# Patient Record
Sex: FEMALE | Race: WHITE | NOT HISPANIC OR LATINO | Employment: OTHER | URBAN - METROPOLITAN AREA
[De-identification: names, ages, dates, MRNs, and addresses within clinical notes are randomized per-mention and may not be internally consistent; named-entity substitution may affect disease eponyms.]

---

## 2017-01-10 ENCOUNTER — PATIENT OUTREACH (OUTPATIENT)
Dept: OTHER | Facility: OTHER | Age: 72
End: 2017-01-10
Payer: MEDICARE

## 2017-01-10 NOTE — PROGRESS NOTES
Last 5 Patient Entered Readings                                                               Current 30 Day Average: 153/74     Recent Readings 1/10/2017 1/9/2017 1/9/2017 1/8/2017 1/8/2017    Systolic BP (mmHg) 150 158 142 158 169    Diastolic BP (mmHg) 79 78 80 77 86        Called Ms. Nguyen to introduce her into the Hypertension Digital Medicine Program.     She feels the [employer] cuff is giving falsely elevated BP readings compared to her [facility] HTN cuff. She confirms she is using it correctly. She said her arm was measured so she is confident it fits appropriately. She has an gynecology appointment on 1/26/17. She will bring her i[employer] cuff to this appointment and will have the check the readings and compare it to a manual reading. I will follow up with her after this visit. Asked that she continue taking readings often even though they may be elevated.     Reviewed patient's medications and verified allergies on file.     Reviewed questionnaire:  Depression: not indicated  Sleep apnea: not indicated    Explained that we expect her to obtain several blood pressures/week at random times of day. Also asked that the BP be taken at least 1 hour after taking BP medications.     Explained that our goal is to get her BP to consistently below 140/90mmHg.     Patient and I agreed that the patient will take her BP daily to every other day at varying times of the day.     I will plan to follow-up with the patient in 2 weeks.    Emailed patient link to [facility] webpage as well as my direct phone number in case he/ she has in questions.

## 2017-01-10 NOTE — LETTER
"   Veronique Bee, PharmD  [address] [place] [address]     Dear Clemencia Nguyen,    Welcome to the [facility]!         My name is Veronique Bee and I am your dedicated clinical pharmacist.  As an expert in medication management, I will help ensure that the medications you are taking continue to provide you with the intended benefits.      This is Miriam Jose and she will be your health  for the duration of the program.  Her job is to help you identify lifestyle changes to improve your blood pressure control.  You will talk about nutrition, exercise, and other ways that you may be able to adjust your current habits to better your health. Together, we will work to improve your overall health and encourage you to meet your goals for a healthier lifestyle.    What we expect from YOU:    You will need to take blood pressure readings multiple times a week and no less than one reading per week.   It is important that you take your measurements at different times during the day, when possible.     What you should expect from your Digital Medicine Care Team:   We will provide you with education about high blood pressure, including lifestyle changes that could help you to control your blood pressure.   We will review your weekly readings and provide you with monthly blood pressure progress reports after you have been in the program for more than 30 days.   We will send monthly progress reports on your blood pressure control to your physician so they can follow along with your progress as well.    You will be able to reach me by phone at   or through your [facility] account by clicking "Send a message to your doctor's office" on the home screen then selecting my name in the "To the office of:" field.     I look forward to working with you to achieve your blood pressure goals!    Sincerely,    Veronique Bee PharmD  Your personal Clinical Pharmacist    Please " visit [email or web address] to learn more about high blood pressure and what you can do lower your blood pressure.                                                                                         Clemencia Nguyen  [address]  [place] [address]

## 2017-01-17 ENCOUNTER — PATIENT OUTREACH (OUTPATIENT)
Dept: OTHER | Facility: OTHER | Age: 72
End: 2017-01-17
Payer: MEDICARE

## 2017-01-17 NOTE — PROGRESS NOTES
"Last 5 Patient Entered Readings                                                               Current 30 Day Average: 153/75     Recent Readings 1/17/2017 1/16/2017 1/16/2017 1/15/2017 1/15/2017    Systolic BP (mmHg) 146 162 157 151 157    Diastolic BP (mmHg) 79 80 75 71 79        Follow up with Ms. Clemencia Nguyen completed. Patient is doing well. She is still having different readings between her home cuff and iHealth cuff (sometimes 20-30 points difference). However, sometimes the readings are "close". She has an appointment on Thursday, 1/26, and will bring both cuffs to have them compared to a manual reading. Patient did not have any further questions or concerns. I will follow up in a few weeks to see how she is doing and progressing.        "

## 2017-01-19 DIAGNOSIS — Z12.31 VISIT FOR SCREENING MAMMOGRAM: Primary | ICD-10-CM

## 2017-01-26 ENCOUNTER — HOSPITAL ENCOUNTER (OUTPATIENT)
Dept: RADIOLOGY | Facility: HOSPITAL | Age: 72
Discharge: HOME | End: 2017-01-26
Attending: OBSTETRICS & GYNECOLOGY
Payer: MEDICARE

## 2017-01-26 DIAGNOSIS — Z12.31 VISIT FOR SCREENING MAMMOGRAM: ICD-10-CM

## 2017-01-26 PROCEDURE — 77067 SCR MAMMO BI INCL CAD: CPT | Mod: 26,,, | Performed by: RADIOLOGY

## 2017-01-26 PROCEDURE — 77063 BREAST TOMOSYNTHESIS BI: CPT | Mod: 26,,, | Performed by: RADIOLOGY

## 2017-01-26 PROCEDURE — 77067 SCR MAMMO BI INCL CAD: CPT | Mod: TC

## 2017-01-31 ENCOUNTER — PATIENT OUTREACH (OUTPATIENT)
Dept: OTHER | Facility: OTHER | Age: 72
End: 2017-01-31
Payer: MEDICARE

## 2017-01-31 ENCOUNTER — INITIAL CONSULT (OUTPATIENT)
Dept: DERMATOLOGY | Facility: CLINIC | Age: 72
End: 2017-01-31
Payer: MEDICARE

## 2017-01-31 VITALS — BODY MASS INDEX: 27.38 KG/M2 | HEIGHT: 61 IN | WEIGHT: 145 LBS

## 2017-01-31 DIAGNOSIS — L81.7 SCHAMBERG'S CAPILLARITIS: ICD-10-CM

## 2017-01-31 DIAGNOSIS — L82.1 SEBORRHEIC KERATOSES: Primary | ICD-10-CM

## 2017-01-31 DIAGNOSIS — I10 ESSENTIAL HYPERTENSION: Primary | ICD-10-CM

## 2017-01-31 DIAGNOSIS — L73.8 SEBACEOUS HYPERPLASIA: ICD-10-CM

## 2017-01-31 PROCEDURE — 99212 OFFICE O/P EST SF 10 MIN: CPT | Mod: PBBFAC,PO | Performed by: DERMATOLOGY

## 2017-01-31 PROCEDURE — 99999 PR PBB SHADOW E&M-EST. PATIENT-LVL II: CPT | Mod: PBBFAC,,, | Performed by: DERMATOLOGY

## 2017-01-31 PROCEDURE — 99203 OFFICE O/P NEW LOW 30 MIN: CPT | Mod: S$PBB,,, | Performed by: DERMATOLOGY

## 2017-01-31 RX ORDER — VALSARTAN 160 MG/1
160 TABLET ORAL DAILY
Qty: 90 TABLET | Refills: 3 | Status: SHIPPED | OUTPATIENT
Start: 2017-01-31 | End: 2017-11-27 | Stop reason: SDUPTHER

## 2017-01-31 NOTE — PROGRESS NOTES
Subjective:       Patient ID:  Clemencia Nguyen is a 71 y.o. female who presents for   Chief Complaint   Patient presents with    Skin Check    Rash    Lesion     HPI Comments: Pt here for IV skin check. She has never been seen by a dermatologist    Lesions on right upper arms for 6 months, asymptomatic, not treated    Rash on bilateral lower legs for 1 year, asymptomatic, not treated    No phx of skin cancer  No fhx of skin cancer    Takes fish oil daily, otherwise denies blood thinners    Review of Systems   Skin: Positive for rash. Negative for itching, daily sunscreen use and activity-related sunscreen use.        Objective:    Physical Exam   HENT:   Head:       Skin:                 Diagram Legend     Erythematous scaling macule/papule c/w actinic keratosis       Vascular papule c/w angioma      Pigmented verrucoid papule/plaque c/w seborrheic keratosis      Yellow umbilicated papule c/w sebaceous hyperplasia      Irregularly shaped tan macule c/w lentigo     1-2 mm smooth white papules consistent with Milia      Movable subcutaneous cyst with punctum c/w epidermal inclusion cyst      Subcutaneous movable cyst c/w pilar cyst      Firm pink to brown papule c/w dermatofibroma      Pedunculated fleshy papule(s) c/w skin tag(s)      Evenly pigmented macule c/w junctional nevus     Mildly variegated pigmented, slightly irregular-bordered macule c/w mildly atypical nevus      Flesh colored to evenly pigmented papule c/w intradermal nevus       Pink pearly papule/plaque c/w basal cell carcinoma      Erythematous hyperkeratotic cursted plaque c/w SCC      Surgical scar with no sign of skin cancer recurrence      Open and closed comedones      Inflammatory papules and pustules      Verrucoid papule consistent consistent with wart     Erythematous eczematous patches and plaques     Dystrophic onycholytic nail with subungual debris c/w onychomycosis     Umbilicated papule    Erythematous-base heme-crusted tan  verrucoid plaque consistent with inflamed seborrheic keratosis     Erythematous Silvery Scaling Plaque c/w Psoriasis     See annotation      Assessment / Plan:        Seborrheic keratoses, face/trunk/extremities  These are benign inherited growths without a malignant potential. Reassurance given to patient. No treatment is necessary.       Schamberg's capillaritis  Reassurance, discussed etiology red blood cell extravasation  Discussed association and exacerbation of condition with NSAIDs and ASA  Recommend OTC ascorbic acid 500mg bid and rutoside/rutin 50mg bid- pt declines, does not want to take any more meds      Sebaceous hyperplasia, face  Reassurance given to patient. No treatment is necessary.   Treatment of benign, asymptomatic lesions may be considered cosmetic.             Return if symptoms worsen or fail to improve.

## 2017-01-31 NOTE — LETTER
January 31, 2017      Marcie Mccloud MD  [address] [facility] [address] [place] [address]           [facility]  [address] [facility] [address] [place] [address]  Phone: [phone]  Fax: [phone]          Patient: Clemencia Nguyen   MR Number: [id number]   YOB: 1945   Date of Visit: 1/31/2017       Dear Dr. Marcie Mccloud:    Thank you for referring Clemencia Nguyen to me for evaluation. Attached you will find relevant portions of my assessment and plan of care.    If you have questions, please do not hesitate to call me. I look forward to following Clemencia Nguyen along with you.    Sincerely,    Angela Shahid MD    Enclosure  CC:  No Recipients    If you would like to receive this communication electronically, please contact [email or web address] or [phone] to request more information on [facility] access.    For providers and/or their staff who would like to refer a patient to [facility], please contact us through our one-stop-shop provider referral line, [facility] [place], at [phone].    If you feel you have received this communication in error or would no longer like to receive these types of communications, please e-mail [email or web address]

## 2017-01-31 NOTE — PROGRESS NOTES
Last 5 Patient Entered Readings                                                               Current 30 Day Average: 155/76     Recent Readings 1/31/2017 1/31/2017 1/30/2017 1/30/2017 1/29/2017    Systolic BP (mmHg) 149 167 142 156 160    Diastolic BP (mmHg) 69 79 67 85 76    Pulse 80 84 80 77 77        Ms. Nguyen BP is not at goal. She denies any symptoms. She did have her BP checked by multiple nurses on different occassions for appointments and the readings are very similar to her HDMP cuff. She understands that her BP is not at goal. Will start valsartan today.     Current HTN regimen:  Outpatient Hypertension Medications as of 1/31/2017             felodipine (PLENDIL) 5 MG 24 hr tablet TAKE 1 TABLET ONE TIME DAILY    valsartan (DIOVAN) 160 MG tablet Take 1 tablet (160 mg total) by mouth once daily.          Will continue to monitor regularly. Will follow up in 3-4 weeks, sooner if BP begins to trend upward or downward.    Patient has my contact information and knows to call with any concerns or clinical changes.

## 2017-02-08 DIAGNOSIS — I10 ESSENTIAL HYPERTENSION: ICD-10-CM

## 2017-02-08 RX ORDER — FELODIPINE 5 MG/1
TABLET, EXTENDED RELEASE ORAL
Qty: 90 TABLET | Refills: 1 | Status: SHIPPED | OUTPATIENT
Start: 2017-02-08 | End: 2017-05-19 | Stop reason: ALTCHOICE

## 2017-02-09 ENCOUNTER — PATIENT OUTREACH (OUTPATIENT)
Dept: OTHER | Facility: OTHER | Age: 72
End: 2017-02-09
Payer: MEDICARE

## 2017-02-09 NOTE — PROGRESS NOTES
Last 5 Patient Entered Readings                                                               Current 30 Day Average: 153/75     Recent Readings 2/9/2017 2/8/2017 2/8/2017 2/7/2017 2/7/2017    Systolic BP (mmHg) 140 149 156 143 157    Diastolic BP (mmHg) 70 69 71 71 73    Pulse 73 87 84 78 83        Follow up with Ms. Clemencia Nguyen completed. Ms. Nguyen is doing well. She started valsartin about a week ago and reports no issues so far. She is watching her sodium intake and trying to stay active. Patient did not have any further questions or concerns. I will follow up in a few weeks to see how she is doing and progressing.

## 2017-02-22 ENCOUNTER — PATIENT OUTREACH (OUTPATIENT)
Dept: OTHER | Facility: OTHER | Age: 72
End: 2017-02-22
Payer: MEDICARE

## 2017-02-22 NOTE — PROGRESS NOTES
Last 5 Patient Entered Readings                                                               Current 30 Day Average: 149/72     Recent Readings 2/21/2017 2/21/2017 2/20/2017 2/19/2017 2/19/2017    Systolic BP (mmHg) 144 156 133 149 132    Diastolic BP (mmHg) 68 76 61 68 70    Pulse 70 74 92 79 78        Ms. Nguyen's BP is not at goal, but is improving on valsartan 160 mg QD. Patient denies any symptoms or concerns.      Current HTN regimen:  Hypertension Medications             felodipine (PLENDIL) 5 MG 24 hr tablet TAKE 1 TABLET ONE TIME DAILY    valsartan (DIOVAN) 160 MG tablet Take 1 tablet (160 mg total) by mouth once daily.        Changes made today:  May increase valsartan to 320 mg QD    Will continue to monitor regularly. Will follow up in 2-3 weeks, sooner if BP begins to trend upward or downward.    Patient has my contact information and knows to call with any concerns or clinical changes.

## 2017-03-02 ENCOUNTER — PATIENT OUTREACH (OUTPATIENT)
Dept: OTHER | Facility: OTHER | Age: 72
End: 2017-03-02
Payer: MEDICARE

## 2017-03-02 NOTE — PROGRESS NOTES
Last 5 Patient Entered Readings                                                               Current 30 Day Average: 145/71     Recent Readings 3/2/2017 3/1/2017 2/28/2017 2/28/2017 2/27/2017    Systolic BP (mmHg) 148 149 132 135 141    Diastolic BP (mmHg) 74 74 69 71 69    Pulse 90 82 81 81 76        Follow up with Ms. Clemencia Nguyen completed. Ms. Nguyen is doing well. She is still attending [employer] three times a week and has cut most salt out of her diet. Patient did not have any further questions or concerns. I will follow up in a few weeks to see how she is doing and progressing.

## 2017-03-13 ENCOUNTER — LAB VISIT (OUTPATIENT)
Dept: LAB | Facility: HOSPITAL | Age: 72
End: 2017-03-13
Payer: MEDICARE

## 2017-03-13 DIAGNOSIS — N25.81 SECONDARY HYPERPARATHYROIDISM (OF RENAL ORIGIN): ICD-10-CM

## 2017-03-13 DIAGNOSIS — E55.9 UNSPECIFIED VITAMIN D DEFICIENCY: Primary | ICD-10-CM

## 2017-03-13 DIAGNOSIS — N18.30 CHRONIC KIDNEY DISEASE, STAGE III (MODERATE): ICD-10-CM

## 2017-03-13 DIAGNOSIS — I10 BENIGN ESSENTIAL HYPERTENSION: ICD-10-CM

## 2017-03-13 LAB
25(OH)D3+25(OH)D2 SERPL-MCNC: 33 NG/ML
ALBUMIN SERPL BCP-MCNC: 3.3 G/DL
ANION GAP SERPL CALC-SCNC: 6 MMOL/L
BASOPHILS # BLD AUTO: 0 K/UL
BASOPHILS NFR BLD: 0 %
BUN SERPL-MCNC: 19 MG/DL
CALCIUM SERPL-MCNC: 9.4 MG/DL
CHLORIDE SERPL-SCNC: 101 MMOL/L
CO2 SERPL-SCNC: 31 MMOL/L
CREAT SERPL-MCNC: 1.4 MG/DL
DIFFERENTIAL METHOD: ABNORMAL
EOSINOPHIL # BLD AUTO: 0 K/UL
EOSINOPHIL NFR BLD: 0 %
ERYTHROCYTE [DISTWIDTH] IN BLOOD BY AUTOMATED COUNT: 13.8 %
EST. GFR  (AFRICAN AMERICAN): 43 ML/MIN/1.73 M^2
EST. GFR  (NON AFRICAN AMERICAN): 37.3 ML/MIN/1.73 M^2
GLUCOSE SERPL-MCNC: 178 MG/DL
HCT VFR BLD AUTO: 43.4 %
HGB BLD-MCNC: 14.2 G/DL
LYMPHOCYTES # BLD AUTO: 1.1 K/UL
LYMPHOCYTES NFR BLD: 28.4 %
MCH RBC QN AUTO: 28 PG
MCHC RBC AUTO-ENTMCNC: 32.7 %
MCV RBC AUTO: 86 FL
MONOCYTES # BLD AUTO: 0.3 K/UL
MONOCYTES NFR BLD: 8.3 %
NEUTROPHILS # BLD AUTO: 2.5 K/UL
NEUTROPHILS NFR BLD: 63.3 %
PHOSPHATE SERPL-MCNC: 3 MG/DL
PLATELET # BLD AUTO: 127 K/UL
PMV BLD AUTO: 9.3 FL
POTASSIUM SERPL-SCNC: 3.8 MMOL/L
PTH-INTACT SERPL-MCNC: 88 PG/ML
RBC # BLD AUTO: 5.07 M/UL
SODIUM SERPL-SCNC: 138 MMOL/L
WBC # BLD AUTO: 3.98 K/UL

## 2017-03-13 PROCEDURE — 36415 COLL VENOUS BLD VENIPUNCTURE: CPT | Mod: PO

## 2017-03-13 PROCEDURE — 85025 COMPLETE CBC W/AUTO DIFF WBC: CPT

## 2017-03-13 PROCEDURE — 82306 VITAMIN D 25 HYDROXY: CPT

## 2017-03-13 PROCEDURE — 83970 ASSAY OF PARATHORMONE: CPT

## 2017-03-13 PROCEDURE — 80069 RENAL FUNCTION PANEL: CPT

## 2017-03-23 ENCOUNTER — PATIENT OUTREACH (OUTPATIENT)
Dept: OTHER | Facility: OTHER | Age: 72
End: 2017-03-23
Payer: MEDICARE

## 2017-03-23 NOTE — PROGRESS NOTES
Last 5 Patient Entered Readings                                                               Current 30 Day Average: 141/70     Recent Readings 3/27/2017 3/26/2017 3/25/2017 3/24/2017 3/23/2017    Systolic BP (mmHg) 147 124 137 137 139    Diastolic BP (mmHg) 88 64 70 76 77    Pulse 88 87 79 82 90      Last average 145/71    Follow up with Ms. Clemencia Nguyen completed. Ms. Nguyen is doing well. She is out of town this week in [place] for an [employer] trip. Patient reports that she had a bad cold for the past 8 days and was taking OTC drugs for relief. She feels much better now and will get back into her daily routine once she returns home from her trip. Patient did not have any further questions or concerns. I will follow up in a few weeks to see how she is doing and progressing.

## 2017-03-24 ENCOUNTER — PATIENT OUTREACH (OUTPATIENT)
Dept: OTHER | Facility: OTHER | Age: 72
End: 2017-03-24
Payer: MEDICARE

## 2017-03-24 NOTE — PROGRESS NOTES
Last 5 Patient Entered Readings                                                               Current 30 Day Average: 142/71     Recent Readings 3/24/2017 3/23/2017 3/22/2017 3/21/2017 3/20/2017    Systolic BP (mmHg) 137 139 150 129 145    Diastolic BP (mmHg) 76 77 81 71 74    Pulse 82 90 100 79 85        MsAnusha Alcocer BP is not at goal, but continues to improve on valsartan. She denies any symptoms. She had a bad cold last week and feels that may have been the reason for elevated readings.     Current HTN regimen:  Hypertension Medications             felodipine (PLENDIL) 5 MG 24 hr tablet TAKE 1 TABLET ONE TIME DAILY    valsartan (DIOVAN) 160 MG tablet Take 1 tablet (160 mg total) by mouth once daily.        Changes made today:  None if still not at goal at next follow up will increase valsartan to 320 QD    Will continue to monitor regularly. Will follow up in about 1 month, sooner if BP begins to trend upward or downward.    Patient has my contact information and knows to call with any concerns or clinical changes.

## 2017-04-01 DIAGNOSIS — K21.9 GASTROESOPHAGEAL REFLUX DISEASE WITHOUT ESOPHAGITIS: ICD-10-CM

## 2017-04-02 RX ORDER — PANTOPRAZOLE SODIUM 40 MG/1
TABLET, DELAYED RELEASE ORAL
Qty: 90 TABLET | Refills: 1 | Status: SHIPPED | OUTPATIENT
Start: 2017-04-02 | End: 2017-10-16 | Stop reason: SDUPTHER

## 2017-04-05 ENCOUNTER — OFFICE VISIT (OUTPATIENT)
Dept: INTERNAL MEDICINE | Facility: CLINIC | Age: 72
End: 2017-04-05
Payer: MEDICARE

## 2017-04-05 VITALS
WEIGHT: 143.06 LBS | HEART RATE: 85 BPM | RESPIRATION RATE: 16 BRPM | OXYGEN SATURATION: 98 % | HEIGHT: 61 IN | SYSTOLIC BLOOD PRESSURE: 110 MMHG | DIASTOLIC BLOOD PRESSURE: 58 MMHG | BODY MASS INDEX: 27.01 KG/M2

## 2017-04-05 DIAGNOSIS — J40 BRONCHITIS: Primary | ICD-10-CM

## 2017-04-05 PROCEDURE — 99213 OFFICE O/P EST LOW 20 MIN: CPT | Mod: S$PBB,,, | Performed by: INTERNAL MEDICINE

## 2017-04-05 PROCEDURE — 99999 PR PBB SHADOW E&M-EST. PATIENT-LVL III: CPT | Mod: PBBFAC,,, | Performed by: INTERNAL MEDICINE

## 2017-04-05 PROCEDURE — 99213 OFFICE O/P EST LOW 20 MIN: CPT | Mod: PBBFAC,PO | Performed by: INTERNAL MEDICINE

## 2017-04-05 RX ORDER — AZITHROMYCIN 500 MG/1
500 TABLET, FILM COATED ORAL DAILY
Qty: 3 TABLET | Refills: 0 | Status: SHIPPED | OUTPATIENT
Start: 2017-04-05 | End: 2017-05-19 | Stop reason: ALTCHOICE

## 2017-04-05 RX ORDER — AZELASTINE 1 MG/ML
1 SPRAY, METERED NASAL 2 TIMES DAILY
Qty: 30 ML | Refills: 1 | Status: SHIPPED | OUTPATIENT
Start: 2017-04-05 | End: 2017-11-27

## 2017-04-05 RX ORDER — BENZONATATE 200 MG/1
200 CAPSULE ORAL 2 TIMES DAILY PRN
Qty: 20 CAPSULE | Refills: 0 | Status: SHIPPED | OUTPATIENT
Start: 2017-04-05 | End: 2017-04-12

## 2017-04-05 NOTE — PROGRESS NOTES
HISTORY OF PRESENT ILLNESS:  Pt. is a 71 y.o. female presents after URI has failed to improve over the last week. She is coughing/dry cough. States a low grade fever one day. She states states a light headedness, had achiness. She states she has been taking coricidin hpb, Not helping, has been on robitussin. States no real shortness of breath. Her worst symptom is the cough, which has been fairly consistent throughout the night, difficult to sleep. Possible influenza, but we are out of the time frame for tamiflu. Her  is also ill.      ROS:  GENERAL: Recent fever, chills, fatigability; no weight loss.  SKIN: No rashes, itching or changes in color or texture of skin.  HEAD: No headaches or recent head trauma.  EARS: Denies ear pain, discharge or vertigo.  NOSE: No loss of smell, no epistaxis or postnasal drip.  MOUTH & THROAT: No hoarseness or change in voice. No excessive gum bleeding.  NODES: Denies swollen glands.  CHEST: Denies DUPONT, cyanosis, wheezing, positive cough and sputum production.  CARDIOVASCULAR: Denies chest pain, PND, orthopnea or reduced exercise tolerance.  ABDOMEN: Appetite fine. No weight loss. Denies constipation, diarrhea, abdominal pain, hematemesis or blood in stool.  URINARY: No flank pain, dysuria or hematuria.  PERIPHERAL VASCULAR: No claudication or cyanosis. No edema.  MUSCULOSKELETAL: No joint stiffness or swelling. Denies back pain.  NEUROLOGIC: Denies numbness    PE:   Vitals:   Vitals:    04/05/17 1048   BP: (!) 110/58   Pulse: 85   Resp: 16     GENERAL: no acute distress, A&Ox3, comfortable  Female with BMI of 27  HEENT: tympanic membranes clear, nasal mucosa pink, positive congestion; no pharyngeal erythema or exudate  NECK: supple, no cervical lymphadenopathy, no thyromegaly; no supraclavicular nodes;   CHEST:  clear to auscultation bilaterally, no crackles or wheeze; no increased work of breathing; positive wet cough;  CARDIOVASCULAR: regular rate and rhythm, no rubs,  murmurs or gallops.  ABDOMEN: normal bowel sounds, soft non-tender, non-distended; no palpable organomegaly;   EXT: no clubbing, cyanosis or edema.     ASSESSMENT/PLAN:    Bronchitis  -     azithromycin (ZITHROMAX) 500 MG tablet; Take 1 tablet (500 mg total) by mouth once daily.  Dispense: 3 tablet; Refill: 0  -     benzonatate (TESSALON) 200 MG capsule; Take 1 capsule (200 mg total) by mouth 2 (two) times daily as needed for Cough.  Dispense: 20 capsule; Refill: 0  -     azelastine (ASTELIN) 137 mcg (0.1 %) nasal spray; 1 spray (137 mcg total) by Nasal route 2 (two) times daily.  Dispense: 30 mL; Refill: 1      Call if condition changes or worsens.

## 2017-04-05 NOTE — MR AVS SNAPSHOT
[facility]  [address] [facility] [address]  [facility] [address]  Phone: [phone]  Fax: [phone]                  Clemencia Nguyen   2017 11:00 AM   Office Visit    Description:  Female : 1945   Provider:  Marcie Mccloud MD   Department:  [place] - Internal Medicine           Reason for Visit     Cough           Diagnoses this Visit        Comments    Bronchitis    -  Primary            To Do List           Future Appointments        Provider Department Dept Phone    2017 9:00 AM Marcie Mccloud MD [facility] [phone]      Goals (5 Years of Data)              Today    17    Blood Pressure <= 140/90   110/58  110/58  110/58    Notes - Note created  2016  3:40 PM by Miriam Jose    It is important to consistently maintain a controlled blood pressure.      Exercise at least 150 minutes per week.           Maintain a low sodium diet           Take at least one BP reading per week at various times of the day              These Medications        Disp Refills Start End    azithromycin (ZITHROMAX) 500 MG tablet 3 tablet 0 2017     Take 1 tablet (500 mg total) by mouth once daily. - Oral    Pharmacy: [facility] [place] [address] AT [facility] [place] [address] Ph #: [phone]       benzonatate (TESSALON) 200 MG capsule 20 capsule 0 2017    Take 1 capsule (200 mg total) by mouth 2 (two) times daily as needed for Cough. - Oral    Pharmacy: [facility] [place] [address] [place] [address] AT [facility] [address] Ph #: [phone]       azelastine (ASTELIN) 137 mcg (0.1 %) nasal spray 30 mL 1 2017     1 spray (137 mcg total) by Nasal route 2 (two) times daily. - Nasal    Pharmacy: [facility] [place] [address] [place] [address] AT [facility] [address] Ph #: [phone]         [facility] On Call     [facility] On [facility] Nurse Care Line -   Assistance  Unless otherwise directed by your provider, please contact [facility], our nurse care line that is available for  assistance.     Registered nurses in the [facility] provide: appointment scheduling, clinical advisement, health education, and other advisory services.  Call: [phone] (toll free)               Medications           Message regarding Medications     Verify the changes and/or additions to your medication regime listed below are the same as discussed with your clinician today.  If any of these changes or additions are incorrect, please notify your healthcare provider.        START taking these NEW medications        Refills    azithromycin (ZITHROMAX) 500 MG tablet 0    Sig: Take 1 tablet (500 mg total) by mouth once daily.    Class: Normal    Route: Oral    benzonatate (TESSALON) 200 MG capsule 0    Sig: Take 1 capsule (200 mg total) by mouth 2 (two) times daily as needed for Cough.    Class: Normal    Route: Oral    azelastine (ASTELIN) 137 mcg (0.1 %) nasal spray 1    Si spray (137 mcg total) by Nasal route 2 (two) times daily.    Class: Normal    Route: Nasal           Verify that the below list of medications is an accurate representation of the medications you are currently taking.  If none reported, the list may be blank. If incorrect, please contact your healthcare provider. Carry this list with you in case of emergency.           Current Medications     alendronate (FOSAMAX) 35 MG tablet Take 1 tablet by mouth once a week.    BIOTIN ORAL Take 1,000 mcg by mouth once daily.    calcitRIOL (ROCALTROL) 0.25 MCG Cap Take 1 capsule by mouth every other day.     felodipine (PLENDIL) 5 MG 24 hr tablet TAKE 1 TABLET ONE TIME DAILY    imipramine (TOFRANIL) 25 MG tablet 3 tablets Twice a day    levothyroxine (SYNTHROID) 75 MCG tablet Take 0.5 tablets (37.5 mcg total) by mouth before breakfast. Take 1/2 pill of the 75 mcg.    omega-3 acid ethyl esters (LOVAZA) 1  "gram capsule Take 4 capsules (4 g total) by mouth once daily.    pantoprazole (PROTONIX) 40 MG tablet TAKE 1 TABLET ONE TIME DAILY    simvastatin (ZOCOR) 10 MG tablet Take 1 tablet (10 mg total) by mouth every evening.    valsartan (DIOVAN) 160 MG tablet Take 1 tablet (160 mg total) by mouth once daily.    vitamin D 1000 units Tab Take 1,000 Units by mouth once daily.    azelastine (ASTELIN) 137 mcg (0.1 %) nasal spray 1 spray (137 mcg total) by Nasal route 2 (two) times daily.    azithromycin (ZITHROMAX) 500 MG tablet Take 1 tablet (500 mg total) by mouth once daily.    benzonatate (TESSALON) 200 MG capsule Take 1 capsule (200 mg total) by mouth 2 (two) times daily as needed for Cough.           Clinical Reference Information           Your Vitals Were     BP Pulse Resp Height Weight SpO2    110/58 85 16 5' 1" (1.549 m) 64.9 kg (143 lb 1.3 oz) 98%    BMI                27.03 kg/m2          Blood Pressure          Most Recent Value    BP  (!)  110/58      Allergies as of 4/5/2017     No Known Drug Allergies      Immunizations Administered on Date of Encounter - 4/5/2017     None      Language Assistance Services     ATTENTION: Language assistance services are available, free of charge. Please call [phone].      ATENCIÓN: Si shaquille noel, tiene a maldonado disposición servicios gratuitos de asistencia lingüística. Llame al [phone].     [facility] Ý: N?u b?n nói Ti?ng Vi?t, có các d?ch v? h? tr? ngôn ng? mi?n phí dành cho b?n. G?i s? [phone].         [facility] complies with applicable Federal civil rights laws and does not discriminate on the basis of race, color, national origin, age, disability, or sex.        "

## 2017-04-05 NOTE — PROGRESS NOTES
HISTORY OF PRESENT ILLNESS:  Pt. is a 71 y.o. female presents after URI has failed to improve over the last week.  She is coughing/dry cough.  States a low grade fever one day.  She states states a light headedness, had achiness.  She states she has been taking coricidin hpb,  Not helping, has been on robitussin.  States no real shortness of breath.  Her worst symptom is the cough, which has been fairly consistent throughout the night, difficult to sleep.  Possible influenza, but we are out of the time frame for tamiflu.  Her  is also ill.      ROS:  GENERAL: No fever, chills, fatigability or weight loss.  SKIN: No rashes, itching or changes in color or texture of skin.  HEAD: No headaches or recent head trauma.  EARS: Denies ear pain, discharge or vertigo.  NOSE: No loss of smell, no epistaxis or postnasal drip.  MOUTH & THROAT: No hoarseness or change in voice. No excessive gum bleeding.  NODES: Denies swollen glands.  CHEST: Denies DUPONT, cyanosis, wheezing, cough and sputum production.  CARDIOVASCULAR: Denies chest pain, PND, orthopnea or reduced exercise tolerance.  ABDOMEN: Appetite fine. No weight loss. Denies constipation, diarrhea, abdominal pain, hematemesis or blood in stool.  URINARY: No flank pain, dysuria or hematuria.  PERIPHERAL VASCULAR: No claudication or cyanosis. No edema.  MUSCULOSKELETAL: No joint stiffness or swelling. Denies back pain.  NEUROLOGIC: Denies numbness    PE:   Vitals:   Vitals:    04/05/17 1048   BP: (!) 110/58   Pulse: 85   Resp: 16     GENERAL: no acute distress, A&Ox3, comfortable.   HEENT: tympanic membranes clear, nasal mucosa pink, no pharyngeal erythema or exudate  NECK: supple, no cervical lymphadenopathy, no thyromegaly; no supraclavicular nodes;   CHEST:  clear to auscultation bilaterally, no crackles or wheeze; no increased work of breathing;  CARDIOVASCULAR: regular rate and rhythm, no rubs, murmurs or gallops.  ABDOMEN: normal bowel sounds, soft non-tender,  non-distended; no palpable organomegaly;   EXT: no clubbing, cyanosis or edema.     ASSESSMENT/PLAN:  Call if condition changes or worsens.  Answers for HPI/ROS submitted by the patient on 4/4/2017   Cough  Chronicity: recurrent  Onset: in the past 7 days  Progression since onset: waxing and waning  Frequency: constantly  Cough characteristics: non-productive  chest pain: No  chills: Yes  ear congestion: No  ear pain: No  fever: Yes  headaches: No  heartburn: No  hemoptysis: No  myalgias: Yes  nasal congestion: No  postnasal drip: No  rash: No  rhinorrhea: Yes  shortness of breath: No  sore throat: No  sweats: No  weight loss: No  wheezing: No  Aggravated by: lying down  asthma: No  bronchiectasis: No  bronchitis: No  COPD: No  emphysema: No  environmental allergies: No  pneumonia: No  Treatments tried: OTC cough suppressant  Improvement on treatment: mild

## 2017-04-24 ENCOUNTER — PATIENT OUTREACH (OUTPATIENT)
Dept: OTHER | Facility: OTHER | Age: 72
End: 2017-04-24
Payer: MEDICARE

## 2017-04-24 NOTE — PROGRESS NOTES
Last 5 Patient Entered Readings                                                               Current 30 Day Average: 141/67     Recent Readings 4/24/2017 4/23/2017 4/15/2017 4/14/2017 4/13/2017    Systolic BP (mmHg) 147 150 136 136 159    Diastolic BP (mmHg) 69 71 66 74 76    Pulse 82 79 83 95 97        Follow up with Ms. Clemencia Nguyen completed.  Mrs. Nguyen returned home from her cruise last night. She attributes elevated readings to dietary indiscretions. She is starting back to monitoring her sodium intake today, and will return to dancing at night. Ms. Nguyen has an appointment with Dr. Mccloud May 19. She will bring her BP cuff with her to assess it's accuracy. Patient did not have any further questions or concerns. I will follow up in a few weeks to see how she is doing and progressing.

## 2017-05-09 NOTE — PROGRESS NOTES
Last 5 Patient Entered Readings                                                               Current 30 Day Average: 139/67     Recent Readings 5/9/2017 5/8/2017 5/7/2017 5/6/2017 5/5/2017    Systolic BP (mmHg) 136 139 141 127 117    Diastolic BP (mmHg) 67 69 64 60 65    Pulse 74 80 87 88 85          Ms. Romero's BP is at goal. She denies any symptoms and has no questions/concerns.     Current HTN regimen:  Hypertension Medications             felodipine (PLENDIL) 5 MG 24 hr tablet TAKE 1 TABLET ONE TIME DAILY    valsartan (DIOVAN) 160 MG tablet Take 1 tablet (160 mg total) by mouth once daily.            Will continue to monitor regularly. Will follow up in 2-3 months, sooner if BP begins to trend upward or downward.    Patient has my contact information and knows to call with any concerns or clinical changes.

## 2017-05-19 ENCOUNTER — OFFICE VISIT (OUTPATIENT)
Dept: INTERNAL MEDICINE | Facility: CLINIC | Age: 72
End: 2017-05-19
Payer: MEDICARE

## 2017-05-19 VITALS
SYSTOLIC BLOOD PRESSURE: 138 MMHG | HEIGHT: 61 IN | BODY MASS INDEX: 27.43 KG/M2 | HEART RATE: 84 BPM | WEIGHT: 145.31 LBS | DIASTOLIC BLOOD PRESSURE: 68 MMHG

## 2017-05-19 DIAGNOSIS — N18.30 CHRONIC KIDNEY DISEASE, STAGE III (MODERATE): ICD-10-CM

## 2017-05-19 DIAGNOSIS — I10 ESSENTIAL HYPERTENSION: Primary | ICD-10-CM

## 2017-05-19 DIAGNOSIS — F39 MILD MOOD DISORDER: ICD-10-CM

## 2017-05-19 DIAGNOSIS — E03.9 HYPOTHYROIDISM, UNSPECIFIED TYPE: ICD-10-CM

## 2017-05-19 DIAGNOSIS — Z00.00 HEALTH CARE MAINTENANCE: ICD-10-CM

## 2017-05-19 DIAGNOSIS — E78.5 HYPERLIPIDEMIA, UNSPECIFIED HYPERLIPIDEMIA TYPE: ICD-10-CM

## 2017-05-19 PROCEDURE — 99499 UNLISTED E&M SERVICE: CPT | Mod: S$PBB,,, | Performed by: INTERNAL MEDICINE

## 2017-05-19 PROCEDURE — 99214 OFFICE O/P EST MOD 30 MIN: CPT | Mod: S$PBB,,, | Performed by: INTERNAL MEDICINE

## 2017-05-19 PROCEDURE — 99999 PR PBB SHADOW E&M-EST. PATIENT-LVL III: CPT | Mod: PBBFAC,,, | Performed by: INTERNAL MEDICINE

## 2017-05-19 PROCEDURE — 99213 OFFICE O/P EST LOW 20 MIN: CPT | Mod: PBBFAC,PO | Performed by: INTERNAL MEDICINE

## 2017-05-19 RX ORDER — AMLODIPINE BESYLATE 2.5 MG/1
5 TABLET ORAL DAILY
Qty: 180 TABLET | Refills: 0 | Status: SHIPPED | OUTPATIENT
Start: 2017-05-19 | End: 2017-07-24 | Stop reason: SDUPTHER

## 2017-05-19 RX ORDER — SIMVASTATIN 10 MG/1
10 TABLET, FILM COATED ORAL NIGHTLY
Qty: 90 TABLET | Refills: 0 | Status: SHIPPED | OUTPATIENT
Start: 2017-05-19 | End: 2017-09-28 | Stop reason: SDUPTHER

## 2017-05-19 NOTE — PROGRESS NOTES
HISTORY OF PRESENT ILLNESS:  Pt. is a 71 y.o. female presents for monitoing of her HTN, hyperlipidemia, hypothyroidism, GERD.  She is due for labs for her liid, TSH.  She had recent labs for Dr. Ny Davis for her CKD stage 3.  Her PTH had improved slightly from previous, renal function on last labs had decreased.  Her platelet count remains at low end of normal.  She is part of the digital HTN clinic, those B/Ps have been at goal.  She had had insurance change and would like alternatives on formulary.    Lab Results   Component Value Date    WBC 3.98 03/13/2017    HGB 14.2 03/13/2017    HCT 43.4 03/13/2017     (L) 03/13/2017    CHOL 152 08/29/2016    TRIG 204 (H) 08/29/2016    HDL 33 (L) 08/29/2016    ALT 21 08/29/2016    AST 18 08/29/2016     03/13/2017    K 3.8 03/13/2017     03/13/2017    CREATININE 1.4 03/13/2017    BUN 19 03/13/2017    CO2 31 (H) 03/13/2017    TSH 3.250 05/27/2016    HGBA1C 5.7 01/05/2015     Lab Results   Component Value Date    LDLCALC 78.2 08/29/2016             ROS:  GENERAL: No fever, chills, fatigability or weight loss.  SKIN: No rashes, itching or changes in color or texture of skin.  HEAD: No headaches or recent head trauma.  EARS: Denies ear pain, discharge or vertigo.  NOSE: No loss of smell, no epistaxis or postnasal drip.  MOUTH & THROAT: No hoarseness or change in voice. No excessive gum bleeding.  NODES: Denies swollen glands.  CHEST: Denies DUPONT, cyanosis, wheezing, cough and sputum production.  CARDIOVASCULAR: Denies chest pain, PND, orthopnea or reduced exercise tolerance.  ABDOMEN: Appetite fine. No weight loss. Denies constipation, diarrhea, abdominal pain, hematemesis or blood in stool.  URINARY: No flank pain, dysuria or hematuria.  PERIPHERAL VASCULAR: No claudication or cyanosis. No edema.  MUSCULOSKELETAL: No joint stiffness or swelling. Denies back pain.  NEUROLOGIC: Denies numbness    PE:   Vitals:   Vitals:    05/19/17 0850   BP: (!) 157/76    Pulse: 84     My repeat: 138/68;    GENERAL: no acute distress, A&Ox3, comfortable.  Female with BMI of 27   HEENT: tympanic membranes clear, nasal mucosa pink, no pharyngeal erythema or exudate  NECK: supple, no cervical lymphadenopathy, no thyromegaly; no supraclavicular nodes;   CHEST:  clear to auscultation bilaterally, no crackles or wheeze; no increased work of breathing;  CARDIOVASCULAR: regular rate and rhythm, no rubs, murmurs or gallops.  ABDOMEN: normal bowel sounds, soft non-tender, non-distended; no palpable organomegaly;   EXT: no clubbing, cyanosis or edema.     ASSESSMENT/PLAN:    Essential hypertension  -     amlodipine (NORVASC) 2.5 MG tablet; Take 2 tablets (5 mg total) by mouth once daily.  Dispense: 180 tablet; Refill: 0    Hyperlipidemia, unspecified hyperlipidemia type  -     Comprehensive metabolic panel; Future; Expected date: 5/19/17  -     Lipid panel; Future; Expected date: 5/19/17  -     simvastatin (ZOCOR) 10 MG tablet; Take 1 tablet (10 mg total) by mouth every evening.  Dispense: 90 tablet; Refill: 0    Hypothyroidism, unspecified type  -     TSH; Future; Expected date: 5/19/17      CKD Stage 3: seeing Dr. Ny Davis;    Mood disorder: on tofranil;    Health Maintenance: Immunizations UTD; mammogram UTD; will be due for Pap next year; declines shingles at this time;      Call if condition changes or worsens.    Answers for HPI/ROS submitted by the patient on 5/16/2017   Hypertension  Chronicity: chronic  Onset: more than 1 month ago  Progression since onset: gradually improving  Condition status: controlled  anxiety: No  blurred vision: No  chest pain: No  headaches: No  malaise/fatigue: No  neck pain: No  orthopnea: No  palpitations: No  peripheral edema: No  PND: No  shortness of breath: No  sweats: No  Agents associated with hypertension: no associated agents  CAD risks: family history  Compliance problems: no compliance problems  Improvement on treatment: moderate

## 2017-05-19 NOTE — MR AVS SNAPSHOT
[facility]  [address] [facility] [address]  [place] [address]  Phone: [phone]  Fax: [phone]                  Clemencia Nguyen   2017 9:00 AM   Office Visit    Description:  Female : 1945   Provider:  Marcie Mccloud MD   Department:  [facility]           Reason for Visit     Follow-up           Diagnoses this Visit        Comments    Essential hypertension    -  Primary     Hyperlipidemia, unspecified hyperlipidemia type         Hypothyroidism, unspecified type         Chronic kidney disease, stage III (moderate)         Mild mood disorder         Health care maintenance                To Do List           Future Appointments        Provider Department Dept Phone    2017 9:00 AM Marcie Mccloud MD [facility] [phone]      Goals (5 Years of Data)              Today    Today    Today    Blood Pressure <= 140/90   138/68  138/68  138/68    Notes - Note created  2016  3:40 PM by Miriam Jose    It is important to consistently maintain a controlled blood pressure.      Exercise at least 150 minutes per week.           Maintain a low sodium diet           Take at least one BP reading per week at various times of the day             Follow-Up and Disposition     Return in about 6 months (around 2017).       These Medications        Disp Refills Start End    amlodipine (NORVASC) 2.5 MG tablet 180 tablet 0 2017    Take 2 tablets (5 mg total) by mouth once daily. - Oral    Pharmacy: [facility] - [facility] [address] Ph #: [phone]       simvastatin (ZOCOR) 10 MG tablet 90 tablet 0 2017     Take 1 tablet (10 mg total) by mouth every evening. - Oral    Pharmacy: [facility] - [facility] [address] Ph #: [phone]         [facility]     [facility] Nurse Care Line -  Assistance  Unless otherwise directed by  your provider, please contact [facility], our nurse care line that is available for 24/7 assistance.     Registered nurses in the [facility] provide: appointment scheduling, clinical advisement, health education, and other advisory services.  Call: [phone] (toll free)               Medications           Message regarding Medications     Verify the changes and/or additions to your medication regime listed below are the same as discussed with your clinician today.  If any of these changes or additions are incorrect, please notify your healthcare provider.        START taking these NEW medications        Refills    amlodipine (NORVASC) 2.5 MG tablet 0    Sig: Take 2 tablets (5 mg total) by mouth once daily.    Class: Normal    Route: Oral      STOP taking these medications     azithromycin (ZITHROMAX) 500 MG tablet Take 1 tablet (500 mg total) by mouth once daily.    felodipine (PLENDIL) 5 MG 24 hr tablet TAKE 1 TABLET ONE TIME DAILY           Verify that the below list of medications is an accurate representation of the medications you are currently taking.  If none reported, the list may be blank. If incorrect, please contact your healthcare provider. Carry this list with you in case of emergency.           Current Medications     alendronate (FOSAMAX) 35 MG tablet Take 1 tablet by mouth once a week.    azelastine (ASTELIN) 137 mcg (0.1 %) nasal spray 1 spray (137 mcg total) by Nasal route 2 (two) times daily.    BIOTIN ORAL Take 1,000 mcg by mouth once daily.    calcitRIOL (ROCALTROL) 0.25 MCG Cap Take 1 capsule by mouth every other day.     imipramine (TOFRANIL) 25 MG tablet 3 tablets Twice a day    levothyroxine (SYNTHROID) 75 MCG tablet Take 0.5 tablets (37.5 mcg total) by mouth before breakfast. Take 1/2 pill of the 75 mcg.    omega-3 acid ethyl esters (LOVAZA) 1 gram capsule Take 4 capsules (4 g total) by mouth once daily.    pantoprazole (PROTONIX) 40 MG tablet TAKE 1 TABLET ONE TIME  "DAILY    simvastatin (ZOCOR) 10 MG tablet Take 1 tablet (10 mg total) by mouth every evening.    valsartan (DIOVAN) 160 MG tablet Take 1 tablet (160 mg total) by mouth once daily.    vitamin D 1000 units Tab Take 1,000 Units by mouth once daily.    amlodipine (NORVASC) 2.5 MG tablet Take 2 tablets (5 mg total) by mouth once daily.           Clinical Reference Information           Your Vitals Were     BP Pulse Height Weight BMI    138/68 84 5' 1" (1.549 m) 65.9 kg (145 lb 4.5 oz) 27.45 kg/m2      Blood Pressure          Most Recent Value    BP  138/68 [my repeat, ]      Allergies as of 5/19/2017     No Known Drug Allergies      Immunizations Administered on Date of Encounter - 5/19/2017     None      Orders Placed During Today's Visit     Future Labs/Procedures Expected by Expires    Comprehensive metabolic panel  5/19/2017 8/17/2017    Lipid panel  5/19/2017 5/20/2018    TSH  5/19/2017 5/20/2018      Language Assistance Services     ATTENTION: Language assistance services are available, free of charge. Please call [phone].      ATENCIÓN: Si habla español, tiene a maldonado disposición servicios gratuitos de asistencia lingüística. Llame al [phone].     SHARON Ý: N?u b?n nói Ti?ng Vi?t, có các d?ch v? h? tr? ngôn ng? mi?n phí dành cho b?n. G?i s? [phone].         [facility] complies with applicable Federal civil rights laws and does not discriminate on the basis of race, color, national origin, age, disability, or sex.        "

## 2017-05-20 ENCOUNTER — LAB VISIT (OUTPATIENT)
Dept: LAB | Facility: HOSPITAL | Age: 72
End: 2017-05-20
Attending: INTERNAL MEDICINE
Payer: MEDICARE

## 2017-05-20 DIAGNOSIS — E78.5 HYPERLIPIDEMIA, UNSPECIFIED HYPERLIPIDEMIA TYPE: ICD-10-CM

## 2017-05-20 DIAGNOSIS — E03.9 HYPOTHYROIDISM, UNSPECIFIED TYPE: ICD-10-CM

## 2017-05-20 LAB
ALBUMIN SERPL BCP-MCNC: 3.6 G/DL
ALP SERPL-CCNC: 57 U/L
ALT SERPL W/O P-5'-P-CCNC: 18 U/L
ANION GAP SERPL CALC-SCNC: 10 MMOL/L
AST SERPL-CCNC: 15 U/L
BILIRUB SERPL-MCNC: 0.6 MG/DL
BUN SERPL-MCNC: 29 MG/DL
CALCIUM SERPL-MCNC: 10.4 MG/DL
CHLORIDE SERPL-SCNC: 105 MMOL/L
CHOLEST/HDLC SERPL: 3.8 {RATIO}
CO2 SERPL-SCNC: 24 MMOL/L
CREAT SERPL-MCNC: 1.2 MG/DL
EST. GFR  (AFRICAN AMERICAN): 51.8 ML/MIN/1.73 M^2
EST. GFR  (NON AFRICAN AMERICAN): 44.9 ML/MIN/1.73 M^2
GLUCOSE SERPL-MCNC: 123 MG/DL
HDL/CHOLESTEROL RATIO: 26.4 %
HDLC SERPL-MCNC: 148 MG/DL
HDLC SERPL-MCNC: 39 MG/DL
LDLC SERPL CALC-MCNC: 89.6 MG/DL
NONHDLC SERPL-MCNC: 109 MG/DL
POTASSIUM SERPL-SCNC: 4.4 MMOL/L
PROT SERPL-MCNC: 7 G/DL
SODIUM SERPL-SCNC: 139 MMOL/L
T4 FREE SERPL-MCNC: 0.94 NG/DL
TRIGL SERPL-MCNC: 97 MG/DL
TSH SERPL DL<=0.005 MIU/L-ACNC: 5.55 UIU/ML

## 2017-05-20 PROCEDURE — 80061 LIPID PANEL: CPT

## 2017-05-20 PROCEDURE — 84443 ASSAY THYROID STIM HORMONE: CPT

## 2017-05-20 PROCEDURE — 36415 COLL VENOUS BLD VENIPUNCTURE: CPT | Mod: PO

## 2017-05-20 PROCEDURE — 84439 ASSAY OF FREE THYROXINE: CPT

## 2017-05-20 PROCEDURE — 80053 COMPREHEN METABOLIC PANEL: CPT

## 2017-05-22 ENCOUNTER — PATIENT OUTREACH (OUTPATIENT)
Dept: OTHER | Facility: OTHER | Age: 72
End: 2017-05-22
Payer: MEDICARE

## 2017-05-22 DIAGNOSIS — E03.9 HYPOTHYROIDISM, UNSPECIFIED TYPE: Primary | ICD-10-CM

## 2017-05-22 RX ORDER — LEVOTHYROXINE SODIUM 50 UG/1
50 TABLET ORAL
Qty: 30 TABLET | Refills: 1 | Status: SHIPPED | OUTPATIENT
Start: 2017-05-22 | End: 2017-06-30 | Stop reason: SDUPTHER

## 2017-05-22 NOTE — PROGRESS NOTES
Last 5 Patient Entered Readings                                                               Current 30 Day Average: 140/67     Recent Readings 5/21/2017 5/19/2017 5/18/2017 5/17/2017 5/15/2017    Systolic BP (mmHg) 135 157 147 152 132    Diastolic BP (mmHg) 68 76 71 72 62    Pulse 78 84 80 77 85          Follow up with Ms. Clemencia Nguyen completed. Ms. Nguyen is doing well. She brought her [employer] cuff to the clinic last week and had the doctor take a manual reading. Patient reports that the [employer] cuff is reading ~20 points higher than the manual cuff. Patient states that Dr. Mccloud told her that if the cuff could not be replaced that she should drop from the program. She will bring her cuff to the [facility] sometime this week. Patient did not have any further questions or concerns. I will follow up in a few weeks to see how she is doing and progressing.      Ms. Nguyen picked up a new [employer] cuff 5/24. She also reports that she is discontinuing felodipine and starting amlodipine due to cost.

## 2017-06-01 ENCOUNTER — PATIENT MESSAGE (OUTPATIENT)
Dept: INTERNAL MEDICINE | Facility: CLINIC | Age: 72
End: 2017-06-01

## 2017-06-03 ENCOUNTER — PATIENT MESSAGE (OUTPATIENT)
Dept: ADMINISTRATIVE | Facility: OTHER | Age: 72
End: 2017-06-03

## 2017-06-15 ENCOUNTER — PATIENT OUTREACH (OUTPATIENT)
Dept: OTHER | Facility: OTHER | Age: 72
End: 2017-06-15
Payer: MEDICARE

## 2017-06-15 NOTE — PROGRESS NOTES
Last 5 Patient Entered Readings                                                               Current 30 Day Average: 134/68     Recent Readings 6/15/2017 6/13/2017 6/12/2017 6/11/2017 6/9/2017    Systolic BP (mmHg) 133 115 119 124 131    Diastolic BP (mmHg) 58 60 68 58 60    Pulse 86 75 74 72 78        Follow up with Ms. Clemencia Nguyen completed. Ms. Nguyen is doing well. Patient has more confidence in new BP cuff. She still sees a fluctation of 5-9 points, but explained that this is not uncommon. Patient reports that she is maintaining a low sodium diet and continuing her exercise regimen. Patient did not have any further questions or concerns. I will follow up in a few weeks to see how she is doing and progressing.

## 2017-06-19 ENCOUNTER — TELEPHONE (OUTPATIENT)
Dept: FAMILY MEDICINE | Facility: CLINIC | Age: 72
End: 2017-06-19

## 2017-06-19 DIAGNOSIS — E03.9 HYPOTHYROIDISM, UNSPECIFIED TYPE: Primary | ICD-10-CM

## 2017-06-19 NOTE — TELEPHONE ENCOUNTER
----- Message from Uri Gonsales sent at 2017 12:17 PM CDT -----  Contact: same  Patient called in and stated she spoke to Dr. Mccloud about rescheduling her labs for 17 but the orders  on 17.      Patient call back number is [phone]

## 2017-06-29 ENCOUNTER — LAB VISIT (OUTPATIENT)
Dept: LAB | Facility: HOSPITAL | Age: 72
End: 2017-06-29
Attending: INTERNAL MEDICINE
Payer: MEDICARE

## 2017-06-29 DIAGNOSIS — E03.9 HYPOTHYROIDISM, UNSPECIFIED TYPE: ICD-10-CM

## 2017-06-29 LAB — TSH SERPL DL<=0.005 MIU/L-ACNC: 2.78 UIU/ML

## 2017-06-29 PROCEDURE — 84443 ASSAY THYROID STIM HORMONE: CPT

## 2017-06-29 PROCEDURE — 36415 COLL VENOUS BLD VENIPUNCTURE: CPT | Mod: PO

## 2017-06-30 ENCOUNTER — PATIENT MESSAGE (OUTPATIENT)
Dept: INTERNAL MEDICINE | Facility: CLINIC | Age: 72
End: 2017-06-30

## 2017-06-30 RX ORDER — LEVOTHYROXINE SODIUM 50 UG/1
50 TABLET ORAL
Qty: 90 TABLET | Refills: 3 | Status: SHIPPED | OUTPATIENT
Start: 2017-06-30 | End: 2018-05-28 | Stop reason: SDUPTHER

## 2017-07-07 ENCOUNTER — PATIENT OUTREACH (OUTPATIENT)
Dept: OTHER | Facility: OTHER | Age: 72
End: 2017-07-07

## 2017-07-07 NOTE — PROGRESS NOTES
Last 5 Patient Entered Redings Current 30 Day Average: 132/64     Recent Readings 7/7/2017 7/6/2017 7/5/2017 7/3/2017 7/1/2017    Systolic BP (mmHg) 118 141 120 128 129    Diastolic BP (mmHg) 59 64 60 54 65    Pulse 84 87 82 87 85        Follow up with Ms. Clemencia Nguyen completed. Ms. Nguyen is doing well. Patient reports that she is maintaining a low sodium diet and exercising at the [facility] three days a week. Patient did not have any further questions or concerns. I will follow up in a few weeks to see how she is doing and progressing.

## 2017-07-20 ENCOUNTER — PATIENT OUTREACH (OUTPATIENT)
Dept: OTHER | Facility: OTHER | Age: 72
End: 2017-07-20

## 2017-07-20 NOTE — PROGRESS NOTES
Last 5 Patient Entered Redings Current 30 Day Average: 136/64     Recent Readings 7/19/2017 7/17/2017 7/16/2017 7/14/2017 7/12/2017    Systolic BP (mmHg) 140 118 141 126 151    Diastolic BP (mmHg) 65 66 63 63 74    Pulse 88 77 84 84 82          Ms. Nguyen's BP is at goal. She denies any symptoms and has no questions/concerns. Dr. Mccloud changed felodipine to amlodipine due to insurance coverage. She has no side effects with the change.     Current HTN regimen:  Hypertension Medications             amlodipine (NORVASC) 2.5 MG tablet Take 2 tablets (5 mg total) by mouth once daily.    valsartan (DIOVAN) 160 MG tablet Take 1 tablet (160 mg total) by mouth once daily.          Will continue to monitor regularly. Will follow up in 3-4 months, sooner if BP begins to trend upward or downward.    Patient has my contact information and knows to call with any concerns or clinical changes.

## 2017-07-24 DIAGNOSIS — I10 ESSENTIAL HYPERTENSION: ICD-10-CM

## 2017-07-25 RX ORDER — AMLODIPINE BESYLATE 2.5 MG/1
5 TABLET ORAL DAILY
Qty: 180 TABLET | Refills: 2 | Status: SHIPPED | OUTPATIENT
Start: 2017-07-25 | End: 2018-02-14 | Stop reason: SDUPTHER

## 2017-08-07 ENCOUNTER — PATIENT OUTREACH (OUTPATIENT)
Dept: OTHER | Facility: OTHER | Age: 72
End: 2017-08-07

## 2017-08-07 NOTE — PROGRESS NOTES
"Last 5 Patient Entered Redings Current 30 Day Average: 134/66     Recent Readings 8/7/2017 8/6/2017 8/5/2017 8/4/2017 8/2/2017    Systolic BP (mmHg) 121 138 133 116 132    Diastolic BP (mmHg) 64 63 63 65 67    Pulse 92 85 80 90 82        Follow up with Ms. Clemencia Nguyen completed. Ms. Nguyen is doing "great". Patient reports that she is continuing to exercise at the [facility] three days a week, and is maintaining her diet. Ms. Nguyen Patient "appreciates all the help and information" for better BP control. She is pleased with her BP readings. Patient did not have any further questions or concerns. I will follow up in a few weeks to see how she is doing and progressing.        "

## 2017-09-05 ENCOUNTER — LAB VISIT (OUTPATIENT)
Dept: LAB | Facility: HOSPITAL | Age: 72
End: 2017-09-05
Attending: INTERNAL MEDICINE
Payer: MEDICARE

## 2017-09-05 ENCOUNTER — PATIENT OUTREACH (OUTPATIENT)
Dept: OTHER | Facility: OTHER | Age: 72
End: 2017-09-05

## 2017-09-05 DIAGNOSIS — M85.80 OTHER SPECIFIED DISORDERS OF BONE DENSITY AND STRUCTURE, UNSPECIFIED SITE: ICD-10-CM

## 2017-09-05 DIAGNOSIS — N18.30 CKD (CHRONIC KIDNEY DISEASE), STAGE III: ICD-10-CM

## 2017-09-05 DIAGNOSIS — E55.9 VITAMIN D DEFICIENCY: Primary | ICD-10-CM

## 2017-09-05 DIAGNOSIS — N25.81 SECONDARY HYPERPARATHYROIDISM OF RENAL ORIGIN: ICD-10-CM

## 2017-09-05 DIAGNOSIS — I10 ESSENTIAL HYPERTENSION: ICD-10-CM

## 2017-09-05 DIAGNOSIS — E88.09 OTHER DISORDERS OF PLASMA PROTEIN METABOLISM: ICD-10-CM

## 2017-09-05 LAB
25(OH)D3+25(OH)D2 SERPL-MCNC: 33 NG/ML
ALBUMIN SERPL BCP-MCNC: 3.6 G/DL
ANION GAP SERPL CALC-SCNC: 7 MMOL/L
BASOPHILS # BLD AUTO: 0 K/UL
BASOPHILS NFR BLD: 0 %
BUN SERPL-MCNC: 20 MG/DL
CALCIUM SERPL-MCNC: 9.9 MG/DL
CHLORIDE SERPL-SCNC: 103 MMOL/L
CO2 SERPL-SCNC: 29 MMOL/L
CREAT SERPL-MCNC: 1.4 MG/DL
DIFFERENTIAL METHOD: ABNORMAL
EOSINOPHIL # BLD AUTO: 0 K/UL
EOSINOPHIL NFR BLD: 0 %
ERYTHROCYTE [DISTWIDTH] IN BLOOD BY AUTOMATED COUNT: 13.3 %
EST. GFR  (AFRICAN AMERICAN): 42.7 ML/MIN/1.73 M^2
EST. GFR  (NON AFRICAN AMERICAN): 37 ML/MIN/1.73 M^2
GLUCOSE SERPL-MCNC: 120 MG/DL
HCT VFR BLD AUTO: 42.2 %
HGB BLD-MCNC: 13.9 G/DL
LYMPHOCYTES # BLD AUTO: 1.8 K/UL
LYMPHOCYTES NFR BLD: 43.7 %
MCH RBC QN AUTO: 28 PG
MCHC RBC AUTO-ENTMCNC: 32.9 G/DL
MCV RBC AUTO: 85 FL
MONOCYTES # BLD AUTO: 0.4 K/UL
MONOCYTES NFR BLD: 9.5 %
NEUTROPHILS # BLD AUTO: 1.9 K/UL
NEUTROPHILS NFR BLD: 46.8 %
PHOSPHATE SERPL-MCNC: 3.2 MG/DL
PLATELET # BLD AUTO: 158 K/UL
PMV BLD AUTO: 9 FL
POTASSIUM SERPL-SCNC: 4.5 MMOL/L
PTH-INTACT SERPL-MCNC: 63 PG/ML
RBC # BLD AUTO: 4.97 M/UL
SODIUM SERPL-SCNC: 139 MMOL/L
WBC # BLD AUTO: 4.12 K/UL

## 2017-09-05 PROCEDURE — 85025 COMPLETE CBC W/AUTO DIFF WBC: CPT

## 2017-09-05 PROCEDURE — 82306 VITAMIN D 25 HYDROXY: CPT

## 2017-09-05 PROCEDURE — 36415 COLL VENOUS BLD VENIPUNCTURE: CPT | Mod: PO

## 2017-09-05 PROCEDURE — 83970 ASSAY OF PARATHORMONE: CPT

## 2017-09-05 PROCEDURE — 80069 RENAL FUNCTION PANEL: CPT

## 2017-09-05 NOTE — PROGRESS NOTES
Last 5 Patient Entered Redings Current 30 Day Average: 131/65     Recent Readings 9/4/2017 9/2/2017 8/31/2017 8/30/2017 8/29/2017    Systolic BP (mmHg) 132 132 111 128 135    Diastolic BP (mmHg) 62 60 61 63 67    Pulse 86 83 87 92 92        Hypertension Digital Medicine Program (HDMP): Health  Follow Up    Lifestyle Modifications:    1. Low sodium diet: yes Patient reports that she is monitoring her sodium intake.     2.Physical activity: yes Ms. Nguyen is continuing to exercise at the [facility] three days each week.    3.Hypotension/Hypertension symptoms: no   Frequency/Alleviating factors/Precipitating factors, etc.     4. Patient has been compliant with the medicaiton regimen    Follow up with Ms. Clemencia Nguyen completed. No further questions or concerns. I will follow up in a few weeks to assess progress.

## 2017-09-28 DIAGNOSIS — E78.5 HYPERLIPIDEMIA, UNSPECIFIED HYPERLIPIDEMIA TYPE: ICD-10-CM

## 2017-10-02 RX ORDER — SIMVASTATIN 10 MG/1
10 TABLET, FILM COATED ORAL NIGHTLY
Qty: 90 TABLET | Refills: 0 | Status: SHIPPED | OUTPATIENT
Start: 2017-10-02 | End: 2017-10-09 | Stop reason: SDUPTHER

## 2017-10-04 ENCOUNTER — PATIENT OUTREACH (OUTPATIENT)
Dept: OTHER | Facility: OTHER | Age: 72
End: 2017-10-04

## 2017-10-04 NOTE — PROGRESS NOTES
Last 5 Patient Entered Redings Current 30 Day Average: 133/67     Recent Readings 9/30/2017 9/28/2017 9/21/2017 9/19/2017 9/16/2017    Systolic BP (mmHg) 138 130 133 118 127    Diastolic BP (mmHg) 72 62 63 65 67    Pulse 71 79 75 79 85        Hypertension Digital Medicine Program (HDMP): Health  Follow Up    Lifestyle Modifications:    1.Low sodium diet: yes Patient reports that she is maintaining her low sodium diet.     2.Physical activity: no Ms. Nguyen is continuing to exercise a few days each week.    3.Hypotension/Hypertension symptoms: no   Frequency/Alleviating factors/Precipitating factors, etc.     4.Patient has been compliant with the medication regimen.     Follow up with Ms. Clemencia Nguyen completed. Ms. Nguyen is doing well. No further questions or concerns. I will follow up in a few weeks to assess progress.

## 2017-10-09 DIAGNOSIS — E78.5 HYPERLIPIDEMIA, UNSPECIFIED HYPERLIPIDEMIA TYPE: ICD-10-CM

## 2017-10-10 RX ORDER — SIMVASTATIN 10 MG/1
10 TABLET, FILM COATED ORAL NIGHTLY
Qty: 90 TABLET | Refills: 1 | Status: SHIPPED | OUTPATIENT
Start: 2017-10-10 | End: 2018-02-14 | Stop reason: SDUPTHER

## 2017-10-16 DIAGNOSIS — K21.9 GASTROESOPHAGEAL REFLUX DISEASE WITHOUT ESOPHAGITIS: ICD-10-CM

## 2017-10-17 RX ORDER — PANTOPRAZOLE SODIUM 40 MG/1
TABLET, DELAYED RELEASE ORAL
Qty: 90 TABLET | Refills: 1 | Status: SHIPPED | OUTPATIENT
Start: 2017-10-17 | End: 2018-02-14 | Stop reason: SDUPTHER

## 2017-11-03 ENCOUNTER — PATIENT OUTREACH (OUTPATIENT)
Dept: OTHER | Facility: OTHER | Age: 72
End: 2017-11-03

## 2017-11-03 NOTE — PROGRESS NOTES
Last 5 Patient Entered Readings Current 30 Day Average: 141/70     Recent Readings 11/14/2017 11/13/2017 11/13/2017 11/11/2017 11/11/2017    Systolic BP (mmHg) 143 148 144 137 161    Diastolic BP (mmHg) 71 76 69 71 77    Pulse 91 97 83 75 81        Hypertension Digital Medicine Program (HDMP): Health  Follow Up    Lifestyle Modifications:    1.Low sodium diet: no Patient attributes elevated BP readings to dietary indiscretions while on vacation. She hopes to see better readings once her returns to her normal eating habits.    2.Physical activity: yes Patient reports that she has not been to the gym, but did do a lot of walking while on vacation. She is getting back this week.     3.Hypotension/Hypertension symptoms: no   Frequency/Alleviating factors/Precipitating factors, etc.     4.Patient has been compliant with the medication regimen.     Follow up with Ms. Clemencia Nguyen completed. Ms. Nguyen is doing well. She visited [place] and [place] while on vacation. No further questions or concerns. I will follow up in a few weeks to assess progress.

## 2017-11-22 ENCOUNTER — PATIENT OUTREACH (OUTPATIENT)
Dept: OTHER | Facility: OTHER | Age: 72
End: 2017-11-22

## 2017-11-22 NOTE — PROGRESS NOTES
Last 5 Patient Entered Readings Current 30 Day Average: 142/69     Recent Readings 11/20/2017 11/19/2017 11/17/2017 11/16/2017 11/15/2017    Systolic BP (mmHg) 150 128 156 124 127    Diastolic BP (mmHg) 64 61 69 57 66    Pulse 87 74 81 84 92        Patient's BP average is not controlled. Reviewed 2017 ACC/AHA HTN guidelines with patient and explained new BP goal is <130/80.     Ms. Nguyen's BP readings have been higher lately. She has been on 2 separate vacations recently and has not been following low salt diet. She is on her way to [place] for [employer], currently. She plans to get back to her usual low sodium diet when she returns. Will follow up in a few weeks to reassess BP. Explained we may have to increase valsartan and/or amlodipine to improve her BP.     Current HTN regimen:  Hypertension Medications             amlodipine (NORVASC) 2.5 MG tablet TAKE 2 TABLETS (5 MG TOTAL) BY MOUTH ONCE DAILY.    valsartan (DIOVAN) 160 MG tablet Take 1 tablet (160 mg total) by mouth once daily.          Will continue to monitor regularly. Will follow up in 2-3 weeks, sooner if BP begins to trend upward or downward.    Patient has my contact information and knows to call with any concerns or clinical changes.

## 2017-11-26 NOTE — PROGRESS NOTES
HISTORY OF PRESENT ILLNESS:  Pt. is a 72 y.o. female presents  for monitoing of her HTN, hyperlipidemia, hypothyroidism, GERD/states occ GERD, takes occ TUMs.  She is due for labs for her lipid, TSH.  She had recent labs for Dr. Ny Davis for her CKD stage 3.  Her PTH had improved slightly from previous, renal function on last labs had decreased from previous.  Her platelet count now normal.  She is part of the digital HTN clinic, those B/Ps have been at goal.  Today's B/P is at goal.  LDL at goal: 89.  TSH is at goal.  Will have her change to zantac.  Will be seeing Patrick Davis 3/17.    Health Maintenance Topics with due status: Not Due       Topic Last Completion Date    Colonoscopy 03/03/2014    DEXA SCAN 02/17/2016    TETANUS VACCINE 05/27/2016    Lipid Panel 05/20/2017     Health Maintenance Due   Topic Date Due    Mammogram  01/26/2018       Lab Results   Component Value Date    WBC 4.12 09/05/2017    HGB 13.9 09/05/2017    HCT 42.2 09/05/2017     09/05/2017    CHOL 148 05/20/2017    TRIG 97 05/20/2017    HDL 39 (L) 05/20/2017    LDLCALC 89.6 05/20/2017    ALT 18 05/20/2017    AST 15 05/20/2017     09/05/2017    K 4.5 09/05/2017     09/05/2017    CREATININE 1.4 09/05/2017    BUN 20 09/05/2017    CO2 29 09/05/2017    ALBUMIN 3.6 09/05/2017    TSH 2.778 06/29/2017    HGBA1C 5.7 01/05/2015       Past Medical History:   Diagnosis Date    Depression     has been on tofranil for years;    Disorder of kidney and ureter     Hyperlipemia     Hypertension     Hypothyroidism     Osteopenia     Thyroid disease     hypothyroid       Past Surgical History:   Procedure Laterality Date    cataract surgery      COLONOSCOPY  2011, again in 2014    repeat in 5    dexa   9/2011    -2.3 osteopenia    HYSTERECTOMY      OOPHORECTOMY      TONSILLECTOMY         Social History     Social History    Marital status:      Spouse name: N/A    Number of children: 3    Years of education: N/A      Social History Main Topics    Smoking status: Never Smoker    Smokeless tobacco: Never Used    Alcohol use No    Drug use: No    Sexual activity: Not Currently     Partners: Male     Birth control/ protection: Surgical     Other Topics Concern    None     Social History Narrative    None       ROS:  GENERAL: No fever, chills, fatigability or weight loss.  SKIN: No rashes, itching or changes in color or texture of skin.  HEAD: No headaches or recent head trauma.  EARS: Denies ear pain, discharge or vertigo.  NOSE: No loss of smell, no epistaxis or postnasal drip.  MOUTH & THROAT: No hoarseness or change in voice. No excessive gum bleeding.  NODES: Denies swollen glands.  CHEST: Denies DUPONT, cyanosis, wheezing, cough and sputum production.  CARDIOVASCULAR: Denies chest pain, PND, orthopnea or reduced exercise tolerance.  ABDOMEN: Appetite fine. No weight loss. Denies constipation, diarrhea, abdominal pain, hematemesis or blood in stool; occ GERD;  URINARY: No flank pain, dysuria or hematuria.  PERIPHERAL VASCULAR: No claudication or cyanosis. No edema.  MUSCULOSKELETAL: No joint stiffness or swelling. Denies back pain.  NEUROLOGIC: Denies numbness    PE:   Vitals:   Vitals:    11/27/17 0906   BP: 110/64   Pulse: 72   Resp: 16   Temp: 97.6 °F (36.4 °C)     GENERAL: no acute distress, A&Ox3, comfortable.  Female with BMI of 27   HEENT: tympanic membranes clear, nasal mucosa pink, no pharyngeal erythema or exudate  NECK: supple, no cervical lymphadenopathy, no thyromegaly; no supraclavicular nodes;   CHEST:  clear to auscultation bilaterally, no crackles or wheeze; no increased work of breathing;  CARDIOVASCULAR: regular rate and rhythm, no rubs, murmurs or gallops.  ABDOMEN: normal bowel sounds, soft non-tender, non-distended; no palpable organomegaly;   EXT: no clubbing, cyanosis or edema; positive onychomycosis;    ASSESSMENT/PLAN:    Hypothyroidism: in control on current levothyroxine  dose;    Hyperlipidemia: on simvastatin; LDL is at goal;    Essential hypertension  -     valsartan (DIOVAN) 160 MG tablet; Take 1 tablet (160 mg total) by mouth once daily.  Dispense: 90 tablet; Refill: 3    Onychomycosis  -     Ambulatory Referral to Podiatry    Health care maintenance  -     Ambulatory Referral to Obstetrics / Gynecology        Medication List with Changes/Refills   Current Medications    ALENDRONATE (FOSAMAX) 35 MG TABLET    Take 1 tablet by mouth once a week.    AMLODIPINE (NORVASC) 2.5 MG TABLET    TAKE 2 TABLETS (5 MG TOTAL) BY MOUTH ONCE DAILY.    BIOTIN ORAL    Take 1,000 mcg by mouth once daily.    CALCITRIOL (ROCALTROL) 0.25 MCG CAP    Take 1 capsule by mouth every other day.     IMIPRAMINE (TOFRANIL) 25 MG TABLET    3 tablets Twice a day    KRILL-OM-3-DHA-EPA-PHOSPHO-AST (MEGARED OMEGA-3 KRILL OIL) 1,000-230-60 MG CAP    Take 1 capsule by mouth once daily.    LEVOTHYROXINE (SYNTHROID) 50 MCG TABLET    Take 1 tablet (50 mcg total) by mouth before breakfast.    PANTOPRAZOLE (PROTONIX) 40 MG TABLET    TAKE 1 TABLET EVERY DAY    SIMVASTATIN (ZOCOR) 10 MG TABLET    TAKE 1 TABLET (10 MG TOTAL) BY MOUTH EVERY EVENING.    VITAMIN D 1000 UNITS TAB    Take 1,000 Units by mouth once daily.   Changed and/or Refilled Medications    Modified Medication Previous Medication    VALSARTAN (DIOVAN) 160 MG TABLET valsartan (DIOVAN) 160 MG tablet       Take 1 tablet (160 mg total) by mouth once daily.    Take 1 tablet (160 mg total) by mouth once daily.   Discontinued Medications    AZELASTINE (ASTELIN) 137 MCG (0.1 %) NASAL SPRAY    1 spray (137 mcg total) by Nasal route 2 (two) times daily.    OMEGA-3 ACID ETHYL ESTERS (LOVAZA) 1 GRAM CAPSULE    Take 4 capsules (4 g total) by mouth once daily.     Call if condition changes or worsens.  Answers for HPI/ROS submitted by the patient on 11/26/2017   Hypertension  Chronicity: recurrent  Onset: more than 1 month ago  Progression since onset: waxing and  waning  Condition status: controlled  anxiety: No  blurred vision: No  chest pain: No  headaches: No  malaise/fatigue: No  neck pain: No  orthopnea: No  palpitations: No  peripheral edema: No  PND: No  shortness of breath: No  sweats: No  Agents associated with hypertension: thyroid hormones  CAD risks: dyslipidemia, family history  Compliance problems: diet  Past treatments: lifestyle changes  Improvement on treatment: moderate

## 2017-11-27 ENCOUNTER — OFFICE VISIT (OUTPATIENT)
Dept: INTERNAL MEDICINE | Facility: CLINIC | Age: 72
End: 2017-11-27
Payer: MEDICARE

## 2017-11-27 VITALS
DIASTOLIC BLOOD PRESSURE: 64 MMHG | RESPIRATION RATE: 16 BRPM | BODY MASS INDEX: 27.39 KG/M2 | SYSTOLIC BLOOD PRESSURE: 110 MMHG | HEART RATE: 72 BPM | OXYGEN SATURATION: 99 % | TEMPERATURE: 98 F | WEIGHT: 145.06 LBS | HEIGHT: 61 IN

## 2017-11-27 DIAGNOSIS — E78.5 HYPERLIPIDEMIA, UNSPECIFIED HYPERLIPIDEMIA TYPE: ICD-10-CM

## 2017-11-27 DIAGNOSIS — E03.9 HYPOTHYROIDISM, UNSPECIFIED TYPE: ICD-10-CM

## 2017-11-27 DIAGNOSIS — I10 ESSENTIAL HYPERTENSION: ICD-10-CM

## 2017-11-27 DIAGNOSIS — Z00.00 HEALTH CARE MAINTENANCE: ICD-10-CM

## 2017-11-27 DIAGNOSIS — B35.1 ONYCHOMYCOSIS: ICD-10-CM

## 2017-11-27 PROCEDURE — 99214 OFFICE O/P EST MOD 30 MIN: CPT | Mod: S$PBB,,, | Performed by: INTERNAL MEDICINE

## 2017-11-27 PROCEDURE — 99999 PR PBB SHADOW E&M-EST. PATIENT-LVL IV: CPT | Mod: PBBFAC,,, | Performed by: INTERNAL MEDICINE

## 2017-11-27 PROCEDURE — 99214 OFFICE O/P EST MOD 30 MIN: CPT | Mod: PBBFAC,PO | Performed by: INTERNAL MEDICINE

## 2017-11-27 RX ORDER — KRILL/OM-3/DHA/EPA/PHOSPHO/AST 1000-230MG
1 CAPSULE ORAL DAILY
Status: ON HOLD | COMMUNITY
End: 2019-09-03 | Stop reason: HOSPADM

## 2017-11-27 RX ORDER — VALSARTAN 160 MG/1
160 TABLET ORAL DAILY
Qty: 90 TABLET | Refills: 3 | Status: SHIPPED | OUTPATIENT
Start: 2017-11-27 | End: 2018-04-09 | Stop reason: SDUPTHER

## 2017-11-28 ENCOUNTER — OFFICE VISIT (OUTPATIENT)
Dept: PODIATRY | Facility: CLINIC | Age: 72
End: 2017-11-28
Payer: MEDICARE

## 2017-11-28 VITALS — HEIGHT: 61 IN | BODY MASS INDEX: 27.38 KG/M2 | WEIGHT: 145 LBS

## 2017-11-28 DIAGNOSIS — B35.1 ONYCHOMYCOSIS DUE TO DERMATOPHYTE: Primary | ICD-10-CM

## 2017-11-28 PROCEDURE — 99999 PR PBB SHADOW E&M-EST. PATIENT-LVL III: CPT | Mod: PBBFAC,,, | Performed by: PODIATRIST

## 2017-11-28 PROCEDURE — 99213 OFFICE O/P EST LOW 20 MIN: CPT | Mod: PBBFAC,PO | Performed by: PODIATRIST

## 2017-11-28 PROCEDURE — 99203 OFFICE O/P NEW LOW 30 MIN: CPT | Mod: S$PBB,,, | Performed by: PODIATRIST

## 2017-11-28 RX ORDER — CICLOPIROX 80 MG/ML
SOLUTION TOPICAL NIGHTLY
Qty: 6.6 ML | Refills: 11 | Status: ON HOLD | OUTPATIENT
Start: 2017-11-28 | End: 2019-09-03 | Stop reason: HOSPADM

## 2017-11-28 NOTE — PROGRESS NOTES
Subjective:      Patient ID: Clemencia Nguyen is a 72 y.o. female.    Chief Complaint: Nail Problem    Virginia is a 72 y.o. female who presents to the clinic complaining of thick and discolored toenails on both feet   Gradual onset, worsening over past several weeks, aggravated by increased weight bearing, shoe gear, pressure.  No previous medical treatment.  OTC pain  not helping.  .Virginia is inquiring about treatment options.      Review of Systems   Constitution: Negative for chills, diaphoresis, fever, malaise/fatigue and night sweats.   Cardiovascular: Negative for claudication, cyanosis, leg swelling and syncope.   Skin: Positive for nail changes. Negative for color change, dry skin, rash, suspicious lesions and unusual hair distribution.   Musculoskeletal: Negative for falls, joint pain, joint swelling, muscle cramps, muscle weakness and stiffness.   Gastrointestinal: Negative for constipation, diarrhea, nausea and vomiting.   Neurological: Negative for brief paralysis, disturbances in coordination, focal weakness, numbness, paresthesias, sensory change and tremors.           Objective:      Physical Exam   Constitutional: She is oriented to person, place, and time. She appears well-developed and well-nourished. She is cooperative.   Oriented to time, place, and person.   Cardiovascular:   Pulses:       Dorsalis pedis pulses are 2+ on the right side, and 2+ on the left side.        Posterior tibial pulses are 1+ on the right side, and 1+ on the left side.   Capillary fill time 3-5 seconds.  All toes warm to touch.      Negative lower extremity edema bilateral.    Negative elevational pallor and dependent rubor bilateral.     Musculoskeletal:   Normal angle, base, station of gait. Decreased stride length, early heel off, moderately propulsive toe off bilateral.    All ten toes without clubbing, cyanosis, or signs of ischemia.      No pain to palpation bilateral lower extremities.      Range of motion,  stability, muscle strength, and muscle tone are age and health appropriate normal bilateral feet and legs.       Lymphadenopathy:   Negative lymphadenopathy bilateral popliteal fossa and tarsal tunnel.     Neurological: She is alert and oriented to person, place, and time. She has normal strength. She is not disoriented. She displays no atrophy and no tremor. No sensory deficit. She exhibits normal muscle tone.   Reflex Scores:       Patellar reflexes are 2+ on the right side and 2+ on the left side.       Achilles reflexes are 2+ on the right side and 2+ on the left side.  Negative tinel sign to percussion sural, superficial peroneal, deep peroneal, saphenous, and posterior tibial nerves right and left ankles and feet.       Skin: Skin is warm, dry and intact. No abrasion, no bruising, no burn, no ecchymosis, no laceration, no lesion, no petechiae and no rash noted. She is not diaphoretic. No cyanosis or erythema. No pallor. Nails show no clubbing.   Skin thin, atrophic, with decreased density and distribution of pedal hair bilateral, but without hyperpigmentation, jose alberto discoloration,  ulcers, masses, nodules or cords palpated bilateral feet and legs.      Toenails 1st, 2nd, 3rd, 4th, 5th  bilateral are hypertrophic thickened 2-3 mm, dystrophic, discolored tanish brown with tan, gray crumbly subungual debris.  Neatly trimmed and not tender to distal nail plate pressure, without periungual skin abnormality of each.               Assessment:       Encounter Diagnosis   Name Primary?    Onychomycosis due to dermatophyte Yes         Plan:       Virginia was seen today for nail problem.    Diagnoses and all orders for this visit:    Onychomycosis due to dermatophyte    Other orders  -     ciclopirox (PENLAC) 8 % Soln; Apply topically nightly.      I counseled the patient on her conditions, their implications and medical management.        - Shoe inspection. Patient instructed on proper foot hygeine. We discussed  wearing proper shoe gear, daily foot inspections, never walking without protective shoe gear, never putting sharp instruments to feet, routine podiatric nail visits every 2-3 months.      Discussed conservative treatment with shoes of adequate dimensions, material, and style to alleviate symptoms and delay or prevent surgical intervention.    Rx penlac.    Return if symptoms worsen or fail to improve.

## 2017-11-28 NOTE — LETTER
November 28, 2017      Marcie Mccloud MD  [address] [facility] [address] [place] [address]           [facility]  [address] [facility] [address] [place] [address]  Phone: [phone]          Patient: Clemencia Nguyen   MR Number: [id number]   YOB: 1945   Date of Visit: 11/28/2017       Dear Dr. Marcie Mccloud:    Thank you for referring Clemencia Nguyen to me for evaluation. Attached you will find relevant portions of my assessment and plan of care.    If you have questions, please do not hesitate to call me. I look forward to following Clemencia Nguyen along with you.    Sincerely,    Tj Jones, MARINA    Enclosure  CC:  No Recipients    If you would like to receive this communication electronically, please contact [email or web address] or [phone] to request more information on [employer] access.    For providers and/or their staff who would like to refer a patient to [facility], please contact us through our one-stop-shop provider referral line, [facility], at [phone].    If you feel you have received this communication in error or would no longer like to receive these types of communications, please e-mail [email or web address]

## 2017-12-12 ENCOUNTER — PATIENT OUTREACH (OUTPATIENT)
Dept: OTHER | Facility: OTHER | Age: 72
End: 2017-12-12

## 2017-12-12 NOTE — PROGRESS NOTES
"Last 5 Patient Entered Readings Current 30 Day Average: 137/65       Units 12/14/2017 12/14/2017 12/13/2017 12/10/2017 12/8/2017    Time -  3:47 PM  9:02 AM  5:31 PM 10:34 AM  7:34 PM    Systolic Blood Pressure - 144 140 147 125 153    Diastolic Blood Pressure - 66 65 70 68 69    Pulse bpm 86 95 86 79 80        Hypertension Digital Medicine Program (HDMP): Health  Follow Up    Lifestyle Modifications:    1.Low sodium diet: no Patient attributes elevated BP to dietary indiscretions. Ms. Nugyen reports that she has been doing a lot of shopping and running around, so she just "eats what's available". She also states that its harder to limit her salt intake during the holiday season. Encouragement provided to limit sodium intake.     2.Physical activity: yes Patient reports that she is continuing to exercise with Silver [employer]neakers three days each week. She has also been doing a lot of walking while [place] shopping.     3.Hypotension/Hypertension symptoms: no   Frequency/Alleviating factors/Precipitating factors, etc.     4.Patient has been compliant with the medication regimen.     Follow up with Ms. Clemencia Nguyen completed. Ms. Romero is doing well. She has been very busy preparing for [employer]. She was pleased with her BP in the clinic on 11/27/17 - 110/64 mmHg. No further questions or concerns. I will follow up in a few weeks to assess progress.       "

## 2017-12-18 DIAGNOSIS — F39 MOOD DISORDER: ICD-10-CM

## 2017-12-18 RX ORDER — IMIPRAMINE HYDROCHLORIDE 25 MG/1
TABLET, FILM COATED ORAL
Qty: 540 TABLET | Refills: 3 | Status: SHIPPED | OUTPATIENT
Start: 2017-12-18 | End: 2018-12-04 | Stop reason: SDUPTHER

## 2017-12-20 ENCOUNTER — PATIENT OUTREACH (OUTPATIENT)
Dept: OTHER | Facility: OTHER | Age: 72
End: 2017-12-20

## 2017-12-20 NOTE — PROGRESS NOTES
Last 5 Patient Entered Readings Current 30 Day Average: 139/66       Units 12/20/2017 12/19/2017 12/18/2017 12/17/2017 12/16/2017    Time - 10:24 AM 12:01 PM  4:24 PM  6:03 PM 10:52 AM    Systolic Blood Pressure - 146 137 143 139 154    Diastolic Blood Pressure - 66 65 73 60 70    Pulse bpm 90 86 87 99 92        Patient's BP average is above goal of <130/80.     Ms. Nguyen's BP is improving. She has returned to her usual routine of low salt diet. BP in Dr. Mccloud's office on 11/27 was 110/64. Will continue to monitor for now. If BP remains above goal, will increase valsartan to 320 mg QD.     Patient denies s/s of hypotension (lightheadedness, dizziness, nausea, fatigue) associated with low readings. Instructed patient to inform me if this occurs, patient confirms understanding.      Patient denies s/s of hypertension (SOB, CP, severe headaches, changes in vision) associated with high readings. Instructed patient to go to the ED if BP > 180/110 and accompanied by hypertensive s/s, patient confirms understanding.    Will continue to monitor regularly. Will follow up in 4-6 weeks, sooner if BP begins to trend upward or downward.    Patient has my contact information and knows to call with any concerns or clinical changes.     Current HTN regimen:  Hypertension Medications             amlodipine (NORVASC) 2.5 MG tablet TAKE 2 TABLETS (5 MG TOTAL) BY MOUTH ONCE DAILY.    valsartan (DIOVAN) 160 MG tablet Take 1 tablet (160 mg total) by mouth once daily.

## 2018-01-12 ENCOUNTER — PATIENT OUTREACH (OUTPATIENT)
Dept: OTHER | Facility: OTHER | Age: 73
End: 2018-01-12

## 2018-01-12 NOTE — PROGRESS NOTES
"Last 5 Patient Entered Readings                                      Current 30 Day Average: 140/68     Recent Readings 1/11/2018 1/9/2018 1/7/2018 1/6/2018 1/5/2018    SBP (mmHg) 143 144 144 140 130    DBP (mmHg) 68 62 67 71 74    Pulse 86 88 87 85 88        Hypertension Digital Medicine Program (HDMP): Health  Follow Up    Lifestyle Modifications:    1.Low sodium diet: yes Patient reports that she is "trying" to limit her salt intake. She continues to make small changes to her diet.    2.Physical activity: yes Ms. Nguyen has resumed her exercise routine. She went to the gym three times this week.    3.Hypotension/Hypertension symptoms: no   Frequency/Alleviating factors/Precipitating factors, etc.     4.Patient has been compliant with the medication regimen.     Follow up with Ms. Clemencia Nguyen completed. Ms. Nguyen is doing well. Patient states that she spoke with her PCP about the new guidelines for HTN (goal <130/80 mmHg). Ms. Nguyen states that she feels that she should be okay with keeping her BP consistently less that 140/90 "considering my age". Encouragement provided for patient to continue to limit salt intake and maintain physical activity. No further questions or concerns. I will follow up in a few weeks to assess progress.         "

## 2018-02-01 ENCOUNTER — HOSPITAL ENCOUNTER (OUTPATIENT)
Dept: RADIOLOGY | Facility: HOSPITAL | Age: 73
Discharge: HOME | End: 2018-02-01
Attending: OBSTETRICS & GYNECOLOGY
Payer: MEDICARE

## 2018-02-01 ENCOUNTER — INITIAL CONSULT (OUTPATIENT)
Dept: OBSTETRICS AND GYNECOLOGY | Facility: CLINIC | Age: 73
End: 2018-02-01
Payer: MEDICARE

## 2018-02-01 VITALS
SYSTOLIC BLOOD PRESSURE: 124 MMHG | WEIGHT: 145.5 LBS | HEIGHT: 61 IN | DIASTOLIC BLOOD PRESSURE: 64 MMHG | BODY MASS INDEX: 27.47 KG/M2

## 2018-02-01 VITALS — BODY MASS INDEX: 27.38 KG/M2 | WEIGHT: 145 LBS | HEIGHT: 61 IN

## 2018-02-01 DIAGNOSIS — Z12.31 VISIT FOR SCREENING MAMMOGRAM: ICD-10-CM

## 2018-02-01 DIAGNOSIS — Z01.419 ENCOUNTER FOR GYNECOLOGICAL EXAMINATION WITHOUT ABNORMAL FINDING: Primary | ICD-10-CM

## 2018-02-01 PROCEDURE — G0101 CA SCREEN;PELVIC/BREAST EXAM: HCPCS | Mod: PBBFAC,PN | Performed by: OBSTETRICS & GYNECOLOGY

## 2018-02-01 PROCEDURE — 77067 SCR MAMMO BI INCL CAD: CPT | Mod: TC,PN

## 2018-02-01 PROCEDURE — 99999 PR PBB SHADOW E&M-EST. PATIENT-LVL III: CPT | Mod: PBBFAC,,, | Performed by: OBSTETRICS & GYNECOLOGY

## 2018-02-01 PROCEDURE — 99213 OFFICE O/P EST LOW 20 MIN: CPT | Mod: PBBFAC,PN | Performed by: OBSTETRICS & GYNECOLOGY

## 2018-02-01 PROCEDURE — 77067 SCR MAMMO BI INCL CAD: CPT | Mod: 26,,, | Performed by: RADIOLOGY

## 2018-02-01 PROCEDURE — 77063 BREAST TOMOSYNTHESIS BI: CPT | Mod: 26,,, | Performed by: RADIOLOGY

## 2018-02-01 PROCEDURE — G0101 CA SCREEN;PELVIC/BREAST EXAM: HCPCS | Mod: S$PBB,,, | Performed by: OBSTETRICS & GYNECOLOGY

## 2018-02-01 NOTE — PROGRESS NOTES
Chief Complaint   Patient presents with    Well Woman       History of Present Illness: Clemencia Nguyen is a 72 y.o. female that presents today 2/1/2018 for well gyn visit. She reports feels pressure and feels like her bladder is falling out.     Past Medical History:   Diagnosis Date    Depression     has been on tofranil for years;    Disorder of kidney and ureter     Hyperlipemia     Hypertension     Hypothyroidism     Osteopenia     Thyroid disease     hypothyroid       Past Surgical History:   Procedure Laterality Date    cataract surgery      COLONOSCOPY  2011, again in 2014    repeat in 5    dexa   9/2011    -2.3 osteopenia    HYSTERECTOMY      OOPHORECTOMY      TONSILLECTOMY         Current Outpatient Prescriptions   Medication Sig Dispense Refill    alendronate (FOSAMAX) 35 MG tablet Take 1 tablet by mouth once a week.      amlodipine (NORVASC) 2.5 MG tablet TAKE 2 TABLETS (5 MG TOTAL) BY MOUTH ONCE DAILY. 180 tablet 2    BIOTIN ORAL Take 1,000 mcg by mouth once daily.      calcitRIOL (ROCALTROL) 0.25 MCG Cap Take 1 capsule by mouth every other day.       ciclopirox (PENLAC) 8 % Soln Apply topically nightly. 6.6 mL 11    imipramine (TOFRANIL) 25 MG tablet TAKE 3 TABLETS TWICE DAILY 540 tablet 3    krill-om-3-dha-epa-phospho-ast (MEGARED OMEGA-3 KRILL OIL) 1,000-230-60 mg Cap Take 1 capsule by mouth once daily.      levothyroxine (SYNTHROID) 50 MCG tablet Take 1 tablet (50 mcg total) by mouth before breakfast. 90 tablet 3    pantoprazole (PROTONIX) 40 MG tablet TAKE 1 TABLET EVERY DAY 90 tablet 1    simvastatin (ZOCOR) 10 MG tablet TAKE 1 TABLET (10 MG TOTAL) BY MOUTH EVERY EVENING. 90 tablet 1    valsartan (DIOVAN) 160 MG tablet Take 1 tablet (160 mg total) by mouth once daily. 90 tablet 3    vitamin D 1000 units Tab Take 1,000 Units by mouth once daily.       No current facility-administered medications for this visit.        Review of patient's allergies indicates:  "  Allergen Reactions    No known drug allergies        Family History   Problem Relation Age of Onset    Hypertension Mother     Heart disease Mother     Stroke Father     Breast cancer Neg Hx     Colon cancer Neg Hx     Ovarian cancer Neg Hx        Social History     Social History    Marital status:      Spouse name: N/A    Number of children: 3    Years of education: N/A     Social History Main Topics    Smoking status: Never Smoker    Smokeless tobacco: Never Used    Alcohol use No    Drug use: No    Sexual activity: Not Currently     Partners: Male     Birth control/ protection: Surgical     Other Topics Concern    None     Social History Narrative    None       OB History    Para Term  AB Living   3 3       3   SAB TAB Ectopic Multiple Live Births           3      # Outcome Date GA Lbr Remington/2nd Weight Sex Delivery Anes PTL Lv   3 Para 1971     Vag-Spont   RAJAN   2 Para 1967     Vag-Spont   RAJAN   1 Para 1964   3.629 kg (8 lb)  Vag-Spont   RAJAN          Review of Symptoms:  GENERAL: Denies weight gain or weight loss. Feeling well overall.   SKIN: Denies rash or lesions.   HEAD: Denies head injury or headache.   NODES: Denies enlarged lymph nodes.   CHEST: Denies chest pain or shortness of breath.   CARDIOVASCULAR: Denies palpitations or left sided chest pain.   ABDOMEN: No abdominal pain, constipation, diarrhea, nausea, vomiting or rectal bleeding.   URINARY: No frequency, dysuria, hematuria, or burning on urination.  HEMATOLOGIC: No easy bruisability or excessive bleeding.   MUSCULOSKELETAL: Denies joint pain or swelling.     /64   Ht 5' 1" (1.549 m)   Wt 66 kg (145 lb 8.1 oz)   Physical Exam:  APPEARANCE: Well nourished, well developed, in no acute distress.  SKIN: Normal skin turgor, no lesions.  NECK: Neck symmetric without masses   RESPIRATORY: Normal respiratory effort with no retractions or use of accessory muscles  CARDIOVASCULAR: Peripheral vascular system with " no swelling no varicosities and palpation of pulses normal  LYMPHATIC: No enlargements of the lymph nodes noted in the neck, axillae, or groin  ABDOMEN: Soft. No tenderness or masses. No hepatosplenomegaly. No hernias.  BREASTS: Symmetrical, no skin changes or visible lesions. No palpable masses, nipple discharge or adenopathy bilaterally.  PELVIC: Normal external female genitalia without lesions. Normal hair distribution. Adequate perineal body, normal urethral meatus. Urethra with no masses.  Bladder nontender. Vagina moist and well rugated without lesions or discharge. Adnexa without masses or tenderness. Urethra and bladder normal.   EXTREMITIES: No clubbing cyanosis or edema.    ASSESSMENT/PLAN:  Encounter for gynecological examination without abnormal finding    Visit for screening mammogram  -     Cancel: Mammo Digital Screening Bilat With CAD; Future; Expected date: 02/01/2018          Patient was counseled today on Pap guidelines, recommendation for pelvic exams, mammograms every other year after the age of 40 and annually after the age of 50, Colonoscopy after the age of 50, Dexa Bone Scan and calcium and vitamin D supplementation in menopause and to see her PCP for other health maintenance.   FOLLOW-UP:prn

## 2018-02-01 NOTE — LETTER
February 1, 2018      Marcie Mccloud MD  [address] [facility] [address]  [facility] [address]           [facility] [address]  [facility] [address]  Phone: [phone]  Fax: [phone]          Patient: Clemencia Nguyen   MR Number: [id number]   YOB: 1945   Date of Visit: 2/1/2018       Dear Dr. Marcie Mccloud:    Thank you for referring Clemencia Nguyen to me for evaluation. Attached you will find relevant portions of my assessment and plan of care.    If you have questions, please do not hesitate to call me. I look forward to following Clemencia Nguyen along with you.    Sincerely,    Ambar Lynn MD    Enclosure  CC:  No Recipients    If you would like to receive this communication electronically, please contact [email or web address] or [phone] to request more information on [employer] Link access.    For providers and/or their staff who would like to refer a patient to [facility], please contact us through our one-stop-shop provider referral line, [facility], at [phone].    If you feel you have received this communication in error or would no longer like to receive these types of communications, please e-mail [email or web address]

## 2018-02-14 DIAGNOSIS — K21.9 GASTROESOPHAGEAL REFLUX DISEASE WITHOUT ESOPHAGITIS: ICD-10-CM

## 2018-02-14 DIAGNOSIS — E78.5 HYPERLIPIDEMIA, UNSPECIFIED HYPERLIPIDEMIA TYPE: ICD-10-CM

## 2018-02-14 DIAGNOSIS — I10 ESSENTIAL HYPERTENSION: ICD-10-CM

## 2018-02-15 ENCOUNTER — PATIENT OUTREACH (OUTPATIENT)
Dept: OTHER | Facility: OTHER | Age: 73
End: 2018-02-15

## 2018-02-15 RX ORDER — PANTOPRAZOLE SODIUM 40 MG/1
TABLET, DELAYED RELEASE ORAL
Qty: 90 TABLET | Refills: 1 | Status: SHIPPED | OUTPATIENT
Start: 2018-02-15 | End: 2018-12-04 | Stop reason: SDUPTHER

## 2018-02-15 RX ORDER — AMLODIPINE BESYLATE 2.5 MG/1
TABLET ORAL
Qty: 180 TABLET | Refills: 2 | Status: SHIPPED | OUTPATIENT
Start: 2018-02-15 | End: 2018-04-09 | Stop reason: ALTCHOICE

## 2018-02-15 RX ORDER — SIMVASTATIN 10 MG/1
TABLET, FILM COATED ORAL
Qty: 90 TABLET | Refills: 0 | Status: SHIPPED | OUTPATIENT
Start: 2018-02-15 | End: 2018-05-15 | Stop reason: SDUPTHER

## 2018-02-15 NOTE — PROGRESS NOTES
Last 5 Patient Entered Readings                                      Current 30 Day Average: 137/68     Recent Readings 2/14/2018 2/10/2018 2/7/2018 2/5/2018 2/1/2018    SBP (mmHg) 130 151 126 126 126    DBP (mmHg) 59 72 66 67 70    Pulse 84 74 89 85 85        Hypertension Digital Medicine Program (HDMP): Health  Follow Up    Lifestyle Modifications:    1.Low sodium diet: yes Patient reports that she is continuing to limit her salt intake.     2.Physical activity: yes Ms. Nguyen is exercising with Silver [employer]neakers a few days each week.    3.Hypotension/Hypertension symptoms: no   Frequency/Alleviating factors/Precipitating factors, etc.     4.Patient has been compliant with the medication regimen.     Follow up with Ms. Clemencia NDIAYE Virgilmilan completed. Ms. Nguyen. She is not sure what caused her BP to spike on 2/10/18. No further questions or concerns. I will follow up in a few weeks to assess progress.

## 2018-02-19 ENCOUNTER — PATIENT OUTREACH (OUTPATIENT)
Dept: OTHER | Facility: OTHER | Age: 73
End: 2018-02-19

## 2018-02-19 NOTE — PROGRESS NOTES
Last 5 Patient Entered Readings                                      Current 30 Day Average: 135/67     Recent Readings 2/19/2018 2/17/2018 2/14/2018 2/10/2018 2/7/2018    SBP (mmHg) 124 130 130 151 126    DBP (mmHg) 57 66 59 72 66    Pulse 80 83 84 74 89        Ms. Alcocer BP is improving. She had a spike in BP 2 weeks ago, but all other readings are at goal. She is feeling well and has no questions or concerns at this time.     Will continue to monitor regularly. Will follow up in 4-6 weeks, sooner if BP begins to trend upward or downward.    Patient has my contact information and knows to call with any concerns or clinical changes.     Current HTN regimen:  Hypertension Medications             amLODIPine (NORVASC) 2.5 MG tablet TAKE 2 TABLETS ONE TIME DAILY    valsartan (DIOVAN) 160 MG tablet Take 1 tablet (160 mg total) by mouth once daily.

## 2018-03-05 ENCOUNTER — HOSPITAL ENCOUNTER (OUTPATIENT)
Dept: RADIOLOGY | Facility: HOSPITAL | Age: 73
Discharge: HOME | End: 2018-03-05
Attending: INTERNAL MEDICINE
Payer: MEDICARE

## 2018-03-05 DIAGNOSIS — M85.80 OTHER SPECIFIED DISORDERS OF BONE DENSITY AND STRUCTURE, UNSPECIFIED SITE: ICD-10-CM

## 2018-03-05 DIAGNOSIS — N18.30 CHRONIC KIDNEY DISEASE, STAGE III (MODERATE): ICD-10-CM

## 2018-03-05 DIAGNOSIS — E55.9 VITAMIN D DEFICIENCY: ICD-10-CM

## 2018-03-05 PROCEDURE — 77080 DXA BONE DENSITY AXIAL: CPT | Mod: 26,,, | Performed by: RADIOLOGY

## 2018-03-05 PROCEDURE — 77080 DXA BONE DENSITY AXIAL: CPT | Mod: TC,PO

## 2018-03-13 ENCOUNTER — PATIENT OUTREACH (OUTPATIENT)
Dept: OTHER | Facility: OTHER | Age: 73
End: 2018-03-13

## 2018-03-13 NOTE — PROGRESS NOTES
"Last 5 Patient Entered Readings                                      Current 30 Day Average: 128/64     Recent Readings 3/13/2018 3/11/2018 3/7/2018 3/2/2018 3/1/2018    SBP (mmHg) 126 124 132 135 120    DBP (mmHg) 64 64 65 74 66    Pulse 69 72 80 86 84        Digital Medicine: Health  Follow Up    Lifestyle Modifications:    1.Dietary Modifications (Sodium intake <2,000mg/day, food labels, dining out): Deferred    2.Physical Activity: Deferred    3.Medication Therapy: Patient has been compliant with the medication regimen.    4.Patient has the following medication side effects/concerns: NA  (Frequency/Alleviating factors/Precipitating factors, etc.)     Follow up with Ms. Clemencia Nguyen completed. Ms. Nguyen is doing well. Patient called to update medications after seeing Dr. Davis yesterday. Ms. Nguyen was instructed to stop amlodipine and double valsartan, totally to 320mg/day. She reports Dr. Davis "finds it better to be on a higher dose of one medication than a low dose of two medications". She will also start taking BP medication at night. Ms. Nguyen reports that Dr. Mccloud will be retiring. Will discuss enrollment status with patient at next encounter, as she must have an active [facility] PCP in order to participate. No further questions or concerns. I will follow up in a few weeks to assess progress.    "

## 2018-04-09 ENCOUNTER — PATIENT OUTREACH (OUTPATIENT)
Dept: OTHER | Facility: OTHER | Age: 73
End: 2018-04-09

## 2018-04-09 DIAGNOSIS — I10 ESSENTIAL HYPERTENSION: ICD-10-CM

## 2018-04-09 RX ORDER — VALSARTAN 320 MG/1
320 TABLET ORAL DAILY
Qty: 90 TABLET | Refills: 1 | Status: SHIPPED | OUTPATIENT
Start: 2018-04-09 | End: 2018-09-10 | Stop reason: CLARIF

## 2018-04-09 NOTE — PROGRESS NOTES
Last 5 Patient Entered Readings                                      Current 30 Day Average: 138/67     Recent Readings 4/9/2018 4/7/2018 4/6/2018 4/4/2018 4/2/2018    SBP (mmHg) 137 150 142 134 149    DBP (mmHg) 62 69 68 66 69    Pulse 79 83 77 83 81        Ms. Nguyen's BP readings have been higher over the past few weeks. Her nephrologist, Dr. Davis, stopped amlodipine and increased valsartan dose to 320 mg QD. She also told Ms. Nguyen to start taking valsartan in the evening. Ms. Nguyen's BP readings have been higher in the late afternoon/early evenings. Will have her return to taking valsartan to the morning. If BP remains elevated with the timing change, will add amlodipine back at 2.5 mg QD to start and increase if necessary.     Will continue to monitor regularly. Will follow up in 2-3 weeks, sooner if BP begins to trend upward or downward.    Patient has my contact information and knows to call with any concerns or clinical changes.     Current HTN regimen:  Hypertension Medications             valsartan (DIOVAN) 320 MG tablet Take 1 tablet (320 mg total) by mouth once daily.

## 2018-04-19 ENCOUNTER — PATIENT OUTREACH (OUTPATIENT)
Dept: OTHER | Facility: OTHER | Age: 73
End: 2018-04-19

## 2018-04-19 NOTE — PROGRESS NOTES
Last 5 Patient Entered Readings                                      Current 30 Day Average: 139/67     Recent Readings 4/24/2018 4/20/2018 4/19/2018 4/18/2018 4/17/2018    SBP (mmHg) 197 123 139 149 129    DBP (mmHg) 90 64 67 72 61    Pulse 73 89 89 81 81        Digital Medicine: Health  Follow Up    Lifestyle Modifications:    1.Dietary Modifications (Sodium intake <2,000mg/day, food labels, dining out): Deferred    2.Physical Activity: Deferred    3.Medication Therapy: Patient has been compliant with the medication regimen.    4.Patient has the following medication side effects/concerns: None  (Frequency/Alleviating factors/Precipitating factors, etc.)     Follow up with Mrs. Clemencia Nguyen completed. Mrs. Nguyen is feeling much better today after visiting the ED yesterday. She reports that [place] and reports that the last plate was very salty. She attributes elevated BP to this and sharp pains in her back. She presented to the ED and had her gallbladder removed. She will follow up with new PCP in a few weeks (Janice Castillo). Will inquire to see if she is able to continue participating in the HDMP under this physician. No further questions or concerns. I will follow up in a few weeks to assess progress.

## 2018-04-24 PROBLEM — K81.0 ACUTE CHOLECYSTITIS: Status: ACTIVE | Noted: 2018-04-24

## 2018-05-02 ENCOUNTER — PATIENT OUTREACH (OUTPATIENT)
Dept: OTHER | Facility: OTHER | Age: 73
End: 2018-05-02

## 2018-05-02 DIAGNOSIS — I10 ESSENTIAL HYPERTENSION: Primary | ICD-10-CM

## 2018-05-02 NOTE — PROGRESS NOTES
Last 5 Patient Entered Readings                                      Current 30 Day Average: 138/68     Recent Readings 5/10/2018 5/8/2018 5/5/2018 5/4/2018 5/2/2018    SBP (mmHg) 131 132 139 142 135    DBP (mmHg) 63 72 67 67 68    Pulse 81 84 81 79 93        Ms. Nguyen's BP average has increased since her nephrologist stopped amlodipine and increased valsartan. Will add amlodipine back to her regimen at half the previous dose. She confirms she still has 2.5 mg tablets at home, so she will start taking 1 tablet daily and will remain on valsartan 320 mg QD.    Patient's BP average is above goal of <130/80.     Will continue to monitor regularly. Will follow up in 2-3 weeks, sooner if BP begins to trend upward or downward.    Patient has my contact information and knows to call with any concerns or clinical changes.     Current HTN regimen:  Hypertension Medications             amLODIPine (NORVASC) 2.5 MG tablet Take 1 tablet (2.5 mg total) by mouth once daily.    valsartan (DIOVAN) 320 MG tablet Take 1 tablet (320 mg total) by mouth once daily.

## 2018-05-11 RX ORDER — AMLODIPINE BESYLATE 2.5 MG/1
2.5 TABLET ORAL DAILY
Qty: 90 TABLET | Refills: 1
Start: 2018-05-11 | End: 2018-09-10 | Stop reason: SDUPTHER

## 2018-05-15 ENCOUNTER — OFFICE VISIT (OUTPATIENT)
Dept: FAMILY MEDICINE | Facility: CLINIC | Age: 73
End: 2018-05-15
Payer: MEDICARE

## 2018-05-15 VITALS
OXYGEN SATURATION: 98 % | WEIGHT: 145.06 LBS | HEART RATE: 81 BPM | DIASTOLIC BLOOD PRESSURE: 70 MMHG | TEMPERATURE: 98 F | SYSTOLIC BLOOD PRESSURE: 130 MMHG | RESPIRATION RATE: 18 BRPM | BODY MASS INDEX: 27.39 KG/M2 | HEIGHT: 61 IN

## 2018-05-15 DIAGNOSIS — K21.9 GASTROESOPHAGEAL REFLUX DISEASE, ESOPHAGITIS PRESENCE NOT SPECIFIED: ICD-10-CM

## 2018-05-15 DIAGNOSIS — N18.30 CHRONIC KIDNEY DISEASE, STAGE III (MODERATE): ICD-10-CM

## 2018-05-15 DIAGNOSIS — E78.2 MIXED HYPERLIPIDEMIA: ICD-10-CM

## 2018-05-15 DIAGNOSIS — E03.9 HYPOTHYROIDISM, UNSPECIFIED TYPE: ICD-10-CM

## 2018-05-15 DIAGNOSIS — M85.80 OSTEOPENIA, UNSPECIFIED LOCATION: ICD-10-CM

## 2018-05-15 DIAGNOSIS — E78.5 HYPERLIPIDEMIA, UNSPECIFIED HYPERLIPIDEMIA TYPE: ICD-10-CM

## 2018-05-15 DIAGNOSIS — I10 ESSENTIAL HYPERTENSION: Primary | ICD-10-CM

## 2018-05-15 PROCEDURE — 99213 OFFICE O/P EST LOW 20 MIN: CPT | Mod: PBBFAC,PO | Performed by: FAMILY MEDICINE

## 2018-05-15 PROCEDURE — 99999 PR PBB SHADOW E&M-EST. PATIENT-LVL III: CPT | Mod: PBBFAC,,, | Performed by: FAMILY MEDICINE

## 2018-05-15 PROCEDURE — 99214 OFFICE O/P EST MOD 30 MIN: CPT | Mod: S$PBB,,, | Performed by: FAMILY MEDICINE

## 2018-05-15 RX ORDER — SIMVASTATIN 10 MG/1
10 TABLET, FILM COATED ORAL NIGHTLY
Qty: 90 TABLET | Refills: 3 | Status: SHIPPED | OUTPATIENT
Start: 2018-05-15 | End: 2019-07-08 | Stop reason: SDUPTHER

## 2018-05-15 NOTE — PROGRESS NOTES
Subjective:       Patient ID: Clemencia Nguyen is a 72 y.o. female.    Chief Complaint: Follow-up (hypertension)    This pt is new to me. Pt is here for followup of chronic medical issues.  She is treated for HTN, HLD, hypothyroidism and GERD.  She is 3 weeks s/p lap seferino.  [facility].  She is gettimg conflicting advice about her BP meds from the BP program and her nephrologist Dr. Davis.  Dr. Davis also changed her Fossamax to Miacalcin NS.      Hypertension   This is a chronic problem. The current episode started more than 1 year ago. The problem has been waxing and waning since onset. The problem is controlled. Pertinent negatives include no anxiety, blurred vision, chest pain, headaches, malaise/fatigue, neck pain, orthopnea, palpitations, peripheral edema, PND, shortness of breath or sweats. Agents associated with hypertension include thyroid hormones. Risk factors for coronary artery disease include dyslipidemia. The current treatment provides moderate improvement.     Review of Systems   Constitutional: Negative for activity change, appetite change, fatigue, malaise/fatigue and unexpected weight change.   Eyes: Negative for blurred vision and visual disturbance.   Respiratory: Negative for cough, chest tightness and shortness of breath.    Cardiovascular: Negative for chest pain, palpitations, orthopnea, leg swelling and PND.   Gastrointestinal: Negative for abdominal pain, constipation, diarrhea, nausea and vomiting.   Endocrine: Negative for cold intolerance, heat intolerance and polyuria.   Genitourinary: Negative for decreased urine volume and dysuria.   Musculoskeletal: Negative for arthralgias, back pain and neck pain.   Skin: Negative for rash.   Neurological: Negative for numbness and headaches.   Psychiatric/Behavioral: Negative for sleep disturbance.       Objective:      Physical Exam   Constitutional: She is oriented to person, place, and time. She  appears well-developed and well-nourished.   HENT:   Head: Normocephalic.   Eyes: Conjunctivae and EOM are normal. Pupils are equal, round, and reactive to light.   Neck: Normal range of motion. Neck supple. No thyromegaly present.   Cardiovascular: Normal rate, regular rhythm and normal heart sounds.    Pulmonary/Chest: Effort normal and breath sounds normal.   Abdominal: Soft. Bowel sounds are normal. There is no tenderness.   Musculoskeletal: Normal range of motion. She exhibits no tenderness or deformity.   Lymphadenopathy:     She has no cervical adenopathy.   Neurological: She is alert and oriented to person, place, and time. She displays normal reflexes. No cranial nerve deficit. She exhibits normal muscle tone. Coordination normal.   Skin: Skin is warm and dry.   Psychiatric: She has a normal mood and affect. Her behavior is normal.       Assessment:       1. Essential hypertension    2. Mixed hyperlipidemia    3. Chronic kidney disease, stage III (moderate)    4. Hypothyroidism, unspecified type    5. Gastroesophageal reflux disease, esophagitis presence not specified    6. Osteopenia, unspecified location    7. Hyperlipidemia, unspecified hyperlipidemia type        Plan:       [place] was seen today for follow-up.    Diagnoses and all orders for this visit:    Essential hypertension  -     Comprehensive metabolic panel; Future    Mixed hyperlipidemia  -     Lipid panel; Future    Chronic kidney disease, stage III (moderate)    Hypothyroidism, unspecified type  -     TSH; Future  -     T4, free; Future    Gastroesophageal reflux disease, esophagitis presence not specified    Osteopenia, unspecified location    Hyperlipidemia, unspecified hyperlipidemia type  -     simvastatin (ZOCOR) 10 MG tablet; Take 1 tablet (10 mg total) by mouth every evening.      During this visit, I reviewed the pt's history, medications, allergies, and problem list.

## 2018-05-16 ENCOUNTER — LAB VISIT (OUTPATIENT)
Dept: LAB | Facility: HOSPITAL | Age: 73
End: 2018-05-16
Attending: FAMILY MEDICINE
Payer: MEDICARE

## 2018-05-16 DIAGNOSIS — E03.9 HYPOTHYROIDISM, UNSPECIFIED TYPE: ICD-10-CM

## 2018-05-16 DIAGNOSIS — I10 ESSENTIAL HYPERTENSION: ICD-10-CM

## 2018-05-16 DIAGNOSIS — E78.2 MIXED HYPERLIPIDEMIA: ICD-10-CM

## 2018-05-16 LAB
ALBUMIN SERPL BCP-MCNC: 3.9 G/DL
ALP SERPL-CCNC: 71 U/L
ALT SERPL W/O P-5'-P-CCNC: 18 U/L
ANION GAP SERPL CALC-SCNC: 9 MMOL/L
AST SERPL-CCNC: 17 U/L
BILIRUB SERPL-MCNC: 0.9 MG/DL
BUN SERPL-MCNC: 17 MG/DL
CALCIUM SERPL-MCNC: 9.7 MG/DL
CHLORIDE SERPL-SCNC: 103 MMOL/L
CHOLEST SERPL-MCNC: 168 MG/DL
CHOLEST/HDLC SERPL: 4.7 {RATIO}
CO2 SERPL-SCNC: 27 MMOL/L
CREAT SERPL-MCNC: 1.4 MG/DL
EST. GFR  (AFRICAN AMERICAN): 42.7 ML/MIN/1.73 M^2
EST. GFR  (NON AFRICAN AMERICAN): 37 ML/MIN/1.73 M^2
GLUCOSE SERPL-MCNC: 120 MG/DL
HDLC SERPL-MCNC: 36 MG/DL
HDLC SERPL: 21.4 %
LDLC SERPL CALC-MCNC: 80.2 MG/DL
NONHDLC SERPL-MCNC: 132 MG/DL
POTASSIUM SERPL-SCNC: 4.2 MMOL/L
PROT SERPL-MCNC: 7 G/DL
SODIUM SERPL-SCNC: 139 MMOL/L
T4 FREE SERPL-MCNC: 0.98 NG/DL
TRIGL SERPL-MCNC: 259 MG/DL
TSH SERPL DL<=0.005 MIU/L-ACNC: 3.74 UIU/ML

## 2018-05-16 PROCEDURE — 80061 LIPID PANEL: CPT

## 2018-05-16 PROCEDURE — 84443 ASSAY THYROID STIM HORMONE: CPT

## 2018-05-16 PROCEDURE — 80053 COMPREHEN METABOLIC PANEL: CPT

## 2018-05-16 PROCEDURE — 84439 ASSAY OF FREE THYROXINE: CPT

## 2018-05-16 PROCEDURE — 36415 COLL VENOUS BLD VENIPUNCTURE: CPT | Mod: PO

## 2018-05-17 ENCOUNTER — LAB VISIT (OUTPATIENT)
Dept: LAB | Facility: HOSPITAL | Age: 73
End: 2018-05-17
Attending: FAMILY MEDICINE
Payer: MEDICARE

## 2018-05-17 ENCOUNTER — TELEPHONE (OUTPATIENT)
Dept: FAMILY MEDICINE | Facility: CLINIC | Age: 73
End: 2018-05-17

## 2018-05-17 DIAGNOSIS — R73.9 HYPERGLYCEMIA: Primary | ICD-10-CM

## 2018-05-17 DIAGNOSIS — R73.9 HYPERGLYCEMIA: ICD-10-CM

## 2018-05-17 LAB
ESTIMATED AVG GLUCOSE: 117 MG/DL
HBA1C MFR BLD HPLC: 5.7 %

## 2018-05-17 PROCEDURE — 36415 COLL VENOUS BLD VENIPUNCTURE: CPT | Mod: PO

## 2018-05-17 PROCEDURE — 83036 HEMOGLOBIN GLYCOSYLATED A1C: CPT

## 2018-05-17 NOTE — TELEPHONE ENCOUNTER
Spoke with pt and informed her to come in and have ha1c blood work she states she will come in this morning to have blood work done please place order in for ha1c

## 2018-05-28 ENCOUNTER — PATIENT MESSAGE (OUTPATIENT)
Dept: FAMILY MEDICINE | Facility: CLINIC | Age: 73
End: 2018-05-28

## 2018-05-28 RX ORDER — LEVOTHYROXINE SODIUM 50 UG/1
50 TABLET ORAL
Qty: 90 TABLET | Refills: 3 | Status: SHIPPED | OUTPATIENT
Start: 2018-05-28 | End: 2018-12-06 | Stop reason: SDUPTHER

## 2018-05-29 ENCOUNTER — PATIENT OUTREACH (OUTPATIENT)
Dept: OTHER | Facility: OTHER | Age: 73
End: 2018-05-29

## 2018-05-29 NOTE — PROGRESS NOTES
Last 5 Patient Entered Readings                                      Current 30 Day Average: 139/66     Recent Readings 5/28/2018 5/19/2018 5/18/2018 5/16/2018 5/15/2018    SBP (mmHg) 161 134 149 125 130    DBP (mmHg) 65 59 71 59 70    Pulse 86 93 87 81 82        Ms. Nguyen's BP readings were improving prior to her cruise. She returned back from the cruise yesterday. She feels BP was elevated last night from eating the food on the cruise ship for a week. She confirms she is taking amlodipine 2.5 mg QD with no issues. Will not increase dose as of now. If BP remains above goal, will increase amlodipine to 5 mg QD.    Patient's BP average is above goal of <130/80.     Will continue to monitor regularly. Will follow up in 2-3 weeks, sooner if BP begins to trend upward or downward.    Patient has my contact information and knows to call with any concerns or clinical changes.     Current HTN regimen:  Hypertension Medications             amLODIPine (NORVASC) 2.5 MG tablet Take 1 tablet (2.5 mg total) by mouth once daily.    valsartan (DIOVAN) 320 MG tablet Take 1 tablet (320 mg total) by mouth once daily.

## 2018-06-12 ENCOUNTER — PATIENT OUTREACH (OUTPATIENT)
Dept: OTHER | Facility: OTHER | Age: 73
End: 2018-06-12

## 2018-06-12 NOTE — PROGRESS NOTES
Last 5 Patient Entered Readings                                      Current 30 Day Average: 132/62     Recent Readings 6/11/2018 6/9/2018 6/8/2018 6/7/2018 6/6/2018    SBP (mmHg) 123 112 130 119 130    DBP (mmHg) 55 53 61 57 61    Pulse 79 89 90 79 88        Ms. Nguyen's BP is trending down. Will continue current regimen. If BP average remains above goal, or begins to trend up, will increase amlodipine to 5 mg QD.     I will continue to monitor regularly and will follow up in 1-2 months, sooner if BP begins to trend upward or downward.    Patient has my contact information and knows to call with any concerns or clinical changes.     Current HTN regimen:  Hypertension Medications             amLODIPine (NORVASC) 2.5 MG tablet Take 1 tablet (2.5 mg total) by mouth once daily.    valsartan (DIOVAN) 320 MG tablet Take 1 tablet (320 mg total) by mouth once daily.

## 2018-06-26 ENCOUNTER — PATIENT OUTREACH (OUTPATIENT)
Dept: OTHER | Facility: OTHER | Age: 73
End: 2018-06-26

## 2018-06-26 NOTE — PROGRESS NOTES
Last 5 Patient Entered Readings                                      Current 30 Day Average: 130/61     Recent Readings 6/25/2018 6/22/2018 6/18/2018 6/15/2018 6/14/2018    SBP (mmHg) 117 114 130 142 139    DBP (mmHg) 58 61 58 63 66    Pulse 79 79 87 96 87        Digital Medicine: Health  Follow Up    Lifestyle Modifications:    1.Dietary Modifications (Sodium intake <2,000mg/day, food labels, dining out): Patient reports that she has been limiting her salt intake. Encouragement provided.    2.Physical Activity: Mrs. Nguyen is exercising with Silver Sneakers three days each week.    3.Medication Therapy: Patient has been compliant with the medication regimen.    4.Patient has the following medication side effects/concerns: None  (Frequency/Alleviating factors/Precipitating factors, etc.)     Follow up with Mrs. Clemencia Nguyen completed. Mrs. Nguyen is doing well. She is pleased with recent BP readings. No further questions or concerns. Will continue follow up to achieve health goals.

## 2018-07-17 ENCOUNTER — PATIENT OUTREACH (OUTPATIENT)
Dept: OTHER | Facility: OTHER | Age: 73
End: 2018-07-17

## 2018-07-17 NOTE — PROGRESS NOTES
Last 5 Patient Entered Readings                                      Current 30 Day Average: 124/61     Recent Readings 7/13/2018 7/10/2018 7/9/2018 7/7/2018 7/6/2018    SBP (mmHg) 126 125 123 120 121    DBP (mmHg) 71 62 62 63 59    Pulse 90 77 75 86 83        Patient called in regards to valsartan recall. She has started to see a new PCP that is not enrolled in the [facility] as of yet, so I am unable to change her medication without an enrolled cosigner. Advised Ms. Nguyen call her nephrologist, Dr. Davis, since she started the valsartan to request irebesartan in place of it. She will also call Dr. Castillo's (new PCP) office to see if they can send a new prescription. She will keep me informed on what happens.    Patient's BP average is controlled based on 2017 ACC/AHA HTN guidelines of goal BP <130/80.      Patient's health , Miriam Jose, will be following up every 3-4 weeks. I will continue to monitor regularly and will follow up in 3-4 months, sooner if BP begins to trend upward or downward.    Patient has my contact information and knows to call with any concerns or clinical changes.     Current HTN regimen:  Hypertension Medications             amLODIPine (NORVASC) 2.5 MG tablet Take 1 tablet (2.5 mg total) by mouth once daily.    valsartan (DIOVAN) 320 MG tablet Take 1 tablet (320 mg total) by mouth once daily.

## 2018-08-07 ENCOUNTER — PATIENT OUTREACH (OUTPATIENT)
Dept: OTHER | Facility: OTHER | Age: 73
End: 2018-08-07

## 2018-08-07 NOTE — PROGRESS NOTES
Last 5 Patient Entered Readings                                      Current 30 Day Average: 133/64     Recent Readings 8/6/2018 8/4/2018 8/3/2018 8/1/2018 7/31/2018    SBP (mmHg) 136 136 119 142 150    DBP (mmHg) 63 64 62 72 68    Pulse 94 70 91 82 74        Digital Medicine: Health  Follow Up    Lifestyle Modifications:    1.Dietary Modifications (Sodium intake <2,000mg/day, food labels, dining out): Deferred    2.Physical Activity: Deferred    3.Medication Therapy: Patient has been compliant with the medication regimen. Mrs. Nguyen was able to get a new prescription from [facility]. [facility] is [facility].     4.Patient has the following medication side effects/concerns: None  (Frequency/Alleviating factors/Precipitating factors, etc.)     Follow up with Mrs. Clemencia Nguyen completed. Mrs. Nguyen has been very busy. Her  spent the last 10 days. He was in prior for a knee replacement, but returned a day or so later after having shortness of breath. He is home now and doing well. She thanks me for calling. No further questions or concerns. Will continue follow up to achieve health goals.

## 2018-09-04 ENCOUNTER — LAB VISIT (OUTPATIENT)
Dept: LAB | Facility: HOSPITAL | Age: 73
End: 2018-09-04
Attending: INTERNAL MEDICINE
Payer: MEDICARE

## 2018-09-04 DIAGNOSIS — M85.80 SAPHO SYNDROME: ICD-10-CM

## 2018-09-04 DIAGNOSIS — L40.3 SAPHO SYNDROME: ICD-10-CM

## 2018-09-04 DIAGNOSIS — N18.30 CHRONIC KIDNEY DISEASE, STAGE III (MODERATE): ICD-10-CM

## 2018-09-04 DIAGNOSIS — E55.9 AVITAMINOSIS D: Primary | ICD-10-CM

## 2018-09-04 DIAGNOSIS — M65.9 SAPHO SYNDROME: ICD-10-CM

## 2018-09-04 DIAGNOSIS — M86.9 SAPHO SYNDROME: ICD-10-CM

## 2018-09-04 DIAGNOSIS — N25.81 SECONDARY HYPERPARATHYROIDISM OF RENAL ORIGIN: ICD-10-CM

## 2018-09-04 DIAGNOSIS — L70.9 SAPHO SYNDROME: ICD-10-CM

## 2018-09-04 DIAGNOSIS — I10 ESSENTIAL HYPERTENSION, MALIGNANT: ICD-10-CM

## 2018-09-04 LAB
25(OH)D3+25(OH)D2 SERPL-MCNC: 35 NG/ML
ALBUMIN SERPL BCP-MCNC: 3.6 G/DL
ANION GAP SERPL CALC-SCNC: 8 MMOL/L
BASOPHILS # BLD AUTO: 0 K/UL
BASOPHILS NFR BLD: 0 %
BUN SERPL-MCNC: 18 MG/DL
CALCIUM SERPL-MCNC: 9.6 MG/DL
CHLORIDE SERPL-SCNC: 102 MMOL/L
CO2 SERPL-SCNC: 27 MMOL/L
CREAT SERPL-MCNC: 1.5 MG/DL
DIFFERENTIAL METHOD: ABNORMAL
EOSINOPHIL # BLD AUTO: 0 K/UL
EOSINOPHIL NFR BLD: 0 %
ERYTHROCYTE [DISTWIDTH] IN BLOOD BY AUTOMATED COUNT: 13.2 %
EST. GFR  (AFRICAN AMERICAN): 39 ML/MIN/1.73 M^2
EST. GFR  (NON AFRICAN AMERICAN): 33.8 ML/MIN/1.73 M^2
GLUCOSE SERPL-MCNC: 147 MG/DL
HCT VFR BLD AUTO: 41.9 %
HGB BLD-MCNC: 13.7 G/DL
IMM GRANULOCYTES # BLD AUTO: 0.01 K/UL
IMM GRANULOCYTES NFR BLD AUTO: 0.3 %
LYMPHOCYTES # BLD AUTO: 1.5 K/UL
LYMPHOCYTES NFR BLD: 40.5 %
MCH RBC QN AUTO: 28.6 PG
MCHC RBC AUTO-ENTMCNC: 32.7 G/DL
MCV RBC AUTO: 88 FL
MONOCYTES # BLD AUTO: 0.2 K/UL
MONOCYTES NFR BLD: 6.3 %
NEUTROPHILS # BLD AUTO: 2 K/UL
NEUTROPHILS NFR BLD: 52.9 %
NRBC BLD-RTO: 0 /100 WBC
PHOSPHATE SERPL-MCNC: 2.9 MG/DL
PLATELET # BLD AUTO: 138 K/UL
PMV BLD AUTO: 9.4 FL
POTASSIUM SERPL-SCNC: 4.3 MMOL/L
PTH-INTACT SERPL-MCNC: 65 PG/ML
RBC # BLD AUTO: 4.79 M/UL
SODIUM SERPL-SCNC: 137 MMOL/L
WBC # BLD AUTO: 3.78 K/UL

## 2018-09-04 PROCEDURE — 83970 ASSAY OF PARATHORMONE: CPT

## 2018-09-04 PROCEDURE — 85025 COMPLETE CBC W/AUTO DIFF WBC: CPT

## 2018-09-04 PROCEDURE — 82306 VITAMIN D 25 HYDROXY: CPT

## 2018-09-04 PROCEDURE — 36415 COLL VENOUS BLD VENIPUNCTURE: CPT | Mod: PO

## 2018-09-04 PROCEDURE — 80069 RENAL FUNCTION PANEL: CPT

## 2018-09-11 ENCOUNTER — PATIENT MESSAGE (OUTPATIENT)
Dept: ADMINISTRATIVE | Facility: OTHER | Age: 73
End: 2018-09-11

## 2018-09-11 ENCOUNTER — PATIENT OUTREACH (OUTPATIENT)
Dept: OTHER | Facility: OTHER | Age: 73
End: 2018-09-11

## 2018-10-16 ENCOUNTER — PATIENT OUTREACH (OUTPATIENT)
Dept: OTHER | Facility: OTHER | Age: 73
End: 2018-10-16

## 2018-10-16 NOTE — PROGRESS NOTES
Last 5 Patient Entered Readings                                      Current 30 Day Average: 128/62     Recent Readings 10/15/2018 10/12/2018 10/10/2018 10/7/2018 10/5/2018    SBP (mmHg) 133 133 121 131 111    DBP (mmHg) 69 65 60 64 54    Pulse 81 91 88 80 81        Digital Medicine: Health  Follow Up    Lifestyle Modifications:    1.Dietary Modifications (Sodium intake <2,000mg/day, food labels, dining out): Patient is limiting her salt intake.     2.Physical Activity: Patient continues to exercise with Silver [employer]neakers.    3.Medication Therapy: Patient has been compliant with the medication regimen.    4.Patient has the following medication side effects/concerns: None  (Frequency/Alleviating factors/Precipitating factors, etc.)     Follow up with Mrs. Clemencia Nguyen completed. Mrs. Nguyen is doing well. She has been trying to check her BP more regularly since her  is doing better. Her 30 day BP average 128/62 mmHg is at goal, <130/80 mmHg. She has no questions for improving dietary habits or physical activity currently. Will continue to follow up to achieve health goals.

## 2018-11-20 ENCOUNTER — PATIENT OUTREACH (OUTPATIENT)
Dept: OTHER | Facility: OTHER | Age: 73
End: 2018-11-20

## 2018-11-20 NOTE — PROGRESS NOTES
Last 5 Patient Entered Readings                                      Current 30 Day Average: 127/63     Recent Readings 11/20/2018 11/19/2018 11/17/2018 11/12/2018 11/7/2018    SBP (mmHg) 143 129 139 128 130    DBP (mmHg) 79 61 66 65 60    Pulse 83 80 83 84 83        Digital Medicine: Health  Follow Up    Lifestyle Modifications:    1.Dietary Modifications (Sodium intake <2,000mg/day, food labels, dining out): Patient denies changes to her diet. Encouraged sodium restriction.     2.Physical Activity: Mrs. Nguyen continues to exercise with Silver Sneakers during the week.     3.Medication Therapy: Patient has been compliant with the medication regimen.    4.Patient has the following medication side effects/concerns: None  (Frequency/Alleviating factors/Precipitating factors, etc.)     Follow up with Mrs. Clemencia Nguyen completed. Mrs. Nguyen is doing okay. Patient called after receiving a text asking her to check her BP. Instructed patient to open MyChart, and readings came through immediately. She is not sure caused BP to spike today. Otherwise, she is pleased with her BP. No further questions or concerns. Will continue to follow up to achieve health goals.

## 2018-12-04 ENCOUNTER — OFFICE VISIT (OUTPATIENT)
Dept: FAMILY MEDICINE | Facility: CLINIC | Age: 73
End: 2018-12-04
Payer: MEDICARE

## 2018-12-04 VITALS
SYSTOLIC BLOOD PRESSURE: 122 MMHG | HEIGHT: 61 IN | OXYGEN SATURATION: 99 % | DIASTOLIC BLOOD PRESSURE: 76 MMHG | HEART RATE: 72 BPM | TEMPERATURE: 98 F | WEIGHT: 140.19 LBS | BODY MASS INDEX: 26.47 KG/M2

## 2018-12-04 DIAGNOSIS — F39 MOOD DISORDER: ICD-10-CM

## 2018-12-04 DIAGNOSIS — Z12.39 BREAST CANCER SCREENING: ICD-10-CM

## 2018-12-04 DIAGNOSIS — I10 ESSENTIAL HYPERTENSION: Primary | ICD-10-CM

## 2018-12-04 DIAGNOSIS — E78.2 MIXED HYPERLIPIDEMIA: ICD-10-CM

## 2018-12-04 DIAGNOSIS — K21.9 GASTROESOPHAGEAL REFLUX DISEASE WITHOUT ESOPHAGITIS: ICD-10-CM

## 2018-12-04 DIAGNOSIS — E03.9 ACQUIRED HYPOTHYROIDISM: ICD-10-CM

## 2018-12-04 PROCEDURE — 99999 PR PBB SHADOW E&M-EST. PATIENT-LVL III: CPT | Mod: PBBFAC,,, | Performed by: FAMILY MEDICINE

## 2018-12-04 PROCEDURE — 99214 OFFICE O/P EST MOD 30 MIN: CPT | Mod: S$PBB,,, | Performed by: FAMILY MEDICINE

## 2018-12-04 PROCEDURE — 99213 OFFICE O/P EST LOW 20 MIN: CPT | Mod: PBBFAC,PO | Performed by: FAMILY MEDICINE

## 2018-12-04 RX ORDER — PANTOPRAZOLE SODIUM 40 MG/1
40 TABLET, DELAYED RELEASE ORAL DAILY
Qty: 90 TABLET | Refills: 3 | Status: SHIPPED | OUTPATIENT
Start: 2018-12-04 | End: 2019-06-14 | Stop reason: SDUPTHER

## 2018-12-04 RX ORDER — AMLODIPINE BESYLATE 2.5 MG/1
2.5 TABLET ORAL NIGHTLY
Qty: 90 TABLET | Refills: 3
Start: 2018-12-04 | End: 2018-12-11 | Stop reason: SDUPTHER

## 2018-12-04 RX ORDER — IMIPRAMINE HYDROCHLORIDE 25 MG/1
TABLET, FILM COATED ORAL
Qty: 540 TABLET | Refills: 3 | Status: ON HOLD | OUTPATIENT
Start: 2018-12-04 | End: 2019-09-03 | Stop reason: HOSPADM

## 2018-12-04 NOTE — PROGRESS NOTES
"Subjective:       Patient ID: Clemencia Nguyen is a 73 y.o. female.    Chief Complaint: Follow-up and Medication Refill    Pt is known to me.  Pt is here for followup of chronic medical issues.  The pt is feeling good and she has no acute complaints today.  [facility] [employer] [employer] [place].      Hypertension   The current episode started more than 1 year ago. The problem has been waxing and waning since onset. The problem is controlled. Pertinent negatives include no anxiety, blurred vision, chest pain, headaches, malaise/fatigue, neck pain, orthopnea, palpitations, peripheral edema, PND, shortness of breath or sweats. Agents associated with hypertension include thyroid hormones. Risk factors for coronary artery disease include dyslipidemia and family history. The current treatment provides moderate improvement.     Review of Systems   Constitutional: Negative for malaise/fatigue and unexpected weight change.   Eyes: Negative for blurred vision.   Respiratory: Negative for chest tightness and shortness of breath.    Cardiovascular: Negative for chest pain, palpitations, orthopnea, leg swelling and PND.   Endocrine: Negative for polyuria.   Musculoskeletal: Negative for neck pain.   Neurological: Negative for dizziness and headaches.   Psychiatric/Behavioral: Negative for dysphoric mood and sleep disturbance. The patient is not nervous/anxious.        Objective:       Vitals:    12/04/18 1116   BP: 122/76   Pulse: 72   Temp: 98.2 °F (36.8 °C)   SpO2: 99%   Weight: 63.6 kg (140 lb 3.4 oz)   Height: 5' 1" (1.549 m)     Physical Exam   Constitutional: She is oriented to person, place, and time. She appears well-developed and well-nourished.   HENT:   Head: Normocephalic.   Eyes: Conjunctivae and EOM are normal. Pupils are equal, round, and reactive to light.   Neck: Normal range of motion. Neck supple. No thyromegaly present.   Cardiovascular: Normal rate, regular rhythm and normal " heart sounds.   Pulmonary/Chest: Effort normal and breath sounds normal.   Abdominal: Soft. Bowel sounds are normal. There is no tenderness.   Musculoskeletal: Normal range of motion. She exhibits no tenderness or deformity.   Lymphadenopathy:     She has no cervical adenopathy.   Neurological: She is alert and oriented to person, place, and time. She displays normal reflexes. No cranial nerve deficit. She exhibits normal muscle tone. Coordination normal.   Skin: Skin is warm and dry.   Psychiatric: She has a normal mood and affect. Her behavior is normal.       Assessment:       1. Breast cancer screening    2. Essential hypertension    3. Mood disorder    4. Gastroesophageal reflux disease without esophagitis    5. Mixed hyperlipidemia    6. Acquired hypothyroidism        Plan:       [place] was seen today for follow-up and medication refill.    Diagnoses and all orders for this visit:    Breast cancer screening  -     Mammo Digital Screening Bilateral With CAD; Future    Essential hypertension  -     amLODIPine (NORVASC) 2.5 MG tablet; Take 1 tablet (2.5 mg total) by mouth every evening.    Mood disorder  -     imipramine (TOFRANIL) 25 MG tablet; TAKE 3 TABLETS TWICE DAILY    Gastroesophageal reflux disease without esophagitis  -     pantoprazole (PROTONIX) 40 MG tablet; Take 1 tablet (40 mg total) by mouth once daily.    Mixed hyperlipidemia  -     Lipid panel; Future    Acquired hypothyroidism  -     TSH; Future      During this visit, I reviewed the pt's history, medications, allergies, and problem list.

## 2018-12-05 ENCOUNTER — LAB VISIT (OUTPATIENT)
Dept: LAB | Facility: HOSPITAL | Age: 73
End: 2018-12-05
Attending: FAMILY MEDICINE
Payer: MEDICARE

## 2018-12-05 DIAGNOSIS — E03.9 ACQUIRED HYPOTHYROIDISM: ICD-10-CM

## 2018-12-05 DIAGNOSIS — N18.30 CKD (CHRONIC KIDNEY DISEASE) STAGE 3, GFR 30-59 ML/MIN: ICD-10-CM

## 2018-12-05 DIAGNOSIS — E78.2 MIXED HYPERLIPIDEMIA: ICD-10-CM

## 2018-12-05 LAB
ALBUMIN SERPL BCP-MCNC: 3.7 G/DL
ANION GAP SERPL CALC-SCNC: 7 MMOL/L
BUN SERPL-MCNC: 18 MG/DL
CALCIUM SERPL-MCNC: 10 MG/DL
CHLORIDE SERPL-SCNC: 106 MMOL/L
CHOLEST SERPL-MCNC: 162 MG/DL
CHOLEST/HDLC SERPL: 4.8 {RATIO}
CO2 SERPL-SCNC: 27 MMOL/L
CREAT SERPL-MCNC: 1.3 MG/DL
EST. GFR  (AFRICAN AMERICAN): 46.4 ML/MIN/1.73 M^2
EST. GFR  (NON AFRICAN AMERICAN): 40.2 ML/MIN/1.73 M^2
GLUCOSE SERPL-MCNC: 132 MG/DL
HDLC SERPL-MCNC: 34 MG/DL
HDLC SERPL: 21 %
LDLC SERPL CALC-MCNC: 80.8 MG/DL
NONHDLC SERPL-MCNC: 128 MG/DL
PHOSPHATE SERPL-MCNC: 2.9 MG/DL
POTASSIUM SERPL-SCNC: 4.5 MMOL/L
SODIUM SERPL-SCNC: 140 MMOL/L
T4 FREE SERPL-MCNC: 0.99 NG/DL
TRIGL SERPL-MCNC: 236 MG/DL
TSH SERPL DL<=0.005 MIU/L-ACNC: 4.69 UIU/ML

## 2018-12-05 PROCEDURE — 80061 LIPID PANEL: CPT

## 2018-12-05 PROCEDURE — 84443 ASSAY THYROID STIM HORMONE: CPT

## 2018-12-05 PROCEDURE — 84439 ASSAY OF FREE THYROXINE: CPT

## 2018-12-05 PROCEDURE — 80069 RENAL FUNCTION PANEL: CPT

## 2018-12-05 PROCEDURE — 36415 COLL VENOUS BLD VENIPUNCTURE: CPT | Mod: PO

## 2018-12-06 ENCOUNTER — PATIENT MESSAGE (OUTPATIENT)
Dept: FAMILY MEDICINE | Facility: CLINIC | Age: 73
End: 2018-12-06

## 2018-12-06 DIAGNOSIS — E03.9 ACQUIRED HYPOTHYROIDISM: Primary | ICD-10-CM

## 2018-12-06 RX ORDER — LEVOTHYROXINE SODIUM 75 UG/1
75 TABLET ORAL
Qty: 90 TABLET | Refills: 3 | Status: ON HOLD | OUTPATIENT
Start: 2018-12-06 | End: 2019-09-03 | Stop reason: HOSPADM

## 2018-12-17 ENCOUNTER — PATIENT MESSAGE (OUTPATIENT)
Dept: FAMILY MEDICINE | Facility: CLINIC | Age: 73
End: 2018-12-17

## 2018-12-18 ENCOUNTER — PATIENT MESSAGE (OUTPATIENT)
Dept: ADMINISTRATIVE | Facility: OTHER | Age: 73
End: 2018-12-18

## 2019-01-08 ENCOUNTER — PATIENT OUTREACH (OUTPATIENT)
Dept: OTHER | Facility: OTHER | Age: 74
End: 2019-01-08

## 2019-01-08 NOTE — PROGRESS NOTES
Last 5 Patient Entered Readings                                      Current 30 Day Average: 135/64     Recent Readings 1/7/2019 1/3/2019 1/2/2019 12/31/2018 12/29/2018    SBP (mmHg) 125 116 118 128 134    DBP (mmHg) 64 59 56 57 66    Pulse 80 77 75 84 79        Digital Medicine: Health  Follow Up    Lifestyle Modifications:    1.Dietary Modifications (Sodium intake <2,000mg/day, food labels, dining out): Patient attributes spikes in BP to dietary indiscretions over the holidays. She reports that she is getting back on track. Encouraged sodium restriction.     2.Physical Activity: Mrs. Nguyen continues to exercise with Silver Sneakers during the week.    3.Medication Therapy: Patient has been compliant with the medication regimen.    4.Patient has the following medication side effects/concerns: None  (Frequency/Alleviating factors/Precipitating factors, etc.)     Follow up with Mrs. Clemencia Nguyen completed. Mrs. Nguyen is doing okay. She is pleased with her most recent BP readings. No further questions or concerns. Will continue to follow up to achieve health goals.

## 2019-01-17 ENCOUNTER — PATIENT OUTREACH (OUTPATIENT)
Dept: OTHER | Facility: OTHER | Age: 74
End: 2019-01-17

## 2019-01-17 NOTE — PROGRESS NOTES
Last 5 Patient Entered Readings                                      Current 30 Day Average: 127/63     Recent Readings 1/9/2019 1/7/2019 1/3/2019 1/2/2019 12/31/2018    SBP (mmHg) 126 125 116 118 128    DBP (mmHg) 60 64 59 56 57    Pulse 87 80 77 75 84        Patient's BP average is controlled based on 2017 ACC/AHA HTN guidelines of goal BP <130/80.      Ms. Cordero is doing well. She is pleased with BP readings and has no questions or concerns at this time.    Patient's health , Miriam Jose, will be following up every 3-4 weeks. I will continue to monitor regularly and will follow up in 4-6 months, sooner if BP begins to trend upward or downward.    Patient has my contact information and knows to call with any concerns or clinical changes.     Current HTN regimen:  Hypertension Medications             amLODIPine (NORVASC) 2.5 MG tablet Take 1 tablet (2.5 mg total) by mouth every evening.    irbesartan (AVAPRO) 300 MG tablet Take 1 tablet (300 mg total) by mouth every evening.

## 2019-01-24 ENCOUNTER — OFFICE VISIT (OUTPATIENT)
Dept: FAMILY MEDICINE | Facility: CLINIC | Age: 74
End: 2019-01-24
Payer: MEDICARE

## 2019-01-24 VITALS
HEIGHT: 61 IN | SYSTOLIC BLOOD PRESSURE: 104 MMHG | HEART RATE: 87 BPM | OXYGEN SATURATION: 98 % | TEMPERATURE: 98 F | DIASTOLIC BLOOD PRESSURE: 70 MMHG | BODY MASS INDEX: 26.39 KG/M2 | WEIGHT: 139.75 LBS

## 2019-01-24 DIAGNOSIS — N18.30 CHRONIC KIDNEY DISEASE, STAGE III (MODERATE): ICD-10-CM

## 2019-01-24 DIAGNOSIS — J34.89 NASAL CONGESTION WITH RHINORRHEA: ICD-10-CM

## 2019-01-24 DIAGNOSIS — J06.9 URI WITH COUGH AND CONGESTION: Primary | ICD-10-CM

## 2019-01-24 DIAGNOSIS — R09.81 NASAL CONGESTION WITH RHINORRHEA: ICD-10-CM

## 2019-01-24 DIAGNOSIS — I10 ESSENTIAL HYPERTENSION: ICD-10-CM

## 2019-01-24 PROCEDURE — 99215 OFFICE O/P EST HI 40 MIN: CPT | Mod: PBBFAC,PO | Performed by: NURSE PRACTITIONER

## 2019-01-24 PROCEDURE — 99214 PR OFFICE/OUTPT VISIT, EST, LEVL IV, 30-39 MIN: ICD-10-PCS | Mod: S$PBB,,, | Performed by: NURSE PRACTITIONER

## 2019-01-24 PROCEDURE — 99999 PR PBB SHADOW E&M-EST. PATIENT-LVL V: ICD-10-PCS | Mod: PBBFAC,,, | Performed by: NURSE PRACTITIONER

## 2019-01-24 PROCEDURE — 99999 PR PBB SHADOW E&M-EST. PATIENT-LVL V: CPT | Mod: PBBFAC,,, | Performed by: NURSE PRACTITIONER

## 2019-01-24 PROCEDURE — 99214 OFFICE O/P EST MOD 30 MIN: CPT | Mod: S$PBB,,, | Performed by: NURSE PRACTITIONER

## 2019-01-24 RX ORDER — FLUTICASONE PROPIONATE 50 MCG
1 SPRAY, SUSPENSION (ML) NASAL DAILY
Qty: 1 BOTTLE | Refills: 0 | Status: SHIPPED | OUTPATIENT
Start: 2019-01-24 | End: 2019-02-20 | Stop reason: SDUPTHER

## 2019-01-24 RX ORDER — GUAIFENESIN 600 MG/1
1200 TABLET, EXTENDED RELEASE ORAL 2 TIMES DAILY
Qty: 30 TABLET | Refills: 0 | Status: SHIPPED | OUTPATIENT
Start: 2019-01-24 | End: 2019-02-23

## 2019-01-24 RX ORDER — BENZONATATE 100 MG/1
100 CAPSULE ORAL 3 TIMES DAILY PRN
Qty: 20 CAPSULE | Refills: 0 | Status: SHIPPED | OUTPATIENT
Start: 2019-01-24 | End: 2019-02-03

## 2019-01-24 NOTE — PATIENT INSTRUCTIONS
Viral Upper Respiratory Illness (Adult)  You have a viral upper respiratory illness (URI), which is another term for the common cold. This illness is contagious during the first few days. It is spread through the air by coughing and sneezing. It may also be spread by direct contact (touching the sick person and then touching your own eyes, nose, or mouth). Frequent handwashing will decrease risk of spread. Most viral illnesses go away within 7 to 10 days with rest and simple home remedies. Sometimes the illness may last for several weeks. Antibiotics will not kill a virus, and they are generally not prescribed for this condition.    Home care  If symptoms are severe, rest at home for the first 2 to 3 days. When you resume activity, don't let yourself get too tired.  Avoid being exposed to cigarette smoke (yours or others).  You may use acetaminophen or ibuprofen to control pain and fever, unless another medicine was prescribed. (Note: If you have chronic liver or kidney disease, have ever had a stomach ulcer or gastrointestinal bleeding, or are taking blood-thinning medicines, talk with your healthcare provider before using these medicines.) Aspirin should never be given to anyone under 18 years of age who is ill with a viral infection or fever. It may cause severe liver or brain damage.  Your appetite may be poor, so a light diet is fine. Avoid dehydration by drinking 6 to 8 glasses of fluids per day (water, soft drinks, juices, tea, or soup). Extra fluids will help loosen secretions in the nose and lungs.  Over-the-counter cold medicines will not shorten the length of time youre sick, but they may be helpful for the following symptoms: cough, sore throat, and nasal and sinus congestion. (Note: Do not use decongestants if you have high blood pressure.)  Follow-up care  Follow up with your healthcare provider, or as advised.  When to seek medical advice  Call your healthcare provider right away if any of these  occur:  Cough with lots of colored sputum (mucus)  Severe headache; face, neck, or ear pain  Difficulty swallowing due to throat pain  Fever of 100.4°F (38°C)  Call 911, or get immediate medical care  Call emergency services right away if any of these occur:  Chest pain, shortness of breath, wheezing, or difficulty breathing  Coughing up blood  Inability to swallow due to throat pain  Date Last Reviewed: 9/13/2015  © 0852-9610 [employer]. [address] [place] [address], [place] [address]. All rights reserved. This information is not intended as a substitute for professional medical care. Always follow your healthcare professional's instructions.        Adult Self-Care for Colds  Colds are caused by viruses. They can't be cured with antibiotics. However, you can ease symptoms and support your body's efforts to heal itself.  No matter which symptoms you have, be sure to:  · Drink plenty of fluids (water or clear soup)  · Stop smoking and drinking alcohol  · Get plenty of rest    Understand a fever  · Take your temperature several times a day. If your fever is 100.4°F (38.0°C) for more than a day, call your healthcare provider.  · Relax, lie down. Go to bed if you want. Just get off your feet and rest. Also, drink plenty of fluids to avoid dehydration.  · Take acetaminophen or a nonsteroidal anti-inflammatory agent (NSAID), such as ibuprofen.  Treat a troubled nose kindly  · Breathe steam or heated humidified air to open blocked nasal passages.  a hot shower or use a vaporizer. Be careful not to get burned by the steam.  · Saline nasal sprays and decongestant tablets help open a stuffy nose. Antihistamines can also help, but they can cause side effects such as drowsiness and drying of the eyes, nose, and mouth.  Soothe a sore throat and cough  · Gargle every 2 hours with 1/4 teaspoon of salt dissolved in 1/2 cup of warm water. Suck on throat lozenges and cough drops to moisten your throat.  · Cough  medicines are available but it is unclear how well they actually work.  · Take acetaminophen or an NSAID, such as ibuprofen, to ease throat pain  Ease digestive problems  · Put fluids back into your body. Take frequent sips of clear liquids such as water or broth. Avoid drinks that have a lot of sugar in them, such as juices and sodas. These can make diarrhea worse. Older children and adults can drink sports drinks.  · As your appetite returns, you can resume your normal diet. Ask your healthcare provider if there are any foods you should avoid.  When to seek medical care  When you first notice symptoms, ask your healthcare provider if antiviral medicines are appropriate. Antibiotics should not be taken for colds or flu. Also, call your healthcare provider if you have any of the following symptoms or if you aren't feeling better after 7 days:  · Shortness of breath  · Pain or pressure in the chest or belly (abdomen)  · Worsening symptoms, especially after a period of improvement  · Fever of 100.4°F  (38.0°C) or higher, or fever that doesn't go down with medicine  · Sudden dizziness or confusion  · Severe or continued vomiting  · Signs of dehydration, including extreme thirst, dark urine, infrequent urination, dry mouth  · Spotted, red, or very sore throat   Date Last Reviewed: 12/1/2016  © 6123-9854 [employer]. [address] [place] [address], [place] [address]. All rights reserved. This information is not intended as a substitute for professional medical care. Always follow your healthcare professional's instructions.

## 2019-01-24 NOTE — PROGRESS NOTES
Subjective:       Patient ID: Celmencia Nguyen is a 73 y.o. female.    Chief Complaint: Nasal Congestion (runny nose; cough; nasal/chest congestion for 1 week )    Patient who is new to me presents with cough.       Cough   This is a new problem. The current episode started in the past 7 days. The problem has been waxing and waning. The problem occurs constantly. The cough is productive of sputum. Associated symptoms include nasal congestion, postnasal drip and rhinorrhea. Pertinent negatives include no chest pain, chills, ear congestion, ear pain, fever, headaches, heartburn, hemoptysis, myalgias, rash, sore throat, shortness of breath, sweats, weight loss or wheezing. The symptoms are aggravated by lying down. She has tried OTC cough suppressant and body position changes (robitussin cough and chest congestion, NSAIDs) for the symptoms. The treatment provided no relief. There is no history of asthma, bronchiectasis, bronchitis, COPD, emphysema, environmental allergies or pneumonia.     Review of Systems   Constitutional: Negative for chills, fatigue, fever and weight loss.   HENT: Positive for congestion, postnasal drip and rhinorrhea. Negative for ear pain, sinus pressure, sinus pain, sneezing and sore throat.    Respiratory: Positive for cough. Negative for hemoptysis, chest tightness, shortness of breath and wheezing.    Cardiovascular: Negative for chest pain, palpitations and leg swelling.   Gastrointestinal: Negative for abdominal distention, abdominal pain, constipation, diarrhea, heartburn, nausea and vomiting.   Genitourinary: Negative for decreased urine volume, difficulty urinating, dysuria, frequency and urgency.   Musculoskeletal: Negative for arthralgias, gait problem, joint swelling and myalgias.   Skin: Negative for rash and wound.   Allergic/Immunologic: Negative for environmental allergies.   Neurological: Negative for dizziness, light-headedness, numbness and headaches.       Objective:       Physical Exam   Constitutional: She is oriented to person, place, and time. She appears well-developed and well-nourished.   HENT:   Head: Normocephalic and atraumatic.   Right Ear: Hearing, tympanic membrane, external ear and ear canal normal.   Left Ear: Hearing, tympanic membrane, external ear and ear canal normal.   Nose: Mucosal edema and rhinorrhea present. Right sinus exhibits no maxillary sinus tenderness and no frontal sinus tenderness. Left sinus exhibits no maxillary sinus tenderness and no frontal sinus tenderness.   Mouth/Throat: Oropharynx is clear and moist.   Eyes: Pupils are equal, round, and reactive to light.   Neck: Normal range of motion.   Cardiovascular: Normal rate, regular rhythm, normal heart sounds and intact distal pulses.   Pulmonary/Chest: Effort normal and breath sounds normal. She has no decreased breath sounds. She has no wheezes. She has no rhonchi. She has no rales. She exhibits no tenderness.   Abdominal: Soft. Bowel sounds are normal.   Musculoskeletal: Normal range of motion.   Neurological: She is alert and oriented to person, place, and time.   Skin: Skin is warm and dry.   Nursing note and vitals reviewed.      Assessment:       1. URI with cough and congestion    2. Nasal congestion with rhinorrhea    3. Chronic kidney disease, stage III (moderate)    4. Essential hypertension        Plan:       URI with cough and congestion  -     benzonatate (TESSALON) 100 MG capsule; Take 1 capsule (100 mg total) by mouth 3 (three) times daily as needed.  Dispense: 20 capsule; Refill: 0  -     fluticasone (FLONASE) 50 mcg/actuation nasal spray; 1 spray (50 mcg total) by Each Nare route once daily.  Dispense: 1 Bottle; Refill: 0  -     guaiFENesin (MUCINEX) 600 mg 12 hr tablet; Take 2 tablets (1,200 mg total) by mouth 2 (two) times daily.  Dispense: 30 tablet; Refill: 0    Nasal congestion with rhinorrhea  -     benzonatate (TESSALON) 100 MG capsule; Take 1 capsule (100 mg total) by  mouth 3 (three) times daily as needed.  Dispense: 20 capsule; Refill: 0  -     fluticasone (FLONASE) 50 mcg/actuation nasal spray; 1 spray (50 mcg total) by Each Nare route once daily.  Dispense: 1 Bottle; Refill: 0  -     guaiFENesin (MUCINEX) 600 mg 12 hr tablet; Take 2 tablets (1,200 mg total) by mouth 2 (two) times daily.  Dispense: 30 tablet; Refill: 0    Chronic kidney disease, stage III (moderate)  BP stable followed by PCP.     Essential hypertension  Stable on current medications, followed by PCP.     Advised OTCs, hydration, rest, and symptoms that warrant follow up. Breath sounds clear to auscultation, symptoms less than 7 days, and vital signs WNL, discussed viral etiology. Patient to follow up with any new or concerning symptoms.

## 2019-02-06 ENCOUNTER — HOSPITAL ENCOUNTER (OUTPATIENT)
Dept: RADIOLOGY | Facility: HOSPITAL | Age: 74
Discharge: HOME | End: 2019-02-06
Attending: FAMILY MEDICINE
Payer: MEDICARE

## 2019-02-06 VITALS — HEIGHT: 61 IN | WEIGHT: 139 LBS | BODY MASS INDEX: 26.24 KG/M2

## 2019-02-06 DIAGNOSIS — Z12.39 BREAST CANCER SCREENING: ICD-10-CM

## 2019-02-06 PROCEDURE — 77063 BREAST TOMOSYNTHESIS BI: CPT | Mod: 26,,, | Performed by: RADIOLOGY

## 2019-02-06 PROCEDURE — 77067 MAMMO DIGITAL SCREENING BILAT WITH TOMOSYNTHESIS_CAD: ICD-10-PCS | Mod: 26,,, | Performed by: RADIOLOGY

## 2019-02-06 PROCEDURE — 77067 SCR MAMMO BI INCL CAD: CPT | Mod: 26,,, | Performed by: RADIOLOGY

## 2019-02-06 PROCEDURE — 77067 SCR MAMMO BI INCL CAD: CPT | Mod: TC,PN

## 2019-02-06 PROCEDURE — 77063 MAMMO DIGITAL SCREENING BILAT WITH TOMOSYNTHESIS_CAD: ICD-10-PCS | Mod: 26,,, | Performed by: RADIOLOGY

## 2019-02-20 ENCOUNTER — PATIENT OUTREACH (OUTPATIENT)
Dept: OTHER | Facility: OTHER | Age: 74
End: 2019-02-20

## 2019-02-20 DIAGNOSIS — J06.9 URI WITH COUGH AND CONGESTION: ICD-10-CM

## 2019-02-20 DIAGNOSIS — R09.81 NASAL CONGESTION WITH RHINORRHEA: ICD-10-CM

## 2019-02-20 DIAGNOSIS — J34.89 NASAL CONGESTION WITH RHINORRHEA: ICD-10-CM

## 2019-02-20 RX ORDER — FLUTICASONE PROPIONATE 50 MCG
1 SPRAY, SUSPENSION (ML) NASAL DAILY
Qty: 16 ML | Refills: 5 | Status: ON HOLD | OUTPATIENT
Start: 2019-02-20 | End: 2019-09-03 | Stop reason: HOSPADM

## 2019-02-20 NOTE — PROGRESS NOTES
Last 5 Patient Entered Readings                                      Current 30 Day Average: 134/67     Recent Readings 2/14/2019 2/12/2019 2/11/2019 2/9/2019 2/6/2019    SBP (mmHg) 142 146 136 133 123    DBP (mmHg) 73 70 62 68 64    Pulse 77 79 87 82 81        Digital Medicine: Health  Follow Up    Lifestyle Modifications:    1.Dietary Modifications (Sodium intake <2,000mg/day, food labels, dining out): Patient denies changes to her diet.     2.Physical Activity: Patient was not exercising as often when she had a cold. She is feeling better now.     3.Medication Therapy: Patient has been compliant with the medication regimen. Patient reports taking Coricidin HBP for about 2 weeks. She attributes this to spikes in BP, even though she knows the OTC medication is not supposed to effect BP.     4.Patient has the following medication side effects/concerns: None  (Frequency/Alleviating factors/Precipitating factors, etc.)     Follow up with Mrs. Clemencia Nguyen completed. Mrs. Nguyen is doing okay. She will be going out of town in March for a cruise. Will place patient on hiatus during that time. Encouraged patient to submit a new reading today, since she is no longer taking any cold medication. She thanks me for calling, and has no questions or concerns currently. Will continue to follow up to achieve health goals.

## 2019-03-11 ENCOUNTER — PATIENT MESSAGE (OUTPATIENT)
Dept: ADMINISTRATIVE | Facility: OTHER | Age: 74
End: 2019-03-11

## 2019-04-01 ENCOUNTER — PATIENT OUTREACH (OUTPATIENT)
Dept: OTHER | Facility: OTHER | Age: 74
End: 2019-04-01

## 2019-04-01 NOTE — PROGRESS NOTES
Last 5 Patient Entered Readings                                      Current 30 Day Average: 139/66     Recent Readings 4/1/2019 3/28/2019 3/27/2019 3/26/2019 3/23/2019    SBP (mmHg) 122 148 136 133 134    DBP (mmHg) 62 70 67 64 62    Pulse 78 85 89 78 77        Digital Medicine: Health  Follow Up    Lifestyle Modifications:    1.Dietary Modifications (Sodium intake <2,000mg/day, food labels, dining out): Patient attributes elevated BP to dietary indiscretions while on a cruise. She reports that she is getting back on track with her diet. Encouragement provided.     2.Physical Activity: Mrs. Nguyen is exercising with [employer] 3 days a week.     3.Medication Therapy: Patient has been compliant with the medication regimen.    4.Patient has the following medication side effects/concerns: None  (Frequency/Alleviating factors/Precipitating factors, etc.)     Follow up with Mrs. Clemencia Nguyen completed. Mrs. Nguyen is doing well, and is pleased with her BP today. She thanks me for calling, and will continue to monitor. No further questions or concerns. Will continue to follow up to achieve health goals.

## 2019-05-22 ENCOUNTER — PATIENT OUTREACH (OUTPATIENT)
Dept: ADMINISTRATIVE | Facility: HOSPITAL | Age: 74
End: 2019-05-22

## 2019-05-22 NOTE — PROGRESS NOTES
There are no preventive care reminders to display for this patient.    Chart reviewed/scrubbed on 05/22/2019

## 2019-06-07 ENCOUNTER — PATIENT OUTREACH (OUTPATIENT)
Dept: OTHER | Facility: OTHER | Age: 74
End: 2019-06-07

## 2019-06-07 NOTE — PROGRESS NOTES
Last 5 Patient Entered Readings                                      Current 30 Day Average: 121/65     Recent Readings 6/7/2019 6/7/2019 6/6/2019 5/29/2019 5/28/2019    SBP (mmHg) 110 143 141 114 119    DBP (mmHg) 66 64 76 57 72    Pulse 83 92 86 98 89        Digital Medicine: Health  Follow Up    Lifestyle Modifications:    1.Dietary Modifications (Sodium intake <2,000mg/day, food labels, dining out): Patient is keeping an eye on her sodium intake.     2.Physical Activity: Mrs. Nguyen is exercising with [employer] three days a week.     3.Medication Therapy: Patient has been compliant with the medication regimen.    4.Patient has the following medication side effects/concerns: None   (Frequency/Alleviating factors/Precipitating factors, etc.)     Follow up with Mrs. Clemencia Nguyen completed. Mrs. Nguyen is doing well. She noticed a spike in her BP yesterday, but cannot attribute any dietary changes to it. She rechecked her BP after exercise today, and was pleased. She thanks me for calling, and has no further questions or concerns. Will continue to follow up to achieve health goals.

## 2019-06-14 ENCOUNTER — LAB VISIT (OUTPATIENT)
Dept: LAB | Facility: HOSPITAL | Age: 74
End: 2019-06-14
Attending: INTERNAL MEDICINE
Payer: MEDICARE

## 2019-06-14 ENCOUNTER — OFFICE VISIT (OUTPATIENT)
Dept: FAMILY MEDICINE | Facility: CLINIC | Age: 74
End: 2019-06-14
Payer: MEDICARE

## 2019-06-14 VITALS
WEIGHT: 141.31 LBS | DIASTOLIC BLOOD PRESSURE: 60 MMHG | HEIGHT: 61 IN | BODY MASS INDEX: 26.68 KG/M2 | OXYGEN SATURATION: 98 % | HEART RATE: 89 BPM | SYSTOLIC BLOOD PRESSURE: 122 MMHG

## 2019-06-14 DIAGNOSIS — K21.9 GASTROESOPHAGEAL REFLUX DISEASE WITHOUT ESOPHAGITIS: ICD-10-CM

## 2019-06-14 DIAGNOSIS — N25.81 SECONDARY HYPERPARATHYROIDISM OF RENAL ORIGIN: ICD-10-CM

## 2019-06-14 DIAGNOSIS — I10 ESSENTIAL HYPERTENSION: Primary | ICD-10-CM

## 2019-06-14 DIAGNOSIS — E55.9 VITAMIN D DEFICIENCY: ICD-10-CM

## 2019-06-14 DIAGNOSIS — N18.30 CHRONIC KIDNEY DISEASE, STAGE III (MODERATE): ICD-10-CM

## 2019-06-14 DIAGNOSIS — N18.30 CKD (CHRONIC KIDNEY DISEASE) STAGE 3, GFR 30-59 ML/MIN: ICD-10-CM

## 2019-06-14 DIAGNOSIS — K21.9 GASTROESOPHAGEAL REFLUX DISEASE, ESOPHAGITIS PRESENCE NOT SPECIFIED: ICD-10-CM

## 2019-06-14 DIAGNOSIS — E03.9 ACQUIRED HYPOTHYROIDISM: ICD-10-CM

## 2019-06-14 LAB
25(OH)D3+25(OH)D2 SERPL-MCNC: 36 NG/ML (ref 30–96)
ALBUMIN SERPL BCP-MCNC: 3.8 G/DL (ref 3.5–5.2)
ANION GAP SERPL CALC-SCNC: 11 MMOL/L (ref 8–16)
BASOPHILS # BLD AUTO: 0 K/UL (ref 0–0.2)
BASOPHILS NFR BLD: 0 % (ref 0–1.9)
BUN SERPL-MCNC: 14 MG/DL (ref 8–23)
CALCIUM SERPL-MCNC: 9.7 MG/DL (ref 8.7–10.5)
CHLORIDE SERPL-SCNC: 105 MMOL/L (ref 95–110)
CO2 SERPL-SCNC: 23 MMOL/L (ref 23–29)
CREAT SERPL-MCNC: 1.2 MG/DL (ref 0.5–1.4)
DIFFERENTIAL METHOD: NORMAL
EOSINOPHIL # BLD AUTO: 0 K/UL (ref 0–0.5)
EOSINOPHIL NFR BLD: 0 % (ref 0–8)
ERYTHROCYTE [DISTWIDTH] IN BLOOD BY AUTOMATED COUNT: 13.5 % (ref 11.5–14.5)
EST. GFR  (AFRICAN AMERICAN): 51.1 ML/MIN/1.73 M^2
EST. GFR  (NON AFRICAN AMERICAN): 44.3 ML/MIN/1.73 M^2
GLUCOSE SERPL-MCNC: 147 MG/DL (ref 70–110)
HCT VFR BLD AUTO: 42.8 % (ref 37–48.5)
HGB BLD-MCNC: 14.2 G/DL (ref 12–16)
IMM GRANULOCYTES # BLD AUTO: 0 K/UL (ref 0–0.04)
IMM GRANULOCYTES NFR BLD AUTO: 0 % (ref 0–0.5)
LYMPHOCYTES # BLD AUTO: 1.7 K/UL (ref 1–4.8)
LYMPHOCYTES NFR BLD: 30.1 % (ref 18–48)
MCH RBC QN AUTO: 28.6 PG (ref 27–31)
MCHC RBC AUTO-ENTMCNC: 33.2 G/DL (ref 32–36)
MCV RBC AUTO: 86 FL (ref 82–98)
MONOCYTES # BLD AUTO: 0.5 K/UL (ref 0.3–1)
MONOCYTES NFR BLD: 7.8 % (ref 4–15)
NEUTROPHILS # BLD AUTO: 3.6 K/UL (ref 1.8–7.7)
NEUTROPHILS NFR BLD: 62.1 % (ref 38–73)
NRBC BLD-RTO: 0 /100 WBC
PHOSPHATE SERPL-MCNC: 2.2 MG/DL (ref 2.7–4.5)
PLATELET # BLD AUTO: 199 K/UL (ref 150–350)
PMV BLD AUTO: 9.4 FL (ref 9.2–12.9)
POTASSIUM SERPL-SCNC: 3.7 MMOL/L (ref 3.5–5.1)
PTH-INTACT SERPL-MCNC: 112 PG/ML (ref 9–77)
RBC # BLD AUTO: 4.97 M/UL (ref 4–5.4)
SODIUM SERPL-SCNC: 139 MMOL/L (ref 136–145)
T4 FREE SERPL-MCNC: 1.38 NG/DL (ref 0.71–1.51)
TSH SERPL DL<=0.005 MIU/L-ACNC: 0.28 UIU/ML (ref 0.4–4)
WBC # BLD AUTO: 5.79 K/UL (ref 3.9–12.7)

## 2019-06-14 PROCEDURE — 82306 VITAMIN D 25 HYDROXY: CPT

## 2019-06-14 PROCEDURE — 99999 PR PBB SHADOW E&M-EST. PATIENT-LVL III: CPT | Mod: PBBFAC,,, | Performed by: FAMILY MEDICINE

## 2019-06-14 PROCEDURE — 83970 ASSAY OF PARATHORMONE: CPT

## 2019-06-14 PROCEDURE — 99999 PR PBB SHADOW E&M-EST. PATIENT-LVL III: ICD-10-PCS | Mod: PBBFAC,,, | Performed by: FAMILY MEDICINE

## 2019-06-14 PROCEDURE — 36415 COLL VENOUS BLD VENIPUNCTURE: CPT | Mod: PO

## 2019-06-14 PROCEDURE — 85025 COMPLETE CBC W/AUTO DIFF WBC: CPT

## 2019-06-14 PROCEDURE — 84439 ASSAY OF FREE THYROXINE: CPT

## 2019-06-14 PROCEDURE — 99213 OFFICE O/P EST LOW 20 MIN: CPT | Mod: PBBFAC,PO | Performed by: FAMILY MEDICINE

## 2019-06-14 PROCEDURE — 99499 UNLISTED E&M SERVICE: CPT | Mod: S$PBB,,, | Performed by: FAMILY MEDICINE

## 2019-06-14 PROCEDURE — 99499 RISK ADDL DX/OHS AUDIT: ICD-10-PCS | Mod: S$PBB,,, | Performed by: FAMILY MEDICINE

## 2019-06-14 PROCEDURE — 99214 PR OFFICE/OUTPT VISIT, EST, LEVL IV, 30-39 MIN: ICD-10-PCS | Mod: S$PBB,,, | Performed by: FAMILY MEDICINE

## 2019-06-14 PROCEDURE — 80069 RENAL FUNCTION PANEL: CPT

## 2019-06-14 PROCEDURE — 84443 ASSAY THYROID STIM HORMONE: CPT

## 2019-06-14 PROCEDURE — 99214 OFFICE O/P EST MOD 30 MIN: CPT | Mod: S$PBB,,, | Performed by: FAMILY MEDICINE

## 2019-06-14 RX ORDER — PANTOPRAZOLE SODIUM 40 MG/1
40 TABLET, DELAYED RELEASE ORAL 2 TIMES DAILY
Qty: 180 TABLET | Refills: 1 | Status: ON HOLD | OUTPATIENT
Start: 2019-06-14 | End: 2019-09-03 | Stop reason: HOSPADM

## 2019-06-14 NOTE — PROGRESS NOTES
"Subjective:       Patient ID: Clemencia Nguyen is a 73 y.o. female.    Chief Complaint: Follow-up (6 month)    Pt is known to me.  Pt is here for followup of chronic medical issues.  The pt had colonoscopy and EGD 2 days ago--she had polyps and needed esophageal dilation.  She is doing well--she still square dances [facility]    Hypertension   This is a recurrent problem. The current episode started more than 1 year ago. The problem has been waxing and waning since onset. The problem is controlled. Pertinent negatives include no anxiety, blurred vision, chest pain, headaches, malaise/fatigue, neck pain, orthopnea, palpitations, peripheral edema, PND, shortness of breath or sweats. Agents associated with hypertension include thyroid hormones. Risk factors for coronary artery disease include dyslipidemia and family history. The current treatment provides significant improvement. There are no compliance problems.      Review of Systems   Constitutional: Negative for malaise/fatigue.   Eyes: Negative for blurred vision.   Respiratory: Negative for shortness of breath.    Cardiovascular: Negative for chest pain, palpitations, orthopnea and PND.   Musculoskeletal: Negative for neck pain.   Neurological: Negative for headaches.       Objective:       Vitals:    06/14/19 1442   BP: 122/60   BP Location: Right arm   Patient Position: Sitting   BP Method: Medium (Manual)   Pulse: 89   SpO2: 98%   Weight: 64.1 kg (141 lb 5 oz)   Height: 5' 1" (1.549 m)     Physical Exam   Constitutional: She is oriented to person, place, and time. She appears well-developed and well-nourished.   HENT:   Head: Normocephalic.   Eyes: Pupils are equal, round, and reactive to light. Conjunctivae and EOM are normal.   Neck: Normal range of motion. Neck supple. No thyromegaly present.   Cardiovascular: Normal rate, regular rhythm and normal heart sounds.   Pulmonary/Chest: Effort normal and breath sounds normal. "   Abdominal: Soft. Bowel sounds are normal. There is no tenderness.   Musculoskeletal: Normal range of motion. She exhibits no tenderness or deformity.   Lymphadenopathy:     She has no cervical adenopathy.   Neurological: She is alert and oriented to person, place, and time. She displays normal reflexes. No cranial nerve deficit. She exhibits normal muscle tone. Coordination normal.   Skin: Skin is warm and dry.   Psychiatric: She has a normal mood and affect. Her behavior is normal.       Assessment:       1. Essential hypertension    2. Chronic kidney disease, stage III (moderate)    3. Acquired hypothyroidism    4. Gastroesophageal reflux disease, esophagitis presence not specified    5. Gastroesophageal reflux disease without esophagitis        Plan:       [place] was seen today for follow-up.    Diagnoses and all orders for this visit:    Essential hypertension    Chronic kidney disease, stage III (moderate)    Acquired hypothyroidism  -     TSH; Future  -     T4, free; Future    Gastroesophageal reflux disease, esophagitis presence not specified  -     pantoprazole (PROTONIX) 40 MG tablet; Take 1 tablet (40 mg total) by mouth 2 (two) times daily.    Gastroesophageal reflux disease without esophagitis      During this visit, I reviewed the pt's history, medications, allergies, and problem list.    Script given for Eddie

## 2019-07-08 ENCOUNTER — PATIENT MESSAGE (OUTPATIENT)
Dept: FAMILY MEDICINE | Facility: CLINIC | Age: 74
End: 2019-07-08

## 2019-07-08 DIAGNOSIS — E78.5 HYPERLIPIDEMIA, UNSPECIFIED HYPERLIPIDEMIA TYPE: ICD-10-CM

## 2019-07-08 DIAGNOSIS — I10 ESSENTIAL HYPERTENSION: ICD-10-CM

## 2019-07-08 RX ORDER — SIMVASTATIN 10 MG/1
10 TABLET, FILM COATED ORAL NIGHTLY
Qty: 90 TABLET | Refills: 3 | Status: ON HOLD | OUTPATIENT
Start: 2019-07-08 | End: 2019-09-03 | Stop reason: HOSPADM

## 2019-07-08 RX ORDER — AMLODIPINE BESYLATE 2.5 MG/1
2.5 TABLET ORAL NIGHTLY
Qty: 90 TABLET | Refills: 3 | Status: ON HOLD | OUTPATIENT
Start: 2019-07-08 | End: 2019-09-03 | Stop reason: HOSPADM

## 2019-07-19 ENCOUNTER — PATIENT OUTREACH (OUTPATIENT)
Dept: OTHER | Facility: OTHER | Age: 74
End: 2019-07-19

## 2019-07-19 NOTE — PROGRESS NOTES
Last 5 Patient Entered Readings                                      Current 30 Day Average: 120/65     Recent Readings 7/15/2019 7/9/2019 7/3/2019 6/26/2019 6/24/2019    SBP (mmHg) 121 123 115 110 125    DBP (mmHg) 61 63 65 70 63    Pulse 90 84 91 89 72        Digital Medicine: Health  Follow Up    Lifestyle Modifications:    1.Dietary Modifications (Sodium intake <2,000mg/day, food labels, dining out): No changes to diet.    2.Physical Activity: Patient continues to exercise with [employer] 2-3 days a week.     3.Medication Therapy: Patient has been compliant with the medication regimen.    4.Patient has the following medication side effects/concerns: None  (Frequency/Alleviating factors/Precipitating factors, etc.)     Follow up with Mrs. Clemencia Nguyen completed. Mrs. Nguyen is feeling well and is pleased with her BP overall. Her 30 day BP average 120/65 mmHg is at goal, <130/80 mmHg. She has no new health goals to establish currently, and no questions for improving dietary habits or physical activity. Will continue to follow up to achieve health goals.

## 2019-07-31 ENCOUNTER — PATIENT OUTREACH (OUTPATIENT)
Dept: OTHER | Facility: OTHER | Age: 74
End: 2019-07-31

## 2019-07-31 NOTE — PROGRESS NOTES
Last 5 Patient Entered Readings                                      Current 30 Day Average: 122/64     Recent Readings 7/29/2019 7/23/2019 7/15/2019 7/9/2019 7/3/2019    SBP (mmHg) 123 130 121 123 115    DBP (mmHg) 62 70 61 63 65    Pulse 82 78 90 84 91        Per 30 day average, 122/64 mmHg, patient's BP is at goal.     Ms. Nguyen's BP average is stable. She denies any s/s associated with lower BP readings. She has no concerns at this time.     Patient's health , Miriam Jose, will be following up. I will continue to monitor regularly and will follow up in 4-6 months, sooner if BP begins to trend upward or downward.    Asked patient to call or message with questions or concerns.     Current HTN regimen:  Hypertension Medications             amLODIPine (NORVASC) 2.5 MG tablet Take 1 tablet (2.5 mg total) by mouth every evening.    irbesartan (AVAPRO) 300 MG tablet Take 1 tablet (300 mg total) by mouth every evening.

## 2019-08-05 ENCOUNTER — PATIENT MESSAGE (OUTPATIENT)
Dept: FAMILY MEDICINE | Facility: CLINIC | Age: 74
End: 2019-08-05

## 2019-08-05 RX ORDER — IRBESARTAN 300 MG/1
300 TABLET ORAL NIGHTLY
Qty: 90 TABLET | Refills: 3 | Status: ON HOLD | OUTPATIENT
Start: 2019-08-05 | End: 2019-09-03 | Stop reason: HOSPADM

## 2019-08-09 ENCOUNTER — OFFICE VISIT (OUTPATIENT)
Dept: DERMATOLOGY | Facility: CLINIC | Age: 74
End: 2019-08-09
Payer: MEDICARE

## 2019-08-09 VITALS — BODY MASS INDEX: 27 KG/M2 | WEIGHT: 143 LBS | HEIGHT: 61 IN

## 2019-08-09 DIAGNOSIS — L57.0 AK (ACTINIC KERATOSIS): Primary | ICD-10-CM

## 2019-08-09 DIAGNOSIS — D22.39 FIBROUS PAPULE OF NOSE: ICD-10-CM

## 2019-08-09 DIAGNOSIS — L81.4 LENTIGINES: ICD-10-CM

## 2019-08-09 DIAGNOSIS — D22.9 MULTIPLE BENIGN NEVI: ICD-10-CM

## 2019-08-09 DIAGNOSIS — D18.00 ANGIOMA: ICD-10-CM

## 2019-08-09 DIAGNOSIS — L82.1 SEBORRHEIC KERATOSES: ICD-10-CM

## 2019-08-09 DIAGNOSIS — L82.1 VERRUCOUS KERATOSIS: ICD-10-CM

## 2019-08-09 DIAGNOSIS — Z12.83 SCREENING EXAM FOR SKIN CANCER: ICD-10-CM

## 2019-08-09 PROCEDURE — 17110 DESTRUCTION B9 LES UP TO 14: CPT | Mod: S$PBB,,, | Performed by: DERMATOLOGY

## 2019-08-09 PROCEDURE — 17000 DESTRUCT PREMALG LESION: CPT | Mod: 59,S$PBB,, | Performed by: DERMATOLOGY

## 2019-08-09 PROCEDURE — 17000 DESTRUCT PREMALG LESION: CPT | Mod: 59,PBBFAC,PO | Performed by: DERMATOLOGY

## 2019-08-09 PROCEDURE — 99213 PR OFFICE/OUTPT VISIT, EST, LEVL III, 20-29 MIN: ICD-10-PCS | Mod: 25,S$PBB,, | Performed by: DERMATOLOGY

## 2019-08-09 PROCEDURE — 17110 PR DESTRUCTION BENIGN LESIONS UP TO 14: ICD-10-PCS | Mod: S$PBB,,, | Performed by: DERMATOLOGY

## 2019-08-09 PROCEDURE — 17110 DESTRUCTION B9 LES UP TO 14: CPT | Mod: PBBFAC,PO | Performed by: DERMATOLOGY

## 2019-08-09 PROCEDURE — 99999 PR PBB SHADOW E&M-EST. PATIENT-LVL III: CPT | Mod: PBBFAC,,, | Performed by: DERMATOLOGY

## 2019-08-09 PROCEDURE — 99213 OFFICE O/P EST LOW 20 MIN: CPT | Mod: 25,S$PBB,, | Performed by: DERMATOLOGY

## 2019-08-09 PROCEDURE — 17000 PR DESTRUCTION(LASER SURGERY,CRYOSURGERY,CHEMOSURGERY),PREMALIGNANT LESIONS,FIRST LESION: ICD-10-PCS | Mod: 59,S$PBB,, | Performed by: DERMATOLOGY

## 2019-08-09 PROCEDURE — 99999 PR PBB SHADOW E&M-EST. PATIENT-LVL III: ICD-10-PCS | Mod: PBBFAC,,, | Performed by: DERMATOLOGY

## 2019-08-09 PROCEDURE — 99213 OFFICE O/P EST LOW 20 MIN: CPT | Mod: PBBFAC,PO,25 | Performed by: DERMATOLOGY

## 2019-08-09 NOTE — PROGRESS NOTES
Subjective:       Patient ID:  Clemencia Nguyen is a 74 y.o. female who presents for   Chief Complaint   Patient presents with    Spot     L cheek, R arm    Lesion     nose     HPI    New patient   Last o/v 1/31/2017 with Dr Shahid    Patient presents today for a spot on the L cheek, x 3 weeks, raised and scaly, The patient denies any change, including change in color, increase in size, or spontaneous bleeding, associated with this lesion.  no tx  Spot on the R arm, x years, raised, has shrunk, no tx  Lesion on the nose, x 4 months, thinks its growing, The patient denies any change, including change in color, or spontaneous bleeding, associated with this lesion.    no tx    No phx of skin cancer  No fhx of skin cancer        Past Medical History:   Diagnosis Date    Depression     has been on tofranil for years;    Disorder of kidney and ureter     Esophageal stricture 06/12/2019    per pt per Dr. Garcia    Hyperlipemia     Hypertension     Hypothyroidism     Osteopenia     Thyroid disease     hypothyroid       Review of Systems   Constitutional: Negative for fever, chills and fatigue.   HENT: Negative for congestion and sore throat.    Respiratory: Negative for cough.    Musculoskeletal: Negative for joint swelling and arthralgias.   Skin: Negative for activity-related sunscreen use and wears hat.   Hematologic/Lymphatic: Does not bruise/bleed easily.        Objective:    Physical Exam   Constitutional: She appears well-developed and well-nourished. No distress.   Neurological: She is alert and oriented to person, place, and time. She is not disoriented.   Skin:   Areas Examined (abnormalities noted in diagram):   Head / Face Inspection Performed  Neck Inspection Performed  Chest / Axilla Inspection Performed  RUE Inspected  LUE Inspection Performed  RLE Inspected  LLE Inspection Performed                       Diagram Legend     Erythematous scaling macule/papule c/w actinic keratosis        Vascular papule c/w angioma      Pigmented verrucoid papule/plaque c/w seborrheic keratosis      Yellow umbilicated papule c/w sebaceous hyperplasia      Irregularly shaped tan macule c/w lentigo     1-2 mm smooth white papules consistent with Milia      Movable subcutaneous cyst with punctum c/w epidermal inclusion cyst      Subcutaneous movable cyst c/w pilar cyst      Firm pink to brown papule c/w dermatofibroma      Pedunculated fleshy papule(s) c/w skin tag(s)      Evenly pigmented macule c/w junctional nevus     Mildly variegated pigmented, slightly irregular-bordered macule c/w mildly atypical nevus      Flesh colored to evenly pigmented papule c/w intradermal nevus       Pink pearly papule/plaque c/w basal cell carcinoma      Erythematous hyperkeratotic cursted plaque c/w SCC      Surgical scar with no sign of skin cancer recurrence      Open and closed comedones      Inflammatory papules and pustules      Verrucoid papule consistent consistent with wart     Erythematous eczematous patches and plaques     Dystrophic onycholytic nail with subungual debris c/w onychomycosis     Umbilicated papule    Erythematous-base heme-crusted tan verrucoid plaque consistent with inflamed seborrheic keratosis     Erythematous Silvery Scaling Plaque c/w Psoriasis     See annotation      Assessment / Plan:        AK (actinic keratosis)  Premalignant nature discussed     Cryosurgery Procedure Note    Verbal consent from the patient is obtained including, but not limited to, risk of hypopigmentation/hyperpigmentation, scar, recurrence of lesion. The patient is aware of the precancerous quality and need for treatment of these lesions. Liquid nitrogen cryosurgery is applied to the 1 actinic keratoses, as detailed in the physical exam, to produce a freeze injury. The patient is aware that blisters may form and is instructed on wound care with gentle cleansing and use of vaseline ointment to keep moist until healed. The patient is  supplied a handout on cryosurgery and is instructed to call if lesions do not completely resolve.      Verrucous keratosis  Cryosurgery Procedure Note    Verbal consent from the patient is obtained including, but not limited to, risk of hypopigmentation/hyperpigmentation, scar, recurrence of lesion. The patient is aware of the precancerous quality and need for treatment of these lesions. Liquid nitrogen cryosurgery is applied to the 1 actinic keratoses, as detailed in the physical exam, to produce a freeze injury. The patient is aware that blisters may form and is instructed on wound care with gentle cleansing and use of vaseline ointment to keep moist until healed. The patient is supplied a handout on cryosurgery and is instructed to call if lesions do not completely resolve.      Seborrheic keratoses  These are benign inherited growths without a malignant potential. Reassurance given to patient. No treatment is necessary.       Multiple benign nevi  Reassurance provided.  Instructed patient to observe lesion(s) for changes and follow up in clinic if changes are noted. Discussed ABCDE's of moles and brochure provided.    Angioma  This is a benign vascular lesion. Reassurance given. No treatment required.     Lentigines  This is a benign hyperpigmented sun induced lesion. Daily sun protection will reduce the number of new lesions. Treatment of these benign lesions are considered cosmetic.      Screening exam for skin cancer   Skin examination performed today including at least 6 points as noted in physical examination. No lesions suspicious for malignancy noted.      Fibrous papule of nose  Benign. No further treatment is needed.                Follow up in about 1 year (around 8/9/2020).

## 2019-08-31 ENCOUNTER — EXTERNAL CHRONIC CARE MANAGEMENT (OUTPATIENT)
Dept: PRIMARY CARE CLINIC | Facility: CLINIC | Age: 74
DRG: 004 | End: 2019-08-31
Payer: MEDICARE

## 2019-08-31 PROCEDURE — 99490 PR CHRONIC CARE MGMT, 1ST 20 MIN: ICD-10-PCS | Mod: S$PBB,,, | Performed by: FAMILY MEDICINE

## 2019-08-31 PROCEDURE — 99490 CHRNC CARE MGMT STAFF 1ST 20: CPT | Mod: S$PBB,,, | Performed by: FAMILY MEDICINE

## 2019-08-31 PROCEDURE — 99490 CHRNC CARE MGMT STAFF 1ST 20: CPT | Mod: PBBFAC,PO | Performed by: FAMILY MEDICINE

## 2019-09-02 PROCEDURE — 99232 SBSQ HOSP IP/OBS MODERATE 35: CPT | Mod: ,,, | Performed by: NEUROLOGICAL SURGERY

## 2019-09-02 PROCEDURE — 99232 PR SUBSEQUENT HOSPITAL CARE,LEVL II: ICD-10-PCS | Mod: ,,, | Performed by: NEUROLOGICAL SURGERY

## 2019-09-03 ENCOUNTER — HOSPITAL ENCOUNTER (INPATIENT)
Facility: HOSPITAL | Age: 74
LOS: 31 days | Discharge: NURSING, REHAB OR LONG-TERM CARE | DRG: 004 | End: 2019-10-04
Attending: ANESTHESIOLOGY | Admitting: PSYCHIATRY & NEUROLOGY
Payer: MEDICARE

## 2019-09-03 DIAGNOSIS — G40.901 STATUS EPILEPTICUS: ICD-10-CM

## 2019-09-03 DIAGNOSIS — G93.40 ENCEPHALOPATHY: ICD-10-CM

## 2019-09-03 DIAGNOSIS — Z99.11 ON MECHANICALLY ASSISTED VENTILATION: ICD-10-CM

## 2019-09-03 DIAGNOSIS — I21.9 MYOCARDIAL INFARCTION: ICD-10-CM

## 2019-09-03 DIAGNOSIS — J96.01 ACUTE RESPIRATORY FAILURE WITH HYPOXIA AND HYPERCAPNIA: ICD-10-CM

## 2019-09-03 DIAGNOSIS — G93.9 BRAIN LESION: ICD-10-CM

## 2019-09-03 DIAGNOSIS — N18.30 CHRONIC KIDNEY DISEASE, STAGE III (MODERATE): ICD-10-CM

## 2019-09-03 DIAGNOSIS — D72.829 LEUKOCYTOSIS, UNSPECIFIED TYPE: ICD-10-CM

## 2019-09-03 DIAGNOSIS — J96.02 ACUTE RESPIRATORY FAILURE WITH HYPOXIA AND HYPERCAPNIA: ICD-10-CM

## 2019-09-03 DIAGNOSIS — R56.9 NEW ONSET SEIZURE: ICD-10-CM

## 2019-09-03 DIAGNOSIS — I95.9 HYPOTENSION: ICD-10-CM

## 2019-09-03 DIAGNOSIS — Z00.00 EVALUATION BY MEDICAL SERVICE REQUIRED: ICD-10-CM

## 2019-09-03 DIAGNOSIS — K22.2 ESOPHAGEAL STRICTURE: Primary | ICD-10-CM

## 2019-09-03 DIAGNOSIS — I10 ESSENTIAL HYPERTENSION: ICD-10-CM

## 2019-09-03 DIAGNOSIS — R78.81 BACTEREMIA: ICD-10-CM

## 2019-09-03 DIAGNOSIS — I33.0 VALVULAR VEGETATION: ICD-10-CM

## 2019-09-03 DIAGNOSIS — G93.6 CEREBRAL EDEMA: ICD-10-CM

## 2019-09-03 DIAGNOSIS — G93.89 BRAIN MASS: ICD-10-CM

## 2019-09-03 PROBLEM — G83.84 TODD'S PARALYSIS (POSTEPILEPTIC): Status: ACTIVE | Noted: 2019-09-03

## 2019-09-03 LAB
ALBUMIN SERPL BCP-MCNC: 4.1 G/DL (ref 3.5–5.2)
ALLENS TEST: ABNORMAL
ALP SERPL-CCNC: 87 U/L (ref 55–135)
ALT SERPL W/O P-5'-P-CCNC: 20 U/L (ref 10–44)
AMPHET+METHAMPHET UR QL: NEGATIVE
ANION GAP SERPL CALC-SCNC: 10 MMOL/L (ref 8–16)
AST SERPL-CCNC: 17 U/L (ref 10–40)
BACTERIA #/AREA URNS AUTO: ABNORMAL /HPF
BARBITURATES UR QL SCN>200 NG/ML: NEGATIVE
BASOPHILS # BLD AUTO: 0.01 K/UL (ref 0–0.2)
BASOPHILS NFR BLD: 0.1 % (ref 0–1.9)
BENZODIAZ UR QL SCN>200 NG/ML: NORMAL
BILIRUB SERPL-MCNC: 0.6 MG/DL (ref 0.1–1)
BILIRUB UR QL STRIP: NEGATIVE
BUN SERPL-MCNC: 21 MG/DL (ref 8–23)
BZE UR QL SCN: NEGATIVE
CALCIUM SERPL-MCNC: 9.4 MG/DL (ref 8.7–10.5)
CANNABINOIDS UR QL SCN: NEGATIVE
CHLORIDE SERPL-SCNC: 103 MMOL/L (ref 95–110)
CLARITY CSF: CLEAR
CLARITY UR REFRACT.AUTO: ABNORMAL
CO2 SERPL-SCNC: 24 MMOL/L (ref 23–29)
COLOR CSF: ABNORMAL
COLOR UR AUTO: ABNORMAL
CREAT SERPL-MCNC: 1.3 MG/DL (ref 0.5–1.4)
CREAT UR-MCNC: 44 MG/DL (ref 15–325)
DELSYS: ABNORMAL
DIFFERENTIAL METHOD: ABNORMAL
EOSINOPHIL # BLD AUTO: 0 K/UL (ref 0–0.5)
EOSINOPHIL NFR BLD: 0 % (ref 0–8)
ERYTHROCYTE [DISTWIDTH] IN BLOOD BY AUTOMATED COUNT: 12.9 % (ref 11.5–14.5)
ERYTHROCYTE [SEDIMENTATION RATE] IN BLOOD BY WESTERGREN METHOD: 16 MM/H
EST. GFR  (AFRICAN AMERICAN): 46 ML/MIN/1.73 M^2
EST. GFR  (NON AFRICAN AMERICAN): 39.9 ML/MIN/1.73 M^2
FIO2: 60
GLUCOSE CSF-MCNC: 107 MG/DL (ref 40–70)
GLUCOSE SERPL-MCNC: 180 MG/DL (ref 70–110)
GLUCOSE UR QL STRIP: ABNORMAL
GRAM STN SPEC: NORMAL
GRAM STN SPEC: NORMAL
HCO3 UR-SCNC: 24 MMOL/L (ref 24–28)
HCT VFR BLD AUTO: 41.8 % (ref 37–48.5)
HGB BLD-MCNC: 13.5 G/DL (ref 12–16)
HGB UR QL STRIP: ABNORMAL
HYALINE CASTS UR QL AUTO: 0 /LPF
IMM GRANULOCYTES # BLD AUTO: 0.03 K/UL (ref 0–0.04)
IMM GRANULOCYTES NFR BLD AUTO: 0.4 % (ref 0–0.5)
KETONES UR QL STRIP: NEGATIVE
LEUKOCYTE ESTERASE UR QL STRIP: ABNORMAL
LYMPHOCYTES # BLD AUTO: 0.8 K/UL (ref 1–4.8)
LYMPHOCYTES NFR BLD: 9.7 % (ref 18–48)
LYMPHOCYTES NFR CSF MANUAL: 72 % (ref 40–80)
MAGNESIUM SERPL-MCNC: 1.7 MG/DL (ref 1.6–2.6)
MCH RBC QN AUTO: 28.5 PG (ref 27–31)
MCHC RBC AUTO-ENTMCNC: 32.3 G/DL (ref 32–36)
MCV RBC AUTO: 88 FL (ref 82–98)
METHADONE UR QL SCN>300 NG/ML: NEGATIVE
MICROSCOPIC COMMENT: ABNORMAL
MIN VOL: 6.6
MODE: ABNORMAL
MONOCYTES # BLD AUTO: 0.3 K/UL (ref 0.3–1)
MONOCYTES NFR BLD: 4 % (ref 4–15)
MONOS+MACROS NFR CSF MANUAL: 15 % (ref 15–45)
NEUTROPHILS # BLD AUTO: 7.1 K/UL (ref 1.8–7.7)
NEUTROPHILS NFR BLD: 85.8 % (ref 38–73)
NEUTROPHILS NFR CSF MANUAL: 13 % (ref 0–6)
NITRITE UR QL STRIP: NEGATIVE
NRBC BLD-RTO: 0 /100 WBC
OPIATES UR QL SCN: NEGATIVE
PCO2 BLDA: 39.3 MMHG (ref 35–45)
PCP UR QL SCN>25 NG/ML: NEGATIVE
PEEP: 5
PH SMN: 7.39 [PH] (ref 7.35–7.45)
PH UR STRIP: 5 [PH] (ref 5–8)
PHOSPHATE SERPL-MCNC: 1.9 MG/DL (ref 2.7–4.5)
PIP: 21
PLATELET # BLD AUTO: 141 K/UL (ref 150–350)
PMV BLD AUTO: 9 FL (ref 9.2–12.9)
PO2 BLDA: 284 MMHG (ref 80–100)
POC BE: -1 MMOL/L
POC SATURATED O2: 100 % (ref 95–100)
POC TCO2: 25 MMOL/L (ref 23–27)
POTASSIUM SERPL-SCNC: 4.4 MMOL/L (ref 3.5–5.1)
PROT CSF-MCNC: 45 MG/DL (ref 15–40)
PROT SERPL-MCNC: 7.3 G/DL (ref 6–8.4)
PROT UR QL STRIP: ABNORMAL
RBC # BLD AUTO: 4.74 M/UL (ref 4–5.4)
RBC # CSF: 1090 /CU MM
RBC #/AREA URNS AUTO: >100 /HPF (ref 0–4)
SAMPLE: ABNORMAL
SITE: ABNORMAL
SODIUM SERPL-SCNC: 137 MMOL/L (ref 136–145)
SP GR UR STRIP: 1.01 (ref 1–1.03)
SP02: 100
SPECIMEN VOL CSF: 1 ML
SQUAMOUS #/AREA URNS AUTO: 0 /HPF
TOXICOLOGY INFORMATION: NORMAL
URATE CRY UR QL COMP ASSIST: ABNORMAL
URN SPEC COLLECT METH UR: ABNORMAL
VT: 400
WBC # BLD AUTO: 8.31 K/UL (ref 3.9–12.7)
WBC # CSF: 8 /CU MM (ref 0–5)
WBC #/AREA URNS AUTO: 4 /HPF (ref 0–5)

## 2019-09-03 PROCEDURE — 82945 GLUCOSE OTHER FLUID: CPT

## 2019-09-03 PROCEDURE — 37799 UNLISTED PX VASCULAR SURGERY: CPT

## 2019-09-03 PROCEDURE — A9585 GADOBUTROL INJECTION: HCPCS | Performed by: PSYCHIATRY & NEUROLOGY

## 2019-09-03 PROCEDURE — 95951 PR EEG MONITORING/VIDEORECORD: ICD-10-PCS | Mod: 26,,, | Performed by: PSYCHIATRY & NEUROLOGY

## 2019-09-03 PROCEDURE — 84100 ASSAY OF PHOSPHORUS: CPT

## 2019-09-03 PROCEDURE — 80307 DRUG TEST PRSMV CHEM ANLYZR: CPT

## 2019-09-03 PROCEDURE — 88185 FLOWCYTOMETRY/TC ADD-ON: CPT | Mod: 59 | Performed by: PATHOLOGY

## 2019-09-03 PROCEDURE — 62270 DX LMBR SPI PNXR: CPT | Mod: GC,,, | Performed by: PSYCHIATRY & NEUROLOGY

## 2019-09-03 PROCEDURE — 89051 BODY FLUID CELL COUNT: CPT

## 2019-09-03 PROCEDURE — 94003 VENT MGMT INPAT SUBQ DAY: CPT

## 2019-09-03 PROCEDURE — 99900035 HC TECH TIME PER 15 MIN (STAT)

## 2019-09-03 PROCEDURE — 99223 PR INITIAL HOSPITAL CARE,LEVL III: ICD-10-PCS | Mod: ,,, | Performed by: PHYSICIAN ASSISTANT

## 2019-09-03 PROCEDURE — 94002 VENT MGMT INPAT INIT DAY: CPT

## 2019-09-03 PROCEDURE — 20000000 HC ICU ROOM

## 2019-09-03 PROCEDURE — 62270 PR SPINAL PUNCTURE,LUMBAR,DIAGNOSTIC: ICD-10-PCS | Mod: GC,,, | Performed by: PSYCHIATRY & NEUROLOGY

## 2019-09-03 PROCEDURE — 99223 1ST HOSP IP/OBS HIGH 75: CPT | Mod: ,,, | Performed by: PHYSICIAN ASSISTANT

## 2019-09-03 PROCEDURE — 88108 CYTOPATH CONCENTRATE TECH: CPT | Performed by: PATHOLOGY

## 2019-09-03 PROCEDURE — 88189 PR  FLOWCYTOMETRY/READ, 16 & > MARKERS: ICD-10-PCS | Mod: ,,, | Performed by: PATHOLOGY

## 2019-09-03 PROCEDURE — 99900026 HC AIRWAY MAINTENANCE (STAT)

## 2019-09-03 PROCEDURE — 63600175 PHARM REV CODE 636 W HCPCS: Performed by: PSYCHIATRY & NEUROLOGY

## 2019-09-03 PROCEDURE — 25000003 PHARM REV CODE 250: Performed by: PSYCHIATRY & NEUROLOGY

## 2019-09-03 PROCEDURE — 88184 FLOWCYTOMETRY/ TC 1 MARKER: CPT | Performed by: PATHOLOGY

## 2019-09-03 PROCEDURE — 25500020 PHARM REV CODE 255: Performed by: PSYCHIATRY & NEUROLOGY

## 2019-09-03 PROCEDURE — 94761 N-INVAS EAR/PLS OXIMETRY MLT: CPT

## 2019-09-03 PROCEDURE — 84157 ASSAY OF PROTEIN OTHER: CPT

## 2019-09-03 PROCEDURE — 63600367 HC NITRIC OXIDE PER HOUR

## 2019-09-03 PROCEDURE — 85025 COMPLETE CBC W/AUTO DIFF WBC: CPT

## 2019-09-03 PROCEDURE — 99291 PR CRITICAL CARE, E/M 30-74 MINUTES: ICD-10-PCS | Mod: 25,GC,, | Performed by: PSYCHIATRY & NEUROLOGY

## 2019-09-03 PROCEDURE — 87798 DETECT AGENT NOS DNA AMP: CPT

## 2019-09-03 PROCEDURE — 62270 DX LMBR SPI PNXR: CPT

## 2019-09-03 PROCEDURE — 88189 FLOWCYTOMETRY/READ 16 & >: CPT | Mod: ,,, | Performed by: PATHOLOGY

## 2019-09-03 PROCEDURE — 27000221 HC OXYGEN, UP TO 24 HOURS

## 2019-09-03 PROCEDURE — 81001 URINALYSIS AUTO W/SCOPE: CPT

## 2019-09-03 PROCEDURE — 87205 SMEAR GRAM STAIN: CPT

## 2019-09-03 PROCEDURE — 83735 ASSAY OF MAGNESIUM: CPT

## 2019-09-03 PROCEDURE — 82803 BLOOD GASES ANY COMBINATION: CPT

## 2019-09-03 PROCEDURE — 95951 HC EEG MONITORING/VIDEO RECORD: CPT

## 2019-09-03 PROCEDURE — 80053 COMPREHEN METABOLIC PANEL: CPT

## 2019-09-03 PROCEDURE — 95951 PR EEG MONITORING/VIDEORECORD: CPT | Mod: 26,,, | Performed by: PSYCHIATRY & NEUROLOGY

## 2019-09-03 PROCEDURE — 88108 CYTOLOGY SPECIMEN- MEDICAL CYTOLOGY (FLUID/WASH/BRUSH): ICD-10-PCS | Mod: 26,,, | Performed by: PATHOLOGY

## 2019-09-03 PROCEDURE — 99291 CRITICAL CARE FIRST HOUR: CPT | Mod: 25,GC,, | Performed by: PSYCHIATRY & NEUROLOGY

## 2019-09-03 PROCEDURE — 99000 SPECIMEN HANDLING OFFICE-LAB: CPT

## 2019-09-03 PROCEDURE — 88108 CYTOPATH CONCENTRATE TECH: CPT | Mod: 26,,, | Performed by: PATHOLOGY

## 2019-09-03 RX ORDER — SODIUM CHLORIDE 9 MG/ML
INJECTION, SOLUTION INTRAVENOUS CONTINUOUS
Status: DISCONTINUED | OUTPATIENT
Start: 2019-09-03 | End: 2019-09-04

## 2019-09-03 RX ORDER — LEVETIRACETAM 10 MG/ML
1000 INJECTION INTRAVASCULAR EVERY 12 HOURS
Status: DISCONTINUED | OUTPATIENT
Start: 2019-09-03 | End: 2019-09-06

## 2019-09-03 RX ORDER — PROPOFOL 10 MG/ML
INJECTION, EMULSION INTRAVENOUS
Status: DISPENSED
Start: 2019-09-03 | End: 2019-09-03

## 2019-09-03 RX ORDER — GADOBUTROL 604.72 MG/ML
8 INJECTION INTRAVENOUS
Status: COMPLETED | OUTPATIENT
Start: 2019-09-03 | End: 2019-09-03

## 2019-09-03 RX ORDER — PROPOFOL 10 MG/ML
45 INJECTION, EMULSION INTRAVENOUS CONTINUOUS
Status: DISCONTINUED | OUTPATIENT
Start: 2019-09-03 | End: 2019-09-04

## 2019-09-03 RX ORDER — SODIUM CHLORIDE 0.9 % (FLUSH) 0.9 %
10 SYRINGE (ML) INJECTION
Status: DISCONTINUED | OUTPATIENT
Start: 2019-09-03 | End: 2019-10-02

## 2019-09-03 RX ORDER — DEXAMETHASONE SODIUM PHOSPHATE 4 MG/ML
4 INJECTION, SOLUTION INTRA-ARTICULAR; INTRALESIONAL; INTRAMUSCULAR; INTRAVENOUS; SOFT TISSUE EVERY 6 HOURS
Status: DISCONTINUED | OUTPATIENT
Start: 2019-09-03 | End: 2019-09-23

## 2019-09-03 RX ORDER — POLYETHYLENE GLYCOL 3350 17 G/17G
17 POWDER, FOR SOLUTION ORAL DAILY
Status: DISCONTINUED | OUTPATIENT
Start: 2019-09-03 | End: 2019-09-04

## 2019-09-03 RX ORDER — AMOXICILLIN 250 MG
1 CAPSULE ORAL 2 TIMES DAILY
Status: DISCONTINUED | OUTPATIENT
Start: 2019-09-03 | End: 2019-09-04

## 2019-09-03 RX ORDER — FAMOTIDINE 20 MG/1
20 TABLET, FILM COATED ORAL DAILY
Status: DISCONTINUED | OUTPATIENT
Start: 2019-09-03 | End: 2019-09-06

## 2019-09-03 RX ADMIN — DEXAMETHASONE SODIUM PHOSPHATE 4 MG: 4 INJECTION, SOLUTION INTRAMUSCULAR; INTRAVENOUS at 11:09

## 2019-09-03 RX ADMIN — SENNOSIDES,DOCUSATE SODIUM 1 TABLET: 8.6; 5 TABLET, FILM COATED ORAL at 09:09

## 2019-09-03 RX ADMIN — LEVETIRACETAM 1000 MG: 10 INJECTION INTRAVENOUS at 01:09

## 2019-09-03 RX ADMIN — LEVETIRACETAM 1000 MG: 10 INJECTION INTRAVENOUS at 09:09

## 2019-09-03 RX ADMIN — GADOBUTROL 8 ML: 604.72 INJECTION INTRAVENOUS at 12:09

## 2019-09-03 RX ADMIN — SENNOSIDES,DOCUSATE SODIUM 1 TABLET: 8.6; 5 TABLET, FILM COATED ORAL at 04:09

## 2019-09-03 RX ADMIN — SODIUM CHLORIDE: 0.9 INJECTION, SOLUTION INTRAVENOUS at 04:09

## 2019-09-03 RX ADMIN — PROPOFOL 45 MCG/KG/MIN: 10 INJECTION, EMULSION INTRAVENOUS at 02:09

## 2019-09-03 RX ADMIN — POLYETHYLENE GLYCOL 3350 17 G: 17 POWDER, FOR SOLUTION ORAL at 04:09

## 2019-09-03 RX ADMIN — FAMOTIDINE 20 MG: 20 TABLET, FILM COATED ORAL at 04:09

## 2019-09-03 RX ADMIN — PROPOFOL 45 MCG/KG/MIN: 10 INJECTION, EMULSION INTRAVENOUS at 09:09

## 2019-09-03 NOTE — SUBJECTIVE & OBJECTIVE
Medications Prior to Admission   Medication Sig Dispense Refill Last Dose    0.9 % sodium chloride (SODIUM CHLORIDE 0.9%) solution Inject 75 mL/hr into the vein continuous.       dexamethasone sodium phosp/PF (DEXAMETHASONE SODIUM PHOS, PF,) 10 mg/mL Soln Inject 0.6 mLs (6 mg total) into the vein every 8 (eight) hours.       dextrose 5 % SolP 100 mL with levETIRAcetam 500 mg/5 mL Soln 750 mg Inject 750 mg into the vein every 12 (twelve) hours.       enoxaparin (LOVENOX) 30 mg/0.3 mL Syrg Inject 0.3 mLs (30 mg total) into the skin once daily.       labetalol 20 mg/4 mL (5 mg/mL) Syrg Inject 2 mLs (10 mg total) into the vein every 4 (four) hours as needed (170).       LORazepam (ATIVAN) 2 mg/mL injection Inject 1 mL (2 mg total) into the vein every hour as needed (seizure).       ondansetron 4 mg/2 mL Soln Inject 4 mg into the vein every 8 (eight) hours as needed.       propofol (DIPRIVAN) 10 mg/mL infusion Inject 1,354 mcg/min into the vein continuous prn (sedation).       sodium chloride 0.9% SolP 100 mL with lacosamide 200 mg/20 mL Soln 200 mg Inject 200 mg into the vein every 12 (twelve) hours.          Review of patient's allergies indicates:  No Known Allergies    Past Medical History:   Diagnosis Date    Depression     has been on tofranil for years;    Disorder of kidney and ureter     Esophageal stricture 06/12/2019    per pt per Dr. Garcia    Hyperlipemia     Hypertension     Hypothyroidism     Osteopenia     Thyroid disease     hypothyroid     Past Surgical History:   Procedure Laterality Date    cataract surgery      CHOLECYSTECTOMY  04/24/2018    CHOLECYSTECTOMY-LAPAROSCOPIC N/A 4/24/2018    [facility]formed [facility]    COLONOSCOPY  2011, again in 2014    repeat in 5    COLONOSCOPY W/ BIOPSIES  06/12/2019    polypectomies per Dr. Garcia    ESOPHAGOGASTRODUODENOSCOPY  06/12/2019    Dr. Garcia    HYSTERECTOMY      OOPHORECTOMY      TONSILLECTOMY       Family  "History     Problem Relation (Age of Onset)    Heart disease Mother    Hypertension Mother    Stroke Father        Tobacco Use    Smoking status: Never Smoker    Smokeless tobacco: Never Used   Substance and Sexual Activity    Alcohol use: No     Frequency: Never     Binge frequency: Never    Drug use: No    Sexual activity: Not Currently     Partners: Male     Birth control/protection: Surgical     Review of Systems   Unable to obtain 2/2 intubation  Objective:     Weight: 70.3 kg (154 lb 15.7 oz)  Body mass index is 28.35 kg/m².  Vital Signs (Most Recent):  Temp: 98.4 °F (36.9 °C) (09/03/19 1100)  Pulse: 82 (09/03/19 1352)  Resp: 16 (09/03/19 1352)  BP: (!) 125/56 (09/03/19 1200)  SpO2: 100 % (09/03/19 1352) Vital Signs (24h Range):  Temp:  [98 °F (36.7 °C)-98.4 °F (36.9 °C)] 98.4 °F (36.9 °C)  Pulse:  [] 82  Resp:  [16-29] 16  SpO2:  [97 %-100 %] 100 %  BP: (111-177)/(56-81) 125/56     Date 09/03/19 0700 - 09/04/19 0659   Shift 2674-0680 2132-0197 3716-0010 24 Hour Total   INTAKE   Shift Total(mL/kg)       OUTPUT   Urine(mL/kg/hr) 485   485   Shift Total(mL/kg) 485(6.9)   485(6.9)   Weight (kg) 70.3 70.3 70.3 70.3              Vent Mode: A/C  Oxygen Concentration (%):  [60] 60  Resp Rate Total:  [16 br/min] 16 br/min  Vt Set:  [400 mL-500 mL] 400 mL  PEEP/CPAP:  [5 cmH20] 5 cmH20  Pressure Support:  [0 cmH20] 0 cmH20  Mean Airway Pressure:  [8.4 cmH20] 8.4 cmH20         Urethral Catheter 09/03/19 1000 Latex 16 Fr. (Active)   Site Assessment Clean;Intact 9/3/2019 11:00 AM   Collection Container Urimeter 9/3/2019 11:00 AM   Securement Method secured to top of thigh w/ adhesive device 9/3/2019 11:00 AM   Catheter Care Performed yes 9/3/2019 11:00 AM   Reason for Continuing Urinary Catheterization Critically ill in ICU requiring intensive monitoring 9/3/2019 11:00 AM   CAUTI Prevention Bundle StatLock in place w 1" slack;Intact seal between catheter & drainage tubing;Drainage bag off the floor;Green " sheeting clip in use;No dependent loops or kinks;Drainage bag not overfilled (<2/3 full);Drainage bag below bladder 9/3/2019 10:00 AM   Output (mL) 195 mL 9/3/2019 12:00 PM       Neurosurgery Physical Exam   General: well developed, well nourished, mild distress secondary to ET tube   Head: normocephalic  GCS: Motor: 5/Verbal: T/Eyes: 1 GCS Total: 7T  Cranial nerves: positive cough, gag, and corneal reflex  Eyes: pupils equal, round, reactive to light with accomodation, EOMI.   Motor Strength: Moves BLE spontaneously. Responds to light noxious stimuli in all 4 extremities. Localizes to pain in all 4 extremities. No abnormal movements seen.   Clonus: absent  Babinski: present on the left  Skin: Skin is warm, dry and intact.      Significant Labs:  Recent Labs   Lab 09/02/19 2252 09/03/19  1007   * 180*    137   K 3.7 4.4    103   CO2 17* 24   BUN 26* 21   CREATININE 1.45* 1.3   CALCIUM 9.9 9.4   MG  --  1.7     Recent Labs   Lab 09/02/19 2252 09/03/19  1007   WBC 5.62 8.31   HGB 13.3 13.5   HCT 41.0 41.8   * 141*     Recent Labs   Lab 09/03/19  0228   LABPT 12.6   INR 1.0   APTT 25.2     Microbiology Results (last 7 days)     ** No results found for the last 168 hours. **            Significant Diagnostics:  EXAMINATION:  MRI BRAIN W WO CONTRAST    CLINICAL HISTORY:  brain mass;    TECHNIQUE:  Sagittal and axial T1, axial T2, axial FLAIR, axial gradient, axial diffusion imaging of the whole brain pre-contrast with postcontrast axial T1 and axial spoiled gradient imaging reformatted in the coronal and sagittal planes.. 8 ml of Gadavist injected intravenously.    COMPARISON:  Outside institution MRI brain earlier today 09/03/2019    FINDINGS:  There is a large region of T2 FLAIR signal hyperintensity within the right frontal lobe extending to the parietal lobe corresponding to signal abnormality seen on outside MRI earlier today.  There is subtle ill-defined enhancement within the underlying  frontal subcortical white matter without diffusion restriction.  Please note there is slight hyperintense FLAIR signal in the overlying cortex although this is most prominent within the right superior frontal gyrus with partial sparing of the right middle frontal and inferior frontal cortex.  There is a subtle component of ill-defined susceptibility within the inferior frontal gyrus within the area of signal abnormality concerning for small component of hemorrhage.  Overall configuration for the process remains nonspecific with sequela of inflammatory/infectious process including encephalitis/cerebritis to be considered however primary CNS neoplasm remains high in the differential.  Sequela of ischemia/infarction felt to be much less likely.  There is superimposed patchy and confluent T2 FLAIR signal abnormality elsewhere supratentorial white matter and alonzo concerning for possible chronic ischemic change.  Ventricles are normal without hydrocephalus.    There is a small focus of susceptibility within the left alonzo concerning for remote microhemorrhage.  Differential including small cavernoma cannot be excluded.    This report was flagged in Epic as abnormal.   Impression:       Continued moderate to large region of edema signal within the right frontal lobe most pronounced in the right parasagittal frontal lobe corresponding to abnormality seen on prior study earlier today.  There is subtle ill-defined enhancement in the frontal subcortical white matter within this region without diffusion restriction.  In addition there is a subtle focus of susceptibility within the inferior frontal gyrus.  Overall remains nonspecific however primary CNS neoplasm to be considered high in the differential inflammatory/infectious process including encephalitis/cerebritis cannot be excluded and overall atypical for ischemia/infarction.    Clinical correlation advised.    Superimposed T2 FLAIR signal abnormality elsewhere  supratentorial white matter and alonzo concerning for superimposed chronic ischemic change.    Small focus of susceptibility in the left alonzo suggestive for remote microhemorrhage.

## 2019-09-03 NOTE — PROGRESS NOTES
[facility]  Neurocritical Care  Progress Note    Admit Date: 9/3/2019  Service Date: 09/03/2019  Length of Stay: 0    Subjective:     Chief Complaint: Status epilepticus    History of Present Illness: Pt is  75 yo female with a PMHx of CKD 3, HTN, was transferred from OSH to [facility] for new onset seizures and concern for right front lobe mass. The pt was found in her bedroom by her spouse at approximately 9:45 pm sitting her head against the bed, unresponsive, and having seizure like activity on the left-side. EMT was called and the pt was given Versed twice while en route to the hospital. Pt had a second witnessed seizures, left facial droop, left-sided weakness, and tongue ecchymosis in the ED. Neurology was consulted and The pt was given IV Keppra and Vimpat. MRI brain without contrast was acquired. The image showed right frontal lobe vasogenic edema with mass effect concerning for underlying mass. EEG was acquired at outside hospital concerning showing an abnormal result. The pt was given decadron IV and neurosurgical consult recommended. Pt transferred to [facility] and admitted to the [facility] for higher level of care and further evaluation.     Pt history acquired per chart review and from spouse present at the bedside. The pt's spouse denies prior history of seizures CVA, cancer history and up-to-date screenings    Hospital Course: 09/03/2019 admitted to hospital      Interval History:  Stat MRI with contrast to further workout brain mass, EEG monitoring, Steroids, Keppra 1g BID       Review of Systems   Unable to perform ROS: Intubated       Objective:     Vitals:  Temp: 98.4 °F (36.9 °C)  Pulse: 85  Rhythm: sinus tachycardia  BP: (!) 125/56  MAP (mmHg): 81  Resp: 17  SpO2: 100 %  Oxygen Concentration (%): 60  O2 Device (Oxygen Therapy): ventilator  Vent Mode: A/C  Set Rate: 16 bmp  Vt Set: 400 mL  PEEP/CPAP: 5 cmH20  Peak Airway Pressure: 22 cmH2O  Mean Airway Pressure: 8.4 cmH20    Temp  Min: 98 °F  (36.7 °C)  Max: 98.4 °F (36.9 °C)  Pulse  Min: 79  Max: 110  BP  Min: 111/78  Max: 177/81  MAP (mmHg)  Min: 79  Max: 116  Resp  Min: 16  Max: 29  SpO2  Min: 97 %  Max: 100 %  Oxygen Concentration (%)  Min: 60  Max: 60    No intake/output data recorded.           Physical Exam   Constitutional: She appears well-developed. She is intubated.   HENT:   Head: Normocephalic.   Cardiovascular: Normal rate and regular rhythm.   Pulmonary/Chest: She is intubated.   Abdominal: Soft. Normal appearance and bowel sounds are normal.   Neurological:   Pt lethargic, intubated, does not follow commands   E2V(T1)M4 GCS (6)   Pupils 2 mm pinpoint and reactive  Downward gaze bilaterally of eyes   Cough, gag, and corneals intact   Left-sided weakness   Seizures   BUE - withdraws and moves extremities spontaneously, RUE > LUE   BLE - withdraws and moves extremities to noxious stimuli        Medications:  Continuous Scheduled  dexamethasone 4 mg Q6H   famotidine 20 mg Daily   levetiracetam IVPB 1,000 mg Q12H   polyethylene glycol 17 g Daily   propofol     propofol     senna-docusate 8.6-50 mg 1 tablet BID   PRN  sodium chloride 0.9% 10 mL PRN     Today I personally reviewed pertinent medications, lines/drains/airways, imaging, cardiology results, laboratory results, microbiology results, notably:    Diet  No diet orders on file  No diet orders on file        Assessment/Plan:     Neuro  * Status epilepticus  Pt is 75 yo female found in bedroom by  last night and two witnessed seizures with concern for new onset brain mass on MRI was admitted to [facility] for further mgmt and workup.     -admit to [facility]  -neurosurgery consulted  -Epilepsy consulted  -neuro checks q1hr   -vital checks q1hr   -SBP <180, MAP >65   -AEDS, on Keppra 1 g BID, wean Propofol   -steroids  -SubQ heparin started  -EEG monitoring   -EKG, ECHO pending   -Labs pending CBC, CMP, TSH, HgA1c pending   -MRI -no acute diffusion or ischemia, there was some underlying mass  effect or infiltrative process.   -repeat imaging MRI with contrast - small focus in the left alonzo suggestive for remote microhemorrhage, large region of edema signal within th right frontal lobe with pronounced right parasagittal frontal lobe.   -Bowel regimen   -on Famotidine  -PT/OT when appropriate      Cerebral edema  2/2 to brain mass   Steroids   NSGY following   Repeat imaging     New onset seizure  2/2 to brain mass   Steroids   See VENTURA   EEG monitoring   Epilepsy following     Cardiac/Vascular  Essential hypertension  SBP <180, MAP >65       Renal/  Chronic kidney disease, stage III (moderate)  -PMHx of CKD 3   -monitor renal function, I/Os   -UA pending   -monitor electrolytes           The patient is being Prophylaxed for:  Venous Thromboembolism with: Mechanical or Chemical  Stress Ulcer with: H2B  Ventilator Pneumonia with: chlorhexidine oral care    Activity Orders          None        Full Code   I have spent 35 min with this patient, with over 50% of this time spent coordinating care and speaking with the family    Critical secondary to Patient has a condition that poses threat to life and bodily function:      Critical care was time spent personally by me on the following activities: development of treatment plan with patient or surrogate and bedside caregivers, discussions with consultants, evaluation of patient's response to treatment, examination of patient, ordering and performing treatments and interventions, ordering and review of laboratory studies, ordering and review of radiographic studies, pulse oximetry, re-evaluation of patient's condition. This critical care time did not overlap with that of any other provider or involve time for any procedures.      Armand Rm PA-C  Neurocritical Care  [facility]

## 2019-09-03 NOTE — PLAN OF CARE
Problem: Adult Inpatient Plan of Care  Goal: Plan of Care Review  Outcome: Ongoing (interventions implemented as appropriate)  POC reviewed with pt and family at 1400. Questions and concerns addressed. No acute events today. Pt progressing toward goals. Will continue to monitor. See flowsheets for full assessment and VS info.     Pt intubated and sedated with Propofol. MRI with contrast performed. OG tube placed. Xray verification for OG tube and ETT. Lumbar puncture performed.

## 2019-09-03 NOTE — HPI
Ms. Nguyen is a 73 yo female who presented as transfer to [facility] [place]SH for evaluation of new onset seizure. Per  at bedside, patient was in her usual state of health on day of OSH presentation. He reports when he got out of hte shower, he found patient unresponsive on the floor with head wedged between bedside table and bed. EMS was activated, and patient given Versed twice for suspected seizure activity. While at OSH, patient reported to have additional witnessed seizures, left facial droop, and left sided weakness. Patient started on Keppra. MRI brain completed which showed large area of edema in right frontal lobe, ill defined enhancement right frontal lobe concerning for possible cerebritis vs neoplasm. Transfer initiated to [facility] for higher level of care. Patient intubated, started on Propofol prior to transfer.

## 2019-09-03 NOTE — HPI
73 y/o female with pmhx CKD and HTN  presented to outside hospital for seizure- like activity this AM. Per  pt was found unconscious this AM, with seizure like activity occurring on the left side. Pt was given versed x 2 via EMS. CTH performed at outside hospital was negative for bleed. Pt had additional seizure in outside hospital ED and was given IV Keppra. EEG ordered and MRI brain w/out contrast concerning for possible infiltratrive process vs post ischemic sequela. Pt transferred [place] [facility] and [facility] to [facility]. MRI brain w/ and w/out obtained that showed large area of edema in right frontal lobe and ill defined enhancement in the frontal lobe concerning for possible cerebritis vs. Neoplasm. NSGY was consulted to evaluate. Pt not on anti-coagulation.

## 2019-09-03 NOTE — ASSESSMENT & PLAN NOTE
- Presented to OSH initially with new onset seizure, MRI brain concerning for brain mass. EEG 8/3 showing lateralized rhythmic delta activity localizing to right hemisphere with some spread of rhythmic activity to left posterior and midline head regions, at times appearing more generalized. Transfer initiated to [facility] for higher level of care, continuous EEG monitoring    Recommendations:  - Continuous EEG  - MRI brain W/Wo completed this afternoon  - Continue Propofol 45 mKm for now  - Agree with LP for further evaluation of etiology  - Continue Keppra 1000 mg BID    Plan of care discussed with NCC team, family at bedside. Will continue to follow.

## 2019-09-03 NOTE — PROGRESS NOTES
Patient arrived to [facility] from [facility]    Type of stroke/diagnosis:  Status    TPA start and end timeN/A    Thrombectomy start and end time N/A    Current symptoms: intubated, sedated, downward gaze, withdraw in all $ extremities, Pupils 2 Brisk not following commands,     Skin assessment done: Y  Wounds noted:Y  non blanchable redness to left flank, and tongue lesions    NCC notified: name of person notified Miriam LOPEZ notified

## 2019-09-03 NOTE — SUBJECTIVE & OBJECTIVE
Interval History:  Stat MRI with contrast to further workout brain mass, EEG monitoring, Steroids, Keppra 1g BID       Review of Systems   Unable to perform ROS: Intubated       Objective:     Vitals:  Temp: 98.4 °F (36.9 °C)  Pulse: 85  Rhythm: sinus tachycardia  BP: (!) 125/56  MAP (mmHg): 81  Resp: 17  SpO2: 100 %  Oxygen Concentration (%): 60  O2 Device (Oxygen Therapy): ventilator  Vent Mode: A/C  Set Rate: 16 bmp  Vt Set: 400 mL  PEEP/CPAP: 5 cmH20  Peak Airway Pressure: 22 cmH2O  Mean Airway Pressure: 8.4 cmH20    Temp  Min: 98 °F (36.7 °C)  Max: 98.4 °F (36.9 °C)  Pulse  Min: 79  Max: 110  BP  Min: 111/78  Max: 177/81  MAP (mmHg)  Min: 79  Max: 116  Resp  Min: 16  Max: 29  SpO2  Min: 97 %  Max: 100 %  Oxygen Concentration (%)  Min: 60  Max: 60    No intake/output data recorded.           Physical Exam   Constitutional: She appears well-developed. She is intubated.   HENT:   Head: Normocephalic.   Cardiovascular: Normal rate and regular rhythm.   Pulmonary/Chest: She is intubated.   Abdominal: Soft. Normal appearance and bowel sounds are normal.   Neurological:   Pt lethargic, intubated, does not follow commands   E2V(T1)M4 GCS (6)   Pupils 2 mm pinpoint and reactive  Downward gaze bilaterally of eyes   Cough, gag, and corneals intact   Left-sided weakness   Seizures   BUE - withdraws and moves extremities spontaneously, RUE > LUE   BLE - withdraws and moves extremities to noxious stimuli        Medications:  Continuous Scheduled  dexamethasone 4 mg Q6H   famotidine 20 mg Daily   levetiracetam IVPB 1,000 mg Q12H   polyethylene glycol 17 g Daily   propofol     propofol     senna-docusate 8.6-50 mg 1 tablet BID   PRN  sodium chloride 0.9% 10 mL PRN     Today I personally reviewed pertinent medications, lines/drains/airways, imaging, cardiology results, laboratory results, microbiology results, notably:    Diet  No diet orders on file  No diet orders on file

## 2019-09-03 NOTE — PLAN OF CARE
09/03/19 1758   Discharge Assessment   Assessment Type Discharge Planning Assessment   Confirmed/corrected address and phone number on facesheet? Yes   Assessment information obtained from? Caregiver  (spouse)   Expected Length of Stay (days) 7   Communicated expected length of stay with patient/caregiver yes   Prior to hospitilization cognitive status: Alert/Oriented   Prior to hospitalization functional status: Independent   Current cognitive status: Coma/Sedated/Intubated   Current Functional Status: Completely Dependent   [facility] spouse   Able to Return to Prior Arrangements yes   Is patient able to care for self after discharge? Unable to determine at this time (comments)   Who are your caregiver(s) and their phone number(s)? Enrike Nguyen () [phone]   Patient's perception of discharge disposition other (comments)  (porsha)   Readmission Within the Last 30 Days no previous admission in last 30 days   Patient currently being followed by outpatient case management? No   Patient currently receives any other outside agency services? No   Equipment Currently Used at Home none   Do you have any problems affording any of your prescribed medications? TBD   Is the patient taking medications as prescribed? yes   Does the patient have transportation home? Yes   Transportation Anticipated family or friend will provide   Does the patient receive services at the [facility]? No   Discharge Plan A Long-term acute care facility (LTAC)   Discharge Plan B Skilled Nursing Facility   DME Needed Upon Discharge  other (see comments)  (tbd)   Patient/Family in Agreement with Plan yes         Discharge/ My Health Packet Folder Given to patient/family:      Yes        PCP:  SHELBY Castillo MD        Pharmacy:      [facility] - [place], [place] - [address]  [address] [facility] [address]  Phone: [phone] Fax: [phone]    [facility]/pharmacy #[id number]  - [place], [place] - [address] [address]  [address]  [facility] [address]  Phone: [phone] Fax: [phone]      Emergency Contacts:    Extended Emergency Contact Information  Primary Emergency Contact: Enrike Romero  Address: [address] [place] [address] [place]  Mobile Phone: [phone]  Relation: Spouse    Insurance:    Payor: MEDICARE / Plan: MEDICARE PART A & B / Product Type: [email or web address] /     Keely Malone RN, CCRN-K, [facility]  Neuro-Critical Care   X [phone]

## 2019-09-03 NOTE — CARE UPDATE
Pt arrived from [facility] without incident and placed on vent in A/C mode.  ABG / CXR pending.  ( Date entered manually due to her chart not being released at this time).

## 2019-09-03 NOTE — ASSESSMENT & PLAN NOTE
73 y/o female with pmhx CKD and HTN presented to outside hospital for seizure- like activity occurring on the left side. Found to have area of ill-defined enhancement on MRI brain w/ and w/out.     -q 1 hr neuro checks  -Fu cEEG, continue AEDs per epilepsy team  -Fu LP results  -Fu infectious rodriguez  -Continue steroids for now  -Further care per NCC, will follow.

## 2019-09-03 NOTE — PROCEDURES
"Clemencia Nguyen is a 74 y.o. female patient.    Temp: 98.4 °F (36.9 °C) (09/03/19 1315)  Pulse: 87 (09/03/19 1800)  Resp: (!) 27 (09/03/19 1800)  BP: (!) 149/83 (09/03/19 1800)  SpO2: 96 % (09/03/19 1800)  Weight: 70.3 kg (154 lb 15.7 oz) (09/03/19 1040)  Height: 5' 2" (157.5 cm) (09/03/19 1040)       Lumbar Puncture  Date/Time: 9/3/2019 6:41 PM  Location procedure was performed: [facility] NEURO CRITICAL CARE  Performed by: Miriam Orlando MD  Authorized by: Miriam Orlando MD   Assisting provider: Vaibhav Gentile MD  Pre-operative diagnosis:  Seizure  Post-operative diagnosis: Unchanged  Consent Done: Yes  Indications: evaluation for infection  Anesthesia: local infiltration    Anesthesia:  Local Anesthetic: lidocaine 1% without epinephrine  Anesthetic total: 3 mL  Patient sedated: yes  Sedatives: propofol  Description of findings: Blood-tinged then clearing fluid   Preparation: Patient was prepped and draped in the usual sterile fashion.  Lumbar space: L4-L5 interspace  Patient's position: left lateral decubitus  Needle gauge: 18  Needle type: spinal needle - Quincke tip  Needle length: 3.5 in  Number of attempts: 3  Opening pressure: 29 cm H2O  Fluid appearance: blood-tinged then clearing  Tubes of fluid: 4  Total volume: 12 ml  Post-procedure: site cleaned and adhesive bandage applied  Complications: No  Estimated blood loss (mL): 5  Specimens: Yes  Implants: No  Patient tolerance: Patient tolerated the procedure well with no immediate complications          Miriam Orlando  9/3/2019  "

## 2019-09-03 NOTE — ASSESSMENT & PLAN NOTE
Pt is 73 yo female found in bedroom by  last night and two witnessed seizures with concern for new onset brain mass on MRI was admitted to [facility] for further mgmt and workup.     -admit to [facility]  -neurosurgery consulted  -Epilepsy consulted  -neuro checks q1hr   -vital checks q1hr   -SBP <180, MAP >65   -AEDS, on Keppra 1 g BID, wean Propofol   -steroids  -SubQ heparin started  -EEG monitoring   -EKG, ECHO pending   -Labs pending CBC, CMP, TSH, HgA1c pending   -MRI -no acute diffusion or ischemia, there was some underlying mass effect or infiltrative process.   -repeat imaging MRI with contrast - small focus in the left alonzo suggestive for remote microhemorrhage, large region of edema signal within th right frontal lobe with pronounced right parasagittal frontal lobe.   -Bowel regimen   -on Famotidine  -PT/OT when appropriate

## 2019-09-03 NOTE — SUBJECTIVE & OBJECTIVE
Past Medical History:   Diagnosis Date    Depression     has been on tofranil for years;    Disorder of kidney and ureter     Esophageal stricture 06/12/2019    per pt per Dr. Garcia    Hyperlipemia     Hypertension     Hypothyroidism     Osteopenia     Thyroid disease     hypothyroid       Past Surgical History:   Procedure Laterality Date    cataract surgery      CHOLECYSTECTOMY  04/24/2018    CHOLECYSTECTOMY-LAPAROSCOPIC N/A 4/24/2018    Performed by Marina Killian MD at [facility]    COLONOSCOPY  2011, again in 2014    repeat in 5    COLONOSCOPY W/ BIOPSIES  06/12/2019    polypectomies per Dr. Garcia    ESOPHAGOGASTRODUODENOSCOPY  06/12/2019    Dr. Garcia    HYSTERECTOMY      OOPHORECTOMY      TONSILLECTOMY         Review of patient's allergies indicates:  No Known Allergies    Current Facility-Administered Medications on File Prior to Encounter   Medication    [COMPLETED] levETIRAcetam in NaCl (iso-os) IVPB 1,000 mg    [COMPLETED] lorazepam injection 1 mg    [DISCONTINUED] 0.9%  NaCl infusion    [DISCONTINUED] chlorhexidine 0.12 % solution 10 mL    [DISCONTINUED] dexAMETHasone sodium phos (PF) injection 6 mg    [DISCONTINUED] dextrose 50% injection 12.5 g    [DISCONTINUED] enoxaparin injection 30 mg    [DISCONTINUED] famotidine (PF) injection 20 mg    [DISCONTINUED] famotidine IVPB 20 mg    [DISCONTINUED] glucagon (human recombinant) injection 1 mg    [DISCONTINUED] insulin aspart U-100 pen 0-5 Units    [DISCONTINUED] labetalol 20 mg/4 mL (5 mg/mL) IV syring    [DISCONTINUED] lacosamide (VIMPAT) 200 mg in sodium chloride 0.9% 100 mL IVPB    [DISCONTINUED] levETIRAcetam (KEPPRA) 750 mg in dextrose 5 % 100 mL IVPB    [DISCONTINUED] levETIRAcetam in NaCl (iso-os) IVPB 1,000 mg    [DISCONTINUED] mupirocin 2 % ointment    [DISCONTINUED] ondansetron injection 4 mg    [DISCONTINUED] propofol (DIPRIVAN) 10 mg/mL infusion    [DISCONTINUED] propofol (DIPRIVAN) 10 mg/mL infusion     [DISCONTINUED] sodium chloride 0.9% flush 10 mL     Current Outpatient Medications on File Prior to Encounter   Medication Sig    0.9 % sodium chloride (SODIUM CHLORIDE 0.9%) solution Inject 75 mL/hr into the vein continuous.    dexamethasone sodium phosp/PF (DEXAMETHASONE SODIUM PHOS, PF,) 10 mg/mL Soln Inject 0.6 mLs (6 mg total) into the vein every 8 (eight) hours.    dextrose 5 % SolP 100 mL with levETIRAcetam 500 mg/5 mL Soln 750 mg Inject 750 mg into the vein every 12 (twelve) hours.    enoxaparin (LOVENOX) 30 mg/0.3 mL Syrg Inject 0.3 mLs (30 mg total) into the skin once daily.    labetalol 20 mg/4 mL (5 mg/mL) Syrg Inject 2 mLs (10 mg total) into the vein every 4 (four) hours as needed (170).    LORazepam (ATIVAN) 2 mg/mL injection Inject 1 mL (2 mg total) into the vein every hour as needed (seizure).    ondansetron 4 mg/2 mL Soln Inject 4 mg into the vein every 8 (eight) hours as needed.    propofol (DIPRIVAN) 10 mg/mL infusion Inject 1,354 mcg/min into the vein continuous prn (sedation).    sodium chloride 0.9% SolP 100 mL with lacosamide 200 mg/20 mL Soln 200 mg Inject 200 mg into the vein every 12 (twelve) hours.    [DISCONTINUED] amLODIPine (NORVASC) 2.5 MG tablet Take 1 tablet (2.5 mg total) by mouth every evening.    [DISCONTINUED] BIOTIN ORAL Take 1,000 mcg by mouth once daily.    [DISCONTINUED] calcitonin, salmon, (FORTICAL) 200 unit/actuation nasal spray 1 spray by Nasal route once daily.    [DISCONTINUED] calcitRIOL (ROCALTROL) 0.5 MCG Cap Take 1 capsule (0.5 mcg total) by mouth once daily. With food    [DISCONTINUED] ciclopirox (PENLAC) 8 % Soln Apply topically nightly.    [DISCONTINUED] fluticasone (FLONASE) 50 mcg/actuation nasal spray 1 SPRAY (50 MCG TOTAL) BY EACH NARE ROUTE ONCE DAILY.    [DISCONTINUED] imipramine (TOFRANIL) 25 MG tablet TAKE 3 TABLETS TWICE DAILY    [DISCONTINUED] irbesartan (AVAPRO) 300 MG tablet Take 1 tablet (300 mg total) by mouth every evening.     [DISCONTINUED] krill-om-3-dha-epa-phospho-ast (MEGARED OMEGA-3 KRILL OIL) 1,000-230-60 mg Cap Take 1 capsule by mouth once daily.    [DISCONTINUED] levothyroxine (SYNTHROID) 75 MCG tablet Take 1 tablet (75 mcg total) by mouth before breakfast.    [DISCONTINUED] pantoprazole (PROTONIX) 40 MG tablet Take 1 tablet (40 mg total) by mouth 2 (two) times daily.    [DISCONTINUED] psyllium (METAMUCIL) packet Take 1 packet by mouth once daily.    [DISCONTINUED] simvastatin (ZOCOR) 10 MG tablet Take 1 tablet (10 mg total) by mouth every evening.    [DISCONTINUED] vitamin D 1000 units Tab Take 1,000 Units by mouth once daily.     Continuous Infusions:   propofol 45 mcg/kg/min (09/03/19 1400)       Family History     Problem Relation (Age of Onset)    Heart disease Mother    Hypertension Mother    Stroke Father        Tobacco Use    Smoking status: Never Smoker    Smokeless tobacco: Never Used   Substance and Sexual Activity    Alcohol use: No     Frequency: Never     Binge frequency: Never    Drug use: No    Sexual activity: Not Currently     Partners: Male     Birth control/protection: Surgical     Review of Systems   Unable to perform ROS: Intubated     Objective:     Vital Signs (Most Recent):  Temp: 98.4 °F (36.9 °C) (09/03/19 1100)  Pulse: 82 (09/03/19 1546)  Resp: 16 (09/03/19 1546)  BP: (!) 142/65 (09/03/19 1400)  SpO2: 100 % (09/03/19 1546) Vital Signs (24h Range):  Temp:  [98 °F (36.7 °C)-98.4 °F (36.9 °C)] 98.4 °F (36.9 °C)  Pulse:  [] 82  Resp:  [16-29] 16  SpO2:  [97 %-100 %] 100 %  BP: (111-177)/(56-81) 142/65     Weight: 70.3 kg (154 lb 15.7 oz)  Body mass index is 28.35 kg/m².    Physical Exam   Constitutional: She appears well-developed and well-nourished.   HENT:   Head: Normocephalic and atraumatic.   Eyes: Pupils are equal, round, and reactive to light. Conjunctivae are normal.   Pulmonary/Chest:   intubated   Skin: Skin is warm and dry. She is not diaphoretic.   Psychiatric:   Unable to  assess       NEUROLOGICAL EXAMINATION:     CRANIAL NERVES     CN III, IV, VI   Pupils are equal, round, and reactive to light.       CN:   ELA OU   Corneal intact OU   Face symmetric   Withdraw to noxious stimuli in extremities     Exam findings to suggest seizures:  Myoclonus - no   eye twitching - no   Nystagmus - no   gaze deviation - no   waxy rigidity - no        Significant Labs: All pertinent lab results from the past 24 hours have been reviewed.    Significant Studies: I have reviewed all pertinent imaging results/findings within the past 24 hours.

## 2019-09-03 NOTE — CONSULTS
[facility]  Neurology-Epilepsy  Consult Note    Patient Name: Clemencia Nguyen  MRN: [id number]  Admission Date: 9/3/2019  Hospital Length of Stay: 0 days  Code Status: Full Code   Attending Provider: Johanna Gamboa MD   Consulting Provider: Angela Stringer PA-C  Primary Care Physician: SHELBY Castillo MD  Principal Problem:Status epilepticus    Inpatient consult for Status Epilepticus Management  Consult performed by: Angela Stringer PA-C  Consult ordered by: Armand Rm PA-C         Subjective:     HPI:   Ms. Nguyen is a 75 yo female who presented as transfer to [facility] from OSH for evaluation of new onset seizure. Per  at bedside, patient was in her usual state of health on day of OSH presentation. He reports when he got out of hte shower, he found patient unresponsive on the floor with head wedged between bedside table and bed. EMS was activated, and patient given Versed twice for suspected seizure activity. While at OSH, patient reported to have additional witnessed seizures, left facial droop, and left sided weakness. Patient started on Keppra. MRI brain completed which showed large area of edema in right frontal lobe, ill defined enhancement right frontal lobe concerning for possible cerebritis vs neoplasm. Transfer initiated to [facility] for higher level of care. Patient intubated, started on Propofol prior to transfer.     Hospital Course:   No notes on file     Past Medical History:   Diagnosis Date    Depression     has been on tofranil for years;    Disorder of kidney and ureter     Esophageal stricture 06/12/2019    per pt per Dr. Garcia    Hyperlipemia     Hypertension     Hypothyroidism     Osteopenia     Thyroid disease     hypothyroid       Past Surgical History:   Procedure Laterality Date    cataract surgery      CHOLECYSTECTOMY  04/24/2018    CHOLECYSTECTOMY-LAPAROSCOPIC N/A 4/24/2018    Performed by Marina Killian MD at [facility] OR    COLONOSCOPY  2011,  again in 2014    repeat in 5    COLONOSCOPY W/ BIOPSIES  06/12/2019    polypectomies per Dr. Garcia    ESOPHAGOGASTRODUODENOSCOPY  06/12/2019    Dr. Garcia    HYSTERECTOMY      OOPHORECTOMY      TONSILLECTOMY         Review of patient's allergies indicates:  No Known Allergies    Current Facility-Administered Medications on File Prior to Encounter   Medication    [COMPLETED] levETIRAcetam in NaCl (iso-os) IVPB 1,000 mg    [COMPLETED] lorazepam injection 1 mg    [DISCONTINUED] 0.9%  NaCl infusion    [DISCONTINUED] chlorhexidine 0.12 % solution 10 mL    [DISCONTINUED] dexAMETHasone sodium phos (PF) injection 6 mg    [DISCONTINUED] dextrose 50% injection 12.5 g    [DISCONTINUED] enoxaparin injection 30 mg    [DISCONTINUED] famotidine (PF) injection 20 mg    [DISCONTINUED] famotidine IVPB 20 mg    [DISCONTINUED] glucagon (human recombinant) injection 1 mg    [DISCONTINUED] insulin aspart U-100 pen 0-5 Units    [DISCONTINUED] labetalol 20 mg/4 mL (5 mg/mL) IV syring    [DISCONTINUED] lacosamide (VIMPAT) 200 mg in sodium chloride 0.9% 100 mL IVPB    [DISCONTINUED] levETIRAcetam (KEPPRA) 750 mg in dextrose 5 % 100 mL IVPB    [DISCONTINUED] levETIRAcetam in NaCl (iso-os) IVPB 1,000 mg    [DISCONTINUED] mupirocin 2 % ointment    [DISCONTINUED] ondansetron injection 4 mg    [DISCONTINUED] propofol (DIPRIVAN) 10 mg/mL infusion    [DISCONTINUED] propofol (DIPRIVAN) 10 mg/mL infusion    [DISCONTINUED] sodium chloride 0.9% flush 10 mL     Current Outpatient Medications on File Prior to Encounter   Medication Sig    0.9 % sodium chloride (SODIUM CHLORIDE 0.9%) solution Inject 75 mL/hr into the vein continuous.    dexamethasone sodium phosp/PF (DEXAMETHASONE SODIUM PHOS, PF,) 10 mg/mL Soln Inject 0.6 mLs (6 mg total) into the vein every 8 (eight) hours.    dextrose 5 % SolP 100 mL with levETIRAcetam 500 mg/5 mL Soln 750 mg Inject 750 mg into the vein every 12 (twelve) hours.    enoxaparin (LOVENOX) 30  mg/0.3 mL Syrg Inject 0.3 mLs (30 mg total) into the skin once daily.    labetalol 20 mg/4 mL (5 mg/mL) Syrg Inject 2 mLs (10 mg total) into the vein every 4 (four) hours as needed (170).    LORazepam (ATIVAN) 2 mg/mL injection Inject 1 mL (2 mg total) into the vein every hour as needed (seizure).    ondansetron 4 mg/2 mL Soln Inject 4 mg into the vein every 8 (eight) hours as needed.    propofol (DIPRIVAN) 10 mg/mL infusion Inject 1,354 mcg/min into the vein continuous prn (sedation).    sodium chloride 0.9% SolP 100 mL with lacosamide 200 mg/20 mL Soln 200 mg Inject 200 mg into the vein every 12 (twelve) hours.    [DISCONTINUED] amLODIPine (NORVASC) 2.5 MG tablet Take 1 tablet (2.5 mg total) by mouth every evening.    [DISCONTINUED] BIOTIN ORAL Take 1,000 mcg by mouth once daily.    [DISCONTINUED] calcitonin, salmon, (FORTICAL) 200 unit/actuation nasal spray 1 spray by Nasal route once daily.    [DISCONTINUED] calcitRIOL (ROCALTROL) 0.5 MCG Cap Take 1 capsule (0.5 mcg total) by mouth once daily. With food    [DISCONTINUED] ciclopirox (PENLAC) 8 % Soln Apply topically nightly.    [DISCONTINUED] fluticasone (FLONASE) 50 mcg/actuation nasal spray 1 SPRAY (50 MCG TOTAL) BY EACH NARE ROUTE ONCE DAILY.    [DISCONTINUED] imipramine (TOFRANIL) 25 MG tablet TAKE 3 TABLETS TWICE DAILY    [DISCONTINUED] irbesartan (AVAPRO) 300 MG tablet Take 1 tablet (300 mg total) by mouth every evening.    [DISCONTINUED] krill-om-3-dha-epa-phospho-ast (MEGARED OMEGA-3 KRILL OIL) 1,000-230-60 mg Cap Take 1 capsule by mouth once daily.    [DISCONTINUED] levothyroxine (SYNTHROID) 75 MCG tablet Take 1 tablet (75 mcg total) by mouth before breakfast.    [DISCONTINUED] pantoprazole (PROTONIX) 40 MG tablet Take 1 tablet (40 mg total) by mouth 2 (two) times daily.    [DISCONTINUED] psyllium (METAMUCIL) packet Take 1 packet by mouth once daily.    [DISCONTINUED] simvastatin (ZOCOR) 10 MG tablet Take 1 tablet (10 mg total) by mouth  every evening.    [DISCONTINUED] vitamin D 1000 units Tab Take 1,000 Units by mouth once daily.     Continuous Infusions:   propofol 45 mcg/kg/min (09/03/19 1400)       Family History     Problem Relation (Age of Onset)    Heart disease Mother    Hypertension Mother    Stroke Father        Tobacco Use    Smoking status: Never Smoker    Smokeless tobacco: Never Used   Substance and Sexual Activity    Alcohol use: No     Frequency: Never     Binge frequency: Never    Drug use: No    Sexual activity: Not Currently     Partners: Male     Birth control/protection: Surgical     Review of Systems   Unable to perform ROS: Intubated     Objective:     Vital Signs (Most Recent):  Temp: 98.4 °F (36.9 °C) (09/03/19 1100)  Pulse: 82 (09/03/19 1546)  Resp: 16 (09/03/19 1546)  BP: (!) 142/65 (09/03/19 1400)  SpO2: 100 % (09/03/19 1546) Vital Signs (24h Range):  Temp:  [98 °F (36.7 °C)-98.4 °F (36.9 °C)] 98.4 °F (36.9 °C)  Pulse:  [] 82  Resp:  [16-29] 16  SpO2:  [97 %-100 %] 100 %  BP: (111-177)/(56-81) 142/65     Weight: 70.3 kg (154 lb 15.7 oz)  Body mass index is 28.35 kg/m².    Physical Exam   Constitutional: She appears well-developed and well-nourished.   HENT:   Head: Normocephalic and atraumatic.   Eyes: Pupils are equal, round, and reactive to light. Conjunctivae are normal.   Pulmonary/Chest:   intubated   Skin: Skin is warm and dry. She is not diaphoretic.   Psychiatric:   Unable to assess       NEUROLOGICAL EXAMINATION:     CRANIAL NERVES     CN III, IV, VI   Pupils are equal, round, and reactive to light.       CN:   ELA OU   Corneal intact OU   Face symmetric   Withdraw to noxious stimuli in extremities     Exam findings to suggest seizures:  Myoclonus - no   eye twitching - no   Nystagmus - no   gaze deviation - no   waxy rigidity - no        Significant Labs: All pertinent lab results from the past 24 hours have been reviewed.    Significant Studies: I have reviewed all pertinent imaging  results/findings within the past 24 hours.    Assessment and Plan:     * Status epilepticus  - Presented to OSH initially with new onset seizure, MRI brain concerning for brain mass. EEG 8/3 showing lateralized rhythmic delta activity localizing to right hemisphere with some spread of rhythmic activity to left posterior and midline head regions, at times appearing more generalized. Transfer initiated to [facility] for higher level of care, continuous EEG monitoring    Recommendations:  - Continuous EEG  - MRI brain W/Wo completed this afternoon  - Continue Propofol 45 mKm for now  - Agree with LP for further evaluation of etiology  - Continue Keppra 1000 mg BID    Plan of care discussed with NCC team, family at bedside. Will continue to follow.      Cerebral edema  - Dex 4 mg q6 hours  - NSGY following    Chronic kidney disease, stage III (moderate)  - SCr 1.3, GFT 39.9  - monitor daily    Essential hypertension  - Vitals reviewed, management per NCC        VTE Risk Mitigation (From admission, onward)    None          Thank you for your consult. I will follow-up with patient. Please contact us if you have any additional questions.    Angela Stringer PA-C  Neurology-Epilepsy  [facility]  Staff: Dr. Lockwood

## 2019-09-03 NOTE — HPI
Pt is  75 yo female with a PMHx of CKD 3, HTN, was [facility] for new onset seizures and concern for right front lobe mass. The pt was found in her bedroom by her spouse at approximately 9:45 pm sitting her head against the bed, unresponsive, and having seizure like activity on the left-side. EMT was called and the pt was given Versed twice while en route to the hospital. Pt had a second witnessed seizures, left facial droop, left-sided weakness, and tongue ecchymosis in the ED. Neurology was consulted and The pt was given IV Keppra and Vimpat. MRI brain without contrast was acquired. The image showed right frontal lobe vasogenic edema with mass effect concerning for underlying mass. EEG was acquired at outside hospital concerning showing an abnormal result. The pt was given decadron IV and neurosurgical consult recommended. [facility] and admitted to the [facility] for higher level of care and further evaluation.     Pt history acquired per chart review and from spouse present at the bedside. The pt's spouse denies prior history of seizures CVA, cancer history and up-to-date screenings

## 2019-09-03 NOTE — ASSESSMENT & PLAN NOTE
75 y/o female with pmhx CKD and HTN presented to outside hospital for seizure- like activity occurring on the left side. Found to have area of ill-defined enhancement on MRI brain w/ and w/out.     - No emergent neurosurgical intervention required  - MRI brain w/ and w/out shows large area of edema in right frontal lobe and ill defined enhancement in the right frontal lobe concerning for possible cerebritis vs. Neoplasm.  - Cerebral edema: Recommend continuing high dose steroids and PPI  - Seizure: continue work up and AEDs per primary team  - Will continue to monitor pt closely.

## 2019-09-03 NOTE — CONSULTS
[facility]  Neurosurgery  Consult Note    Subjective:     History of Present Illness: 73 y/o female with pmhx CKD and HTN  presented to outside hospital for seizure- like activity this AM. Per HPI from [facility], pt was found unconscious this AM, with seizure like activity occurring on the left side. Pt was given versed x 2 via EMS. CTH performed at outside hospital was negative for bleed. Pt had additional seizure in outside hospital ED and was given IV Keppra. EEG ordered and MRI brain w/out contrast concerning for possible infiltratrive process vs post ischemic sequela. Pt transferred to [facility] and admitted to [facility]. MRI brain w/ and w/out obtained that showed large area of edema in right frontal lobe and ill defined enhancement in the frontal lobe concerning for possible cerebritis vs. Neoplasm. NSGY was consulted to evaluate. Pt not on anti-coagulation.       Post-Op Info:  * No surgery found *         Medications Prior to Admission   Medication Sig Dispense Refill Last Dose    0.9 % sodium chloride (SODIUM CHLORIDE 0.9%) solution Inject 75 mL/hr into the vein continuous.       dexamethasone sodium phosp/PF (DEXAMETHASONE SODIUM PHOS, PF,) 10 mg/mL Soln Inject 0.6 mLs (6 mg total) into the vein every 8 (eight) hours.       dextrose 5 % SolP 100 mL with levETIRAcetam 500 mg/5 mL Soln 750 mg Inject 750 mg into the vein every 12 (twelve) hours.       enoxaparin (LOVENOX) 30 mg/0.3 mL Syrg Inject 0.3 mLs (30 mg total) into the skin once daily.       labetalol 20 mg/4 mL (5 mg/mL) Syrg Inject 2 mLs (10 mg total) into the vein every 4 (four) hours as needed (170).       LORazepam (ATIVAN) 2 mg/mL injection Inject 1 mL (2 mg total) into the vein every hour as needed (seizure).       ondansetron 4 mg/2 mL Soln Inject 4 mg into the vein every 8 (eight) hours as needed.       propofol (DIPRIVAN) 10 mg/mL infusion Inject 1,354 mcg/min into the vein continuous prn (sedation).       sodium  chloride 0.9% SolP 100 mL with lacosamide 200 mg/20 mL Soln 200 mg Inject 200 mg into the vein every 12 (twelve) hours.          Review of patient's allergies indicates:  No Known Allergies    Past Medical History:   Diagnosis Date    Depression     has been on tofranil for years;    Disorder of kidney and ureter     Esophageal stricture 06/12/2019    per pt per Dr. Garcia    Hyperlipemia     Hypertension     Hypothyroidism     Osteopenia     Thyroid disease     hypothyroid     Past Surgical History:   Procedure Laterality Date    cataract surgery      CHOLECYSTECTOMY  04/24/2018    CHOLECYSTECTOMY-LAPAROSCOPIC N/A 4/24/2018    [facility]    COLONOSCOPY  2011, again in 2014    repeat in 5    COLONOSCOPY W/ BIOPSIES  06/12/2019    polypectomies per Dr. Garcia    ESOPHAGOGASTRODUODENOSCOPY  06/12/2019    Dr. Garcia    HYSTERECTOMY      OOPHORECTOMY      TONSILLECTOMY       Family History     Problem Relation (Age of Onset)    Heart disease Mother    Hypertension Mother    Stroke Father        Tobacco Use    Smoking status: Never Smoker    Smokeless tobacco: Never Used   Substance and Sexual Activity    Alcohol use: No     Frequency: Never     Binge frequency: Never    Drug use: No    Sexual activity: Not Currently     Partners: Male     Birth control/protection: Surgical     Review of Systems   Unable to obtain 2/2 intubation  Objective:     Weight: 70.3 kg (154 lb 15.7 oz)  Body mass index is 28.35 kg/m².  Vital Signs (Most Recent):  Temp: 98.4 °F (36.9 °C) (09/03/19 1100)  Pulse: 82 (09/03/19 1352)  Resp: 16 (09/03/19 1352)  BP: (!) 125/56 (09/03/19 1200)  SpO2: 100 % (09/03/19 1352) Vital Signs (24h Range):  Temp:  [98 °F (36.7 °C)-98.4 °F (36.9 °C)] 98.4 °F (36.9 °C)  Pulse:  [] 82  Resp:  [16-29] 16  SpO2:  [97 %-100 %] 100 %  BP: (111-177)/(56-81) 125/56     Date 09/03/19 0700 - 09/04/19 0659   Shift 5722-8485 3703-7045 0081-2100 24 Hour Total  "  INTAKE   Shift Total(mL/kg)       OUTPUT   Urine(mL/kg/hr) 485   485   Shift Total(mL/kg) 485(6.9)   485(6.9)   Weight (kg) 70.3 70.3 70.3 70.3              Vent Mode: A/C  Oxygen Concentration (%):  [60] 60  Resp Rate Total:  [16 br/min] 16 br/min  Vt Set:  [400 mL-500 mL] 400 mL  PEEP/CPAP:  [5 cmH20] 5 cmH20  Pressure Support:  [0 cmH20] 0 cmH20  Mean Airway Pressure:  [8.4 cmH20] 8.4 cmH20         Urethral Catheter 09/03/19 1000 Latex 16 Fr. (Active)   Site Assessment Clean;Intact 9/3/2019 11:00 AM   Collection Container Urimeter 9/3/2019 11:00 AM   Securement Method secured to top of thigh w/ adhesive device 9/3/2019 11:00 AM   Catheter Care Performed yes 9/3/2019 11:00 AM   Reason for Continuing Urinary Catheterization Critically ill in ICU requiring intensive monitoring 9/3/2019 11:00 AM   CAUTI Prevention Bundle StatLock in place w 1" slack;Intact seal between catheter & drainage tubing;Drainage bag off the floor;Green sheeting clip in use;No dependent loops or kinks;Drainage bag not overfilled (<2/3 full);Drainage bag below bladder 9/3/2019 10:00 AM   Output (mL) 195 mL 9/3/2019 12:00 PM       Neurosurgery Physical Exam   General: well developed, well nourished, mild distress secondary to ET tube   Head: normocephalic  GCS: Motor: 5/Verbal: T/Eyes: 1 GCS Total: 7T  Cranial nerves: positive cough, gag, and corneal reflex  Eyes: pupils equal, round, reactive to light with accomodation, EOMI.   Motor Strength: Moves BLE spontaneously. Responds to light noxious stimuli in all 4 extremities. Localizes to pain in upper extremities. No abnormal movements seen.   Clonus: absent  Babinski: present on the left  Skin: Skin is warm, dry and intact.      Significant Labs:  Recent Labs   Lab 09/02/19  2252 09/03/19  1007   * 180*    137   K 3.7 4.4    103   CO2 17* 24   BUN 26* 21   CREATININE 1.45* 1.3   CALCIUM 9.9 9.4   MG  --  1.7     Recent Labs   Lab 09/02/19  2252 09/03/19  1007   WBC 5.62 " 8.31   HGB 13.3 13.5   HCT 41.0 41.8   * 141*     Recent Labs   Lab 09/03/19  0228   LABPT 12.6   INR 1.0   APTT 25.2     Microbiology Results (last 7 days)     ** No results found for the last 168 hours. **            Significant Diagnostics:  EXAMINATION:  MRI BRAIN W WO CONTRAST    CLINICAL HISTORY:  brain mass;    TECHNIQUE:  Sagittal and axial T1, axial T2, axial FLAIR, axial gradient, axial diffusion imaging of the whole brain pre-contrast with postcontrast axial T1 and axial spoiled gradient imaging reformatted in the coronal and sagittal planes.. 8 ml of Gadavist injected intravenously.    COMPARISON:  Outside institution MRI brain earlier today 09/03/2019    FINDINGS:  There is a large region of T2 FLAIR signal hyperintensity within the right frontal lobe extending to the parietal lobe corresponding to signal abnormality seen on outside MRI earlier today.  There is subtle ill-defined enhancement within the underlying frontal subcortical white matter without diffusion restriction.  Please note there is slight hyperintense FLAIR signal in the overlying cortex although this is most prominent within the right superior frontal gyrus with partial sparing of the right middle frontal and inferior frontal cortex.  There is a subtle component of ill-defined susceptibility within the inferior frontal gyrus within the area of signal abnormality concerning for small component of hemorrhage.  Overall configuration for the process remains nonspecific with sequela of inflammatory/infectious process including encephalitis/cerebritis to be considered however primary CNS neoplasm remains high in the differential.  Sequela of ischemia/infarction felt to be much less likely.  There is superimposed patchy and confluent T2 FLAIR signal abnormality elsewhere supratentorial white matter and alonzo concerning for possible chronic ischemic change.  Ventricles are normal without hydrocephalus.    There is a small focus of  susceptibility within the left alonzo concerning for remote microhemorrhage.  Differential including small cavernoma cannot be excluded.    This report was flagged in Epic as abnormal.   Impression:       Continued moderate to large region of edema signal within the right frontal lobe most pronounced in the right parasagittal frontal lobe corresponding to abnormality seen on prior study earlier today.  There is subtle ill-defined enhancement in the frontal subcortical white matter within this region without diffusion restriction.  In addition there is a subtle focus of susceptibility within the inferior frontal gyrus.  Overall remains nonspecific however primary CNS neoplasm to be considered high in the differential inflammatory/infectious process including encephalitis/cerebritis cannot be excluded and overall atypical for ischemia/infarction.    Clinical correlation advised.    Superimposed T2 FLAIR signal abnormality elsewhere supratentorial white matter and alonzo concerning for superimposed chronic ischemic change.    Small focus of susceptibility in the left alonzo suggestive for remote microhemorrhage.         Assessment/Plan:     New onset seizure  73 y/o female with pmhx CKD and HTN presented to outside hospital for seizure- like activity occurring on the left side. Found to have area of ill-defined enhancement on MRI brain w/ and w/out.     - Pt currently intubated and sedated  - MRI brain w/ and w/out shows large area of edema in right frontal lobe and ill defined enhancement in the right frontal lobe concerning for possible cerebritis vs. Neoplasm.       - No emergent neurosurgical intervention required. Continue work up per primary team  - Cerebral edema: Recommend continuing high dose steroids and PPI  - Seizure: Continue work up and AEDs per primary team  - Will continue to monitor pt closely. Please notify NSGY for any changes in neuro exam     Discussed with Dr. Jonathan Christianson,  JEFF  Neurosurgery  [facility]

## 2019-09-03 NOTE — HOSPITAL COURSE
--admitted to [facility] on 9/3 following new onset seizure, arrived intubated  --MRI with large area of R cortical edema with flair and ill defined enhancement on contrast study, concern for glioma vs infectious/inflammatory process  --LP appears non infectious, cytology pending  9/5- no acute events, awaiting LP finalization from pathology report., weaning vent.   09/06/2019: Very agitated overnight, requiring increased sedation. Patient extubated this morning on rounds, and reintubated shortly after. Unable to maintain airway and secretions. CSF gram stain negative.  09/07/2019: NAEON. EKG obtained, seroquel started. Valproic acid started.  09/08/2019: NAEON. Improved agitation with seroquel.  09/09/2019: MRI Brain w/wo ordered for today. BLE US ordered. Will need new LP later on in the week.   09/10/2019 LP for infx workup, CD4 lab, EEG monitoring per Epilepsy   09/11/2019: To IR for LP. Spoke to NSGY about possible bx of lesion. Discontinue cEEG.   9/13/2019 Issues with low HR and BP overnight and this morning, Given Shoaib bump and atropine. 24 hours of IVF and bolus. WBC decreasing and afebrile. Repeat blood cultures negative. Plan for EGD and FEDERICO today.   9/14/2019 NAEO, EGD and FEDERICO moved until Monday. CSF still pending. Increasing WBC but afebrile. Start tube feeding   9/15/2019 NAEO, all CSF studies negative so far. CT negative. Begin insulin gtt. EGD and FEDERICO tomorrow    9/16 No significant events over night. EGD done this afternoon for esophageal stricture. Esophagus stretched to 15. FEDERICO ordered for tomorrow.   9/17 No significant vents over night NPO for FEDERICO today scheduled for 3pm. Off levophed.  9/18 FEDERICO negative. Tube feeds restarted. NSGY following and considering brain biopsy. Propofol stopped. Precedex started.  9/19: NSGY recommending MRI brain W/WO with stealth for possible brain biopsy as other work up has been negative. Patient continues with hypotensive episodes, on Fentanyl drip and recommend to space  out medications.  9/20 no significant events over night. Hypotensive episodes after vimpat dose and seroquel dose requiring a kayla bump, IVF bolus then  Levophed gtt started to maintain map>65. Spontaneous breathing trial this afternoon  9/21 no significant events over night . Discussions with family daily about trach and peg. Trial of spon breathing off all sedation, pt. Agitated, restless not following commands. Family decided to have trach and peg done . gen surg. Consulted for trach and peg next week. Restarted fentanyl gtt for sedation. DCd EEG, not having seizures  9/22 Hypotensive episode last night required 500cc bolus and neobump x3. Levophed gtt started. Fentanyl gtt off till SBP back up. This am remains restless on fentanyl gtt. Will try versed IVP at lower dose.  Currently on levophed. Trach and peg scheduled for Tuesday. Paraneoplastic panel sent. Febrile over night blood and sputum cultures sent. Will readdress biopsy with NSGY next week.  9/23: likely intravascularly dry, fluid bolus again administered and placed on continuous rate, use prn versed 1mg  q2 hrs prn and resume fentanyl gtt when awakens, could not tolerate 2 mg dose  9/30: wean fentanyl, increase gabapentin, KUB, family updated  10/1: advance tube feeds, LTAC ready  10/2: transfer to LTAC  10/3: did not transfer over night due to fentanyl gtt, questionable seizure over night, stop levetiracetam, stop fentanyl gtt, start fent patch, connect real EEG, wean propofol as tolerated  10/4: off propofol, tolerating fent patch,  updated at bedside, no seizure on eeg, remove EEG

## 2019-09-03 NOTE — PLAN OF CARE
09/03/19 1800   Post-Acute Status   Post-Acute Authorization Placement   Post-Acute Placement Status Awaiting Internal Medical Clearance   Discharge Delays None known at this time       Keely Malone RN, CCRN-K, [facility]  Neuro-Critical Care   X [phone]

## 2019-09-04 PROBLEM — R23.9 ALTERATION IN SKIN INTEGRITY: Status: ACTIVE | Noted: 2019-09-04

## 2019-09-04 LAB
ALBUMIN SERPL BCP-MCNC: 3.7 G/DL (ref 3.5–5.2)
ALLENS TEST: ABNORMAL
ALP SERPL-CCNC: 80 U/L (ref 55–135)
ALT SERPL W/O P-5'-P-CCNC: 20 U/L (ref 10–44)
ANION GAP SERPL CALC-SCNC: 12 MMOL/L (ref 8–16)
ASCENDING AORTA: 2.9 CM
AST SERPL-CCNC: 22 U/L (ref 10–40)
AV INDEX (PROSTH): 0.8
AV MEAN GRADIENT: 4 MMHG
AV PEAK GRADIENT: 7 MMHG
AV VALVE AREA: 2.12 CM2
AV VELOCITY RATIO: 0.78
BASOPHILS # BLD AUTO: 0.01 K/UL (ref 0–0.2)
BASOPHILS NFR BLD: 0.1 % (ref 0–1.9)
BILIRUB SERPL-MCNC: 0.6 MG/DL (ref 0.1–1)
BSA FOR ECHO PROCEDURE: 1.76 M2
BUN SERPL-MCNC: 14 MG/DL (ref 8–23)
CALCIUM SERPL-MCNC: 8.9 MG/DL (ref 8.7–10.5)
CHLORIDE SERPL-SCNC: 110 MMOL/L (ref 95–110)
CO2 SERPL-SCNC: 19 MMOL/L (ref 23–29)
CREAT SERPL-MCNC: 1.2 MG/DL (ref 0.5–1.4)
CV ECHO LV RWT: 0.51 CM
DELSYS: ABNORMAL
DIFFERENTIAL METHOD: ABNORMAL
DOP CALC AO PEAK VEL: 1.33 M/S
DOP CALC AO VTI: 25.52 CM
DOP CALC LVOT AREA: 2.7 CM2
DOP CALC LVOT DIAMETER: 1.84 CM
DOP CALC LVOT PEAK VEL: 1.04 M/S
DOP CALC LVOT STROKE VOLUME: 54.19 CM3
DOP CALCLVOT PEAK VEL VTI: 20.39 CM
E WAVE DECELERATION TIME: 276.66 MSEC
E/A RATIO: 0.82
E/E' RATIO: 11.87 M/S
ECHO LV POSTERIOR WALL: 0.71 CM (ref 0.6–1.1)
EOSINOPHIL # BLD AUTO: 0 K/UL (ref 0–0.5)
EOSINOPHIL NFR BLD: 0 % (ref 0–8)
ERYTHROCYTE [DISTWIDTH] IN BLOOD BY AUTOMATED COUNT: 13.2 % (ref 11.5–14.5)
ERYTHROCYTE [SEDIMENTATION RATE] IN BLOOD BY WESTERGREN METHOD: 16 MM/H
EST. GFR  (AFRICAN AMERICAN): 50.7 ML/MIN/1.73 M^2
EST. GFR  (NON AFRICAN AMERICAN): 44 ML/MIN/1.73 M^2
FIO2: 40
FLOW CYTOMETRY ANTIBODIES ANALYZED - CSF: NORMAL
FLOW CYTOMETRY COMMENT - CSF: NORMAL
FLOW CYTOMETRY INTERPRETATION - CSF: NORMAL
FRACTIONAL SHORTENING: 31 % (ref 28–44)
GLUCOSE SERPL-MCNC: 180 MG/DL (ref 70–110)
HCO3 UR-SCNC: 22 MMOL/L (ref 24–28)
HCT VFR BLD AUTO: 38.3 % (ref 37–48.5)
HGB BLD-MCNC: 12.6 G/DL (ref 12–16)
IMM GRANULOCYTES # BLD AUTO: 0.05 K/UL (ref 0–0.04)
IMM GRANULOCYTES NFR BLD AUTO: 0.5 % (ref 0–0.5)
INTERVENTRICULAR SEPTUM: 0.79 CM (ref 0.6–1.1)
LA MAJOR: 4.3 CM
LA MINOR: 4.12 CM
LA WIDTH: 2.9 CM
LEFT ATRIUM SIZE: 3.08 CM
LEFT ATRIUM VOLUME INDEX: 18.5 ML/M2
LEFT ATRIUM VOLUME: 31.95 CM3
LEFT INTERNAL DIMENSION IN SYSTOLE: 1.93 CM (ref 2.1–4)
LEFT VENTRICLE DIASTOLIC VOLUME INDEX: 16.9 ML/M2
LEFT VENTRICLE DIASTOLIC VOLUME: 29.24 ML
LEFT VENTRICLE MASS INDEX: 28 G/M2
LEFT VENTRICLE SYSTOLIC VOLUME INDEX: 6.7 ML/M2
LEFT VENTRICLE SYSTOLIC VOLUME: 11.65 ML
LEFT VENTRICULAR INTERNAL DIMENSION IN DIASTOLE: 2.79 CM (ref 3.5–6)
LEFT VENTRICULAR MASS: 48.22 G
LV LATERAL E/E' RATIO: 11.13 M/S
LV SEPTAL E/E' RATIO: 12.71 M/S
LYMPHOCYTES # BLD AUTO: 0.6 K/UL (ref 1–4.8)
LYMPHOCYTES NFR BLD: 5.5 % (ref 18–48)
MAGNESIUM SERPL-MCNC: 1.9 MG/DL (ref 1.6–2.6)
MCH RBC QN AUTO: 29.1 PG (ref 27–31)
MCHC RBC AUTO-ENTMCNC: 32.9 G/DL (ref 32–36)
MCV RBC AUTO: 89 FL (ref 82–98)
MIN VOL: 9.57
MODE: ABNORMAL
MONOCYTES # BLD AUTO: 0.6 K/UL (ref 0.3–1)
MONOCYTES NFR BLD: 5.9 % (ref 4–15)
MV PEAK A VEL: 1.08 M/S
MV PEAK E VEL: 0.89 M/S
NEUTROPHILS # BLD AUTO: 9.1 K/UL (ref 1.8–7.7)
NEUTROPHILS NFR BLD: 88 % (ref 38–73)
NRBC BLD-RTO: 0 /100 WBC
PCO2 BLDA: 35.6 MMHG (ref 35–45)
PEEP: 5
PH SMN: 7.4 [PH] (ref 7.35–7.45)
PHOSPHATE SERPL-MCNC: 2.7 MG/DL (ref 2.7–4.5)
PIP: 20
PLATELET # BLD AUTO: 153 K/UL (ref 150–350)
PMV BLD AUTO: 8.9 FL (ref 9.2–12.9)
PO2 BLDA: 110 MMHG (ref 80–100)
POC BE: -3 MMOL/L
POC SATURATED O2: 98 % (ref 95–100)
POC TCO2: 23 MMOL/L (ref 23–27)
POCT GLUCOSE: 164 MG/DL (ref 70–110)
POCT GLUCOSE: 171 MG/DL (ref 70–110)
POTASSIUM SERPL-SCNC: 4.8 MMOL/L (ref 3.5–5.1)
PROT SERPL-MCNC: 6.9 G/DL (ref 6–8.4)
RA MAJOR: 3.27 CM
RA WIDTH: 2.16 CM
RBC # BLD AUTO: 4.33 M/UL (ref 4–5.4)
RIGHT VENTRICULAR END-DIASTOLIC DIMENSION: 2.58 CM
SAMPLE: ABNORMAL
SINUS: 2.22 CM
SITE: ABNORMAL
SODIUM SERPL-SCNC: 141 MMOL/L (ref 136–145)
SP02: 100
STJ: 2.5 CM
TDI LATERAL: 0.08 M/S
TDI SEPTAL: 0.07 M/S
TDI: 0.08 M/S
TRICUSPID ANNULAR PLANE SYSTOLIC EXCURSION: 1.81 CM
VT: 400
WBC # BLD AUTO: 10.32 K/UL (ref 3.9–12.7)

## 2019-09-04 PROCEDURE — 99900035 HC TECH TIME PER 15 MIN (STAT)

## 2019-09-04 PROCEDURE — 63600175 PHARM REV CODE 636 W HCPCS: Performed by: STUDENT IN AN ORGANIZED HEALTH CARE EDUCATION/TRAINING PROGRAM

## 2019-09-04 PROCEDURE — 84100 ASSAY OF PHOSPHORUS: CPT

## 2019-09-04 PROCEDURE — 63600175 PHARM REV CODE 636 W HCPCS: Performed by: PSYCHIATRY & NEUROLOGY

## 2019-09-04 PROCEDURE — 82803 BLOOD GASES ANY COMBINATION: CPT

## 2019-09-04 PROCEDURE — 94761 N-INVAS EAR/PLS OXIMETRY MLT: CPT

## 2019-09-04 PROCEDURE — 27200966 HC CLOSED SUCTION SYSTEM

## 2019-09-04 PROCEDURE — 95951 HC EEG MONITORING/VIDEO RECORD: CPT

## 2019-09-04 PROCEDURE — 20000000 HC ICU ROOM

## 2019-09-04 PROCEDURE — 99233 PR SUBSEQUENT HOSPITAL CARE,LEVL III: ICD-10-PCS | Mod: ,,, | Performed by: PSYCHIATRY & NEUROLOGY

## 2019-09-04 PROCEDURE — 94003 VENT MGMT INPAT SUBQ DAY: CPT

## 2019-09-04 PROCEDURE — 25000003 PHARM REV CODE 250: Performed by: PSYCHIATRY & NEUROLOGY

## 2019-09-04 PROCEDURE — 36600 WITHDRAWAL OF ARTERIAL BLOOD: CPT

## 2019-09-04 PROCEDURE — 27000221 HC OXYGEN, UP TO 24 HOURS

## 2019-09-04 PROCEDURE — 85025 COMPLETE CBC W/AUTO DIFF WBC: CPT

## 2019-09-04 PROCEDURE — 80053 COMPREHEN METABOLIC PANEL: CPT

## 2019-09-04 PROCEDURE — 83735 ASSAY OF MAGNESIUM: CPT

## 2019-09-04 PROCEDURE — 99900026 HC AIRWAY MAINTENANCE (STAT)

## 2019-09-04 PROCEDURE — 95951 PR EEG MONITORING/VIDEORECORD: ICD-10-PCS | Mod: 26,,, | Performed by: PSYCHIATRY & NEUROLOGY

## 2019-09-04 PROCEDURE — 25000003 PHARM REV CODE 250: Performed by: STUDENT IN AN ORGANIZED HEALTH CARE EDUCATION/TRAINING PROGRAM

## 2019-09-04 PROCEDURE — 99233 SBSQ HOSP IP/OBS HIGH 50: CPT | Mod: ,,, | Performed by: PSYCHIATRY & NEUROLOGY

## 2019-09-04 PROCEDURE — 36415 COLL VENOUS BLD VENIPUNCTURE: CPT

## 2019-09-04 PROCEDURE — 95951 PR EEG MONITORING/VIDEORECORD: CPT | Mod: 26,,, | Performed by: PSYCHIATRY & NEUROLOGY

## 2019-09-04 RX ORDER — AMOXICILLIN 250 MG
1 CAPSULE ORAL 2 TIMES DAILY
Status: DISCONTINUED | OUTPATIENT
Start: 2019-09-04 | End: 2019-09-20

## 2019-09-04 RX ORDER — PROPOFOL 10 MG/ML
30 INJECTION, EMULSION INTRAVENOUS CONTINUOUS
Status: DISCONTINUED | OUTPATIENT
Start: 2019-09-04 | End: 2019-09-04

## 2019-09-04 RX ORDER — LEVOTHYROXINE SODIUM 75 UG/1
75 TABLET ORAL
Status: DISCONTINUED | OUTPATIENT
Start: 2019-09-05 | End: 2019-10-04 | Stop reason: HOSPADM

## 2019-09-04 RX ORDER — SODIUM,POTASSIUM PHOSPHATES 280-250MG
2 POWDER IN PACKET (EA) ORAL
Status: DISCONTINUED | OUTPATIENT
Start: 2019-09-04 | End: 2019-10-04 | Stop reason: HOSPADM

## 2019-09-04 RX ORDER — GLUCAGON 1 MG
1 KIT INJECTION
Status: DISCONTINUED | OUTPATIENT
Start: 2019-09-04 | End: 2019-09-16

## 2019-09-04 RX ORDER — POTASSIUM CHLORIDE 20 MEQ/15ML
40 SOLUTION ORAL
Status: DISCONTINUED | OUTPATIENT
Start: 2019-09-04 | End: 2019-10-04 | Stop reason: HOSPADM

## 2019-09-04 RX ORDER — POLYETHYLENE GLYCOL 3350 17 G/17G
17 POWDER, FOR SOLUTION ORAL DAILY
Status: DISCONTINUED | OUTPATIENT
Start: 2019-09-05 | End: 2019-09-08

## 2019-09-04 RX ORDER — HEPARIN SODIUM 5000 [USP'U]/ML
5000 INJECTION, SOLUTION INTRAVENOUS; SUBCUTANEOUS EVERY 8 HOURS
Status: DISCONTINUED | OUTPATIENT
Start: 2019-09-04 | End: 2019-09-10

## 2019-09-04 RX ORDER — POTASSIUM CHLORIDE 20 MEQ/15ML
60 SOLUTION ORAL ONCE
Status: DISCONTINUED | OUTPATIENT
Start: 2019-09-04 | End: 2019-09-04

## 2019-09-04 RX ORDER — INSULIN ASPART 100 [IU]/ML
1-10 INJECTION, SOLUTION INTRAVENOUS; SUBCUTANEOUS EVERY 6 HOURS PRN
Status: DISCONTINUED | OUTPATIENT
Start: 2019-09-04 | End: 2019-09-15

## 2019-09-04 RX ORDER — LANOLIN ALCOHOL/MO/W.PET/CERES
800 CREAM (GRAM) TOPICAL
Status: DISCONTINUED | OUTPATIENT
Start: 2019-09-04 | End: 2019-10-04 | Stop reason: HOSPADM

## 2019-09-04 RX ORDER — FENTANYL CITRATE 50 UG/ML
25 INJECTION, SOLUTION INTRAMUSCULAR; INTRAVENOUS ONCE
Status: COMPLETED | OUTPATIENT
Start: 2019-09-04 | End: 2019-09-04

## 2019-09-04 RX ORDER — FENTANYL CITRATE 50 UG/ML
50 INJECTION, SOLUTION INTRAMUSCULAR; INTRAVENOUS
Status: DISCONTINUED | OUTPATIENT
Start: 2019-09-04 | End: 2019-09-05

## 2019-09-04 RX ORDER — CHLORHEXIDINE GLUCONATE ORAL RINSE 1.2 MG/ML
15 SOLUTION DENTAL 2 TIMES DAILY
Status: DISCONTINUED | OUTPATIENT
Start: 2019-09-04 | End: 2019-09-06

## 2019-09-04 RX ORDER — FENTANYL CITRATE 50 UG/ML
25 INJECTION, SOLUTION INTRAMUSCULAR; INTRAVENOUS
Status: DISCONTINUED | OUTPATIENT
Start: 2019-09-04 | End: 2019-09-04

## 2019-09-04 RX ADMIN — DEXAMETHASONE SODIUM PHOSPHATE 4 MG: 4 INJECTION, SOLUTION INTRAMUSCULAR; INTRAVENOUS at 11:09

## 2019-09-04 RX ADMIN — FENTANYL CITRATE 25 MCG: 50 INJECTION INTRAMUSCULAR; INTRAVENOUS at 04:09

## 2019-09-04 RX ADMIN — PROPOFOL 30 MCG/KG/MIN: 10 INJECTION, EMULSION INTRAVENOUS at 04:09

## 2019-09-04 RX ADMIN — POTASSIUM & SODIUM PHOSPHATES POWDER PACK 280-160-250 MG 2 PACKET: 280-160-250 PACK at 04:09

## 2019-09-04 RX ADMIN — FENTANYL CITRATE 25 MCG: 50 INJECTION INTRAMUSCULAR; INTRAVENOUS at 02:09

## 2019-09-04 RX ADMIN — PROPOFOL 30 MCG/KG/MIN: 10 INJECTION, EMULSION INTRAVENOUS at 11:09

## 2019-09-04 RX ADMIN — POTASSIUM & SODIUM PHOSPHATES POWDER PACK 280-160-250 MG 2 PACKET: 280-160-250 PACK at 11:09

## 2019-09-04 RX ADMIN — PROPOFOL 45 MCG/KG/MIN: 10 INJECTION, EMULSION INTRAVENOUS at 06:09

## 2019-09-04 RX ADMIN — FAMOTIDINE 20 MG: 20 TABLET, FILM COATED ORAL at 08:09

## 2019-09-04 RX ADMIN — CHLORHEXIDINE GLUCONATE 0.12% ORAL RINSE 15 ML: 1.2 LIQUID ORAL at 11:09

## 2019-09-04 RX ADMIN — DEXAMETHASONE SODIUM PHOSPHATE 4 MG: 4 INJECTION, SOLUTION INTRAMUSCULAR; INTRAVENOUS at 05:09

## 2019-09-04 RX ADMIN — LEVETIRACETAM 1000 MG: 10 INJECTION INTRAVENOUS at 08:09

## 2019-09-04 RX ADMIN — FENTANYL CITRATE 25 MCG: 50 INJECTION INTRAMUSCULAR; INTRAVENOUS at 05:09

## 2019-09-04 RX ADMIN — HEPARIN SODIUM 5000 UNITS: 5000 INJECTION INTRAVENOUS; SUBCUTANEOUS at 09:09

## 2019-09-04 RX ADMIN — HEPARIN SODIUM 5000 UNITS: 5000 INJECTION INTRAVENOUS; SUBCUTANEOUS at 02:09

## 2019-09-04 RX ADMIN — SENNOSIDES,DOCUSATE SODIUM 1 TABLET: 8.6; 5 TABLET, FILM COATED ORAL at 08:09

## 2019-09-04 RX ADMIN — INSULIN ASPART 2 UNITS: 100 INJECTION, SOLUTION INTRAVENOUS; SUBCUTANEOUS at 11:09

## 2019-09-04 RX ADMIN — FENTANYL CITRATE 50 MCG: 50 INJECTION INTRAMUSCULAR; INTRAVENOUS at 10:09

## 2019-09-04 RX ADMIN — CHLORHEXIDINE GLUCONATE 0.12% ORAL RINSE 15 ML: 1.2 LIQUID ORAL at 08:09

## 2019-09-04 RX ADMIN — SENNOSIDES AND DOCUSATE SODIUM 1 TABLET: 8.6; 5 TABLET ORAL at 09:09

## 2019-09-04 RX ADMIN — POLYETHYLENE GLYCOL 3350 17 G: 17 POWDER, FOR SOLUTION ORAL at 08:09

## 2019-09-04 RX ADMIN — DEXAMETHASONE SODIUM PHOSPHATE 4 MG: 4 INJECTION, SOLUTION INTRAMUSCULAR; INTRAVENOUS at 06:09

## 2019-09-04 RX ADMIN — FENTANYL CITRATE 50 MCG: 50 INJECTION INTRAMUSCULAR; INTRAVENOUS at 08:09

## 2019-09-04 RX ADMIN — INSULIN ASPART 2 UNITS: 100 INJECTION, SOLUTION INTRAVENOUS; SUBCUTANEOUS at 05:09

## 2019-09-04 NOTE — H&P
[facility]  Neurocritical Care  Progress Note    Admit Date: 9/3/2019  Service Date: 09/03/2019  Length of Stay: 0    Subjective:     Chief Complaint: Status epilepticus    History of Present Illness: Pt is  73 yo female with a PMHx of CKD 3, HTN, was transferred from OSH to [facility] for new onset seizures and concern for right front lobe mass. The pt was found in her bedroom by her spouse at approximately 9:45 pm sitting her head against the bed, unresponsive, and having seizure like activity on the left-side. EMT was called and the pt was given Versed twice while en route to the hospital. Pt had a second witnessed seizures, left facial droop, left-sided weakness, and tongue ecchymosis in the ED. Neurology was consulted and The pt was given IV Keppra and Vimpat. MRI brain without contrast was acquired. The image showed right frontal lobe vasogenic edema with mass effect concerning for underlying mass. EEG was acquired at outside hospital concerning showing an abnormal result. The pt was given decadron IV and neurosurgical consult recommended. Pt transferred to [facility] and admitted to the [facility] for higher level of care and further evaluation.     Pt history acquired per chart review and from spouse present at the bedside. The pt's spouse denies prior history of seizures CVA, cancer history and up-to-date screenings    Hospital Course: 09/03/2019 admitted to hospital      Interval History:  Admitted to [facility], Stat MRI with contrast to further workout brain mass, EEG monitoring, Steroids, Keppra 1g BID     Review of Systems   Unable to perform ROS: Intubated     Objective:     Vitals:  Temp: 98.3 °F (36.8 °C)  Pulse: 92  Rhythm: normal sinus rhythm  BP: (!) 150/70  MAP (mmHg): 110  Resp: 18  SpO2: 97 %  Oxygen Concentration (%): 40  O2 Device (Oxygen Therapy): ventilator  Vent Mode: A/C  Set Rate: 16 bmp  Vt Set: 400 mL  Pressure Support: 0 cmH20  PEEP/CPAP: 5 cmH20  Peak Airway Pressure: 29 cmH2O  Mean Airway  Pressure: 9.6 cmH20  Plateau Pressure: 0 cmH20    Temp  Min: 98 °F (36.7 °C)  Max: 98.4 °F (36.9 °C)  Pulse  Min: 77  Max: 110  BP  Min: 111/78  Max: 177/81  MAP (mmHg)  Min: 79  Max: 116  Resp  Min: 16  Max: 29  SpO2  Min: 96 %  Max: 100 %  Oxygen Concentration (%)  Min: 40  Max: 60    No intake/output data recorded.           Physical Exam     Constitutional: She appears well-developed. She is intubated.   HENT:   Head: Normocephalic.   Cardiovascular: Normal rate and regular rhythm.   Pulmonary/Chest: She is intubated.   Abdominal: Soft. Normal appearance and bowel sounds are normal.   Neurological:   Pt lethargic, intubated, does not follow commands   E2V(T1)M4 GCS (6)   Pupils 2 mm pinpoint and reactive  Downward gaze bilaterally of eyes   Cough, gag, and corneals intact   Left-sided weakness   Seizures   BUE - withdraws and moves extremities spontaneously, RUE > LUE   BLE - withdraws and moves extremities to noxious stimuli     Medications:  Continuous  sodium chloride 0.9% Last Rate: 75 mL/hr at 09/03/19 2009   propofol Last Rate: 45 mcg/kg/min (09/03/19 2009)   Scheduled  dexamethasone 4 mg Q6H   famotidine 20 mg Daily   levetiracetam IVPB 1,000 mg Q12H   polyethylene glycol 17 g Daily   propofol     propofol     senna-docusate 8.6-50 mg 1 tablet BID   PRN  sodium chloride 0.9% 10 mL PRN     Today I personally reviewed pertinent medications, lines/drains/airways, imaging, cardiology results, laboratory results, microbiology results, notably:    Diet  No diet orders on file  No diet orders on file        Assessment/Plan:     Neuro  * Status epilepticus  Pt is 75 yo female found in bedroom by  last night and two witnessed seizures with concern for new onset brain mass on MRI was admitted to [facility] for further mgmt and workup.     -admit to [facility]  -neurosurgery consulted  -Epilepsy consulted  -neuro checks q1hr   -vital checks q1hr   -SBP <180, MAP >65   -AEDS, on Keppra 1 g BID, wean Propofol    -steroids  -SubQ heparin started  -EEG monitoring   -EKG, ECHO pending   -Labs pending CBC, CMP, TSH, HgA1c pending   -MRI -no acute diffusion or ischemia, there was some underlying mass effect or infiltrative process.   -repeat imaging MRI with contrast - small focus in the left alonzo suggestive for remote microhemorrhage, large region of edema signal within th right frontal lobe with pronounced right parasagittal frontal lobe.   -Bowel regimen   -on Famotidine  -PT/OT when appropriate      Cerebral edema  2/2 to brain mass   Steroids   NSGY following   Repeat imaging     New onset seizure  2/2 to brain mass   Steroids   See VENTURA   EEG monitoring   Epilepsy following     Cardiac/Vascular  Essential hypertension  SBP <180, MAP >65       Renal/  Chronic kidney disease, stage III (moderate)  -PMHx of CKD 3   -monitor renal function, I/Os   -UA pending   -monitor electrolytes           The patient is being Prophylaxed for:  Venous Thromboembolism with: Mechanical  Stress Ulcer with: H2B  Ventilator Pneumonia with: chlorhexidine oral care    Activity Orders          None        Full Code  I have spent 35 min with this patient, with over 50% of this time spent coordinating care and speaking with the family    Critical secondary to Patient has a condition that poses threat to life and bodily function:      Critical care was time spent personally by me on the following activities: development of treatment plan with patient or surrogate and bedside caregivers, discussions with consultants, evaluation of patient's response to treatment, examination of patient, ordering and performing treatments and interventions, ordering and review of laboratory studies, ordering and review of radiographic studies, pulse oximetry, re-evaluation of patient's condition. This critical care time did not overlap with that of any other provider or involve time for any procedures.        Armand Rm PA-C  Neurocritical Care  [facility]

## 2019-09-04 NOTE — SUBJECTIVE & OBJECTIVE
Interval History: on cEEG this am.    Medications:  Continuous Infusions:   propofol 30 mcg/kg/min (09/04/19 1124)     Scheduled Meds:   chlorhexidine  15 mL Mouth/Throat BID    dexamethasone  4 mg Intravenous Q6H    famotidine  20 mg Per OG tube Daily    heparin (porcine)  5,000 Units Subcutaneous Q8H    levetiracetam IVPB  1,000 mg Intravenous Q12H    [START ON 9/5/2019] levothyroxine  75 mcg Per OG tube Before breakfast    [START ON 9/5/2019] polyethylene glycol  17 g Per NG tube Daily    potassium chloride 10%  60 mEq Per NG tube Once    senna-docusate 8.6-50 mg  1 tablet Per OG tube BID     PRN Meds:Dextrose 10% Bolus, glucagon (human recombinant), insulin aspart U-100, magnesium oxide, magnesium oxide, potassium chloride 10%, potassium chloride 10%, potassium, sodium phosphates, potassium, sodium phosphates, potassium, sodium phosphates, sodium chloride 0.9%     Review of Systems  Objective:     Weight: 69.9 kg (154 lb)  Body mass index is 27.28 kg/m².  Vital Signs (Most Recent):  Temp: 97.8 °F (36.6 °C) (09/04/19 1105)  Pulse: 86 (09/04/19 1136)  Resp: 16 (09/04/19 1136)  BP: (!) 143/66 (09/04/19 1136)  SpO2: 100 % (09/04/19 1136) Vital Signs (24h Range):  Temp:  [97.8 °F (36.6 °C)-98.8 °F (37.1 °C)] 97.8 °F (36.6 °C)  Pulse:  [] 86  Resp:  [16-31] 16  SpO2:  [96 %-100 %] 100 %  BP: (103-173)/(52-83) 143/66     Date 09/04/19 0700 - 09/05/19 0659   Shift 8135-9456 5640-0928 4214-5146 24 Hour Total   INTAKE   I.V.(mL/kg) 659.9(9.4)   659.9(9.4)   NG/   240   IV Piggyback 100   100   Shift Total(mL/kg) 999.9(14.3)   999.9(14.3)   OUTPUT   Urine(mL/kg/hr) 385   385   Shift Total(mL/kg) 385(5.5)   385(5.5)   Weight (kg) 69.9 69.9 69.9 69.9              Vent Mode: A/C  Oxygen Concentration (%):  [40-60] 40  Resp Rate Total:  [16 br/min-24 br/min] 16 br/min  Vt Set:  [400 mL] 400 mL  PEEP/CPAP:  [5 cmH20] 5 cmH20  Pressure Support:  [0 cmH20] 0 cmH20  Mean Airway Pressure:  [8.3 cmH20-10 cmH20]  "8.8 cmH20         NG/OG Tube 09/03/19 1100 orogastric Center mouth (Active)   Placement Check placement verified by aspirate characteristics;placement verified by distal tube length measurement 9/4/2019  7:05 AM   Tolerance no signs/symptoms of discomfort 9/4/2019  7:05 AM   Securement secured to commercial device 9/4/2019  7:05 AM   Clamp Status/Tolerance clamped 9/4/2019  7:05 AM   Suction Setting/Drainage Method suction at the bedside 9/4/2019  3:05 AM   Insertion Site Appearance no redness, warmth, tenderness, skin breakdown, drainage 9/4/2019  7:05 AM   Flush/Irrigation flushed w/;water;no resistance met 9/4/2019  8:31 AM   Water Bolus (mL) 240 mL 9/4/2019  8:31 AM            Urethral Catheter 09/03/19 1000 Latex 16 Fr. (Active)   Site Assessment Clean;Intact 9/4/2019  7:05 AM   Collection Container Urimeter 9/4/2019  7:05 AM   Securement Method secured to top of thigh w/ adhesive device 9/4/2019  7:05 AM   Catheter Care Performed yes 9/4/2019  7:05 AM   Reason for Continuing Urinary Catheterization Critically ill in ICU requiring intensive monitoring 9/4/2019  7:05 AM   CAUTI Prevention Bundle StatLock in place w 1" slack;Intact seal between catheter & drainage tubing;Drainage bag off the floor;Green sheeting clip in use 9/4/2019  7:05 AM   Output (mL) 100 mL 9/4/2019 11:05 AM       Neurosurgery Physical Exam   E1VTM5, grimacing to pain.  PERRL  Localizing on right, WD on left    Significant Labs:  Recent Labs   Lab 09/02/19 2252 09/03/19 1007 09/04/19  0631   * 180* 180*    137 141   K 3.7 4.4 4.8    103 110   CO2 17* 24 19*   BUN 26* 21 14   CREATININE 1.45* 1.3 1.2   CALCIUM 9.9 9.4 8.9   MG  --  1.7 1.9     Recent Labs   Lab 09/02/19 2252 09/03/19  1007 09/04/19  0813   WBC 5.62 8.31 10.32   HGB 13.3 13.5 12.6   HCT 41.0 41.8 38.3   * 141* 153     Recent Labs   Lab 09/03/19  0228   LABPT 12.6   INR 1.0   APTT 25.2     Microbiology Results (last 7 days)     Procedure Component " Value Units Date/Time    Gram stain [[id number]] Collected:  09/03/19 1843    Order Status:  Completed Specimen:  CSF (Spinal Fluid) from CSF Tap, Tube 2 Updated:  09/03/19 2153     Gram Stain Result No WBC's      No organisms seen            Significant Diagnostics:

## 2019-09-04 NOTE — SUBJECTIVE & OBJECTIVE
Interval History:    F/u LP, Wean propofol  to 30   F/u ECHO read   d/c IVF   SSI started  / glucose checks q6hr   Nutrition consult   TF started  d/c cunningham   SUbqH started     Review of Systems   Unable to perform ROS: Acuity of condition     Objective:     Vitals:  Temp: 97.8 °F (36.6 °C)  Pulse: 100  Rhythm: normal sinus rhythm  BP: (!) 158/74  MAP (mmHg): 107  Resp: (!) 29  SpO2: 100 %  Oxygen Concentration (%): 40  O2 Device (Oxygen Therapy): ventilator  Vent Mode: A/C  Set Rate: 16 bmp  Vt Set: 400 mL  Pressure Support: 0 cmH20  PEEP/CPAP: 5 cmH20  Peak Airway Pressure: 24 cmH2O  Mean Airway Pressure: 8.8 cmH20  Plateau Pressure: 0 cmH20    Temp  Min: 97.8 °F (36.6 °C)  Max: 98.8 °F (37.1 °C)  Pulse  Min: 72  Max: 109  BP  Min: 103/52  Max: 173/75  MAP (mmHg)  Min: 74  Max: 110  Resp  Min: 16  Max: 31  SpO2  Min: 96 %  Max: 100 %  Oxygen Concentration (%)  Min: 40  Max: 60    09/03 0701 - 09/04 0700  In: 1294.1 [I.V.:1094.1]  Out: 1955 [Urine:1957]   Unmeasured Output  Stool Occurrence: 0       Physical Exam   Constitutional: She appears well-developed and well-nourished. She is intubated.   HENT:   Head: Normocephalic.   Cardiovascular: Normal rate and regular rhythm.   Pulmonary/Chest: She is intubated.   Psychiatric:   Unable to assess    Abdominal: Soft. Normal appearance and bowel sounds are normal.   Neurological:   Pt lethargic, intubated, does not follow commands   E2V(T1)M4 GCS (6)   Pupils 2 mm pinpoint and reactive  Downward gaze bilaterally of eyes   Cough, gag, and corneals intact   Left-sided weakness   Seizures   BUE - withdraws and moves extremities spontaneously, RUE > LUE   BLE - withdraws and moves extremities to noxious stimuli     Medications:  Continuous  propofol Last Rate: 30 mcg/kg/min (09/04/19 1305)   Scheduled  chlorhexidine 15 mL BID   dexamethasone 4 mg Q6H   famotidine 20 mg Daily   heparin (porcine) 5,000 Units Q8H   levetiracetam IVPB 1,000 mg Q12H   [START ON 9/5/2019]  levothyroxine 75 mcg Before breakfast   [START ON 9/5/2019] polyethylene glycol 17 g Daily   potassium chloride 10% 60 mEq Once   senna-docusate 8.6-50 mg 1 tablet BID   PRN  Dextrose 10% Bolus 12.5 g PRN   glucagon (human recombinant) 1 mg PRN   insulin aspart U-100 1-10 Units Q6H PRN   magnesium oxide 800 mg PRN   magnesium oxide 800 mg PRN   potassium chloride 10% 40 mEq PRN   potassium chloride 10% 40 mEq PRN   potassium, sodium phosphates 2 packet PRN   potassium, sodium phosphates 2 packet PRN   potassium, sodium phosphates 2 packet PRN   sodium chloride 0.9% 10 mL PRN     Today I personally reviewed pertinent medications, lines/drains/airways, imaging, cardiology results, laboratory results, microbiology results, notably:    Diet  No diet orders on file  No diet orders on file

## 2019-09-04 NOTE — CARE UPDATE
Withdrew ETT tube 1cm as ordered by MD.  Tube placed at 20cm @ teeth.  Pt tolerated well.  Will continue to monitor.

## 2019-09-04 NOTE — SUBJECTIVE & OBJECTIVE
Interval History:  Admitted to [facility], Stat MRI with contrast to further workout brain mass, EEG monitoring, Steroids, Keppra 1g BID     Review of Systems   Unable to perform ROS: Intubated     Objective:     Vitals:  Temp: 98.3 °F (36.8 °C)  Pulse: 92  Rhythm: normal sinus rhythm  BP: (!) 150/70  MAP (mmHg): 110  Resp: 18  SpO2: 97 %  Oxygen Concentration (%): 40  O2 Device (Oxygen Therapy): ventilator  Vent Mode: A/C  Set Rate: 16 bmp  Vt Set: 400 mL  Pressure Support: 0 cmH20  PEEP/CPAP: 5 cmH20  Peak Airway Pressure: 29 cmH2O  Mean Airway Pressure: 9.6 cmH20  Plateau Pressure: 0 cmH20    Temp  Min: 98 °F (36.7 °C)  Max: 98.4 °F (36.9 °C)  Pulse  Min: 77  Max: 110  BP  Min: 111/78  Max: 177/81  MAP (mmHg)  Min: 79  Max: 116  Resp  Min: 16  Max: 29  SpO2  Min: 96 %  Max: 100 %  Oxygen Concentration (%)  Min: 40  Max: 60    No intake/output data recorded.           Physical Exam     Constitutional: She appears well-developed. She is intubated.   HENT:   Head: Normocephalic.   Cardiovascular: Normal rate and regular rhythm.   Pulmonary/Chest: She is intubated.   Abdominal: Soft. Normal appearance and bowel sounds are normal.   Neurological:   Pt lethargic, intubated, does not follow commands   E2V(T1)M4 GCS (6)   Pupils 2 mm pinpoint and reactive  Downward gaze bilaterally of eyes   Cough, gag, and corneals intact   Left-sided weakness   Seizures   BUE - withdraws and moves extremities spontaneously, RUE > LUE   BLE - withdraws and moves extremities to noxious stimuli     Medications:  Continuous  sodium chloride 0.9% Last Rate: 75 mL/hr at 09/03/19 2009   propofol Last Rate: 45 mcg/kg/min (09/03/19 2009)   Scheduled  dexamethasone 4 mg Q6H   famotidine 20 mg Daily   levetiracetam IVPB 1,000 mg Q12H   polyethylene glycol 17 g Daily   propofol     propofol     senna-docusate 8.6-50 mg 1 tablet BID   PRN  sodium chloride 0.9% 10 mL PRN     Today I personally reviewed pertinent medications, lines/drains/airways, imaging,  cardiology results, laboratory results, microbiology results, notably:    Diet  No diet orders on file  No diet orders on file

## 2019-09-04 NOTE — ASSESSMENT & PLAN NOTE
-PMHx of CKD 3   -monitor renal function, I/Os   -GFR 44, Creatinine 1.2   -UA pending   -monitor electrolytes

## 2019-09-04 NOTE — CARE UPDATE
Withdrew ETT tube 4cm to 21cm @ teeth as ordered by MD.  BS are equal bilaterally.  Pt tolerated procedure well.  Chest x-ray has been ordered to confirm placement.

## 2019-09-04 NOTE — PLAN OF CARE
Problem: Adult Inpatient Plan of Care  Goal: Plan of Care Review  Outcome: Ongoing (interventions implemented as appropriate)  POC reviewed with pt and family at 1400. Pt. Intubated and sedated and is unable to verbalize understanding. Questions and concerns addressed with family. Propofol drip decreased and PRN Fentanyl given per orders OGT feedings started and patient tolerating well. Bazzi catheter DC'd and patient voided x 1. No acute events today. Pt progressing toward goals. Will continue to monitor. See flowsheets for full assessment and VS info.

## 2019-09-04 NOTE — NURSING
ETT pulled back by respiratory 1 cm per BRIANNA Dodson.  Current Tube placement at 20 at lips.  Pt tolerated well. Will continue to monitor.

## 2019-09-04 NOTE — NURSING
Notified BRIANNA Dodson of radiology results of cxr pt ETT in Right main bronchus.  Orders to call respiratory and pull tube out 4 cm and get repeat cxr per BRIANNA Dodson.        Current tube placement after withdrawing tube is 21 at lips.  Pt tolerated well. Will continue to monitor.

## 2019-09-04 NOTE — ASSESSMENT & PLAN NOTE
Pt is 75 yo female found in bedroom by  last night and two witnessed seizures with concern for new onset brain mass on MRI was admitted to [facility] for further mgmt and workup.     -admit to [facility]  -neurosurgery following  -Epilepsy following   -neuro checks q1hr   -vital checks q1hr   -SBP <180, MAP >65   -AEDS, on Keppra 1 g BID  -- wean Propofol @ 30    -on steroids q6hrs   -on SubQ heparin started  -EEG monitoring   -ECHO - 65-70%, no wall motion abnormalities, nml LV diastolic function  -MRI -no acute diffusion or ischemia, there was some underlying mass effect or infiltrative process.   -repeat imaging MRI with contrast - small focus in the left alonzo suggestive for remote microhemorrhage, large region of edema signal within th right frontal lobe with pronounced right parasagittal frontal lobe.   -Bowel regimen   -on Famotidine  -f/u LP results pending   -PT/OT when appropriate

## 2019-09-04 NOTE — PROGRESS NOTES
Consulted for tongue lesions on admit  75 yo female with a PMHx of CKD 3, HTN, was transferred from OSH to [facility] for new onset seizures and concern for right front lobe mass. The pt was found in her bedroom by her spouse at approximately 9:45 pm sitting her head against the bed, unresponsive, and having seizure like activity on the left-side. EMT was called and the pt was given Versed twice while en route to the hospital. Pt had a second witnessed seizures, left facial droop, left-sided weakness, and tongue ecchymosis in the ED.   Noted consult for open lesions to tongue and right lower lip. History suggests this trauma occurred during her seizure activity.  Suggest applying Sween moisturizer to lips. Unable to offer anything to the tongue . Only able to see a small portion of the tip of her left tongue as her mouth is clenched shut.   Right lower lip 0.4x0.8x0.1cm    tongue    Discussed recommendations and inability to fully assess the tongue with Armand Rm PA-C     09/04/19 1600        Wound 09/04/19 1612 Other (comment) lower Lip   Date First Assessed/Time First Assessed: 09/04/19 1612   Pre-existing: Yes  Primary Wound Type: (c) Other (comment)  Side: Right  Orientation: lower  Location: (c) Lip   Wound Image     Wound WDL ex   Dressing Appearance Open to air;No dressing   Drainage Amount None   Drainage Characteristics/Odor No odor   Appearance Red;Moist   Periwound Area Intact   Wound Edges Open   Wound Length (cm) 0.4 cm   Wound Width (cm) 0.8 cm   Wound Depth (cm) 0.1 cm   Wound Volume (cm^3) 0.03 cm^3   Wound Surface Area (cm^2) 0.32 cm^2   Care Cleansed with:;Sterile normal saline;Applied:;Moisturizing agent  (Sween (pink top))     Abelino Tejada RN CWON  [id number]

## 2019-09-04 NOTE — ASSESSMENT & PLAN NOTE
2/2 to brain mass   Steroids   NSGY following   MRI w/wo Contrast ill-defined enhancement in the frontal subcortical white matter within this region without diffusion restriction,  R frontol lobe extending to the parietal lobe. There is a slight hyperintense FLAIR signal in the overlying cortex within the right superior frontal gyrus with partial sparing of the R middle frontal and inferior frontal cortex.There is a small focus of susceptibility within the left alonzo concerning for remote microhemorrhage.

## 2019-09-04 NOTE — PROGRESS NOTES
[facility]  Neurocritical Care  Progress Note    Admit Date: 9/3/2019  Service Date: 09/04/2019  Length of Stay: 1    Subjective:     Chief Complaint: Status epilepticus    History of Present Illness: Pt is  75 yo female with a PMHx of CKD 3, HTN, was transferred from OSH to [facility] for new onset seizures and concern for right front lobe mass. The pt was found in her bedroom by her spouse at approximately 9:45 pm sitting her head against the bed, unresponsive, and having seizure like activity on the left-side. EMT was called and the pt was given Versed twice while en route to the hospital. Pt had a second witnessed seizures, left facial droop, left-sided weakness, and tongue ecchymosis in the ED. Neurology was consulted and The pt was given IV Keppra and Vimpat. MRI brain without contrast was acquired. The image showed right frontal lobe vasogenic edema with mass effect concerning for underlying mass. EEG was acquired at outside hospital concerning showing an abnormal result. The pt was given decadron IV and neurosurgical consult recommended. Pt transferred to [facility] and admitted to the [facility] for higher level of care and further evaluation.     Pt history acquired per chart review and from spouse present at the bedside. The pt's spouse denies prior history of seizures CVA, cancer history and up-to-date screenings    Hospital Course: 09/03/2019 admitted to hospital  09/04/2019 F/u LP cytology and Infx results, f/u Epilepsy read from EEG, subQH, trickle feeds           Interval History:    F/u LP, Wean propofol  to 30   F/u ECHO read   d/c IVF   SSI started  / glucose checks q6hr   Nutrition consult   TF started  d/c cunningham   SUbqH started     Review of Systems   Unable to perform ROS: Acuity of condition     Objective:     Vitals:  Temp: 97.8 °F (36.6 °C)  Pulse: 100  Rhythm: normal sinus rhythm  BP: (!) 158/74  MAP (mmHg): 107  Resp: (!) 29  SpO2: 100 %  Oxygen Concentration (%): 40  O2 Device (Oxygen  Therapy): ventilator  Vent Mode: A/C  Set Rate: 16 bmp  Vt Set: 400 mL  Pressure Support: 0 cmH20  PEEP/CPAP: 5 cmH20  Peak Airway Pressure: 24 cmH2O  Mean Airway Pressure: 8.8 cmH20  Plateau Pressure: 0 cmH20    Temp  Min: 97.8 °F (36.6 °C)  Max: 98.8 °F (37.1 °C)  Pulse  Min: 72  Max: 109  BP  Min: 103/52  Max: 173/75  MAP (mmHg)  Min: 74  Max: 110  Resp  Min: 16  Max: 31  SpO2  Min: 96 %  Max: 100 %  Oxygen Concentration (%)  Min: 40  Max: 60    09/03 0701 - 09/04 0700  In: 1294.1 [I.V.:1094.1]  Out: 1955 [Urine:1955]   Unmeasured Output  Stool Occurrence: 0       Physical Exam   Constitutional: She appears well-developed and well-nourished. She is intubated.   HENT:   Head: Normocephalic.   Cardiovascular: Normal rate and regular rhythm.   Pulmonary/Chest: She is intubated.   Psychiatric:   Unable to assess    Abdominal: Soft. Normal appearance and bowel sounds are normal.   Neurological:   Pt lethargic, intubated, does not follow commands   E2V(T1)M4 GCS (6)   Pupils 2 mm pinpoint and reactive  Downward gaze bilaterally of eyes   Cough, gag, and corneals intact   Left-sided weakness   Seizures   BUE - withdraws and moves extremities spontaneously, RUE > LUE   BLE - withdraws and moves extremities to noxious stimuli     Medications:  Continuous  propofol Last Rate: 30 mcg/kg/min (09/04/19 1305)   Scheduled  chlorhexidine 15 mL BID   dexamethasone 4 mg Q6H   famotidine 20 mg Daily   heparin (porcine) 5,000 Units Q8H   levetiracetam IVPB 1,000 mg Q12H   [START ON 9/5/2019] levothyroxine 75 mcg Before breakfast   [START ON 9/5/2019] polyethylene glycol 17 g Daily   potassium chloride 10% 60 mEq Once   senna-docusate 8.6-50 mg 1 tablet BID   PRN  Dextrose 10% Bolus 12.5 g PRN   glucagon (human recombinant) 1 mg PRN   insulin aspart U-100 1-10 Units Q6H PRN   magnesium oxide 800 mg PRN   magnesium oxide 800 mg PRN   potassium chloride 10% 40 mEq PRN   potassium chloride 10% 40 mEq PRN   potassium, sodium phosphates 2  packet PRN   potassium, sodium phosphates 2 packet PRN   potassium, sodium phosphates 2 packet PRN   sodium chloride 0.9% 10 mL PRN     Today I personally reviewed pertinent medications, lines/drains/airways, imaging, cardiology results, laboratory results, microbiology results, notably:    Diet  No diet orders on file  No diet orders on file        Assessment/Plan:     Neuro  * Status epilepticus  Pt is 73 yo female found in bedroom by  last night and two witnessed seizures with concern for new onset brain mass on MRI was admitted to [facility] for further mgmt and workup.     -admit to [facility]  -neurosurgery following  -Epilepsy following   -neuro checks q1hr   -vital checks q1hr   -SBP <180, MAP >65   -AEDS, on Keppra 1 g BID  -- wean Propofol @ 30    -on steroids q6hrs   -on SubQ heparin started  -EEG monitoring   -ECHO - 65-70%, no wall motion abnormalities, nml LV diastolic function  -MRI -no acute diffusion or ischemia, there was some underlying mass effect or infiltrative process.   -repeat imaging MRI with contrast - small focus in the left alonzo suggestive for remote microhemorrhage, large region of edema signal within th right frontal lobe with pronounced right parasagittal frontal lobe.   -Bowel regimen   -on Famotidine  -f/u LP results pending   -PT/OT when appropriate      Cerebral edema  2/2 to brain mass   Steroids   NSGY following   MRI w/wo Contrast ill-defined enhancement in the frontal subcortical white matter within this region without diffusion restriction,  R frontol lobe extending to the parietal lobe. There is a slight hyperintense FLAIR signal in the overlying cortex within the right superior frontal gyrus with partial sparing of the R middle frontal and inferior frontal cortex.There is a small focus of susceptibility within the left alonzo concerning for remote microhemorrhage.    New onset seizure  2/2 to brain mass   Steroids - Dex 4 mg q6hrs   See VENTURA   On Keppra 1000 BID   Wean propofol at 30    EEG monitoring   Epilepsy following   NSGY following     Cardiac/Vascular  Essential hypertension  SBP <180, MAP >65       Renal/  Chronic kidney disease, stage III (moderate)  -PMHx of CKD 3   -monitor renal function, I/Os   -GFR 44, Creatinine 1.2   -UA pending   -monitor electrolytes           The patient is being Prophylaxed for:  Venous Thromboembolism with: Mechanical or Chemical  Stress Ulcer with: H2B  Ventilator Pneumonia with: chlorhexidine oral care    Activity Orders          None        Full Code   I have spent 35 min with this patient, with over 50% of this time spent coordinating care and speaking with the family    Critical secondary to Patient has a condition that poses threat to life and bodily function:      Critical care was time spent personally by me on the following activities: development of treatment plan with patient or surrogate and bedside caregivers, discussions with consultants, evaluation of patient's response to treatment, examination of patient, ordering and performing treatments and interventions, ordering and review of laboratory studies, ordering and review of radiographic studies, pulse oximetry, re-evaluation of patient's condition. This critical care time did not overlap with that of any other provider or involve time for any procedures.      Armand Rm PA-C  Neurocritical Care  [facility]

## 2019-09-04 NOTE — PROGRESS NOTES
[facility]  Neurosurgery  Progress Note    Subjective:     History of Present Illness: 73 y/o female with pmhx CKD and HTN  presented to outside hospital for seizure- like activity this AM. Per  pt was found unconscious this AM, with seizure like activity occurring on the left side. Pt was given versed x 2 via EMS. CTH performed at outside hospital was negative for bleed. Pt had additional seizure in outside hospital ED and was given IV Keppra. EEG ordered and MRI brain w/out contrast concerning for possible infiltratrive process vs post ischemic sequela. [place] [facility] [place] [facility]. MRI brain w/ and w/out obtained that showed large area of edema in right frontal lobe and ill defined enhancement in the frontal lobe concerning for possible cerebritis vs. Neoplasm. NSGY was consulted to evaluate. Pt not on anti-coagulation.       Post-Op Info:  * No surgery found *         Interval History: on cEEG this am.    Medications:  Continuous Infusions:   propofol 30 mcg/kg/min (09/04/19 1124)     Scheduled Meds:   chlorhexidine  15 mL Mouth/Throat BID    dexamethasone  4 mg Intravenous Q6H    famotidine  20 mg Per OG tube Daily    heparin (porcine)  5,000 Units Subcutaneous Q8H    levetiracetam IVPB  1,000 mg Intravenous Q12H    [START ON 9/5/2019] levothyroxine  75 mcg Per OG tube Before breakfast    [START ON 9/5/2019] polyethylene glycol  17 g Per NG tube Daily    potassium chloride 10%  60 mEq Per NG tube Once    senna-docusate 8.6-50 mg  1 tablet Per OG tube BID     PRN Meds:Dextrose 10% Bolus, glucagon (human recombinant), insulin aspart U-100, magnesium oxide, magnesium oxide, potassium chloride 10%, potassium chloride 10%, potassium, sodium phosphates, potassium, sodium phosphates, potassium, sodium phosphates, sodium chloride 0.9%     Review of Systems  Objective:     Weight: 69.9 kg (154 lb)  Body mass index is 27.28 kg/m².  Vital Signs (Most Recent):  Temp: 97.8  °F (36.6 °C) (09/04/19 1105)  Pulse: 86 (09/04/19 1136)  Resp: 16 (09/04/19 1136)  BP: (!) 143/66 (09/04/19 1136)  SpO2: 100 % (09/04/19 1136) Vital Signs (24h Range):  Temp:  [97.8 °F (36.6 °C)-98.8 °F (37.1 °C)] 97.8 °F (36.6 °C)  Pulse:  [] 86  Resp:  [16-31] 16  SpO2:  [96 %-100 %] 100 %  BP: (103-173)/(52-83) 143/66     Date 09/04/19 0700 - 09/05/19 0659   Shift 0407-0493 0813-6340 7347-4842 24 Hour Total   INTAKE   I.V.(mL/kg) 659.9(9.4)   659.9(9.4)   NG/   240   IV Piggyback 100   100   Shift Total(mL/kg) 999.9(14.3)   999.9(14.3)   OUTPUT   Urine(mL/kg/hr) 385   385   Shift Total(mL/kg) 385(5.5)   385(5.5)   Weight (kg) 69.9 69.9 69.9 69.9              Vent Mode: A/C  Oxygen Concentration (%):  [40-60] 40  Resp Rate Total:  [16 br/min-24 br/min] 16 br/min  Vt Set:  [400 mL] 400 mL  PEEP/CPAP:  [5 cmH20] 5 cmH20  Pressure Support:  [0 cmH20] 0 cmH20  Mean Airway Pressure:  [8.3 cmH20-10 cmH20] 8.8 cmH20         NG/OG Tube 09/03/19 1100 orogastric Center mouth (Active)   Placement Check placement verified by aspirate characteristics;placement verified by distal tube length measurement 9/4/2019  7:05 AM   Tolerance no signs/symptoms of discomfort 9/4/2019  7:05 AM   Securement secured to commercial device 9/4/2019  7:05 AM   Clamp Status/Tolerance clamped 9/4/2019  7:05 AM   Suction Setting/Drainage Method suction at the bedside 9/4/2019  3:05 AM   Insertion Site Appearance no redness, warmth, tenderness, skin breakdown, drainage 9/4/2019  7:05 AM   Flush/Irrigation flushed w/;water;no resistance met 9/4/2019  8:31 AM   Water Bolus (mL) 240 mL 9/4/2019  8:31 AM            Urethral Catheter 09/03/19 1000 Latex 16 Fr. (Active)   Site Assessment Clean;Intact 9/4/2019  7:05 AM   Collection Container Urimeter 9/4/2019  7:05 AM   Securement Method secured to top of thigh w/ adhesive device 9/4/2019  7:05 AM   Catheter Care Performed yes 9/4/2019  7:05 AM   Reason for Continuing Urinary Catheterization  "Critically ill in ICU requiring intensive monitoring 9/4/2019  7:05 AM   CAUTI Prevention Bundle StatLock in place w 1" slack;Intact seal between catheter & drainage tubing;Drainage bag off the floor;Green sheeting clip in use 9/4/2019  7:05 AM   Output (mL) 100 mL 9/4/2019 11:05 AM       Neurosurgery Physical Exam   E1VTM5, grimacing to pain.  PERRL  Localizing on right, WD on left    Significant Labs:  Recent Labs   Lab 09/02/19 2252 09/03/19  1007 09/04/19  0631   * 180* 180*    137 141   K 3.7 4.4 4.8    103 110   CO2 17* 24 19*   BUN 26* 21 14   CREATININE 1.45* 1.3 1.2   CALCIUM 9.9 9.4 8.9   MG  --  1.7 1.9     Recent Labs   Lab 09/02/19 2252 09/03/19  1007 09/04/19  0813   WBC 5.62 8.31 10.32   HGB 13.3 13.5 12.6   HCT 41.0 41.8 38.3   * 141* 153     Recent Labs   Lab 09/03/19  0228   LABPT 12.6   INR 1.0   APTT 25.2     Microbiology Results (last 7 days)     Procedure Component Value Units Date/Time    Gram stain [[id number]] Collected:  09/03/19 1843    Order Status:  Completed Specimen:  CSF (Spinal Fluid) from CSF Tap, Tube 2 Updated:  09/03/19 2153     Gram Stain Result No WBC's      No organisms seen            Significant Diagnostics:      Assessment/Plan:     New onset seizure  75 y/o female with pmhx CKD and HTN presented to outside hospital for seizure- like activity occurring on the left side. Found to have area of ill-defined enhancement on MRI brain w/ and w/out.     -q 1 hr neuro checks  -Fu cEEG, continue AEDs per epilepsy team  -Fu LP results  -Fu infectious rodriguez  -Continue steroids for now  -Further care per NCC, will follow.          Kingsley Lisa, DO  Neurosurgery  [facility]  "

## 2019-09-04 NOTE — PLAN OF CARE
Problem: Adult Inpatient Plan of Care  Goal: Plan of Care Review  ETT tube pulled back 5cm   POC reviewed with pt at 0500. Pt  verbalized understanding. Questions and concerns addressed with . No acute events overnight. Pt progressing toward goals. Will continue to monitor. See flowsheets and notes for full assessment and VS info

## 2019-09-04 NOTE — ASSESSMENT & PLAN NOTE
2/2 to brain mass   Steroids - Dex 4 mg q6hrs   See VENTURA   On Keppra 1000 BID   Wean propofol at 30   EEG monitoring   Epilepsy following   NSGY following

## 2019-09-04 NOTE — ASSESSMENT & PLAN NOTE
Pt is 73 yo female found in bedroom by  last night and two witnessed seizures with concern for new onset brain mass on MRI was admitted to [facility] for further mgmt and workup.     -admit to [facility]  -neurosurgery consulted  -Epilepsy consulted  -neuro checks q1hr   -vital checks q1hr   -SBP <180, MAP >65   -AEDS, on Keppra 1 g BID, wean Propofol   -steroids  -SubQ heparin started  -EEG monitoring   -EKG, ECHO pending   -Labs pending CBC, CMP, TSH, HgA1c pending   -MRI -no acute diffusion or ischemia, there was some underlying mass effect or infiltrative process.   -repeat imaging MRI with contrast - small focus in the left alonzo suggestive for remote microhemorrhage, large region of edema signal within th right frontal lobe with pronounced right parasagittal frontal lobe.   -Bowel regimen   -on Famotidine  -PT/OT when appropriate

## 2019-09-05 LAB
ALBUMIN SERPL BCP-MCNC: 3 G/DL (ref 3.5–5.2)
ALLENS TEST: ABNORMAL
ALLENS TEST: NORMAL
ALP SERPL-CCNC: 66 U/L (ref 55–135)
ALT SERPL W/O P-5'-P-CCNC: 34 U/L (ref 10–44)
ANION GAP SERPL CALC-SCNC: 9 MMOL/L (ref 8–16)
AST SERPL-CCNC: 25 U/L (ref 10–40)
BASOPHILS # BLD AUTO: 0 K/UL (ref 0–0.2)
BASOPHILS NFR BLD: 0 % (ref 0–1.9)
BILIRUB SERPL-MCNC: 0.3 MG/DL (ref 0.1–1)
BUN SERPL-MCNC: 18 MG/DL (ref 8–23)
CALCIUM SERPL-MCNC: 8.4 MG/DL (ref 8.7–10.5)
CHLORIDE SERPL-SCNC: 111 MMOL/L (ref 95–110)
CO2 SERPL-SCNC: 21 MMOL/L (ref 23–29)
CREAT SERPL-MCNC: 1 MG/DL (ref 0.5–1.4)
DELSYS: ABNORMAL
DELSYS: NORMAL
DIFFERENTIAL METHOD: ABNORMAL
EOSINOPHIL # BLD AUTO: 0 K/UL (ref 0–0.5)
EOSINOPHIL NFR BLD: 0 % (ref 0–8)
ERYTHROCYTE [DISTWIDTH] IN BLOOD BY AUTOMATED COUNT: 13.6 % (ref 11.5–14.5)
ERYTHROCYTE [SEDIMENTATION RATE] IN BLOOD BY WESTERGREN METHOD: 16 MM/H
EST. GFR  (AFRICAN AMERICAN): >60 ML/MIN/1.73 M^2
EST. GFR  (NON AFRICAN AMERICAN): 54.9 ML/MIN/1.73 M^2
FIO2: 40
GLUCOSE SERPL-MCNC: 206 MG/DL (ref 70–110)
HCO3 UR-SCNC: 25.6 MMOL/L (ref 24–28)
HCO3 UR-SCNC: 25.7 MMOL/L (ref 24–28)
HCT VFR BLD AUTO: 35 % (ref 37–48.5)
HGB BLD-MCNC: 11.4 G/DL (ref 12–16)
IMM GRANULOCYTES # BLD AUTO: 0.04 K/UL (ref 0–0.04)
IMM GRANULOCYTES NFR BLD AUTO: 0.5 % (ref 0–0.5)
LYMPHOCYTES # BLD AUTO: 0.7 K/UL (ref 1–4.8)
LYMPHOCYTES NFR BLD: 8.3 % (ref 18–48)
MAGNESIUM SERPL-MCNC: 1.9 MG/DL (ref 1.6–2.6)
MCH RBC QN AUTO: 29.2 PG (ref 27–31)
MCHC RBC AUTO-ENTMCNC: 32.6 G/DL (ref 32–36)
MCV RBC AUTO: 90 FL (ref 82–98)
MODE: ABNORMAL
MODE: NORMAL
MONOCYTES # BLD AUTO: 0.7 K/UL (ref 0.3–1)
MONOCYTES NFR BLD: 8.3 % (ref 4–15)
NEUTROPHILS # BLD AUTO: 6.9 K/UL (ref 1.8–7.7)
NEUTROPHILS NFR BLD: 82.9 % (ref 38–73)
NRBC BLD-RTO: 0 /100 WBC
PCO2 BLDA: 35.4 MMHG (ref 35–45)
PCO2 BLDA: 44.4 MMHG (ref 35–45)
PEEP: 5
PEEP: 5
PH SMN: 7.37 [PH] (ref 7.35–7.45)
PH SMN: 7.47 [PH] (ref 7.35–7.45)
PHOSPHATE SERPL-MCNC: 2.5 MG/DL (ref 2.7–4.5)
PLATELET # BLD AUTO: 145 K/UL (ref 150–350)
PMV BLD AUTO: 9.2 FL (ref 9.2–12.9)
PO2 BLDA: 108 MMHG (ref 80–100)
PO2 BLDA: 92 MMHG (ref 80–100)
POC BE: 0 MMOL/L
POC BE: 2 MMOL/L
POC SATURATED O2: 97 % (ref 95–100)
POC SATURATED O2: 99 % (ref 95–100)
POC TCO2: 27 MMOL/L (ref 23–27)
POC TCO2: 27 MMOL/L (ref 23–27)
POCT GLUCOSE: 198 MG/DL (ref 70–110)
POCT GLUCOSE: 264 MG/DL (ref 70–110)
POTASSIUM SERPL-SCNC: 4.1 MMOL/L (ref 3.5–5.1)
PROT SERPL-MCNC: 5.8 G/DL (ref 6–8.4)
PS: 10
RBC # BLD AUTO: 3.9 M/UL (ref 4–5.4)
SAMPLE: ABNORMAL
SAMPLE: NORMAL
SITE: ABNORMAL
SITE: NORMAL
SODIUM SERPL-SCNC: 141 MMOL/L (ref 136–145)
SP02: 40
VT: 400
WBC # BLD AUTO: 8.35 K/UL (ref 3.9–12.7)

## 2019-09-05 PROCEDURE — 63600175 PHARM REV CODE 636 W HCPCS: Performed by: PHYSICIAN ASSISTANT

## 2019-09-05 PROCEDURE — 85025 COMPLETE CBC W/AUTO DIFF WBC: CPT

## 2019-09-05 PROCEDURE — 95951 HC EEG MONITORING/VIDEO RECORD: CPT

## 2019-09-05 PROCEDURE — 94003 VENT MGMT INPAT SUBQ DAY: CPT

## 2019-09-05 PROCEDURE — 95951 PR EEG MONITORING/VIDEORECORD: CPT | Mod: 26,,, | Performed by: PSYCHIATRY & NEUROLOGY

## 2019-09-05 PROCEDURE — 95951 PR EEG MONITORING/VIDEORECORD: ICD-10-PCS | Mod: 26,,, | Performed by: PSYCHIATRY & NEUROLOGY

## 2019-09-05 PROCEDURE — 94761 N-INVAS EAR/PLS OXIMETRY MLT: CPT

## 2019-09-05 PROCEDURE — 99291 CRITICAL CARE FIRST HOUR: CPT | Mod: GC,,, | Performed by: PSYCHIATRY & NEUROLOGY

## 2019-09-05 PROCEDURE — 36600 WITHDRAWAL OF ARTERIAL BLOOD: CPT

## 2019-09-05 PROCEDURE — 99232 SBSQ HOSP IP/OBS MODERATE 35: CPT | Mod: ,,, | Performed by: NEUROLOGICAL SURGERY

## 2019-09-05 PROCEDURE — 25000003 PHARM REV CODE 250: Performed by: PSYCHIATRY & NEUROLOGY

## 2019-09-05 PROCEDURE — 27200966 HC CLOSED SUCTION SYSTEM

## 2019-09-05 PROCEDURE — 99232 PR SUBSEQUENT HOSPITAL CARE,LEVL II: ICD-10-PCS | Mod: ,,, | Performed by: NEUROLOGICAL SURGERY

## 2019-09-05 PROCEDURE — 80053 COMPREHEN METABOLIC PANEL: CPT

## 2019-09-05 PROCEDURE — 99900026 HC AIRWAY MAINTENANCE (STAT)

## 2019-09-05 PROCEDURE — 63600175 PHARM REV CODE 636 W HCPCS: Performed by: PSYCHIATRY & NEUROLOGY

## 2019-09-05 PROCEDURE — 25000003 PHARM REV CODE 250: Performed by: STUDENT IN AN ORGANIZED HEALTH CARE EDUCATION/TRAINING PROGRAM

## 2019-09-05 PROCEDURE — 99233 PR SUBSEQUENT HOSPITAL CARE,LEVL III: ICD-10-PCS | Mod: ,,, | Performed by: PHYSICIAN ASSISTANT

## 2019-09-05 PROCEDURE — 82803 BLOOD GASES ANY COMBINATION: CPT

## 2019-09-05 PROCEDURE — 84100 ASSAY OF PHOSPHORUS: CPT

## 2019-09-05 PROCEDURE — 63600175 PHARM REV CODE 636 W HCPCS: Performed by: STUDENT IN AN ORGANIZED HEALTH CARE EDUCATION/TRAINING PROGRAM

## 2019-09-05 PROCEDURE — 37799 UNLISTED PX VASCULAR SURGERY: CPT

## 2019-09-05 PROCEDURE — 63600175 PHARM REV CODE 636 W HCPCS

## 2019-09-05 PROCEDURE — 27000221 HC OXYGEN, UP TO 24 HOURS

## 2019-09-05 PROCEDURE — 82800 BLOOD PH: CPT

## 2019-09-05 PROCEDURE — 20000000 HC ICU ROOM

## 2019-09-05 PROCEDURE — 99291 PR CRITICAL CARE, E/M 30-74 MINUTES: ICD-10-PCS | Mod: GC,,, | Performed by: PSYCHIATRY & NEUROLOGY

## 2019-09-05 PROCEDURE — 99900035 HC TECH TIME PER 15 MIN (STAT)

## 2019-09-05 PROCEDURE — 99233 SBSQ HOSP IP/OBS HIGH 50: CPT | Mod: ,,, | Performed by: PHYSICIAN ASSISTANT

## 2019-09-05 PROCEDURE — 83735 ASSAY OF MAGNESIUM: CPT

## 2019-09-05 PROCEDURE — 63600175 PHARM REV CODE 636 W HCPCS: Performed by: NURSE PRACTITIONER

## 2019-09-05 RX ORDER — PROPOFOL 10 MG/ML
INJECTION, EMULSION INTRAVENOUS
Status: DISPENSED
Start: 2019-09-05 | End: 2019-09-06

## 2019-09-05 RX ORDER — GLUCAGON 1 MG
1 KIT INJECTION
Status: CANCELLED | OUTPATIENT
Start: 2019-09-05

## 2019-09-05 RX ORDER — SODIUM CHLORIDE 9 MG/ML
INJECTION, SOLUTION INTRAVENOUS CONTINUOUS
Status: DISCONTINUED | OUTPATIENT
Start: 2019-09-05 | End: 2019-09-06

## 2019-09-05 RX ORDER — FENTANYL CITRATE 50 UG/ML
50 INJECTION, SOLUTION INTRAMUSCULAR; INTRAVENOUS ONCE
Status: COMPLETED | OUTPATIENT
Start: 2019-09-05 | End: 2019-09-05

## 2019-09-05 RX ORDER — MIDAZOLAM HYDROCHLORIDE 1 MG/ML
INJECTION INTRAMUSCULAR; INTRAVENOUS
Status: COMPLETED
Start: 2019-09-05 | End: 2019-09-05

## 2019-09-05 RX ORDER — DEXMEDETOMIDINE HYDROCHLORIDE 4 UG/ML
0.2 INJECTION, SOLUTION INTRAVENOUS CONTINUOUS
Status: DISCONTINUED | OUTPATIENT
Start: 2019-09-05 | End: 2019-09-05

## 2019-09-05 RX ORDER — INSULIN ASPART 100 [IU]/ML
1-10 INJECTION, SOLUTION INTRAVENOUS; SUBCUTANEOUS EVERY 4 HOURS PRN
Status: CANCELLED | OUTPATIENT
Start: 2019-09-05

## 2019-09-05 RX ORDER — FENTANYL CITRATE 50 UG/ML
50 INJECTION, SOLUTION INTRAMUSCULAR; INTRAVENOUS
Status: DISCONTINUED | OUTPATIENT
Start: 2019-09-05 | End: 2019-09-05

## 2019-09-05 RX ORDER — FENTANYL CITRATE 50 UG/ML
100 INJECTION, SOLUTION INTRAMUSCULAR; INTRAVENOUS ONCE
Status: COMPLETED | OUTPATIENT
Start: 2019-09-05 | End: 2019-09-05

## 2019-09-05 RX ORDER — PROPOFOL 10 MG/ML
5 INJECTION, EMULSION INTRAVENOUS CONTINUOUS
Status: DISCONTINUED | OUTPATIENT
Start: 2019-09-05 | End: 2019-09-06

## 2019-09-05 RX ORDER — FENTANYL CITRATE 50 UG/ML
100 INJECTION, SOLUTION INTRAMUSCULAR; INTRAVENOUS
Status: DISCONTINUED | OUTPATIENT
Start: 2019-09-05 | End: 2019-09-05

## 2019-09-05 RX ORDER — MIDAZOLAM HYDROCHLORIDE 1 MG/ML
2 INJECTION INTRAMUSCULAR; INTRAVENOUS ONCE
Status: COMPLETED | OUTPATIENT
Start: 2019-09-05 | End: 2019-09-05

## 2019-09-05 RX ORDER — PROPOFOL 10 MG/ML
5 INJECTION, EMULSION INTRAVENOUS CONTINUOUS
Status: DISCONTINUED | OUTPATIENT
Start: 2019-09-05 | End: 2019-09-05

## 2019-09-05 RX ORDER — FENTANYL CITRATE 50 UG/ML
INJECTION, SOLUTION INTRAMUSCULAR; INTRAVENOUS
Status: COMPLETED
Start: 2019-09-05 | End: 2019-09-05

## 2019-09-05 RX ORDER — FENTANYL CITRATE-0.9 % NACL/PF 10 MCG/ML
12.5 PLASTIC BAG, INJECTION (ML) INTRAVENOUS CONTINUOUS
Status: DISCONTINUED | OUTPATIENT
Start: 2019-09-05 | End: 2019-09-05

## 2019-09-05 RX ADMIN — LEVOTHYROXINE SODIUM 75 MCG: 75 TABLET ORAL at 05:09

## 2019-09-05 RX ADMIN — LEVETIRACETAM 1000 MG: 10 INJECTION INTRAVENOUS at 08:09

## 2019-09-05 RX ADMIN — FENTANYL CITRATE 50 MCG: 50 INJECTION INTRAMUSCULAR; INTRAVENOUS at 08:09

## 2019-09-05 RX ADMIN — FENTANYL CITRATE 100 MCG: 50 INJECTION, SOLUTION INTRAMUSCULAR; INTRAVENOUS at 06:09

## 2019-09-05 RX ADMIN — DEXAMETHASONE SODIUM PHOSPHATE 4 MG: 4 INJECTION, SOLUTION INTRAMUSCULAR; INTRAVENOUS at 11:09

## 2019-09-05 RX ADMIN — PROPOFOL 36 MCG/KG/MIN: 10 INJECTION, EMULSION INTRAVENOUS at 11:09

## 2019-09-05 RX ADMIN — SENNOSIDES AND DOCUSATE SODIUM 1 TABLET: 8.6; 5 TABLET ORAL at 08:09

## 2019-09-05 RX ADMIN — FENTANYL CITRATE 50 MCG: 50 INJECTION INTRAMUSCULAR; INTRAVENOUS at 01:09

## 2019-09-05 RX ADMIN — FENTANYL CITRATE 100 MCG: 50 INJECTION INTRAMUSCULAR; INTRAVENOUS at 05:09

## 2019-09-05 RX ADMIN — LEVETIRACETAM 1000 MG: 10 INJECTION INTRAVENOUS at 10:09

## 2019-09-05 RX ADMIN — PROPOFOL 30 MCG/KG/MIN: 10 INJECTION, EMULSION INTRAVENOUS at 07:09

## 2019-09-05 RX ADMIN — FENTANYL CITRATE 50 MCG: 50 INJECTION INTRAMUSCULAR; INTRAVENOUS at 02:09

## 2019-09-05 RX ADMIN — FAMOTIDINE 20 MG: 20 TABLET, FILM COATED ORAL at 08:09

## 2019-09-05 RX ADMIN — FENTANYL CITRATE 50 MCG: 50 INJECTION INTRAMUSCULAR; INTRAVENOUS at 05:09

## 2019-09-05 RX ADMIN — FENTANYL CITRATE 100 MCG: 50 INJECTION INTRAMUSCULAR; INTRAVENOUS at 06:09

## 2019-09-05 RX ADMIN — HEPARIN SODIUM 5000 UNITS: 5000 INJECTION INTRAVENOUS; SUBCUTANEOUS at 10:09

## 2019-09-05 RX ADMIN — CHLORHEXIDINE GLUCONATE 0.12% ORAL RINSE 15 ML: 1.2 LIQUID ORAL at 08:09

## 2019-09-05 RX ADMIN — FENTANYL CITRATE 100 MCG: 50 INJECTION INTRAMUSCULAR; INTRAVENOUS at 07:09

## 2019-09-05 RX ADMIN — HEPARIN SODIUM 5000 UNITS: 5000 INJECTION INTRAVENOUS; SUBCUTANEOUS at 02:09

## 2019-09-05 RX ADMIN — FENTANYL CITRATE 50 MCG: 50 INJECTION INTRAMUSCULAR; INTRAVENOUS at 11:09

## 2019-09-05 RX ADMIN — FENTANYL CITRATE 50 MCG: 50 INJECTION INTRAMUSCULAR; INTRAVENOUS at 12:09

## 2019-09-05 RX ADMIN — FENTANYL CITRATE 50 MCG: 50 INJECTION INTRAMUSCULAR; INTRAVENOUS at 03:09

## 2019-09-05 RX ADMIN — PROPOFOL 30 MCG/KG/MIN: 10 INJECTION, EMULSION INTRAVENOUS at 08:09

## 2019-09-05 RX ADMIN — DEXAMETHASONE SODIUM PHOSPHATE 4 MG: 4 INJECTION, SOLUTION INTRAMUSCULAR; INTRAVENOUS at 05:09

## 2019-09-05 RX ADMIN — POLYETHYLENE GLYCOL 3350 17 G: 17 POWDER, FOR SOLUTION ORAL at 08:09

## 2019-09-05 RX ADMIN — PROPOFOL 30 MCG/KG/MIN: 10 INJECTION, EMULSION INTRAVENOUS at 01:09

## 2019-09-05 RX ADMIN — MIDAZOLAM HYDROCHLORIDE 2 MG: 1 INJECTION, SOLUTION INTRAMUSCULAR; INTRAVENOUS at 08:09

## 2019-09-05 RX ADMIN — FENTANYL CITRATE 50 MCG: 50 INJECTION INTRAMUSCULAR; INTRAVENOUS at 07:09

## 2019-09-05 RX ADMIN — MIDAZOLAM HYDROCHLORIDE 2 MG: 1 INJECTION INTRAMUSCULAR; INTRAVENOUS at 08:09

## 2019-09-05 RX ADMIN — INSULIN ASPART 6 UNITS: 100 INJECTION, SOLUTION INTRAVENOUS; SUBCUTANEOUS at 05:09

## 2019-09-05 RX ADMIN — FENTANYL CITRATE 50 MCG: 50 INJECTION INTRAMUSCULAR; INTRAVENOUS at 09:09

## 2019-09-05 RX ADMIN — DEXMEDETOMIDINE HYDROCHLORIDE 1.4 MCG/KG/HR: 4 INJECTION, SOLUTION INTRAVENOUS at 03:09

## 2019-09-05 RX ADMIN — DEXMEDETOMIDINE HYDROCHLORIDE 0.2 MCG/KG/HR: 4 INJECTION, SOLUTION INTRAVENOUS at 11:09

## 2019-09-05 RX ADMIN — CHLORHEXIDINE GLUCONATE 0.12% ORAL RINSE 15 ML: 1.2 LIQUID ORAL at 10:09

## 2019-09-05 RX ADMIN — INSULIN ASPART 1 UNITS: 100 INJECTION, SOLUTION INTRAVENOUS; SUBCUTANEOUS at 12:09

## 2019-09-05 RX ADMIN — SENNOSIDES AND DOCUSATE SODIUM 1 TABLET: 8.6; 5 TABLET ORAL at 10:09

## 2019-09-05 RX ADMIN — HEPARIN SODIUM 5000 UNITS: 5000 INJECTION INTRAVENOUS; SUBCUTANEOUS at 05:09

## 2019-09-05 RX ADMIN — SODIUM CHLORIDE: 0.9 INJECTION, SOLUTION INTRAVENOUS at 10:09

## 2019-09-05 NOTE — PROGRESS NOTES
[facility]  Neurosurgery  Progress Note    Subjective:     History of Present Illness: 73 y/o female with pmhx CKD and HTN  presented to outside hospital for seizure- like activity this AM. Per  pt was found unconscious this AM, with seizure like activity occurring on the left side. Pt was given versed x 2 via EMS. CTH performed at outside hospital was negative for bleed. Pt had additional seizure in outside hospital ED and was given IV Keppra. EEG ordered and MRI brain w/out contrast concerning for possible infiltratrive process vs post ischemic sequela. Pt transferred to [facility] and admitted to [facility]. MRI brain w/ and w/out obtained that showed large area of edema in right frontal lobe and ill defined enhancement in the frontal lobe concerning for possible cerebritis vs. Neoplasm. NSGY was consulted to evaluate. Pt not on anti-coagulation.       Post-Op Info:  * No surgery found *         Interval History: naeon, remains on cEEG.    Medications:  Continuous Infusions:   propofol 30 mcg/kg/min (09/05/19 0600)     Scheduled Meds:   chlorhexidine  15 mL Mouth/Throat BID    dexamethasone  4 mg Intravenous Q6H    famotidine  20 mg Per OG tube Daily    heparin (porcine)  5,000 Units Subcutaneous Q8H    levetiracetam IVPB  1,000 mg Intravenous Q12H    levothyroxine  75 mcg Per OG tube Before breakfast    polyethylene glycol  17 g Per NG tube Daily    senna-docusate 8.6-50 mg  1 tablet Per OG tube BID     PRN Meds:Dextrose 10% Bolus, fentaNYL, glucagon (human recombinant), insulin aspart U-100, magnesium oxide, magnesium oxide, potassium chloride 10%, potassium chloride 10%, potassium, sodium phosphates, potassium, sodium phosphates, potassium, sodium phosphates, sodium chloride 0.9%     Review of Systems  Objective:     Weight: 70.5 kg (155 lb 6.8 oz)  Body mass index is 27.53 kg/m².  Vital Signs (Most Recent):  Temp: 97.7 °F (36.5 °C) (09/05/19 0301)  Pulse: 63 (09/05/19 0630)  Resp: 16  (09/05/19 0630)  BP: (!) 109/52 (09/05/19 0630)  SpO2: 100 % (09/05/19 0630) Vital Signs (24h Range):  Temp:  [97.7 °F (36.5 °C)-98.7 °F (37.1 °C)] 97.7 °F (36.5 °C)  Pulse:  [] 63  Resp:  [16-32] 16  SpO2:  [99 %-100 %] 100 %  BP: (103-161)/(51-74) 109/52                Vent Mode: A/C  Oxygen Concentration (%):  [40] 40  Resp Rate Total:  [16 br/min-29 br/min] 16 br/min  Vt Set:  [400 mL] 400 mL  PEEP/CPAP:  [5 cmH20] 5 cmH20  Pressure Support:  [0 cmH20] 0 cmH20  Mean Airway Pressure:  [8.8 cmH20-10 cmH20] 9.4 cmH20         NG/OG Tube 09/03/19 1100 orogastric Center mouth (Active)   Placement Check placement verified by x-ray 9/5/2019  3:03 AM   Tolerance no signs/symptoms of discomfort 9/5/2019  3:03 AM   Securement secured to commercial device 9/5/2019  3:03 AM   Clamp Status/Tolerance unclamped 9/5/2019  3:03 AM   Suction Setting/Drainage Method suction at the bedside 9/4/2019  3:05 AM   Insertion Site Appearance no redness, warmth, tenderness, skin breakdown, drainage 9/5/2019  3:03 AM   Flush/Irrigation flushed w/;water;no resistance met 9/4/2019  8:31 AM   Feeding Method continuous 9/5/2019  3:03 AM   Feeding Action feeding continued 9/5/2019  3:03 AM   Current Rate (mL/hr) 30 mL/hr 9/4/2019  7:10 PM   Goal Rate (mL/hr) 45 mL/hr 9/4/2019  7:10 PM   Water Bolus (mL) 60 mL 9/5/2019  6:00 AM   Formula Name Isosource 1.5 9/4/2019 12:05 PM   Intake (mL) - Formula Tube Feeding 45 9/5/2019  5:00 AM   Residual Amount (ml) 0 ml 9/5/2019  3:03 AM       Female External Urinary Catheter 09/04/19 2301 (Active)   Skin perineum cleansed w/ soap and water 9/5/2019  3:03 AM   Tolerance no signs/symptoms of discomfort 9/5/2019  3:03 AM   Suction Continuous suction at 70 mmHg 9/4/2019 11:01 PM   Date of last wick change 09/04/19 9/4/2019 11:01 PM   Time of last wick change 2301 9/4/2019 11:01 PM       Neurosurgery Physical Exam   E2VTM5, grimacing to pain  PERRL, +corneals, +cough/gag  Localizing x4    Significant  Labs:  Recent Labs   Lab 09/03/19  1007 09/04/19  0631 09/05/19  0254   * 180* 206*    141 141   K 4.4 4.8 4.1    110 111*   CO2 24 19* 21*   BUN 21 14 18   CREATININE 1.3 1.2 1.0   CALCIUM 9.4 8.9 8.4*   MG 1.7 1.9 1.9     Recent Labs   Lab 09/03/19  1007 09/04/19  0813 09/05/19  0254   WBC 8.31 10.32 8.35   HGB 13.5 12.6 11.4*   HCT 41.8 38.3 35.0*   * 153 145*     No results for input(s): LABPT, INR, APTT in the last 48 hours.  Microbiology Results (last 7 days)     Procedure Component Value Units Date/Time    Gram stain [[id number]] Collected:  09/03/19 1843    Order Status:  Completed Specimen:  CSF (Spinal Fluid) from CSF Tap, Tube 2 Updated:  09/03/19 2153     Gram Stain Result No WBC's      No organisms seen            Significant Diagnostics:      Assessment/Plan:     New onset seizure  73 y/o female with pmhx CKD and HTN presented to outside hospital for seizure- like activity occurring on the left side. Found to have area of ill-defined enhancement on MRI brain w/ and w/out.     -q 1 hr neuro checks  -Fu cEEG, continue AEDs per epilepsy team  -Fu LP results  -Fu infectious rodriguez  -Continue steroids for now  -Further care per NCC, will follow.          Kingsley Lisa, DO  Neurosurgery  [facility]

## 2019-09-05 NOTE — SUBJECTIVE & OBJECTIVE
Interval History: naeon, remains on cEEG.    Medications:  Continuous Infusions:   propofol 30 mcg/kg/min (09/05/19 0600)     Scheduled Meds:   chlorhexidine  15 mL Mouth/Throat BID    dexamethasone  4 mg Intravenous Q6H    famotidine  20 mg Per OG tube Daily    heparin (porcine)  5,000 Units Subcutaneous Q8H    levetiracetam IVPB  1,000 mg Intravenous Q12H    levothyroxine  75 mcg Per OG tube Before breakfast    polyethylene glycol  17 g Per NG tube Daily    senna-docusate 8.6-50 mg  1 tablet Per OG tube BID     PRN Meds:Dextrose 10% Bolus, fentaNYL, glucagon (human recombinant), insulin aspart U-100, magnesium oxide, magnesium oxide, potassium chloride 10%, potassium chloride 10%, potassium, sodium phosphates, potassium, sodium phosphates, potassium, sodium phosphates, sodium chloride 0.9%     Review of Systems  Objective:     Weight: 70.5 kg (155 lb 6.8 oz)  Body mass index is 27.53 kg/m².  Vital Signs (Most Recent):  Temp: 97.7 °F (36.5 °C) (09/05/19 0301)  Pulse: 63 (09/05/19 0630)  Resp: 16 (09/05/19 0630)  BP: (!) 109/52 (09/05/19 0630)  SpO2: 100 % (09/05/19 0630) Vital Signs (24h Range):  Temp:  [97.7 °F (36.5 °C)-98.7 °F (37.1 °C)] 97.7 °F (36.5 °C)  Pulse:  [] 63  Resp:  [16-32] 16  SpO2:  [99 %-100 %] 100 %  BP: (103-161)/(51-74) 109/52                Vent Mode: A/C  Oxygen Concentration (%):  [40] 40  Resp Rate Total:  [16 br/min-29 br/min] 16 br/min  Vt Set:  [400 mL] 400 mL  PEEP/CPAP:  [5 cmH20] 5 cmH20  Pressure Support:  [0 cmH20] 0 cmH20  Mean Airway Pressure:  [8.8 cmH20-10 cmH20] 9.4 cmH20         NG/OG Tube 09/03/19 1100 orogastric Center mouth (Active)   Placement Check placement verified by x-ray 9/5/2019  3:03 AM   Tolerance no signs/symptoms of discomfort 9/5/2019  3:03 AM   Securement secured to commercial device 9/5/2019  3:03 AM   Clamp Status/Tolerance unclamped 9/5/2019  3:03 AM   Suction Setting/Drainage Method suction at the bedside 9/4/2019  3:05 AM   Insertion Site  Appearance no redness, warmth, tenderness, skin breakdown, drainage 9/5/2019  3:03 AM   Flush/Irrigation flushed w/;water;no resistance met 9/4/2019  8:31 AM   Feeding Method continuous 9/5/2019  3:03 AM   Feeding Action feeding continued 9/5/2019  3:03 AM   Current Rate (mL/hr) 30 mL/hr 9/4/2019  7:10 PM   Goal Rate (mL/hr) 45 mL/hr 9/4/2019  7:10 PM   Water Bolus (mL) 60 mL 9/5/2019  6:00 AM   Formula Name Isosource 1.5 9/4/2019 12:05 PM   Intake (mL) - Formula Tube Feeding 45 9/5/2019  5:00 AM   Residual Amount (ml) 0 ml 9/5/2019  3:03 AM       Female External Urinary Catheter 09/04/19 2301 (Active)   Skin perineum cleansed w/ soap and water 9/5/2019  3:03 AM   Tolerance no signs/symptoms of discomfort 9/5/2019  3:03 AM   Suction Continuous suction at 70 mmHg 9/4/2019 11:01 PM   Date of last wick change 09/04/19 9/4/2019 11:01 PM   Time of last wick change 2301 9/4/2019 11:01 PM       Neurosurgery Physical Exam   E2VTM5, grimacing to pain  PERRL, +corneals, +cough/gag  Localizing x4    Significant Labs:  Recent Labs   Lab 09/03/19 1007 09/04/19  0631 09/05/19  0254   * 180* 206*    141 141   K 4.4 4.8 4.1    110 111*   CO2 24 19* 21*   BUN 21 14 18   CREATININE 1.3 1.2 1.0   CALCIUM 9.4 8.9 8.4*   MG 1.7 1.9 1.9     Recent Labs   Lab 09/03/19 1007 09/04/19  0813 09/05/19  0254   WBC 8.31 10.32 8.35   HGB 13.5 12.6 11.4*   HCT 41.8 38.3 35.0*   * 153 145*     No results for input(s): LABPT, INR, APTT in the last 48 hours.  Microbiology Results (last 7 days)     Procedure Component Value Units Date/Time    Gram stain [[id number]] Collected:  09/03/19 1843    Order Status:  Completed Specimen:  CSF (Spinal Fluid) from CSF Tap, Tube 2 Updated:  09/03/19 2153     Gram Stain Result No WBC's      No organisms seen            Significant Diagnostics:

## 2019-09-05 NOTE — PLAN OF CARE
Problem: Adult Inpatient Plan of Care  Goal: Plan of Care Review  Outcome: Ongoing (interventions implemented as appropriate)  Nutrition assessment completed. Please see RD note for details.    Recommendation/Intervention:   1. Recommend changing TF to better meet pt's protein needs and manage BG levels.   Impact Peptide @ goal rate 40mL/hr.   - Provides 1440kcals, 90g protein and 739mL free water.     2. If able to extubate and advance diet, recommend Regular with texture per SLP recommendations.     RD to monitor.    Goals: Pt to receive >85% EEN and EPN by RD follow up  Nutrition Goal Status: new  Communication of RD Recs: reviewed with RN

## 2019-09-05 NOTE — PROGRESS NOTES
SBT initiated 1203. Sedation vacation completed.     Pt placed on spontaneous 10/+5/40%    Cough reflex present.    Pt failed SBT. RR >=35 for 5 mins.     Pt placed back on sedation per MD.    Pt placed on below settings:    VC/SIMV = RR 14/ / PEEP 5/ FIO2 40%

## 2019-09-05 NOTE — PLAN OF CARE
Problem: Adult Inpatient Plan of Care  Goal: Plan of Care Review  Outcome: Ongoing (interventions implemented as appropriate)  Patient progressing towards discharge.  Patient had no acute events noted throughout the night at this time.  See Doc Flowsheets for documented results.       Infusions: propofol     Neurological:  Responds to voice.  bilateral 2 mm PERRL, hippus. BENITEZ, but does not follow commands. Restless and agitated frequently, fentanyl IVP given during those episodes of agitation. cEEG monitoring ongoing.     Cardiac:  Normal Sinus Rhythm.  Pulses weak, but palpable in all extremities.  Capillary refill < 3 seconds in all extremities.    Respiratory:  intubated     Gastrointestinal: OGT with TF, tolerating well, currently at goal, max residual 10 cc. No BM during shift.      Genitourinary: bladder scan performed, straight cath performed, 700 cc output.     Endocrine:  Blood glucose monitoring, ISS administered.     Musculoskeletal: BENITEZ spontaneously when restless, otherwise, withdraws to pain.     Integumentary/Other: Patient repositioned every 2 hours throughout the night.      Discussed plan of care with patient and family with verbalization of understanding.  Will continue to monitor.

## 2019-09-05 NOTE — PROGRESS NOTES
Cuff leak test performed.     Cuff pressure 0. Pt maintaining volumes. Absence of air leak.     Inflated cuff pressure to 30.

## 2019-09-05 NOTE — PLAN OF CARE
Problem: Adult Inpatient Plan of Care  Goal: Plan of Care Review  Outcome: Ongoing (interventions implemented as appropriate)  POC reviewed with pt and family at 1400. Pt verbalized understanding. Questions and concerns addressed. Propofol gtt switched to precedex gtt. cEEg continued; no seizure activity noted at this time. Pt tolerating spontaneous mode well since 1400.  No acute events today. Pt progressing toward goals. Will continue to monitor. See flowsheets for full assessment and VS info.

## 2019-09-05 NOTE — PROGRESS NOTES
[facility]  Neurology-Epilepsy  Progress Note    Patient Name: Clemencia Nguyen  MRN: [id number]  Admission Date: 9/3/2019  Hospital Length of Stay: 2 days  Code Status: Full Code   Attending Provider: Johanan Gamboa MD  Primary Care Physician: SHELBY Castillo MD   Principal Problem:<principal problem not specified>    Subjective:     Hospital Course:   EE/3>: generalized slowing, no epileptiform activity  >: Propofol tapered off, EEG so far stable    Interval History: Propofol tapered off today, patient agitated, now on precedex with intermittent fentanyl pushes. Will continue EEG monitoring.    Current Facility-Administered Medications   Medication Dose Route Frequency Provider Last Rate Last Dose    chlorhexidine 0.12 % solution 15 mL  15 mL Mouth/Throat BID Armand Rm PA-C   15 mL at 19 0826    dexamethasone injection 4 mg  4 mg Intravenous Q6H Armand Rm PA-C   4 mg at 19 1134    dexmedetomidine (PRECEDEX) 400mcg/100mL 0.9% NaCL infusion  0.2 mcg/kg/hr Intravenous Continuous Miriam Orlando MD 24.7 mL/hr at 19 1526 1.4 mcg/kg/hr at 19 1526    dextrose 10% (D10W) Bolus  12.5 g Intravenous PRN Miriam Orlando MD        famotidine tablet 20 mg  20 mg Per OG tube Daily Armand Rm PA-C   20 mg at 19 0828    fentaNYL injection 50 mcg  50 mcg Intravenous Q1H PRN Desiree Jerry PA-C   50 mcg at 19 1534    glucagon (human recombinant) injection 1 mg  1 mg Intramuscular PRN Miriam Orlando MD        heparin (porcine) injection 5,000 Units  5,000 Units Subcutaneous Q8H Miriam Orlando MD   5,000 Units at 19 1400    insulin aspart U-100 pen 1-10 Units  1-10 Units Subcutaneous Q6H PRN Miriam Orlando MD   6 Units at 19 0534    levETIRAcetam in NaCl (iso-os) IVPB 1,000 mg  1,000 mg Intravenous Q12H Armand Rm PA-C 200 mL/hr at  09/05/19 0826 1,000 mg at 09/05/19 0826    levothyroxine tablet 75 mcg  75 mcg Per OG tube Before breakfast Johanna Gamboa MD   75 mcg at 09/05/19 0534    magnesium oxide tablet 800 mg  800 mg Per OG tube PRN Miriam Orlando MD        magnesium oxide tablet 800 mg  800 mg Per OG tube PRN Miriam Orlando MD        polyethylene glycol packet 17 g  17 g Per NG tube Daily Johanna Gamboa MD   17 g at 09/05/19 0827    potassium chloride 10% oral solution 40 mEq  40 mEq Per NG tube PRN Miriam Orlando MD        potassium chloride 10% oral solution 40 mEq  40 mEq Per NG tube PRN Miriam Orlando MD        potassium, sodium phosphates 280-160-250 mg packet 2 packet  2 packet Per OG tube PRN Miriam Orlando MD   2 packet at 09/04/19 1642    potassium, sodium phosphates 280-160-250 mg packet 2 packet  2 packet Per OG tube PRN Miriam Orlando MD        potassium, sodium phosphates 280-160-250 mg packet 2 packet  2 packet Per OG tube PRN Miriam Orlando MD        senna-docusate 8.6-50 mg per tablet 1 tablet  1 tablet Per OG tube BID Johanna Gamboa MD   1 tablet at 09/05/19 0827    sodium chloride 0.9% flush 10 mL  10 mL Intravenous PRN Armand Rm PA-C         Continuous Infusions:   dexmedetomidine (PRECEDEX) infusion 1.4 mcg/kg/hr (09/05/19 1526)       Review of Systems   Unable to perform ROS: Intubated     Objective:     Vital Signs (Most Recent):  Temp: 97.3 °F (36.3 °C) (09/05/19 1548)  Pulse: 92 (09/05/19 1548)  Resp: 13 (09/05/19 1548)  BP: (!) 161/78 (09/05/19 1548)  SpO2: 97 % (09/05/19 1548) Vital Signs (24h Range):  Temp:  [96.1 °F (35.6 °C)-98.5 °F (36.9 °C)] 97.3 °F (36.3 °C)  Pulse:  [] 92  Resp:  [13-32] 13  SpO2:  [94 %-100 %] 97 %  BP: ()/(51-78) 161/78     Weight: 70.5 kg (155 lb 6.8 oz)  Body mass index is 27.53 kg/m².    Physical Exam   Constitutional: She appears well-developed and  well-nourished.   HENT:   Head: Normocephalic and atraumatic.   Eyes: Pupils are equal, round, and reactive to light. Conjunctivae are normal.   Pulmonary/Chest:   intubated   Skin: Skin is warm and dry. She is not diaphoretic.   Psychiatric:   Unable to assess       NEUROLOGICAL EXAMINATION:     CRANIAL NERVES     CN III, IV, VI   Pupils are equal, round, and reactive to light.       CN:   ELA OU   Corneal intact OU   Face symmetric   Agitated, moving all extremities spontaneously, not following commands    Exam findings to suggest seizures:  Myoclonus - no   eye twitching - no   Nystagmus - no   gaze deviation - no   waxy rigidity - no        Significant Labs: All pertinent lab results from the past 24 hours have been reviewed.    Significant Studies: I have reviewed all pertinent imaging results/findings within the past 24 hours.    Assessment and Plan:     New onset seizure  Presented to OSH initially with new onset seizure, MRI brain concerning for brain mass. EEG 9/3 showed lateralized rhythmic delta activity localizing to right hemisphere with some spread of rhythmic activity to left posterior and midline head regions, at times appearing more generalized. Patient transferred to [facility] for higher level of care, continuous EEG monitoring.    REcommendations:  - Continue vEEG  - COntinue Keppra 1000 mg BID  - Propofol tapered off today - will continue close EEG monitoring for further recommendations    Cerebral edema  - Dex 4 mg q6 hours  - NSGY following    Chronic kidney disease, stage III (moderate)  - SCr 1.0, GFR 54.9 today - improving over past several days  - monitor daily    Essential hypertension  - Vitals reviewed, management per NCC      VTE Risk Mitigation (From admission, onward)        Ordered     heparin (porcine) injection 5,000 Units  Every 8 hours      09/04/19 1035          Angela Stringer PA-C  Neurology-Epilepsy  [facility]  Staff: Dr. Lockwood

## 2019-09-05 NOTE — NURSING
Pt's core temperature as read on esophageal probe = 95.9F   Relayed to NCC team. Per ALEJANDRO Ramírez, implement bear hugger. Will continue to monitor.

## 2019-09-05 NOTE — ASSESSMENT & PLAN NOTE
Presented to OSH initially with new onset seizure, MRI brain concerning for brain mass. EEG 9/3 showed lateralized rhythmic delta activity localizing to right hemisphere with some spread of rhythmic activity to left posterior and midline head regions, at times appearing more generalized. Patient transferred to [facility] for higher level of care, continuous EEG monitoring.    REcommendations:  - Continue vEEG  - COntinue Keppra 1000 mg BID  - Propofol tapered off today - will continue close EEG monitoring for further recommendations

## 2019-09-05 NOTE — SUBJECTIVE & OBJECTIVE
Interval History: Propofol tapered off today, patient agitated, now on precedex with intermittent fentanyl pushes. Will continue EEG monitoring.    Current Facility-Administered Medications   Medication Dose Route Frequency Provider Last Rate Last Dose    chlorhexidine 0.12 % solution 15 mL  15 mL Mouth/Throat BID Armand Rm PA-C   15 mL at 09/05/19 0826    dexamethasone injection 4 mg  4 mg Intravenous Q6H Armand Rm PA-C   4 mg at 09/05/19 1134    dexmedetomidine (PRECEDEX) 400mcg/100mL 0.9% NaCL infusion  0.2 mcg/kg/hr Intravenous Continuous Miriam Orlando MD 24.7 mL/hr at 09/05/19 1526 1.4 mcg/kg/hr at 09/05/19 1526    dextrose 10% (D10W) Bolus  12.5 g Intravenous PRN Miriam Orlando MD        famotidine tablet 20 mg  20 mg Per OG tube Daily Armand Rm PA-C   20 mg at 09/05/19 0828    fentaNYL injection 50 mcg  50 mcg Intravenous Q1H PRN Desiree Jerry PA-C   50 mcg at 09/05/19 1534    glucagon (human recombinant) injection 1 mg  1 mg Intramuscular PRN Miriam Orlando MD        heparin (porcine) injection 5,000 Units  5,000 Units Subcutaneous Q8H Miriam Orlando MD   5,000 Units at 09/05/19 1400    insulin aspart U-100 pen 1-10 Units  1-10 Units Subcutaneous Q6H PRN Miriam Orlando MD   6 Units at 09/05/19 0534    levETIRAcetam in NaCl (iso-os) IVPB 1,000 mg  1,000 mg Intravenous Q12H Armand Rm PA-C 200 mL/hr at 09/05/19 0826 1,000 mg at 09/05/19 0826    levothyroxine tablet 75 mcg  75 mcg Per OG tube Before breakfast Johanna Gamboa MD   75 mcg at 09/05/19 0534    magnesium oxide tablet 800 mg  800 mg Per OG tube PRN Miriam Orlando MD        magnesium oxide tablet 800 mg  800 mg Per OG tube PRN Miriam Orlando MD        polyethylene glycol packet 17 g  17 g Per NG tube Daily Johanna Gamboa MD   17 g at 09/05/19 0827    potassium chloride 10% oral solution 40 mEq  40  mEq Per NG tube PRN Miriam Orlando MD        potassium chloride 10% oral solution 40 mEq  40 mEq Per NG tube PRN Miriam Orlando MD        potassium, sodium phosphates 280-160-250 mg packet 2 packet  2 packet Per OG tube PRN Miriam Orlando MD   2 packet at 09/04/19 1642    potassium, sodium phosphates 280-160-250 mg packet 2 packet  2 packet Per OG tube PRN Miriam Orlando MD        potassium, sodium phosphates 280-160-250 mg packet 2 packet  2 packet Per OG tube PRN Miriam Orlando MD        senna-docusate 8.6-50 mg per tablet 1 tablet  1 tablet Per OG tube BID Johanna Gamboa MD   1 tablet at 09/05/19 0827    sodium chloride 0.9% flush 10 mL  10 mL Intravenous PRN Armand Rm PA-C         Continuous Infusions:   dexmedetomidine (PRECEDEX) infusion 1.4 mcg/kg/hr (09/05/19 1526)       Review of Systems   Unable to perform ROS: Intubated     Objective:     Vital Signs (Most Recent):  Temp: 97.3 °F (36.3 °C) (09/05/19 1548)  Pulse: 92 (09/05/19 1548)  Resp: 13 (09/05/19 1548)  BP: (!) 161/78 (09/05/19 1548)  SpO2: 97 % (09/05/19 1548) Vital Signs (24h Range):  Temp:  [96.1 °F (35.6 °C)-98.5 °F (36.9 °C)] 97.3 °F (36.3 °C)  Pulse:  [] 92  Resp:  [13-32] 13  SpO2:  [94 %-100 %] 97 %  BP: ()/(51-78) 161/78     Weight: 70.5 kg (155 lb 6.8 oz)  Body mass index is 27.53 kg/m².    Physical Exam   Constitutional: She appears well-developed and well-nourished.   HENT:   Head: Normocephalic and atraumatic.   Eyes: Pupils are equal, round, and reactive to light. Conjunctivae are normal.   Pulmonary/Chest:   intubated   Skin: Skin is warm and dry. She is not diaphoretic.   Psychiatric:   Unable to assess       NEUROLOGICAL EXAMINATION:     CRANIAL NERVES     CN III, IV, VI   Pupils are equal, round, and reactive to light.       CN:   ELA OU   Corneal intact OU   Face symmetric   Agitated, moving all extremities spontaneously, not following  commands    Exam findings to suggest seizures:  Myoclonus - no   eye twitching - no   Nystagmus - no   gaze deviation - no   waxy rigidity - no        Significant Labs: All pertinent lab results from the past 24 hours have been reviewed.    Significant Studies: I have reviewed all pertinent imaging results/findings within the past 24 hours.

## 2019-09-05 NOTE — PROGRESS NOTES
[facility]  Neurocritical Care  Progress Note    Admit Date: 9/3/2019  Service Date: 09/05/2019  Length of Stay: 2    Subjective:     Chief Complaint: <principal problem not specified>    History of Present Illness: Pt is  75 yo female with a PMHx of CKD 3, HTN, was transferred from OSH to [facility] for new onset seizures and concern for right front lobe mass. The pt was found in her bedroom by her spouse at approximately 9:45 pm sitting her head against the bed, unresponsive, and having seizure like activity on the left-side. EMT was called and the pt was given Versed twice while en route to the hospital. Pt had a second witnessed seizures, left facial droop, left-sided weakness, and tongue ecchymosis in the ED. Neurology was consulted and The pt was given IV Keppra and Vimpat. MRI brain without contrast was acquired. The image showed right frontal lobe vasogenic edema with mass effect concerning for underlying mass. EEG was acquired at outside hospital concerning showing an abnormal result. The pt was given decadron IV and neurosurgical consult recommended. Pt transferred to [facility] and admitted to the [facility] for higher level of care and further evaluation.     Pt history acquired per chart review and from spouse present at the bedside. The pt's spouse denies prior history of seizures CVA, cancer history and up-to-date screenings    Hospital Course: --admitted to [facility] on 9/3 following new onset seizure, arrived intubated  --MRI with large area of R cortical edema with flair and ill defined enhancement on contrast study, concern for glioma vs infectious/inflammatory process  --LP appears non infectious, cytology pending    9/5- no acute events, awaiting LP finalization from pathology report., weaning vent.           Interval History: noeon    Review of Systems:Unable to obtain a complete ROS due to level of consciousness.     Vitals:   Temp: 97.5 °F (36.4 °C)  Pulse: 64  Rhythm: normal sinus rhythm  BP: (!)  88/48  MAP (mmHg): 65  Resp: 14  SpO2: (!) 94 %  Oxygen Concentration (%): 40  O2 Device (Oxygen Therapy): ventilator  Vent Mode: SIMV  Set Rate: 14 bmp  Vt Set: 410 mL  Pressure Support: 10 cmH20  PEEP/CPAP: 5 cmH20  Peak Airway Pressure: 23 cmH2O  Mean Airway Pressure: 8.7 cmH20  Plateau Pressure: 20 cmH20    Temp  Min: 96.1 °F (35.6 °C)  Max: 98.7 °F (37.1 °C)  Pulse  Min: 63  Max: 127  BP  Min: 88/48  Max: 162/74  MAP (mmHg)  Min: 65  Max: 106  Resp  Min: 14  Max: 32  SpO2  Min: 94 %  Max: 100 %  Oxygen Concentration (%)  Min: 40  Max: 40    09/04 0701 - 09/05 0700  In: 2133.2 [I.V.:988.2]  Out: 1085 [Urine:1085]   Unmeasured Output  Urine Occurrence: 1  Stool Occurrence: 0     Examination:   Constitutional: Well-nourished and -developed. No apparent distress.   Eyes: Conjunctiva clear, anicteric. Lids no lesions.  Head/Ears/Nose/Mouth/Throat/Neck: Moist mucous membranes. External ears, nose atraumatic.   Cardiovascular: Regular rhythm. No murmurs. No leg edema.  Respiratory: Comfortable respirations. Clear to auscultation. Intubated   Gastrointestinal: No hernia. Soft, nondistended, nontender. + bowel sounds.    Neurologic:  --on propofol, exam limited   --pupils equal and reactive, face symmetric   -- opens eyes spontaneously, pulling at lines, does not consistently following commands   --moves all extremities spontaneously, localizes to pain, tracks examiner minimally     Medications:   Continuous  dexmedetomidine (PRECEDEX) infusion Last Rate: 1.4 mcg/kg/hr (09/05/19 1401)   Scheduled  chlorhexidine 15 mL BID   dexamethasone 4 mg Q6H   famotidine 20 mg Daily   heparin (porcine) 5,000 Units Q8H   levetiracetam IVPB 1,000 mg Q12H   levothyroxine 75 mcg Before breakfast   polyethylene glycol 17 g Daily   senna-docusate 8.6-50 mg 1 tablet BID   PRN  Dextrose 10% Bolus 12.5 g PRN   fentaNYL 50 mcg Q2H PRN   glucagon (human recombinant) 1 mg PRN   insulin aspart U-100 1-10 Units Q6H PRN   magnesium oxide 800 mg  PRN   magnesium oxide 800 mg PRN   potassium chloride 10% 40 mEq PRN   potassium chloride 10% 40 mEq PRN   potassium, sodium phosphates 2 packet PRN   potassium, sodium phosphates 2 packet PRN   potassium, sodium phosphates 2 packet PRN   sodium chloride 0.9% 10 mL PRN      Today I independently reviewed pertinent medications, lines/drains/airways, imaging, cardiology results,     ISTAT: Recent Labs   Lab 09/05/19  0331   PH 7.467*   PCO2 35.4   PO2 108*   POCSATURATED 99   HCO3 25.6   BE 2   POCTCO2 27   SAMPLE ARTERIAL      Chem: Recent Labs   Lab 09/05/19  0254      K 4.1   *   CO2 21*   *   BUN 18   CREATININE 1.0   ESTGFRAFRICA >60.0   EGFRNONAA 54.9*   CALCIUM 8.4*   MG 1.9   PHOS 2.5*   ANIONGAP 9   PROT 5.8*   ALBUMIN 3.0*   BILITOT 0.3   ALKPHOS 66   AST 25   ALT 34     Heme: Recent Labs   Lab 09/05/19 0254   WBC 8.35   HGB 11.4*   HCT 35.0*   *     Endo:   Recent Labs   Lab 09/04/19  1742 09/05/19  0024 09/05/19  0533   POCTGLUCOSE 164* 198* 264*      Assessment/Plan:     Neuro  Cerebral edema  --continue decadron, see brain lesion    New onset seizure  2/2 to brain mass   On Keppra 1000 BID   EEG monitoring   Epilepsy following       Cardiac/Vascular  Essential hypertension  SBP <180, MAP >65             The patient is being Prophylaxed for:  Venous Thromboembolism with: Mechanical or Chemical  Stress Ulcer with: H2B  Ventilator Pneumonia with: chlorhexidine oral care    Activity Orders          Straight Cath starting at 09/05 0034        Full Code    Desiree Jerry PA-C  Neurocritical Care  [facility]

## 2019-09-05 NOTE — CONSULTS
"  [facility]  Adult Nutrition  Consult Note    SUMMARY     Recommendations    Recommendation/Intervention:   1. Recommend changing TF to better meet pt's protein needs and manage BG levels.   Impact Peptide @ goal rate 40mL/hr.   - Provides 1440kcals, 90g protein and 739mL free water.     2. If able to extubate and advance diet, recommend Regular with texture per SLP recommendations.     RD to monitor.    Goals: Pt to receive >85% EEN and EPN by RD follow up  Nutrition Goal Status: new  Communication of RD Recs: reviewed with RN    Reason for Assessment    Reason For Assessment: consult  Diagnosis: seizures  Relevant Medical History: CKD St 3, HTN  Interdisciplinary Rounds: attended  General Information Comments: Pt intubated, sedated. TF started, tolerating at goal rate. Family at bedside confirming nutrition hx. Pt's UBW approx 145-150lb > 8 years per family, confirmed by chart review. Pt with excellent appetite and intake PTA. No indications of malnutrition at this time.  Nutrition Discharge Planning: unable to determine at this time    Nutrition Risk Screen    Nutrition Risk Screen: other (see comments)(NPO- intubated/ sedated)    Nutrition/Diet History    Spiritual, Cultural Beliefs, Judaism Practices, Values that Affect Care: no  Factors Affecting Nutritional Intake: NPO, on mechanical ventilation    Anthropometrics    Temp: 96.4 °F (35.8 °C)  Height Method: Stated  Height: 5' 3" (160 cm)  Height (inches): 63 in  Weight Method: Bed Scale  Weight: 70.5 kg (155 lb 6.8 oz)  Weight (lb): 155.43 lb  Ideal Body Weight (IBW), Female: 115 lb  % Ideal Body Weight, Female (lb): 133.91 lb  BMI (Calculated): 27.3  BMI Grade: 25 - 29.9 - overweight       Lab/Procedures/Meds    Pertinent Labs Reviewed: reviewed  Pertinent Labs Comments: POCT Glu 164-264  Pertinent Medications Reviewed: reviewed  Pertinent Medications Comments: heparin, propofol    Estimated/Assessed Needs    Weight Used For Calorie " Calculations: 70.5 kg (155 lb 6.8 oz)  Energy Calorie Requirements (kcal): 1348  Energy Need Method: [facility]  Protein Requirements: 85-106g(1.2-1.5g/kg)  Weight Used For Protein Calculations: 70.5 kg (155 lb 6.8 oz)  Fluid Requirements (mL): 1mL/kcal or per MD     RDA Method (mL): 1348         Nutrition Prescription Ordered    Current Diet Order: NPO  Nutrition Order Comments: TF at goal  Current Nutrition Support Formula Ordered: Isosource 1.5  Current Nutrition Support Rate Ordered: 45 (ml)  Current Nutrition Support Frequency Ordered: mL/hr    Evaluation of Received Nutrient/Fluid Intake    Enteral Calories (kcal): 1620  Enteral Protein (gm): 73  Enteral (Free Water) Fluid (mL): 825  Lipid Calories (kcals): 330 kcals(propofol @ 12.5mL/hr)  Total Calories (kcal): 1950    % Kcal Needs: 144  % Protein Needs: 86    I/O: +I/O, good UOP, LBM 9/1    Energy Calories Required: exceeds needs  Protein Required: not meeting needs  Fluid Required: other (see comments)(per MD)    Comments: 0mL residuals 9/5  Tolerance: tolerating    % Intake of Estimated Energy Needs: Other: >100%  % Meal Intake: NPO    Nutrition Risk    Level of Risk/Frequency of Follow-up: high(f/u 2x/week)     Assessment and Plan    Nutrition Problem  Inadequate oral intake    Related to (etiology):   NPO    Signs and Symptoms (as evidenced by):   Pt requiring alternative means of nutrition to meet 85% EEN and EPN.     Intervention:  Collaboration of nutrition care with providers    Nutrition Diagnosis Status:   New      Monitor and Evaluation    Food and Nutrient Intake: energy intake, food and beverage intake, enteral nutrition intake  Food and Nutrient Adminstration: diet order, enteral and parenteral nutrition administration  Anthropometric Measurements: weight, weight change, body mass index  Biochemical Data, Medical Tests and Procedures: electrolyte and renal panel, gastrointestinal profile, glucose/endocrine profile, inflammatory profile, lipid  profile  Nutrition-Focused Physical Findings: overall appearance     Nutrition Follow-Up    RD Follow-up?: Yes

## 2019-09-06 PROBLEM — Z99.11 ON MECHANICALLY ASSISTED VENTILATION: Status: ACTIVE | Noted: 2019-09-06

## 2019-09-06 LAB
ALBUMIN SERPL BCP-MCNC: 2.8 G/DL (ref 3.5–5.2)
ALLENS TEST: ABNORMAL
ALP SERPL-CCNC: 69 U/L (ref 55–135)
ALT SERPL W/O P-5'-P-CCNC: 118 U/L (ref 10–44)
ANION GAP SERPL CALC-SCNC: 9 MMOL/L (ref 8–16)
AST SERPL-CCNC: 48 U/L (ref 10–40)
BASOPHILS # BLD AUTO: 0.01 K/UL (ref 0–0.2)
BASOPHILS NFR BLD: 0.1 % (ref 0–1.9)
BILIRUB SERPL-MCNC: 0.4 MG/DL (ref 0.1–1)
BUN SERPL-MCNC: 26 MG/DL (ref 8–23)
CALCIUM SERPL-MCNC: 8.4 MG/DL (ref 8.7–10.5)
CHLORIDE SERPL-SCNC: 112 MMOL/L (ref 95–110)
CO2 SERPL-SCNC: 21 MMOL/L (ref 23–29)
CREAT SERPL-MCNC: 1 MG/DL (ref 0.5–1.4)
DELSYS: ABNORMAL
DIFFERENTIAL METHOD: ABNORMAL
EOSINOPHIL # BLD AUTO: 0 K/UL (ref 0–0.5)
EOSINOPHIL NFR BLD: 0 % (ref 0–8)
ERYTHROCYTE [DISTWIDTH] IN BLOOD BY AUTOMATED COUNT: 13.6 % (ref 11.5–14.5)
ERYTHROCYTE [SEDIMENTATION RATE] IN BLOOD BY WESTERGREN METHOD: 20 MM/H
EST. GFR  (AFRICAN AMERICAN): >60 ML/MIN/1.73 M^2
EST. GFR  (NON AFRICAN AMERICAN): 54.9 ML/MIN/1.73 M^2
FIO2: 100
GLUCOSE SERPL-MCNC: 180 MG/DL (ref 70–110)
HCO3 UR-SCNC: 23.9 MMOL/L (ref 24–28)
HCT VFR BLD AUTO: 34.5 % (ref 37–48.5)
HGB BLD-MCNC: 10.7 G/DL (ref 12–16)
IMM GRANULOCYTES # BLD AUTO: 0.08 K/UL (ref 0–0.04)
IMM GRANULOCYTES NFR BLD AUTO: 0.9 % (ref 0–0.5)
LYMPHOCYTES # BLD AUTO: 0.8 K/UL (ref 1–4.8)
LYMPHOCYTES NFR BLD: 9.1 % (ref 18–48)
MAGNESIUM SERPL-MCNC: 1.9 MG/DL (ref 1.6–2.6)
MCH RBC QN AUTO: 28.4 PG (ref 27–31)
MCHC RBC AUTO-ENTMCNC: 31 G/DL (ref 32–36)
MCV RBC AUTO: 92 FL (ref 82–98)
MODE: ABNORMAL
MONOCYTES # BLD AUTO: 1 K/UL (ref 0.3–1)
MONOCYTES NFR BLD: 11 % (ref 4–15)
NEUTROPHILS # BLD AUTO: 7.2 K/UL (ref 1.8–7.7)
NEUTROPHILS NFR BLD: 78.9 % (ref 38–73)
NRBC BLD-RTO: 0 /100 WBC
PCO2 BLDA: 36.9 MMHG (ref 35–45)
PEEP: 5
PH SMN: 7.42 [PH] (ref 7.35–7.45)
PHOSPHATE SERPL-MCNC: 2.5 MG/DL (ref 2.7–4.5)
PLATELET # BLD AUTO: 151 K/UL (ref 150–350)
PMV BLD AUTO: 9.4 FL (ref 9.2–12.9)
PO2 BLDA: 181 MMHG (ref 80–100)
POC BE: -1 MMOL/L
POC SATURATED O2: 100 % (ref 95–100)
POC TCO2: 25 MMOL/L (ref 23–27)
POCT GLUCOSE: 157 MG/DL (ref 70–110)
POCT GLUCOSE: 165 MG/DL (ref 70–110)
POCT GLUCOSE: 170 MG/DL (ref 70–110)
POCT GLUCOSE: 191 MG/DL (ref 70–110)
POTASSIUM SERPL-SCNC: 4.8 MMOL/L (ref 3.5–5.1)
PROT SERPL-MCNC: 5.6 G/DL (ref 6–8.4)
RBC # BLD AUTO: 3.77 M/UL (ref 4–5.4)
SAMPLE: ABNORMAL
SITE: ABNORMAL
SODIUM SERPL-SCNC: 142 MMOL/L (ref 136–145)
VT: 410
WBC # BLD AUTO: 9.12 K/UL (ref 3.9–12.7)

## 2019-09-06 PROCEDURE — 63600175 PHARM REV CODE 636 W HCPCS: Performed by: NURSE PRACTITIONER

## 2019-09-06 PROCEDURE — 82803 BLOOD GASES ANY COMBINATION: CPT

## 2019-09-06 PROCEDURE — 94003 VENT MGMT INPAT SUBQ DAY: CPT

## 2019-09-06 PROCEDURE — 63600175 PHARM REV CODE 636 W HCPCS

## 2019-09-06 PROCEDURE — 95813 PR EEG, EXTENDED, 61-119 MINS: ICD-10-PCS | Mod: 26,,, | Performed by: PSYCHIATRY & NEUROLOGY

## 2019-09-06 PROCEDURE — 25000242 PHARM REV CODE 250 ALT 637 W/ HCPCS: Performed by: PSYCHIATRY & NEUROLOGY

## 2019-09-06 PROCEDURE — 94002 VENT MGMT INPAT INIT DAY: CPT

## 2019-09-06 PROCEDURE — 20000000 HC ICU ROOM

## 2019-09-06 PROCEDURE — 80053 COMPREHEN METABOLIC PANEL: CPT

## 2019-09-06 PROCEDURE — 99900026 HC AIRWAY MAINTENANCE (STAT)

## 2019-09-06 PROCEDURE — 94761 N-INVAS EAR/PLS OXIMETRY MLT: CPT

## 2019-09-06 PROCEDURE — 94640 AIRWAY INHALATION TREATMENT: CPT

## 2019-09-06 PROCEDURE — 36430 TRANSFUSION BLD/BLD COMPNT: CPT

## 2019-09-06 PROCEDURE — 25000003 PHARM REV CODE 250: Performed by: PSYCHIATRY & NEUROLOGY

## 2019-09-06 PROCEDURE — 25000242 PHARM REV CODE 250 ALT 637 W/ HCPCS: Performed by: STUDENT IN AN ORGANIZED HEALTH CARE EDUCATION/TRAINING PROGRAM

## 2019-09-06 PROCEDURE — 95951 PR EEG MONITORING/VIDEORECORD: ICD-10-PCS | Mod: 26,,, | Performed by: PSYCHIATRY & NEUROLOGY

## 2019-09-06 PROCEDURE — 25000003 PHARM REV CODE 250: Performed by: NURSE PRACTITIONER

## 2019-09-06 PROCEDURE — 85025 COMPLETE CBC W/AUTO DIFF WBC: CPT

## 2019-09-06 PROCEDURE — 99291 PR CRITICAL CARE, E/M 30-74 MINUTES: ICD-10-PCS | Mod: 25,,, | Performed by: NURSE PRACTITIONER

## 2019-09-06 PROCEDURE — 36600 WITHDRAWAL OF ARTERIAL BLOOD: CPT

## 2019-09-06 PROCEDURE — 63600175 PHARM REV CODE 636 W HCPCS: Performed by: STUDENT IN AN ORGANIZED HEALTH CARE EDUCATION/TRAINING PROGRAM

## 2019-09-06 PROCEDURE — 95813 EEG EXTND MNTR 61-119 MIN: CPT | Mod: 26,,, | Performed by: PSYCHIATRY & NEUROLOGY

## 2019-09-06 PROCEDURE — 84100 ASSAY OF PHOSPHORUS: CPT

## 2019-09-06 PROCEDURE — 31500 INSERT EMERGENCY AIRWAY: CPT | Mod: GC,,, | Performed by: PSYCHIATRY & NEUROLOGY

## 2019-09-06 PROCEDURE — 95951 HC EEG MONITORING/VIDEO RECORD: CPT

## 2019-09-06 PROCEDURE — 25000003 PHARM REV CODE 250: Performed by: STUDENT IN AN ORGANIZED HEALTH CARE EDUCATION/TRAINING PROGRAM

## 2019-09-06 PROCEDURE — 99232 SBSQ HOSP IP/OBS MODERATE 35: CPT | Mod: ,,, | Performed by: NEUROLOGICAL SURGERY

## 2019-09-06 PROCEDURE — 31500 PR INSERT, EMERGENCY ENDOTRACH AIRWAY: ICD-10-PCS | Mod: GC,,, | Performed by: PSYCHIATRY & NEUROLOGY

## 2019-09-06 PROCEDURE — 99291 CRITICAL CARE FIRST HOUR: CPT | Mod: 25,,, | Performed by: NURSE PRACTITIONER

## 2019-09-06 PROCEDURE — 99900035 HC TECH TIME PER 15 MIN (STAT)

## 2019-09-06 PROCEDURE — 63600175 PHARM REV CODE 636 W HCPCS: Performed by: PSYCHIATRY & NEUROLOGY

## 2019-09-06 PROCEDURE — 83735 ASSAY OF MAGNESIUM: CPT

## 2019-09-06 PROCEDURE — 27000221 HC OXYGEN, UP TO 24 HOURS

## 2019-09-06 PROCEDURE — 95951 PR EEG MONITORING/VIDEORECORD: CPT | Mod: 26,,, | Performed by: PSYCHIATRY & NEUROLOGY

## 2019-09-06 PROCEDURE — 99232 PR SUBSEQUENT HOSPITAL CARE,LEVL II: ICD-10-PCS | Mod: ,,, | Performed by: NEUROLOGICAL SURGERY

## 2019-09-06 RX ORDER — FUROSEMIDE 10 MG/ML
INJECTION INTRAMUSCULAR; INTRAVENOUS
Status: COMPLETED
Start: 2019-09-06 | End: 2019-09-06

## 2019-09-06 RX ORDER — DEXMEDETOMIDINE HYDROCHLORIDE 4 UG/ML
INJECTION, SOLUTION INTRAVENOUS
Status: DISPENSED
Start: 2019-09-06 | End: 2019-09-06

## 2019-09-06 RX ORDER — PROPOFOL 10 MG/ML
5 INJECTION, EMULSION INTRAVENOUS CONTINUOUS
Status: DISCONTINUED | OUTPATIENT
Start: 2019-09-06 | End: 2019-09-06

## 2019-09-06 RX ORDER — FUROSEMIDE 10 MG/ML
40 INJECTION INTRAMUSCULAR; INTRAVENOUS ONCE
Status: COMPLETED | OUTPATIENT
Start: 2019-09-06 | End: 2019-09-06

## 2019-09-06 RX ORDER — ACETAMINOPHEN 325 MG/1
650 TABLET ORAL EVERY 6 HOURS PRN
Status: DISCONTINUED | OUTPATIENT
Start: 2019-09-06 | End: 2019-10-04 | Stop reason: HOSPADM

## 2019-09-06 RX ORDER — MIDAZOLAM HYDROCHLORIDE 1 MG/ML
1 INJECTION INTRAMUSCULAR; INTRAVENOUS ONCE
Status: COMPLETED | OUTPATIENT
Start: 2019-09-06 | End: 2019-09-06

## 2019-09-06 RX ORDER — PROPOFOL 10 MG/ML
5 INJECTION, EMULSION INTRAVENOUS CONTINUOUS
Status: DISCONTINUED | OUTPATIENT
Start: 2019-09-06 | End: 2019-09-18

## 2019-09-06 RX ORDER — LACOSAMIDE 50 MG/1
100 TABLET ORAL EVERY 12 HOURS
Status: DISCONTINUED | OUTPATIENT
Start: 2019-09-06 | End: 2019-09-20

## 2019-09-06 RX ORDER — DEXMEDETOMIDINE HYDROCHLORIDE 4 UG/ML
0.2 INJECTION, SOLUTION INTRAVENOUS CONTINUOUS
Status: DISCONTINUED | OUTPATIENT
Start: 2019-09-06 | End: 2019-09-06

## 2019-09-06 RX ORDER — CHLORHEXIDINE GLUCONATE ORAL RINSE 1.2 MG/ML
15 SOLUTION DENTAL 2 TIMES DAILY
Status: DISCONTINUED | OUTPATIENT
Start: 2019-09-06 | End: 2019-09-23

## 2019-09-06 RX ORDER — PHENYLEPHRINE HCL IN 0.9% NACL 1 MG/10 ML
SYRINGE (ML) INTRAVENOUS
Status: DISPENSED
Start: 2019-09-06 | End: 2019-09-06

## 2019-09-06 RX ORDER — MIDAZOLAM HYDROCHLORIDE 1 MG/ML
INJECTION INTRAMUSCULAR; INTRAVENOUS
Status: COMPLETED
Start: 2019-09-06 | End: 2019-09-06

## 2019-09-06 RX ORDER — ROCURONIUM BROMIDE 10 MG/ML
70 INJECTION, SOLUTION INTRAVENOUS ONCE
Status: COMPLETED | OUTPATIENT
Start: 2019-09-06 | End: 2019-09-06

## 2019-09-06 RX ORDER — ETOMIDATE 2 MG/ML
14 INJECTION INTRAVENOUS ONCE
Status: COMPLETED | OUTPATIENT
Start: 2019-09-06 | End: 2019-09-06

## 2019-09-06 RX ORDER — FAMOTIDINE 20 MG/1
20 TABLET, FILM COATED ORAL NIGHTLY
Status: DISCONTINUED | OUTPATIENT
Start: 2019-09-06 | End: 2019-10-04 | Stop reason: HOSPADM

## 2019-09-06 RX ADMIN — FAMOTIDINE 20 MG: 20 TABLET ORAL at 09:09

## 2019-09-06 RX ADMIN — LEVETIRACETAM 1000 MG: 10 INJECTION INTRAVENOUS at 10:09

## 2019-09-06 RX ADMIN — PROPOFOL 50 MCG/KG/MIN: 10 INJECTION, EMULSION INTRAVENOUS at 10:09

## 2019-09-06 RX ADMIN — SODIUM CHLORIDE 500 ML: 0.9 INJECTION, SOLUTION INTRAVENOUS at 04:09

## 2019-09-06 RX ADMIN — RACEPINEPHRINE HYDROCHLORIDE 0.5 ML: 11.25 SOLUTION RESPIRATORY (INHALATION) at 08:09

## 2019-09-06 RX ADMIN — INSULIN ASPART 1 UNITS: 100 INJECTION, SOLUTION INTRAVENOUS; SUBCUTANEOUS at 11:09

## 2019-09-06 RX ADMIN — FUROSEMIDE 40 MG: 10 INJECTION, SOLUTION INTRAMUSCULAR; INTRAVENOUS at 10:09

## 2019-09-06 RX ADMIN — DEXAMETHASONE SODIUM PHOSPHATE 4 MG: 4 INJECTION, SOLUTION INTRAMUSCULAR; INTRAVENOUS at 11:09

## 2019-09-06 RX ADMIN — ROCURONIUM BROMIDE 70 MG: 10 INJECTION, SOLUTION INTRAVENOUS at 11:09

## 2019-09-06 RX ADMIN — CHLORHEXIDINE GLUCONATE 0.12% ORAL RINSE 15 ML: 1.2 LIQUID ORAL at 09:09

## 2019-09-06 RX ADMIN — RACEPINEPHRINE HYDROCHLORIDE 0.5 ML: 11.25 SOLUTION RESPIRATORY (INHALATION) at 09:09

## 2019-09-06 RX ADMIN — INSULIN ASPART 2 UNITS: 100 INJECTION, SOLUTION INTRAVENOUS; SUBCUTANEOUS at 06:09

## 2019-09-06 RX ADMIN — MIDAZOLAM HYDROCHLORIDE 1 MG: 1 INJECTION INTRAMUSCULAR; INTRAVENOUS at 07:09

## 2019-09-06 RX ADMIN — ETOMIDATE 14 MG: 2 INJECTION, SOLUTION INTRAVENOUS at 11:09

## 2019-09-06 RX ADMIN — MIDAZOLAM HYDROCHLORIDE 1 MG: 1 INJECTION, SOLUTION INTRAMUSCULAR; INTRAVENOUS at 07:09

## 2019-09-06 RX ADMIN — SODIUM CHLORIDE: 0.9 INJECTION, SOLUTION INTRAVENOUS at 05:09

## 2019-09-06 RX ADMIN — HEPARIN SODIUM 5000 UNITS: 5000 INJECTION INTRAVENOUS; SUBCUTANEOUS at 09:09

## 2019-09-06 RX ADMIN — ACETAMINOPHEN 650 MG: 325 TABLET ORAL at 11:09

## 2019-09-06 RX ADMIN — HEPARIN SODIUM 5000 UNITS: 5000 INJECTION INTRAVENOUS; SUBCUTANEOUS at 05:09

## 2019-09-06 RX ADMIN — DEXAMETHASONE SODIUM PHOSPHATE 4 MG: 4 INJECTION, SOLUTION INTRAMUSCULAR; INTRAVENOUS at 05:09

## 2019-09-06 RX ADMIN — HEPARIN SODIUM 5000 UNITS: 5000 INJECTION INTRAVENOUS; SUBCUTANEOUS at 02:09

## 2019-09-06 RX ADMIN — LACOSAMIDE 100 MG: 50 TABLET, FILM COATED ORAL at 09:09

## 2019-09-06 RX ADMIN — PROPOFOL 70 MCG/KG/MIN: 10 INJECTION, EMULSION INTRAVENOUS at 08:09

## 2019-09-06 RX ADMIN — FUROSEMIDE 40 MG: 10 INJECTION INTRAMUSCULAR; INTRAVENOUS at 10:09

## 2019-09-06 RX ADMIN — PROPOFOL 50 MCG/KG/MIN: 10 INJECTION, EMULSION INTRAVENOUS at 05:09

## 2019-09-06 RX ADMIN — INSULIN ASPART 2 UNITS: 100 INJECTION, SOLUTION INTRAVENOUS; SUBCUTANEOUS at 12:09

## 2019-09-06 RX ADMIN — LEVOTHYROXINE SODIUM 75 MCG: 75 TABLET ORAL at 05:09

## 2019-09-06 RX ADMIN — PROPOFOL 65 MCG/KG/MIN: 10 INJECTION, EMULSION INTRAVENOUS at 02:09

## 2019-09-06 RX ADMIN — INSULIN ASPART 1 UNITS: 100 INJECTION, SOLUTION INTRAVENOUS; SUBCUTANEOUS at 02:09

## 2019-09-06 RX ADMIN — INSULIN ASPART 2 UNITS: 100 INJECTION, SOLUTION INTRAVENOUS; SUBCUTANEOUS at 05:09

## 2019-09-06 NOTE — PROGRESS NOTES
[facility]  Neurosurgery  Progress Note    Subjective:     History of Present Illness: 73 y/o female with pmhx CKD and HTN  presented to outside hospital for seizure- like activity this AM. Per  pt was found unconscious this AM, with seizure like activity occurring on the left side. Pt was given versed x 2 via EMS. CTH performed at outside hospital was negative for bleed. Pt had additional seizure in outside hospital ED and was given IV Keppra. EEG ordered and MRI brain w/out contrast concerning for possible infiltratrive process vs post ischemic sequela. Pt transferred to [facility] and admitted to [facility]. MRI brain w/ and w/out obtained that showed large area of edema in right frontal lobe and ill defined enhancement in the frontal lobe concerning for possible cerebritis vs. Neoplasm. NSGY was consulted to evaluate. Pt not on anti-coagulation.       Post-Op Info:  * No surgery found *         Interval History: propofol weaned in attempt to extubate yesterday but placed back on overnight d/t agitation.    Medications:  Continuous Infusions:   sodium chloride 0.9% 75 mL/hr at 09/06/19 0558    propofol 52 mcg/kg/min (09/06/19 0605)     Scheduled Meds:   chlorhexidine  15 mL Mouth/Throat BID    dexamethasone  4 mg Intravenous Q6H    famotidine  20 mg Per OG tube Daily    heparin (porcine)  5,000 Units Subcutaneous Q8H    levetiracetam IVPB  1,000 mg Intravenous Q12H    levothyroxine  75 mcg Per OG tube Before breakfast    polyethylene glycol  17 g Per NG tube Daily    senna-docusate 8.6-50 mg  1 tablet Per OG tube BID     PRN Meds:Dextrose 10% Bolus, glucagon (human recombinant), insulin aspart U-100, magnesium oxide, magnesium oxide, potassium chloride 10%, potassium chloride 10%, potassium, sodium phosphates, potassium, sodium phosphates, potassium, sodium phosphates, sodium chloride 0.9%     Review of Systems  Objective:     Weight: 70.5 kg (155 lb 6.8 oz)  Body mass index is 27.53  kg/m².  Vital Signs (Most Recent):  Temp: 98.4 °F (36.9 °C) (09/06/19 0305)  Pulse: 69 (09/06/19 0605)  Resp: 14 (09/06/19 0605)  BP: (!) 133/58 (09/06/19 0605)  SpO2: 100 % (09/06/19 0605) Vital Signs (24h Range):  Temp:  [94.3 °F (34.6 °C)-98.4 °F (36.9 °C)] 98.4 °F (36.9 °C)  Pulse:  [] 69  Resp:  [13-40] 14  SpO2:  [91 %-100 %] 100 %  BP: ()/() 133/58                Vent Mode: A/C  Oxygen Concentration (%):  [40] 40  Resp Rate Total:  [11 br/min-38 br/min] 14 br/min  Vt Set:  [400 mL-410 mL] 410 mL  PEEP/CPAP:  [5 cmH20] 5 cmH20  Pressure Support:  [0 cmH20-10 cmH20] 0 cmH20  Mean Airway Pressure:  [4.9 euL25-92 cmH20] 11 cmH20         NG/OG Tube 09/03/19 1100 orogastric Center mouth (Active)   Placement Check placement verified by aspirate characteristics;placement verified by distal tube length measurement;placement verified by x-ray 9/5/2019  3:01 PM   Tolerance no signs/symptoms of discomfort 9/5/2019  3:01 PM   Securement secured to commercial device 9/5/2019  3:01 PM   Clamp Status/Tolerance unclamped 9/5/2019  3:01 PM   Suction Setting/Drainage Method suction at the bedside 9/4/2019  3:05 AM   Insertion Site Appearance no redness, warmth, tenderness, skin breakdown, drainage 9/5/2019  3:01 PM   Flush/Irrigation flushed w/;water 9/5/2019  3:01 PM   Feeding Method continuous 9/5/2019  3:01 PM   Feeding Action feeding continued 9/5/2019  3:01 PM   Current Rate (mL/hr) 45 mL/hr 9/5/2019  7:01 AM   Goal Rate (mL/hr) 45 mL/hr 9/5/2019  7:01 AM   Intake (mL) 60 mL 9/5/2019  9:01 AM   Water Bolus (mL) 60 mL 9/5/2019  6:00 AM   Formula Name isosource 9/5/2019  3:01 PM   Intake (mL) - Formula Tube Feeding 45 9/6/2019  6:05 AM   Residual Amount (ml) 0 ml 9/5/2019  3:03 AM       Female External Urinary Catheter 09/04/19 5252 (Active)   Skin no redness;no breakdown 9/6/2019  3:05 AM   Tolerance no signs/symptoms of discomfort 9/6/2019  3:05 AM   Suction Continuous suction at 40 mmHg 9/6/2019  3:05 AM    Date of last wick change 09/06/19 9/6/2019  3:05 AM   Time of last wick change 0025 9/6/2019  3:05 AM       Neurosurgery Physical Exam   [id number]  PERRL, +corneals, +cough/gag  Localizing in BLE, minimal movement in BUE    Significant Labs:  Recent Labs   Lab 09/05/19  0254 09/06/19  0210   * 180*    142   K 4.1 4.8   * 112*   CO2 21* 21*   BUN 18 26*   CREATININE 1.0 1.0   CALCIUM 8.4* 8.4*   MG 1.9 1.9     Recent Labs   Lab 09/04/19  0813 09/05/19  0254 09/06/19  0210   WBC 10.32 8.35 9.12   HGB 12.6 11.4* 10.7*   HCT 38.3 35.0* 34.5*    145* 151     No results for input(s): LABPT, INR, APTT in the last 48 hours.  Microbiology Results (last 7 days)     Procedure Component Value Units Date/Time    Gram stain [[id number]] Collected:  09/03/19 1843    Order Status:  Completed Specimen:  CSF (Spinal Fluid) from CSF Tap, Tube 2 Updated:  09/03/19 2153     Gram Stain Result No WBC's      No organisms seen            Significant Diagnostics:      Assessment/Plan:     New onset seizure  75 y/o female with pmhx CKD and HTN presented to outside hospital for seizure- like activity occurring on the left side. Found to have area of ill-defined enhancement on MRI brain w/ and w/out.     -q 1 hr neuro checks  -Fu cEEG, wean propofol, continue keppra.  -Fu infectious rodriguez  -Continue steroids for now  -WTE when medically stable.  -Further care per NCC, will follow.          Kingsley Lisa, DO  Neurosurgery  [facility]

## 2019-09-06 NOTE — ASSESSMENT & PLAN NOTE
2/2 to brain mass   On Keppra 1000 BID- may need changed to Vimpat if continued agitation  Continue EEG monitoring   Epilepsy following

## 2019-09-06 NOTE — PROGRESS NOTES
[facility]  Neurocritical Care  Progress Note    Admit Date: 9/3/2019  Service Date: 09/06/2019  Length of Stay: 3    Subjective:     Chief Complaint: Brain lesion    History of Present Illness: Pt is  73 yo female with a PMHx of CKD 3, HTN, was transferred from [facility] to [facility] [place] new onset seizures and concern for right front lobe mass. The pt was found in her bedroom by her spouse at approximately 9:45 pm sitting her head against the bed, unresponsive, and having seizure like activity on the left-side. EMT was called and the pt was given Versed twice while en route to the hospital. Pt had a second witnessed seizures, left facial droop, left-sided weakness, and tongue ecchymosis in the ED. Neurology was consulted and The pt was given IV Keppra and Vimpat. MRI brain without contrast was acquired. The image showed right frontal lobe vasogenic edema with mass effect concerning for underlying mass. EEG was acquired at outside hospital concerning showing an abnormal result. The pt was given decadron IV and neurosurgical consult recommended. Pt transferred to [facility] and admitted to the [facility] for higher level of care and further evaluation.     Pt history acquired per chart review and from spouse present at the bedside. The pt's spouse denies prior history of seizures CVA, cancer history and up-to-date screenings    Hospital Course: --admitted to [facility] on 9/3 following new onset seizure, arrived intubated  --MRI with large area of R cortical edema with flair and ill defined enhancement on contrast study, concern for glioma vs infectious/inflammatory process  --LP appears non infectious, cytology pending  9/5- no acute events, awaiting LP finalization from pathology report., weaning vent.   09/06/2019: Very agitated overnight, requiring increased sedation. Patient extubated this morning on rounds, and reintubated shortly after. Unable to maintain airway and secretions. CSF gram stain negative, cytology negative  for malignant cells. Changed Keppra to Vimpat.    Review of Systems: Unable to obtain a complete ROS due to level of consciousness.     Vitals:   Temp: 99.1 °F (37.3 °C)  Pulse: 96  Rhythm: normal sinus rhythm  BP: (!) 147/61  MAP (mmHg): 95  Resp: 20  SpO2: 96 %  Oxygen Concentration (%): 40  O2 Device (Oxygen Therapy): ambu bag  Vent Mode: A/C  Set Rate: 20 bmp  Vt Set: 410 mL  Pressure Support: 0 cmH20  PEEP/CPAP: 5 cmH20  Peak Airway Pressure: 25 cmH2O  Mean Airway Pressure: 11 cmH20  Plateau Pressure: 19 cmH20    Temp  Min: 94.3 °F (34.6 °C)  Max: 99.1 °F (37.3 °C)  Pulse  Min: 64  Max: 152  BP  Min: 98/50  Max: 194/84  MAP (mmHg)  Min: 71  Max: 124  Resp  Min: 13  Max: 48  SpO2  Min: 84 %  Max: 100 %  Oxygen Concentration (%)  Min: 36  Max: 100    09/05 0701 - 09/06 0700  In: 2393.8 [I.V.:733.8]  Out: -    Unmeasured Output  Urine Occurrence: 1  Stool Occurrence: 0  Emesis Occurrence: 0  Pad Count: 2     Examination:   Constitutional: Well-nourished and -developed. No apparent distress.   Eyes: Conjunctiva clear, anicteric. Lids no lesions.  Head/Ears/Nose/Mouth/Throat/Neck: Moist mucous membranes. External ears, nose atraumatic.   Cardiovascular: Regular rhythm. No murmurs. No leg edema.  Respiratory: Comfortable respirations. Clear to auscultation.  Gastrointestinal: No hernia. Soft, nondistended, nontender. + bowel sounds.    Neurologic:  -GCS E 4 V 1t M 5  -Restless. Intubated. Sedated. Unable to follow commands.  -Cranial nerves: EOM intact, PERRL,  -Motor: Moves all extremities spontaneously and antigravity  -Sensation: Intact to noxious stimuli  Unable to test orientation, language, memory, judgment, insight, fund of knowledge, hearing, shoulder shrug, tongue protrusion, coordination, gait due to level of consciousness.    Medications:   Continuous  propofol Last Rate: 65 mcg/kg/min (09/06/19 1455)   Scheduled  chlorhexidine 15 mL BID   dexamethasone 4 mg Q6H   dexMEDEtomidine in 0.9 % NaCl      famotidine 20 mg QHS   heparin (porcine) 5,000 Units Q8H   levetiracetam IVPB 1,000 mg Q12H   levothyroxine 75 mcg Before breakfast   phenylephrine HCl in 0.9% NaCl     polyethylene glycol 17 g Daily   senna-docusate 8.6-50 mg 1 tablet BID   PRN  acetaminophen 650 mg Q6H PRN   Dextrose 10% Bolus 12.5 g PRN   glucagon (human recombinant) 1 mg PRN   insulin aspart U-100 1-10 Units Q6H PRN   magnesium oxide 800 mg PRN   magnesium oxide 800 mg PRN   potassium chloride 10% 40 mEq PRN   potassium chloride 10% 40 mEq PRN   potassium, sodium phosphates 2 packet PRN   potassium, sodium phosphates 2 packet PRN   potassium, sodium phosphates 2 packet PRN   racepinephrine 0.5 mL Q4H PRN   sodium chloride 0.9% 10 mL PRN      Today I independently reviewed pertinent medications, lines/drains/airways, imaging, cardiology results, laboratory results, microbiology results, notably:     ISTAT: Recent Labs   Lab 09/06/19  1048   PH 7.419   PCO2 36.9   PO2 181*   POCSATURATED 100   HCO3 23.9*   BE -1   POCTCO2 25   SAMPLE ARTERIAL      Chem: Recent Labs   Lab 09/06/19  0210      K 4.8   *   CO2 21*   *   BUN 26*   CREATININE 1.0   ESTGFRAFRICA >60.0   EGFRNONAA 54.9*   CALCIUM 8.4*   MG 1.9   PHOS 2.5*   ANIONGAP 9   PROT 5.6*   ALBUMIN 2.8*   BILITOT 0.4   ALKPHOS 69   AST 48*   *     Heme: Recent Labs   Lab 09/06/19  0210   WBC 9.12   HGB 10.7*   HCT 34.5*        Endo:   Recent Labs   Lab 09/06/19  0227 09/06/19  0650 09/06/19  1202   POCTGLUCOSE 191* 170* 157*          Assessment/Plan:     Neuro  * Brain lesion  74 year old admitted to unit on 9/3 following new onset seizure, with MRI showing large area of R cortical edema with flair and ill defined enhancement on contrast study, concern for glioma vs infectious/inflammatory process  -- LP appears non infectious, Gram stain negative, cytology negative for malignant cells, will need repeat MRI once extubated  -- continue decadron  -- EEG monitoring,  no further seizures  -- Propofol for sedation- requiring high doses  -- Change keppra 1000 bid to Vimpat 100 BID  -- NSGY, Epilepsy following   -- Hourly neuro checks  -- Hourly vital signs    Cerebral edema  --continue decadron, see brain lesion    New onset seizure  2/2 to brain mass   Changed to Vimpat 100 mg BID 9/6  Continue EEG monitoring   Epilepsy following       Pulmonary  On mechanically assisted ventilation  Daily CXR, ABG  Continue Peridex, Famotidine, Heparin subq  Trial of extubation 9/6/2019- re-intubated shortly after extubation for airway protection    Vent Mode: A/C  Oxygen Concentration (%):  [] 40  Resp Rate Total:  [11 br/min-36 br/min] 20 br/min  Vt Set:  [410 mL] 410 mL  PEEP/CPAP:  [5 cmH20] 5 cmH20  Pressure Support:  [0 cmH20-10 cmH20] 0 cmH20  Mean Airway Pressure:  [6.7 yaR27-27 cmH20] 11 cmH20      Cardiac/Vascular  Essential hypertension  SBP <180, MAP >65     The patient is being Prophylaxed for:  Venous Thromboembolism with: Mechanical or Chemical  Stress Ulcer with: H2B  Ventilator Pneumonia with: chlorhexidine oral care    Activity Orders          Straight Cath starting at 09/05 0034        Full Code    CC time spent: 45 minutes    Lolita Salinas NP  Neurocritical Care  [facility]

## 2019-09-06 NOTE — ASSESSMENT & PLAN NOTE
Daily CXR, ABG  Continue Peridex, Famotidine, Heparin subq  Trial of extubation 9/6/2019- re-intubated shortly after extubation for airway protection    Vent Mode: A/C  Oxygen Concentration (%):  [] 40  Resp Rate Total:  [11 br/min-36 br/min] 20 br/min  Vt Set:  [410 mL] 410 mL  PEEP/CPAP:  [5 cmH20] 5 cmH20  Pressure Support:  [0 cmH20-10 cmH20] 0 cmH20  Mean Airway Pressure:  [6.7 duJ10-54 cmH20] 11 cmH20

## 2019-09-06 NOTE — PROGRESS NOTES
0853: Extubated with neuro critical care team and RN at bedside.    Breath sounds: stridorous  Resp. Pattern: labored, retractions    Administered: Racepinephrine nebulization    No change in respiratory status after tx.    0915: Intubated pt with a 7.5 ETT secured at 23cm lips.    Intubated by MILLA Gamboa MD    Placement verified by end tidal co2, equal breath sounds, x-ray.    RN, RT at bedside.    Pt placed on below settings:    VC/AC: RR 20/  / PEEP 5 / FIO2 100%    1048: ABG done post intubation    PH 7.41 / CO2 36.9 / O2 181 / HCO3 23.9

## 2019-09-06 NOTE — PLAN OF CARE
Problem: Adult Inpatient Plan of Care  Goal: Plan of Care Review  Outcome: Ongoing (interventions implemented as appropriate)  POC reviewed with pt and family at 1400.  verbalized understanding. Questions and concerns addressed. Pt extubated and emergently re-intubated this morning. Neuro exam remains the same. Propofol gtt continued. Pt progressing toward goals. Will continue to monitor. See flowsheets for full assessment and VS info.

## 2019-09-06 NOTE — PLAN OF CARE
Problem: Adult Inpatient Plan of Care  Goal: Plan of Care Review  Outcome: Ongoing (interventions implemented as appropriate)  POC reviewed with pt and spouse at 0500. Pt intubated and sedated. Questions and concerns addressed. 1000mL NS bolus x1 and 500mL NS bolus x1 administered throughout the night due to  hypotension. Propofol at 52mcg/kg/min No acute events overnight. Pt progressing toward goals. Will continue to monitor. See flowsheets for full assessment and VS info

## 2019-09-06 NOTE — SUBJECTIVE & OBJECTIVE
Interval History: propofol weaned in attempt to extubate yesterday but placed back on overnight d/t agitation.    Medications:  Continuous Infusions:   sodium chloride 0.9% 75 mL/hr at 09/06/19 0558    propofol 52 mcg/kg/min (09/06/19 0605)     Scheduled Meds:   chlorhexidine  15 mL Mouth/Throat BID    dexamethasone  4 mg Intravenous Q6H    famotidine  20 mg Per OG tube Daily    heparin (porcine)  5,000 Units Subcutaneous Q8H    levetiracetam IVPB  1,000 mg Intravenous Q12H    levothyroxine  75 mcg Per OG tube Before breakfast    polyethylene glycol  17 g Per NG tube Daily    senna-docusate 8.6-50 mg  1 tablet Per OG tube BID     PRN Meds:Dextrose 10% Bolus, glucagon (human recombinant), insulin aspart U-100, magnesium oxide, magnesium oxide, potassium chloride 10%, potassium chloride 10%, potassium, sodium phosphates, potassium, sodium phosphates, potassium, sodium phosphates, sodium chloride 0.9%     Review of Systems  Objective:     Weight: 70.5 kg (155 lb 6.8 oz)  Body mass index is 27.53 kg/m².  Vital Signs (Most Recent):  Temp: 98.4 °F (36.9 °C) (09/06/19 0305)  Pulse: 69 (09/06/19 0605)  Resp: 14 (09/06/19 0605)  BP: (!) 133/58 (09/06/19 0605)  SpO2: 100 % (09/06/19 0605) Vital Signs (24h Range):  Temp:  [94.3 °F (34.6 °C)-98.4 °F (36.9 °C)] 98.4 °F (36.9 °C)  Pulse:  [] 69  Resp:  [13-40] 14  SpO2:  [91 %-100 %] 100 %  BP: ()/() 133/58                Vent Mode: A/C  Oxygen Concentration (%):  [40] 40  Resp Rate Total:  [11 br/min-38 br/min] 14 br/min  Vt Set:  [400 mL-410 mL] 410 mL  PEEP/CPAP:  [5 cmH20] 5 cmH20  Pressure Support:  [0 cmH20-10 cmH20] 0 cmH20  Mean Airway Pressure:  [4.9 utG16-86 cmH20] 11 cmH20         NG/OG Tube 09/03/19 1100 orogastric Center mouth (Active)   Placement Check placement verified by aspirate characteristics;placement verified by distal tube length measurement;placement verified by x-ray 9/5/2019  3:01 PM   Tolerance no signs/symptoms of discomfort  9/5/2019  3:01 PM   Securement secured to commercial device 9/5/2019  3:01 PM   Clamp Status/Tolerance unclamped 9/5/2019  3:01 PM   Suction Setting/Drainage Method suction at the bedside 9/4/2019  3:05 AM   Insertion Site Appearance no redness, warmth, tenderness, skin breakdown, drainage 9/5/2019  3:01 PM   Flush/Irrigation flushed w/;water 9/5/2019  3:01 PM   Feeding Method continuous 9/5/2019  3:01 PM   Feeding Action feeding continued 9/5/2019  3:01 PM   Current Rate (mL/hr) 45 mL/hr 9/5/2019  7:01 AM   Goal Rate (mL/hr) 45 mL/hr 9/5/2019  7:01 AM   Intake (mL) 60 mL 9/5/2019  9:01 AM   Water Bolus (mL) 60 mL 9/5/2019  6:00 AM   Formula Name isosource 9/5/2019  3:01 PM   Intake (mL) - Formula Tube Feeding 45 9/6/2019  6:05 AM   Residual Amount (ml) 0 ml 9/5/2019  3:03 AM       Female External Urinary Catheter 09/04/19 2301 (Active)   Skin no redness;no breakdown 9/6/2019  3:05 AM   Tolerance no signs/symptoms of discomfort 9/6/2019  3:05 AM   Suction Continuous suction at 40 mmHg 9/6/2019  3:05 AM   Date of last wick change 09/06/19 9/6/2019  3:05 AM   Time of last wick change 0025 9/6/2019  3:05 AM       Neurosurgery Physical Exam   E1VTM5  PERRL, +corneals, +cough/gag  Localizing in BLE, minimal movement in BUE    Significant Labs:  Recent Labs   Lab 09/05/19  0254 09/06/19  0210   * 180*    142   K 4.1 4.8   * 112*   CO2 21* 21*   BUN 18 26*   CREATININE 1.0 1.0   CALCIUM 8.4* 8.4*   MG 1.9 1.9     Recent Labs   Lab 09/04/19  0813 09/05/19  0254 09/06/19  0210   WBC 10.32 8.35 9.12   HGB 12.6 11.4* 10.7*   HCT 38.3 35.0* 34.5*    145* 151     No results for input(s): LABPT, INR, APTT in the last 48 hours.  Microbiology Results (last 7 days)     Procedure Component Value Units Date/Time    Gram stain [[id number]] Collected:  09/03/19 1843    Order Status:  Completed Specimen:  CSF (Spinal Fluid) from CSF Tap, Tube 2 Updated:  09/03/19 2153     Gram Stain Result No WBC's      No  organisms seen            Significant Diagnostics:

## 2019-09-06 NOTE — PROGRESS NOTES
Cuff leak test performed.      Cuff pressure deflated to 0. Pt maintained volumes.     Absence of air leak.      Inflated cuff pressure to 20.

## 2019-09-06 NOTE — PLAN OF CARE
09/06/19 1527   Discharge Reassessment   Assessment Type Discharge Planning Reassessment   Provided patient/caregiver education on the expected discharge date and the discharge plan No   Do you have any problems affording any of your prescribed medications? TBD   Discharge Plan A Long-term acute care facility (LTAC)   Discharge Plan B Skilled Nursing Facility   DME Needed Upon Discharge  other (see comments)  (tbd)   Patient choice form signed by patient/caregiver N/A   Anticipated Discharge Disposition LTAC   Can the patient answer the patient profile reliably? No, cognitively impaired   How does the patient rate their overall health at the present time?   (porsha)   Describe the patient's ability to walk at the present time. Does not walk or unable to take any steps at all   How often would a person be available to care for the patient? Whenever needed   Number of comorbid conditions (as recorded on the chart) Five or more   During the past month, has the patient often been bothered by feeling down, depressed or hopeless?   (porsha)   During the past month, has the patient often been bothered by little interest or pleasure in doing things?   (porsha)   Post-Acute Status   Post-Acute Authorization Placement   Post-Acute Placement Status Awaiting Internal Medical Clearance  (patient reintubated today)   Discharge Delays None known at this time       Keely Malone RN, CCRN-K, [facility]  Neuro-Critical Care   [phone]

## 2019-09-06 NOTE — SUBJECTIVE & OBJECTIVE
Review of Systems: Unable to obtain a complete ROS due to level of consciousness.     Vitals:   Temp: 99.1 °F (37.3 °C)  Pulse: 96  Rhythm: normal sinus rhythm  BP: (!) 147/61  MAP (mmHg): 95  Resp: 20  SpO2: 96 %  Oxygen Concentration (%): 40  O2 Device (Oxygen Therapy): ambu bag  Vent Mode: A/C  Set Rate: 20 bmp  Vt Set: 410 mL  Pressure Support: 0 cmH20  PEEP/CPAP: 5 cmH20  Peak Airway Pressure: 25 cmH2O  Mean Airway Pressure: 11 cmH20  Plateau Pressure: 19 cmH20    Temp  Min: 94.3 °F (34.6 °C)  Max: 99.1 °F (37.3 °C)  Pulse  Min: 64  Max: 152  BP  Min: 98/50  Max: 194/84  MAP (mmHg)  Min: 71  Max: 124  Resp  Min: 13  Max: 48  SpO2  Min: 84 %  Max: 100 %  Oxygen Concentration (%)  Min: 36  Max: 100    09/05 0701 - 09/06 0700  In: 2393.8 [I.V.:733.8]  Out: -    Unmeasured Output  Urine Occurrence: 1  Stool Occurrence: 0  Emesis Occurrence: 0  Pad Count: 2     Examination:   Constitutional: Well-nourished and -developed. No apparent distress.   Eyes: Conjunctiva clear, anicteric. Lids no lesions.  Head/Ears/Nose/Mouth/Throat/Neck: Moist mucous membranes. External ears, nose atraumatic.   Cardiovascular: Regular rhythm. No murmurs. No leg edema.  Respiratory: Comfortable respirations. Clear to auscultation.  Gastrointestinal: No hernia. Soft, nondistended, nontender. + bowel sounds.    Neurologic:  -GCS E 4 V 1t M 5  -Restless. Intubated. Sedated. Unable to follow commands.  -Cranial nerves: EOM intact, PERRL,  -Motor: Moves all extremities spontaneously and antigravity  -Sensation: Intact to noxious stimuli  Unable to test orientation, language, memory, judgment, insight, fund of knowledge, hearing, shoulder shrug, tongue protrusion, coordination, gait due to level of consciousness.    Medications:   Continuous  propofol Last Rate: 65 mcg/kg/min (09/06/19 1455)   Scheduled  chlorhexidine 15 mL BID   dexamethasone 4 mg Q6H   dexMEDEtomidine in 0.9 % NaCl     famotidine 20 mg QHS   heparin (porcine) 5,000 Units Q8H    levetiracetam IVPB 1,000 mg Q12H   levothyroxine 75 mcg Before breakfast   phenylephrine HCl in 0.9% NaCl     polyethylene glycol 17 g Daily   senna-docusate 8.6-50 mg 1 tablet BID   PRN  acetaminophen 650 mg Q6H PRN   Dextrose 10% Bolus 12.5 g PRN   glucagon (human recombinant) 1 mg PRN   insulin aspart U-100 1-10 Units Q6H PRN   magnesium oxide 800 mg PRN   magnesium oxide 800 mg PRN   potassium chloride 10% 40 mEq PRN   potassium chloride 10% 40 mEq PRN   potassium, sodium phosphates 2 packet PRN   potassium, sodium phosphates 2 packet PRN   potassium, sodium phosphates 2 packet PRN   racepinephrine 0.5 mL Q4H PRN   sodium chloride 0.9% 10 mL PRN      Today I independently reviewed pertinent medications, lines/drains/airways, imaging, cardiology results, laboratory results, microbiology results, notably:     ISTAT: Recent Labs   Lab 09/06/19  1048   PH 7.419   PCO2 36.9   PO2 181*   POCSATURATED 100   HCO3 23.9*   BE -1   POCTCO2 25   SAMPLE ARTERIAL      Chem: Recent Labs   Lab 09/06/19  0210      K 4.8   *   CO2 21*   *   BUN 26*   CREATININE 1.0   ESTGFRAFRICA >60.0   EGFRNONAA 54.9*   CALCIUM 8.4*   MG 1.9   PHOS 2.5*   ANIONGAP 9   PROT 5.6*   ALBUMIN 2.8*   BILITOT 0.4   ALKPHOS 69   AST 48*   *     Heme: Recent Labs   Lab 09/06/19  0210   WBC 9.12   HGB 10.7*   HCT 34.5*        Endo:   Recent Labs   Lab 09/06/19  0227 09/06/19  0650 09/06/19  1202   POCTGLUCOSE 191* 170* 157*

## 2019-09-06 NOTE — PROCEDURES
"Clemencia Nguyen is a 74 y.o. female patient.    Temp: 99.1 °F (37.3 °C) (09/06/19 1101)  Pulse: 96 (09/06/19 1401)  Resp: 20 (09/06/19 1401)  BP: (!) 147/61 (09/06/19 1401)  SpO2: 96 % (09/06/19 1401)  Weight: 70.5 kg (155 lb 6.8 oz) (09/05/19 0309)  Height: 5' 3" (160 cm) (09/04/19 0947)       Intubation  Date/Time: 9/6/2019 2:43 PM  Location procedure was performed: [facility] NEURO CRITICAL CARE  Performed by: Johanna Gamboa MD  Authorized by: Johanna Gamboa MD   Consent Done: Yes  Consent: Verbal consent obtained.  Consent given by: spouse  Patient identity confirmed: MRN, name and provided demographic data  Indications: respiratory distress and  airway protection  Intubation method: direct  Preoxygenation: mask  Sedatives: etomidate  Paralytic: rocuronium  Laryngoscope size: Mac 3  Tube size: 7.5 mm  Tube type: cuffed  Number of attempts: 1  Cricoid pressure: no  Cords visualized: yes  Post-procedure assessment: chest rise and CO2 detector  Breath sounds: rales/crackles  Cuff inflated: yes  ETT to lip: 23 cm  Tube secured with: adhesive tape, bite block and ETT russell  Chest x-ray interpreted by me.  Chest x-ray findings: endotracheal tube in appropriate position  Patient tolerance: Patient tolerated the procedure well with no immediate complications  Complications: No  Specimens: No  Implants: No          Johanna Gamboa  9/6/2019  "

## 2019-09-06 NOTE — NURSING
Pt extubated at bedside by Dr. Gamboa and [facility] team members. Shortly after extubation,  pt was emergently re-intubated at bedside by Dr. Gamboa.  Re-intubation without complication. Propofol gtt restarted per order. VSS. See flowsheet for further information. Will continue to monitor.

## 2019-09-06 NOTE — ASSESSMENT & PLAN NOTE
73 y/o female with pmhx CKD and HTN presented to outside hospital for seizure- like activity occurring on the left side. Found to have area of ill-defined enhancement on MRI brain w/ and w/out.     -q 1 hr neuro checks  -Fu cEEG, wean propofol, continue keppra.  -Fu infectious rodriguez  -Continue steroids for now  -WTE when medically stable.  -Further care per NCC, will follow.

## 2019-09-07 LAB
ALBUMIN SERPL BCP-MCNC: 3.1 G/DL (ref 3.5–5.2)
ALLENS TEST: ABNORMAL
ALP SERPL-CCNC: 74 U/L (ref 55–135)
ALT SERPL W/O P-5'-P-CCNC: 94 U/L (ref 10–44)
ANION GAP SERPL CALC-SCNC: 9 MMOL/L (ref 8–16)
AST SERPL-CCNC: 30 U/L (ref 10–40)
BASOPHILS # BLD AUTO: 0.01 K/UL (ref 0–0.2)
BASOPHILS NFR BLD: 0.1 % (ref 0–1.9)
BILIRUB SERPL-MCNC: 0.6 MG/DL (ref 0.1–1)
BUN SERPL-MCNC: 27 MG/DL (ref 8–23)
CALCIUM SERPL-MCNC: 8.5 MG/DL (ref 8.7–10.5)
CHLORIDE SERPL-SCNC: 104 MMOL/L (ref 95–110)
CO2 SERPL-SCNC: 24 MMOL/L (ref 23–29)
CREAT SERPL-MCNC: 1.2 MG/DL (ref 0.5–1.4)
DELSYS: ABNORMAL
DIFFERENTIAL METHOD: ABNORMAL
EOSINOPHIL # BLD AUTO: 0 K/UL (ref 0–0.5)
EOSINOPHIL NFR BLD: 0 % (ref 0–8)
ERYTHROCYTE [DISTWIDTH] IN BLOOD BY AUTOMATED COUNT: 13.2 % (ref 11.5–14.5)
ERYTHROCYTE [SEDIMENTATION RATE] IN BLOOD BY WESTERGREN METHOD: 20 MM/H
EST. GFR  (AFRICAN AMERICAN): 50.7 ML/MIN/1.73 M^2
EST. GFR  (NON AFRICAN AMERICAN): 44 ML/MIN/1.73 M^2
FIO2: 40
GLUCOSE SERPL-MCNC: 189 MG/DL (ref 70–110)
HCO3 UR-SCNC: 25.5 MMOL/L (ref 24–28)
HCT VFR BLD AUTO: 36.3 % (ref 37–48.5)
HGB BLD-MCNC: 11.5 G/DL (ref 12–16)
IMM GRANULOCYTES # BLD AUTO: 0.07 K/UL (ref 0–0.04)
IMM GRANULOCYTES NFR BLD AUTO: 0.8 % (ref 0–0.5)
LYMPHOCYTES # BLD AUTO: 1.1 K/UL (ref 1–4.8)
LYMPHOCYTES NFR BLD: 12.5 % (ref 18–48)
MAGNESIUM SERPL-MCNC: 1.9 MG/DL (ref 1.6–2.6)
MCH RBC QN AUTO: 28.1 PG (ref 27–31)
MCHC RBC AUTO-ENTMCNC: 31.7 G/DL (ref 32–36)
MCV RBC AUTO: 89 FL (ref 82–98)
MIN VOL: 9
MODE: ABNORMAL
MONOCYTES # BLD AUTO: 0.9 K/UL (ref 0.3–1)
MONOCYTES NFR BLD: 10.6 % (ref 4–15)
NEUTROPHILS # BLD AUTO: 6.4 K/UL (ref 1.8–7.7)
NEUTROPHILS NFR BLD: 76 % (ref 38–73)
NRBC BLD-RTO: 0 /100 WBC
PCO2 BLDA: 30.6 MMHG (ref 35–45)
PEEP: 5
PH SMN: 7.53 [PH] (ref 7.35–7.45)
PHOSPHATE SERPL-MCNC: 2.1 MG/DL (ref 2.7–4.5)
PIP: 26
PLATELET # BLD AUTO: 180 K/UL (ref 150–350)
PMV BLD AUTO: 8.9 FL (ref 9.2–12.9)
PO2 BLDA: 100 MMHG (ref 80–100)
POC BE: 3 MMOL/L
POC SATURATED O2: 99 % (ref 95–100)
POC TCO2: 26 MMOL/L (ref 23–27)
POCT GLUCOSE: 154 MG/DL (ref 70–110)
POCT GLUCOSE: 174 MG/DL (ref 70–110)
POCT GLUCOSE: 176 MG/DL (ref 70–110)
POCT GLUCOSE: 199 MG/DL (ref 70–110)
POTASSIUM SERPL-SCNC: 4.3 MMOL/L (ref 3.5–5.1)
PROT SERPL-MCNC: 6.3 G/DL (ref 6–8.4)
RBC # BLD AUTO: 4.09 M/UL (ref 4–5.4)
SAMPLE: ABNORMAL
SITE: ABNORMAL
SODIUM SERPL-SCNC: 137 MMOL/L (ref 136–145)
SP02: 100
VT: 420
WBC # BLD AUTO: 8.41 K/UL (ref 3.9–12.7)

## 2019-09-07 PROCEDURE — 95951 PR EEG MONITORING/VIDEORECORD: CPT | Mod: 26,,, | Performed by: PSYCHIATRY & NEUROLOGY

## 2019-09-07 PROCEDURE — 20000000 HC ICU ROOM

## 2019-09-07 PROCEDURE — 95951 PR EEG MONITORING/VIDEORECORD: ICD-10-PCS | Mod: 26,,, | Performed by: PSYCHIATRY & NEUROLOGY

## 2019-09-07 PROCEDURE — 93010 ELECTROCARDIOGRAM REPORT: CPT | Mod: ,,, | Performed by: INTERNAL MEDICINE

## 2019-09-07 PROCEDURE — 63600175 PHARM REV CODE 636 W HCPCS: Performed by: STUDENT IN AN ORGANIZED HEALTH CARE EDUCATION/TRAINING PROGRAM

## 2019-09-07 PROCEDURE — 25000003 PHARM REV CODE 250: Performed by: NURSE PRACTITIONER

## 2019-09-07 PROCEDURE — 99900035 HC TECH TIME PER 15 MIN (STAT)

## 2019-09-07 PROCEDURE — 80053 COMPREHEN METABOLIC PANEL: CPT

## 2019-09-07 PROCEDURE — 25000003 PHARM REV CODE 250: Performed by: PSYCHIATRY & NEUROLOGY

## 2019-09-07 PROCEDURE — 25000003 PHARM REV CODE 250: Performed by: STUDENT IN AN ORGANIZED HEALTH CARE EDUCATION/TRAINING PROGRAM

## 2019-09-07 PROCEDURE — 99291 PR CRITICAL CARE, E/M 30-74 MINUTES: ICD-10-PCS | Mod: ,,, | Performed by: NURSE PRACTITIONER

## 2019-09-07 PROCEDURE — 94003 VENT MGMT INPAT SUBQ DAY: CPT

## 2019-09-07 PROCEDURE — 99900026 HC AIRWAY MAINTENANCE (STAT)

## 2019-09-07 PROCEDURE — 93010 EKG 12-LEAD: ICD-10-PCS | Mod: ,,, | Performed by: INTERNAL MEDICINE

## 2019-09-07 PROCEDURE — 83735 ASSAY OF MAGNESIUM: CPT

## 2019-09-07 PROCEDURE — 85025 COMPLETE CBC W/AUTO DIFF WBC: CPT

## 2019-09-07 PROCEDURE — 85730 THROMBOPLASTIN TIME PARTIAL: CPT

## 2019-09-07 PROCEDURE — 36600 WITHDRAWAL OF ARTERIAL BLOOD: CPT

## 2019-09-07 PROCEDURE — 94761 N-INVAS EAR/PLS OXIMETRY MLT: CPT

## 2019-09-07 PROCEDURE — 84100 ASSAY OF PHOSPHORUS: CPT

## 2019-09-07 PROCEDURE — 82803 BLOOD GASES ANY COMBINATION: CPT

## 2019-09-07 PROCEDURE — 63600175 PHARM REV CODE 636 W HCPCS: Performed by: PSYCHIATRY & NEUROLOGY

## 2019-09-07 PROCEDURE — 95951 HC EEG MONITORING/VIDEO RECORD: CPT

## 2019-09-07 PROCEDURE — 99291 CRITICAL CARE FIRST HOUR: CPT | Mod: ,,, | Performed by: NURSE PRACTITIONER

## 2019-09-07 PROCEDURE — 85610 PROTHROMBIN TIME: CPT

## 2019-09-07 PROCEDURE — 27000221 HC OXYGEN, UP TO 24 HOURS

## 2019-09-07 PROCEDURE — 93005 ELECTROCARDIOGRAM TRACING: CPT

## 2019-09-07 PROCEDURE — 63600175 PHARM REV CODE 636 W HCPCS: Performed by: NURSE PRACTITIONER

## 2019-09-07 RX ORDER — AMLODIPINE BESYLATE 5 MG/1
5 TABLET ORAL DAILY
Status: DISCONTINUED | OUTPATIENT
Start: 2019-09-07 | End: 2019-09-13

## 2019-09-07 RX ORDER — LABETALOL HCL 20 MG/4 ML
10 SYRINGE (ML) INTRAVENOUS EVERY 6 HOURS PRN
Status: DISCONTINUED | OUTPATIENT
Start: 2019-09-07 | End: 2019-09-13

## 2019-09-07 RX ORDER — QUETIAPINE FUMARATE 25 MG/1
25 TABLET, FILM COATED ORAL 2 TIMES DAILY
Status: DISCONTINUED | OUTPATIENT
Start: 2019-09-07 | End: 2019-09-13

## 2019-09-07 RX ORDER — HYDRALAZINE HYDROCHLORIDE 20 MG/ML
10 INJECTION INTRAMUSCULAR; INTRAVENOUS EVERY 6 HOURS PRN
Status: DISCONTINUED | OUTPATIENT
Start: 2019-09-07 | End: 2019-10-04 | Stop reason: HOSPADM

## 2019-09-07 RX ADMIN — PROPOFOL 70 MCG/KG/MIN: 10 INJECTION, EMULSION INTRAVENOUS at 06:09

## 2019-09-07 RX ADMIN — INSULIN ASPART 2 UNITS: 100 INJECTION, SOLUTION INTRAVENOUS; SUBCUTANEOUS at 05:09

## 2019-09-07 RX ADMIN — LACOSAMIDE 100 MG: 50 TABLET, FILM COATED ORAL at 09:09

## 2019-09-07 RX ADMIN — CHLORHEXIDINE GLUCONATE 0.12% ORAL RINSE 15 ML: 1.2 LIQUID ORAL at 08:09

## 2019-09-07 RX ADMIN — PROPOFOL 70 MCG/KG/MIN: 10 INJECTION, EMULSION INTRAVENOUS at 03:09

## 2019-09-07 RX ADMIN — LEVOTHYROXINE SODIUM 75 MCG: 75 TABLET ORAL at 05:09

## 2019-09-07 RX ADMIN — PROPOFOL 60 MCG/KG/MIN: 10 INJECTION, EMULSION INTRAVENOUS at 12:09

## 2019-09-07 RX ADMIN — QUETIAPINE FUMARATE 25 MG: 25 TABLET ORAL at 06:09

## 2019-09-07 RX ADMIN — HEPARIN SODIUM 5000 UNITS: 5000 INJECTION INTRAVENOUS; SUBCUTANEOUS at 09:09

## 2019-09-07 RX ADMIN — PROPOFOL 60 MCG/KG/MIN: 10 INJECTION, EMULSION INTRAVENOUS at 02:09

## 2019-09-07 RX ADMIN — LACOSAMIDE 100 MG: 50 TABLET, FILM COATED ORAL at 08:09

## 2019-09-07 RX ADMIN — HEPARIN SODIUM 5000 UNITS: 5000 INJECTION INTRAVENOUS; SUBCUTANEOUS at 05:09

## 2019-09-07 RX ADMIN — CHLORHEXIDINE GLUCONATE 0.12% ORAL RINSE 15 ML: 1.2 LIQUID ORAL at 09:09

## 2019-09-07 RX ADMIN — POTASSIUM & SODIUM PHOSPHATES POWDER PACK 280-160-250 MG 2 PACKET: 280-160-250 PACK at 01:09

## 2019-09-07 RX ADMIN — AMLODIPINE BESYLATE 5 MG: 5 TABLET ORAL at 11:09

## 2019-09-07 RX ADMIN — SENNOSIDES AND DOCUSATE SODIUM 1 TABLET: 8.6; 5 TABLET ORAL at 08:09

## 2019-09-07 RX ADMIN — DEXTROSE 750 MG: 50 INJECTION, SOLUTION INTRAVENOUS at 04:09

## 2019-09-07 RX ADMIN — SENNOSIDES AND DOCUSATE SODIUM 1 TABLET: 8.6; 5 TABLET ORAL at 09:09

## 2019-09-07 RX ADMIN — FAMOTIDINE 20 MG: 20 TABLET ORAL at 09:09

## 2019-09-07 RX ADMIN — DEXAMETHASONE SODIUM PHOSPHATE 4 MG: 4 INJECTION, SOLUTION INTRAMUSCULAR; INTRAVENOUS at 05:09

## 2019-09-07 RX ADMIN — PROPOFOL 60 MCG/KG/MIN: 10 INJECTION, EMULSION INTRAVENOUS at 10:09

## 2019-09-07 RX ADMIN — HEPARIN SODIUM 5000 UNITS: 5000 INJECTION INTRAVENOUS; SUBCUTANEOUS at 01:09

## 2019-09-07 RX ADMIN — DEXTROSE 1000 MG: 50 INJECTION, SOLUTION INTRAVENOUS at 11:09

## 2019-09-07 RX ADMIN — POTASSIUM & SODIUM PHOSPHATES POWDER PACK 280-160-250 MG 2 PACKET: 280-160-250 PACK at 08:09

## 2019-09-07 RX ADMIN — INSULIN ASPART 2 UNITS: 100 INJECTION, SOLUTION INTRAVENOUS; SUBCUTANEOUS at 11:09

## 2019-09-07 RX ADMIN — POTASSIUM & SODIUM PHOSPHATES POWDER PACK 280-160-250 MG 2 PACKET: 280-160-250 PACK at 05:09

## 2019-09-07 RX ADMIN — DEXAMETHASONE SODIUM PHOSPHATE 4 MG: 4 INJECTION, SOLUTION INTRAMUSCULAR; INTRAVENOUS at 11:09

## 2019-09-07 NOTE — ASSESSMENT & PLAN NOTE
2/2 to brain mass   Keppra changed to Vimpat 9/6  Valproic acid started 9/7  Continue EEG monitoring   Epilepsy following

## 2019-09-07 NOTE — ASSESSMENT & PLAN NOTE
74 year old admitted to unit on 9/3 following new onset seizure, with MRI showing large area of R cortical edema with flair and ill defined enhancement on contrast study, concern for glioma vs infectious/inflammatory process  -- LP appears non infectious, cytology negative- will need repeat MRI once extubated  -- continue decadron  -- EEG monitoring  -- Continue propofol, seroquel started 9/7 for agitation  -- NSGY, epilepsy following   -- Hourly neuro checks  -- Hourly vital signs

## 2019-09-07 NOTE — SUBJECTIVE & OBJECTIVE
Interval History: propofol weaned in attempt to extubate yesterday but placed back on overnight d/t agitation.    Medications:  Continuous Infusions:   propofol 60 mcg/kg/min (09/07/19 0726)     Scheduled Meds:   chlorhexidine  15 mL Mouth/Throat BID    dexamethasone  4 mg Intravenous Q6H    famotidine  20 mg Per OG tube QHS    heparin (porcine)  5,000 Units Subcutaneous Q8H    lacosamide  100 mg Per OG tube Q12H    levothyroxine  75 mcg Per OG tube Before breakfast    polyethylene glycol  17 g Per NG tube Daily    senna-docusate 8.6-50 mg  1 tablet Per OG tube BID     PRN Meds:acetaminophen, Dextrose 10% Bolus, glucagon (human recombinant), insulin aspart U-100, magnesium oxide, magnesium oxide, potassium chloride 10%, potassium chloride 10%, potassium, sodium phosphates, potassium, sodium phosphates, potassium, sodium phosphates, racepinephrine, sodium chloride 0.9%     Review of Systems    Objective:     Weight: 70.5 kg (155 lb 6.8 oz)  Body mass index is 27.53 kg/m².  Vital Signs (Most Recent):  Temp: 99.5 °F (37.5 °C) (09/07/19 0705)  Pulse: 92 (09/07/19 0705)  Resp: 20 (09/07/19 0705)  BP: (!) 163/74 (09/07/19 0705)  SpO2: 100 % (09/07/19 0705) Vital Signs (24h Range):  Temp:  [99.1 °F (37.3 °C)-100.6 °F (38.1 °C)] 99.5 °F (37.5 °C)  Pulse:  [] 92  Resp:  [16-48] 20  SpO2:  [84 %-100 %] 100 %  BP: (112-194)/(56-90) 163/74     Date 09/07/19 0700 - 09/08/19 0659   Shift 1011-2430 1336-7226 4204-1299 24 Hour Total   INTAKE   I.V.(mL/kg) 34.6(0.5)   34.6(0.5)   NG/GT 45   45   Shift Total(mL/kg) 79.6(1.1)   79.6(1.1)   OUTPUT   Urine(mL/kg/hr) 250   250   Shift Total(mL/kg) 250(3.5)   250(3.5)   Weight (kg) 70.5 70.5 70.5 70.5              Vent Mode: A/C  Oxygen Concentration (%):  [] 40  Resp Rate Total:  [15 br/min-22 br/min] 20 br/min  Vt Set:  [410 mL] 410 mL  PEEP/CPAP:  [5 cmH20] 5 cmH20  Pressure Support:  [0 cmH20-5 cmH20] 0 cmH20  Mean Airway Pressure:  [7.8 ryB76-17 cmH20] 12  cmH20         NG/OG Tube 09/03/19 1100 orogastric Center mouth (Active)   Placement Check placement verified by aspirate characteristics;placement verified by distal tube length measurement;placement verified by x-ray 9/5/2019  3:01 PM   Tolerance no signs/symptoms of discomfort 9/5/2019  3:01 PM   Securement secured to commercial device 9/5/2019  3:01 PM   Clamp Status/Tolerance unclamped 9/5/2019  3:01 PM   Suction Setting/Drainage Method suction at the bedside 9/4/2019  3:05 AM   Insertion Site Appearance no redness, warmth, tenderness, skin breakdown, drainage 9/5/2019  3:01 PM   Flush/Irrigation flushed w/;water 9/5/2019  3:01 PM   Feeding Method continuous 9/5/2019  3:01 PM   Feeding Action feeding continued 9/5/2019  3:01 PM   Current Rate (mL/hr) 45 mL/hr 9/5/2019  7:01 AM   Goal Rate (mL/hr) 45 mL/hr 9/5/2019  7:01 AM   Intake (mL) 60 mL 9/5/2019  9:01 AM   Water Bolus (mL) 60 mL 9/5/2019  6:00 AM   Formula Name isosource 9/5/2019  3:01 PM   Intake (mL) - Formula Tube Feeding 45 9/6/2019  6:05 AM   Residual Amount (ml) 0 ml 9/5/2019  3:03 AM       Female External Urinary Catheter 09/04/19 2301 (Active)   Skin no redness;no breakdown 9/6/2019  3:05 AM   Tolerance no signs/symptoms of discomfort 9/6/2019  3:05 AM   Suction Continuous suction at 40 mmHg 9/6/2019  3:05 AM   Date of last wick change 09/06/19 9/6/2019  3:05 AM   Time of last wick change 0025 9/6/2019  3:05 AM       Neurosurgery Physical Exam     [id number]  PERRL, +corneals, +cough/gag  Localizing in BLE, minimal movement in BUE  Moves legs spontaneously  On cEEG    Significant Labs:  Recent Labs   Lab 09/06/19  0210 09/07/19  0212   * 189*    137   K 4.8 4.3   * 104   CO2 21* 24   BUN 26* 27*   CREATININE 1.0 1.2   CALCIUM 8.4* 8.5*   MG 1.9 1.9     Recent Labs   Lab 09/06/19  0210 09/07/19  0212   WBC 9.12 8.41   HGB 10.7* 11.5*   HCT 34.5* 36.3*    180     No results for input(s): LABPT, INR, APTT in the last 48  hours.  Microbiology Results (last 7 days)     Procedure Component Value Units Date/Time    Gram stain [[id number]] Collected:  09/03/19 1843    Order Status:  Completed Specimen:  CSF (Spinal Fluid) from CSF Tap, Tube 2 Updated:  09/03/19 2153     Gram Stain Result No WBC's      No organisms seen            Significant Diagnostics:

## 2019-09-07 NOTE — ASSESSMENT & PLAN NOTE
Daily CXR, ABG  Continue Peridex, Famotidine, Heparin subq  Trial of extubation 9/6/2019- re-intubated shortly after extubation for airway protection  Continue to wean vent settings as tolerated    Vent Mode: A/C  Oxygen Concentration (%):  [40] 40  Resp Rate Total:  [20 br/min-23 br/min] 20 br/min  Vt Set:  [410 mL] 410 mL  PEEP/CPAP:  [5 cmH20] 5 cmH20  Pressure Support:  [0 cmH20] 0 cmH20  Mean Airway Pressure:  [9.7 ifS71-02 cmH20] 11 cmH20

## 2019-09-07 NOTE — PROGRESS NOTES
[facility]  Neurosurgery  Progress Note    Subjective:     History of Present Illness: 73 y/o female with pmhx CKD and HTN  presented to outside hospital for seizure- like activity this AM. Per  pt was found unconscious this AM, with seizure like activity occurring on the left side. Pt was given versed x 2 via EMS. CTH performed at outside hospital was negative for bleed. Pt had additional seizure in outside hospital ED and was given IV Keppra. EEG ordered and MRI brain w/out contrast concerning for possible infiltratrive process vs post ischemic sequela. Pt transferred to [facility] and admitted to [facility]. MRI brain w/ and w/out obtained that showed large area of edema in right frontal lobe and ill defined enhancement in the frontal lobe concerning for possible cerebritis vs. Neoplasm. NSGY was consulted to evaluate. Pt not on anti-coagulation.       Post-Op Info:  * No surgery found *         Interval History: propofol weaned in attempt to extubate yesterday but placed back on overnight d/t agitation.    Medications:  Continuous Infusions:   propofol 60 mcg/kg/min (09/07/19 0726)     Scheduled Meds:   chlorhexidine  15 mL Mouth/Throat BID    dexamethasone  4 mg Intravenous Q6H    famotidine  20 mg Per OG tube QHS    heparin (porcine)  5,000 Units Subcutaneous Q8H    lacosamide  100 mg Per OG tube Q12H    levothyroxine  75 mcg Per OG tube Before breakfast    polyethylene glycol  17 g Per NG tube Daily    senna-docusate 8.6-50 mg  1 tablet Per OG tube BID     PRN Meds:acetaminophen, Dextrose 10% Bolus, glucagon (human recombinant), insulin aspart U-100, magnesium oxide, magnesium oxide, potassium chloride 10%, potassium chloride 10%, potassium, sodium phosphates, potassium, sodium phosphates, potassium, sodium phosphates, racepinephrine, sodium chloride 0.9%     Review of Systems    Objective:     Weight: 70.5 kg (155 lb 6.8 oz)  Body mass index is 27.53 kg/m².  Vital Signs (Most  Recent):  Temp: 99.5 °F (37.5 °C) (09/07/19 0705)  Pulse: 92 (09/07/19 0705)  Resp: 20 (09/07/19 0705)  BP: (!) 163/74 (09/07/19 0705)  SpO2: 100 % (09/07/19 0705) Vital Signs (24h Range):  Temp:  [99.1 °F (37.3 °C)-100.6 °F (38.1 °C)] 99.5 °F (37.5 °C)  Pulse:  [] 92  Resp:  [16-48] 20  SpO2:  [84 %-100 %] 100 %  BP: (112-194)/(56-90) 163/74     Date 09/07/19 0700 - 09/08/19 0659   Shift 1857-5953 8643-0179 6155-3601 24 Hour Total   INTAKE   I.V.(mL/kg) 34.6(0.5)   34.6(0.5)   NG/GT 45   45   Shift Total(mL/kg) 79.6(1.1)   79.6(1.1)   OUTPUT   Urine(mL/kg/hr) 250   250   Shift Total(mL/kg) 250(3.5)   250(3.5)   Weight (kg) 70.5 70.5 70.5 70.5              Vent Mode: A/C  Oxygen Concentration (%):  [] 40  Resp Rate Total:  [15 br/min-22 br/min] 20 br/min  Vt Set:  [410 mL] 410 mL  PEEP/CPAP:  [5 cmH20] 5 cmH20  Pressure Support:  [0 cmH20-5 cmH20] 0 cmH20  Mean Airway Pressure:  [7.8 qwY27-91 cmH20] 12 cmH20         NG/OG Tube 09/03/19 1100 orogastric Center mouth (Active)   Placement Check placement verified by aspirate characteristics;placement verified by distal tube length measurement;placement verified by x-ray 9/5/2019  3:01 PM   Tolerance no signs/symptoms of discomfort 9/5/2019  3:01 PM   Securement secured to commercial device 9/5/2019  3:01 PM   Clamp Status/Tolerance unclamped 9/5/2019  3:01 PM   Suction Setting/Drainage Method suction at the bedside 9/4/2019  3:05 AM   Insertion Site Appearance no redness, warmth, tenderness, skin breakdown, drainage 9/5/2019  3:01 PM   Flush/Irrigation flushed w/;water 9/5/2019  3:01 PM   Feeding Method continuous 9/5/2019  3:01 PM   Feeding Action feeding continued 9/5/2019  3:01 PM   Current Rate (mL/hr) 45 mL/hr 9/5/2019  7:01 AM   Goal Rate (mL/hr) 45 mL/hr 9/5/2019  7:01 AM   Intake (mL) 60 mL 9/5/2019  9:01 AM   Water Bolus (mL) 60 mL 9/5/2019  6:00 AM   Formula Name isosource 9/5/2019  3:01 PM   Intake (mL) - Formula Tube Feeding 45 9/6/2019  6:05 AM    Residual Amount (ml) 0 ml 9/5/2019  3:03 AM       Female External Urinary Catheter 09/04/19 2301 (Active)   Skin no redness;no breakdown 9/6/2019  3:05 AM   Tolerance no signs/symptoms of discomfort 9/6/2019  3:05 AM   Suction Continuous suction at 40 mmHg 9/6/2019  3:05 AM   Date of last wick change 09/06/19 9/6/2019  3:05 AM   Time of last wick change 0025 9/6/2019  3:05 AM       Neurosurgery Physical Exam     E1VTM5  PERRL, +corneals, +cough/gag  Localizing in BLE, minimal movement in BUE  Moves legs spontaneously  On cEEG    Significant Labs:  Recent Labs   Lab 09/06/19 0210 09/07/19 0212   * 189*    137   K 4.8 4.3   * 104   CO2 21* 24   BUN 26* 27*   CREATININE 1.0 1.2   CALCIUM 8.4* 8.5*   MG 1.9 1.9     Recent Labs   Lab 09/06/19 0210 09/07/19 0212   WBC 9.12 8.41   HGB 10.7* 11.5*   HCT 34.5* 36.3*    180     No results for input(s): LABPT, INR, APTT in the last 48 hours.  Microbiology Results (last 7 days)     Procedure Component Value Units Date/Time    Gram stain [[id number]] Collected:  09/03/19 1843    Order Status:  Completed Specimen:  CSF (Spinal Fluid) from CSF Tap, Tube 2 Updated:  09/03/19 2153     Gram Stain Result No WBC's      No organisms seen            Significant Diagnostics:      Assessment/Plan:     New onset seizure  75 y/o female with pmhx CKD and HTN presented to outside hospital for seizure- like activity occurring on the left side. Found to have area of ill-defined enhancement on MRI brain w/ and w/out.     -q 1 hr neuro checks  -Fu cEEG, wean propofol, continue keppra.  -Fu infectious rodriguez  -Continue steroids for now  -WTE when medically stable.  -Further care per NCC, will follow.          Darin Mckeon MD  Neurosurgery  [facility]

## 2019-09-07 NOTE — SUBJECTIVE & OBJECTIVE
Review of Systems: Unable to obtain a complete ROS due to level of consciousness.     Vitals:   Temp: 98.6 °F (37 °C)  Pulse: 94  Rhythm: normal sinus rhythm  BP: (!) 179/80  MAP (mmHg): 115  Resp: 20  SpO2: 99 %  Oxygen Concentration (%): 40  O2 Device (Oxygen Therapy): ventilator  Vent Mode: A/C  Set Rate: 20 bmp  Vt Set: 410 mL  Pressure Support: 0 cmH20  PEEP/CPAP: 5 cmH20  Peak Airway Pressure: 23 cmH2O  Mean Airway Pressure: 11 cmH20  Plateau Pressure: 19 cmH20    Temp  Min: 98.6 °F (37 °C)  Max: 100.6 °F (38.1 °C)  Pulse  Min: 81  Max: 103  BP  Min: 137/64  Max: 186/78  MAP (mmHg)  Min: 92  Max: 123  Resp  Min: 18  Max: 28  SpO2  Min: 98 %  Max: 100 %  Oxygen Concentration (%)  Min: 40  Max: 40    09/06 0701 - 09/07 0700  In: 1553.8 [I.V.:978.8]  Out: 2500 [Urine:2500]   Unmeasured Output  Urine Occurrence: 1  Stool Occurrence: 1  Emesis Occurrence: 0  Pad Count: 1     Examination:   Constitutional: Well-nourished and -developed. No apparent distress.   Eyes: Conjunctiva clear, anicteric. Lids no lesions.  Head/Ears/Nose/Mouth/Throat/Neck: Moist mucous membranes. External ears, nose atraumatic.   Cardiovascular: Regular rhythm. No murmurs. No leg edema.  Respiratory: Comfortable respirations. Clear to auscultation.  Gastrointestinal: No hernia. Soft, nondistended, nontender. + bowel sounds.    Neurologic:  -GCS E 4 V 1t M 5  -Restless. Intubated. Sedated. Unable to follow commands.  -Cranial nerves: EOM intact, PERRL,  -Motor: Moves all extremities spontaneously and antigravity  -Sensation: Intact to noxious stimuli  Unable to test orientation, language, memory, judgment, insight, fund of knowledge, hearing, shoulder shrug, tongue protrusion, coordination, gait due to level of consciousness.    Medications:   Continuous  propofol Last Rate: 50 mcg/kg/min (09/07/19 1705)   Scheduled  amLODIPine 5 mg Daily   chlorhexidine 15 mL BID   dexamethasone 4 mg Q6H   famotidine 20 mg QHS   heparin (porcine) 5,000 Units  Q8H   lacosamide 100 mg Q12H   levothyroxine 75 mcg Before breakfast   polyethylene glycol 17 g Daily   QUEtiapine 25 mg BID   senna-docusate 8.6-50 mg 1 tablet BID   valproate sodium (DEPACON) IVPB 750 mg Q8H   PRN  acetaminophen 650 mg Q6H PRN   Dextrose 10% Bolus 12.5 g PRN   glucagon (human recombinant) 1 mg PRN   hydrALAZINE 10 mg Q6H PRN   insulin aspart U-100 1-10 Units Q6H PRN   labetalol 10 mg Q6H PRN   magnesium oxide 800 mg PRN   magnesium oxide 800 mg PRN   potassium chloride 10% 40 mEq PRN   potassium chloride 10% 40 mEq PRN   potassium, sodium phosphates 2 packet PRN   potassium, sodium phosphates 2 packet PRN   potassium, sodium phosphates 2 packet PRN   racepinephrine 0.5 mL Q4H PRN   sodium chloride 0.9% 10 mL PRN      Today I independently reviewed pertinent medications, lines/drains/airways, imaging, cardiology results, laboratory results, microbiology results, notably:     ISTAT: Recent Labs   Lab 09/07/19  0341   PH 7.529*   PCO2 30.6*   PO2 100   POCSATURATED 99   HCO3 25.5   BE 3   POCTCO2 26   SAMPLE ARTERIAL      Chem: Recent Labs   Lab 09/07/19 0212      K 4.3      CO2 24   *   BUN 27*   CREATININE 1.2   ESTGFRAFRICA 50.7*   EGFRNONAA 44.0*   CALCIUM 8.5*   MG 1.9   PHOS 2.1*   ANIONGAP 9   PROT 6.3   ALBUMIN 3.1*   BILITOT 0.6   ALKPHOS 74   AST 30   ALT 94*     Heme: Recent Labs   Lab 09/07/19  0212   WBC 8.41   HGB 11.5*   HCT 36.3*        Endo:   Recent Labs   Lab 09/07/19  0503 09/07/19  1123 09/07/19  1714   POCTGLUCOSE 174* 176* 199*

## 2019-09-07 NOTE — PLAN OF CARE
Problem: Adult Inpatient Plan of Care  Goal: Plan of Care Review  Outcome: Ongoing (interventions implemented as appropriate)  POC reviewed with pt and  at 0500. Pt  verbalized understanding. Pt unable to verbalize understanding due to cognitive state. Questions and concerns addressed. No acute events overnight. Replacing Phos. Propofol gtt continued per order. Pt restless throughout shift. TF at goal. 1 BM. Will continue to monitor. See flowsheets for full assessment and VS info

## 2019-09-07 NOTE — PLAN OF CARE
Problem: Adult Inpatient Plan of Care  Goal: Patient-Specific Goal (Individualization)  Outcome: Ongoing (interventions implemented as appropriate)  POC reviewed with pt and family at 1400. Pt unable to verbalize understanding due to mechanical ventilation. Questions and concerns addressed with family at bedside. No acute events today. Titrating propofol for RASS goal. EKG obtained, WNL. Pt progressing toward goals. Will continue to monitor. See flowsheets for full assessment and VS info.

## 2019-09-07 NOTE — PROCEDURES
ICU EEG/VIDEO MONITORING REPORT    Clemencia Nguyen  [id number]  1945    DATE OF SERVICE: 9/6-7/19    DATE OF ADMISSION: 9/3/2019 11:00 AM    ADMITTING PROVIDER: Phillip Bardales MD    METHODOLOGY   Electroencephalographic (EEG) recording is with electrodes placed according to the International 10-20 placement system.  Thirty two (32) channels of digital signal are simultaneously recorded from the scalp and may include EKG, EMG, and/or eye monitors.   Recording band pass was 0.1 to 512 hz.  Digital video recording of the patient is simultaneously recorded with the EEG.  The nursing staff report clinical symptoms and may press an event button when the patient has symptoms of clinical interest to the treating physicians.  EEG and video recording is stored and archived in digital format.  The entire recording is visually reviewed and the times identified by computer analysis as being spikes or seizures are reviewed again.  Activation procedures which include photic stimulation, hyperventilation and instructing patients to perform simple task are done in selected patients.   Compresses spectral analysis (CSA) is also performed on the activity recorded from each individual channel.  This is displayed as a power display of frequencies from 0 to 30 Hz over time.   The CSA analysis is done and displayed continuously.  This is reviewed for asymmetries in power between homologous areas of the scalp and for presence of changes in power which canbe seen when seizures occur.  Sections of suspected abnormalities on the CSA is then compared with the original EEG recording.     Sp[employer]ee software was also utilized in the review of this study.  This software suite analyzes the EEG recording in multiple domains.  Coherence and rhythmicity is computed to identify EEG sections which may contain organized seizures.  Each channel undergoes analysis to detect presence of spike and sharp waves which have special and morphological  characteristic of epileptic activity.  The routine EEG recording is converted from spacial into frequency domain.  This is then displayed comparing homologous areas to identify areas of significant asymmetry.  Algorithm to identify non-cortically generated artifact is used to separate eye movement, EMG and other artifact from the EEG.      Recording Times  Start on 9/6/19, 11:17  Stop on 9/7/19, 07:00    A total of 19 hours 43 minutes of EEG was recorded.    EEG FINDINGS  At onset with Propofol 50mKm,  Burst suppression pattern is seen with bursts of sharply contoured delta mixed with sharp waves         On Propofol 65mKm:  Bilateral independent epileptiform (sharp waves) periodic discharges (left > right) is noted  No associated clinical correlate seen.        With Propofol increased to 70mKm              Generalized delta (1-2 Hz) slowing with bilateral (left > right) epileptiform (sharp waves) periodic discharges persist until end of the study - however, the morphology of the periodic discharges become more broad and lower amplitude with increased dose of Propofol.        Sleep:   Not seen.    Activation procedures:   Not done    EKG:   - unable to assess    IMPRESSION:   This is an abnormal C-EEG due to:  1) bilateral left > right epileptiform periodic discharges seen as described, suggestive of underlying epileptiform potential   2) severe generalized slowing seen, suggestive of severe diffuse or multifocal cerebral dysfunction.    CLINICAL CORRELATION IS RECOMMENDED.    Fawn Alfred MD, MALINA(), ELIZABETH MOMIN.  Neurology-Epilepsy.  [facility].

## 2019-09-07 NOTE — PROGRESS NOTES
[facility]  Neurocritical Care  Progress Note    Admit Date: 9/3/2019  Service Date: 09/07/2019  Length of Stay: 4    Subjective:     Chief Complaint: Brain lesion    History of Present Illness: Pt is  75 yo female with a PMHx of CKD 3, HTN, was transferred from [facility] to [facility] for new onset seizures and concern for right front lobe mass. The pt was found in her bedroom by her spouse at approximately 9:45 pm sitting her head against the bed, unresponsive, and having seizure like activity on the left-side. EMT was called and the pt was given Versed twice while en route to the hospital. Pt had a second witnessed seizures, left facial droop, left-sided weakness, and tongue ecchymosis in the ED. Neurology was consulted and The pt was given IV Keppra and Vimpat. MRI brain without contrast was acquired. The image showed right frontal lobe vasogenic edema with mass effect concerning for underlying mass. EEG was acquired at outside hospital concerning showing an abnormal result. The pt was given decadron IV and neurosurgical consult recommended. Pt transferred to [facility] and admitted to the [facility] for higher level of care and further evaluation.     Pt history acquired per chart review and from spouse present at the bedside. The pt's spouse denies prior history of seizures CVA, cancer history and up-to-date screenings    Hospital Course: --admitted to [facility] on 9/3 following new onset seizure, arrived intubated  --MRI with large area of R cortical edema with flair and ill defined enhancement on contrast study, concern for glioma vs infectious/inflammatory process  --LP appears non infectious, cytology pending  9/5- no acute events, awaiting LP finalization from pathology report., weaning vent.   09/06/2019: Very agitated overnight, requiring increased sedation. Patient extubated this morning on rounds, and reintubated shortly after. Unable to maintain airway and secretions. CSF gram stain negative.  09/07/2019: KATT.  EKG obtained, seroquel started. Valproic acid started.    Review of Systems: Unable to obtain a complete ROS due to level of consciousness.     Vitals:   Temp: 98.6 °F (37 °C)  Pulse: 94  Rhythm: normal sinus rhythm  BP: (!) 179/80  MAP (mmHg): 115  Resp: 20  SpO2: 99 %  Oxygen Concentration (%): 40  O2 Device (Oxygen Therapy): ventilator  Vent Mode: A/C  Set Rate: 20 bmp  Vt Set: 410 mL  Pressure Support: 0 cmH20  PEEP/CPAP: 5 cmH20  Peak Airway Pressure: 23 cmH2O  Mean Airway Pressure: 11 cmH20  Plateau Pressure: 19 cmH20    Temp  Min: 98.6 °F (37 °C)  Max: 100.6 °F (38.1 °C)  Pulse  Min: 81  Max: 103  BP  Min: 137/64  Max: 186/78  MAP (mmHg)  Min: 92  Max: 123  Resp  Min: 18  Max: 28  SpO2  Min: 98 %  Max: 100 %  Oxygen Concentration (%)  Min: 40  Max: 40    09/06 0701 - 09/07 0700  In: 1553.8 [I.V.:978.8]  Out: 2500 [Urine:2500]   Unmeasured Output  Urine Occurrence: 1  Stool Occurrence: 1  Emesis Occurrence: 0  Pad Count: 1     Examination:   Constitutional: Well-nourished and -developed. No apparent distress.   Eyes: Conjunctiva clear, anicteric. Lids no lesions.  Head/Ears/Nose/Mouth/Throat/Neck: Moist mucous membranes. External ears, nose atraumatic.   Cardiovascular: Regular rhythm. No murmurs. No leg edema.  Respiratory: Comfortable respirations. Clear to auscultation.  Gastrointestinal: No hernia. Soft, nondistended, nontender. + bowel sounds.    Neurologic:  -GCS E 4 V 1t M 5  -Restless. Intubated. Sedated. Unable to follow commands.  -Cranial nerves: EOM intact, PERRL,  -Motor: Moves all extremities spontaneously and antigravity  -Sensation: Intact to noxious stimuli  Unable to test orientation, language, memory, judgment, insight, fund of knowledge, hearing, shoulder shrug, tongue protrusion, coordination, gait due to level of consciousness.    Medications:   Continuous  propofol Last Rate: 50 mcg/kg/min (09/07/19 1705)   Scheduled  amLODIPine 5 mg Daily   chlorhexidine 15 mL BID   dexamethasone 4 mg  Q6H   famotidine 20 mg QHS   heparin (porcine) 5,000 Units Q8H   lacosamide 100 mg Q12H   levothyroxine 75 mcg Before breakfast   polyethylene glycol 17 g Daily   QUEtiapine 25 mg BID   senna-docusate 8.6-50 mg 1 tablet BID   valproate sodium (DEPACON) IVPB 750 mg Q8H   PRN  acetaminophen 650 mg Q6H PRN   Dextrose 10% Bolus 12.5 g PRN   glucagon (human recombinant) 1 mg PRN   hydrALAZINE 10 mg Q6H PRN   insulin aspart U-100 1-10 Units Q6H PRN   labetalol 10 mg Q6H PRN   magnesium oxide 800 mg PRN   magnesium oxide 800 mg PRN   potassium chloride 10% 40 mEq PRN   potassium chloride 10% 40 mEq PRN   potassium, sodium phosphates 2 packet PRN   potassium, sodium phosphates 2 packet PRN   potassium, sodium phosphates 2 packet PRN   racepinephrine 0.5 mL Q4H PRN   sodium chloride 0.9% 10 mL PRN      Today I independently reviewed pertinent medications, lines/drains/airways, imaging, cardiology results, laboratory results, microbiology results, notably:     ISTAT: Recent Labs   Lab 09/07/19  0341   PH 7.529*   PCO2 30.6*   PO2 100   POCSATURATED 99   HCO3 25.5   BE 3   POCTCO2 26   SAMPLE ARTERIAL      Chem: Recent Labs   Lab 09/07/19  0212      K 4.3      CO2 24   *   BUN 27*   CREATININE 1.2   ESTGFRAFRICA 50.7*   EGFRNONAA 44.0*   CALCIUM 8.5*   MG 1.9   PHOS 2.1*   ANIONGAP 9   PROT 6.3   ALBUMIN 3.1*   BILITOT 0.6   ALKPHOS 74   AST 30   ALT 94*     Heme: Recent Labs   Lab 09/07/19  0212   WBC 8.41   HGB 11.5*   HCT 36.3*        Endo:   Recent Labs   Lab 09/07/19  0503 09/07/19  1123 09/07/19  1714   POCTGLUCOSE 174* 176* 199*          Assessment/Plan:     Neuro  * Brain lesion  74 year old admitted to unit on 9/3 following new onset seizure, with MRI showing large area of R cortical edema with flair and ill defined enhancement on contrast study, concern for glioma vs infectious/inflammatory process  -- LP appears non infectious, cytology negative- will need repeat MRI once extubated  --  continue decadron  -- EEG monitoring  -- Continue propofol, seroquel started 9/7 for agitation  -- NSGY, epilepsy following   -- Hourly neuro checks  -- Hourly vital signs    Cerebral edema  --continue decadron, see brain lesion    New onset seizure  2/2 to brain mass   Keppra changed to Vimpat 9/6  Valproic acid started 9/7  Continue EEG monitoring   Epilepsy following      Pulmonary  On mechanically assisted ventilation  Daily CXR, ABG  Continue Peridex, Famotidine, Heparin subq  Trial of extubation 9/6/2019- re-intubated shortly after extubation for airway protection  Continue to wean vent settings as tolerated    Vent Mode: A/C  Oxygen Concentration (%):  [40] 40  Resp Rate Total:  [20 br/min-23 br/min] 20 br/min  Vt Set:  [410 mL] 410 mL  PEEP/CPAP:  [5 cmH20] 5 cmH20  Pressure Support:  [0 cmH20] 0 cmH20  Mean Airway Pressure:  [9.7 baV09-41 cmH20] 11 cmH20      Cardiac/Vascular  Essential hypertension  SBP <180, MAP >65   -Amlodipine started 9/7          The patient is being Prophylaxed for:  Venous Thromboembolism with: Mechanical or Chemical  Stress Ulcer with: H2B  Ventilator Pneumonia with: chlorhexidine oral care    Activity Orders          Straight Cath starting at 09/05 0034        Full Code     CC time spent: 35 minutes    Lolita Salinas NP  Neurocritical Care  [facility]

## 2019-09-08 PROBLEM — D72.829 LEUKOCYTOSIS: Status: ACTIVE | Noted: 2019-09-08

## 2019-09-08 LAB
ALBUMIN SERPL BCP-MCNC: 3 G/DL (ref 3.5–5.2)
ALLENS TEST: ABNORMAL
ALP SERPL-CCNC: 83 U/L (ref 55–135)
ALT SERPL W/O P-5'-P-CCNC: 89 U/L (ref 10–44)
ANION GAP SERPL CALC-SCNC: 15 MMOL/L (ref 8–16)
APTT BLDCRRT: 25.2 SEC (ref 21–32)
AST SERPL-CCNC: 22 U/L (ref 10–40)
BACTERIA #/AREA URNS AUTO: ABNORMAL /HPF
BASOPHILS # BLD AUTO: 0.06 K/UL (ref 0–0.2)
BASOPHILS NFR BLD: 0.4 % (ref 0–1.9)
BILIRUB SERPL-MCNC: 0.5 MG/DL (ref 0.1–1)
BILIRUB UR QL STRIP: NEGATIVE
BUN SERPL-MCNC: 25 MG/DL (ref 8–23)
CALCIUM SERPL-MCNC: 9.2 MG/DL (ref 8.7–10.5)
CHLORIDE SERPL-SCNC: 104 MMOL/L (ref 95–110)
CLARITY UR REFRACT.AUTO: ABNORMAL
CO2 SERPL-SCNC: 21 MMOL/L (ref 23–29)
COLOR UR AUTO: ABNORMAL
CREAT SERPL-MCNC: 1.1 MG/DL (ref 0.5–1.4)
DELSYS: ABNORMAL
DIFFERENTIAL METHOD: ABNORMAL
EOSINOPHIL # BLD AUTO: 0 K/UL (ref 0–0.5)
EOSINOPHIL NFR BLD: 0 % (ref 0–8)
ERYTHROCYTE [DISTWIDTH] IN BLOOD BY AUTOMATED COUNT: 13.2 % (ref 11.5–14.5)
ERYTHROCYTE [SEDIMENTATION RATE] IN BLOOD BY WESTERGREN METHOD: 20 MM/H
EST. GFR  (AFRICAN AMERICAN): 56.4 ML/MIN/1.73 M^2
EST. GFR  (NON AFRICAN AMERICAN): 48.9 ML/MIN/1.73 M^2
FIO2: 40
GLUCOSE SERPL-MCNC: 209 MG/DL (ref 70–110)
GLUCOSE UR QL STRIP: ABNORMAL
HCO3 UR-SCNC: 24.3 MMOL/L (ref 24–28)
HCT VFR BLD AUTO: 44.6 % (ref 37–48.5)
HGB BLD-MCNC: 14.3 G/DL (ref 12–16)
HGB UR QL STRIP: ABNORMAL
HYALINE CASTS UR QL AUTO: 0 /LPF
IMM GRANULOCYTES # BLD AUTO: 0.42 K/UL (ref 0–0.04)
IMM GRANULOCYTES NFR BLD AUTO: 3.1 % (ref 0–0.5)
INR PPP: 0.9 (ref 0.8–1.2)
KETONES UR QL STRIP: ABNORMAL
LEUKOCYTE ESTERASE UR QL STRIP: ABNORMAL
LYMPHOCYTES # BLD AUTO: 1.4 K/UL (ref 1–4.8)
LYMPHOCYTES NFR BLD: 10.1 % (ref 18–48)
MAGNESIUM SERPL-MCNC: 1.9 MG/DL (ref 1.6–2.6)
MCH RBC QN AUTO: 28.3 PG (ref 27–31)
MCHC RBC AUTO-ENTMCNC: 32.1 G/DL (ref 32–36)
MCV RBC AUTO: 88 FL (ref 82–98)
MICROSCOPIC COMMENT: ABNORMAL
MODE: ABNORMAL
MONOCYTES # BLD AUTO: 1.8 K/UL (ref 0.3–1)
MONOCYTES NFR BLD: 13.3 % (ref 4–15)
NEUTROPHILS # BLD AUTO: 9.9 K/UL (ref 1.8–7.7)
NEUTROPHILS NFR BLD: 73.1 % (ref 38–73)
NITRITE UR QL STRIP: NEGATIVE
NRBC BLD-RTO: 0 /100 WBC
PCO2 BLDA: 31.3 MMHG (ref 35–45)
PEEP: 5
PH SMN: 7.5 [PH] (ref 7.35–7.45)
PH UR STRIP: 6 [PH] (ref 5–8)
PHOSPHATE SERPL-MCNC: 2.6 MG/DL (ref 2.7–4.5)
PLATELET # BLD AUTO: 271 K/UL (ref 150–350)
PMV BLD AUTO: 8.8 FL (ref 9.2–12.9)
PO2 BLDA: 97 MMHG (ref 80–100)
POC BE: 1 MMOL/L
POC SATURATED O2: 98 % (ref 95–100)
POC TCO2: 25 MMOL/L (ref 23–27)
POCT GLUCOSE: 164 MG/DL (ref 70–110)
POCT GLUCOSE: 164 MG/DL (ref 70–110)
POCT GLUCOSE: 195 MG/DL (ref 70–110)
POCT GLUCOSE: 206 MG/DL (ref 70–110)
POTASSIUM SERPL-SCNC: 4.3 MMOL/L (ref 3.5–5.1)
PROT SERPL-MCNC: 6.8 G/DL (ref 6–8.4)
PROT UR QL STRIP: ABNORMAL
PROTHROMBIN TIME: 10 SEC (ref 9–12.5)
RBC # BLD AUTO: 5.05 M/UL (ref 4–5.4)
RBC #/AREA URNS AUTO: 10 /HPF (ref 0–4)
SAMPLE: ABNORMAL
SITE: ABNORMAL
SODIUM SERPL-SCNC: 140 MMOL/L (ref 136–145)
SP GR UR STRIP: 1.02 (ref 1–1.03)
URN SPEC COLLECT METH UR: ABNORMAL
VT: 410
WBC # BLD AUTO: 13.53 K/UL (ref 3.9–12.7)
WBC #/AREA URNS AUTO: 13 /HPF (ref 0–5)

## 2019-09-08 PROCEDURE — 80053 COMPREHEN METABOLIC PANEL: CPT

## 2019-09-08 PROCEDURE — 87070 CULTURE OTHR SPECIMN AEROBIC: CPT

## 2019-09-08 PROCEDURE — 99900035 HC TECH TIME PER 15 MIN (STAT)

## 2019-09-08 PROCEDURE — 84100 ASSAY OF PHOSPHORUS: CPT

## 2019-09-08 PROCEDURE — 27200966 HC CLOSED SUCTION SYSTEM

## 2019-09-08 PROCEDURE — 95951 PR EEG MONITORING/VIDEORECORD: CPT | Mod: 26,,, | Performed by: PSYCHIATRY & NEUROLOGY

## 2019-09-08 PROCEDURE — 99291 PR CRITICAL CARE, E/M 30-74 MINUTES: ICD-10-PCS | Mod: ,,, | Performed by: NURSE PRACTITIONER

## 2019-09-08 PROCEDURE — 99291 CRITICAL CARE FIRST HOUR: CPT | Mod: ,,, | Performed by: NURSE PRACTITIONER

## 2019-09-08 PROCEDURE — 20000000 HC ICU ROOM

## 2019-09-08 PROCEDURE — 85025 COMPLETE CBC W/AUTO DIFF WBC: CPT

## 2019-09-08 PROCEDURE — 25000003 PHARM REV CODE 250: Performed by: PSYCHIATRY & NEUROLOGY

## 2019-09-08 PROCEDURE — 81001 URINALYSIS AUTO W/SCOPE: CPT

## 2019-09-08 PROCEDURE — 25000003 PHARM REV CODE 250: Performed by: NURSE PRACTITIONER

## 2019-09-08 PROCEDURE — 36600 WITHDRAWAL OF ARTERIAL BLOOD: CPT

## 2019-09-08 PROCEDURE — 63600175 PHARM REV CODE 636 W HCPCS: Performed by: PSYCHIATRY & NEUROLOGY

## 2019-09-08 PROCEDURE — 87205 SMEAR GRAM STAIN: CPT

## 2019-09-08 PROCEDURE — 63600175 PHARM REV CODE 636 W HCPCS: Performed by: STUDENT IN AN ORGANIZED HEALTH CARE EDUCATION/TRAINING PROGRAM

## 2019-09-08 PROCEDURE — 25000003 PHARM REV CODE 250: Performed by: STUDENT IN AN ORGANIZED HEALTH CARE EDUCATION/TRAINING PROGRAM

## 2019-09-08 PROCEDURE — 82800 BLOOD PH: CPT

## 2019-09-08 PROCEDURE — 94761 N-INVAS EAR/PLS OXIMETRY MLT: CPT

## 2019-09-08 PROCEDURE — 82803 BLOOD GASES ANY COMBINATION: CPT

## 2019-09-08 PROCEDURE — 87040 BLOOD CULTURE FOR BACTERIA: CPT

## 2019-09-08 PROCEDURE — 87077 CULTURE AEROBIC IDENTIFY: CPT

## 2019-09-08 PROCEDURE — 99900026 HC AIRWAY MAINTENANCE (STAT)

## 2019-09-08 PROCEDURE — 27000221 HC OXYGEN, UP TO 24 HOURS

## 2019-09-08 PROCEDURE — 87186 SC STD MICRODIL/AGAR DIL: CPT

## 2019-09-08 PROCEDURE — 63600175 PHARM REV CODE 636 W HCPCS: Performed by: NURSE PRACTITIONER

## 2019-09-08 PROCEDURE — 95951 PR EEG MONITORING/VIDEORECORD: ICD-10-PCS | Mod: 26,,, | Performed by: PSYCHIATRY & NEUROLOGY

## 2019-09-08 PROCEDURE — 95951 HC EEG MONITORING/VIDEO RECORD: CPT

## 2019-09-08 PROCEDURE — 94003 VENT MGMT INPAT SUBQ DAY: CPT

## 2019-09-08 PROCEDURE — 83735 ASSAY OF MAGNESIUM: CPT

## 2019-09-08 RX ORDER — VANCOMYCIN 2 GRAM/500 ML IN 0.9 % SODIUM CHLORIDE INTRAVENOUS
25 ONCE
Status: COMPLETED | OUTPATIENT
Start: 2019-09-08 | End: 2019-09-08

## 2019-09-08 RX ORDER — POLYETHYLENE GLYCOL 3350 17 G/17G
17 POWDER, FOR SOLUTION ORAL DAILY PRN
Status: DISCONTINUED | OUTPATIENT
Start: 2019-09-08 | End: 2019-09-11

## 2019-09-08 RX ADMIN — DEXAMETHASONE SODIUM PHOSPHATE 4 MG: 4 INJECTION, SOLUTION INTRAMUSCULAR; INTRAVENOUS at 06:09

## 2019-09-08 RX ADMIN — DEXTROSE 750 MG: 50 INJECTION, SOLUTION INTRAVENOUS at 12:09

## 2019-09-08 RX ADMIN — LACOSAMIDE 100 MG: 50 TABLET, FILM COATED ORAL at 08:09

## 2019-09-08 RX ADMIN — DEXTROSE 750 MG: 50 INJECTION, SOLUTION INTRAVENOUS at 03:09

## 2019-09-08 RX ADMIN — HEPARIN SODIUM 5000 UNITS: 5000 INJECTION INTRAVENOUS; SUBCUTANEOUS at 02:09

## 2019-09-08 RX ADMIN — CHLORHEXIDINE GLUCONATE 0.12% ORAL RINSE 15 ML: 1.2 LIQUID ORAL at 09:09

## 2019-09-08 RX ADMIN — AMLODIPINE BESYLATE 5 MG: 5 TABLET ORAL at 08:09

## 2019-09-08 RX ADMIN — DEXTROSE 750 MG: 50 INJECTION, SOLUTION INTRAVENOUS at 08:09

## 2019-09-08 RX ADMIN — DEXAMETHASONE SODIUM PHOSPHATE 4 MG: 4 INJECTION, SOLUTION INTRAMUSCULAR; INTRAVENOUS at 11:09

## 2019-09-08 RX ADMIN — PROPOFOL 45 MCG/KG/MIN: 10 INJECTION, EMULSION INTRAVENOUS at 09:09

## 2019-09-08 RX ADMIN — LABETALOL HCL IV SOLN PREFILLED SYRINGE 20 MG/4ML (5 MG/ML) 10 MG: 20/4 SOLUTION PREFILLED SYRINGE at 12:09

## 2019-09-08 RX ADMIN — VANCOMYCIN HYDROCHLORIDE 2000 MG: 100 INJECTION, POWDER, LYOPHILIZED, FOR SOLUTION INTRAVENOUS at 03:09

## 2019-09-08 RX ADMIN — FAMOTIDINE 20 MG: 20 TABLET ORAL at 09:09

## 2019-09-08 RX ADMIN — SENNOSIDES AND DOCUSATE SODIUM 1 TABLET: 8.6; 5 TABLET ORAL at 08:09

## 2019-09-08 RX ADMIN — DEXAMETHASONE SODIUM PHOSPHATE 4 MG: 4 INJECTION, SOLUTION INTRAMUSCULAR; INTRAVENOUS at 12:09

## 2019-09-08 RX ADMIN — POTASSIUM & SODIUM PHOSPHATES POWDER PACK 280-160-250 MG 2 PACKET: 280-160-250 PACK at 06:09

## 2019-09-08 RX ADMIN — POTASSIUM & SODIUM PHOSPHATES POWDER PACK 280-160-250 MG 2 PACKET: 280-160-250 PACK at 09:09

## 2019-09-08 RX ADMIN — DEXAMETHASONE SODIUM PHOSPHATE 4 MG: 4 INJECTION, SOLUTION INTRAMUSCULAR; INTRAVENOUS at 05:09

## 2019-09-08 RX ADMIN — HYDRALAZINE HYDROCHLORIDE 10 MG: 20 INJECTION INTRAMUSCULAR; INTRAVENOUS at 12:09

## 2019-09-08 RX ADMIN — INSULIN ASPART 2 UNITS: 100 INJECTION, SOLUTION INTRAVENOUS; SUBCUTANEOUS at 01:09

## 2019-09-08 RX ADMIN — LEVOTHYROXINE SODIUM 75 MCG: 75 TABLET ORAL at 06:09

## 2019-09-08 RX ADMIN — QUETIAPINE FUMARATE 25 MG: 25 TABLET ORAL at 09:09

## 2019-09-08 RX ADMIN — HEPARIN SODIUM 5000 UNITS: 5000 INJECTION INTRAVENOUS; SUBCUTANEOUS at 09:09

## 2019-09-08 RX ADMIN — INSULIN ASPART 2 UNITS: 100 INJECTION, SOLUTION INTRAVENOUS; SUBCUTANEOUS at 05:09

## 2019-09-08 RX ADMIN — INSULIN ASPART 2 UNITS: 100 INJECTION, SOLUTION INTRAVENOUS; SUBCUTANEOUS at 06:09

## 2019-09-08 RX ADMIN — HEPARIN SODIUM 5000 UNITS: 5000 INJECTION INTRAVENOUS; SUBCUTANEOUS at 06:09

## 2019-09-08 RX ADMIN — LACOSAMIDE 100 MG: 50 TABLET, FILM COATED ORAL at 09:09

## 2019-09-08 RX ADMIN — PIPERACILLIN AND TAZOBACTAM 4.5 G: 4; .5 INJECTION, POWDER, LYOPHILIZED, FOR SOLUTION INTRAVENOUS; PARENTERAL at 11:09

## 2019-09-08 RX ADMIN — CHLORHEXIDINE GLUCONATE 0.12% ORAL RINSE 15 ML: 1.2 LIQUID ORAL at 08:09

## 2019-09-08 RX ADMIN — PIPERACILLIN AND TAZOBACTAM 4.5 G: 4; .5 INJECTION, POWDER, LYOPHILIZED, FOR SOLUTION INTRAVENOUS; PARENTERAL at 04:09

## 2019-09-08 RX ADMIN — QUETIAPINE FUMARATE 25 MG: 25 TABLET ORAL at 08:09

## 2019-09-08 RX ADMIN — PROPOFOL 25 MCG/KG/MIN: 10 INJECTION, EMULSION INTRAVENOUS at 02:09

## 2019-09-08 RX ADMIN — DEXTROSE 750 MG: 50 INJECTION, SOLUTION INTRAVENOUS at 11:09

## 2019-09-08 RX ADMIN — INSULIN ASPART 2 UNITS: 100 INJECTION, SOLUTION INTRAVENOUS; SUBCUTANEOUS at 12:09

## 2019-09-08 RX ADMIN — SENNOSIDES AND DOCUSATE SODIUM 1 TABLET: 8.6; 5 TABLET ORAL at 09:09

## 2019-09-08 NOTE — SUBJECTIVE & OBJECTIVE
Review of Systems: Unable to obtain a complete ROS due to level of consciousness.     Vitals:   Temp: 99.1 °F (37.3 °C)  Pulse: 94  Rhythm: sinus tachycardia  BP: (!) 165/78  MAP (mmHg): 123  Resp: (!) 29  SpO2: 100 %  Oxygen Concentration (%): 30  O2 Device (Oxygen Therapy): ventilator  Vent Mode: A/C  Set Rate: 20 bmp  Vt Set: 410 mL  Pressure Support: 0 cmH20  PEEP/CPAP: 5 cmH20  Peak Airway Pressure: 21 cmH2O  Mean Airway Pressure: 11 cmH20  Plateau Pressure: 19 cmH20    Temp  Min: 98.6 °F (37 °C)  Max: 100.1 °F (37.8 °C)  Pulse  Min: 82  Max: 117  BP  Min: 128/60  Max: 210/86  MAP (mmHg)  Min: 86  Max: 134  Resp  Min: 20  Max: 35  SpO2  Min: 99 %  Max: 100 %  Oxygen Concentration (%)  Min: 30  Max: 40    09/07 0701 - 09/08 0700  In: 2058.7 [I.V.:618.7]  Out: 3000 [Urine:3000]   Unmeasured Output  Urine Occurrence: 1  Stool Occurrence: 1  Emesis Occurrence: 0  Pad Count: 2     Examination:   Constitutional: Well-nourished and -developed. No apparent distress.   Eyes: Conjunctiva clear, anicteric. Lids no lesions.  Head/Ears/Nose/Mouth/Throat/Neck: Moist mucous membranes. External ears, nose atraumatic.   Cardiovascular: Regular rhythm. No murmurs. No leg edema.  Respiratory: Comfortable respirations. Clear to auscultation.  Gastrointestinal: No hernia. Soft, nondistended, nontender. + bowel sounds.    Neurologic:  -GCS E 4 V 1t M 5  -Intubated. Sedated. Restless. Does not follow commands.  -Cranial nerves: EOM intact, PERRL, no facial droop, cough/gag intact  -Motor: Moves all extremities spontaneously and antigravity  -Sensation: Intact to noxious stimuli  Unable to test orientation, language, memory, judgment, insight, fund of knowledge, hearing, shoulder shrug, tongue protrusion, coordination, gait due to level of consciousness.    Medications:   Continuous  propofol Last Rate: 20 mcg/kg/min (09/08/19 1205)   Scheduled  amLODIPine 5 mg Daily   chlorhexidine 15 mL BID   dexamethasone 4 mg Q6H   famotidine 20  mg QHS   heparin (porcine) 5,000 Units Q8H   lacosamide 100 mg Q12H   levothyroxine 75 mcg Before breakfast   QUEtiapine 25 mg BID   senna-docusate 8.6-50 mg 1 tablet BID   valproate sodium (DEPACON) IVPB 750 mg Q8H   PRN  acetaminophen 650 mg Q6H PRN   Dextrose 10% Bolus 12.5 g PRN   glucagon (human recombinant) 1 mg PRN   hydrALAZINE 10 mg Q6H PRN   insulin aspart U-100 1-10 Units Q6H PRN   labetalol 10 mg Q6H PRN   magnesium oxide 800 mg PRN   magnesium oxide 800 mg PRN   polyethylene glycol 17 g Daily PRN   potassium chloride 10% 40 mEq PRN   potassium chloride 10% 40 mEq PRN   potassium, sodium phosphates 2 packet PRN   potassium, sodium phosphates 2 packet PRN   potassium, sodium phosphates 2 packet PRN   racepinephrine 0.5 mL Q4H PRN   sodium chloride 0.9% 10 mL PRN      Today I independently reviewed pertinent medications, lines/drains/airways, imaging, cardiology results, laboratory results, microbiology results, notably:     ISTAT: Recent Labs   Lab 09/08/19  0337   PH 7.497*   PCO2 31.3*   PO2 97   POCSATURATED 98   HCO3 24.3   BE 1   POCTCO2 25   SAMPLE ARTERIAL      Chem: Recent Labs   Lab 09/08/19  0240      K 4.3      CO2 21*   *   BUN 25*   CREATININE 1.1   ESTGFRAFRICA 56.4*   EGFRNONAA 48.9*   CALCIUM 9.2   MG 1.9   PHOS 2.6*   ANIONGAP 15   PROT 6.8   ALBUMIN 3.0*   BILITOT 0.5   ALKPHOS 83   AST 22   ALT 89*     Heme: Recent Labs   Lab 09/08/19  0240   WBC 13.53*   HGB 14.3   HCT 44.6        Endo:   Recent Labs   Lab 09/08/19  0100 09/08/19  0614 09/08/19  1214   POCTGLUCOSE 206* 164* 164*

## 2019-09-08 NOTE — PROGRESS NOTES
[facility]  Neurocritical Care  Progress Note    Admit Date: 9/3/2019  Service Date: 09/08/2019  Length of Stay: 5    Subjective:     Chief Complaint: Brain lesion    History of Present Illness: Pt is  73 yo female with a PMHx of CKD 3, HTN, was transferred from [facility] to [facility] for new onset seizures and concern for right front lobe mass. The pt was found in her bedroom by her spouse at approximately 9:45 pm sitting her head against the bed, unresponsive, and having seizure like activity on the left-side. EMT was called and the pt was given Versed twice while en route to the hospital. Pt had a second witnessed seizures, left facial droop, left-sided weakness, and tongue ecchymosis in the ED. Neurology was consulted and The pt was given IV Keppra and Vimpat. MRI brain without contrast was acquired. The image showed right frontal lobe vasogenic edema with mass effect concerning for underlying mass. EEG was acquired at outside hospital concerning showing an abnormal result. The pt was given decadron IV and neurosurgical consult recommended. Pt transferred to [facility] and admitted to the [facility] for higher level of care and further evaluation.     Pt history acquired per chart review and from spouse present at the bedside. The pt's spouse denies prior history of seizures CVA, cancer history and up-to-date screenings    Hospital Course: --admitted to [facility] on 9/3 following new onset seizure, arrived intubated  --MRI with large area of R cortical edema with flair and ill defined enhancement on contrast study, concern for glioma vs infectious/inflammatory process  --LP appears non infectious, cytology pending  9/5- no acute events, awaiting LP finalization from pathology report., weaning vent.   09/06/2019: Very agitated overnight, requiring increased sedation. Patient extubated this morning on rounds, and reintubated shortly after. Unable to maintain airway and secretions. CSF gram stain negative.  09/07/2019: KATT.  EKG obtained, seroquel started. Valproic acid started.  09/08/2019: JOVITAON. Improved agitation with seroquel. Febrile, leukocytosis, panculture, start broad spectrum antibiotics.      Review of Systems: Unable to obtain a complete ROS due to level of consciousness.     Vitals:   Temp: 99.1 °F (37.3 °C)  Pulse: 94  Rhythm: sinus tachycardia  BP: (!) 165/78  MAP (mmHg): 123  Resp: (!) 29  SpO2: 100 %  Oxygen Concentration (%): 30  O2 Device (Oxygen Therapy): ventilator  Vent Mode: A/C  Set Rate: 20 bmp  Vt Set: 410 mL  Pressure Support: 0 cmH20  PEEP/CPAP: 5 cmH20  Peak Airway Pressure: 21 cmH2O  Mean Airway Pressure: 11 cmH20  Plateau Pressure: 19 cmH20    Temp  Min: 98.6 °F (37 °C)  Max: 100.1 °F (37.8 °C)  Pulse  Min: 82  Max: 117  BP  Min: 128/60  Max: 210/86  MAP (mmHg)  Min: 86  Max: 134  Resp  Min: 20  Max: 35  SpO2  Min: 99 %  Max: 100 %  Oxygen Concentration (%)  Min: 30  Max: 40    09/07 0701 - 09/08 0700  In: 2058.7 [I.V.:618.7]  Out: 3000 [Urine:3000]   Unmeasured Output  Urine Occurrence: 1  Stool Occurrence: 1  Emesis Occurrence: 0  Pad Count: 2     Examination:   Constitutional: Well-nourished and -developed. No apparent distress.   Eyes: Conjunctiva clear, anicteric. Lids no lesions.  Head/Ears/Nose/Mouth/Throat/Neck: Moist mucous membranes. External ears, nose atraumatic.   Cardiovascular: Regular rhythm. No murmurs. No leg edema.  Respiratory: Comfortable respirations. Clear to auscultation.  Gastrointestinal: No hernia. Soft, nondistended, nontender. + bowel sounds.    Neurologic:  -GCS E 4 V 1t M 5  -Intubated. Sedated. Restless. Does not follow commands.  -Cranial nerves: EOM intact, PERRL, no facial droop, cough/gag intact  -Motor: Moves all extremities spontaneously and antigravity  -Sensation: Intact to noxious stimuli  Unable to test orientation, language, memory, judgment, insight, fund of knowledge, hearing, shoulder shrug, tongue protrusion, coordination, gait due to level of  consciousness.    Medications:   Continuous  propofol Last Rate: 20 mcg/kg/min (09/08/19 1205)   Scheduled  amLODIPine 5 mg Daily   chlorhexidine 15 mL BID   dexamethasone 4 mg Q6H   famotidine 20 mg QHS   heparin (porcine) 5,000 Units Q8H   lacosamide 100 mg Q12H   levothyroxine 75 mcg Before breakfast   QUEtiapine 25 mg BID   senna-docusate 8.6-50 mg 1 tablet BID   valproate sodium (DEPACON) IVPB 750 mg Q8H   PRN  acetaminophen 650 mg Q6H PRN   Dextrose 10% Bolus 12.5 g PRN   glucagon (human recombinant) 1 mg PRN   hydrALAZINE 10 mg Q6H PRN   insulin aspart U-100 1-10 Units Q6H PRN   labetalol 10 mg Q6H PRN   magnesium oxide 800 mg PRN   magnesium oxide 800 mg PRN   polyethylene glycol 17 g Daily PRN   potassium chloride 10% 40 mEq PRN   potassium chloride 10% 40 mEq PRN   potassium, sodium phosphates 2 packet PRN   potassium, sodium phosphates 2 packet PRN   potassium, sodium phosphates 2 packet PRN   racepinephrine 0.5 mL Q4H PRN   sodium chloride 0.9% 10 mL PRN      Today I independently reviewed pertinent medications, lines/drains/airways, imaging, cardiology results, laboratory results, microbiology results, notably:     ISTAT: Recent Labs   Lab 09/08/19  0337   PH 7.497*   PCO2 31.3*   PO2 97   POCSATURATED 98   HCO3 24.3   BE 1   POCTCO2 25   SAMPLE ARTERIAL      Chem: Recent Labs   Lab 09/08/19  0240      K 4.3      CO2 21*   *   BUN 25*   CREATININE 1.1   ESTGFRAFRICA 56.4*   EGFRNONAA 48.9*   CALCIUM 9.2   MG 1.9   PHOS 2.6*   ANIONGAP 15   PROT 6.8   ALBUMIN 3.0*   BILITOT 0.5   ALKPHOS 83   AST 22   ALT 89*     Heme: Recent Labs   Lab 09/08/19  0240   WBC 13.53*   HGB 14.3   HCT 44.6        Endo:   Recent Labs   Lab 09/08/19  0100 09/08/19  0614 09/08/19  1214   POCTGLUCOSE 206* 164* 164*          Assessment/Plan:     Neuro  * Brain lesion  74 year old admitted to unit on 9/3 following new onset seizure, with MRI showing large area of R cortical edema with flair and ill  defined enhancement on contrast study, concern for glioma vs infectious/inflammatory process  -- LP appears non infectious, cytology negative  -- continue decadron  -- EEG monitoring  -- Wean Propofol as tolerated, minimal sedation requirements since seroquel started  -- Will need repeat MRI imaging, Monday or Tuesday to reevaluate lesion  -- NSGY, epilepsy following   -- Hourly neuro checks  -- Hourly vital signs    Cerebral edema  --continue decadron, see brain lesion    New onset seizure  2/2 to brain mass   Keppra changed to Vimpat 9/6  Valproic acid started 9/7  Continue EEG monitoring   Epilepsy following      Pulmonary  On mechanically assisted ventilation  Daily CXR, ABG  Continue Peridex, Famotidine, Heparin subq  Trial of extubation 9/6/2019- re-intubated shortly after extubation for airway protection  Continue to wean vent settings as tolerated    Vent Mode: A/C  Oxygen Concentration (%):  [30-40] 30  Resp Rate Total:  [20 br/min-31 br/min] 26 br/min  Vt Set:  [410 mL] 410 mL  PEEP/CPAP:  [5 cmH20] 5 cmH20  Pressure Support:  [0 cmH20] 0 cmH20  Mean Airway Pressure:  [9.9 xiM70-89 cmH20] 11 cmH20      Cardiac/Vascular  Essential hypertension  SBP <180, MAP >65   -Amlodipine started 9/7      Oncology  Leukocytosis  Febrile, leukocytosis present  -Panculture  -Start Broad Spec antibiotics        The patient is being Prophylaxed for:  Venous Thromboembolism with: Mechanical or Chemical  Stress Ulcer with: H2B  Ventilator Pneumonia with: chlorhexidine oral care    Activity Orders          Straight Cath starting at 09/05 0034        Full Code    CC time spent: 35 minutes    Lolita Salinas NP  Neurocritical Care  [facility]

## 2019-09-08 NOTE — ASSESSMENT & PLAN NOTE
Daily CXR, ABG  Continue Peridex, Famotidine, Heparin subq  Trial of extubation 9/6/2019- re-intubated shortly after extubation for airway protection  Continue to wean vent settings as tolerated    Vent Mode: A/C  Oxygen Concentration (%):  [30-40] 30  Resp Rate Total:  [20 br/min-31 br/min] 26 br/min  Vt Set:  [410 mL] 410 mL  PEEP/CPAP:  [5 cmH20] 5 cmH20  Pressure Support:  [0 cmH20] 0 cmH20  Mean Airway Pressure:  [9.9 ahH45-78 cmH20] 11 cmH20

## 2019-09-08 NOTE — PLAN OF CARE
Problem: Adult Inpatient Plan of Care  Goal: Plan of Care Review  Outcome: Ongoing (interventions implemented as appropriate)  POC reviewed with pt and family at 0500. Pt unable to verbalize understanding. Questions and concerns addressed with family. Hydralazine 10 mg given 1x overnight. TMAX 100.1 F. 1 BM overnight. No acute events overnight. Will continue to monitor. See flowsheets for full assessment and VS info

## 2019-09-08 NOTE — PROGRESS NOTES
Pharmacokinetic Initial Assessment: IV Vancomycin    Assessment/Plan:  · Initiate intravenous vancomycin with loading dose of 2,000 mg once followed by a maintenance dose of vancomycin 1,250 mg IV every 24 hours  · Desired empiric serum trough concentration is 10 to 20 mcg/mL  · Draw vancomycin trough level 30 min prior to third dose on 9/10 at approximately 1515.     Pharmacy will continue to follow and monitor vancomycin. Please call with questions.    Thank you for the consult,   Talia Bailon, PharmD, BCCCP  Neurocritical Care Clinical Pharmacist  Spectralink: [id number]  _____________________________________________________________________________________________________________________     Patient brief summary:  Clemencia Nguyen is a 74 y.o. female initiated on antimicrobial therapy with IV Vancomycin for treatment of suspected bacteremia    Drug Allergies:   Review of patient's allergies indicates:  No Known Allergies    Actual Body Weight:   78.6 kg    Renal Function:   Estimated Creatinine Clearance: 43.2 mL/min (based on SCr of 1.1 mg/dL).,     Dialysis Method (if applicable):  N/A    CBC (last 72 hours):  Recent Labs   Lab Result Units 09/06/19 0210 09/07/19 0212 09/08/19  0240   WBC K/uL 9.12 8.41 13.53*   Hemoglobin g/dL 10.7* 11.5* 14.3   Hematocrit % 34.5* 36.3* 44.6   Platelets K/uL 151 180 271   Gran% % 78.9* 76.0* 73.1*   Lymph% % 9.1* 12.5* 10.1*   Mono% % 11.0 10.6 13.3   Eosinophil% % 0.0 0.0 0.0   Basophil% % 0.1 0.1 0.4   Differential Method  Automated Automated Automated       Metabolic Panel (last 72 hours):  Recent Labs   Lab Result Units 09/06/19 0210 09/07/19 0212 09/08/19  0240   Sodium mmol/L 142 137 140   Potassium mmol/L 4.8 4.3 4.3   Chloride mmol/L 112* 104 104   CO2 mmol/L 21* 24 21*   Glucose mg/dL 180* 189* 209*   BUN, Bld mg/dL 26* 27* 25*   Creatinine mg/dL 1.0 1.2 1.1   Albumin g/dL 2.8* 3.1* 3.0*   Total Bilirubin mg/dL 0.4 0.6 0.5   Alkaline Phosphatase U/L 69 74 83    AST U/L 48* 30 22   ALT U/L 118* 94* 89*   Magnesium mg/dL 1.9 1.9 1.9   Phosphorus mg/dL 2.5* 2.1* 2.6*       Drug levels (last 3 results):  No results for input(s): VANCOMYCINRA, VANCOMYCINPE, VANCOMYCINTR in the last 72 hours.    Microbiologic Results:  Microbiology Results (last 7 days)     Procedure Component Value Units Date/Time    Culture, Respiratory with Gram Stain [[id number]] Collected:  09/08/19 1158    Order Status:  Sent Specimen:  Respiratory from Tracheal Aspirate Updated:  09/08/19 1335    Blood culture [[id number]] Collected:  09/08/19 1215    Order Status:  Sent Specimen:  Blood from Peripheral, Forearm, Left Updated:  09/08/19 1234    Blood culture [[id number]] Collected:  09/08/19 1210    Order Status:  Sent Specimen:  Blood from Peripheral, Ankle, Left Updated:  09/08/19 1233    Gram stain [[id number]] Collected:  09/03/19 1843    Order Status:  Completed Specimen:  CSF (Spinal Fluid) from CSF Tap, Tube 2 Updated:  09/03/19 2153     Gram Stain Result No WBC's      No organisms seen

## 2019-09-08 NOTE — ASSESSMENT & PLAN NOTE
Daily CXR, ABG  Continue Peridex, Famotidine, Heparin subq  Trial of extubation 9/6/2019- re-intubated shortly after extubation for airway protection  Continue to wean vent settings as tolerated    Vent Mode: A/C  Oxygen Concentration (%):  [30-40] 30  Resp Rate Total:  [20 br/min-31 br/min] 28 br/min  Vt Set:  [410 mL] 410 mL  PEEP/CPAP:  [5 cmH20] 5 cmH20  Pressure Support:  [0 cmH20] 0 cmH20  Mean Airway Pressure:  [9.9 fjX23-61 cmH20] 11 cmH20

## 2019-09-08 NOTE — PROCEDURES
ICU EEG/VIDEO MONITORING REPORT    Clemencia Nguyen  [id number]  1945    DATE OF SERVICE: 9/7-8/19    DATE OF ADMISSION: 9/3/2019 11:00 AM    ADMITTING PROVIDER: Phillip Bardales MD    METHODOLOGY   Electroencephalographic (EEG) recording is with electrodes placed according to the International 10-20 placement system.  Thirty two (32) channels of digital signal are simultaneously recorded from the scalp and may include EKG, EMG, and/or eye monitors.   Recording band pass was 0.1 to 512 hz.  Digital video recording of the patient is simultaneously recorded with the EEG.  The nursing staff report clinical symptoms and may press an event button when the patient has symptoms of clinical interest to the treating physicians.  EEG and video recording is stored and archived in digital format.  The entire recording is visually reviewed and the times identified by computer analysis as being spikes or seizures are reviewed again.  Activation procedures which include photic stimulation, hyperventilation and instructing patients to perform simple task are done in selected patients.   Compresses spectral analysis (CSA) is also performed on the activity recorded from each individual channel.  This is displayed as a power display of frequencies from 0 to 30 Hz over time.   The CSA analysis is done and displayed continuously.  This is reviewed for asymmetries in power between homologous areas of the scalp and for presence of changes in power which canbe seen when seizures occur.  Sections of suspected abnormalities on the CSA is then compared with the original EEG recording.     [employer] software was also utilized in the review of this study.  This software suite analyzes the EEG recording in multiple domains.  Coherence and rhythmicity is computed to identify EEG sections which may contain organized seizures.  Each channel undergoes analysis to detect presence of spike and sharp waves which have special and morphological  characteristic of epileptic activity.  The routine EEG recording is converted from spacial into frequency domain.  This is then displayed comparing homologous areas to identify areas of significant asymmetry.  Algorithm to identify non-cortically generated artifact is used to separate eye movement, EMG and other artifact from the EEG.      Recording Times  Start on 9/7/19, 07:00  Stop on 9/8/19, 07:00    A total of 24 hours of EEG was recorded.    EEG FINDINGS  Bilateral independent epileptiform (broad sharp waves) periodic discharges (left > right) is noted throughout the study.  No associated clinical correlate seen.    At onset, with Propofol @ 60mKm        The bilateral periodic discharges persist until end of the study  Towards the end of the study with Propofol @ 25mKm:        Sleep:   Not seen.    Activation procedures:   Not done    EKG:   - unable to assess    IMPRESSION:   This is an abnormal C-EEG due to:  1) bilateral left > right epileptiform periodic discharges seen as described, suggestive of underlying epileptiform potential   2) severe generalized slowing seen, suggestive of severe diffuse or multifocal cerebral dysfunction.    No significant change from prior day  CLINICAL CORRELATION IS RECOMMENDED.    Fawn Alfred MD, MALINA(), FACNS, FAKENISHA.  Neurology-Epilepsy.  [facility].

## 2019-09-08 NOTE — SUBJECTIVE & OBJECTIVE
Interval History: no AE. AFVSS. No seizures reported overnight.     Medications:  Continuous Infusions:   propofol 25 mcg/kg/min (09/08/19 0705)     Scheduled Meds:   amLODIPine  5 mg Per OG tube Daily    chlorhexidine  15 mL Mouth/Throat BID    dexamethasone  4 mg Intravenous Q6H    famotidine  20 mg Per OG tube QHS    heparin (porcine)  5,000 Units Subcutaneous Q8H    lacosamide  100 mg Per OG tube Q12H    levothyroxine  75 mcg Per OG tube Before breakfast    polyethylene glycol  17 g Per NG tube Daily    QUEtiapine  25 mg Per OG tube BID    senna-docusate 8.6-50 mg  1 tablet Per OG tube BID    valproate sodium (DEPACON) IVPB  750 mg Intravenous Q8H     PRN Meds:acetaminophen, Dextrose 10% Bolus, glucagon (human recombinant), hydrALAZINE, insulin aspart U-100, labetalol, magnesium oxide, magnesium oxide, potassium chloride 10%, potassium chloride 10%, potassium, sodium phosphates, potassium, sodium phosphates, potassium, sodium phosphates, racepinephrine, sodium chloride 0.9%     Review of Systems    Objective:     Weight: 78.6 kg (173 lb 4.5 oz)  Body mass index is 30.7 kg/m².  Vital Signs (Most Recent):  Temp: 100.1 °F (37.8 °C) (09/08/19 0705)  Pulse: 102 (09/08/19 0705)  Resp: (!) 26 (09/08/19 0705)  BP: (!) 152/72 (09/08/19 0705)  SpO2: 100 % (09/08/19 0705) Vital Signs (24h Range):  Temp:  [98.6 °F (37 °C)-100.1 °F (37.8 °C)] 100.1 °F (37.8 °C)  Pulse:  [] 102  Resp:  [18-27] 26  SpO2:  [99 %-100 %] 100 %  BP: (145-193)/(64-93) 152/72     Date 09/08/19 0700 - 09/09/19 0659   Shift 7874-4050 0370-5224 3608-7286 24 Hour Total   INTAKE   I.V.(mL/kg) 11.2(0.1)   11.2(0.1)   NG/GT 45   45   Shift Total(mL/kg) 56.2(0.7)   56.2(0.7)   OUTPUT   Urine(mL/kg/hr) 100   100   Shift Total(mL/kg) 100(1.3)   100(1.3)   Weight (kg) 78.6 78.6 78.6 78.6              Vent Mode: A/C  Oxygen Concentration (%):  [40] 40  Resp Rate Total:  [20 br/min-27 br/min] 20 br/min  Vt Set:  [410 mL] 410 mL  PEEP/CPAP:  [5  cmH20] 5 cmH20  Pressure Support:  [0 cmH20] 0 cmH20  Mean Airway Pressure:  [9.7 yxB33-04 cmH20] 10 cmH20         NG/OG Tube 09/03/19 1100 orogastric Center mouth (Active)   Placement Check placement verified by aspirate characteristics;placement verified by distal tube length measurement;placement verified by x-ray 9/5/2019  3:01 PM   Tolerance no signs/symptoms of discomfort 9/5/2019  3:01 PM   Securement secured to commercial device 9/5/2019  3:01 PM   Clamp Status/Tolerance unclamped 9/5/2019  3:01 PM   Suction Setting/Drainage Method suction at the bedside 9/4/2019  3:05 AM   Insertion Site Appearance no redness, warmth, tenderness, skin breakdown, drainage 9/5/2019  3:01 PM   Flush/Irrigation flushed w/;water 9/5/2019  3:01 PM   Feeding Method continuous 9/5/2019  3:01 PM   Feeding Action feeding continued 9/5/2019  3:01 PM   Current Rate (mL/hr) 45 mL/hr 9/5/2019  7:01 AM   Goal Rate (mL/hr) 45 mL/hr 9/5/2019  7:01 AM   Intake (mL) 60 mL 9/5/2019  9:01 AM   Water Bolus (mL) 60 mL 9/5/2019  6:00 AM   Formula Name isosource 9/5/2019  3:01 PM   Intake (mL) - Formula Tube Feeding 45 9/6/2019  6:05 AM   Residual Amount (ml) 0 ml 9/5/2019  3:03 AM       Female External Urinary Catheter 09/04/19 2301 (Active)   Skin no redness;no breakdown 9/6/2019  3:05 AM   Tolerance no signs/symptoms of discomfort 9/6/2019  3:05 AM   Suction Continuous suction at 40 mmHg 9/6/2019  3:05 AM   Date of last wick change 09/06/19 9/6/2019  3:05 AM   Time of last wick change 0025 9/6/2019  3:05 AM       Physical Exam:  Nursing note and vitals reviewed.    Constitutional: She appears well-developed.     Abdominal: Soft.      E1VTM5  PERRL, +corneals, +cough/gag  Localizing in BLE, minimal movement in BUE  Moves legs spontaneously  On cEEG    Significant Labs:  Recent Labs   Lab 09/07/19  0212 09/08/19  0240   * 209*    140   K 4.3 4.3    104   CO2 24 21*   BUN 27* 25*   CREATININE 1.2 1.1   CALCIUM 8.5* 9.2   MG 1.9  1.9     Recent Labs   Lab 09/07/19  0212 09/08/19  0240   WBC 8.41 13.53*   HGB 11.5* 14.3   HCT 36.3* 44.6    271     Recent Labs   Lab 09/07/19  0000   INR 0.9   APTT 25.2     Microbiology Results (last 7 days)     Procedure Component Value Units Date/Time    Gram stain [[id number]] Collected:  09/03/19 1843    Order Status:  Completed Specimen:  CSF (Spinal Fluid) from CSF Tap, Tube 2 Updated:  09/03/19 2153     Gram Stain Result No WBC's      No organisms seen            Significant Diagnostics:

## 2019-09-08 NOTE — ASSESSMENT & PLAN NOTE
74 year old admitted to unit on 9/3 following new onset seizure, with MRI showing large area of R cortical edema with flair and ill defined enhancement on contrast study, concern for glioma vs infectious/inflammatory process  -- LP appears non infectious, cytology negative  -- continue decadron  -- EEG monitoring  -- NSGY, epilepsy following   -- Hourly neuro checks  -- Hourly vital signs  --Repeat MRI Brain w/wo today  --Increased Seroquel overnight. Wean Propofol, Fentanyl IVP as needed.  --Will need repeat LP later on in the week.

## 2019-09-08 NOTE — PROGRESS NOTES
[facility]  Neurosurgery  Progress Note    Subjective:     History of Present Illness: 73 y/o female with pmhx CKD and HTN  presented to outside hospital for seizure- like activity this AM. Per  pt was found unconscious this AM, with seizure like activity occurring on the left side. Pt was given versed x 2 via EMS. CTH performed at outside hospital was negative for bleed. Pt had additional seizure in outside hospital ED and was given IV Keppra. EEG ordered and MRI brain w/out contrast concerning for possible infiltratrive process vs post ischemic sequela. Pt transferred to [facility] and admitted to [facility]. MRI brain w/ and w/out obtained that showed large area of edema in right frontal lobe and ill defined enhancement in the frontal lobe concerning for possible cerebritis vs. Neoplasm. NSGY was consulted to evaluate. Pt not on anti-coagulation.       Post-Op Info:  * No surgery found *         Interval History: no AE. AFVSS. No seizures reported overnight.     Medications:  Continuous Infusions:   propofol 25 mcg/kg/min (09/08/19 0705)     Scheduled Meds:   amLODIPine  5 mg Per OG tube Daily    chlorhexidine  15 mL Mouth/Throat BID    dexamethasone  4 mg Intravenous Q6H    famotidine  20 mg Per OG tube QHS    heparin (porcine)  5,000 Units Subcutaneous Q8H    lacosamide  100 mg Per OG tube Q12H    levothyroxine  75 mcg Per OG tube Before breakfast    polyethylene glycol  17 g Per NG tube Daily    QUEtiapine  25 mg Per OG tube BID    senna-docusate 8.6-50 mg  1 tablet Per OG tube BID    valproate sodium (DEPACON) IVPB  750 mg Intravenous Q8H     PRN Meds:acetaminophen, Dextrose 10% Bolus, glucagon (human recombinant), hydrALAZINE, insulin aspart U-100, labetalol, magnesium oxide, magnesium oxide, potassium chloride 10%, potassium chloride 10%, potassium, sodium phosphates, potassium, sodium phosphates, potassium, sodium phosphates, racepinephrine, sodium chloride 0.9%     Review of  Systems    Objective:     Weight: 78.6 kg (173 lb 4.5 oz)  Body mass index is 30.7 kg/m².  Vital Signs (Most Recent):  Temp: 100.1 °F (37.8 °C) (09/08/19 0705)  Pulse: 102 (09/08/19 0705)  Resp: (!) 26 (09/08/19 0705)  BP: (!) 152/72 (09/08/19 0705)  SpO2: 100 % (09/08/19 0705) Vital Signs (24h Range):  Temp:  [98.6 °F (37 °C)-100.1 °F (37.8 °C)] 100.1 °F (37.8 °C)  Pulse:  [] 102  Resp:  [18-27] 26  SpO2:  [99 %-100 %] 100 %  BP: (145-193)/(64-93) 152/72     Date 09/08/19 0700 - 09/09/19 0659   Shift 2934-0469 6094-3100 3970-1090 24 Hour Total   INTAKE   I.V.(mL/kg) 11.2(0.1)   11.2(0.1)   NG/GT 45   45   Shift Total(mL/kg) 56.2(0.7)   56.2(0.7)   OUTPUT   Urine(mL/kg/hr) 100   100   Shift Total(mL/kg) 100(1.3)   100(1.3)   Weight (kg) 78.6 78.6 78.6 78.6              Vent Mode: A/C  Oxygen Concentration (%):  [40] 40  Resp Rate Total:  [20 br/min-27 br/min] 20 br/min  Vt Set:  [410 mL] 410 mL  PEEP/CPAP:  [5 cmH20] 5 cmH20  Pressure Support:  [0 cmH20] 0 cmH20  Mean Airway Pressure:  [9.7 ncD86-08 cmH20] 10 cmH20         NG/OG Tube 09/03/19 1100 orogastric Center mouth (Active)   Placement Check placement verified by aspirate characteristics;placement verified by distal tube length measurement;placement verified by x-ray 9/5/2019  3:01 PM   Tolerance no signs/symptoms of discomfort 9/5/2019  3:01 PM   Securement secured to commercial device 9/5/2019  3:01 PM   Clamp Status/Tolerance unclamped 9/5/2019  3:01 PM   Suction Setting/Drainage Method suction at the bedside 9/4/2019  3:05 AM   Insertion Site Appearance no redness, warmth, tenderness, skin breakdown, drainage 9/5/2019  3:01 PM   Flush/Irrigation flushed w/;water 9/5/2019  3:01 PM   Feeding Method continuous 9/5/2019  3:01 PM   Feeding Action feeding continued 9/5/2019  3:01 PM   Current Rate (mL/hr) 45 mL/hr 9/5/2019  7:01 AM   Goal Rate (mL/hr) 45 mL/hr 9/5/2019  7:01 AM   Intake (mL) 60 mL 9/5/2019  9:01 AM   Water Bolus (mL) 60 mL 9/5/2019  6:00  AM   Formula Name isosource 9/5/2019  3:01 PM   Intake (mL) - Formula Tube Feeding 45 9/6/2019  6:05 AM   Residual Amount (ml) 0 ml 9/5/2019  3:03 AM       Female External Urinary Catheter 09/04/19 2301 (Active)   Skin no redness;no breakdown 9/6/2019  3:05 AM   Tolerance no signs/symptoms of discomfort 9/6/2019  3:05 AM   Suction Continuous suction at 40 mmHg 9/6/2019  3:05 AM   Date of last wick change 09/06/19 9/6/2019  3:05 AM   Time of last wick change 0025 9/6/2019  3:05 AM       Physical Exam:  Nursing note and vitals reviewed.    Constitutional: She appears well-developed.     Abdominal: Soft.      E1VTM5  PERRL, +corneals, +cough/gag  Localizing in BLE, minimal movement in BUE  Moves legs spontaneously  On cEEG    Significant Labs:  Recent Labs   Lab 09/07/19  0212 09/08/19  0240   * 209*    140   K 4.3 4.3    104   CO2 24 21*   BUN 27* 25*   CREATININE 1.2 1.1   CALCIUM 8.5* 9.2   MG 1.9 1.9     Recent Labs   Lab 09/07/19  0212 09/08/19  0240   WBC 8.41 13.53*   HGB 11.5* 14.3   HCT 36.3* 44.6    271     Recent Labs   Lab 09/07/19  0000   INR 0.9   APTT 25.2     Microbiology Results (last 7 days)     Procedure Component Value Units Date/Time    Gram stain [[id number]] Collected:  09/03/19 1843    Order Status:  Completed Specimen:  CSF (Spinal Fluid) from CSF Tap, Tube 2 Updated:  09/03/19 2153     Gram Stain Result No WBC's      No organisms seen            Significant Diagnostics:      Assessment/Plan:     New onset seizure  73 y/o female with pmhx CKD and HTN presented to outside hospital for seizure- like activity occurring on the left side. Found to have area of ill-defined enhancement on MRI brain w/ and w/out.     -q 1 hr neuro checks  -Fu cEEG, wean propofol, continue keppra.  -Fu infectious rodriguez  -Continue steroids for now  -WTE when medically stable.  -Further care per N[facility], will follow.          Darin Mckeon MD  Neurosurgery  [facility]

## 2019-09-09 PROBLEM — J96.02 ACUTE RESPIRATORY FAILURE WITH HYPOXIA AND HYPERCAPNIA: Status: ACTIVE | Noted: 2019-09-09

## 2019-09-09 PROBLEM — R50.9 FEVER: Status: ACTIVE | Noted: 2019-09-09

## 2019-09-09 PROBLEM — J96.01 ACUTE RESPIRATORY FAILURE WITH HYPOXIA AND HYPERCAPNIA: Status: ACTIVE | Noted: 2019-09-09

## 2019-09-09 LAB
ALBUMIN SERPL BCP-MCNC: 2.4 G/DL (ref 3.5–5.2)
ALLENS TEST: ABNORMAL
ALP SERPL-CCNC: 54 U/L (ref 55–135)
ALT SERPL W/O P-5'-P-CCNC: 42 U/L (ref 10–44)
ANION GAP SERPL CALC-SCNC: 17 MMOL/L (ref 8–16)
AST SERPL-CCNC: 12 U/L (ref 10–40)
BASOPHILS # BLD AUTO: 0.05 K/UL (ref 0–0.2)
BASOPHILS NFR BLD: 0.5 % (ref 0–1.9)
BILIRUB SERPL-MCNC: 0.4 MG/DL (ref 0.1–1)
BUN SERPL-MCNC: 34 MG/DL (ref 8–23)
CALCIUM SERPL-MCNC: 8.2 MG/DL (ref 8.7–10.5)
CHLORIDE SERPL-SCNC: 107 MMOL/L (ref 95–110)
CO2 SERPL-SCNC: 17 MMOL/L (ref 23–29)
CREAT SERPL-MCNC: 1.2 MG/DL (ref 0.5–1.4)
DELSYS: ABNORMAL
DIFFERENTIAL METHOD: ABNORMAL
EOSINOPHIL # BLD AUTO: 0 K/UL (ref 0–0.5)
EOSINOPHIL NFR BLD: 0 % (ref 0–8)
ERYTHROCYTE [DISTWIDTH] IN BLOOD BY AUTOMATED COUNT: 13.5 % (ref 11.5–14.5)
ERYTHROCYTE [SEDIMENTATION RATE] IN BLOOD BY WESTERGREN METHOD: 20 MM/H
EST. GFR  (AFRICAN AMERICAN): 50.7 ML/MIN/1.73 M^2
EST. GFR  (NON AFRICAN AMERICAN): 44 ML/MIN/1.73 M^2
FIO2: 30
GLUCOSE SERPL-MCNC: 238 MG/DL (ref 70–110)
HCO3 UR-SCNC: 23 MMOL/L (ref 24–28)
HCT VFR BLD AUTO: 39.7 % (ref 37–48.5)
HGB BLD-MCNC: 12.9 G/DL (ref 12–16)
IMM GRANULOCYTES # BLD AUTO: 0.45 K/UL (ref 0–0.04)
IMM GRANULOCYTES NFR BLD AUTO: 4.1 % (ref 0–0.5)
LYMPHOCYTES # BLD AUTO: 1.5 K/UL (ref 1–4.8)
LYMPHOCYTES NFR BLD: 13.4 % (ref 18–48)
MAGNESIUM SERPL-MCNC: 1.9 MG/DL (ref 1.6–2.6)
MCH RBC QN AUTO: 28.7 PG (ref 27–31)
MCHC RBC AUTO-ENTMCNC: 32.5 G/DL (ref 32–36)
MCV RBC AUTO: 88 FL (ref 82–98)
MODE: ABNORMAL
MONOCYTES # BLD AUTO: 1.4 K/UL (ref 0.3–1)
MONOCYTES NFR BLD: 13.1 % (ref 4–15)
NEUTROPHILS # BLD AUTO: 7.5 K/UL (ref 1.8–7.7)
NEUTROPHILS NFR BLD: 68.9 % (ref 38–73)
NRBC BLD-RTO: 0 /100 WBC
PCO2 BLDA: 29.2 MMHG (ref 35–45)
PEEP: 5
PH SMN: 7.5 [PH] (ref 7.35–7.45)
PHOSPHATE SERPL-MCNC: 3 MG/DL (ref 2.7–4.5)
PLATELET # BLD AUTO: 229 K/UL (ref 150–350)
PMV BLD AUTO: 9.2 FL (ref 9.2–12.9)
PO2 BLDA: 86 MMHG (ref 80–100)
POC BE: 0 MMOL/L
POC SATURATED O2: 98 % (ref 95–100)
POC TCO2: 24 MMOL/L (ref 23–27)
POCT GLUCOSE: 150 MG/DL (ref 70–110)
POCT GLUCOSE: 175 MG/DL (ref 70–110)
POCT GLUCOSE: 194 MG/DL (ref 70–110)
POCT GLUCOSE: 206 MG/DL (ref 70–110)
POCT GLUCOSE: 216 MG/DL (ref 70–110)
POTASSIUM SERPL-SCNC: 4.7 MMOL/L (ref 3.5–5.1)
PROT SERPL-MCNC: 5.3 G/DL (ref 6–8.4)
RBC # BLD AUTO: 4.5 M/UL (ref 4–5.4)
SAMPLE: ABNORMAL
SITE: ABNORMAL
SODIUM SERPL-SCNC: 141 MMOL/L (ref 136–145)
VANCOMYCIN SERPL-MCNC: 10.5 UG/ML
VT: 410
WBC # BLD AUTO: 10.87 K/UL (ref 3.9–12.7)

## 2019-09-09 PROCEDURE — 84100 ASSAY OF PHOSPHORUS: CPT

## 2019-09-09 PROCEDURE — 85025 COMPLETE CBC W/AUTO DIFF WBC: CPT

## 2019-09-09 PROCEDURE — 20000000 HC ICU ROOM

## 2019-09-09 PROCEDURE — 95951 HC EEG MONITORING/VIDEO RECORD: CPT

## 2019-09-09 PROCEDURE — A9585 GADOBUTROL INJECTION: HCPCS | Performed by: PSYCHIATRY & NEUROLOGY

## 2019-09-09 PROCEDURE — 94003 VENT MGMT INPAT SUBQ DAY: CPT

## 2019-09-09 PROCEDURE — 99900026 HC AIRWAY MAINTENANCE (STAT)

## 2019-09-09 PROCEDURE — 99900035 HC TECH TIME PER 15 MIN (STAT)

## 2019-09-09 PROCEDURE — 99291 PR CRITICAL CARE, E/M 30-74 MINUTES: ICD-10-PCS | Mod: GC,,, | Performed by: PSYCHIATRY & NEUROLOGY

## 2019-09-09 PROCEDURE — 63600175 PHARM REV CODE 636 W HCPCS: Performed by: PSYCHIATRY & NEUROLOGY

## 2019-09-09 PROCEDURE — 63600175 PHARM REV CODE 636 W HCPCS: Performed by: NURSE PRACTITIONER

## 2019-09-09 PROCEDURE — 82803 BLOOD GASES ANY COMBINATION: CPT

## 2019-09-09 PROCEDURE — 99232 PR SUBSEQUENT HOSPITAL CARE,LEVL II: ICD-10-PCS | Mod: ,,, | Performed by: NEUROLOGICAL SURGERY

## 2019-09-09 PROCEDURE — 95951 PR EEG MONITORING/VIDEORECORD: ICD-10-PCS | Mod: 26,52,, | Performed by: PSYCHIATRY & NEUROLOGY

## 2019-09-09 PROCEDURE — 25000003 PHARM REV CODE 250: Performed by: STUDENT IN AN ORGANIZED HEALTH CARE EDUCATION/TRAINING PROGRAM

## 2019-09-09 PROCEDURE — 80053 COMPREHEN METABOLIC PANEL: CPT

## 2019-09-09 PROCEDURE — 25000003 PHARM REV CODE 250: Performed by: NURSE PRACTITIONER

## 2019-09-09 PROCEDURE — 83735 ASSAY OF MAGNESIUM: CPT

## 2019-09-09 PROCEDURE — 63600175 PHARM REV CODE 636 W HCPCS: Performed by: STUDENT IN AN ORGANIZED HEALTH CARE EDUCATION/TRAINING PROGRAM

## 2019-09-09 PROCEDURE — 25000003 PHARM REV CODE 250: Performed by: PSYCHIATRY & NEUROLOGY

## 2019-09-09 PROCEDURE — 36600 WITHDRAWAL OF ARTERIAL BLOOD: CPT

## 2019-09-09 PROCEDURE — 82800 BLOOD PH: CPT

## 2019-09-09 PROCEDURE — 99291 CRITICAL CARE FIRST HOUR: CPT | Mod: GC,,, | Performed by: PSYCHIATRY & NEUROLOGY

## 2019-09-09 PROCEDURE — 99232 SBSQ HOSP IP/OBS MODERATE 35: CPT | Mod: ,,, | Performed by: NEUROLOGICAL SURGERY

## 2019-09-09 PROCEDURE — 27000221 HC OXYGEN, UP TO 24 HOURS

## 2019-09-09 PROCEDURE — 94761 N-INVAS EAR/PLS OXIMETRY MLT: CPT

## 2019-09-09 PROCEDURE — 95951 PR EEG MONITORING/VIDEORECORD: CPT | Mod: 26,52,, | Performed by: PSYCHIATRY & NEUROLOGY

## 2019-09-09 PROCEDURE — 80202 ASSAY OF VANCOMYCIN: CPT

## 2019-09-09 PROCEDURE — 25500020 PHARM REV CODE 255: Performed by: PSYCHIATRY & NEUROLOGY

## 2019-09-09 RX ORDER — FENTANYL CITRATE 50 UG/ML
50 INJECTION, SOLUTION INTRAMUSCULAR; INTRAVENOUS
Status: DISCONTINUED | OUTPATIENT
Start: 2019-09-09 | End: 2019-09-11

## 2019-09-09 RX ORDER — SODIUM CHLORIDE 9 MG/ML
INJECTION, SOLUTION INTRAVENOUS CONTINUOUS
Status: ACTIVE | OUTPATIENT
Start: 2019-09-09 | End: 2019-09-10

## 2019-09-09 RX ORDER — GADOBUTROL 604.72 MG/ML
8 INJECTION INTRAVENOUS
Status: COMPLETED | OUTPATIENT
Start: 2019-09-09 | End: 2019-09-09

## 2019-09-09 RX ORDER — VANCOMYCIN HCL IN 5 % DEXTROSE 1G/250ML
1000 PLASTIC BAG, INJECTION (ML) INTRAVENOUS
Status: DISCONTINUED | OUTPATIENT
Start: 2019-09-10 | End: 2019-09-09

## 2019-09-09 RX ORDER — VANCOMYCIN HCL IN 5 % DEXTROSE 1G/250ML
1000 PLASTIC BAG, INJECTION (ML) INTRAVENOUS
Status: DISCONTINUED | OUTPATIENT
Start: 2019-09-09 | End: 2019-09-11

## 2019-09-09 RX ORDER — PHENYLEPHRINE HCL IN 0.9% NACL 1 MG/10 ML
SYRINGE (ML) INTRAVENOUS
Status: DISPENSED
Start: 2019-09-09 | End: 2019-09-10

## 2019-09-09 RX ADMIN — GADOBUTROL 8 ML: 604.72 INJECTION INTRAVENOUS at 05:09

## 2019-09-09 RX ADMIN — DEXTROSE 750 MG: 50 INJECTION, SOLUTION INTRAVENOUS at 08:09

## 2019-09-09 RX ADMIN — DEXAMETHASONE SODIUM PHOSPHATE 4 MG: 4 INJECTION, SOLUTION INTRAMUSCULAR; INTRAVENOUS at 05:09

## 2019-09-09 RX ADMIN — LACOSAMIDE 100 MG: 50 TABLET, FILM COATED ORAL at 08:09

## 2019-09-09 RX ADMIN — FENTANYL CITRATE 50 MCG: 50 INJECTION INTRAMUSCULAR; INTRAVENOUS at 11:09

## 2019-09-09 RX ADMIN — QUETIAPINE FUMARATE 25 MG: 25 TABLET ORAL at 08:09

## 2019-09-09 RX ADMIN — PIPERACILLIN AND TAZOBACTAM 4.5 G: 4; .5 INJECTION, POWDER, LYOPHILIZED, FOR SOLUTION INTRAVENOUS; PARENTERAL at 06:09

## 2019-09-09 RX ADMIN — FENTANYL CITRATE 50 MCG: 50 INJECTION INTRAMUSCULAR; INTRAVENOUS at 02:09

## 2019-09-09 RX ADMIN — DEXTROSE 750 MG: 50 INJECTION, SOLUTION INTRAVENOUS at 11:09

## 2019-09-09 RX ADMIN — INSULIN ASPART 1 UNITS: 100 INJECTION, SOLUTION INTRAVENOUS; SUBCUTANEOUS at 12:09

## 2019-09-09 RX ADMIN — INSULIN ASPART 4 UNITS: 100 INJECTION, SOLUTION INTRAVENOUS; SUBCUTANEOUS at 05:09

## 2019-09-09 RX ADMIN — HEPARIN SODIUM 5000 UNITS: 5000 INJECTION INTRAVENOUS; SUBCUTANEOUS at 09:09

## 2019-09-09 RX ADMIN — DEXTROSE 750 MG: 50 INJECTION, SOLUTION INTRAVENOUS at 05:09

## 2019-09-09 RX ADMIN — PIPERACILLIN AND TAZOBACTAM 4.5 G: 4; .5 INJECTION, POWDER, LYOPHILIZED, FOR SOLUTION INTRAVENOUS; PARENTERAL at 05:09

## 2019-09-09 RX ADMIN — SODIUM CHLORIDE: 0.9 INJECTION, SOLUTION INTRAVENOUS at 07:09

## 2019-09-09 RX ADMIN — INSULIN ASPART 2 UNITS: 100 INJECTION, SOLUTION INTRAVENOUS; SUBCUTANEOUS at 11:09

## 2019-09-09 RX ADMIN — HEPARIN SODIUM 5000 UNITS: 5000 INJECTION INTRAVENOUS; SUBCUTANEOUS at 03:09

## 2019-09-09 RX ADMIN — LACOSAMIDE 100 MG: 50 TABLET, FILM COATED ORAL at 09:09

## 2019-09-09 RX ADMIN — SODIUM CHLORIDE: 0.9 INJECTION, SOLUTION INTRAVENOUS at 09:09

## 2019-09-09 RX ADMIN — HEPARIN SODIUM 5000 UNITS: 5000 INJECTION INTRAVENOUS; SUBCUTANEOUS at 05:09

## 2019-09-09 RX ADMIN — LEVOTHYROXINE SODIUM 75 MCG: 75 TABLET ORAL at 05:09

## 2019-09-09 RX ADMIN — FAMOTIDINE 20 MG: 20 TABLET ORAL at 09:09

## 2019-09-09 RX ADMIN — DEXAMETHASONE SODIUM PHOSPHATE 4 MG: 4 INJECTION, SOLUTION INTRAMUSCULAR; INTRAVENOUS at 11:09

## 2019-09-09 RX ADMIN — AMLODIPINE BESYLATE 5 MG: 5 TABLET ORAL at 08:09

## 2019-09-09 RX ADMIN — CHLORHEXIDINE GLUCONATE 0.12% ORAL RINSE 15 ML: 1.2 LIQUID ORAL at 09:09

## 2019-09-09 RX ADMIN — CHLORHEXIDINE GLUCONATE 0.12% ORAL RINSE 15 ML: 1.2 LIQUID ORAL at 08:09

## 2019-09-09 RX ADMIN — QUETIAPINE FUMARATE 25 MG: 25 TABLET ORAL at 09:09

## 2019-09-09 RX ADMIN — VANCOMYCIN HYDROCHLORIDE 1000 MG: 1 INJECTION, POWDER, LYOPHILIZED, FOR SOLUTION INTRAVENOUS at 07:09

## 2019-09-09 NOTE — SUBJECTIVE & OBJECTIVE
Review of Systems: Unable to obtain a complete ROS due to level of consciousness.     Vitals:   Temp: 97.8 °F (36.6 °C)  Pulse: 67  Rhythm: normal sinus rhythm  BP: 138/63  MAP (mmHg): 90  Resp: 20  SpO2: 100 %  Oxygen Concentration (%): 30  O2 Device (Oxygen Therapy): ventilator  Vent Mode: A/C  Set Rate: 20 bmp  Vt Set: 410 mL  Pressure Support: 0 cmH20  PEEP/CPAP: 5 cmH20  Peak Airway Pressure: 27 cmH2O  Mean Airway Pressure: 11 cmH20  Plateau Pressure: 19 cmH20    Temp  Min: 97.2 °F (36.2 °C)  Max: 99.7 °F (37.6 °C)  Pulse  Min: 64  Max: 101  BP  Min: 106/51  Max: 178/73  MAP (mmHg)  Min: 74  Max: 113  Resp  Min: 7  Max: 38  SpO2  Min: 100 %  Max: 100 %  Oxygen Concentration (%)  Min: 30  Max: 30    09/08 0701 - 09/09 0700  In: 2989.9 [I.V.:529.9]  Out: 220 [Urine:220]   Unmeasured Output  Urine Occurrence: 1  Stool Occurrence: 1  Emesis Occurrence: 0  Pad Count: 2     Examination:   Constitutional: Well-nourished and -developed. No apparent distress.   Eyes: Conjunctiva clear, anicteric. Lids no lesions.  Head/Ears/Nose/Mouth/Throat/Neck: Moist mucous membranes. External ears, nose atraumatic.   Cardiovascular: Regular rhythm. No murmurs. No leg edema.  Respiratory: Comfortable respirations. Clear to auscultation.  Gastrointestinal: No hernia. Soft, nondistended, nontender. + bowel sounds.    Neurologic:  -GCS E 4 V 1t M 5  -Intubated. Sedated. Restless. Does not follow commands.  -Cranial nerves: EOM intact, PERRL, no facial droop, cough/gag intact  -Motor: Moves all extremities spontaneously and antigravity  -Sensation: Intact to noxious stimuli  Unable to test orientation, language, memory, judgment, insight, fund of knowledge, hearing, shoulder shrug, tongue protrusion, coordination, gait due to level of consciousness.    Medications:   Continuous  sodium chloride 0.9% Last Rate: 100 mL/hr at 09/09/19 1300   propofol Last Rate: 15 mcg/kg/min (09/09/19 1339)   Scheduled  amLODIPine 5 mg Daily   chlorhexidine  15 mL BID   dexamethasone 4 mg Q6H   famotidine 20 mg QHS   heparin (porcine) 5,000 Units Q8H   lacosamide 100 mg Q12H   levothyroxine 75 mcg Before breakfast   piperacillin-tazobactam (ZOSYN) IVPB 4.5 g Q8H   QUEtiapine 25 mg BID   senna-docusate 8.6-50 mg 1 tablet BID   valproate sodium (DEPACON) IVPB 750 mg Q8H   vancomycin (VANCOCIN) IVPB 15 mg/kg Q24H   PRN  acetaminophen 650 mg Q6H PRN   Dextrose 10% Bolus 12.5 g PRN   fentaNYL 50 mcg Q2H PRN   glucagon (human recombinant) 1 mg PRN   hydrALAZINE 10 mg Q6H PRN   insulin aspart U-100 1-10 Units Q6H PRN   labetalol 10 mg Q6H PRN   magnesium oxide 800 mg PRN   magnesium oxide 800 mg PRN   polyethylene glycol 17 g Daily PRN   potassium chloride 10% 40 mEq PRN   potassium chloride 10% 40 mEq PRN   potassium, sodium phosphates 2 packet PRN   potassium, sodium phosphates 2 packet PRN   potassium, sodium phosphates 2 packet PRN   racepinephrine 0.5 mL Q4H PRN   sodium chloride 0.9% 10 mL PRN      Today I independently reviewed pertinent medications, lines/drains/airways, imaging, cardiology results, laboratory results, microbiology results, notably:     ISTAT: Recent Labs   Lab 09/09/19 0414   PH 7.504*   PCO2 29.2*   PO2 86   POCSATURATED 98   HCO3 23.0*   BE 0   POCTCO2 24   SAMPLE ARTERIAL      Chem: Recent Labs   Lab 09/09/19 0412      K 4.7      CO2 17*   *   BUN 34*   CREATININE 1.2   ESTGFRAFRICA 50.7*   EGFRNONAA 44.0*   CALCIUM 8.2*   MG 1.9   PHOS 3.0   ANIONGAP 17*   PROT 5.3*   ALBUMIN 2.4*   BILITOT 0.4   ALKPHOS 54*   AST 12   ALT 42     Heme: Recent Labs   Lab 09/09/19 0412   WBC 10.87   HGB 12.9   HCT 39.7        Endo:   Recent Labs   Lab 09/09/19  0000 09/09/19  0536 09/09/19  1137   POCTGLUCOSE 175* 216* 194*

## 2019-09-09 NOTE — SUBJECTIVE & OBJECTIVE
Interval History: naeon, remains on propofol.    Medications:  Continuous Infusions:   propofol 40 mcg/kg/min (09/09/19 0600)     Scheduled Meds:   amLODIPine  5 mg Per OG tube Daily    chlorhexidine  15 mL Mouth/Throat BID    dexamethasone  4 mg Intravenous Q6H    famotidine  20 mg Per OG tube QHS    heparin (porcine)  5,000 Units Subcutaneous Q8H    lacosamide  100 mg Per OG tube Q12H    levothyroxine  75 mcg Per OG tube Before breakfast    piperacillin-tazobactam (ZOSYN) IVPB  4.5 g Intravenous Q8H    QUEtiapine  25 mg Per OG tube BID    senna-docusate 8.6-50 mg  1 tablet Per OG tube BID    valproate sodium (DEPACON) IVPB  750 mg Intravenous Q8H    vancomycin (VANCOCIN) IVPB  15 mg/kg Intravenous Q24H     PRN Meds:acetaminophen, Dextrose 10% Bolus, glucagon (human recombinant), hydrALAZINE, insulin aspart U-100, labetalol, magnesium oxide, magnesium oxide, polyethylene glycol, potassium chloride 10%, potassium chloride 10%, potassium, sodium phosphates, potassium, sodium phosphates, potassium, sodium phosphates, racepinephrine, sodium chloride 0.9%     Review of Systems  Objective:     Weight: 78.6 kg (173 lb 4.5 oz)  Body mass index is 30.7 kg/m².  Vital Signs (Most Recent):  Temp: 98.9 °F (37.2 °C) (09/09/19 0300)  Pulse: 73 (09/09/19 0600)  Resp: 20 (09/09/19 0600)  BP: (!) 151/65 (09/09/19 0600)  SpO2: 100 % (09/09/19 0600) Vital Signs (24h Range):  Temp:  [98.6 °F (37 °C)-100.1 °F (37.8 °C)] 98.9 °F (37.2 °C)  Pulse:  [] 73  Resp:  [7-38] 20  SpO2:  [100 %] 100 %  BP: (106-210)/(51-86) 151/65                Vent Mode: A/C  Oxygen Concentration (%):  [30-40] 30  Resp Rate Total:  [20 br/min-31 br/min] 20 br/min  Vt Set:  [410 mL] 410 mL  PEEP/CPAP:  [5 cmH20] 5 cmH20  Pressure Support:  [0 cmH20] 0 cmH20  Mean Airway Pressure:  [10 myO62-72 cmH20] 11 cmH20         NG/OG Tube 09/03/19 1100 orogastric Center mouth (Active)   Placement Check placement verified by x-ray;placement verified by  distal tube length measurement;placement verified by aspirate characteristics 9/9/2019  3:00 AM   Tolerance no signs/symptoms of discomfort 9/9/2019  3:00 AM   Securement secured to commercial device 9/9/2019  3:00 AM   Clamp Status/Tolerance unclamped;no abdominal discomfort;no emesis;no nausea;no residual;no restlessness 9/9/2019  3:00 AM   Suction Setting/Drainage Method suction at the bedside 9/8/2019  3:05 AM   Insertion Site Appearance no redness, warmth, tenderness, skin breakdown, drainage 9/9/2019  3:00 AM   Flush/Irrigation flushed w/;water;no resistance met 9/9/2019  3:00 AM   Feeding Method continuous 9/9/2019  3:00 AM   Feeding Action feeding continued 9/9/2019  3:00 AM   Current Rate (mL/hr) 45 mL/hr 9/9/2019  3:00 AM   Goal Rate (mL/hr) 45 mL/hr 9/9/2019  3:00 AM   Intake (mL) 90 mL 9/9/2019  6:00 AM   Water Bolus (mL) 60 mL 9/5/2019  6:00 AM   Rate Formula Tube Feeding (mL/hr) 45 mL/hr 9/8/2019  3:05 AM   Formula Name Isosource 9/8/2019  3:05 AM   Intake (mL) - Formula Tube Feeding 45 9/9/2019  6:00 AM   Residual Amount (ml) 0 ml 9/7/2019 11:05 PM       Female External Urinary Catheter 09/04/19 2301 (Active)   Skin no redness;no breakdown 9/9/2019  3:00 AM   Tolerance no signs/symptoms of discomfort 9/9/2019  3:00 AM   Suction Continuous suction at 70 mmHg 9/9/2019  3:00 AM   Date of last wick change 09/08/19 9/9/2019  3:00 AM   Time of last wick change 2300 9/9/2019  3:00 AM   Output (mL) 70 mL 9/8/2019  4:31 PM       Neurosurgery Physical Exam   E1VTM5  PERRL, +corneals, coughg/gag  Grimacing to pain  Localizing in all except WD in LUE    Significant Labs:  Recent Labs   Lab 09/08/19  0240   *      K 4.3      CO2 21*   BUN 25*   CREATININE 1.1   CALCIUM 9.2   MG 1.9     Recent Labs   Lab 09/08/19  0240 09/09/19  0412   WBC 13.53* 10.87   HGB 14.3 12.9   HCT 44.6 39.7    229     No results for input(s): LABPT, INR, APTT in the last 48 hours.  Microbiology Results (last 7  days)     Procedure Component Value Units Date/Time    Blood culture [[id number]] Collected:  09/08/19 1210    Order Status:  Completed Specimen:  Blood from Peripheral, Ankle, Left Updated:  09/08/19 2115     Blood Culture, Routine No Growth to date    Blood culture [[id number]] Collected:  09/08/19 1215    Order Status:  Completed Specimen:  Blood from Peripheral, Forearm, Left Updated:  09/08/19 2115     Blood Culture, Routine No Growth to date    Culture, Respiratory with Gram Stain [[id number]] Collected:  09/08/19 1158    Order Status:  Completed Specimen:  Respiratory from Tracheal Aspirate Updated:  09/08/19 1537     Gram Stain (Respiratory) Few WBC's     Gram Stain (Respiratory) Moderate Gram negative rods     Gram Stain (Respiratory) Rare Gram positive cocci    Gram stain [[id number]] Collected:  09/03/19 1843    Order Status:  Completed Specimen:  CSF (Spinal Fluid) from CSF Tap, Tube 2 Updated:  09/03/19 2153     Gram Stain Result No WBC's      No organisms seen            Significant Diagnostics:

## 2019-09-09 NOTE — ASSESSMENT & PLAN NOTE
2/2 to brain mass   Keppra changed to Vimpat 9/6  Valproic acid started 9/7  EEG monitoring  Epilepsy following

## 2019-09-09 NOTE — ASSESSMENT & PLAN NOTE
74 year old admitted to unit on 9/3 following new onset seizure, with MRI showing large area of R cortical edema with flair and ill defined enhancement on contrast study, concern for glioma vs infectious/inflammatory process  - LP appears non infectious, cytology negative  - continue decadron  - EEG monitoring  - NSGY, epilepsy following   - neuro checks q1hr   - vital signs q1hr   -on SubQ heparin   -Repeat MRI Brain stable  -Increased Seroquel overnight  - Wean Propofol,   -Fentanyl IVP gtt  -repeat LP today

## 2019-09-09 NOTE — PROCEDURES
[facility] EEG/VIDEO MONITORING REPORT  Clemencia Hermaneix  [id number]  1945    DATE OF SERVICE:  09/08/2019  DATE OF ADMISSION: 9/3/2019 11:00 AM    ADMITTING/REQUESTING PROVIDER: Phillip Bardales MD    REASON FOR CONSULT:  74-year-old woman admitted after episodes concerning for seizure found to have significant white matter changes consistent with vasogenic edema of unclear etiology most prominent in the right anterior quadrant.  Evaluate for evidence of epileptiform activity    METHODOLOGY   Electroencephalographic (EEG) recording is with electrodes placed according to the International 10-20 placement system.  Thirty two (32) channels of digital signal (sampling rate of 512/sec) including T1 and T2 was simultaneously recorded from the scalp and may include  EKG, EMG, and/or eye monitors.  Recording band pass was 0.1 to 512 hz.  Digital video recording of the patient is simultaneously recorded with the EEG.  The patient is instructed report clinical symptoms which may occur during the recording session.  EEG and video recording is stored and archived in digital format.  Activation procedures which include photic stimulation, hyperventilation and instructing patients to perform simple task are done in selected patients.   The EEG is displayed on a monitor screen and can be reviewed using different montages.  Computer assisted analysis is employed to detect spike and electrographic seizure activity.   The entire record is submitted for computer analysis.  The entire recording is visually reviewed and the times identified by computer analysis as being spikes or seizures are reviewed again.  Compresses spectral analysis (CSA) is also performed on the activity recorded from each individual channel.  This is displayed as a power display of frequencies from 0 to 30 Hz over time.   The CSA is reviewed looking for asymmetries in power between homologous areas of the scalp and then compared with the original EEG recording.      [employer] software is also utilized in the review of this study.  This software suite analyzes the EEG recording in multiple domains.  Coherence and rhythmicity is computed to identify EEG sections which may contain organized seizures.  Each channel undergoes analysis to detect presence of spike and sharp waves which have special and morphological characteristic of epileptic activity.  The routine EEG recording is converted from spacial into frequency domain.  This is then displayed comparing homologous areas to identify areas of significant asymmetry.  Algorithm to identify non-cortically generated artifact is used to separate eye movement, EMG and other artifact from the EEG.      RECORDING TIMES  Start on 09/08/2019 at 07:00 a.m.  Stop on 09/09/2019 at 07:00 a.m.  A total of 23 hr and 40 min of EEG recording is obtained.    EEG FINDINGS  Background activity:   The background is generally slow with moderate voltage, disorganized, mixed frequency theta/delta activity with occasional generalized suppressions.  At times, the background appears more burst suppressed with prolonged generalized suppressions and bursts of irregular theta activity. During these times, there is occasional sharp activity over the temporal lobes L>>R.   At other times, there are abundant broad-based triphasic appearing waveforms.  There are also periods with more continuous, disorganized, polymorphic theta activity.  There are overriding faster activities.     There are no pushbutton activations.    Sleep:  There is no normal sleep architecture.    Activation procedures:   Hyperventilation is not performed  Photic stimulation is not performed    Cardiac Monitor:   Heart rate looks general regular on a single lead EKG.    Impression:   This is an abnormal continuous EEG monitoring study because of occasional periods with broad-based waveforms which may indicate a potential for cortical hyperexcitability.  During burst suppression, there is  occasional sharp activity over the temporal lobes, L>>R of unclear significance.  Otherwise, the background cycles through various levels of generalized suppression consistent with global cortical dysfunction and a moderate-severe to severe encephalopathy. This finding is nonspecific with regards to etiology but can be seen in the setting of toxic/metabolic derangements, infection, and as a medication effect.  Overriding faster activities are often seen as a medication effect.  There are no pushbutton activations and no electrographic seizures.    Angelique Salinas MD PhD  Neurology-Epilepsy  [facility].  [facility]

## 2019-09-09 NOTE — PROGRESS NOTES
[facility]  Neurosurgery  Progress Note    Subjective:     History of Present Illness: 73 y/o female with pmhx CKD and HTN  presented to outside hospital for seizure- like activity this AM. Per  pt was found unconscious this AM, with seizure like activity occurring on the left side. Pt was given versed x 2 via EMS. CTH performed at outside hospital was negative for bleed. Pt had additional seizure in outside hospital ED and was given IV Keppra. EEG ordered and MRI brain w/out contrast concerning for possible infiltratrive process vs post ischemic sequela. Pt transferred to [facility] and admitted to [facility]. MRI brain w/ and w/out obtained that showed large area of edema in right frontal lobe and ill defined enhancement in the frontal lobe concerning for possible cerebritis vs. Neoplasm. NSGY was consulted to evaluate. Pt not on anti-coagulation.       Post-Op Info:  * No surgery found *         Interval History: naeon, remains on propofol.    Medications:  Continuous Infusions:   propofol 40 mcg/kg/min (09/09/19 0600)     Scheduled Meds:   amLODIPine  5 mg Per OG tube Daily    chlorhexidine  15 mL Mouth/Throat BID    dexamethasone  4 mg Intravenous Q6H    famotidine  20 mg Per OG tube QHS    heparin (porcine)  5,000 Units Subcutaneous Q8H    lacosamide  100 mg Per OG tube Q12H    levothyroxine  75 mcg Per OG tube Before breakfast    piperacillin-tazobactam (ZOSYN) IVPB  4.5 g Intravenous Q8H    QUEtiapine  25 mg Per OG tube BID    senna-docusate 8.6-50 mg  1 tablet Per OG tube BID    valproate sodium (DEPACON) IVPB  750 mg Intravenous Q8H    vancomycin (VANCOCIN) IVPB  15 mg/kg Intravenous Q24H     PRN Meds:acetaminophen, Dextrose 10% Bolus, glucagon (human recombinant), hydrALAZINE, insulin aspart U-100, labetalol, magnesium oxide, magnesium oxide, polyethylene glycol, potassium chloride 10%, potassium chloride 10%, potassium, sodium phosphates, potassium, sodium phosphates, potassium,  sodium phosphates, racepinephrine, sodium chloride 0.9%     Review of Systems  Objective:     Weight: 78.6 kg (173 lb 4.5 oz)  Body mass index is 30.7 kg/m².  Vital Signs (Most Recent):  Temp: 98.9 °F (37.2 °C) (09/09/19 0300)  Pulse: 73 (09/09/19 0600)  Resp: 20 (09/09/19 0600)  BP: (!) 151/65 (09/09/19 0600)  SpO2: 100 % (09/09/19 0600) Vital Signs (24h Range):  Temp:  [98.6 °F (37 °C)-100.1 °F (37.8 °C)] 98.9 °F (37.2 °C)  Pulse:  [] 73  Resp:  [7-38] 20  SpO2:  [100 %] 100 %  BP: (106-210)/(51-86) 151/65                Vent Mode: A/C  Oxygen Concentration (%):  [30-40] 30  Resp Rate Total:  [20 br/min-31 br/min] 20 br/min  Vt Set:  [410 mL] 410 mL  PEEP/CPAP:  [5 cmH20] 5 cmH20  Pressure Support:  [0 cmH20] 0 cmH20  Mean Airway Pressure:  [10 hjV74-98 cmH20] 11 cmH20         NG/OG Tube 09/03/19 1100 orogastric Center mouth (Active)   Placement Check placement verified by x-ray;placement verified by distal tube length measurement;placement verified by aspirate characteristics 9/9/2019  3:00 AM   Tolerance no signs/symptoms of discomfort 9/9/2019  3:00 AM   Securement secured to commercial device 9/9/2019  3:00 AM   Clamp Status/Tolerance unclamped;no abdominal discomfort;no emesis;no nausea;no residual;no restlessness 9/9/2019  3:00 AM   Suction Setting/Drainage Method suction at the bedside 9/8/2019  3:05 AM   Insertion Site Appearance no redness, warmth, tenderness, skin breakdown, drainage 9/9/2019  3:00 AM   Flush/Irrigation flushed w/;water;no resistance met 9/9/2019  3:00 AM   Feeding Method continuous 9/9/2019  3:00 AM   Feeding Action feeding continued 9/9/2019  3:00 AM   Current Rate (mL/hr) 45 mL/hr 9/9/2019  3:00 AM   Goal Rate (mL/hr) 45 mL/hr 9/9/2019  3:00 AM   Intake (mL) 90 mL 9/9/2019  6:00 AM   Water Bolus (mL) 60 mL 9/5/2019  6:00 AM   Rate Formula Tube Feeding (mL/hr) 45 mL/hr 9/8/2019  3:05 AM   Formula Name Isosource 9/8/2019  3:05 AM   Intake (mL) - Formula Tube Feeding 45 9/9/2019   6:00 AM   Residual Amount (ml) 0 ml 9/7/2019 11:05 PM       Female External Urinary Catheter 09/04/19 2301 (Active)   Skin no redness;no breakdown 9/9/2019  3:00 AM   Tolerance no signs/symptoms of discomfort 9/9/2019  3:00 AM   Suction Continuous suction at 70 mmHg 9/9/2019  3:00 AM   Date of last wick change 09/08/19 9/9/2019  3:00 AM   Time of last wick change 2300 9/9/2019  3:00 AM   Output (mL) 70 mL 9/8/2019  4:31 PM       Neurosurgery Physical Exam   E1VTM5  PERRL, +corneals, coughg/gag  Grimacing to pain  Localizing in all except WD in LUE    Significant Labs:  Recent Labs   Lab 09/08/19  0240   *      K 4.3      CO2 21*   BUN 25*   CREATININE 1.1   CALCIUM 9.2   MG 1.9     Recent Labs   Lab 09/08/19  0240 09/09/19  0412   WBC 13.53* 10.87   HGB 14.3 12.9   HCT 44.6 39.7    229     No results for input(s): LABPT, INR, APTT in the last 48 hours.  Microbiology Results (last 7 days)     Procedure Component Value Units Date/Time    Blood culture [[id number]] Collected:  09/08/19 1210    Order Status:  Completed Specimen:  Blood from Peripheral, Ankle, Left Updated:  09/08/19 2115     Blood Culture, Routine No Growth to date    Blood culture [[id number]] Collected:  09/08/19 1215    Order Status:  Completed Specimen:  Blood from Peripheral, Forearm, Left Updated:  09/08/19 2115     Blood Culture, Routine No Growth to date    Culture, Respiratory with Gram Stain [[id number]] Collected:  09/08/19 1158    Order Status:  Completed Specimen:  Respiratory from Tracheal Aspirate Updated:  09/08/19 1537     Gram Stain (Respiratory) Few WBC's     Gram Stain (Respiratory) Moderate Gram negative rods     Gram Stain (Respiratory) Rare Gram positive cocci    Gram stain [[id number]] Collected:  09/03/19 1843    Order Status:  Completed Specimen:  CSF (Spinal Fluid) from CSF Tap, Tube 2 Updated:  09/03/19 2153     Gram Stain Result No WBC's      No organisms seen            Significant  Diagnostics:      Assessment/Plan:     New onset seizure  73 y/o female with pmhx CKD and HTN presented to outside hospital for seizure- like activity occurring on the left side. Found to have area of ill-defined enhancement on MRI brain w/ and w/out.  Most recent EEG showing bilateral PLEDs left greater than right.    -q 1 hr neuro checks  -Fu cEEG, wean propofol, continue AEDs per epilepsy team.  -Fu infectious rodriguez  -Continue steroids for now  -WTE when medically stable.  -Further care per NCC, will follow.          Kingsley Lisa, DO  Neurosurgery  [facility]

## 2019-09-09 NOTE — PLAN OF CARE
Problem: Adult Inpatient Plan of Care  Goal: Plan of Care Review  Outcome: Ongoing (interventions implemented as appropriate)  No acute events occurred throughout the shift. See flowsheets for assessments and VS. Plan of care reviewed with Clemencia Nguyen and Clemencia Nguyen's family. All questions answered in turn. Pt progressing towards goals as noted. Will continue to monitor.

## 2019-09-09 NOTE — PROGRESS NOTES
"[facility]  Adult Nutrition  Progress Note    SUMMARY       Recommendations    Recommendation/Intervention:   1. Recommend changing TF to better meet pt's protein needs and manage BG levels.   Impact Peptide @ goal rate 40mL/hr.   - Provides 1440kcals, 90g protein and 739mL free water.     2. If able to extubate and advance diet, recommend Regular with texture per SLP recommendations.     RD to monitor.    Goals: Pt to receive >85% EEN and EPN by RD follow up  Nutrition Goal Status: goal not met  Communication of RD Recs: reviewed with RN    Reason for Assessment    Reason For Assessment: RD follow-up  Diagnosis: seizures  Relevant Medical History: CKD St 3, HTN  Interdisciplinary Rounds: attended  General Information Comments: Pt remains intubated and sedated. TF at goal, tolerating appropriately. Family at bedside confirming nutrition hx. Pt's UBW approx 145-150lb > 8 years per family, confirmed by chart review. Pt with excellent appetite and intake PTA. No indications of malnutrition at this time.  Nutrition Discharge Planning: unable to determine at this time    Nutrition Risk Screen    Nutrition Risk Screen: tube feeding or parenteral nutrition    Nutrition/Diet History    Spiritual, Cultural Beliefs, Temple Practices, Values that Affect Care: no  Factors Affecting Nutritional Intake: NPO, on mechanical ventilation    Anthropometrics    Temp: 97.2 °F (36.2 °C)  Height Method: Stated  Height: 5' 3" (160 cm)  Height (inches): 63 in  Weight Method: Bed Scale  Weight: 78.6 kg (173 lb 4.5 oz)  Weight (lb): 173.28 lb  Ideal Body Weight (IBW), Female: 115 lb  % Ideal Body Weight, Female (lb): 133.91 lb  BMI (Calculated): 27.3  BMI Grade: 25 - 29.9 - overweight       Lab/Procedures/Meds    Pertinent Labs Reviewed: reviewed  Pertinent Labs Comments: POCT Glu 175-216  Pertinent Medications Reviewed: reviewed  Pertinent Medications Comments: heparin, propofol      Estimated/Assessed Needs    Weight " Used For Calorie Calculations: 70.5 kg (155 lb 6.8 oz)  Energy Calorie Requirements (kcal): 1457  Energy Need Method: [facility]  Protein Requirements: 85-106g(1.2-1.5g/kg)  Weight Used For Protein Calculations: 70.5 kg (155 lb 6.8 oz)  Fluid Requirements (mL): 1mL/kcal or per MD     RDA Method (mL): 1457         Nutrition Prescription Ordered    Current Diet Order: NPO  Nutrition Order Comments: TF at goal  Current Nutrition Support Formula Ordered: Isosource 1.5  Current Nutrition Support Rate Ordered: 45 (ml)  Current Nutrition Support Frequency Ordered: mL/hr    Evaluation of Received Nutrient/Fluid Intake    Enteral Calories (kcal): 1620  Enteral Protein (gm): 73  Enteral (Free Water) Fluid (mL): 825  Lipid Calories (kcals): 224 kcals(propofol @ 8.5mL/hr)  Total Calories (kcal): 1844    % Kcal Needs: 127  % Protein Needs: 86    I/O: +3.6L since admit, +UOP, LBM 9/8    Energy Calories Required: exceeds needs  Protein Required: not meeting needs  Fluid Required: other (see comments)(per MD)    Comments: 0mL residuals 9/7  Tolerance: tolerating    % Intake of Estimated Energy Needs: 75 - 100 %  % Meal Intake: NPO    Nutrition Risk    Level of Risk/Frequency of Follow-up: low(f/u 1x/week)     Assessment and Plan    Nutrition Problem  Inadequate oral intake     Related to (etiology):   NPO     Signs and Symptoms (as evidenced by):   Pt requiring alternative means of nutrition to meet 85% EEN and EPN.      Intervention:  Collaboration of nutrition care with providers     Nutrition Diagnosis Status:   Continues    Monitor and Evaluation    Food and Nutrient Intake: energy intake, food and beverage intake, enteral nutrition intake  Food and Nutrient Adminstration: diet order, enteral and parenteral nutrition administration  Anthropometric Measurements: weight, weight change, body mass index  Biochemical Data, Medical Tests and Procedures: electrolyte and renal panel, gastrointestinal profile, glucose/endocrine profile,  inflammatory profile, lipid profile  Nutrition-Focused Physical Findings: overall appearance          Nutrition Follow-Up    RD Follow-up?: Yes

## 2019-09-09 NOTE — PROGRESS NOTES
[facility]  Neurocritical Care  Progress Note    Admit Date: 9/3/2019  Service Date: 09/09/2019  Length of Stay: 6    Subjective:     Chief Complaint: Brain lesion    History of Present Illness: Pt is  73 yo female with a PMHx of CKD 3, HTN, was transferred from [facility] to [facility] for new onset seizures and concern for right front lobe mass. The pt was found in her bedroom by her spouse at approximately 9:45 pm sitting her head against the bed, unresponsive, and having seizure like activity on the left-side. EMT was called and the pt was given Versed twice while en route to the hospital. Pt had a second witnessed seizures, left facial droop, left-sided weakness, and tongue ecchymosis in the ED. Neurology was consulted and The pt was given IV Keppra and Vimpat. MRI brain without contrast was acquired. The image showed right frontal lobe vasogenic edema with mass effect concerning for underlying mass. EEG was acquired at outside hospital concerning showing an abnormal result. The pt was given decadron IV and neurosurgical consult recommended. Pt transferred to [facility] and admitted to the [facility] for higher level of care and further evaluation.     Pt history acquired per chart review and from spouse present at the bedside. The pt's spouse denies prior history of seizures CVA, cancer history and up-to-date screenings    Hospital Course: --admitted to [facility] on 9/3 following new onset seizure, arrived intubated  --MRI with large area of R cortical edema with flair and ill defined enhancement on contrast study, concern for glioma vs infectious/inflammatory process  --LP appears non infectious, cytology pending  9/5- no acute events, awaiting LP finalization from pathology report., weaning vent.   09/06/2019: Very agitated overnight, requiring increased sedation. Patient extubated this morning on rounds, and reintubated shortly after. Unable to maintain airway and secretions. CSF gram stain negative.  09/07/2019: KATT.  EKG obtained, seroquel started. Valproic acid started.  09/08/2019: NAEON. Improved agitation with seroquel.  09/09/2019: MRI Brain w/wo ordered for today. BLE US ordered. Will need new LP later on in the week.       Review of Systems: Unable to obtain a complete ROS due to level of consciousness.     Vitals:   Temp: 97.8 °F (36.6 °C)  Pulse: 67  Rhythm: normal sinus rhythm  BP: 138/63  MAP (mmHg): 90  Resp: 20  SpO2: 100 %  Oxygen Concentration (%): 30  O2 Device (Oxygen Therapy): ventilator  Vent Mode: A/C  Set Rate: 20 bmp  Vt Set: 410 mL  Pressure Support: 0 cmH20  PEEP/CPAP: 5 cmH20  Peak Airway Pressure: 27 cmH2O  Mean Airway Pressure: 11 cmH20  Plateau Pressure: 19 cmH20    Temp  Min: 97.2 °F (36.2 °C)  Max: 99.7 °F (37.6 °C)  Pulse  Min: 64  Max: 101  BP  Min: 106/51  Max: 178/73  MAP (mmHg)  Min: 74  Max: 113  Resp  Min: 7  Max: 38  SpO2  Min: 100 %  Max: 100 %  Oxygen Concentration (%)  Min: 30  Max: 30    09/08 0701 - 09/09 0700  In: 2989.9 [I.V.:529.9]  Out: 220 [Urine:220]   Unmeasured Output  Urine Occurrence: 1  Stool Occurrence: 1  Emesis Occurrence: 0  Pad Count: 2     Examination:   Constitutional: Well-nourished and -developed. No apparent distress.   Eyes: Conjunctiva clear, anicteric. Lids no lesions.  Head/Ears/Nose/Mouth/Throat/Neck: Moist mucous membranes. External ears, nose atraumatic.   Cardiovascular: Regular rhythm. No murmurs. No leg edema.  Respiratory: Comfortable respirations. Clear to auscultation.  Gastrointestinal: No hernia. Soft, nondistended, nontender. + bowel sounds.    Neurologic:  -GCS E 4 V 1t M 5  -Intubated. Sedated. Restless. Does not follow commands.  -Cranial nerves: EOM intact, PERRL, no facial droop, cough/gag intact  -Motor: Moves all extremities spontaneously and antigravity  -Sensation: Intact to noxious stimuli  Unable to test orientation, language, memory, judgment, insight, fund of knowledge, hearing, shoulder shrug, tongue protrusion, coordination, gait due  to level of consciousness.    Medications:   Continuous  sodium chloride 0.9% Last Rate: 100 mL/hr at 09/09/19 1300   propofol Last Rate: 15 mcg/kg/min (09/09/19 1339)   Scheduled  amLODIPine 5 mg Daily   chlorhexidine 15 mL BID   dexamethasone 4 mg Q6H   famotidine 20 mg QHS   heparin (porcine) 5,000 Units Q8H   lacosamide 100 mg Q12H   levothyroxine 75 mcg Before breakfast   piperacillin-tazobactam (ZOSYN) IVPB 4.5 g Q8H   QUEtiapine 25 mg BID   senna-docusate 8.6-50 mg 1 tablet BID   valproate sodium (DEPACON) IVPB 750 mg Q8H   vancomycin (VANCOCIN) IVPB 15 mg/kg Q24H   PRN  acetaminophen 650 mg Q6H PRN   Dextrose 10% Bolus 12.5 g PRN   fentaNYL 50 mcg Q2H PRN   glucagon (human recombinant) 1 mg PRN   hydrALAZINE 10 mg Q6H PRN   insulin aspart U-100 1-10 Units Q6H PRN   labetalol 10 mg Q6H PRN   magnesium oxide 800 mg PRN   magnesium oxide 800 mg PRN   polyethylene glycol 17 g Daily PRN   potassium chloride 10% 40 mEq PRN   potassium chloride 10% 40 mEq PRN   potassium, sodium phosphates 2 packet PRN   potassium, sodium phosphates 2 packet PRN   potassium, sodium phosphates 2 packet PRN   racepinephrine 0.5 mL Q4H PRN   sodium chloride 0.9% 10 mL PRN      Today I independently reviewed pertinent medications, lines/drains/airways, imaging, cardiology results, laboratory results, microbiology results, notably:     ISTAT: Recent Labs   Lab 09/09/19 0414   PH 7.504*   PCO2 29.2*   PO2 86   POCSATURATED 98   HCO3 23.0*   BE 0   POCTCO2 24   SAMPLE ARTERIAL      Chem: Recent Labs   Lab 09/09/19 0412      K 4.7      CO2 17*   *   BUN 34*   CREATININE 1.2   ESTGFRAFRICA 50.7*   EGFRNONAA 44.0*   CALCIUM 8.2*   MG 1.9   PHOS 3.0   ANIONGAP 17*   PROT 5.3*   ALBUMIN 2.4*   BILITOT 0.4   ALKPHOS 54*   AST 12   ALT 42     Heme: Recent Labs   Lab 09/09/19 0412   WBC 10.87   HGB 12.9   HCT 39.7        Endo:   Recent Labs   Lab 09/09/19  0000 09/09/19  0536 09/09/19  1137   POCTGLUCOSE 175* 216*  194*          Assessment/Plan:     Neuro  * Brain lesion  74 year old admitted to unit on 9/3 following new onset seizure, with MRI showing large area of R cortical edema with flair and ill defined enhancement on contrast study, concern for glioma vs infectious/inflammatory process  -- LP appears non infectious, cytology negative  -- continue decadron  -- EEG monitoring  -- NSGY, epilepsy following   -- Hourly neuro checks  -- Hourly vital signs  --Repeat MRI Brain w/wo today to reevaluate lesion  --Increased Seroquel overnight. Wean Propofol, Fentanyl IVP as needed.  --Will need repeat LP later on in the week.    Cerebral edema  --continue decadron, see brain lesion    New onset seizure  2/2 to brain mass   Keppra changed to Vimpat 9/6  Valproic acid started 9/7  Continue EEG monitoring   Epilepsy following      Pulmonary  On mechanically assisted ventilation  Daily CXR, ABG  Continue Peridex, Famotidine, Heparin subq  Trial of extubation 9/6/2019- re-intubated shortly after extubation for airway protection  Continue to wean vent settings as tolerated    Vent Mode: A/C  Oxygen Concentration (%):  [30] 30  Resp Rate Total:  [20 br/min-29 br/min] 20 br/min  Vt Set:  [410 mL] 410 mL  PEEP/CPAP:  [5 cmH20] 5 cmH20  Pressure Support:  [0 cmH20] 0 cmH20  Mean Airway Pressure:  [10 vmX14-99 cmH20] 11 cmH20      Cardiac/Vascular  Essential hypertension  SBP <180, MAP >65   -Amlodipine started 9/7      Renal/  Chronic kidney disease, stage III (moderate)  -PMHx of CKD 3   -monitor renal function, I/Os   -IVF pre and post MRI contrast dye    Oncology  Leukocytosis  Febrile, leukocytosis present  -Panculture  -Start Broad Spec antibiotics    Other  Fever  -US BLE ordered      The patient is being Prophylaxed for:  Venous Thromboembolism with: Mechanical or Chemical  Stress Ulcer with: H2B  Ventilator Pneumonia with: chlorhexidine oral care    Activity Orders          Straight Cath starting at 09/05 0034        Full Code      CC time spent: 35 minutes    Lolita Salinas NP  Neurocritical Care  [facility]

## 2019-09-09 NOTE — PLAN OF CARE
Problem: Adult Inpatient Plan of Care  Goal: Patient-Specific Goal (Individualization)  Outcome: Ongoing (interventions implemented as appropriate)  POC reviewed with pt and pt's , Enrike, at 1400. Pt's  verbalized understanding. Questions and concerns addressed. No acute events today. cEEG throughout most of the shift, removed for MRI and to be replaced tomorrow morning per MRI tech.  Straight cath x 1.  Propofol gtt and Fentanyl IVP prn agitation per Dr. Ramos.  Pt progressing toward goals. Will continue to monitor. See flowsheets for full assessment and VS info.

## 2019-09-09 NOTE — ASSESSMENT & PLAN NOTE
Daily CXR, ABG  Continue Peridex, Famotidine, Heparin subq  Trial of extubation 9/6/2019- re-intubated shortly after extubation for airway protection  Continue to wean vent settings as tolerated    Vent Mode: A/C  Oxygen Concentration (%):  [30] 30  Resp Rate Total:  [20 br/min-26 br/min] 20 br/min  Vt Set:  [410 mL] 410 mL  PEEP/CPAP:  [5 cmH20] 5 cmH20  Pressure Support:  [0 cmH20] 0 cmH20  Mean Airway Pressure:  [10 mcQ07-55 cmH20] 11 cmH20

## 2019-09-10 LAB
ALBUMIN SERPL BCP-MCNC: 2.4 G/DL (ref 3.5–5.2)
ALP SERPL-CCNC: 47 U/L (ref 55–135)
ALT SERPL W/O P-5'-P-CCNC: 38 U/L (ref 10–44)
ANION GAP SERPL CALC-SCNC: 13 MMOL/L (ref 8–16)
ANISOCYTOSIS BLD QL SMEAR: SLIGHT
AST SERPL-CCNC: 27 U/L (ref 10–40)
BASOPHILS # BLD AUTO: ABNORMAL K/UL (ref 0–0.2)
BASOPHILS NFR BLD: 0 % (ref 0–1.9)
BILIRUB SERPL-MCNC: 0.3 MG/DL (ref 0.1–1)
BUN SERPL-MCNC: 32 MG/DL (ref 8–23)
CALCIUM SERPL-MCNC: 8 MG/DL (ref 8.7–10.5)
CHLORIDE SERPL-SCNC: 109 MMOL/L (ref 95–110)
CO2 SERPL-SCNC: 17 MMOL/L (ref 23–29)
CREAT SERPL-MCNC: 1 MG/DL (ref 0.5–1.4)
DIFFERENTIAL METHOD: ABNORMAL
EOSINOPHIL # BLD AUTO: ABNORMAL K/UL (ref 0–0.5)
EOSINOPHIL NFR BLD: 0 % (ref 0–8)
ERYTHROCYTE [DISTWIDTH] IN BLOOD BY AUTOMATED COUNT: 13.7 % (ref 11.5–14.5)
EST. GFR  (AFRICAN AMERICAN): >60 ML/MIN/1.73 M^2
EST. GFR  (NON AFRICAN AMERICAN): 54.9 ML/MIN/1.73 M^2
GLUCOSE SERPL-MCNC: 214 MG/DL (ref 70–110)
HCT VFR BLD AUTO: 45.3 % (ref 37–48.5)
HGB BLD-MCNC: 14.1 G/DL (ref 12–16)
IMM GRANULOCYTES # BLD AUTO: ABNORMAL K/UL (ref 0–0.04)
IMM GRANULOCYTES NFR BLD AUTO: ABNORMAL % (ref 0–0.5)
LYMPHOCYTES # BLD AUTO: ABNORMAL K/UL (ref 1–4.8)
LYMPHOCYTES NFR BLD: 10 % (ref 18–48)
MAGNESIUM SERPL-MCNC: 2 MG/DL (ref 1.6–2.6)
MCH RBC QN AUTO: 29 PG (ref 27–31)
MCHC RBC AUTO-ENTMCNC: 31.1 G/DL (ref 32–36)
MCV RBC AUTO: 93 FL (ref 82–98)
MONOCYTES # BLD AUTO: ABNORMAL K/UL (ref 0.3–1)
MONOCYTES NFR BLD: 19 % (ref 4–15)
MYELOCYTES NFR BLD MANUAL: 3 %
NEUTROPHILS NFR BLD: 68 % (ref 38–73)
NRBC BLD-RTO: 0 /100 WBC
OVALOCYTES BLD QL SMEAR: ABNORMAL
PHOSPHATE SERPL-MCNC: 2.9 MG/DL (ref 2.7–4.5)
PLATELET # BLD AUTO: 216 K/UL (ref 150–350)
PLATELET BLD QL SMEAR: ABNORMAL
PMV BLD AUTO: 9 FL (ref 9.2–12.9)
POCT GLUCOSE: 184 MG/DL (ref 70–110)
POCT GLUCOSE: 186 MG/DL (ref 70–110)
POCT GLUCOSE: 187 MG/DL (ref 70–110)
POIKILOCYTOSIS BLD QL SMEAR: SLIGHT
POTASSIUM SERPL-SCNC: 5.1 MMOL/L (ref 3.5–5.1)
PROT SERPL-MCNC: 5.3 G/DL (ref 6–8.4)
RBC # BLD AUTO: 4.87 M/UL (ref 4–5.4)
SODIUM SERPL-SCNC: 139 MMOL/L (ref 136–145)
WBC # BLD AUTO: 12.57 K/UL (ref 3.9–12.7)

## 2019-09-10 PROCEDURE — 99291 PR CRITICAL CARE, E/M 30-74 MINUTES: ICD-10-PCS | Mod: 25,GC,, | Performed by: PSYCHIATRY & NEUROLOGY

## 2019-09-10 PROCEDURE — 25000003 PHARM REV CODE 250: Performed by: NURSE PRACTITIONER

## 2019-09-10 PROCEDURE — 99232 SBSQ HOSP IP/OBS MODERATE 35: CPT | Mod: ,,, | Performed by: NEUROLOGICAL SURGERY

## 2019-09-10 PROCEDURE — 99900026 HC AIRWAY MAINTENANCE (STAT)

## 2019-09-10 PROCEDURE — 27000221 HC OXYGEN, UP TO 24 HOURS

## 2019-09-10 PROCEDURE — 94761 N-INVAS EAR/PLS OXIMETRY MLT: CPT

## 2019-09-10 PROCEDURE — 20000000 HC ICU ROOM

## 2019-09-10 PROCEDURE — 95951 PR EEG MONITORING/VIDEORECORD: CPT | Mod: 26,,, | Performed by: PSYCHIATRY & NEUROLOGY

## 2019-09-10 PROCEDURE — 99900035 HC TECH TIME PER 15 MIN (STAT)

## 2019-09-10 PROCEDURE — 85027 COMPLETE CBC AUTOMATED: CPT

## 2019-09-10 PROCEDURE — 86361 T CELL ABSOLUTE COUNT: CPT

## 2019-09-10 PROCEDURE — 62270 DX LMBR SPI PNXR: CPT | Mod: 52,,, | Performed by: PSYCHIATRY & NEUROLOGY

## 2019-09-10 PROCEDURE — 95951 HC EEG MONITORING/VIDEO RECORD: CPT

## 2019-09-10 PROCEDURE — 63600175 PHARM REV CODE 636 W HCPCS: Performed by: STUDENT IN AN ORGANIZED HEALTH CARE EDUCATION/TRAINING PROGRAM

## 2019-09-10 PROCEDURE — 25000003 PHARM REV CODE 250: Performed by: PSYCHIATRY & NEUROLOGY

## 2019-09-10 PROCEDURE — 25000003 PHARM REV CODE 250: Performed by: STUDENT IN AN ORGANIZED HEALTH CARE EDUCATION/TRAINING PROGRAM

## 2019-09-10 PROCEDURE — 99232 PR SUBSEQUENT HOSPITAL CARE,LEVL II: ICD-10-PCS | Mod: ,,, | Performed by: NEUROLOGICAL SURGERY

## 2019-09-10 PROCEDURE — 62270 PR SPINAL PUNCTURE,LUMBAR,DIAGNOSTIC: ICD-10-PCS | Mod: 52,,, | Performed by: PSYCHIATRY & NEUROLOGY

## 2019-09-10 PROCEDURE — 63600175 PHARM REV CODE 636 W HCPCS: Performed by: PSYCHIATRY & NEUROLOGY

## 2019-09-10 PROCEDURE — 80053 COMPREHEN METABOLIC PANEL: CPT

## 2019-09-10 PROCEDURE — 85007 BL SMEAR W/DIFF WBC COUNT: CPT

## 2019-09-10 PROCEDURE — 99291 CRITICAL CARE FIRST HOUR: CPT | Mod: 25,GC,, | Performed by: PSYCHIATRY & NEUROLOGY

## 2019-09-10 PROCEDURE — 94003 VENT MGMT INPAT SUBQ DAY: CPT

## 2019-09-10 PROCEDURE — 63600175 PHARM REV CODE 636 W HCPCS: Performed by: NURSE PRACTITIONER

## 2019-09-10 PROCEDURE — 83735 ASSAY OF MAGNESIUM: CPT

## 2019-09-10 PROCEDURE — 84100 ASSAY OF PHOSPHORUS: CPT

## 2019-09-10 PROCEDURE — 95951 PR EEG MONITORING/VIDEORECORD: ICD-10-PCS | Mod: 26,,, | Performed by: PSYCHIATRY & NEUROLOGY

## 2019-09-10 RX ORDER — LIDOCAINE HYDROCHLORIDE 10 MG/ML
1 INJECTION, SOLUTION EPIDURAL; INFILTRATION; INTRACAUDAL; PERINEURAL
Status: DISCONTINUED | OUTPATIENT
Start: 2019-09-10 | End: 2019-09-11

## 2019-09-10 RX ORDER — HEPARIN SODIUM 5000 [USP'U]/ML
5000 INJECTION, SOLUTION INTRAVENOUS; SUBCUTANEOUS EVERY 8 HOURS
Status: DISCONTINUED | OUTPATIENT
Start: 2019-09-10 | End: 2019-09-25

## 2019-09-10 RX ORDER — FENTANYL CITRATE-0.9 % NACL/PF 10 MCG/ML
25 PLASTIC BAG, INJECTION (ML) INTRAVENOUS CONTINUOUS
Status: DISCONTINUED | OUTPATIENT
Start: 2019-09-10 | End: 2019-09-11

## 2019-09-10 RX ADMIN — PIPERACILLIN AND TAZOBACTAM 4.5 G: 4; .5 INJECTION, POWDER, LYOPHILIZED, FOR SOLUTION INTRAVENOUS; PARENTERAL at 06:09

## 2019-09-10 RX ADMIN — INSULIN ASPART 2 UNITS: 100 INJECTION, SOLUTION INTRAVENOUS; SUBCUTANEOUS at 06:09

## 2019-09-10 RX ADMIN — CHLORHEXIDINE GLUCONATE 0.12% ORAL RINSE 15 ML: 1.2 LIQUID ORAL at 09:09

## 2019-09-10 RX ADMIN — PROPOFOL 40 MCG/KG/MIN: 10 INJECTION, EMULSION INTRAVENOUS at 04:09

## 2019-09-10 RX ADMIN — PROPOFOL 40 MCG/KG/MIN: 10 INJECTION, EMULSION INTRAVENOUS at 12:09

## 2019-09-10 RX ADMIN — QUETIAPINE FUMARATE 25 MG: 25 TABLET ORAL at 08:09

## 2019-09-10 RX ADMIN — DEXTROSE 750 MG: 50 INJECTION, SOLUTION INTRAVENOUS at 11:09

## 2019-09-10 RX ADMIN — PROPOFOL 40 MCG/KG/MIN: 10 INJECTION, EMULSION INTRAVENOUS at 05:09

## 2019-09-10 RX ADMIN — Medication 200 MCG/HR: at 11:09

## 2019-09-10 RX ADMIN — PIPERACILLIN AND TAZOBACTAM 4.5 G: 4; .5 INJECTION, POWDER, LYOPHILIZED, FOR SOLUTION INTRAVENOUS; PARENTERAL at 10:09

## 2019-09-10 RX ADMIN — Medication 25 MCG/HR: at 10:09

## 2019-09-10 RX ADMIN — FENTANYL CITRATE 50 MCG: 50 INJECTION INTRAMUSCULAR; INTRAVENOUS at 09:09

## 2019-09-10 RX ADMIN — DEXAMETHASONE SODIUM PHOSPHATE 4 MG: 4 INJECTION, SOLUTION INTRAMUSCULAR; INTRAVENOUS at 05:09

## 2019-09-10 RX ADMIN — DEXTROSE 750 MG: 50 INJECTION, SOLUTION INTRAVENOUS at 08:09

## 2019-09-10 RX ADMIN — SENNOSIDES AND DOCUSATE SODIUM 1 TABLET: 8.6; 5 TABLET ORAL at 09:09

## 2019-09-10 RX ADMIN — DEXTROSE 750 MG: 50 INJECTION, SOLUTION INTRAVENOUS at 03:09

## 2019-09-10 RX ADMIN — LEVOTHYROXINE SODIUM 75 MCG: 75 TABLET ORAL at 06:09

## 2019-09-10 RX ADMIN — DEXAMETHASONE SODIUM PHOSPHATE 4 MG: 4 INJECTION, SOLUTION INTRAMUSCULAR; INTRAVENOUS at 12:09

## 2019-09-10 RX ADMIN — INSULIN ASPART 2 UNITS: 100 INJECTION, SOLUTION INTRAVENOUS; SUBCUTANEOUS at 12:09

## 2019-09-10 RX ADMIN — QUETIAPINE FUMARATE 25 MG: 25 TABLET ORAL at 09:09

## 2019-09-10 RX ADMIN — LACOSAMIDE 100 MG: 50 TABLET, FILM COATED ORAL at 09:09

## 2019-09-10 RX ADMIN — DEXAMETHASONE SODIUM PHOSPHATE 4 MG: 4 INJECTION, SOLUTION INTRAMUSCULAR; INTRAVENOUS at 06:09

## 2019-09-10 RX ADMIN — LACOSAMIDE 100 MG: 50 TABLET, FILM COATED ORAL at 08:09

## 2019-09-10 RX ADMIN — VANCOMYCIN HYDROCHLORIDE 1000 MG: 1 INJECTION, POWDER, LYOPHILIZED, FOR SOLUTION INTRAVENOUS at 05:09

## 2019-09-10 RX ADMIN — AMLODIPINE BESYLATE 5 MG: 5 TABLET ORAL at 08:09

## 2019-09-10 RX ADMIN — HEPARIN SODIUM 5000 UNITS: 5000 INJECTION INTRAVENOUS; SUBCUTANEOUS at 06:09

## 2019-09-10 RX ADMIN — FAMOTIDINE 20 MG: 20 TABLET ORAL at 09:09

## 2019-09-10 RX ADMIN — DEXAMETHASONE SODIUM PHOSPHATE 4 MG: 4 INJECTION, SOLUTION INTRAMUSCULAR; INTRAVENOUS at 11:09

## 2019-09-10 RX ADMIN — PIPERACILLIN AND TAZOBACTAM 4.5 G: 4; .5 INJECTION, POWDER, LYOPHILIZED, FOR SOLUTION INTRAVENOUS; PARENTERAL at 01:09

## 2019-09-10 RX ADMIN — HEPARIN SODIUM 5000 UNITS: 5000 INJECTION, SOLUTION INTRAVENOUS; SUBCUTANEOUS at 09:09

## 2019-09-10 RX ADMIN — CHLORHEXIDINE GLUCONATE 0.12% ORAL RINSE 15 ML: 1.2 LIQUID ORAL at 08:09

## 2019-09-10 NOTE — PROGRESS NOTES
[facility]  Neurosurgery  Progress Note    Subjective:     History of Present Illness: 73 y/o female with pmhx CKD and HTN  presented to outside hospital for seizure- like activity this AM. Per  pt was found unconscious this AM, with seizure like activity occurring on the left side. Pt was given versed x 2 via EMS. CTH performed at outside hospital was negative for bleed. Pt had additional seizure in outside hospital ED and was given IV Keppra. EEG ordered and MRI brain w/out contrast concerning for possible infiltratrive process vs post ischemic sequela. Pt transferred to [facility] and admitted to [facility]. MRI brain w/ and w/out obtained that showed large area of edema in right frontal lobe and ill defined enhancement in the frontal lobe concerning for possible cerebritis vs. Neoplasm. NSGY was consulted to evaluate. Pt not on anti-coagulation.       Post-Op Info:  * No surgery found *         Interval History: NAEON. MRI overnight. Continues on Prop gtt, no sz on EEG.    Medications:  Continuous Infusions:   fentanyl      propofol 20 mcg/kg/min (09/10/19 0900)     Scheduled Meds:   amLODIPine  5 mg Per OG tube Daily    chlorhexidine  15 mL Mouth/Throat BID    dexamethasone  4 mg Intravenous Q6H    famotidine  20 mg Per OG tube QHS    heparin (porcine)  5,000 Units Subcutaneous Q8H    lacosamide  100 mg Per OG tube Q12H    levothyroxine  75 mcg Per OG tube Before breakfast    piperacillin-tazobactam (ZOSYN) IVPB  4.5 g Intravenous Q8H    QUEtiapine  25 mg Per OG tube BID    senna-docusate 8.6-50 mg  1 tablet Per OG tube BID    valproate sodium (DEPACON) IVPB  750 mg Intravenous Q8H    vancomycin (VANCOCIN) IVPB  1,000 mg Intravenous Q24H     PRN Meds:acetaminophen, Dextrose 10% Bolus, fentaNYL, glucagon (human recombinant), hydrALAZINE, insulin aspart U-100, labetalol, magnesium oxide, magnesium oxide, polyethylene glycol, potassium chloride 10%, potassium chloride 10%, potassium,  sodium phosphates, potassium, sodium phosphates, potassium, sodium phosphates, racepinephrine, sodium chloride 0.9%     Review of Systems  Objective:     Weight: 78.6 kg (173 lb 4.5 oz)  Body mass index is 30.7 kg/m².  Vital Signs (Most Recent):  Temp: 98.5 °F (36.9 °C) (09/10/19 0705)  Pulse: 88 (09/10/19 0915)  Resp: (!) 24 (09/10/19 0915)  BP: (!) 150/68 (09/10/19 0900)  SpO2: 100 % (09/10/19 0915) Vital Signs (24h Range):  Temp:  [97.7 °F (36.5 °C)-99.1 °F (37.3 °C)] 98.5 °F (36.9 °C)  Pulse:  [] 88  Resp:  [20-31] 24  SpO2:  [98 %-100 %] 100 %  BP: ()/(51-76) 150/68     Date 09/10/19 0700 - 09/11/19 0659   Shift 8449-9750 4050-3023 0662-6198 24 Hour Total   INTAKE   I.V.(mL/kg) 467.2(5.9)   467.2(5.9)   NG/   195   IV Piggyback 100   100   Shift Total(mL/kg) 762.2(9.7)   762.2(9.7)   OUTPUT   Urine(mL/kg/hr) 350   350   Shift Total(mL/kg) 350(4.5)   350(4.5)   Weight (kg) 78.6 78.6 78.6 78.6              Vent Mode: A/C  Oxygen Concentration (%):  [30] 30  Resp Rate Total:  [20 br/min-26 br/min] 25 br/min  Vt Set:  [410 mL] 410 mL  PEEP/CPAP:  [5 cmH20] 5 cmH20  Pressure Support:  [0 cmH20] 0 cmH20  Mean Airway Pressure:  [10 qaW02-77 cmH20] 11 cmH20         NG/OG Tube 09/03/19 1100 orogastric Center mouth (Active)   Placement Check placement verified by x-ray 9/10/2019  3:05 AM   Tolerance no signs/symptoms of discomfort 9/10/2019  3:05 AM   Securement secured to commercial device 9/10/2019  3:05 AM   Clamp Status/Tolerance unclamped 9/10/2019  3:05 AM   Suction Setting/Drainage Method suction at the bedside 9/8/2019  3:05 AM   Insertion Site Appearance no redness, warmth, tenderness, skin breakdown, drainage 9/10/2019  3:05 AM   Flush/Irrigation flushed w/;water;no resistance met 9/9/2019  3:00 PM   Feeding Method continuous 9/9/2019  3:00 PM   Feeding Action feeding continued 9/9/2019  3:00 PM   Current Rate (mL/hr) 45 mL/hr 9/9/2019  7:05 PM   Goal Rate (mL/hr) 45 mL/hr 9/9/2019  7:05 PM    Intake (mL) 60 mL 9/10/2019  9:00 AM   Water Bolus (mL) 60 mL 9/5/2019  6:00 AM   Rate Formula Tube Feeding (mL/hr) 45 mL/hr 9/9/2019  7:05 PM   Formula Name isosource 9/10/2019  3:05 AM   Intake (mL) - Formula Tube Feeding 45 9/10/2019  9:00 AM   Residual Amount (ml) 5 ml 9/9/2019  7:05 PM       Female External Urinary Catheter 09/04/19 2301 (Active)   Skin no redness;no breakdown 9/10/2019  3:05 AM   Tolerance no signs/symptoms of discomfort 9/10/2019  3:05 AM   Suction Continuous suction at 50 mmHg 9/9/2019  7:05 PM   Date of last wick change 09/09/19 9/9/2019  7:05 PM   Time of last wick change 2300 9/9/2019  7:05 AM   Output (mL) 250 mL 9/10/2019  9:00 AM       Neurosurgery Physical Exam   E1VTM5  PERRL, +corneals, coughg/gag  Grimacing to pain  Localizing in all except WD in LUE    Significant Labs:  Recent Labs   Lab 09/09/19  0412 09/10/19  0145   * 214*    139   K 4.7 5.1    109   CO2 17* 17*   BUN 34* 32*   CREATININE 1.2 1.0   CALCIUM 8.2* 8.0*   MG 1.9 2.0     Recent Labs   Lab 09/09/19  0412 09/10/19  0343   WBC 10.87 12.57   HGB 12.9 14.1   HCT 39.7 45.3    216     No results for input(s): LABPT, INR, APTT in the last 48 hours.  Microbiology Results (last 7 days)     Procedure Component Value Units Date/Time    Blood culture [[id number]] Collected:  09/08/19 1215    Order Status:  Completed Specimen:  Blood from Peripheral, Forearm, Left Updated:  09/10/19 0911     Blood Culture, Routine Gram stain brian bottle: Gram positive cocci       Results called to and read back by:Milagro Valencia RN  09/09/2019  13:48    Culture, Respiratory with Gram Stain [[id number]]  (Abnormal) Collected:  09/08/19 1158    Order Status:  Completed Specimen:  Respiratory from Tracheal Aspirate Updated:  09/10/19 2679     Respiratory Culture No S aureus or Pseudomonas isolated.      ESCHERICHIA COLI  Few  Susceptibility pending  Normal respiratory connie also present       Gram Stain (Respiratory) Few WBC's      Gram Stain (Respiratory) Moderate Gram negative rods     Gram Stain (Respiratory) Rare Gram positive cocci    Blood culture [[id number]] Collected:  09/08/19 1210    Order Status:  Completed Specimen:  Blood from Peripheral, Ankle, Left Updated:  09/09/19 1412     Blood Culture, Routine No Growth to date      No Growth to date    Gram stain [[id number]] Collected:  09/03/19 1843    Order Status:  Completed Specimen:  CSF (Spinal Fluid) from CSF Tap, Tube 2 Updated:  09/03/19 2153     Gram Stain Result No WBC's      No organisms seen            Significant Diagnostics:  X-ray Chest 1 View    Result Date: 9/10/2019  Suspected mild left central and upper infiltrate without dense consolidation, continued appearance suspected representing mild pleural effusion on the left. Electronically signed by: Heber Brunson Date:    09/10/2019 Time:    05:55    Us Lower Extremity Veins Bilateral    Result Date: 9/9/2019  No evidence of deep venous thrombosis in either lower extremity. Electronically signed by resident: Iva Calderón Date:    09/09/2019 Time:    15:38 Electronically signed by: Murphy Chairez MD Date:    09/09/2019 Time:    15:45      Assessment/Plan:     New onset seizure  73 y/o female with pmhx CKD and HTN presented to outside hospital for seizure- like activity occurring on the left side. Found to have area of ill-defined enhancement on MRI brain w/ and w/out.  Most recent EEG showing bilateral PLEDs left greater than right.    -q 1 hr neuro checks  -Fu cEEG, wean propofol, continue AEDs per epilepsy team.  -Fu infectious rodriguez  -Continue steroids for now  -WTE when medically stable.  -MRI brain reviewed  -Further care per [facility], will follow.          Chica Starks MD  Neurosurgery  [facility]

## 2019-09-10 NOTE — PROCEDURES
[facility] EEG/VIDEO MONITORING REPORT  Clemencia Hermaneix  [id number]  1945    DATE OF SERVICE:  09/09/2018  DATE OF ADMISSION: 9/3/2019 11:00 AM    ADMITTING/REQUESTING PROVIDER: Phillip Bardales MD    REASON FOR CONSULT:  74-year-old woman admitted after episodes concerning for seizure found to have significant white matter changes consistent with vasogenic edema of unclear etiology most prominent in the right anterior quadrant.  Evaluate for evidence of epileptiform activity    METHODOLOGY   Electroencephalographic (EEG) recording is with electrodes placed according to the International 10-20 placement system.  Thirty two (32) channels of digital signal (sampling rate of 512/sec) including T1 and T2 was simultaneously recorded from the scalp and may include  EKG, EMG, and/or eye monitors.  Recording band pass was 0.1 to 512 hz.  Digital video recording of the patient is simultaneously recorded with the EEG.  The patient is instructed report clinical symptoms which may occur during the recording session.  EEG and video recording is stored and archived in digital format.  Activation procedures which include photic stimulation, hyperventilation and instructing patients to perform simple task are done in selected patients.   The EEG is displayed on a monitor screen and can be reviewed using different montages.  Computer assisted analysis is employed to detect spike and electrographic seizure activity.   The entire record is submitted for computer analysis.  The entire recording is visually reviewed and the times identified by computer analysis as being spikes or seizures are reviewed again.  Compresses spectral analysis (CSA) is also performed on the activity recorded from each individual channel.  This is displayed as a power display of frequencies from 0 to 30 Hz over time.   The CSA is reviewed looking for asymmetries in power between homologous areas of the scalp and then compared with the original EEG recording.      [employer] software is also utilized in the review of this study.  This software suite analyzes the EEG recording in multiple domains.  Coherence and rhythmicity is computed to identify EEG sections which may contain organized seizures.  Each channel undergoes analysis to detect presence of spike and sharp waves which have special and morphological characteristic of epileptic activity.  The routine EEG recording is converted from spacial into frequency domain.  This is then displayed comparing homologous areas to identify areas of significant asymmetry.  Algorithm to identify non-cortically generated artifact is used to separate eye movement, EMG and other artifact from the EEG.      RECORDING TIMES  Start on 09/09/2019 at 07:00 a.m.  Stop on 09/10/2019 at 16:22 p.m.  A total of 9 hr and 15 min of EEG recording is obtained.    EEG FINDINGS  Background activity:   The background is generally slow with moderate voltage, disorganized, mixed frequency, polymorphic, theta/delta activity with occasional generalized suppressions lasting 1-3 seconds. There are occasional periods with low-moderate voltage, broad based, generalized waveforms. At other times, the background appears more suppressed with longer generalized suppressions. There are overriding faster activities.     There are no pushbutton activations.    Sleep:  There is no normal sleep architecture.    Activation procedures:   Hyperventilation is not performed  Photic stimulation is not performed    Cardiac Monitor:   Heart rate looks general regular on a single lead EKG.    Impression:   This is an abnormal continuous EEG monitoring study because of occasional periods with broad-based waveforms which may indicate a potential for cortical hyperexcitability.  Otherwise, the background cycles through various levels of generalized suppression consistent with global cortical dysfunction and a moderate-severe to severe encephalopathy. This finding is nonspecific with  regards to etiology but can be seen in the setting of toxic/metabolic derangements, infection, and as a medication effect.  Overriding faster activities are often seen as a medication effect.  There are no pushbutton activations and no electrographic seizures.    Angelique Salinas MD PhD  Neurology-Epilepsy  [facility].  [facility]

## 2019-09-10 NOTE — SUBJECTIVE & OBJECTIVE
Interval History: NAEON. MRI overnight. Continues on Prop gtt, no sz on EEG.    Medications:  Continuous Infusions:   fentanyl      propofol 20 mcg/kg/min (09/10/19 0900)     Scheduled Meds:   amLODIPine  5 mg Per OG tube Daily    chlorhexidine  15 mL Mouth/Throat BID    dexamethasone  4 mg Intravenous Q6H    famotidine  20 mg Per OG tube QHS    heparin (porcine)  5,000 Units Subcutaneous Q8H    lacosamide  100 mg Per OG tube Q12H    levothyroxine  75 mcg Per OG tube Before breakfast    piperacillin-tazobactam (ZOSYN) IVPB  4.5 g Intravenous Q8H    QUEtiapine  25 mg Per OG tube BID    senna-docusate 8.6-50 mg  1 tablet Per OG tube BID    valproate sodium (DEPACON) IVPB  750 mg Intravenous Q8H    vancomycin (VANCOCIN) IVPB  1,000 mg Intravenous Q24H     PRN Meds:acetaminophen, Dextrose 10% Bolus, fentaNYL, glucagon (human recombinant), hydrALAZINE, insulin aspart U-100, labetalol, magnesium oxide, magnesium oxide, polyethylene glycol, potassium chloride 10%, potassium chloride 10%, potassium, sodium phosphates, potassium, sodium phosphates, potassium, sodium phosphates, racepinephrine, sodium chloride 0.9%     Review of Systems  Objective:     Weight: 78.6 kg (173 lb 4.5 oz)  Body mass index is 30.7 kg/m².  Vital Signs (Most Recent):  Temp: 98.5 °F (36.9 °C) (09/10/19 0705)  Pulse: 88 (09/10/19 0915)  Resp: (!) 24 (09/10/19 0915)  BP: (!) 150/68 (09/10/19 0900)  SpO2: 100 % (09/10/19 0915) Vital Signs (24h Range):  Temp:  [97.7 °F (36.5 °C)-99.1 °F (37.3 °C)] 98.5 °F (36.9 °C)  Pulse:  [] 88  Resp:  [20-31] 24  SpO2:  [98 %-100 %] 100 %  BP: ()/(51-76) 150/68     Date 09/10/19 0700 - 09/11/19 0659   Shift 5080-6222 6927-7954 3599-5984 24 Hour Total   INTAKE   I.V.(mL/kg) 467.2(5.9)   467.2(5.9)   NG/   195   IV Piggyback 100   100   Shift Total(mL/kg) 762.2(9.7)   762.2(9.7)   OUTPUT   Urine(mL/kg/hr) 350   350   Shift Total(mL/kg) 350(4.5)   350(4.5)   Weight (kg) 78.6 78.6 78.6 78.6               Vent Mode: A/C  Oxygen Concentration (%):  [30] 30  Resp Rate Total:  [20 br/min-26 br/min] 25 br/min  Vt Set:  [410 mL] 410 mL  PEEP/CPAP:  [5 cmH20] 5 cmH20  Pressure Support:  [0 cmH20] 0 cmH20  Mean Airway Pressure:  [10 ybR06-36 cmH20] 11 cmH20         NG/OG Tube 09/03/19 1100 orogastric Center mouth (Active)   Placement Check placement verified by x-ray 9/10/2019  3:05 AM   Tolerance no signs/symptoms of discomfort 9/10/2019  3:05 AM   Securement secured to commercial device 9/10/2019  3:05 AM   Clamp Status/Tolerance unclamped 9/10/2019  3:05 AM   Suction Setting/Drainage Method suction at the bedside 9/8/2019  3:05 AM   Insertion Site Appearance no redness, warmth, tenderness, skin breakdown, drainage 9/10/2019  3:05 AM   Flush/Irrigation flushed w/;water;no resistance met 9/9/2019  3:00 PM   Feeding Method continuous 9/9/2019  3:00 PM   Feeding Action feeding continued 9/9/2019  3:00 PM   Current Rate (mL/hr) 45 mL/hr 9/9/2019  7:05 PM   Goal Rate (mL/hr) 45 mL/hr 9/9/2019  7:05 PM   Intake (mL) 60 mL 9/10/2019  9:00 AM   Water Bolus (mL) 60 mL 9/5/2019  6:00 AM   Rate Formula Tube Feeding (mL/hr) 45 mL/hr 9/9/2019  7:05 PM   Formula Name isosource 9/10/2019  3:05 AM   Intake (mL) - Formula Tube Feeding 45 9/10/2019  9:00 AM   Residual Amount (ml) 5 ml 9/9/2019  7:05 PM       Female External Urinary Catheter 09/04/19 2301 (Active)   Skin no redness;no breakdown 9/10/2019  3:05 AM   Tolerance no signs/symptoms of discomfort 9/10/2019  3:05 AM   Suction Continuous suction at 50 mmHg 9/9/2019  7:05 PM   Date of last wick change 09/09/19 9/9/2019  7:05 PM   Time of last wick change 2300 9/9/2019  7:05 AM   Output (mL) 250 mL 9/10/2019  9:00 AM       Neurosurgery Physical Exam   E1VTM5  PERRL, +corneals, coughg/gag  Grimacing to pain  Localizing in all except WD in LUE    Significant Labs:  Recent Labs   Lab 09/09/19  0412 09/10/19  0145   * 214*    139   K 4.7 5.1    109   CO2  17* 17*   BUN 34* 32*   CREATININE 1.2 1.0   CALCIUM 8.2* 8.0*   MG 1.9 2.0     Recent Labs   Lab 09/09/19  0412 09/10/19  0343   WBC 10.87 12.57   HGB 12.9 14.1   HCT 39.7 45.3    216     No results for input(s): LABPT, INR, APTT in the last 48 hours.  Microbiology Results (last 7 days)     Procedure Component Value Units Date/Time    Blood culture [[id number]] Collected:  09/08/19 1215    Order Status:  Completed Specimen:  Blood from Peripheral, Forearm, Left Updated:  09/10/19 0911     Blood Culture, Routine Gram stain brian bottle: Gram positive cocci       Results called to and read back by:Milagro Valencia RN  09/09/2019  13:48    Culture, Respiratory with Gram Stain [[id number]]  (Abnormal) Collected:  09/08/19 1158    Order Status:  Completed Specimen:  Respiratory from Tracheal Aspirate Updated:  09/10/19 0854     Respiratory Culture No S aureus or Pseudomonas isolated.      ESCHERICHIA COLI  Few  Susceptibility pending  Normal respiratory connie also present       Gram Stain (Respiratory) Few WBC's     Gram Stain (Respiratory) Moderate Gram negative rods     Gram Stain (Respiratory) Rare Gram positive cocci    Blood culture [[id number]] Collected:  09/08/19 1210    Order Status:  Completed Specimen:  Blood from Peripheral, Ankle, Left Updated:  09/09/19 1412     Blood Culture, Routine No Growth to date      No Growth to date    Gram stain [[id number]] Collected:  09/03/19 1843    Order Status:  Completed Specimen:  CSF (Spinal Fluid) from CSF Tap, Tube 2 Updated:  09/03/19 2153     Gram Stain Result No WBC's      No organisms seen            Significant Diagnostics:  X-ray Chest 1 View    Result Date: 9/10/2019  Suspected mild left central and upper infiltrate without dense consolidation, continued appearance suspected representing mild pleural effusion on the left. Electronically signed by: Heber Brunson Date:    09/10/2019 Time:    05:55    Us Lower Extremity Veins Bilateral    Result Date: 9/9/2019  No  evidence of deep venous thrombosis in either lower extremity. Electronically signed by resident: Iva Calderón Date:    09/09/2019 Time:    15:38 Electronically signed by: Murphy Chairez MD Date:    09/09/2019 Time:    15:45

## 2019-09-10 NOTE — PHYSICIAN QUERY
PT Name: Clemencia Nguyen  MR #: [id number]     CDS: Clementine Akhtar RN, CCDS       Contact information: [email or web address]  This form is a permanent document in the medical record.     Query Date: September 10, 2019    Physician Query - Neurological Condition Clarification    By submitting this query, we are merely seeking further clarification of documentation to reflect the severity of illness of your patient. Please utilize your independent clinical judgment when addressing the question(s) below.    The Medical record reflects the following:     Indicators   Supporting Clinical Findings Location in Medical Record   X    X AMS, Confusion,  LOC, etc.  Remains agitated with acute encephalopathy.    Acute encephalopathy/encephalitis unchanged. Repeat mri without significant changes (mostly white matter involvement, some cortical edema). Lp attempted today, unsuccessful, will consult ir. Cont abx, awaiting c/s. Cont steroids. Minimize sedation, will switch to fentanyl. Nsgy on board, possible biopsy.    9/9-[facility] PN (Rachel)    9/10-[facility] PN Attestation (Rachel)   X Acute / Chronic Illness 75 yo female with new onset seizure and bilateral (extensive R frontal, some L cingulate) brain lesions of unclear etiology. On mri appears to be mostly white matter involvement. Lp fairly bland. Initial cytology negative. Ddx remains broad (infection, autoimmune, neoplasm).  Cerebral Edema.  Acute hypoxic respiratory failure. Failed extubation over weekend. Maintain ett today.   Fever and leukocytosis.    9/9-[facility] PN (Rachel)   X Radiology Findings --MRI with large area of R cortical edema with flair and ill defined enhancement on contrast study, concern for glioma vs infectious/inflammatory process.   9/9-[facility] PN (otilio)    Electrolyte Imbalance     X Medication Cont steroids (diagnostic yield if lymphoma is now low), aeds, and abx. On seroquel.   Cont abx for ecoli in sputum.   9/9-[facility] PN (Rachel)   X Treatment        "  Repeat mri today. Ivf in setting of ckd. Will likely need repeat lp.       X      X Other Improved agitation with seroquel.      This is an abnormal continuous EEG monitoring study because of occasional periods with broad-based waveforms which may indicate a potential for cortical hyperexcitability.  During burst suppression, there is occasional sharp activity over the temporal lobes, L>>R of unclear significance.  Otherwise, the background cycles through various levels of generalized suppression consistent with global cortical dysfunction and a moderate-severe to severe encephalopathy. This finding is nonspecific with regards to etiology but can be seen in the setting of toxic/metabolic derangements, infection, and as a medication effect.  Overriding faster activities are often seen as a medication effect.   9/9-[facility] PN (Rachel)    9/9-EEG Report     Encephalopathy- is a general term for any diffuse disease of the brain that alters brain function or structure. Treatment of the cognitive dysfunction varies but is ultimately dependent on the treatment of the underlying condition.    Major Symptoms of Encephalopathy - Decreased level of consciousness, fluctuating alertness/concentration, confusion, agitation, lethargy, somnolence, drowsiness, obtundation, stupor, or coma.         References: National Institutes of Healths (NIH) National [place] of Neurological Disorders and Strokes;  HCPro 2016; Advisory Board     Clinical Guidelines:   These guidelines will set system standards to assist providers in managing, documentation, and coding of encephalopathy. The intent of this document is to serve as a system guideline, not replace the providers clinical judgment:    Provider, please specify the diagnosis or diagnoses associated with above clinical findings.    Please provide a more specific "Acute Encephalopathy" diagnosis by clarifying Type/Etiology.     [   ] Metabolic Encephalopathy - Due to electrolye " imbalance, metabolic derangements, or infections processes, includes Septic Encephalopathy   [   ] Other Encephalopathy - Includes uremic encephalopathy   [  x ] Unspecified Encephalopathy : related to brain lesion     [   ] Other Neurological Condition- Includes Post-ictal altered mental status. (please specify condition): ____________________________   [   ] Clinically Undetermined     Please document in your progress notes daily for the duration of treatment until resolved, and include in your discharge summary.

## 2019-09-10 NOTE — PROGRESS NOTES
[facility]  Neurocritical Care  Progress Note    Admit Date: 9/3/2019  Service Date: 09/10/2019  Length of Stay: 7    Subjective:     Chief Complaint: Brain lesion    History of Present Illness: Pt is  73 yo female with a PMHx of CKD 3, HTN, was transferred from [facility] to [facility] for new onset seizures and concern for right front lobe mass. The pt was found in her bedroom by her spouse at approximately 9:45 pm sitting her head against the bed, unresponsive, and having seizure like activity on the left-side. EMT was called and the pt was given Versed twice while en route to the hospital. Pt had a second witnessed seizures, left facial droop, left-sided weakness, and tongue ecchymosis in the ED. Neurology was consulted and The pt was given IV Keppra and Vimpat. MRI brain without contrast was acquired. The image showed right frontal lobe vasogenic edema with mass effect concerning for underlying mass. EEG was acquired at outside hospital concerning showing an abnormal result. The pt was given decadron IV and neurosurgical consult recommended. Pt transferred to [facility] and admitted to the [facility] for higher level of care and further evaluation.     Pt history acquired per chart review and from spouse present at the bedside. The pt's spouse denies prior history of seizures CVA, cancer history and up-to-date screenings    Hospital Course: --admitted to [facility] on 9/3 following new onset seizure, arrived intubated  --MRI with large area of R cortical edema with flair and ill defined enhancement on contrast study, concern for glioma vs infectious/inflammatory process  --LP appears non infectious, cytology pending  9/5- no acute events, awaiting LP finalization from pathology report., weaning vent.   09/06/2019: Very agitated overnight, requiring increased sedation. Patient extubated this morning on rounds, and reintubated shortly after. Unable to maintain airway and secretions. CSF gram stain negative.  09/07/2019: KATT.  EKG obtained, seroquel started. Valproic acid started.  09/08/2019: NAEON. Improved agitation with seroquel.  09/09/2019: MRI Brain w/wo ordered for today. BLE US ordered. Will need new LP later on in the week.   09/10/2019 LP for infx workup, CD4 lab, EEG monitoring per Epilepsy         Interval History:    D/cIVFs  LP for cytology 10 cc for cytology   Concern for RACHEL virus   Cd4 lab - for immune suppression in setting of viral etiology   On Fentanyl gt      Review of Systems   Unable to perform ROS: Intubated   and acuity of condition, encephalopathic     Objective:     Vitals:  Temp: 97.2 °F (36.2 °C)  Pulse: 72  Rhythm: normal sinus rhythm  BP: (!) 142/65  MAP (mmHg): 93  Resp: 20  SpO2: 100 %  Oxygen Concentration (%): 30  O2 Device (Oxygen Therapy): ventilator  Vent Mode: A/C  Set Rate: 20 bmp  Vt Set: 410 mL  Pressure Support: 0 cmH20  PEEP/CPAP: 5 cmH20  Peak Airway Pressure: 26 cmH2O  Mean Airway Pressure: 11 cmH20  Plateau Pressure: 0 cmH20    Temp  Min: 97.2 °F (36.2 °C)  Max: 99.1 °F (37.3 °C)  Pulse  Min: 62  Max: 96  BP  Min: 97/55  Max: 164/71  MAP (mmHg)  Min: 73  Max: 107  Resp  Min: 20  Max: 31  SpO2  Min: 98 %  Max: 100 %  Oxygen Concentration (%)  Min: 30  Max: 30    09/09 0701 - 09/10 0700  In: 4480.3 [I.V.:2545.3]  Out: 830 [Urine:830]   Unmeasured Output  Urine Occurrence: 1  Stool Occurrence: 1  Emesis Occurrence: 0  Pad Count: 1       Physical Exam   Constitutional: She appears well-developed and well-nourished.   She is intubated.   HENT:   Head: Normocephalic.   Cardiovascular: Normal rate and regular rhythm.   Pulmonary/Chest: She is intubated.   Abdominal: Soft. Normal appearance and bowel sounds are normal.   Neurological:   Pt lethargic, intubated, does not follow commands   E2V(T1)M4 GCS (6)   Pupils 2 mm pinpoint and reactive  Downward gaze bilaterally of eyes   Cough, gag, and corneals intact   Left-sided weakness   Seizures   BUE - withdraws and moves extremities spontaneously, RUE  > LUE   BLE - withdraws and moves extremities to noxious stimuli       Medications:  Continuous  fentanyl Last Rate: 150 mcg/hr (09/10/19 1229)   propofol Last Rate: Stopped (09/10/19 1230)   Scheduled  amLODIPine 5 mg Daily   chlorhexidine 15 mL BID   dexamethasone 4 mg Q6H   famotidine 20 mg QHS   heparin (porcine) 5,000 Units Q8H   lacosamide 100 mg Q12H   levothyroxine 75 mcg Before breakfast   piperacillin-tazobactam (ZOSYN) IVPB 4.5 g Q8H   QUEtiapine 25 mg BID   senna-docusate 8.6-50 mg 1 tablet BID   valproate sodium (DEPACON) IVPB 750 mg Q8H   vancomycin (VANCOCIN) IVPB 1,000 mg Q24H   PRN  acetaminophen 650 mg Q6H PRN   Dextrose 10% Bolus 12.5 g PRN   fentaNYL 50 mcg Q2H PRN   glucagon (human recombinant) 1 mg PRN   hydrALAZINE 10 mg Q6H PRN   insulin aspart U-100 1-10 Units Q6H PRN   labetalol 10 mg Q6H PRN   lidocaine (PF) 10 mg/ml (1%) 1 mL On Call Procedure   magnesium oxide 800 mg PRN   magnesium oxide 800 mg PRN   polyethylene glycol 17 g Daily PRN   potassium chloride 10% 40 mEq PRN   potassium chloride 10% 40 mEq PRN   potassium, sodium phosphates 2 packet PRN   potassium, sodium phosphates 2 packet PRN   potassium, sodium phosphates 2 packet PRN   racepinephrine 0.5 mL Q4H PRN   sodium chloride 0.9% 10 mL PRN     Today I personally reviewed pertinent medications, lines/drains/airways, imaging, cardiology results, laboratory results, microbiology results, notably:    Diet  No diet orders on file  No diet orders on file        Assessment/Plan:     Neuro  * Brain lesion  74 year old admitted to unit on 9/3 following new onset seizure, with MRI showing large area of R cortical edema with flair and ill defined enhancement on contrast study, concern for glioma vs infectious/inflammatory process  - LP appears non infectious, cytology negative  - continue decadron  - EEG monitoring  - NSGY, epilepsy following   - neuro checks q1hr   - vital signs q1hr   -on SubQ heparin   -Repeat MRI Brain  stable  -Increased Seroquel overnight  - Wean Propofol,   -Fentanyl IVP gtt  -repeat LP today     Cerebral edema  --continue decadron, see brain lesion    New onset seizure  2/2 to brain mass   Keppra changed to Vimpat 9/6  Valproic acid started 9/7  EEG monitoring  Epilepsy following      Pulmonary  On mechanically assisted ventilation  Daily CXR, ABG  Continue Peridex, Famotidine, Heparin subq  Trial of extubation 9/6/2019- re-intubated shortly after extubation for airway protection  Continue to wean vent settings as tolerated    Vent Mode: A/C  Oxygen Concentration (%):  [30] 30  Resp Rate Total:  [20 br/min-26 br/min] 20 br/min  Vt Set:  [410 mL] 410 mL  PEEP/CPAP:  [5 cmH20] 5 cmH20  Pressure Support:  [0 cmH20] 0 cmH20  Mean Airway Pressure:  [10 xiY06-28 cmH20] 11 cmH20      Cardiac/Vascular  Essential hypertension  SBP <180, MAP >65   -on Amlodipine 5 mg     Renal/  Chronic kidney disease, stage III (moderate)  -PMHx of CKD 3   -monitor renal function, I/Os   -d/C IVFs       Oncology  Leukocytosis  Febrile, leukocytosis present  -Panculture  -on Broad Spec ABX, vanc + zosyn       Other  Fever  -US BLE ordered          The patient is being Prophylaxed for:  Venous Thromboembolism with: Mechanical or Chemical  Stress Ulcer with: H2B  Ventilator Pneumonia with: chlorhexidine oral care    Activity Orders          Straight Cath starting at 09/05 0034        Full Code   I have spent 35 min with this patient, with over 50% of this time spent coordinating care and speaking with the family    Critical secondary to Patient has a condition that poses threat to life and bodily function:      Critical care was time spent personally by me on the following activities: development of treatment plan with patient or surrogate and bedside caregivers, discussions with consultants, evaluation of patient's response to treatment, examination of patient, ordering and performing treatments and interventions, ordering and review of  laboratory studies, ordering and review of radiographic studies, pulse oximetry, re-evaluation of patient's condition. This critical care time did not overlap with that of any other provider or involve time for any procedures.      Armand Rm PA-C  Neurocritical Care  [facility]

## 2019-09-10 NOTE — PROCEDURES
"Clemencia Nguyen is a 74 y.o. female patient.    Temp: 97.2 °F (36.2 °C) (09/10/19 1100)  Pulse: 72 (09/10/19 1312)  Resp: 20 (09/10/19 1312)  BP: (!) 142/65 (09/10/19 1200)  SpO2: 100 % (09/10/19 1312)  Weight: 78.6 kg (173 lb 4.5 oz) (09/07/19 2305)  Height: 5' 3" (160 cm) (09/04/19 0947)       Lumbar Puncture  Date/Time: 9/10/2019 2:52 PM  Location procedure was performed: [facility] NEURO CRITICAL CARE  Performed by: Antelmo Ramos MD  Authorized by: Miriam Orlando MD   Assisting provider: Miriam Orlando MD  Pre-operative diagnosis:  Seizure  Post-operative diagnosis: unchanged  Consent Done: Yes  Indications: evaluation for infection and evaluation for altered mental status  Anesthesia: local infiltration    Anesthesia:  Local Anesthetic: lidocaine 1% without epinephrine  Anesthetic total: 5 mL  Patient sedated: yes  Sedatives: fentanyl and propofol  Lumbar space: L3-L4 interspace  Patient's position: sitting  Needle gauge: 18  Needle type: spinal needle - Quincke tip  Needle length: 3.5 in  Number of attempts: 5 or more  Fluid appearance: blood-tinged then clearing  Tubes of fluid: 2  Total volume: 12 ml  Post-procedure: site cleaned and adhesive bandage applied  Complications: No  Estimated blood loss (mL): 5  Specimens: Yes (Clotted specimen, unusable)  Implants: No  Patient tolerance: Patient tolerated the procedure well with no immediate complications  Comments: Bloody tap obtained but likely that needle moved during tap and significant blood in collection tubes leading to two clotted samples.        Miriam Orlando  9/10/2019  "

## 2019-09-10 NOTE — PLAN OF CARE
Problem: Adult Inpatient Plan of Care  Goal: Patient-Specific Goal (Individualization)  Admit Date:  9/3/19    Admit Dx:    Past Medical History:  No date: Depression      Comment:  has been on tofranil for years;  No date: Disorder of kidney and ureter  06/12/2019: Esophageal stricture      Comment:  per pt per Dr. Garcia  No date: Hyperlipemia  No date: Hypertension  No date: Hypothyroidism  No date: Osteopenia  No date: Thyroid disease      Comment:  hypothyroid    Past Surgical History:  No date: cataract surgery  04/24/2018: CHOLECYSTECTOMY  4/24/2018: CHOLECYSTECTOMY-LAPAROSCOPIC; N/A      Comment:  Performed by Marina Killian [facility] at [facility]  2011, again in 2014: COLONOSCOPY      Comment:  repeat in 5  06/12/2019: COLONOSCOPY W/ BIOPSIES      Comment:  polypectomies per Dr. Garcia  06/12/2019: ESOPHAGOGASTRODUODENOSCOPY      Comment:  Dr. Garcia  No date: HYSTERECTOMY  No date: OOPHORECTOMY  No date: TONSILLECTOMY    Individualization:   1.     Restraints: 9/3/19 pulling lines       Outcome: Ongoing (interventions implemented as appropriate)  POC reviewed with pt and pt's , Enrike, at 1400. Pt's  verbalized understanding. Questions and concerns addressed. Plan for LP in IR this week.  Transitioned from propofol gtt to fentanyl gtt for agitation.  cEEG reconnected.  Tolerating SIMV.  Pt progressing toward goals. Will continue to monitor. See flowsheets for full assessment and VS info.

## 2019-09-10 NOTE — ASSESSMENT & PLAN NOTE
75 y/o female with pmhx CKD and HTN presented to outside hospital for seizure- like activity occurring on the left side. Found to have area of ill-defined enhancement on MRI brain w/ and w/out.  Most recent EEG showing bilateral PLEDs left greater than right.    -q 1 hr neuro checks  -Fu cEEG, wean propofol, continue AEDs per epilepsy team.  -Fu infectious rodriguez  -Continue steroids for now  -WTE when medically stable.  -MRI brain reviewed  -Further care per NCC, will follow.

## 2019-09-10 NOTE — PLAN OF CARE
09/09/19 2142   Discharge Reassessment   Assessment Type Discharge Planning Reassessment   Provided patient/caregiver education on the expected discharge date and the discharge plan No   Do you have any problems affording any of your prescribed medications? TBD   Discharge Plan A Long-term acute care facility (LTAC)   Discharge Plan B Skilled Nursing Facility   DME Needed Upon Discharge  other (see comments)  (tbd)   Patient choice form signed by patient/caregiver N/A   Anticipated Discharge Disposition LTAC   Can the patient answer the patient profile reliably? No, cognitively impaired   How does the patient rate their overall health at the present time?   (porsha)   How often would a person be available to care for the patient? Whenever needed   Number of comorbid conditions (as recorded on the chart) Five or more   During the past month, has the patient often been bothered by feeling down, depressed or hopeless?   (porsha)   During the past month, has the patient often been bothered by little interest or pleasure in doing things?   (porsha)   Post-Acute Status   Post-Acute Authorization Placement   Post-Acute Placement Status Awaiting Internal Medical Clearance   Discharge Delays None known at this time       Keely Malone RN, CCRN-K, [facility]  Neuro-Critical Care   [phone]

## 2019-09-10 NOTE — PLAN OF CARE
Problem: Adult Inpatient Plan of Care  Goal: Plan of Care Review  Outcome: Ongoing (interventions implemented as appropriate)  POC reviewed with pt and spouse at 0500. Pt unable to verbalize understanding. Questions and concerns addressed with spouse. No acute events overnight. Propofol gtt titrated. AM labs collected. No replacements admin. R and L wrist restraints in place. cEEG placement 9/10. Pt progressing toward goals. Will continue to monitor. See flowsheets for full assessment and VS info

## 2019-09-10 NOTE — SUBJECTIVE & OBJECTIVE
Interval History:    D/cIVFs  LP for cytology 10 cc for cytology   Concern for RACHEL virus   Cd4 lab - for immune suppression in setting of viral etiology   On Fentanyl gt      Review of Systems   Unable to perform ROS: Intubated   and acuity of condition, encephalopathic     Objective:     Vitals:  Temp: 97.2 °F (36.2 °C)  Pulse: 72  Rhythm: normal sinus rhythm  BP: (!) 142/65  MAP (mmHg): 93  Resp: 20  SpO2: 100 %  Oxygen Concentration (%): 30  O2 Device (Oxygen Therapy): ventilator  Vent Mode: A/C  Set Rate: 20 bmp  Vt Set: 410 mL  Pressure Support: 0 cmH20  PEEP/CPAP: 5 cmH20  Peak Airway Pressure: 26 cmH2O  Mean Airway Pressure: 11 cmH20  Plateau Pressure: 0 cmH20    Temp  Min: 97.2 °F (36.2 °C)  Max: 99.1 °F (37.3 °C)  Pulse  Min: 62  Max: 96  BP  Min: 97/55  Max: 164/71  MAP (mmHg)  Min: 73  Max: 107  Resp  Min: 20  Max: 31  SpO2  Min: 98 %  Max: 100 %  Oxygen Concentration (%)  Min: 30  Max: 30    09/09 0701 - 09/10 0700  In: 4480.3 [I.V.:2545.3]  Out: 830 [Urine:830]   Unmeasured Output  Urine Occurrence: 1  Stool Occurrence: 1  Emesis Occurrence: 0  Pad Count: 1       Physical Exam   Constitutional: She appears well-developed and well-nourished.   She is intubated.   HENT:   Head: Normocephalic.   Cardiovascular: Normal rate and regular rhythm.   Pulmonary/Chest: She is intubated.   Abdominal: Soft. Normal appearance and bowel sounds are normal.   Neurological:   Pt lethargic, intubated, does not follow commands   E2V(T1)M4 GCS (6)   Pupils 2 mm pinpoint and reactive  Downward gaze bilaterally of eyes   Cough, gag, and corneals intact   Left-sided weakness   Seizures   BUE - withdraws and moves extremities spontaneously, RUE > LUE   BLE - withdraws and moves extremities to noxious stimuli       Medications:  Continuous  fentanyl Last Rate: 150 mcg/hr (09/10/19 1229)   propofol Last Rate: Stopped (09/10/19 1230)   Scheduled  amLODIPine 5 mg Daily   chlorhexidine 15 mL BID   dexamethasone 4 mg Q6H   famotidine  20 mg QHS   heparin (porcine) 5,000 Units Q8H   lacosamide 100 mg Q12H   levothyroxine 75 mcg Before breakfast   piperacillin-tazobactam (ZOSYN) IVPB 4.5 g Q8H   QUEtiapine 25 mg BID   senna-docusate 8.6-50 mg 1 tablet BID   valproate sodium (DEPACON) IVPB 750 mg Q8H   vancomycin (VANCOCIN) IVPB 1,000 mg Q24H   PRN  acetaminophen 650 mg Q6H PRN   Dextrose 10% Bolus 12.5 g PRN   fentaNYL 50 mcg Q2H PRN   glucagon (human recombinant) 1 mg PRN   hydrALAZINE 10 mg Q6H PRN   insulin aspart U-100 1-10 Units Q6H PRN   labetalol 10 mg Q6H PRN   lidocaine (PF) 10 mg/ml (1%) 1 mL On Call Procedure   magnesium oxide 800 mg PRN   magnesium oxide 800 mg PRN   polyethylene glycol 17 g Daily PRN   potassium chloride 10% 40 mEq PRN   potassium chloride 10% 40 mEq PRN   potassium, sodium phosphates 2 packet PRN   potassium, sodium phosphates 2 packet PRN   potassium, sodium phosphates 2 packet PRN   racepinephrine 0.5 mL Q4H PRN   sodium chloride 0.9% 10 mL PRN     Today I personally reviewed pertinent medications, lines/drains/airways, imaging, cardiology results, laboratory results, microbiology results, notably:    Diet  No diet orders on file  No diet orders on file

## 2019-09-11 LAB
ALBUMIN SERPL BCP-MCNC: 2.5 G/DL (ref 3.5–5.2)
ALLENS TEST: ABNORMAL
ALP SERPL-CCNC: 48 U/L (ref 55–135)
ALT SERPL W/O P-5'-P-CCNC: 37 U/L (ref 10–44)
ANION GAP SERPL CALC-SCNC: 9 MMOL/L (ref 8–16)
ANISOCYTOSIS BLD QL SMEAR: SLIGHT
AST SERPL-CCNC: 29 U/L (ref 10–40)
BACTERIA SPEC AEROBE CULT: ABNORMAL
BACTERIA SPEC AEROBE CULT: ABNORMAL
BASOPHILS NFR BLD: 0 % (ref 0–1.9)
BILIRUB SERPL-MCNC: 0.4 MG/DL (ref 0.1–1)
BLOOD COLLECTION FOR MS PROFILE: NORMAL
BUN SERPL-MCNC: 34 MG/DL (ref 8–23)
CALCIUM SERPL-MCNC: 8.2 MG/DL (ref 8.7–10.5)
CD3+CD4+ CELLS # BLD: 623 CELLS/UL (ref 300–1400)
CD3+CD4+ CELLS NFR BLD: 49 % (ref 28–57)
CHLORIDE SERPL-SCNC: 107 MMOL/L (ref 95–110)
CLARITY CSF: ABNORMAL
CLARITY CSF: ABNORMAL
CO2 SERPL-SCNC: 21 MMOL/L (ref 23–29)
COLOR CSF: ABNORMAL
COLOR CSF: ABNORMAL
CREAT SERPL-MCNC: 1.1 MG/DL (ref 0.5–1.4)
DELSYS: ABNORMAL
DIFFERENTIAL METHOD: ABNORMAL
EOSINOPHIL NFR BLD: 0 % (ref 0–8)
ERYTHROCYTE [DISTWIDTH] IN BLOOD BY AUTOMATED COUNT: 13.5 % (ref 11.5–14.5)
ERYTHROCYTE [SEDIMENTATION RATE] IN BLOOD BY WESTERGREN METHOD: 20 MM/H
EST. GFR  (AFRICAN AMERICAN): 56.4 ML/MIN/1.73 M^2
EST. GFR  (NON AFRICAN AMERICAN): 48.9 ML/MIN/1.73 M^2
FIO2: 30
GLUCOSE CSF-MCNC: 108 MG/DL (ref 40–70)
GLUCOSE SERPL-MCNC: 230 MG/DL (ref 70–110)
GRAM STN SPEC: ABNORMAL
HCO3 UR-SCNC: 22.4 MMOL/L (ref 24–28)
HCT VFR BLD AUTO: 40 % (ref 37–48.5)
HGB BLD-MCNC: 12.7 G/DL (ref 12–16)
IMM GRANULOCYTES # BLD AUTO: ABNORMAL K/UL (ref 0–0.04)
IMM GRANULOCYTES NFR BLD AUTO: ABNORMAL % (ref 0–0.5)
JCPYV DNA CSF QL NAA+PROBE: NEGATIVE
LYMPHOCYTES NFR BLD: 15 % (ref 18–48)
LYMPHOCYTES NFR CSF MANUAL: 20 % (ref 40–80)
LYMPHOCYTES NFR CSF MANUAL: 21 % (ref 40–80)
MAGNESIUM SERPL-MCNC: 2.2 MG/DL (ref 1.6–2.6)
MCH RBC QN AUTO: 28.7 PG (ref 27–31)
MCHC RBC AUTO-ENTMCNC: 31.8 G/DL (ref 32–36)
MCV RBC AUTO: 91 FL (ref 82–98)
METAMYELOCYTES NFR BLD MANUAL: 1 %
MODE: ABNORMAL
MONOCYTES NFR BLD: 12 % (ref 4–15)
MONOS+MACROS NFR CSF MANUAL: 3 % (ref 15–45)
MONOS+MACROS NFR CSF MANUAL: 7 % (ref 15–45)
MYELOCYTES NFR BLD MANUAL: 1 %
NEUTROPHILS NFR BLD: 70 % (ref 38–73)
NEUTROPHILS NFR CSF MANUAL: 72 % (ref 0–6)
NEUTROPHILS NFR CSF MANUAL: 77 % (ref 0–6)
NEUTS BAND NFR BLD MANUAL: 1 %
NRBC BLD-RTO: 0 /100 WBC
PCO2 BLDA: 27.8 MMHG (ref 35–45)
PEEP: 5
PH SMN: 7.51 [PH] (ref 7.35–7.45)
PHOSPHATE SERPL-MCNC: 3.2 MG/DL (ref 2.7–4.5)
PLATELET # BLD AUTO: 223 K/UL (ref 150–350)
PLATELET BLD QL SMEAR: ABNORMAL
PMV BLD AUTO: 9.7 FL (ref 9.2–12.9)
PO2 BLDA: 80 MMHG (ref 80–100)
POC BE: -1 MMOL/L
POC SATURATED O2: 97 % (ref 95–100)
POC TCO2: 23 MMOL/L (ref 23–27)
POCT GLUCOSE: 108 MG/DL (ref 70–110)
POCT GLUCOSE: 144 MG/DL (ref 70–110)
POCT GLUCOSE: 187 MG/DL (ref 70–110)
POCT GLUCOSE: 235 MG/DL (ref 70–110)
POTASSIUM SERPL-SCNC: 5.1 MMOL/L (ref 3.5–5.1)
PROT CSF-MCNC: 118 MG/DL (ref 15–40)
PROT SERPL-MCNC: 5.7 G/DL (ref 6–8.4)
PS: 8
RBC # BLD AUTO: 4.42 M/UL (ref 4–5.4)
RBC # CSF: 4080 /CU MM
RBC # CSF: 5025 /CU MM
SAMPLE: ABNORMAL
SITE: ABNORMAL
SODIUM SERPL-SCNC: 137 MMOL/L (ref 136–145)
SP02: 100
SPECIMEN VOL CSF: 0.5 ML
SPECIMEN VOL CSF: 0.5 ML
VANCOMYCIN TROUGH SERPL-MCNC: 9.6 UG/ML (ref 10–22)
VT: 410
WBC # BLD AUTO: 10.72 K/UL (ref 3.9–12.7)
WBC # CSF: 10 /CU MM (ref 0–5)
WBC # CSF: 6 /CU MM (ref 0–5)

## 2019-09-11 PROCEDURE — 88108 CYTOPATH CONCENTRATE TECH: CPT | Mod: 26,,, | Performed by: PATHOLOGY

## 2019-09-11 PROCEDURE — 63600175 PHARM REV CODE 636 W HCPCS: Performed by: NURSE PRACTITIONER

## 2019-09-11 PROCEDURE — 80053 COMPREHEN METABOLIC PANEL: CPT

## 2019-09-11 PROCEDURE — 25000003 PHARM REV CODE 250: Performed by: PSYCHIATRY & NEUROLOGY

## 2019-09-11 PROCEDURE — 20000000 HC ICU ROOM

## 2019-09-11 PROCEDURE — 63600175 PHARM REV CODE 636 W HCPCS

## 2019-09-11 PROCEDURE — 27000221 HC OXYGEN, UP TO 24 HOURS

## 2019-09-11 PROCEDURE — 63600175 PHARM REV CODE 636 W HCPCS: Performed by: PSYCHIATRY & NEUROLOGY

## 2019-09-11 PROCEDURE — 82945 GLUCOSE OTHER FLUID: CPT

## 2019-09-11 PROCEDURE — 99900026 HC AIRWAY MAINTENANCE (STAT)

## 2019-09-11 PROCEDURE — 82803 BLOOD GASES ANY COMBINATION: CPT

## 2019-09-11 PROCEDURE — 99233 SBSQ HOSP IP/OBS HIGH 50: CPT | Mod: GC,,, | Performed by: PSYCHIATRY & NEUROLOGY

## 2019-09-11 PROCEDURE — 36600 WITHDRAWAL OF ARTERIAL BLOOD: CPT

## 2019-09-11 PROCEDURE — 87529 HSV DNA AMP PROBE: CPT

## 2019-09-11 PROCEDURE — 80202 ASSAY OF VANCOMYCIN: CPT

## 2019-09-11 PROCEDURE — 87498 ENTEROVIRUS PROBE&REVRS TRNS: CPT

## 2019-09-11 PROCEDURE — 89051 BODY FLUID CELL COUNT: CPT

## 2019-09-11 PROCEDURE — 87798 DETECT AGENT NOS DNA AMP: CPT | Mod: 91

## 2019-09-11 PROCEDURE — 87040 BLOOD CULTURE FOR BACTERIA: CPT

## 2019-09-11 PROCEDURE — 99233 PR SUBSEQUENT HOSPITAL CARE,LEVL III: ICD-10-PCS | Mod: GC,,, | Performed by: PSYCHIATRY & NEUROLOGY

## 2019-09-11 PROCEDURE — 87205 SMEAR GRAM STAIN: CPT

## 2019-09-11 PROCEDURE — 99900035 HC TECH TIME PER 15 MIN (STAT)

## 2019-09-11 PROCEDURE — 99000 SPECIMEN HANDLING OFFICE-LAB: CPT

## 2019-09-11 PROCEDURE — 88108 CYTOPATH CONCENTRATE TECH: CPT | Performed by: PATHOLOGY

## 2019-09-11 PROCEDURE — 88108 CYTOLOGY SPECIMEN- MEDICAL CYTOLOGY (FLUID/WASH/BRUSH): ICD-10-PCS | Mod: 26,,, | Performed by: PATHOLOGY

## 2019-09-11 PROCEDURE — 84157 ASSAY OF PROTEIN OTHER: CPT

## 2019-09-11 PROCEDURE — 83735 ASSAY OF MAGNESIUM: CPT

## 2019-09-11 PROCEDURE — 82040 ASSAY OF SERUM ALBUMIN: CPT

## 2019-09-11 PROCEDURE — 82800 BLOOD PH: CPT

## 2019-09-11 PROCEDURE — 94761 N-INVAS EAR/PLS OXIMETRY MLT: CPT

## 2019-09-11 PROCEDURE — 85027 COMPLETE CBC AUTOMATED: CPT

## 2019-09-11 PROCEDURE — 25000003 PHARM REV CODE 250: Performed by: STUDENT IN AN ORGANIZED HEALTH CARE EDUCATION/TRAINING PROGRAM

## 2019-09-11 PROCEDURE — 94003 VENT MGMT INPAT SUBQ DAY: CPT

## 2019-09-11 PROCEDURE — 25000003 PHARM REV CODE 250: Performed by: NURSE PRACTITIONER

## 2019-09-11 PROCEDURE — 87070 CULTURE OTHR SPECIMN AEROBIC: CPT

## 2019-09-11 PROCEDURE — 95951 HC EEG MONITORING/VIDEO RECORD: CPT

## 2019-09-11 PROCEDURE — 87798 DETECT AGENT NOS DNA AMP: CPT

## 2019-09-11 PROCEDURE — 85007 BL SMEAR W/DIFF WBC COUNT: CPT

## 2019-09-11 PROCEDURE — 84100 ASSAY OF PHOSPHORUS: CPT

## 2019-09-11 RX ORDER — MIDAZOLAM HYDROCHLORIDE 5 MG/ML
2 INJECTION INTRAMUSCULAR; INTRAVENOUS EVERY 5 MIN PRN
Status: DISCONTINUED | OUTPATIENT
Start: 2019-09-11 | End: 2019-09-16

## 2019-09-11 RX ORDER — MIDAZOLAM HYDROCHLORIDE 1 MG/ML
INJECTION INTRAMUSCULAR; INTRAVENOUS
Status: COMPLETED
Start: 2019-09-11 | End: 2019-09-11

## 2019-09-11 RX ORDER — FENTANYL CITRATE 50 UG/ML
100 INJECTION, SOLUTION INTRAMUSCULAR; INTRAVENOUS
Status: DISCONTINUED | OUTPATIENT
Start: 2019-09-11 | End: 2019-09-21

## 2019-09-11 RX ORDER — MIDAZOLAM HYDROCHLORIDE 1 MG/ML
1 INJECTION INTRAMUSCULAR; INTRAVENOUS ONCE
Status: COMPLETED | OUTPATIENT
Start: 2019-09-11 | End: 2019-09-11

## 2019-09-11 RX ORDER — FENTANYL CITRATE 50 UG/ML
50 INJECTION, SOLUTION INTRAMUSCULAR; INTRAVENOUS
Status: DISCONTINUED | OUTPATIENT
Start: 2019-09-11 | End: 2019-09-21

## 2019-09-11 RX ORDER — VALPROIC ACID 250 MG/5ML
750 SOLUTION ORAL
Status: DISCONTINUED | OUTPATIENT
Start: 2019-09-11 | End: 2019-09-26

## 2019-09-11 RX ORDER — LIDOCAINE HYDROCHLORIDE 10 MG/ML
3 INJECTION INFILTRATION; PERINEURAL ONCE
Status: COMPLETED | OUTPATIENT
Start: 2019-09-11 | End: 2019-09-11

## 2019-09-11 RX ADMIN — PROPOFOL 50 MCG/KG/MIN: 10 INJECTION, EMULSION INTRAVENOUS at 08:09

## 2019-09-11 RX ADMIN — SENNOSIDES AND DOCUSATE SODIUM 1 TABLET: 8.6; 5 TABLET ORAL at 09:09

## 2019-09-11 RX ADMIN — HEPARIN SODIUM 5000 UNITS: 5000 INJECTION, SOLUTION INTRAVENOUS; SUBCUTANEOUS at 03:09

## 2019-09-11 RX ADMIN — INSULIN ASPART 1 UNITS: 100 INJECTION, SOLUTION INTRAVENOUS; SUBCUTANEOUS at 11:09

## 2019-09-11 RX ADMIN — VANCOMYCIN HYDROCHLORIDE 1500 MG: 1.5 INJECTION, POWDER, LYOPHILIZED, FOR SOLUTION INTRAVENOUS at 09:09

## 2019-09-11 RX ADMIN — INSULIN ASPART 2 UNITS: 100 INJECTION, SOLUTION INTRAVENOUS; SUBCUTANEOUS at 12:09

## 2019-09-11 RX ADMIN — FAMOTIDINE 20 MG: 20 TABLET ORAL at 09:09

## 2019-09-11 RX ADMIN — DEXAMETHASONE SODIUM PHOSPHATE 4 MG: 4 INJECTION, SOLUTION INTRAMUSCULAR; INTRAVENOUS at 11:09

## 2019-09-11 RX ADMIN — MIDAZOLAM HYDROCHLORIDE 2 MG: 5 INJECTION, SOLUTION INTRAMUSCULAR; INTRAVENOUS at 02:09

## 2019-09-11 RX ADMIN — HEPARIN SODIUM 5000 UNITS: 5000 INJECTION, SOLUTION INTRAVENOUS; SUBCUTANEOUS at 09:09

## 2019-09-11 RX ADMIN — DEXTROSE 750 MG: 50 INJECTION, SOLUTION INTRAVENOUS at 09:09

## 2019-09-11 RX ADMIN — DEXAMETHASONE SODIUM PHOSPHATE 4 MG: 4 INJECTION, SOLUTION INTRAMUSCULAR; INTRAVENOUS at 03:09

## 2019-09-11 RX ADMIN — QUETIAPINE FUMARATE 25 MG: 25 TABLET ORAL at 09:09

## 2019-09-11 RX ADMIN — Medication 50 MCG/HR: at 11:09

## 2019-09-11 RX ADMIN — CHLORHEXIDINE GLUCONATE 0.12% ORAL RINSE 15 ML: 1.2 LIQUID ORAL at 09:09

## 2019-09-11 RX ADMIN — DEXAMETHASONE SODIUM PHOSPHATE 4 MG: 4 INJECTION, SOLUTION INTRAMUSCULAR; INTRAVENOUS at 05:09

## 2019-09-11 RX ADMIN — FENTANYL CITRATE 100 MCG: 50 INJECTION INTRAMUSCULAR; INTRAVENOUS at 07:09

## 2019-09-11 RX ADMIN — HEPARIN SODIUM 5000 UNITS: 5000 INJECTION, SOLUTION INTRAVENOUS; SUBCUTANEOUS at 05:09

## 2019-09-11 RX ADMIN — MIDAZOLAM HYDROCHLORIDE 1 MG: 1 INJECTION, SOLUTION INTRAMUSCULAR; INTRAVENOUS at 06:09

## 2019-09-11 RX ADMIN — LACOSAMIDE 100 MG: 50 TABLET, FILM COATED ORAL at 09:09

## 2019-09-11 RX ADMIN — LIDOCAINE HYDROCHLORIDE 3 ML: 10 INJECTION, SOLUTION INFILTRATION; PERINEURAL at 01:09

## 2019-09-11 RX ADMIN — PIPERACILLIN AND TAZOBACTAM 4.5 G: 4; .5 INJECTION, POWDER, LYOPHILIZED, FOR SOLUTION INTRAVENOUS; PARENTERAL at 09:09

## 2019-09-11 RX ADMIN — CEFTRIAXONE 2 G: 2 INJECTION, SOLUTION INTRAVENOUS at 05:09

## 2019-09-11 RX ADMIN — FENTANYL CITRATE 50 MCG: 50 INJECTION INTRAMUSCULAR; INTRAVENOUS at 10:09

## 2019-09-11 RX ADMIN — MIDAZOLAM HYDROCHLORIDE 2 MG: 1 INJECTION, SOLUTION INTRAMUSCULAR; INTRAVENOUS at 01:09

## 2019-09-11 RX ADMIN — VALPROIC ACID 750 MG: 250 SOLUTION ORAL at 11:09

## 2019-09-11 RX ADMIN — SODIUM CHLORIDE 500 ML: 0.9 INJECTION, SOLUTION INTRAVENOUS at 12:09

## 2019-09-11 RX ADMIN — VALPROIC ACID 750 MG: 250 SOLUTION ORAL at 05:09

## 2019-09-11 RX ADMIN — INSULIN ASPART 2 UNITS: 100 INJECTION, SOLUTION INTRAVENOUS; SUBCUTANEOUS at 05:09

## 2019-09-11 RX ADMIN — LEVOTHYROXINE SODIUM 75 MCG: 75 TABLET ORAL at 05:09

## 2019-09-11 RX ADMIN — PIPERACILLIN AND TAZOBACTAM 4.5 G: 4; .5 INJECTION, POWDER, LYOPHILIZED, FOR SOLUTION INTRAVENOUS; PARENTERAL at 02:09

## 2019-09-11 RX ADMIN — AMLODIPINE BESYLATE 5 MG: 5 TABLET ORAL at 09:09

## 2019-09-11 NOTE — PROCEDURES
"Clemencia Nguyen is a 74 y.o. female patient.    Temp: 97.6 °F (36.4 °C) (09/11/19 0305)  Pulse: 88 (09/11/19 0605)  Resp: 18 (09/11/19 0605)  BP: (!) 152/68 (09/11/19 0605)  SpO2: 100 % (09/11/19 0605)  Weight: 78.6 kg (173 lb 4.5 oz) (09/07/19 2305)  Height: 5' 3" (160 cm) (09/04/19 0947)       Lumbar Puncture  Date/Time: 9/10/2019 4:00 PM  Location procedure was performed: [facility] NEURO CRITICAL CARE  Performed by: Antelmo Ramos MD  Authorized by: Antelmo Ramos MD   Consent Done: Yes  Indications: evaluation for infection and evaluation for altered mental status  Anesthesia: local infiltration    Anesthesia:  Local Anesthetic: lidocaine 1% without epinephrine  Patient sedated: yes  Sedatives: propofol (pt intubated, using sedation that was already in place)  Analgesia: fentanyl  Vitals: Vital signs were monitored during sedation.  Preparation: Patient was prepped and draped in the usual sterile fashion.  Lumbar space: L4-L5 interspace  Patient's position: sitting  Needle gauge: 22  Needle type: spinal needle - Quincke tip  Number of attempts: 5 or more  Fluid appearance: bloody  Tubes of fluid: 2  Total volume: 16 ml  Post-procedure: site cleaned  Patient tolerance: Patient tolerated the procedure well with no immediate complications  Comments: Difficult access  Csf space entered with bloody csf, however did not clear after 16 ccs were collected  Tubes with blood clotted and were not usable  Procedure aborted           Antelmo Ramos  9/11/2019  "

## 2019-09-11 NOTE — ASSESSMENT & PLAN NOTE
74 year old admitted to unit on 9/3 following new onset seizure, with MRI showing large area of R cortical edema with flair and ill defined enhancement on contrast study, concern for glioma vs infectious/inflammatory process  - LP appears non infectious, cytology negative  - continue decadron  - EEG monitoring  - NSGY, epilepsy following   - neuro checks q1hr   - vital signs q1hr   -on SubQ heparin   -Repeat MRI Brain stable  -LP attempt 9/10 unsuccessful, to IR today for LP  -Spoke to NSGY about possibly bx of lesion

## 2019-09-11 NOTE — SUBJECTIVE & OBJECTIVE
Interval History: off propofol overnight.    Medications:  Continuous Infusions:   fentanyl 150 mcg/hr (09/11/19 0810)    propofol 10 mcg/kg/min (09/11/19 0800)     Scheduled Meds:   amLODIPine  5 mg Per OG tube Daily    chlorhexidine  15 mL Mouth/Throat BID    dexamethasone  4 mg Intravenous Q6H    famotidine  20 mg Per OG tube QHS    heparin (porcine)  5,000 Units Subcutaneous Q8H    lacosamide  100 mg Per OG tube Q12H    levothyroxine  75 mcg Per OG tube Before breakfast    piperacillin-tazobactam (ZOSYN) IVPB  4.5 g Intravenous Q8H    QUEtiapine  25 mg Per OG tube BID    senna-docusate 8.6-50 mg  1 tablet Per OG tube BID    valproate sodium (DEPACON) IVPB  750 mg Intravenous Q8H    vancomycin (VANCOCIN) IVPB  1,000 mg Intravenous Q24H     PRN Meds:acetaminophen, Dextrose 10% Bolus, fentaNYL, glucagon (human recombinant), hydrALAZINE, insulin aspart U-100, labetalol, lidocaine (PF) 10 mg/ml (1%), magnesium oxide, magnesium oxide, polyethylene glycol, potassium chloride 10%, potassium chloride 10%, potassium, sodium phosphates, potassium, sodium phosphates, potassium, sodium phosphates, racepinephrine, sodium chloride 0.9%     Review of Systems  Objective:     Weight: 78.6 kg (173 lb 4.5 oz)  Body mass index is 30.7 kg/m².  Vital Signs (Most Recent):  Temp: 97.6 °F (36.4 °C) (09/11/19 0305)  Pulse: 97 (09/11/19 0801)  Resp: (!) 29 (09/11/19 0801)  BP: (!) 152/68 (09/11/19 0605)  SpO2: 100 % (09/11/19 0801) Vital Signs (24h Range):  Temp:  [97.2 °F (36.2 °C)-98 °F (36.7 °C)] 97.6 °F (36.4 °C)  Pulse:  [58-97] 97  Resp:  [18-31] 29  SpO2:  [93 %-100 %] 100 %  BP: ()/(48-75) 152/68                Vent Mode: SIMV  Oxygen Concentration (%):  [30] 30  Resp Rate Total:  [18 br/min-26 br/min] 18 br/min  Vt Set:  [400 mL-410 mL] 400 mL  PEEP/CPAP:  [5 cmH20] 5 cmH20  Pressure Support:  [0 cmH20-8 cmH20] 8 cmH20  Mean Airway Pressure:  [11 shR20-19 cmH20] 12 cmH20         NG/OG Tube 09/03/19 1100  orogastric Center mouth (Active)   Placement Check placement verified by aspirate characteristics;placement verified by distal tube length measurement 9/11/2019  3:05 AM   Tolerance no signs/symptoms of discomfort 9/11/2019  3:05 AM   Securement secured to commercial device 9/11/2019  3:05 AM   Clamp Status/Tolerance unclamped 9/11/2019  3:05 AM   Suction Setting/Drainage Method suction at the bedside 9/8/2019  3:05 AM   Insertion Site Appearance no redness, warmth, tenderness, skin breakdown, drainage 9/11/2019  3:05 AM   Flush/Irrigation flushed w/;water;no resistance met 9/11/2019  3:05 AM   Feeding Method continuous 9/11/2019  3:05 AM   Feeding Action feeding continued 9/11/2019  3:05 AM   Current Rate (mL/hr) 45 mL/hr 9/10/2019  7:05 PM   Goal Rate (mL/hr) 45 mL/hr 9/10/2019  7:05 PM   Intake (mL) 60 mL 9/10/2019  9:00 AM   Water Bolus (mL) 60 mL 9/5/2019  6:00 AM   Rate Formula Tube Feeding (mL/hr) 45 mL/hr 9/9/2019  7:05 PM   Formula Name isosource 9/10/2019  3:00 PM   Intake (mL) - Formula Tube Feeding 45 9/11/2019  6:05 AM   Residual Amount (ml) 5 ml 9/10/2019  7:05 AM       Female External Urinary Catheter 09/04/19 2301 (Active)   Skin no redness;no breakdown 9/11/2019  3:05 AM   Tolerance no signs/symptoms of discomfort 9/11/2019  3:05 AM   Suction Continuous suction at 50 mmHg 9/10/2019  7:05 PM   Date of last wick change 09/10/19 9/10/2019  7:05 PM   Time of last wick change 1905 9/10/2019  7:05 PM   Output (mL) 100 mL 9/10/2019  4:00 PM       Neurosurgery Physical Exam   [id number]  PERRL  Right and LLE spontaneously antigravity.  WD in LUE  SILT    Significant Labs:  Recent Labs   Lab 09/10/19  0145 09/11/19  0115   * 230*    137   K 5.1 5.1    107   CO2 17* 21*   BUN 32* 34*   CREATININE 1.0 1.1   CALCIUM 8.0* 8.2*   MG 2.0 2.2     Recent Labs   Lab 09/10/19  0343 09/11/19  0115   WBC 12.57 10.72   HGB 14.1 12.7   HCT 45.3 40.0    223     No results for input(s): LABPT, INR,  APTT in the last 48 hours.  Microbiology Results (last 7 days)     Procedure Component Value Units Date/Time    Blood culture [[id number]] Collected:  09/08/19 1215    Order Status:  Completed Specimen:  Blood from Peripheral, Forearm, Left Updated:  09/10/19 1547     Blood Culture, Routine Gram stain brian bottle: Gram positive cocci       Results called to and read back by:Milagro Valencia RN  09/09/2019  13:48      Gram stain aer bottle: Gram positive cocci 09/10/2019  15:46    Blood culture [[id number]] Collected:  09/08/19 1210    Order Status:  Completed Specimen:  Blood from Peripheral, Ankle, Left Updated:  09/10/19 1412     Blood Culture, Routine No Growth to date      No Growth to date      No Growth to date    Culture, Respiratory with Gram Stain [[id number]]  (Abnormal) Collected:  09/08/19 1158    Order Status:  Completed Specimen:  Respiratory from Tracheal Aspirate Updated:  09/10/19 0854     Respiratory Culture No S aureus or Pseudomonas isolated.      ESCHERICHIA COLI  Few  Susceptibility pending  Normal respiratory connie also present       Gram Stain (Respiratory) Few WBC's     Gram Stain (Respiratory) Moderate Gram negative rods     Gram Stain (Respiratory) Rare Gram positive cocci            Significant Diagnostics:

## 2019-09-11 NOTE — ASSESSMENT & PLAN NOTE
Daily CXR, ABG  Continue Peridex, Famotidine, Heparin subq  Trial of extubation 9/6/2019- re-intubated shortly after extubation for airway protection  Continue to wean vent settings as tolerated  Still intubated 2/2 inability to protect airway and for LP procedure    Vent Mode: SIMV  Oxygen Concentration (%):  [30] 30  Resp Rate Total:  [15 br/min-21 br/min] 15 br/min  Vt Set:  [380 mL-410 mL] 380 mL  PEEP/CPAP:  [5 cmH20] 5 cmH20  Pressure Support:  [0 cmH20-8 cmH20] 8 cmH20  Mean Airway Pressure:  [7.3 riO90-17 cmH20] 12 cmH20

## 2019-09-11 NOTE — SUBJECTIVE & OBJECTIVE
Review of Systems: Unable to obtain a complete ROS due to level of consciousness.     Vitals:   Temp: 97.6 °F (36.4 °C)  Pulse: 93  Rhythm: normal sinus rhythm  BP: (!) 160/73  MAP (mmHg): 98  Resp: (!) 26  SpO2: 98 %  Oxygen Concentration (%): 30  O2 Device (Oxygen Therapy): ventilator  Vent Mode: SIMV  Set Rate: 15 bmp  Vt Set: 380 mL  Pressure Support: 8 cmH20  PEEP/CPAP: 5 cmH20  Peak Airway Pressure: 12 cmH2O  Mean Airway Pressure: 7.3 cmH20  Plateau Pressure: 0 cmH20    Temp  Min: 97.5 °F (36.4 °C)  Max: 98 °F (36.7 °C)  Pulse  Min: 58  Max: 97  BP  Min: 89/52  Max: 176/70  MAP (mmHg)  Min: 67  Max: 100  Resp  Min: 18  Max: 31  SpO2  Min: 93 %  Max: 100 %  Oxygen Concentration (%)  Min: 30  Max: 30    09/10 0701 - 09/11 0700  In: 3081.4 [I.V.:1136.4]  Out: 450 [Urine:450]   Unmeasured Output  Urine Occurrence: 1  Stool Occurrence: 1  Emesis Occurrence: 0  Pad Count: 1     Examination:   Constitutional: *Well-developed. No apparent distress.   Eyes: Conjunctiva clear, anicteric. Lids no lesions.  Head/Ears/Nose/Mouth/Throat/Neck: Moist mucous membranes. External ears, nose atraumatic.   Cardiovascular: Regular rhythm. No murmurs. No leg edema.  Respiratory: Comfortable respirations. Clear to auscultation.  Gastrointestinal: No hernia. Soft, nondistended, nontender. + bowel sounds.    Neurologic:  -GCS E 2 V 1t M 5  -Intubated. Lethargic. Restless, easily agitated. Does not follow commands.  -Cranial nerves: EOM intact, PERRL, cough/gag/corneals intact.  -Motor: Moves all extremities spontaneously and antigravity  -Sensation: Intact to noxious stimuli.  Unable to test orientation, language, memory, judgment, insight, fund of knowledge, hearing, shoulder shrug, tongue protrusion, coordination, gait due to level of consciousness.    Medications:   Continuous  fentanyl Last Rate: 150 mcg/hr (09/11/19 0810)   propofol Last Rate: 20 mcg/kg/min (09/11/19 2075)   Scheduled  amLODIPine 5 mg Daily   chlorhexidine 15 mL  BID   dexamethasone 4 mg Q6H   famotidine 20 mg QHS   heparin (porcine) 5,000 Units Q8H   lacosamide 100 mg Q12H   levothyroxine 75 mcg Before breakfast   piperacillin-tazobactam (ZOSYN) IVPB 4.5 g Q8H   QUEtiapine 25 mg BID   senna-docusate 8.6-50 mg 1 tablet BID   valproate sodium (DEPACON) IVPB 750 mg Q8H   vancomycin (VANCOCIN) IVPB 1,000 mg Q24H   PRN  acetaminophen 650 mg Q6H PRN   Dextrose 10% Bolus 12.5 g PRN   fentaNYL 100 mcg Q2H PRN   fentaNYL 50 mcg Q2H PRN   glucagon (human recombinant) 1 mg PRN   hydrALAZINE 10 mg Q6H PRN   insulin aspart U-100 1-10 Units Q6H PRN   labetalol 10 mg Q6H PRN   magnesium oxide 800 mg PRN   magnesium oxide 800 mg PRN   potassium chloride 10% 40 mEq PRN   potassium chloride 10% 40 mEq PRN   potassium, sodium phosphates 2 packet PRN   potassium, sodium phosphates 2 packet PRN   potassium, sodium phosphates 2 packet PRN   racepinephrine 0.5 mL Q4H PRN   sodium chloride 0.9% 10 mL PRN      Today I independently reviewed pertinent medications, lines/drains/airways, imaging, cardiology results, laboratory results, microbiology results, notably:     ISTAT: Recent Labs   Lab 09/11/19  0406   PH 7.514*   PCO2 27.8*   PO2 80   POCSATURATED 97   HCO3 22.4*   BE -1   POCTCO2 23   SAMPLE ARTERIAL      Chem: Recent Labs   Lab 09/11/19  0115      K 5.1      CO2 21*   *   BUN 34*   CREATININE 1.1   ESTGFRAFRICA 56.4*   EGFRNONAA 48.9*   CALCIUM 8.2*   MG 2.2   PHOS 3.2   ANIONGAP 9   PROT 5.7*   ALBUMIN 2.5*   BILITOT 0.4   ALKPHOS 48*   AST 29   ALT 37     Heme: Recent Labs   Lab 09/11/19  0115   WBC 10.72   HGB 12.7   HCT 40.0        Endo:   Recent Labs   Lab 09/11/19  0014 09/11/19  0546 09/11/19  0812   POCTGLUCOSE 235* 187* 144*

## 2019-09-11 NOTE — PROGRESS NOTES
[facility]  Neurocritical Care  Progress Note    Admit Date: 9/3/2019  Service Date: 09/11/2019  Length of Stay: 8    Subjective:     Chief Complaint: Brain lesion    History of Present Illness: Pt is  73 yo female with a PMHx of CKD 3, HTN, was [place] [facility] [place] [facility] [place] onset seizures and concern for right front lobe mass. The pt was found in her bedroom by her spouse at approximately 9:45 pm sitting her head against the bed, unresponsive, and having seizure like activity on the left-side. EMT was called and the pt was given Versed twice while en route to the hospital. Pt had a second witnessed seizures, left facial droop, left-sided weakness, and tongue ecchymosis in the ED. Neurology was consulted and The pt was given IV Keppra and Vimpat. MRI brain without contrast was acquired. The image showed right frontal lobe vasogenic edema with mass effect concerning for underlying mass. EEG was acquired at outside hospital concerning showing an abnormal result. The pt was given decadron IV and neurosurgical consult recommended. Pt transferred to [facility] and admitted to the [facility] for higher level of care and further evaluation.     Pt history acquired per chart review and from spouse present at the bedside. The pt's spouse denies prior history of seizures CVA, cancer history and up-to-date screenings    Hospital Course: --admitted to [facility] on 9/3 following new onset seizure, arrived intubated  --MRI with large area of R cortical edema with flair and ill defined enhancement on contrast study, concern for glioma vs infectious/inflammatory process  --LP appears non infectious, cytology pending  9/5- no acute events, awaiting LP finalization from pathology report., weaning vent.   09/06/2019: Very agitated overnight, requiring increased sedation. Patient extubated this morning on rounds, and reintubated shortly after. Unable to maintain airway and secretions. CSF gram stain negative.  09/07/2019: KATT.  EKG obtained, seroquel started. Valproic acid started.  09/08/2019: NAEON. Improved agitation with seroquel.  09/09/2019: MRI Brain w/wo ordered for today. BLE US ordered. Will need new LP later on in the week.   09/10/2019 LP for infx workup, CD4 lab, EEG monitoring per Epilepsy   09/11/2019: To IR for LP. Spoke to NSGY about possible bx of lesion. Discontinue cEEG.       Review of Systems: Unable to obtain a complete ROS due to level of consciousness.     Vitals:   Temp: 97.6 °F (36.4 °C)  Pulse: 93  Rhythm: normal sinus rhythm  BP: (!) 160/73  MAP (mmHg): 98  Resp: (!) 26  SpO2: 98 %  Oxygen Concentration (%): 30  O2 Device (Oxygen Therapy): ventilator  Vent Mode: SIMV  Set Rate: 15 bmp  Vt Set: 380 mL  Pressure Support: 8 cmH20  PEEP/CPAP: 5 cmH20  Peak Airway Pressure: 12 cmH2O  Mean Airway Pressure: 7.3 cmH20  Plateau Pressure: 0 cmH20    Temp  Min: 97.5 °F (36.4 °C)  Max: 98 °F (36.7 °C)  Pulse  Min: 58  Max: 97  BP  Min: 89/52  Max: 176/70  MAP (mmHg)  Min: 67  Max: 100  Resp  Min: 18  Max: 31  SpO2  Min: 93 %  Max: 100 %  Oxygen Concentration (%)  Min: 30  Max: 30    09/10 0701 - 09/11 0700  In: 3081.4 [I.V.:1136.4]  Out: 450 [Urine:450]   Unmeasured Output  Urine Occurrence: 1  Stool Occurrence: 1  Emesis Occurrence: 0  Pad Count: 1     Examination:   Constitutional: *Well-developed. No apparent distress.   Eyes: Conjunctiva clear, anicteric. Lids no lesions.  Head/Ears/Nose/Mouth/Throat/Neck: Moist mucous membranes. External ears, nose atraumatic.   Cardiovascular: Regular rhythm. No murmurs. No leg edema.  Respiratory: Comfortable respirations. Clear to auscultation.  Gastrointestinal: No hernia. Soft, nondistended, nontender. + bowel sounds.    Neurologic:  -GCS E 2 V 1t M 5  -Intubated. Lethargic. Restless, easily agitated. Does not follow commands.  -Cranial nerves: EOM intact, PERRL, cough/gag/corneals intact.  -Motor: Moves all extremities spontaneously and antigravity  -Sensation: Intact to noxious  stimuli.  Unable to test orientation, language, memory, judgment, insight, fund of knowledge, hearing, shoulder shrug, tongue protrusion, coordination, gait due to level of consciousness.    Medications:   Continuous  fentanyl Last Rate: 150 mcg/hr (09/11/19 0810)   propofol Last Rate: 20 mcg/kg/min (09/11/19 0915)   Scheduled  amLODIPine 5 mg Daily   chlorhexidine 15 mL BID   dexamethasone 4 mg Q6H   famotidine 20 mg QHS   heparin (porcine) 5,000 Units Q8H   lacosamide 100 mg Q12H   levothyroxine 75 mcg Before breakfast   piperacillin-tazobactam (ZOSYN) IVPB 4.5 g Q8H   QUEtiapine 25 mg BID   senna-docusate 8.6-50 mg 1 tablet BID   valproate sodium (DEPACON) IVPB 750 mg Q8H   vancomycin (VANCOCIN) IVPB 1,000 mg Q24H   PRN  acetaminophen 650 mg Q6H PRN   Dextrose 10% Bolus 12.5 g PRN   fentaNYL 100 mcg Q2H PRN   fentaNYL 50 mcg Q2H PRN   glucagon (human recombinant) 1 mg PRN   hydrALAZINE 10 mg Q6H PRN   insulin aspart U-100 1-10 Units Q6H PRN   labetalol 10 mg Q6H PRN   magnesium oxide 800 mg PRN   magnesium oxide 800 mg PRN   potassium chloride 10% 40 mEq PRN   potassium chloride 10% 40 mEq PRN   potassium, sodium phosphates 2 packet PRN   potassium, sodium phosphates 2 packet PRN   potassium, sodium phosphates 2 packet PRN   racepinephrine 0.5 mL Q4H PRN   sodium chloride 0.9% 10 mL PRN      Today I independently reviewed pertinent medications, lines/drains/airways, imaging, cardiology results, laboratory results, microbiology results, notably:     ISTAT: Recent Labs   Lab 09/11/19  0406   PH 7.514*   PCO2 27.8*   PO2 80   POCSATURATED 97   HCO3 22.4*   BE -1   POCTCO2 23   SAMPLE ARTERIAL      Chem: Recent Labs   Lab 09/11/19  0115      K 5.1      CO2 21*   *   BUN 34*   CREATININE 1.1   ESTGFRAFRICA 56.4*   EGFRNONAA 48.9*   CALCIUM 8.2*   MG 2.2   PHOS 3.2   ANIONGAP 9   PROT 5.7*   ALBUMIN 2.5*   BILITOT 0.4   ALKPHOS 48*   AST 29   ALT 37     Heme: Recent Labs   Lab 09/11/19  0115   WBC  10.72   HGB 12.7   HCT 40.0        Endo:   Recent Labs   Lab 09/11/19  0014 09/11/19  0546 09/11/19  0812   POCTGLUCOSE 235* 187* 144*          Assessment/Plan:     Neuro  * Brain lesion  74 year old admitted to unit on 9/3 following new onset seizure, with MRI showing large area of R cortical edema with flair and ill defined enhancement on contrast study, concern for glioma vs infectious/inflammatory process  - LP appears non infectious, cytology negative  - continue decadron  - NSGY, epilepsy following   - neuro checks q1hr   - vital signs q1hr   - on SubQ heparin   -Repeat MRI Brain stable  -LP attempt 9/10 unsuccessful, to IR today for LP  -Spoke to NSGY about possibly bx of lesion    New onset seizure  2/2 to brain mass   Keppra changed to Vimpat 9/6  Valproic acid started 9/7  Epilepsy following  -Continue current AEDs  -Discontinue cEEG today    Pulmonary  On mechanically assisted ventilation  Daily CXR, ABG  Continue Peridex, Famotidine, Heparin subq  Trial of extubation 9/6/2019- re-intubated shortly after extubation for airway protection  Continue to wean vent settings as tolerated  Still intubated 2/2 inability to protect airway and for LP procedure    Vent Mode: SIMV  Oxygen Concentration (%):  [30] 30  Resp Rate Total:  [15 br/min-21 br/min] 15 br/min  Vt Set:  [380 mL-410 mL] 380 mL  PEEP/CPAP:  [5 cmH20] 5 cmH20  Pressure Support:  [0 cmH20-8 cmH20] 8 cmH20  Mean Airway Pressure:  [7.3 ecQ63-59 cmH20] 12 cmH20      Cardiac/Vascular  Essential hypertension  SBP <180, MAP >65   -Continue Amlodipine 5 mg     Renal/  Chronic kidney disease, stage III (moderate)  -PMHx of CKD 3   -monitor renal function, I/Os     Oncology  Leukocytosis  Febrile, leukocytosis present  -Panculture  -Continue abx, vanc + zosyn    Discussed plan of care with family at bedside.     The patient is being Prophylaxed for:  Venous Thromboembolism with: Mechanical or Chemical  Stress Ulcer with: H2B  Ventilator Pneumonia  with: chlorhexidine oral care    Activity Orders          Straight Cath starting at 09/05 0034        Full Code    CC time spent: 35 minutes    Lolita Salinas NP  Neurocritical Care  [facility]

## 2019-09-11 NOTE — PROCEDURES
"Clemencia Nguyen is a 74 y.o. female patient.    Temp: 97.6 °F (36.4 °C) (19 0305)  Pulse: 79 (19 1800)  Resp: 15 (19 1800)  BP: (!) 144/69 (19 1800)  SpO2: 96 % (19 1800)  Weight: 78.6 kg (173 lb 4.5 oz) (19 2305)  Height: 5' 3" (160 cm) (19 0947)       Arterial Line  Date/Time: 2019 6:38 PM  Performed by: Lolita Salinas NP  Authorized by: Lolita Salinas NP   Consent Done: Yes  Consent: Verbal consent obtained. Written consent obtained.  Risks and benefits: risks, benefits and alternatives were discussed  Consent given by: spouse  Procedure consent: procedure consent matches procedure scheduled  Relevant documents: relevant documents present and verified  Site marked: the operative site was marked  Required items: required blood products, implants, devices, and special equipment available  Patient identity confirmed: , MRN and name  Time out: Immediately prior to procedure a "time out" was called to verify the correct patient, procedure, equipment, support staff and site/side marked as required.  Preparation: Patient was prepped and draped in the usual sterile fashion.  Indications: multiple ABGs, respiratory failure and hemodynamic monitoring  Location: right radial  Anesthesia: see MAR for details  Doni's test normal: yes  Needle gauge: 20  Number of attempts: 3  Technical procedures used: US guidance  Complications: No  Post-procedure: dressing applied  Post-procedure CMS: normal and unchanged  Patient tolerance: Patient tolerated the procedure well with no immediate complications          Lolita Salinas  2019  "

## 2019-09-11 NOTE — H&P
Inpatient Radiology Pre-procedure Note    History of Present Illness:  Clemencia Nguyen is a 74 y.o. female who presents for lumbar puncture.  Admission H&P reviewed.    Past Medical History:   Diagnosis Date    Depression     has been on tofranil for years;    Disorder of kidney and ureter     Esophageal stricture 06/12/2019    per pt per Dr. Garcia    Hyperlipemia     Hypertension     Hypothyroidism     Osteopenia     Thyroid disease     hypothyroid     Past Surgical History:   Procedure Laterality Date    cataract surgery      CHOLECYSTECTOMY  04/24/2018    CHOLECYSTECTOMY-LAPAROSCOPIC N/A 4/24/2018    [facility]formed [facility]    COLONOSCOPY  2011, again in 2014    repeat in 5    COLONOSCOPY W/ BIOPSIES  06/12/2019    polypectomies per Dr. Garcia    ESOPHAGOGASTRODUODENOSCOPY  06/12/2019    Dr. Garcia    HYSTERECTOMY      OOPHORECTOMY      TONSILLECTOMY         Review of Systems:   As documented in primary team H&P    Home Meds:   Prior to Admission medications    Not on File     Scheduled Meds:    amLODIPine  5 mg Per OG tube Daily    chlorhexidine  15 mL Mouth/Throat BID    dexamethasone  4 mg Intravenous Q6H    famotidine  20 mg Per OG tube QHS    heparin (porcine)  5,000 Units Subcutaneous Q8H    lacosamide  100 mg Per OG tube Q12H    levothyroxine  75 mcg Per OG tube Before breakfast    piperacillin-tazobactam (ZOSYN) IVPB  4.5 g Intravenous Q8H    QUEtiapine  25 mg Per OG tube BID    senna-docusate 8.6-50 mg  1 tablet Per OG tube BID    valproate sodium (DEPACON) IVPB  750 mg Intravenous Q8H    vancomycin (VANCOCIN) IVPB  1,000 mg Intravenous Q24H     Continuous Infusions:    fentanyl 150 mcg/hr (09/11/19 0810)    propofol 20 mcg/kg/min (09/11/19 0915)     PRN Meds:acetaminophen, Dextrose 10% Bolus, fentaNYL, fentaNYL, glucagon (human recombinant), hydrALAZINE, insulin aspart U-100, labetalol, magnesium oxide, magnesium oxide, potassium chloride  10%, potassium chloride 10%, potassium, sodium phosphates, potassium, sodium phosphates, potassium, sodium phosphates, racepinephrine, sodium chloride 0.9%  Anticoagulants/Antiplatelets: Heparin    Allergies: Review of patient's allergies indicates:  No Known Allergies  Sedation Hx: have not been any systemic reactions    Labs:  Recent Labs   Lab 09/07/19  0000   INR 0.9       Recent Labs   Lab 09/11/19 0115   WBC 10.72   HGB 12.7   HCT 40.0   MCV 91         Recent Labs   Lab 09/11/19 0115   *      K 5.1      CO2 21*   BUN 34*   CREATININE 1.1   CALCIUM 8.2*   MG 2.2   ALT 37   AST 29   ALBUMIN 2.5*   BILITOT 0.4         Vitals:  Temp: 97.6 °F (36.4 °C) (09/11/19 0305)  Pulse: 93 (09/11/19 0910)  Resp: (!) 26 (09/11/19 0910)  BP: (!) 160/73 (09/11/19 0910)  SpO2: 98 % (09/11/19 0910)     Physical Exam:  ASA: 2  Mallampati: 2    General: no acute distress  Mental Status: alert and oriented to person, place and time  HEENT: normocephalic, atraumatic  Chest: unlabored breathing  Heart: regular heart rate  Abdomen: nondistended  Extremity: moves all extremities    Plan: lumbar puncture  Sedation Plan: local anesthesia    Rainer Shi MD  PGY-II Radiology

## 2019-09-11 NOTE — PROGRESS NOTES
[facility]  Neurosurgery  Progress Note    Subjective:     History of Present Illness: 73 y/o female with pmhx CKD and HTN  presented to outside hospital for seizure- like activity this AM. Per  pt was found unconscious this AM, with seizure like activity occurring on the left side. Pt was given versed x 2 via EMS. CTH performed at outside hospital was negative for bleed. Pt had additional seizure in outside hospital ED and was given IV Keppra. EEG ordered and MRI brain w/out contrast concerning for possible infiltratrive process vs post ischemic sequela. Pt transferred to [facility] and admitted to [facility]. MRI brain w/ and w/out obtained that showed large area of edema in right frontal lobe and ill defined enhancement in the frontal lobe concerning for possible cerebritis vs. Neoplasm. NSGY was consulted to evaluate. Pt not on anti-coagulation.       Post-Op Info:  * No surgery found *         Interval History: off propofol overnight.    Medications:  Continuous Infusions:   fentanyl 150 mcg/hr (09/11/19 0810)    propofol 10 mcg/kg/min (09/11/19 0800)     Scheduled Meds:   amLODIPine  5 mg Per OG tube Daily    chlorhexidine  15 mL Mouth/Throat BID    dexamethasone  4 mg Intravenous Q6H    famotidine  20 mg Per OG tube QHS    heparin (porcine)  5,000 Units Subcutaneous Q8H    lacosamide  100 mg Per OG tube Q12H    levothyroxine  75 mcg Per OG tube Before breakfast    piperacillin-tazobactam (ZOSYN) IVPB  4.5 g Intravenous Q8H    QUEtiapine  25 mg Per OG tube BID    senna-docusate 8.6-50 mg  1 tablet Per OG tube BID    valproate sodium (DEPACON) IVPB  750 mg Intravenous Q8H    vancomycin (VANCOCIN) IVPB  1,000 mg Intravenous Q24H     PRN Meds:acetaminophen, Dextrose 10% Bolus, fentaNYL, glucagon (human recombinant), hydrALAZINE, insulin aspart U-100, labetalol, lidocaine (PF) 10 mg/ml (1%), magnesium oxide, magnesium oxide, polyethylene glycol, potassium chloride 10%, potassium chloride  10%, potassium, sodium phosphates, potassium, sodium phosphates, potassium, sodium phosphates, racepinephrine, sodium chloride 0.9%     Review of Systems  Objective:     Weight: 78.6 kg (173 lb 4.5 oz)  Body mass index is 30.7 kg/m².  Vital Signs (Most Recent):  Temp: 97.6 °F (36.4 °C) (09/11/19 0305)  Pulse: 97 (09/11/19 0801)  Resp: (!) 29 (09/11/19 0801)  BP: (!) 152/68 (09/11/19 0605)  SpO2: 100 % (09/11/19 0801) Vital Signs (24h Range):  Temp:  [97.2 °F (36.2 °C)-98 °F (36.7 °C)] 97.6 °F (36.4 °C)  Pulse:  [58-97] 97  Resp:  [18-31] 29  SpO2:  [93 %-100 %] 100 %  BP: ()/(48-75) 152/68                Vent Mode: SIMV  Oxygen Concentration (%):  [30] 30  Resp Rate Total:  [18 br/min-26 br/min] 18 br/min  Vt Set:  [400 mL-410 mL] 400 mL  PEEP/CPAP:  [5 cmH20] 5 cmH20  Pressure Support:  [0 cmH20-8 cmH20] 8 cmH20  Mean Airway Pressure:  [11 xwP77-29 cmH20] 12 cmH20         NG/OG Tube 09/03/19 1100 orogastric Center mouth (Active)   Placement Check placement verified by aspirate characteristics;placement verified by distal tube length measurement 9/11/2019  3:05 AM   Tolerance no signs/symptoms of discomfort 9/11/2019  3:05 AM   Securement secured to commercial device 9/11/2019  3:05 AM   Clamp Status/Tolerance unclamped 9/11/2019  3:05 AM   Suction Setting/Drainage Method suction at the bedside 9/8/2019  3:05 AM   Insertion Site Appearance no redness, warmth, tenderness, skin breakdown, drainage 9/11/2019  3:05 AM   Flush/Irrigation flushed w/;water;no resistance met 9/11/2019  3:05 AM   Feeding Method continuous 9/11/2019  3:05 AM   Feeding Action feeding continued 9/11/2019  3:05 AM   Current Rate (mL/hr) 45 mL/hr 9/10/2019  7:05 PM   Goal Rate (mL/hr) 45 mL/hr 9/10/2019  7:05 PM   Intake (mL) 60 mL 9/10/2019  9:00 AM   Water Bolus (mL) 60 mL 9/5/2019  6:00 AM   Rate Formula Tube Feeding (mL/hr) 45 mL/hr 9/9/2019  7:05 PM   Formula Name isosource 9/10/2019  3:00 PM   Intake (mL) - Formula Tube Feeding 45  9/11/2019  6:05 AM   Residual Amount (ml) 5 ml 9/10/2019  7:05 AM       Female External Urinary Catheter 09/04/19 2301 (Active)   Skin no redness;no breakdown 9/11/2019  3:05 AM   Tolerance no signs/symptoms of discomfort 9/11/2019  3:05 AM   Suction Continuous suction at 50 mmHg 9/10/2019  7:05 PM   Date of last wick change 09/10/19 9/10/2019  7:05 PM   Time of last wick change 1905 9/10/2019  7:05 PM   Output (mL) 100 mL 9/10/2019  4:00 PM       Neurosurgery Physical Exam   E4VTM5  PERRL  Right and LLE spontaneously antigravity.  WD in LUE  SILT    Significant Labs:  Recent Labs   Lab 09/10/19  0145 09/11/19  0115   * 230*    137   K 5.1 5.1    107   CO2 17* 21*   BUN 32* 34*   CREATININE 1.0 1.1   CALCIUM 8.0* 8.2*   MG 2.0 2.2     Recent Labs   Lab 09/10/19  0343 09/11/19  0115   WBC 12.57 10.72   HGB 14.1 12.7   HCT 45.3 40.0    223     No results for input(s): LABPT, INR, APTT in the last 48 hours.  Microbiology Results (last 7 days)     Procedure Component Value Units Date/Time    Blood culture [[id number]] Collected:  09/08/19 1215    Order Status:  Completed Specimen:  Blood from Peripheral, Forearm, Left Updated:  09/10/19 1547     Blood Culture, Routine Gram stain brian bottle: Gram positive cocci       Results called to and read back by:Milagro Valencia RN  09/09/2019  13:48      Gram stain aer bottle: Gram positive cocci 09/10/2019  15:46    Blood culture [[id number]] Collected:  09/08/19 1210    Order Status:  Completed Specimen:  Blood from Peripheral, Ankle, Left Updated:  09/10/19 1412     Blood Culture, Routine No Growth to date      No Growth to date      No Growth to date    Culture, Respiratory with Gram Stain [[id number]]  (Abnormal) Collected:  09/08/19 1158    Order Status:  Completed Specimen:  Respiratory from Tracheal Aspirate Updated:  09/10/19 0854     Respiratory Culture No S aureus or Pseudomonas isolated.      ESCHERICHIA COLI  Few  Susceptibility pending  Normal  respiratory connie also present       Gram Stain (Respiratory) Few WBC's     Gram Stain (Respiratory) Moderate Gram negative rods     Gram Stain (Respiratory) Rare Gram positive cocci            Significant Diagnostics:      Assessment/Plan:     New onset seizure  75 y/o female with pmhx CKD and HTN presented to outside hospital for seizure- like activity occurring on the left side. Found to have area of ill-defined enhancement on MRI brain w/ and w/out.  Most recent EEG showing bilateral PLEDs left greater than right.    -q 1 hr neuro checks  -Fu epilepsy recs  -Fu infectious rodriguez, repeat LP today with IR.  -Continue steroids for now  -WTE when medically stable.  -MRI brain reviewed  -Further care per NCC, will follow.          Kingsley Lisa,   Neurosurgery  [facility]

## 2019-09-11 NOTE — PROGRESS NOTES
09/11/19 1205   Vital Signs   BP (!) 86/48   MAP (mmHg) 64     Notified BRIANNA Mchugh of pt's hypotension. NP stated to just titrate propofol to 25mcg/kg/min.    1210 Pt's BP 71/35 MAP 48, sedation drips paused, NP called to bedside.

## 2019-09-11 NOTE — PROCEDURES
Radiology Post-Procedure Note    Pre Op Diagnosis: encephalopathy    Post Op Diagnosis: Same    Procedure: lumbar puncture    Procedure performed by: Robi Camacho MD    Written Informed Consent Obtained: Yes    Specimen Removed: 9 mL CSF    Estimated Blood Loss: Minimal    Findings: Following written informed consent and sterile prep and drape, a 22 gauge spinal needle was inserted at L5-S1 intralaminar space under fluoroscopic surveillance.  9 mL blood-tinged CSF removed and sent to the lab for further analysis.  There were no complications.    Patient tolerated procedure well.    Rainer Shi MD  PGY-II Radiology

## 2019-09-11 NOTE — ASSESSMENT & PLAN NOTE
75 y/o female with pmhx CKD and HTN presented to outside hospital for seizure- like activity occurring on the left side. Found to have area of ill-defined enhancement on MRI brain w/ and w/out.  Most recent EEG showing bilateral PLEDs left greater than right.    -q 1 hr neuro checks  -Fu epilepsy recs  -Fu infectious rodriguez, repeat LP today with IR.  -Continue steroids for now  -WTE when medically stable.  -MRI brain reviewed  -Further care per NCC, will follow.

## 2019-09-11 NOTE — NURSING
Pt with RASS +3, kicking BLE when staff performs interventions; pt unable to follow commands at this time, reorientation and redirection attempted, no evidence of learning; RT Ivette at bedside,  at bedside notified of propofol drip restarted at 5mcg/kg/min, POC to titrate drip to meet RASS goal -1.

## 2019-09-11 NOTE — NURSING
Lolita, NP called to bedside, notified NP that pt was hypotensive on propofol 20mcg/kg/min and fentanyl 150mcg/hr; however, pt gets agitated with any type of stimulation and is now kicking and wailing around, /117 ; NP at bedside assessing pt.

## 2019-09-11 NOTE — PLAN OF CARE
Problem: Adult Inpatient Plan of Care  Goal: Plan of Care Review  Outcome: Ongoing (interventions implemented as appropriate)  POC reviewed with pt at 0500. Pt's  verbalized understanding. Questions and concerns addressed. No acute events overnight. Pt progressing toward goals. Will continue to monitor. See flowsheets for full assessment and VS info

## 2019-09-11 NOTE — ASSESSMENT & PLAN NOTE
2/2 to brain mass   Keppra changed to Vimpat 9/6  Valproic acid started 9/7  Epilepsy following  -Continue current AEDs  -Discontinue cEEG today

## 2019-09-11 NOTE — PROCEDURES
[facility] EEG/VIDEO MONITORING REPORT  Clemencia Hermaneidaniel  [id number]  1945    DATE OF SERVICE:  09/10/2019-09/11/2019  DATE OF ADMISSION: 9/3/2019 11:00 AM    ADMITTING/REQUESTING PROVIDER: Phillip Bardales MD    REASON FOR CONSULT:  74-year-old woman admitted after episodes concerning for seizure found to have significant white matter changes consistent with vasogenic edema of unclear etiology most prominent in the right anterior quadrant.  Evaluate for evidence of epileptiform activity    METHODOLOGY   Electroencephalographic (EEG) recording is with electrodes placed according to the International 10-20 placement system.  Thirty two (32) channels of digital signal (sampling rate of 512/sec) including T1 and T2 was simultaneously recorded from the scalp and may include  EKG, EMG, and/or eye monitors.  Recording band pass was 0.1 to 512 hz.  Digital video recording of the patient is simultaneously recorded with the EEG.  The patient is instructed report clinical symptoms which may occur during the recording session.  EEG and video recording is stored and archived in digital format.  Activation procedures which include photic stimulation, hyperventilation and instructing patients to perform simple task are done in selected patients.   The EEG is displayed on a monitor screen and can be reviewed using different montages.  Computer assisted analysis is employed to detect spike and electrographic seizure activity.   The entire record is submitted for computer analysis.  The entire recording is visually reviewed and the times identified by computer analysis as being spikes or seizures are reviewed again.  Compresses spectral analysis (CSA) is also performed on the activity recorded from each individual channel.  This is displayed as a power display of frequencies from 0 to 30 Hz over time.   The CSA is reviewed looking for asymmetries in power between homologous areas of the scalp and then compared with the original EEG  recording.     ilab software is also utilized in the review of this study.  This software suite analyzes the EEG recording in multiple domains.  Coherence and rhythmicity is computed to identify EEG sections which may contain organized seizures.  Each channel undergoes analysis to detect presence of spike and sharp waves which have special and morphological characteristic of epileptic activity.  The routine EEG recording is converted from spacial into frequency domain.  This is then displayed comparing homologous areas to identify areas of significant asymmetry.  Algorithm to identify non-cortically generated artifact is used to separate eye movement, EMG and other artifact from the EEG.      RECORDING TIMES  Start on 09/10/2019 at 15:42 p.m.  Stop on 09/11/2019 at 10:46 a.m.  A total of 18 hr and 54 min of EEG recording is obtained.    EEG FINDINGS  Background activity:   The background is continuous, symmetric, predominantly delta with some admixed theta frequencies.  There is some degree of variability throughout the recording session with mild variation/cycling in the overall level of generalized background suppression as the study progresses.  There are overriding faster frequencies.    There are no pushbutton activations.    Sleep:  There is no normal sleep architecture.    Activation procedures:   Hyperventilation is not performed  Photic stimulation is not performed    Cardiac Monitor:   Heart rate looks generally regular on a single lead EKG.    Impression:   This is an abnormal continuous EEG monitoring study because generalized background slowing consistent with a moderate to severe encephalopathy.  This finding is nonspecific with regards to etiology but can be seen in the setting of toxic/metabolic derangements, infection, and as a medication effect.  There are no prominent focal findings however encephalopathy obscures focal findings.  There are no pushbutton activations, no epileptiform discharges,  and no electrographic seizures.    Angelique Salinas MD PhD  Neurology-Epilepsy  [facility].  [facility]

## 2019-09-12 PROBLEM — R78.81 BACTEREMIA: Status: ACTIVE | Noted: 2019-09-12

## 2019-09-12 LAB
ALBUMIN SERPL BCP-MCNC: 2.5 G/DL (ref 3.5–5.2)
ALLENS TEST: ABNORMAL
ALP SERPL-CCNC: 43 U/L (ref 55–135)
ALT SERPL W/O P-5'-P-CCNC: 60 U/L (ref 10–44)
AMMONIA PLAS-SCNC: 52 UMOL/L (ref 10–50)
ANION GAP SERPL CALC-SCNC: 8 MMOL/L (ref 8–16)
AST SERPL-CCNC: 42 U/L (ref 10–40)
BACTERIA BLD CULT: ABNORMAL
BASOPHILS NFR BLD: 0 % (ref 0–1.9)
BILIRUB SERPL-MCNC: 0.3 MG/DL (ref 0.1–1)
BUN SERPL-MCNC: 30 MG/DL (ref 8–23)
CALCIUM SERPL-MCNC: 8.3 MG/DL (ref 8.7–10.5)
CHLORIDE SERPL-SCNC: 106 MMOL/L (ref 95–110)
CO2 SERPL-SCNC: 23 MMOL/L (ref 23–29)
CREAT SERPL-MCNC: 0.9 MG/DL (ref 0.5–1.4)
DIFFERENTIAL METHOD: ABNORMAL
EOSINOPHIL NFR BLD: 0 % (ref 0–8)
ERYTHROCYTE [DISTWIDTH] IN BLOOD BY AUTOMATED COUNT: 13.3 % (ref 11.5–14.5)
EST. GFR  (AFRICAN AMERICAN): >60 ML/MIN/1.73 M^2
EST. GFR  (NON AFRICAN AMERICAN): >60 ML/MIN/1.73 M^2
GLUCOSE SERPL-MCNC: 149 MG/DL (ref 70–110)
HCO3 UR-SCNC: 24.7 MMOL/L (ref 24–28)
HCT VFR BLD AUTO: 37.9 % (ref 37–48.5)
HGB BLD-MCNC: 11.7 G/DL (ref 12–16)
IMM GRANULOCYTES # BLD AUTO: ABNORMAL K/UL (ref 0–0.04)
IMM GRANULOCYTES NFR BLD AUTO: ABNORMAL % (ref 0–0.5)
LYMPHOCYTES NFR BLD: 4.5 % (ref 18–48)
MAGNESIUM SERPL-MCNC: 2.2 MG/DL (ref 1.6–2.6)
MCH RBC QN AUTO: 28.3 PG (ref 27–31)
MCHC RBC AUTO-ENTMCNC: 30.9 G/DL (ref 32–36)
MCV RBC AUTO: 92 FL (ref 82–98)
METAMYELOCYTES NFR BLD MANUAL: 1 %
MONOCYTES NFR BLD: 10 % (ref 4–15)
MYELOCYTES NFR BLD MANUAL: 0.5 %
NEUTROPHILS NFR BLD: 81 % (ref 38–73)
NEUTS BAND NFR BLD MANUAL: 3 %
NRBC BLD-RTO: 0 /100 WBC
PCO2 BLDA: 34.6 MMHG (ref 35–45)
PH SMN: 7.46 [PH] (ref 7.35–7.45)
PHOSPHATE SERPL-MCNC: 4 MG/DL (ref 2.7–4.5)
PLATELET # BLD AUTO: 178 K/UL (ref 150–350)
PLATELET BLD QL SMEAR: ABNORMAL
PMV BLD AUTO: 9.7 FL (ref 9.2–12.9)
PO2 BLDA: 92 MMHG (ref 80–100)
POC BE: 1 MMOL/L
POC SATURATED O2: 98 % (ref 95–100)
POC TCO2: 26 MMOL/L (ref 23–27)
POCT GLUCOSE: 105 MG/DL (ref 70–110)
POCT GLUCOSE: 126 MG/DL (ref 70–110)
POCT GLUCOSE: 128 MG/DL (ref 70–110)
POCT GLUCOSE: 134 MG/DL (ref 70–110)
POCT GLUCOSE: 161 MG/DL (ref 70–110)
POTASSIUM SERPL-SCNC: 4.9 MMOL/L (ref 3.5–5.1)
PROT SERPL-MCNC: 5.2 G/DL (ref 6–8.4)
RBC # BLD AUTO: 4.13 M/UL (ref 4–5.4)
SAMPLE: ABNORMAL
SITE: ABNORMAL
SODIUM SERPL-SCNC: 137 MMOL/L (ref 136–145)
WBC # BLD AUTO: 11.49 K/UL (ref 3.9–12.7)

## 2019-09-12 PROCEDURE — 37799 UNLISTED PX VASCULAR SURGERY: CPT

## 2019-09-12 PROCEDURE — 99232 PR SUBSEQUENT HOSPITAL CARE,LEVL II: ICD-10-PCS | Mod: ,,, | Performed by: NEUROLOGICAL SURGERY

## 2019-09-12 PROCEDURE — 84100 ASSAY OF PHOSPHORUS: CPT

## 2019-09-12 PROCEDURE — 27000221 HC OXYGEN, UP TO 24 HOURS

## 2019-09-12 PROCEDURE — 85007 BL SMEAR W/DIFF WBC COUNT: CPT

## 2019-09-12 PROCEDURE — 99222 PR INITIAL HOSPITAL CARE,LEVL II: ICD-10-PCS | Mod: GC,,, | Performed by: INTERNAL MEDICINE

## 2019-09-12 PROCEDURE — 63600175 PHARM REV CODE 636 W HCPCS: Performed by: STUDENT IN AN ORGANIZED HEALTH CARE EDUCATION/TRAINING PROGRAM

## 2019-09-12 PROCEDURE — 36620 INSERTION CATHETER ARTERY: CPT | Mod: ,,, | Performed by: NURSE PRACTITIONER

## 2019-09-12 PROCEDURE — 99232 SBSQ HOSP IP/OBS MODERATE 35: CPT | Mod: ,,, | Performed by: NEUROLOGICAL SURGERY

## 2019-09-12 PROCEDURE — 99900035 HC TECH TIME PER 15 MIN (STAT)

## 2019-09-12 PROCEDURE — 94761 N-INVAS EAR/PLS OXIMETRY MLT: CPT

## 2019-09-12 PROCEDURE — 80053 COMPREHEN METABOLIC PANEL: CPT

## 2019-09-12 PROCEDURE — 25000003 PHARM REV CODE 250: Performed by: PSYCHIATRY & NEUROLOGY

## 2019-09-12 PROCEDURE — 94003 VENT MGMT INPAT SUBQ DAY: CPT

## 2019-09-12 PROCEDURE — 85027 COMPLETE CBC AUTOMATED: CPT

## 2019-09-12 PROCEDURE — 82140 ASSAY OF AMMONIA: CPT

## 2019-09-12 PROCEDURE — 99900026 HC AIRWAY MAINTENANCE (STAT)

## 2019-09-12 PROCEDURE — 99222 1ST HOSP IP/OBS MODERATE 55: CPT | Mod: GC,,, | Performed by: INTERNAL MEDICINE

## 2019-09-12 PROCEDURE — 63600175 PHARM REV CODE 636 W HCPCS: Performed by: NURSE PRACTITIONER

## 2019-09-12 PROCEDURE — 20000000 HC ICU ROOM

## 2019-09-12 PROCEDURE — 63600175 PHARM REV CODE 636 W HCPCS: Performed by: PSYCHIATRY & NEUROLOGY

## 2019-09-12 PROCEDURE — 36620 ARTERIAL LINE: ICD-10-PCS | Mod: ,,, | Performed by: NURSE PRACTITIONER

## 2019-09-12 PROCEDURE — 83735 ASSAY OF MAGNESIUM: CPT

## 2019-09-12 PROCEDURE — 82803 BLOOD GASES ANY COMBINATION: CPT

## 2019-09-12 PROCEDURE — 25000003 PHARM REV CODE 250: Performed by: STUDENT IN AN ORGANIZED HEALTH CARE EDUCATION/TRAINING PROGRAM

## 2019-09-12 PROCEDURE — 99291 CRITICAL CARE FIRST HOUR: CPT | Mod: 25,GC,, | Performed by: PSYCHIATRY & NEUROLOGY

## 2019-09-12 PROCEDURE — 99291 PR CRITICAL CARE, E/M 30-74 MINUTES: ICD-10-PCS | Mod: 25,GC,, | Performed by: PSYCHIATRY & NEUROLOGY

## 2019-09-12 PROCEDURE — 25000003 PHARM REV CODE 250: Performed by: NURSE PRACTITIONER

## 2019-09-12 PROCEDURE — 25500020 PHARM REV CODE 255: Performed by: PSYCHIATRY & NEUROLOGY

## 2019-09-12 RX ORDER — FENTANYL CITRATE-0.9 % NACL/PF 10 MCG/ML
25 PLASTIC BAG, INJECTION (ML) INTRAVENOUS CONTINUOUS
Status: DISCONTINUED | OUTPATIENT
Start: 2019-09-12 | End: 2019-09-15

## 2019-09-12 RX ORDER — SODIUM CHLORIDE 0.9 % (FLUSH) 0.9 %
10 SYRINGE (ML) INJECTION
Status: DISCONTINUED | OUTPATIENT
Start: 2019-09-12 | End: 2019-10-04 | Stop reason: HOSPADM

## 2019-09-12 RX ORDER — IODIXANOL 320 MG/ML
200 INJECTION, SOLUTION INTRAVASCULAR
Status: COMPLETED | OUTPATIENT
Start: 2019-09-12 | End: 2019-09-12

## 2019-09-12 RX ORDER — ATROPINE SULFATE 0.1 MG/ML
0.5 INJECTION INTRAVENOUS ONCE
Status: COMPLETED | OUTPATIENT
Start: 2019-09-12 | End: 2019-09-12

## 2019-09-12 RX ORDER — NOREPINEPHRINE BITARTRATE/D5W 4MG/250ML
PLASTIC BAG, INJECTION (ML) INTRAVENOUS
Status: DISPENSED
Start: 2019-09-12 | End: 2019-09-13

## 2019-09-12 RX ADMIN — FAMOTIDINE 20 MG: 20 TABLET ORAL at 09:09

## 2019-09-12 RX ADMIN — DEXAMETHASONE SODIUM PHOSPHATE 4 MG: 4 INJECTION, SOLUTION INTRAMUSCULAR; INTRAVENOUS at 12:09

## 2019-09-12 RX ADMIN — DEXAMETHASONE SODIUM PHOSPHATE 4 MG: 4 INJECTION, SOLUTION INTRAMUSCULAR; INTRAVENOUS at 05:09

## 2019-09-12 RX ADMIN — SENNOSIDES AND DOCUSATE SODIUM 1 TABLET: 8.6; 5 TABLET ORAL at 09:09

## 2019-09-12 RX ADMIN — ATROPINE SULFATE 0.5 MG: 0.1 INJECTION PARENTERAL at 08:09

## 2019-09-12 RX ADMIN — HEPARIN SODIUM 5000 UNITS: 5000 INJECTION, SOLUTION INTRAVENOUS; SUBCUTANEOUS at 05:09

## 2019-09-12 RX ADMIN — CEFTRIAXONE SODIUM 2 G: 2 INJECTION, SOLUTION INTRAVENOUS at 11:09

## 2019-09-12 RX ADMIN — AMLODIPINE BESYLATE 5 MG: 5 TABLET ORAL at 08:09

## 2019-09-12 RX ADMIN — VALPROIC ACID 750 MG: 250 SOLUTION ORAL at 11:09

## 2019-09-12 RX ADMIN — FENTANYL CITRATE 50 MCG: 50 INJECTION INTRAMUSCULAR; INTRAVENOUS at 07:09

## 2019-09-12 RX ADMIN — VALPROIC ACID 750 MG: 250 SOLUTION ORAL at 08:09

## 2019-09-12 RX ADMIN — FENTANYL CITRATE 50 MCG: 50 INJECTION INTRAMUSCULAR; INTRAVENOUS at 05:09

## 2019-09-12 RX ADMIN — LACOSAMIDE 100 MG: 50 TABLET, FILM COATED ORAL at 09:09

## 2019-09-12 RX ADMIN — LEVOTHYROXINE SODIUM 75 MCG: 75 TABLET ORAL at 05:09

## 2019-09-12 RX ADMIN — LACOSAMIDE 100 MG: 50 TABLET, FILM COATED ORAL at 08:09

## 2019-09-12 RX ADMIN — QUETIAPINE FUMARATE 25 MG: 25 TABLET ORAL at 08:09

## 2019-09-12 RX ADMIN — HEPARIN SODIUM 5000 UNITS: 5000 INJECTION, SOLUTION INTRAVENOUS; SUBCUTANEOUS at 09:09

## 2019-09-12 RX ADMIN — GENTAMICIN SULFATE 230 MG: 40 INJECTION, SOLUTION INTRAMUSCULAR; INTRAVENOUS at 02:09

## 2019-09-12 RX ADMIN — IODIXANOL 75 ML: 320 INJECTION, SOLUTION INTRAVASCULAR at 12:09

## 2019-09-12 RX ADMIN — QUETIAPINE FUMARATE 25 MG: 25 TABLET ORAL at 09:09

## 2019-09-12 RX ADMIN — Medication 25 MCG/HR: at 09:09

## 2019-09-12 RX ADMIN — DEXAMETHASONE SODIUM PHOSPHATE 4 MG: 4 INJECTION, SOLUTION INTRAMUSCULAR; INTRAVENOUS at 11:09

## 2019-09-12 RX ADMIN — CEFTRIAXONE SODIUM 2 G: 2 INJECTION, SOLUTION INTRAVENOUS at 12:09

## 2019-09-12 RX ADMIN — CHLORHEXIDINE GLUCONATE 0.12% ORAL RINSE 15 ML: 1.2 LIQUID ORAL at 09:09

## 2019-09-12 RX ADMIN — VALPROIC ACID 750 MG: 250 SOLUTION ORAL at 04:09

## 2019-09-12 RX ADMIN — SENNOSIDES AND DOCUSATE SODIUM 1 TABLET: 8.6; 5 TABLET ORAL at 08:09

## 2019-09-12 RX ADMIN — FENTANYL CITRATE 50 MCG: 50 INJECTION INTRAMUSCULAR; INTRAVENOUS at 01:09

## 2019-09-12 RX ADMIN — FENTANYL CITRATE 50 MCG: 50 INJECTION INTRAMUSCULAR; INTRAVENOUS at 10:09

## 2019-09-12 RX ADMIN — CHLORHEXIDINE GLUCONATE 0.12% ORAL RINSE 15 ML: 1.2 LIQUID ORAL at 08:09

## 2019-09-12 RX ADMIN — HEPARIN SODIUM 5000 UNITS: 5000 INJECTION, SOLUTION INTRAVENOUS; SUBCUTANEOUS at 02:09

## 2019-09-12 NOTE — PROGRESS NOTES
FEDERICO order received. Chart being reviewed by cardiology fellow to determine if patient is eligible for FEDERICO due to history of esophageal stricture. Will let ordering team know of fellow's decision at the number provided on the FEDERICO order ([id number]). Call FEDERICO RN at [phone] with any questions.

## 2019-09-12 NOTE — PLAN OF CARE
Problem: Adult Inpatient Plan of Care  Goal: Plan of Care Review  Outcome: Ongoing (interventions implemented as appropriate)  POC reviewed with pt at 0500. Pt unable to verbalize understanding d/t intubation. Questions and concerns addressed with . No acute events overnight. Propofol gtt continued. PRN fentanyl given for agitation. Pt progressing toward goals. Will continue to monitor. See flowsheets for full assessment and VS info

## 2019-09-12 NOTE — PROGRESS NOTES
[facility]  Neurosurgery  Progress Note    Subjective:     History of Present Illness: 75 y/o female with pmhx CKD and HTN  presented to outside hospital for seizure- like activity this AM. Per  pt was found unconscious this AM, with seizure like activity occurring on the left side. Pt was given versed x 2 via EMS. CTH performed at outside hospital was negative for bleed. Pt had additional seizure in outside hospital ED and was given IV Keppra. EEG ordered and MRI brain w/out contrast concerning for possible infiltratrive process vs post ischemic sequela. Pt transferred to [facility] and admitted to [facility]. MRI brain w/ and w/out obtained that showed large area of edema in right frontal lobe and ill defined enhancement in the frontal lobe concerning for possible cerebritis vs. Neoplasm. NSGY was consulted to evaluate. Pt not on anti-coagulation.       Post-Op Info:  * No surgery found *         Interval History: LP yesterday with IR.  Back on propofol this am.    Medications:  Continuous Infusions:   fentanyl      propofol 45 mcg/kg/min (09/12/19 0705)     Scheduled Meds:   amLODIPine  5 mg Per OG tube Daily    cefTRIAXone (ROCEPHIN) IVPB  2 g Intravenous Q24H    chlorhexidine  15 mL Mouth/Throat BID    dexamethasone  4 mg Intravenous Q6H    famotidine  20 mg Per OG tube QHS    heparin (porcine)  5,000 Units Subcutaneous Q8H    lacosamide  100 mg Per OG tube Q12H    levothyroxine  75 mcg Per OG tube Before breakfast    QUEtiapine  25 mg Per OG tube BID    senna-docusate 8.6-50 mg  1 tablet Per OG tube BID    valproic acid (as sodium salt)  750 mg Per OG tube Q8H    vancomycin (VANCOCIN) IVPB  1,500 mg Intravenous Q24H     PRN Meds:acetaminophen, Dextrose 10% Bolus, fentaNYL, fentaNYL, glucagon (human recombinant), hydrALAZINE, insulin aspart U-100, labetalol, magnesium oxide, magnesium oxide, midazolam, potassium chloride 10%, potassium chloride 10%, potassium, sodium phosphates,  potassium, sodium phosphates, potassium, sodium phosphates, racepinephrine, sodium chloride 0.9%     Review of Systems  Objective:     Weight: 78.6 kg (173 lb 4.5 oz)  Body mass index is 30.7 kg/m².  Vital Signs (Most Recent):  Temp: 98.9 °F (37.2 °C) (09/12/19 0705)  Pulse: 81 (09/12/19 0727)  Resp: 18 (09/12/19 0727)  BP: (!) 175/78 (09/12/19 0705)  SpO2: 97 % (09/12/19 0727) Vital Signs (24h Range):  Temp:  [97.7 °F (36.5 °C)-99.7 °F (37.6 °C)] 98.9 °F (37.2 °C)  Pulse:  [] 81  Resp:  [15-26] 18  SpO2:  [94 %-100 %] 97 %  BP: ()/() 175/78  Arterial Line BP: (119-183)/(43-72) 183/72     Date 09/12/19 0700 - 09/13/19 0659   Shift 6447-6631 9354-5776 3131-6493 24 Hour Total   INTAKE   I.V.(mL/kg) 38.2(0.5)   38.2(0.5)   Shift Total(mL/kg) 38.2(0.5)   38.2(0.5)   OUTPUT   Shift Total(mL/kg)       Weight (kg) 78.6 78.6 78.6 78.6              Vent Mode: A/C  Oxygen Concentration (%):  [30-35] 35  Resp Rate Total:  [15 br/min-30 br/min] 18 br/min  Vt Set:  [380 mL] 380 mL  PEEP/CPAP:  [5 cmH20] 5 cmH20  Pressure Support:  [0 cmH20-8 cmH20] 0 cmH20  Mean Airway Pressure:  [7.3 spO71-76 cmH20] 9.2 cmH20         NG/OG Tube 09/03/19 1100 orogastric Center mouth (Active)   Placement Check placement verified by distal tube length measurement;placement verified by x-ray;physician notified 9/12/2019  3:05 AM   Tolerance no signs/symptoms of discomfort 9/12/2019  3:05 AM   Securement secured to nostril center w/ adhesive device 9/12/2019  3:05 AM   Clamp Status/Tolerance unclamped 9/12/2019  3:05 AM   Suction Setting/Drainage Method suction at the bedside 9/12/2019  3:05 AM   Insertion Site Appearance no redness, warmth, tenderness, skin breakdown, drainage 9/12/2019  3:05 AM   Flush/Irrigation flushed w/;water;no resistance met 9/12/2019  3:05 AM   Feeding Method continuous 9/11/2019  3:05 AM   Feeding Action feeding held 9/12/2019  3:05 AM   Current Rate (mL/hr) 0 mL/hr 9/11/2019  7:05 PM   Goal Rate (mL/hr)  45 mL/hr 9/11/2019  7:05 PM   Intake (mL) 60 mL 9/10/2019  9:00 AM   Water Bolus (mL) 60 mL 9/5/2019  6:00 AM   Rate Formula Tube Feeding (mL/hr) 45 mL/hr 9/9/2019  7:05 PM   Formula Name isosource 9/10/2019  3:00 PM   Intake (mL) - Formula Tube Feeding 45 9/11/2019 11:00 AM   Residual Amount (ml) 0 ml 9/11/2019  4:00 PM       Neurosurgery Physical Exam   E1VTM5  PERRL  Moves R, LLE spont  WD in LUE    Significant Labs:  Recent Labs   Lab 09/11/19 0115 09/12/19  0200   * 149*    137   K 5.1 4.9    106   CO2 21* 23   BUN 34* 30*   CREATININE 1.1 0.9   CALCIUM 8.2* 8.3*   MG 2.2 2.2     Recent Labs   Lab 09/11/19 0115 09/12/19  0200   WBC 10.72 11.49   HGB 12.7 11.7*   HCT 40.0 37.9    178     No results for input(s): LABPT, INR, APTT in the last 48 hours.  Microbiology Results (last 7 days)     Procedure Component Value Units Date/Time    CSF culture [[id number]] Collected:  09/11/19 1355    Order Status:  Completed Specimen:  CSF (Spinal Fluid) from CSF Tap, Tube 3 Updated:  09/12/19 0720     CSF CULTURE No Growth to date     Gram Stain Result Cytospin indicates:      Rare WBC's      No organisms seen    Blood culture [[id number]] Collected:  09/11/19 1811    Order Status:  Sent Specimen:  Blood from Peripheral, Hand, Left Updated:  09/11/19 1914    Blood culture [[id number]]     Order Status:  Sent Specimen:  Blood     Blood culture [[id number]]  (Abnormal) Collected:  09/08/19 1215    Order Status:  Completed Specimen:  Blood from Peripheral, Forearm, Left Updated:  09/11/19 1457     Blood Culture, Routine Gram stain brian bottle: Gram positive cocci       Results called to and read back by:Milagro Valencia RN  09/09/2019  13:48      Gram stain aer bottle: Gram positive cocci 09/10/2019  15:46      GEMELLA (S.) MORBILLORUM  Susceptibility testing not routinely performed      Blood culture [[id number]] Collected:  09/08/19 1210    Order Status:  Completed Specimen:  Blood from Peripheral, Ankle, Left  Updated:  09/11/19 1412     Blood Culture, Routine No Growth to date      No Growth to date      No Growth to date      No Growth to date    Gram stain [[id number]] Collected:  09/11/19 1355    Order Status:  Canceled Specimen:  CSF (Spinal Fluid) from CSF Tap, Tube 3     Culture, Respiratory with Gram Stain [[id number]]  (Abnormal)  (Susceptibility) Collected:  09/08/19 1158    Order Status:  Completed Specimen:  Respiratory from Tracheal Aspirate Updated:  09/11/19 0934     Respiratory Culture No S aureus or Pseudomonas isolated.      ESCHERICHIA COLI  Few  Normal respiratory connie also present       Gram Stain (Respiratory) Few WBC's     Gram Stain (Respiratory) Moderate Gram negative rods     Gram Stain (Respiratory) Rare Gram positive cocci    Blood culture [[id number]]     Order Status:  Canceled Specimen:  Blood     Blood culture [[id number]]     Order Status:  Canceled Specimen:  Blood             Significant Diagnostics:      Assessment/Plan:     New onset seizure  75 y/o female with pmhx CKD and HTN presented to outside hospital for seizure- like activity occurring on the left side. Found to have area of ill-defined enhancement on MRI brain w/ and w/out.  Most recent EEG showing bilateral PLEDs left greater than right.    -q 1 hr neuro checks  -Fu epilepsy recs  -Fu infectious rodriguez, CSF NGTD.  Would recommend documenting opening and closing pressure when performing LP to assess for elevated ICPs.  -Continue steroids for now  -WTE when medically stable.  -MRI brain reviewed  -Further care per NCC, will follow.          Kingsley Lisa,   Neurosurgery  [facility]

## 2019-09-12 NOTE — PROGRESS NOTES
1033: Pt taken to IR per RN x2 and RT via bed with portable monitor and vent.      1215: pt returned to room and placed on bedside monitor and vent. Tolerated procedure well.

## 2019-09-12 NOTE — SUBJECTIVE & OBJECTIVE
Interval History: LP yesterday with IR.  Back on propofol this am.    Medications:  Continuous Infusions:   fentanyl      propofol 45 mcg/kg/min (09/12/19 0705)     Scheduled Meds:   amLODIPine  5 mg Per OG tube Daily    cefTRIAXone (ROCEPHIN) IVPB  2 g Intravenous Q24H    chlorhexidine  15 mL Mouth/Throat BID    dexamethasone  4 mg Intravenous Q6H    famotidine  20 mg Per OG tube QHS    heparin (porcine)  5,000 Units Subcutaneous Q8H    lacosamide  100 mg Per OG tube Q12H    levothyroxine  75 mcg Per OG tube Before breakfast    QUEtiapine  25 mg Per OG tube BID    senna-docusate 8.6-50 mg  1 tablet Per OG tube BID    valproic acid (as sodium salt)  750 mg Per OG tube Q8H    vancomycin (VANCOCIN) IVPB  1,500 mg Intravenous Q24H     PRN Meds:acetaminophen, Dextrose 10% Bolus, fentaNYL, fentaNYL, glucagon (human recombinant), hydrALAZINE, insulin aspart U-100, labetalol, magnesium oxide, magnesium oxide, midazolam, potassium chloride 10%, potassium chloride 10%, potassium, sodium phosphates, potassium, sodium phosphates, potassium, sodium phosphates, racepinephrine, sodium chloride 0.9%     Review of Systems  Objective:     Weight: 78.6 kg (173 lb 4.5 oz)  Body mass index is 30.7 kg/m².  Vital Signs (Most Recent):  Temp: 98.9 °F (37.2 °C) (09/12/19 0705)  Pulse: 81 (09/12/19 0727)  Resp: 18 (09/12/19 0727)  BP: (!) 175/78 (09/12/19 0705)  SpO2: 97 % (09/12/19 0727) Vital Signs (24h Range):  Temp:  [97.7 °F (36.5 °C)-99.7 °F (37.6 °C)] 98.9 °F (37.2 °C)  Pulse:  [] 81  Resp:  [15-26] 18  SpO2:  [94 %-100 %] 97 %  BP: ()/() 175/78  Arterial Line BP: (119-183)/(43-72) 183/72     Date 09/12/19 0700 - 09/13/19 0659   Shift 5442-4033 7345-2126 9670-7116 24 Hour Total   INTAKE   I.V.(mL/kg) 38.2(0.5)   38.2(0.5)   Shift Total(mL/kg) 38.2(0.5)   38.2(0.5)   OUTPUT   Shift Total(mL/kg)       Weight (kg) 78.6 78.6 78.6 78.6              Vent Mode: A/C  Oxygen Concentration (%):  [30-35] 35  Resp  Rate Total:  [15 br/min-30 br/min] 18 br/min  Vt Set:  [380 mL] 380 mL  PEEP/CPAP:  [5 cmH20] 5 cmH20  Pressure Support:  [0 cmH20-8 cmH20] 0 cmH20  Mean Airway Pressure:  [7.3 fqO02-98 cmH20] 9.2 cmH20         NG/OG Tube 09/03/19 1100 orogastric Center mouth (Active)   Placement Check placement verified by distal tube length measurement;placement verified by x-ray;physician notified 9/12/2019  3:05 AM   Tolerance no signs/symptoms of discomfort 9/12/2019  3:05 AM   Securement secured to nostril center w/ adhesive device 9/12/2019  3:05 AM   Clamp Status/Tolerance unclamped 9/12/2019  3:05 AM   Suction Setting/Drainage Method suction at the bedside 9/12/2019  3:05 AM   Insertion Site Appearance no redness, warmth, tenderness, skin breakdown, drainage 9/12/2019  3:05 AM   Flush/Irrigation flushed w/;water;no resistance met 9/12/2019  3:05 AM   Feeding Method continuous 9/11/2019  3:05 AM   Feeding Action feeding held 9/12/2019  3:05 AM   Current Rate (mL/hr) 0 mL/hr 9/11/2019  7:05 PM   Goal Rate (mL/hr) 45 mL/hr 9/11/2019  7:05 PM   Intake (mL) 60 mL 9/10/2019  9:00 AM   Water Bolus (mL) 60 mL 9/5/2019  6:00 AM   Rate Formula Tube Feeding (mL/hr) 45 mL/hr 9/9/2019  7:05 PM   Formula Name isosource 9/10/2019  3:00 PM   Intake (mL) - Formula Tube Feeding 45 9/11/2019 11:00 AM   Residual Amount (ml) 0 ml 9/11/2019  4:00 PM       Neurosurgery Physical Exam   [id number]  PERRL  Moves R, LLE spont  WD in LUE    Significant Labs:  Recent Labs   Lab 09/11/19  0115 09/12/19  0200   * 149*    137   K 5.1 4.9    106   CO2 21* 23   BUN 34* 30*   CREATININE 1.1 0.9   CALCIUM 8.2* 8.3*   MG 2.2 2.2     Recent Labs   Lab 09/11/19  0115 09/12/19  0200   WBC 10.72 11.49   HGB 12.7 11.7*   HCT 40.0 37.9    178     No results for input(s): LABPT, INR, APTT in the last 48 hours.  Microbiology Results (last 7 days)     Procedure Component Value Units Date/Time    CSF culture [[id number]] Collected:  09/11/19 0978     Order Status:  Completed Specimen:  CSF (Spinal Fluid) from CSF Tap, Tube 3 Updated:  09/12/19 0720     CSF CULTURE No Growth to date     Gram Stain Result Cytospin indicates:      Rare WBC's      No organisms seen    Blood culture [[id number]] Collected:  09/11/19 1811    Order Status:  Sent Specimen:  Blood from Peripheral, Hand, Left Updated:  09/11/19 1914    Blood culture [[id number]]     Order Status:  Sent Specimen:  Blood     Blood culture [[id number]]  (Abnormal) Collected:  09/08/19 1215    Order Status:  Completed Specimen:  Blood from Peripheral, Forearm, Left Updated:  09/11/19 1457     Blood Culture, Routine Gram stain brian bottle: Gram positive cocci       Results called to and read back by:Milagro Valencia RN  09/09/2019  13:48      Gram stain aer bottle: Gram positive cocci 09/10/2019  15:46      GEMELLA (S.) MORBILLORUM  Susceptibility testing not routinely performed      Blood culture [[id number]] Collected:  09/08/19 1210    Order Status:  Completed Specimen:  Blood from Peripheral, Ankle, Left Updated:  09/11/19 1412     Blood Culture, Routine No Growth to date      No Growth to date      No Growth to date      No Growth to date    Gram stain [[id number]] Collected:  09/11/19 1355    Order Status:  Canceled Specimen:  CSF (Spinal Fluid) from CSF Tap, Tube 3     Culture, Respiratory with Gram Stain [[id number]]  (Abnormal)  (Susceptibility) Collected:  09/08/19 1158    Order Status:  Completed Specimen:  Respiratory from Tracheal Aspirate Updated:  09/11/19 0934     Respiratory Culture No S aureus or Pseudomonas isolated.      ESCHERICHIA COLI  Few  Normal respiratory connie also present       Gram Stain (Respiratory) Few WBC's     Gram Stain (Respiratory) Moderate Gram negative rods     Gram Stain (Respiratory) Rare Gram positive cocci    Blood culture [[id number]]     Order Status:  Canceled Specimen:  Blood     Blood culture [[id number]]     Order Status:  Canceled Specimen:  Blood             Significant  Diagnostics:

## 2019-09-12 NOTE — PROCEDURES
Radiology Post-Procedure Note    Pre Op Diagnosis: Status epilepticus    Post Op Diagnosis: same    Procedure: Cerebral angiogram    Procedure performed by: Jase Elizalde MD    Written Informed Consent Obtained: Yes    Specimen Removed: NO    Estimated Blood Loss: Minimal    Procedure report:     A 6F sheath was placed into the right femoral artery and a 5F Ronan catheter was advanced into the aortic arch.  The bilateral common and internal carotid arteries and the bilateral vertebral arteries were subselected and angiography of the brain was performed after injection into each of these vessels.    Preliminary interpretation: normal cerebral angiogram.  Please see Imaging report for full details.    A right femoral artery angiogram was performed, the sheath removed and hemostasis achieved using angioseal.  No hematoma was present at the time of hemostasis.    The patient tolerated the procedure well.     Jase Elizalde MD  Vascular and Interventional Neurology Staff  Director of [facility]  [facility]  [phone]

## 2019-09-12 NOTE — PROGRESS NOTES
Patient hemostasis achieved to right groin by use of angioseal closure device, patietn to remain flat for 2 hours, head of bed may be elevated at 1400

## 2019-09-12 NOTE — PROGRESS NOTES
Pharmacokinetic Assessment Follow Up: IV Vancomycin    Vancomycin serum concentration assessment(s):  · Vanc trough concentration prior to 3rd dose of Vanc 1g Q24H resulted as 9.6 mcg/mL (drawn ~1hr late based on admin time on 9/10 but true trough still sub-therapeutic for goal of 15-20 mcg/mL)    Vancomycin Regimen Plan:  · Changing Vanc regimen to Vancomcyin 1.5g Q24H  · Next vancomycin trough concentration timed for 9/13 @ 2000 prior to 3rd dose of new Q24H regimen     Drug levels (last 3 results):  Recent Labs   Lab Result Units 09/09/19  1522 09/11/19  1834   Vancomycin, Random ug/mL 10.5  --    Vancomycin-Trough ug/mL  --  9.6*       Pharmacy will continue to follow and monitor vancomycin.    Please contact pharmacy at extension [phone] for questions regarding this assessment.    Thank you for the consult,   Radha Florian PharmD, [facility]  Clinical Pharmacist  Spectra Link: [phone]     Patient brief summary:  Clemencia Nguyen is a 74 y.o. female initiated on antimicrobial therapy with IV Vancomycin for treatment of bacteremia    Drug Allergies:   Review of patient's allergies indicates:  No Known Allergies    Actual Body Weight:   78.6 kg     Renal Function:   Estimated Creatinine Clearance: 43.2 mL/min (based on SCr of 1.1 mg/dL).,       CBC (last 72 hours):  Recent Labs   Lab Result Units 09/09/19  0412 09/10/19  0343 09/11/19  0115   WBC K/uL 10.87 12.57 10.72   Hemoglobin g/dL 12.9 14.1 12.7   Hematocrit % 39.7 45.3 40.0   Platelets K/uL 229 216 223   Gran% % 68.9 68.0 70.0   Lymph% % 13.4* 10.0* 15.0*   Mono% % 13.1 19.0* 12.0   Eosinophil% % 0.0 0.0 0.0   Basophil% % 0.5 0.0 0.0   Differential Method  Automated Manual Manual       Metabolic Panel (last 72 hours):  Recent Labs   Lab Result Units 09/09/19  0412 09/10/19  0145 09/11/19  0115 09/11/19  1355   Sodium mmol/L 141 139 137  --    Potassium mmol/L 4.7 5.1 5.1  --    Chloride mmol/L 107 109 107  --    CO2 mmol/L 17* 17* 21*  --    Glucose mg/dL 238*  214* 230*  --    Glucose, CSF mg/dL  --   --   --  108*   BUN, Bld mg/dL 34* 32* 34*  --    Creatinine mg/dL 1.2 1.0 1.1  --    Albumin g/dL 2.4* 2.4* 2.5*  --    Total Bilirubin mg/dL 0.4 0.3 0.4  --    Alkaline Phosphatase U/L 54* 47* 48*  --    AST U/L 12 27 29  --    ALT U/L 42 38 37  --    Magnesium mg/dL 1.9 2.0 2.2  --    Phosphorus mg/dL 3.0 2.9 3.2  --        Vancomycin Administrations:  vancomycin given in the last 96 hours                   vancomycin in dextrose 5 % 1 gram/250 mL IVPB 1,000 mg (mg) 1,000 mg New Bag 09/10/19 1758     1,000 mg New Bag 09/09/19 1942                Microbiologic Results:  Microbiology Results (last 7 days)     Procedure Component Value Units Date/Time    Blood culture [[id number]] Collected:  09/11/19 1811    Order Status:  Sent Specimen:  Blood from Peripheral, Hand, Left Updated:  09/11/19 1914    CSF culture [[id number]] Collected:  09/11/19 1355    Order Status:  Completed Specimen:  CSF (Spinal Fluid) from CSF Tap, Tube 3 Updated:  09/11/19 1654     Gram Stain Result Cytospin indicates:      Rare WBC's      No organisms seen    Blood culture [[id number]]     Order Status:  Sent Specimen:  Blood     Blood culture [[id number]]  (Abnormal) Collected:  09/08/19 1215    Order Status:  Completed Specimen:  Blood from Peripheral, Forearm, Left Updated:  09/11/19 1457     Blood Culture, Routine Gram stain brian bottle: Gram positive cocci       Results called to and read back by:Milagro Valencia RN  09/09/2019  13:48      Gram stain aer bottle: Gram positive cocci 09/10/2019  15:46      GEMELLA (S.) MORBILLORUM  Susceptibility testing not routinely performed      Blood culture [[id number]] Collected:  09/08/19 1210    Order Status:  Completed Specimen:  Blood from Peripheral, Ankle, Left Updated:  09/11/19 1412     Blood Culture, Routine No Growth to date      No Growth to date      No Growth to date      No Growth to date    Gram stain [[id number]] Collected:  09/11/19 1355    Order Status:   Canceled Specimen:  CSF (Spinal Fluid) from CSF Tap, Tube 3     Culture, Respiratory with Gram Stain [[id number]]  (Abnormal)  (Susceptibility) Collected:  09/08/19 1158    Order Status:  Completed Specimen:  Respiratory from Tracheal Aspirate Updated:  09/11/19 0934     Respiratory Culture No S aureus or Pseudomonas isolated.      ESCHERICHIA COLI  Few  Normal respiratory connie also present       Gram Stain (Respiratory) Few WBC's     Gram Stain (Respiratory) Moderate Gram negative rods     Gram Stain (Respiratory) Rare Gram positive cocci    Blood culture [[id number]]     Order Status:  Canceled Specimen:  Blood     Blood culture [[id number]]     Order Status:  Canceled Specimen:  Blood

## 2019-09-12 NOTE — ASSESSMENT & PLAN NOTE
73 y/o female with pmhx CKD and HTN presented to outside hospital for seizure- like activity occurring on the left side. Found to have area of ill-defined enhancement on MRI brain w/ and w/out.  Most recent EEG showing bilateral PLEDs left greater than right.    -q 1 hr neuro checks  -Fu epilepsy recs  -Fu infectious rodriguez, CSF NGTD.  Would recommend documenting opening and closing pressure when performing LP to assess for elevated ICPs.  -Continue steroids for now  -WTE when medically stable.  -MRI brain reviewed  -Further care per NCC, will follow.

## 2019-09-12 NOTE — PLAN OF CARE
09/12/19 1651   Discharge Reassessment   Assessment Type Discharge Planning Reassessment   Provided patient/caregiver education on the expected discharge date and the discharge plan No   Do you have any problems affording any of your prescribed medications? TBD   Discharge Plan A Long-term acute care facility (LTAC)   Discharge Plan B Skilled Nursing Facility   DME Needed Upon Discharge  other (see comments)  (tbd)   Patient choice form signed by patient/caregiver N/A   Anticipated Discharge Disposition LTAC   Can the patient answer the patient profile reliably? No, cognitively impaired   How does the patient rate their overall health at the present time?   (porsha)   Describe the patient's ability to walk at the present time. Does not walk or unable to take any steps at all   How often would a person be available to care for the patient? Whenever needed   Number of comorbid conditions (as recorded on the chart) Five or more   During the past month, has the patient often been bothered by feeling down, depressed or hopeless?   (porsha)   During the past month, has the patient often been bothered by little interest or pleasure in doing things?   (porsha)   Post-Acute Status   Post-Acute Authorization Placement   Post-Acute Placement Status Awaiting Internal Medical Clearance   Discharge Delays None known at this time       Keely Malone RN, CCRN-K, [facility]  Neuro-Critical Care   [phone]     [facility]    Follow-up NOTE           Visit date: 1/24/2018      Reason for visit /chief complaints   foot wound    Assessment:     1. Open wound of right foot, subsequent encounter     2. Other chronic osteomyelitis of right foot ([address]             Plan:     Patient is off of antibiotics. He still has open area on the right foot. No signs of acute infection. He was advised to make sure keep wound clean and dry  If he start to have any drainage or worsening pain than call us        HPI:    Patient is 24-year-old male came in for follow-up. I'm following him for chronic right foot osteo as well as right foot wound. He has completed a course of vanc and cefepime. He denies any fever or chills. No cough or shortness of breath. No vomiting or diarrhea. He still have open wound on the right foot but denied any purulent drainage        Past Medical History:   Diagnosis Date    Arm fracture     Back injury     Eczema     Femur fracture (HCC)     GERD (gastroesophageal reflux disease)     Glaucoma     Hypertension     MRSA (methicillin resistant staph aureus) culture positive     pseudomonas e coli    MSSA (methicillin susceptible Staphylococcus aureus)     Osteomyelitis (HCC)     right foot     Shoulder fracture     Ulcerated, foot ([address]     wound     Past Surgical History:   Procedure Laterality Date    BACK SURGERY      COLONOSCOPY      HIP SURGERY      ORIF left hip     Social History     Social History    Marital status:      Spouse name: N/A    Number of children: N/A    Years of education: N/A     Social History Main Topics    Smoking status: Never Smoker    Smokeless tobacco: Never Used    Alcohol use No    Drug use: No    Sexual activity: Not Asked     Other Topics Concern    None     Social History Narrative    None     No family history on file.     Current med     Current Outpatient Prescriptions:     EUCRISA 2 % OINT, Apply 1 Applicatorful topically

## 2019-09-12 NOTE — CONSULTS
[facility]  Infectious Disease  Consult Note    Patient Name: Clemencia Nguyen  MRN: [id number]  Admission Date: 9/3/2019  Hospital Length of Stay: 9 days  Attending Physician: Antelmo Ramos MD  Primary Care Provider: SHELBY Castillo MD     Isolation Status: No active isolations    Patient information was obtained from spouse/SO, past medical records and ER records.      Inpatient consult to Infectious Diseases  Consult performed by: Marry Candelaria MD  Consult ordered by: Lolita Salinas NP        Assessment/Plan:     Bacteremia  75yo woman with PMH HTN & CKD presents with acute onset of seizure like activity. Found to have area of ill-defined enhancement on MRI brain w/ and w/out.  Most recent EEG showing bilateral PLEDs left greater than right. ID consulted for bacteremia. Lap ccy 04/2018.  reports dental procedure (crown) ~2 months ago.     9/03 CSF: gram stain negative. 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     Recommendation:   -Obtain TTE to evaluate for endocarditis   -Gemella can be PCN resistant. Continue ceftriaxone & add on gentamycin IV. Discontinue vancomycin.   -Follow up CSF viral studies  -Agree with repeat blood cultures until negative      Thank you for your consult. I will follow-up with patient. Please contact us if you have any additional questions.    Marry Candelaria MD  Infectious Disease  [facility]    Subjective:     Principal Problem: Brain lesion    HPI: Ms. Manriquez is a 75yo woman with PMH of HTN & CKD otherwise doing well who  presented to [facility] with acute onset of Left sided seizure like activity and LOC.  at bedside reports she was functioning well and appropriately until he had found her unconscious last week. She has never had seizures before. 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 He notes she did seem forgetful during square dancing over the past month but he had attributed it to her age. He also says she had a dental crown procedure a couple of months ago. Denies fevers, chills. Denies FHx of cancer. Denies Fhx of colorectal cancer.     On her way to [facility], she was given versed x 2 via EMS. CTH performed at outside hospital was negative for bleed.  MRI brain w/ and w/out obtained that showed large area of edema in right frontal lobe and ill defined enhancement in the frontal lobe.     She has been afebrile & hypertensive. Failed 2 extubations as family notes she became very anxious during trials.     Past Medical History:   Diagnosis Date    Depression     has been on tofranil for years;    Disorder of kidney and ureter     Esophageal stricture 06/12/2019    per pt per Dr. Garcia    Hyperlipemia     Hypertension     Hypothyroidism     Osteopenia     Thyroid disease     hypothyroid       Past Surgical History:   Procedure Laterality Date    cataract surgery      CHOLECYSTECTOMY  04/24/2018    CHOLECYSTECTOMY-LAPAROSCOPIC N/A 4/24/2018    [facility]    COLONOSCOPY  2011, again in 2014    repeat in 5    COLONOSCOPY W/ BIOPSIES  06/12/2019    polypectomies per Dr. Garcia    ESOPHAGOGASTRODUODENOSCOPY  06/12/2019    Dr. Garcia    HYSTERECTOMY      OOPHORECTOMY      TONSILLECTOMY         Review of patient's allergies indicates:  No Known Allergies    Medications:  No medications prior to admission.     Antibiotics (From admission, onward)    Start     Stop Route Frequency Ordered    09/12/19 1415  gentamicin (GARAMYCIN) 230 mg in sodium  chloride 0.9% 100 mL IVPB      -- IV Every 24 hours (non-standard times) 09/12/19 1302    09/12/19 1215  cefTRIAXone (ROCEPHIN) 2 g in dextrose 5 % 50 mL IVPB      -- IV Every 12 hours (non-standard times) 09/12/19 1214        Antifungals (From admission, onward)    None        Antivirals (From admission, onward)    None           Immunization History   Administered Date(s) Administered    Influenza - High Dose - PF (65 years and older) 10/16/2012, 10/22/2013, 09/08/2015, 11/18/2016, 09/26/2017    Influenza Split 09/20/2011    Pneumococcal Conjugate 12/29/2015    Pneumococcal Polysaccharide - 23 Valent 11/09/2012    Tdap 05/27/2016       Family History     Problem Relation (Age of Onset)    Heart disease Mother    Hypertension Mother    Stroke Father        Social History     Socioeconomic History    Marital status:      Spouse name: Not on file    Number of children: 3    Years of education: Not on file    Highest education level: Not on file   Occupational History    Not on file   Social Needs    Financial resource strain: Not very hard    Food insecurity:     Worry: Never true     Inability: Never true    Transportation needs:     Medical: No     Non-medical: No   Tobacco Use    Smoking status: Never Smoker    Smokeless tobacco: Never Used   Substance and Sexual Activity    Alcohol use: No     Frequency: Never     Binge frequency: Never    Drug use: No    Sexual activity: Not Currently     Partners: Male     Birth control/protection: Surgical   Lifestyle    Physical activity:     Days per week: 4 days     Minutes per session: 60 min    Stress: Not at all   Relationships    Social connections:     Talks on phone: More than three times a week     Gets together: Twice a week     Attends [employer] service: Not on file     Active member of club or organization: Yes     Attends meetings of clubs or organizations: More than 4 times per year     Relationship status:    Other Topics  Concern    Not on file   Social History Narrative    Not on file     Review of Systems   Unable to perform ROS: Intubated     Objective:     Vital Signs (Most Recent):  Temp: 97.9 °F (36.6 °C) (09/12/19 1102)  Pulse: (!) 58 (09/12/19 1315)  Resp: 15 (09/12/19 1315)  BP: (!) 123/57 (09/12/19 1315)  SpO2: 95 % (09/12/19 1315) Vital Signs (24h Range):  Temp:  [97.7 °F (36.5 °C)-98.9 °F (37.2 °C)] 97.9 °F (36.6 °C)  Pulse:  [] 58  Resp:  [15-24] 15  SpO2:  [92 %-100 %] 95 %  BP: ()/(46-89) 123/57  Arterial Line BP: (106-183)/(39-72) 116/42     Weight: 78.6 kg (173 lb 4.5 oz)  Body mass index is 30.7 kg/m².    Estimated Creatinine Clearance: 52.8 mL/min (based on SCr of 0.9 mg/dL).    Physical Exam   Constitutional: She appears well-developed and well-nourished. No distress.   intubated   HENT:   Head: Normocephalic and atraumatic.   Eyes: Pupils are equal, round, and reactive to light. EOM are normal.   Neck: Normal range of motion. Neck supple. No JVD present. No thyromegaly present.   Cardiovascular: Normal rate, regular rhythm, normal heart sounds and intact distal pulses. Exam reveals no gallop and no friction rub.   No murmur heard.  Pulmonary/Chest: Effort normal and breath sounds normal. No stridor. No respiratory distress. She has no wheezes. She has no rales. She exhibits no tenderness.   Abdominal: Soft. Bowel sounds are normal. She exhibits no distension. There is no tenderness. There is no guarding.   Neurological: She displays normal reflexes.   Skin: Skin is warm and dry. Capillary refill takes less than 2 seconds. She is not diaphoretic.       Significant Labs:   Blood Culture:   Recent Labs   Lab 09/08/19  1210 09/08/19  1215 09/11/19  1811   LABBLOO No Growth to date  No Growth to date  No Growth to date  No Growth to date Gram stain brian bottle: Gram positive cocci   Results called to and read back by:Milagro Valencia RN  09/09/2019  13:48  Gram stain aer bottle: Gram positive cocci  09/10/2019  15:46  GEMELLA (S.) MORBILLORUM  Susceptibility testing not routinely performed  * No Growth to date     C4 Count: No results for input(s): C4 in the last 48 hours.  CBC:   Recent Labs   Lab 09/11/19  0115 09/12/19  0200   WBC 10.72 11.49   HGB 12.7 11.7*   HCT 40.0 37.9    178     CMP:   Recent Labs   Lab 09/11/19  0115 09/12/19  0200    137   K 5.1 4.9    106   CO2 21* 23   * 149*   BUN 34* 30*   CREATININE 1.1 0.9   CALCIUM 8.2* 8.3*   PROT 5.7* 5.2*   ALBUMIN 2.5* 2.5*   BILITOT 0.4 0.3   ALKPHOS 48* 43*   AST 29 42*   ALT 37 60*   ANIONGAP 9 8   EGFRNONAA 48.9* >60.0     Coagulation: No results for input(s): PT, INR, APTT in the last 48 hours.  CSF:   Recent Labs   Lab 09/11/19  1355   CSFCULTURE No Growth to date     Hepatitis Panel: No results for input(s): HEPBSAG, HEPAIGM, HEPCAB in the last 48 hours.    Invalid input(s): HAPBIGM  HIV 1/2 Antibodies: No results for input(s): [id number] in the last 48 hours.    Significant Imaging: I have reviewed all pertinent imaging results/findings within the past 24 hours.

## 2019-09-12 NOTE — ASSESSMENT & PLAN NOTE
75yo woman with PMH HTN & CKD presents with acute onset of seizure like activity. Found to have area of ill-defined enhancement on MRI brain w/ and w/out.  Most recent EEG showing bilateral PLEDs left greater than right. ID consulted for bacteremia. Bc ccy 04/2018.  reports dental procedure (crown) ~2 months ago.     9/03 CSF: gram stain negative. 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     Recommendation:   -Obtain TTE to evaluate for endocarditis   -Gemella can be PCN resistant. Continue ceftriaxone & add on gentamycin IV. Discontinue vancomycin.   -Follow up CSF viral studies  -Agree with repeat blood cultures until negative

## 2019-09-12 NOTE — HPI
Ms. Manriquez is a 73yo woman with PMH of HTN & CKD otherwise doing well who presented to [facility] with acute onset of Left sided seizure like activity and LOC.  at bedside reports she was functioning well and appropriately until he had found her unconscious last week. She has never had seizures before. 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 He notes she did seem forgetful during square dancing over the past month but he had attributed it to her age. He also says she had a dental crown procedure a couple of months ago. Denies fevers, chills. Denies FHx of cancer. Denies Fhx of colorectal cancer.     [place] her way [place] [facility], [place] given versed x 2 via EMS. CTH performed at outside hospital was negative for bleed.  MRI brain w/ and w/out obtained that showed large area of edema in right frontal lobe and ill defined enhancement in the frontal lobe.     She has been afebrile & hypertensive. Failed 2 extubations as family notes she became very anxious during trials.

## 2019-09-12 NOTE — H&P
Inpatient Radiology Pre-procedure Note    History of Present Illness:  Clemencia Nguyen is a 74 y.o. female who is admitted to Neuro ICU with multiple episodes of seizure-like activity. An MRI brain w/ and w/out obtained that showed large area of edema in right frontal lobe and ill defined enhancement in the frontal lobe concerning for possible cerebritis vs. Neoplasm. She presents for cerebral angiogram.    Admission H&P reviewed.  Past Medical History:   Diagnosis Date    Depression     has been on tofranil for years;    Disorder of kidney and ureter     Esophageal stricture 06/12/2019    per pt per Dr. Garcia    Hyperlipemia     Hypertension     Hypothyroidism     Osteopenia     Thyroid disease     hypothyroid     Past Surgical History:   Procedure Laterality Date    cataract surgery      CHOLECYSTECTOMY  04/24/2018    CHOLECYSTECTOMY-LAPAROSCOPIC N/A 4/24/2018    [facility] OR    COLONOSCOPY  2011, again in 2014    repeat in 5    COLONOSCOPY W/ BIOPSIES  06/12/2019    polypectomies per Dr. Garcia    ESOPHAGOGASTRODUODENOSCOPY  06/12/2019    Dr. Garcia    HYSTERECTOMY      OOPHORECTOMY      TONSILLECTOMY         Review of Systems:   As documented in primary team H&P    Home Meds:   Prior to Admission medications    Not on File     Scheduled Meds:    amLODIPine  5 mg Per OG tube Daily    cefTRIAXone (ROCEPHIN) IVPB  2 g Intravenous Q24H    chlorhexidine  15 mL Mouth/Throat BID    dexamethasone  4 mg Intravenous Q6H    famotidine  20 mg Per OG tube QHS    heparin (porcine)  5,000 Units Subcutaneous Q8H    lacosamide  100 mg Per OG tube Q12H    levothyroxine  75 mcg Per OG tube Before breakfast    QUEtiapine  25 mg Per OG tube BID    senna-docusate 8.6-50 mg  1 tablet Per OG tube BID    valproic acid (as sodium salt)  750 mg Per OG tube Q8H    vancomycin (VANCOCIN) IVPB  1,500 mg Intravenous Q24H     Continuous Infusions:    fentanyl       propofol 45 mcg/kg/min (09/12/19 0705)     PRN Meds:acetaminophen, Dextrose 10% Bolus, fentaNYL, fentaNYL, glucagon (human recombinant), hydrALAZINE, insulin aspart U-100, labetalol, magnesium oxide, magnesium oxide, midazolam, potassium chloride 10%, potassium chloride 10%, potassium, sodium phosphates, potassium, sodium phosphates, potassium, sodium phosphates, racepinephrine, sodium chloride 0.9%  Anticoagulants/Antiplatelets: heparin DVT PPx    Allergies: Review of patient's allergies indicates:  No Known Allergies  Sedation Hx: have not been any systemic reactions    Labs:  Recent Labs   Lab 09/07/19  0000   INR 0.9       Recent Labs   Lab 09/12/19 0200   WBC 11.49   HGB 11.7*   HCT 37.9   MCV 92         Recent Labs   Lab 09/12/19 0200   *      K 4.9      CO2 23   BUN 30*   CREATININE 0.9   CALCIUM 8.3*   MG 2.2   ALT 60*   AST 42*   ALBUMIN 2.5*   BILITOT 0.3         Vitals:  Temp: 98.9 °F (37.2 °C) (09/12/19 0705)  Pulse: 70 (09/12/19 0918)  Resp: 15 (09/12/19 0918)  BP: (!) 115/58 (09/12/19 0918)  SpO2: (!) 94 % (09/12/19 0918)     Physical Exam:  ASA: 3  Mallampati: II    General: no acute distress  Mental Status: sedated, unresponsive to questions  HEENT: normocephalic, atraumatic  Chest: intubated  Heart: regular heart rate  Abdomen: nondistended  Extremity: moves all extremities spontaneously    Plan: Patient will undergo cerebral angiogram with possible intervention  Sedation Plan: moderate    Justus Berry MD PGY-II  Interventional Radiology  [facility]

## 2019-09-12 NOTE — CONSULTS
Vancomycin therapy discontinued per Infectious Disease recommendations.  Pharmacy will sign off, please re-consult as needed.    Talia Bailon, PharmD, BCCCP  Neurocritical Care Clinical Pharmacist  Spectralink: [id number]

## 2019-09-12 NOTE — SUBJECTIVE & OBJECTIVE
Past Medical History:   Diagnosis Date    Depression     has been on tofranil for years;    Disorder of kidney and ureter     Esophageal stricture 06/12/2019    per pt per Dr. Garcia    Hyperlipemia     Hypertension     Hypothyroidism     Osteopenia     Thyroid disease     hypothyroid       Past Surgical History:   Procedure Laterality Date    cataract surgery      CHOLECYSTECTOMY  04/24/2018    CHOLECYSTECTOMY-LAPAROSCOPIC N/A 4/24/2018    [facility] OR    COLONOSCOPY  2011, again in 2014    repeat in 5    COLONOSCOPY W/ BIOPSIES  06/12/2019    polypectomies per Dr. Garcia    ESOPHAGOGASTRODUODENOSCOPY  06/12/2019    Dr. Garcia    HYSTERECTOMY      OOPHORECTOMY      TONSILLECTOMY         Review of patient's allergies indicates:  No Known Allergies    Medications:  No medications prior to admission.     Antibiotics (From admission, onward)    Start     Stop Route Frequency Ordered    09/12/19 1415  gentamicin (GARAMYCIN) 230 mg in sodium chloride 0.9% 100 mL IVPB      -- IV Every 24 hours (non-standard times) 09/12/19 1302    09/12/19 1215  cefTRIAXone (ROCEPHIN) 2 g in dextrose 5 % 50 mL IVPB      -- IV Every 12 hours (non-standard times) 09/12/19 1214        Antifungals (From admission, onward)    None        Antivirals (From admission, onward)    None           Immunization History   Administered Date(s) Administered    Influenza - High Dose - PF (65 years and older) 10/16/2012, 10/22/2013, 09/08/2015, 11/18/2016, 09/26/2017    Influenza Split 09/20/2011    Pneumococcal Conjugate 12/29/2015    Pneumococcal Polysaccharide - 23 Valent 11/09/2012    Tdap 05/27/2016       Family History     Problem Relation (Age of Onset)    Heart disease Mother    Hypertension Mother    Stroke Father        Social History     Socioeconomic History    Marital status:      Spouse name: Not on file    Number of children: 3    Years of education: Not on file    Highest  education level: Not on file   Occupational History    Not on file   Social Needs    Financial resource strain: Not very hard    Food insecurity:     Worry: Never true     Inability: Never true    Transportation needs:     Medical: No     Non-medical: No   Tobacco Use    Smoking status: Never Smoker    Smokeless tobacco: Never Used   Substance and Sexual Activity    Alcohol use: No     Frequency: Never     Binge frequency: Never    Drug use: No    Sexual activity: Not Currently     Partners: Male     Birth control/protection: Surgical   Lifestyle    Physical activity:     Days per week: 4 days     Minutes per session: 60 min    Stress: Not at all   Relationships    Social connections:     Talks on phone: More than three times a week     Gets together: Twice a week     Attends [employer] service: Not on file     Active member of club or organization: Yes     Attends meetings of clubs or organizations: More than 4 times per year     Relationship status:    Other Topics Concern    Not on file   Social History Narrative    Not on file     Review of Systems   Unable to perform ROS: Intubated     Objective:     Vital Signs (Most Recent):  Temp: 97.9 °F (36.6 °C) (09/12/19 1102)  Pulse: (!) 58 (09/12/19 1315)  Resp: 15 (09/12/19 1315)  BP: (!) 123/57 (09/12/19 1315)  SpO2: 95 % (09/12/19 1315) Vital Signs (24h Range):  Temp:  [97.7 °F (36.5 °C)-98.9 °F (37.2 °C)] 97.9 °F (36.6 °C)  Pulse:  [] 58  Resp:  [15-24] 15  SpO2:  [92 %-100 %] 95 %  BP: ()/(46-89) 123/57  Arterial Line BP: (106-183)/(39-72) 116/42     Weight: 78.6 kg (173 lb 4.5 oz)  Body mass index is 30.7 kg/m².    Estimated Creatinine Clearance: 52.8 mL/min (based on SCr of 0.9 mg/dL).    Physical Exam   Constitutional: She appears well-developed and well-nourished. No distress.   intubated   HENT:   Head: Normocephalic and atraumatic.   Eyes: Pupils are equal, round, and reactive to light. EOM are normal.   Neck: Normal range of  motion. Neck supple. No JVD present. No thyromegaly present.   Cardiovascular: Normal rate, regular rhythm, normal heart sounds and intact distal pulses. Exam reveals no gallop and no friction rub.   No murmur heard.  Pulmonary/Chest: Effort normal and breath sounds normal. No stridor. No respiratory distress. She has no wheezes. She has no rales. She exhibits no tenderness.   Abdominal: Soft. Bowel sounds are normal. She exhibits no distension. There is no tenderness. There is no guarding.   Neurological: She displays normal reflexes.   Skin: Skin is warm and dry. Capillary refill takes less than 2 seconds. She is not diaphoretic.       Significant Labs:   Blood Culture:   Recent Labs   Lab 09/08/19  1210 09/08/19  1215 09/11/19  1811   LABBLOO No Growth to date  No Growth to date  No Growth to date  No Growth to date Gram stain brian bottle: Gram positive cocci   Results called to and read back by:Milagro Valencia RN  09/09/2019  13:48  Gram stain aer bottle: Gram positive cocci 09/10/2019  15:46  GEMELLA (S.) MORBILLORUM  Susceptibility testing not routinely performed  * No Growth to date     C4 Count: No results for input(s): C4 in the last 48 hours.  CBC:   Recent Labs   Lab 09/11/19  0115 09/12/19  0200   WBC 10.72 11.49   HGB 12.7 11.7*   HCT 40.0 37.9    178     CMP:   Recent Labs   Lab 09/11/19  0115 09/12/19  0200    137   K 5.1 4.9    106   CO2 21* 23   * 149*   BUN 34* 30*   CREATININE 1.1 0.9   CALCIUM 8.2* 8.3*   PROT 5.7* 5.2*   ALBUMIN 2.5* 2.5*   BILITOT 0.4 0.3   ALKPHOS 48* 43*   AST 29 42*   ALT 37 60*   ANIONGAP 9 8   EGFRNONAA 48.9* >60.0     Coagulation: No results for input(s): PT, INR, APTT in the last 48 hours.  CSF:   Recent Labs   Lab 09/11/19  1355   CSFCULTURE No Growth to date     Hepatitis Panel: No results for input(s): HEPBSAG, HEPAIGM, HEPCAB in the last 48 hours.    Invalid input(s): HAPBIGM  HIV 1/2 Antibodies: No results for input(s): [id number] in the  last 48 hours.    Significant Imaging: I have reviewed all pertinent imaging results/findings within the past 24 hours.

## 2019-09-12 NOTE — CONSULTS
[facility]  Gastroenterology  Consult Note    Patient Name: Clemencia Nguyen  MRN: [id number]  Admission Date: 9/3/2019  Hospital Length of Stay: 9 days  Code Status: Full Code   Attending Provider: Antelmo Ramos MD   Consulting Provider: Paulette Garcia DO  Primary Care Physician: SHELBY Castillo MD  Principal Problem:Brain lesion    Inpatient consult to Gastroenterology  Consult performed by: Paulette Garcia DO  Consult ordered by: Miriam Orlando MD        Subjective:     HPI: Ms. Nguyen is a 73 y/o CF with known medical issues of Hypothyroidism, HLD, GERD, CKD Stage III. Patient presented to [facility] ED on 9/3 after having an unwitnessed seizure and  found the patient on the floor with her head leaning on their bedroom bed unconscious. The patient was brought to the ED and had a witnessed seizure with rashmi's paralysis: left sided facial drooping, left sided weakness, tongue biting. The patient was transferred to [facility] for higher neuro critical care work up. On her way to [facility] per the  at bedside reported that the patient continued to have seizures in the ambulance and required to be intubated to maintain her airway. Upon arrival she was in status epilepticus and was given IV Keppra which had to be transitioned to IV Vimpat to break her out of status. MRI of the brain showed cerebral edema secondary to a right frontal lobe mass. The patient was started on IV decadron to reduce the swelling and a PPI. She was transferred to [facility] unit where serial EEGs daily have been done showing multifocal cerebral dysfunction with left>right epileptiform periodic changes possibly due to toxic/ metabolic derangements, infection, medications, etc. LP on 9/3/19 was done and CSF cytology was negative on gram stain and for malignant cells. TTE 9/4/19 was normal showing no LV dyastolic or systolic dysfunction with an EF of 65-70%. The patient was transitioned from vimpat to  keppra on 9/6/19 for maintenance and then valproic acid was started on 9/7/19. Extubation was attempted. Extubation was attempted 9/7/19 and the patient was not able to tolerate it due to not being able to maintain her airway and increased secretions. MRI was re-done on 9/9/19 with little changes from previous study on 9/3/19 still showing R frontal lobe mass with notable lack of mass effect with differential including infectious and inflammatory processes such as cerebritis/encephalitis or low-grade primary brain tumor. And B/L LE U/S showed no evidence of DVT. Blood culture results from 9/3/19 resulted with gemella morbillorum and respiratory cultures from 9/3/19 resulted with pan-sensitive E. Coli both being covered with rocephin and gentamicin. Repeat LP was done on 9/11/19 showing a slightly bloody fluid, neutrophils of 72, lymphocytes of 21 and monocytes of 7 with WBC count 6. Cerebral Angiogram was also done 9/12/19.    The primary care team has asked Cardiology to do a FEDERICO on this patient but due to her history of lower esophageal ring as worked up by GI on an EGD [facility] in [facility] on 612/19; GI was consulted to assess the patient first before FEDERICO is attempted.     EGD on 6/12/19 showed Lower esophageal ring, mild erythema of the stomach with biopsies taken from the body and antrum, with normal pylorus and duodenum. Biopsies of antrum and body of the stomach showed reactive changes to the mucosa negative for H. Pylroi. The esophagus had squamocolumnar mucosa with reflux esophagitis negative fro metaplasia    Colonoscopy 6/12/19 showed 1 dimunitive polyp in the sigmoid colon ablated, 8 mm sessile polyp in the transverse colon removed and biopsied and 12 mm sessile polyp in the ascending colon removed and biopsied. Biopsies showed hyperplastic polyp in the transverse colon and fragments of a tubular adenoma in the ascending colon    Past Medical History:   Diagnosis Date     Depression     has been on tofranil for years;    Disorder of kidney and ureter     Esophageal stricture 06/12/2019    per pt per Dr. Garcia    Hyperlipemia     Hypertension     Hypothyroidism     Osteopenia     Thyroid disease     hypothyroid       Past Surgical History:   Procedure Laterality Date    cataract surgery      CHOLECYSTECTOMY  04/24/2018    CHOLECYSTECTOMY-LAPAROSCOPIC N/A 4/24/2018    Performed [facility] Marina Killian [facility] at [facility] OR    COLONOSCOPY  2011, again in 2014    repeat in 5    COLONOSCOPY W/ BIOPSIES  06/12/2019    polypectomies per Dr. Garcia    ESOPHAGOGASTRODUODENOSCOPY  06/12/2019    Dr. Garcia    HYSTERECTOMY      OOPHORECTOMY      TONSILLECTOMY         Review of patient's allergies indicates:  No Known Allergies  Family History     Problem Relation (Age of Onset)    Heart disease Mother    Hypertension Mother    Stroke Father        Tobacco Use    Smoking status: Never Smoker    Smokeless tobacco: Never Used   Substance and Sexual Activity    Alcohol use: No     Frequency: Never     Binge frequency: Never    Drug use: No    Sexual activity: Not Currently     Partners: Male     Birth control/protection: Surgical     Review of Systems   Unable to perform ROS: Intubated     Objective:     Vital Signs (Most Recent):  Temp: 97.9 °F (36.6 °C) (09/12/19 1102)  Pulse: 83 (09/12/19 1405)  Resp: 19 (09/12/19 1405)  BP: (!) 153/67 (09/12/19 1405)  SpO2: 97 % (09/12/19 1405) Vital Signs (24h Range):  Temp:  [97.7 °F (36.5 °C)-98.9 °F (37.2 °C)] 97.9 °F (36.6 °C)  Pulse:  [] 83  Resp:  [15-24] 19  SpO2:  [92 %-100 %] 97 %  BP: ()/(46-89) 153/67  Arterial Line BP: (106-190)/(39-72) 190/72     Weight: 78.6 kg (173 lb 4.5 oz) (09/07/19 2305)  Body mass index is 30.7 kg/m².      Intake/Output Summary (Last 24 hours) at 9/12/2019 1448  Last data filed at 9/12/2019 1215  Gross per 24 hour   Intake 525.26 ml   Output --   Net 525.26 ml       Lines/Drains/Airways      Drain                 NG/OG Tube 09/03/19 1100 orogastric Center mouth 9 days          Airway                 Airway - Non-Surgical 09/06/19 0915 Endotracheal Tube 6 days          Arterial Line                 Arterial Line 09/11/19 1845 Right Radial less than 1 day          Peripheral Intravenous Line                 Peripheral IV - Single Lumen 09/06/19 1330 20 G;1 3/4 in Right;Anterior Upper Arm 6 days         Peripheral IV - Single Lumen 09/11/19 1205 20 G;1 3/4 in Left Upper Arm 1 day                Physical Exam   Constitutional: She appears well-developed and well-nourished. She appears ill. She is sedated, intubated and restrained.   Cardiovascular: Normal rate and regular rhythm.   Pulmonary/Chest: She is intubated.   Abdominal: Soft. She exhibits no distension and no mass. Bowel sounds are decreased.   Musculoskeletal: She exhibits no edema.   Skin: Skin is warm and dry. No erythema.       Significant Labs:  Blood Culture:   Recent Labs   Lab 09/11/19  1811   LABBLOO No Growth to date     CBC:   Recent Labs   Lab 09/11/19  0115 09/12/19  0200   WBC 10.72 11.49   HGB 12.7 11.7*   HCT 40.0 37.9    178     CMP:   Recent Labs   Lab 09/12/19  0200   *   CALCIUM 8.3*   ALBUMIN 2.5*   PROT 5.2*      K 4.9   CO2 23      BUN 30*   CREATININE 0.9   ALKPHOS 43*   ALT 60*   AST 42*   BILITOT 0.3     Liver Function Test:   Recent Labs   Lab 09/11/19  0115 09/12/19  0200   ALT 37 60*   AST 29 42*   ALKPHOS 48* 43*   BILITOT 0.4 0.3   PROT 5.7* 5.2*   ALBUMIN 2.5* 2.5*       Significant Imaging:  Imaging results within the past 24 hours have been reviewed.     9/10 CXR: Endotracheal tube in place with distal tip approximately 3.9 cm proximal to the evonne.  NG tube noted with distal tip outside the field of view.  Operative change of cholecystectomy.  Small volume left-sided pleural effusion.  No pneumothorax.  Mildly increased right hilar fullness which could be secondary to patient  positioning.  Cardiac silhouette and mediastinal contours are otherwise unchanged.  Bones are intact.      Assessment/Plan:     Active Diagnoses:    Diagnosis Date Noted POA    PRINCIPAL PROBLEM:  Brain lesion [G93.9] 09/03/2019 Yes    Bacteremia [R78.81] 09/12/2019 Unknown    Fever [R50.9] 09/09/2019 Yes    Acute respiratory failure with hypoxia and hypercapnia [J96.01, J96.02] 09/09/2019 Yes    Leukocytosis [D72.829] 09/08/2019 No    On mechanically assisted ventilation [Z99.11] 09/06/2019 Not Applicable    Alteration in skin integrity [R23.9] 09/04/2019 Yes    New onset seizure [R56.9] 09/03/2019 Yes    Status epilepticus [G40.901] 09/03/2019 Unknown    Cerebral edema [G93.6]  Yes    Chronic kidney disease, stage III (moderate) [N18.3] 03/16/2016 Yes    Essential hypertension [I10] 03/16/2016 Yes      Problems Resolved During this Admission:       Asymptomatic Lower Esophageal Stricture - As seen on EGD 6/12/19 - not dialted  75 y/o CF with history of GERD on pantoprazole 40 mg BID at home Currently intubated since 9/3 and requiring a FEDERICO. Per Cardiology request, GI is to evaluate patient due to history of stricture before FEDERICO is performed.    EGD 6/12/19 OSH = showed Lower esophageal ring, mild erythema of the stomach with biopsies taken from the body and antrum, with normal pylorus and duodenum. Biopsies of antrum and body of the stomach showed reactive changes to the mucosa negative for H. Pylroi. The esophagus had squamocolumnar mucosa with reflux esophagitis negative fro metaplasia  -- Consider switching patient to a PPI from an H2 blocker due to risk of curling ulcer with use of IV steroids greater than 250 mg equivalents of hydrocortisone and mechanical ventilation >48 hours.   -- Plan for EGD tomorrow 9/13 to assess for strictures and plan for possible dilation if warranted. hold tube feedings after Midnight.      H/O Colonic Polyps - Tubular adenoma  Colonoscopy 6/12/19 OSH = showed 1  dimunitive polyp in the sigmoid colon ablated, 8 mm sessile polyp in the transverse colon removed and biopsied and 12 mm sessile polyp in the ascending colon removed and biopsied. Biopsies showed hyperplastic polyp in the transverse colon and fragments of a tubular adenoma in the ascending colon  -- Plan for repeat Colonoscopy per Dr. Garcia in 2 years    Thank you for your consult.     Paulette Garcia,   Gastroenterology  [facility]

## 2019-09-13 ENCOUNTER — ANESTHESIA (OUTPATIENT)
Dept: ENDOSCOPY | Facility: HOSPITAL | Age: 74
DRG: 004 | End: 2019-09-13
Payer: MEDICARE

## 2019-09-13 ENCOUNTER — ANESTHESIA EVENT (OUTPATIENT)
Dept: ENDOSCOPY | Facility: HOSPITAL | Age: 74
DRG: 004 | End: 2019-09-13
Payer: MEDICARE

## 2019-09-13 PROBLEM — E86.1 HYPOTENSION DUE TO HYPOVOLEMIA: Status: ACTIVE | Noted: 2019-09-13

## 2019-09-13 LAB
ALBUMIN SERPL BCP-MCNC: 2.7 G/DL (ref 3.5–5.2)
ALLENS TEST: ABNORMAL
ALP SERPL-CCNC: 47 U/L (ref 55–135)
ALT SERPL W/O P-5'-P-CCNC: 67 U/L (ref 10–44)
ANION GAP SERPL CALC-SCNC: 10 MMOL/L (ref 8–16)
AST SERPL-CCNC: 31 U/L (ref 10–40)
BACTERIA BLD CULT: NORMAL
BASOPHILS NFR BLD: 0 % (ref 0–1.9)
BILIRUB SERPL-MCNC: 0.2 MG/DL (ref 0.1–1)
BUN SERPL-MCNC: 28 MG/DL (ref 8–23)
CALCIUM SERPL-MCNC: 8.3 MG/DL (ref 8.7–10.5)
CHLORIDE SERPL-SCNC: 103 MMOL/L (ref 95–110)
CO2 SERPL-SCNC: 24 MMOL/L (ref 23–29)
CREAT SERPL-MCNC: 1 MG/DL (ref 0.5–1.4)
DIFFERENTIAL METHOD: ABNORMAL
EOSINOPHIL NFR BLD: 0 % (ref 0–8)
ERYTHROCYTE [DISTWIDTH] IN BLOOD BY AUTOMATED COUNT: 13.1 % (ref 11.5–14.5)
EST. GFR  (AFRICAN AMERICAN): >60 ML/MIN/1.73 M^2
EST. GFR  (NON AFRICAN AMERICAN): 54.9 ML/MIN/1.73 M^2
EV RNA SPEC QL NAA+PROBE: NEGATIVE
GLUCOSE SERPL-MCNC: 142 MG/DL (ref 70–110)
HCO3 UR-SCNC: 25 MMOL/L (ref 24–28)
HCT VFR BLD AUTO: 35.9 % (ref 37–48.5)
HGB BLD-MCNC: 11.8 G/DL (ref 12–16)
HSV1, PCR, CSF: NEGATIVE
HSV2, PCR, CSF: NEGATIVE
IMM GRANULOCYTES # BLD AUTO: ABNORMAL K/UL (ref 0–0.04)
IMM GRANULOCYTES NFR BLD AUTO: ABNORMAL % (ref 0–0.5)
LYMPHOCYTES NFR BLD: 9 % (ref 18–48)
MAGNESIUM SERPL-MCNC: 2.3 MG/DL (ref 1.6–2.6)
MCH RBC QN AUTO: 28.9 PG (ref 27–31)
MCHC RBC AUTO-ENTMCNC: 32.9 G/DL (ref 32–36)
MCV RBC AUTO: 88 FL (ref 82–98)
METAMYELOCYTES NFR BLD MANUAL: 2 %
MONOCYTES NFR BLD: 12 % (ref 4–15)
MYELOCYTES NFR BLD MANUAL: 1 %
NEUTROPHILS NFR BLD: 71 % (ref 38–73)
NEUTS BAND NFR BLD MANUAL: 5 %
NRBC BLD-RTO: 0 /100 WBC
PCO2 BLDA: 39.6 MMHG (ref 35–45)
PH SMN: 7.41 [PH] (ref 7.35–7.45)
PHOSPHATE SERPL-MCNC: 4.1 MG/DL (ref 2.7–4.5)
PLATELET # BLD AUTO: 184 K/UL (ref 150–350)
PMV BLD AUTO: 9.5 FL (ref 9.2–12.9)
PO2 BLDA: 122 MMHG (ref 80–100)
POC BE: 0 MMOL/L
POC SATURATED O2: 99 % (ref 95–100)
POC TCO2: 26 MMOL/L (ref 23–27)
POCT GLUCOSE: 116 MG/DL (ref 70–110)
POCT GLUCOSE: 126 MG/DL (ref 70–110)
POTASSIUM SERPL-SCNC: 4.8 MMOL/L (ref 3.5–5.1)
PROT SERPL-MCNC: 5.7 G/DL (ref 6–8.4)
RBC # BLD AUTO: 4.09 M/UL (ref 4–5.4)
SAMPLE: ABNORMAL
SITE: ABNORMAL
SODIUM SERPL-SCNC: 137 MMOL/L (ref 136–145)
SPECIMEN SOURCE: NORMAL
SPECIMEN SOURCE: NORMAL
VARICELLA ZOSTER BY PCR RESULT: NEGATIVE
WBC # BLD AUTO: 11.85 K/UL (ref 3.9–12.7)

## 2019-09-13 PROCEDURE — 25000003 PHARM REV CODE 250: Performed by: PSYCHIATRY & NEUROLOGY

## 2019-09-13 PROCEDURE — 85007 BL SMEAR W/DIFF WBC COUNT: CPT

## 2019-09-13 PROCEDURE — 63600175 PHARM REV CODE 636 W HCPCS: Performed by: NURSE PRACTITIONER

## 2019-09-13 PROCEDURE — 99232 PR SUBSEQUENT HOSPITAL CARE,LEVL II: ICD-10-PCS | Mod: ,,, | Performed by: NEUROLOGICAL SURGERY

## 2019-09-13 PROCEDURE — 94761 N-INVAS EAR/PLS OXIMETRY MLT: CPT

## 2019-09-13 PROCEDURE — 27200966 HC CLOSED SUCTION SYSTEM

## 2019-09-13 PROCEDURE — 99233 SBSQ HOSP IP/OBS HIGH 50: CPT | Mod: ,,, | Performed by: INTERNAL MEDICINE

## 2019-09-13 PROCEDURE — 63600175 PHARM REV CODE 636 W HCPCS: Performed by: PHYSICIAN ASSISTANT

## 2019-09-13 PROCEDURE — 99232 SBSQ HOSP IP/OBS MODERATE 35: CPT | Mod: ,,, | Performed by: NEUROLOGICAL SURGERY

## 2019-09-13 PROCEDURE — 63600175 PHARM REV CODE 636 W HCPCS: Performed by: PSYCHIATRY & NEUROLOGY

## 2019-09-13 PROCEDURE — 99900026 HC AIRWAY MAINTENANCE (STAT)

## 2019-09-13 PROCEDURE — 25000003 PHARM REV CODE 250: Performed by: STUDENT IN AN ORGANIZED HEALTH CARE EDUCATION/TRAINING PROGRAM

## 2019-09-13 PROCEDURE — 27000221 HC OXYGEN, UP TO 24 HOURS

## 2019-09-13 PROCEDURE — 82803 BLOOD GASES ANY COMBINATION: CPT

## 2019-09-13 PROCEDURE — 99900035 HC TECH TIME PER 15 MIN (STAT)

## 2019-09-13 PROCEDURE — 99291 CRITICAL CARE FIRST HOUR: CPT | Mod: GC,,, | Performed by: PSYCHIATRY & NEUROLOGY

## 2019-09-13 PROCEDURE — 63600175 PHARM REV CODE 636 W HCPCS: Performed by: STUDENT IN AN ORGANIZED HEALTH CARE EDUCATION/TRAINING PROGRAM

## 2019-09-13 PROCEDURE — 99233 PR SUBSEQUENT HOSPITAL CARE,LEVL III: ICD-10-PCS | Mod: ,,, | Performed by: INTERNAL MEDICINE

## 2019-09-13 PROCEDURE — 80053 COMPREHEN METABOLIC PANEL: CPT

## 2019-09-13 PROCEDURE — 20000000 HC ICU ROOM

## 2019-09-13 PROCEDURE — 83735 ASSAY OF MAGNESIUM: CPT

## 2019-09-13 PROCEDURE — 85027 COMPLETE CBC AUTOMATED: CPT

## 2019-09-13 PROCEDURE — 99291 PR CRITICAL CARE, E/M 30-74 MINUTES: ICD-10-PCS | Mod: GC,,, | Performed by: PSYCHIATRY & NEUROLOGY

## 2019-09-13 PROCEDURE — 36620 INSERTION CATHETER ARTERY: CPT

## 2019-09-13 PROCEDURE — 25000003 PHARM REV CODE 250: Performed by: PHYSICIAN ASSISTANT

## 2019-09-13 PROCEDURE — 94003 VENT MGMT INPAT SUBQ DAY: CPT

## 2019-09-13 PROCEDURE — 84100 ASSAY OF PHOSPHORUS: CPT

## 2019-09-13 PROCEDURE — 25000003 PHARM REV CODE 250: Performed by: NURSE PRACTITIONER

## 2019-09-13 PROCEDURE — 37799 UNLISTED PX VASCULAR SURGERY: CPT

## 2019-09-13 RX ORDER — SODIUM CHLORIDE, SODIUM LACTATE, POTASSIUM CHLORIDE, CALCIUM CHLORIDE 600; 310; 30; 20 MG/100ML; MG/100ML; MG/100ML; MG/100ML
INJECTION, SOLUTION INTRAVENOUS CONTINUOUS
Status: ACTIVE | OUTPATIENT
Start: 2019-09-13 | End: 2019-09-14

## 2019-09-13 RX ORDER — MIDAZOLAM HYDROCHLORIDE 1 MG/ML
2 INJECTION INTRAMUSCULAR; INTRAVENOUS ONCE
Status: COMPLETED | OUTPATIENT
Start: 2019-09-13 | End: 2019-09-13

## 2019-09-13 RX ORDER — PHENYLEPHRINE HYDROCHLORIDE 10 MG/ML
100 INJECTION INTRAVENOUS ONCE
Status: COMPLETED | OUTPATIENT
Start: 2019-09-13 | End: 2019-09-13

## 2019-09-13 RX ORDER — QUETIAPINE FUMARATE 25 MG/1
50 TABLET, FILM COATED ORAL 2 TIMES DAILY
Status: DISCONTINUED | OUTPATIENT
Start: 2019-09-13 | End: 2019-09-14

## 2019-09-13 RX ORDER — MIDAZOLAM HYDROCHLORIDE 1 MG/ML
1 INJECTION INTRAMUSCULAR; INTRAVENOUS ONCE
Status: COMPLETED | OUTPATIENT
Start: 2019-09-13 | End: 2019-09-13

## 2019-09-13 RX ORDER — QUETIAPINE FUMARATE 25 MG/1
75 TABLET, FILM COATED ORAL ONCE
Status: COMPLETED | OUTPATIENT
Start: 2019-09-13 | End: 2019-09-13

## 2019-09-13 RX ORDER — OLANZAPINE 5 MG/1
5 TABLET ORAL EVERY 12 HOURS PRN
Status: DISCONTINUED | OUTPATIENT
Start: 2019-09-13 | End: 2019-09-21

## 2019-09-13 RX ORDER — PHENYLEPHRINE HCL IN 0.9% NACL 1 MG/10 ML
SYRINGE (ML) INTRAVENOUS
Status: COMPLETED
Start: 2019-09-13 | End: 2019-09-13

## 2019-09-13 RX ORDER — NOREPINEPHRINE BITARTRATE/D5W 4MG/250ML
0.02 PLASTIC BAG, INJECTION (ML) INTRAVENOUS CONTINUOUS
Status: DISCONTINUED | OUTPATIENT
Start: 2019-09-13 | End: 2019-09-17

## 2019-09-13 RX ADMIN — QUETIAPINE FUMARATE 50 MG: 25 TABLET ORAL at 09:09

## 2019-09-13 RX ADMIN — Medication 0.02 MCG/KG/MIN: at 01:09

## 2019-09-13 RX ADMIN — DEXAMETHASONE SODIUM PHOSPHATE 4 MG: 4 INJECTION, SOLUTION INTRAMUSCULAR; INTRAVENOUS at 11:09

## 2019-09-13 RX ADMIN — LEVOTHYROXINE SODIUM 75 MCG: 75 TABLET ORAL at 05:09

## 2019-09-13 RX ADMIN — CEFTRIAXONE SODIUM 2 G: 2 INJECTION, SOLUTION INTRAVENOUS at 11:09

## 2019-09-13 RX ADMIN — AMLODIPINE BESYLATE 5 MG: 5 TABLET ORAL at 09:09

## 2019-09-13 RX ADMIN — SENNOSIDES AND DOCUSATE SODIUM 1 TABLET: 8.6; 5 TABLET ORAL at 08:09

## 2019-09-13 RX ADMIN — SENNOSIDES AND DOCUSATE SODIUM 1 TABLET: 8.6; 5 TABLET ORAL at 09:09

## 2019-09-13 RX ADMIN — FENTANYL CITRATE 50 MCG: 50 INJECTION INTRAMUSCULAR; INTRAVENOUS at 12:09

## 2019-09-13 RX ADMIN — PHENYLEPHRINE HYDROCHLORIDE 100 MCG: 10 INJECTION INTRAVENOUS at 10:09

## 2019-09-13 RX ADMIN — Medication 0.34 MCG/KG/MIN: at 10:09

## 2019-09-13 RX ADMIN — QUETIAPINE FUMARATE 75 MG: 25 TABLET ORAL at 05:09

## 2019-09-13 RX ADMIN — VALPROIC ACID 750 MG: 250 SOLUTION ORAL at 04:09

## 2019-09-13 RX ADMIN — VALPROIC ACID 750 MG: 250 SOLUTION ORAL at 08:09

## 2019-09-13 RX ADMIN — HEPARIN SODIUM 5000 UNITS: 5000 INJECTION, SOLUTION INTRAVENOUS; SUBCUTANEOUS at 09:09

## 2019-09-13 RX ADMIN — FENTANYL CITRATE 50 MCG: 50 INJECTION INTRAMUSCULAR; INTRAVENOUS at 02:09

## 2019-09-13 RX ADMIN — VALPROIC ACID 750 MG: 250 SOLUTION ORAL at 11:09

## 2019-09-13 RX ADMIN — FAMOTIDINE 20 MG: 20 TABLET ORAL at 08:09

## 2019-09-13 RX ADMIN — HEPARIN SODIUM 5000 UNITS: 5000 INJECTION, SOLUTION INTRAVENOUS; SUBCUTANEOUS at 05:09

## 2019-09-13 RX ADMIN — Medication 0.34 MCG/KG/MIN: at 08:09

## 2019-09-13 RX ADMIN — QUETIAPINE FUMARATE 50 MG: 25 TABLET ORAL at 08:09

## 2019-09-13 RX ADMIN — Medication 200 MCG/HR: at 08:09

## 2019-09-13 RX ADMIN — GENTAMICIN SULFATE 230 MG: 40 INJECTION, SOLUTION INTRAMUSCULAR; INTRAVENOUS at 03:09

## 2019-09-13 RX ADMIN — DEXAMETHASONE SODIUM PHOSPHATE 4 MG: 4 INJECTION, SOLUTION INTRAMUSCULAR; INTRAVENOUS at 06:09

## 2019-09-13 RX ADMIN — MIDAZOLAM HYDROCHLORIDE 1 MG: 1 INJECTION, SOLUTION INTRAMUSCULAR; INTRAVENOUS at 05:09

## 2019-09-13 RX ADMIN — CHLORHEXIDINE GLUCONATE 0.12% ORAL RINSE 15 ML: 1.2 LIQUID ORAL at 09:09

## 2019-09-13 RX ADMIN — DEXAMETHASONE SODIUM PHOSPHATE 4 MG: 4 INJECTION, SOLUTION INTRAMUSCULAR; INTRAVENOUS at 05:09

## 2019-09-13 RX ADMIN — SODIUM CHLORIDE, SODIUM LACTATE, POTASSIUM CHLORIDE, AND CALCIUM CHLORIDE 100 ML/HR: 600; 310; 30; 20 INJECTION, SOLUTION INTRAVENOUS at 09:09

## 2019-09-13 RX ADMIN — SODIUM CHLORIDE, SODIUM LACTATE, POTASSIUM CHLORIDE, AND CALCIUM CHLORIDE 500 ML: .6; .31; .03; .02 INJECTION, SOLUTION INTRAVENOUS at 09:09

## 2019-09-13 RX ADMIN — CHLORHEXIDINE GLUCONATE 0.12% ORAL RINSE 15 ML: 1.2 LIQUID ORAL at 08:09

## 2019-09-13 RX ADMIN — CEFTRIAXONE SODIUM 2 G: 2 INJECTION, SOLUTION INTRAVENOUS at 12:09

## 2019-09-13 RX ADMIN — LACOSAMIDE 100 MG: 50 TABLET, FILM COATED ORAL at 08:09

## 2019-09-13 RX ADMIN — LACOSAMIDE 100 MG: 50 TABLET, FILM COATED ORAL at 09:09

## 2019-09-13 RX ADMIN — MIDAZOLAM HYDROCHLORIDE 2 MG: 1 INJECTION, SOLUTION INTRAMUSCULAR; INTRAVENOUS at 09:09

## 2019-09-13 NOTE — SUBJECTIVE & OBJECTIVE
Interval History: naeon, angio negative yesterday.    Medications:  Continuous Infusions:   fentanyl 100 mcg/hr (09/13/19 0705)    propofol 25 mcg/kg/min (09/13/19 0705)     Scheduled Meds:   amLODIPine  5 mg Per OG tube Daily    cefTRIAXone (ROCEPHIN) IVPB  2 g Intravenous Q12H    chlorhexidine  15 mL Mouth/Throat BID    dexamethasone  4 mg Intravenous Q6H    famotidine  20 mg Per OG tube QHS    gentamicin IVPB (standard dosing)  230 mg Intravenous Q24H    heparin (porcine)  5,000 Units Subcutaneous Q8H    lacosamide  100 mg Per OG tube Q12H    levothyroxine  75 mcg Per OG tube Before breakfast    norepinephrine bitartrate-D5W        QUEtiapine  25 mg Per OG tube BID    senna-docusate 8.6-50 mg  1 tablet Per OG tube BID    valproic acid (as sodium salt)  750 mg Per OG tube Q8H     PRN Meds:acetaminophen, Dextrose 10% Bolus, fentaNYL, fentaNYL, glucagon (human recombinant), hydrALAZINE, insulin aspart U-100, labetalol, magnesium oxide, magnesium oxide, midazolam, potassium chloride 10%, potassium chloride 10%, potassium, sodium phosphates, potassium, sodium phosphates, potassium, sodium phosphates, racepinephrine, sodium chloride 0.9%, sodium chloride 0.9%     Review of Systems  Objective:     Weight: 78.6 kg (173 lb 4.5 oz)  Body mass index is 30.7 kg/m².  Vital Signs (Most Recent):  Temp: 98.9 °F (37.2 °C) (09/13/19 0705)  Pulse: 87 (09/13/19 0705)  Resp: 20 (09/13/19 0705)  BP: (!) 166/67 (09/13/19 0705)  SpO2: 100 % (09/13/19 0705) Vital Signs (24h Range):  Temp:  [97.9 °F (36.6 °C)-99.3 °F (37.4 °C)] 98.9 °F (37.2 °C)  Pulse:  [54-93] 87  Resp:  [15-31] 20  SpO2:  [92 %-100 %] 100 %  BP: ()/(37-85) 166/67  Arterial Line BP: ()/(26-72) 155/55     Date 09/13/19 0700 - 09/14/19 0659   Shift [id number] 8644-9300 6520-0659 24 Hour Total   INTAKE   I.V.(mL/kg) 22.3(0.3)   22.3(0.3)   Shift Total(mL/kg) 22.3(0.3)   22.3(0.3)   OUTPUT   Shift Total(mL/kg)       Weight (kg) 78.6 78.6 78.6 78.6               Vent Mode: A/C  Oxygen Concentration (%):  [35-50] 50  Resp Rate Total:  [15 br/min-21 br/min] 17 br/min  Vt Set:  [380 mL] 380 mL  PEEP/CPAP:  [5 cmH20] 5 cmH20  Pressure Support:  [0 cmH20] 0 cmH20  Mean Airway Pressure:  [6.7 cmH20-9.5 cmH20] 6.7 cmH20         NG/OG Tube 09/03/19 1100 orogastric Center mouth (Active)   Placement Check placement verified by distal tube length measurement;placement verified by x-ray;physician notified 9/13/2019  3:05 AM   Tolerance no signs/symptoms of discomfort 9/13/2019  3:05 AM   Securement secured to nostril center w/ adhesive device 9/13/2019  3:05 AM   Clamp Status/Tolerance unclamped 9/13/2019  3:05 AM   Suction Setting/Drainage Method suction at the bedside 9/13/2019  3:05 AM   Insertion Site Appearance no redness, warmth, tenderness, skin breakdown, drainage 9/13/2019  3:05 AM   Flush/Irrigation flushed w/;water;no resistance met 9/13/2019  3:05 AM   Feeding Method continuous 9/11/2019  3:05 AM   Feeding Action feeding held 9/13/2019  3:05 AM   Current Rate (mL/hr) 0 mL/hr 9/11/2019  7:05 PM   Goal Rate (mL/hr) 45 mL/hr 9/11/2019  7:05 PM   Intake (mL) 50 mL 9/12/2019  9:05 AM   Water Bolus (mL) 60 mL 9/5/2019  6:00 AM   Rate Formula Tube Feeding (mL/hr) 45 mL/hr 9/9/2019  7:05 PM   Formula Name isosource 9/10/2019  3:00 PM   Intake (mL) - Formula Tube Feeding 45 9/11/2019 11:00 AM   Residual Amount (ml) 0 ml 9/11/2019  4:00 PM       Neurosurgery Physical Exam   [id number]  PERRL  Right side and LLE spontaneous, WD in LUE  SILT    Significant Labs:  Recent Labs   Lab 09/12/19  0200 09/13/19  0146   * 142*    137   K 4.9 4.8    103   CO2 23 24   BUN 30* 28*   CREATININE 0.9 1.0   CALCIUM 8.3* 8.3*   MG 2.2 2.3     Recent Labs   Lab 09/12/19  0200 09/13/19  0146   WBC 11.49 11.85   HGB 11.7* 11.8*   HCT 37.9 35.9*    184     No results for input(s): LABPT, INR, APTT in the last 48 hours.  Microbiology Results (last 7 days)     Procedure  Component Value Units Date/Time    CSF culture [[id number]] Collected:  09/11/19 1355    Order Status:  Completed Specimen:  CSF (Spinal Fluid) from CSF Tap, Tube 3 Updated:  09/13/19 0724     CSF CULTURE No Growth to date     Gram Stain Result Cytospin indicates:      Rare WBC's      No organisms seen    Blood culture [[id number]] Collected:  09/11/19 1811    Order Status:  Completed Specimen:  Blood from Peripheral, Hand, Left Updated:  09/12/19 2012     Blood Culture, Routine No Growth to date      No Growth to date    Blood culture [[id number]] Collected:  09/08/19 1210    Order Status:  Completed Specimen:  Blood from Peripheral, Ankle, Left Updated:  09/12/19 1412     Blood Culture, Routine No Growth to date      No Growth to date      No Growth to date      No Growth to date      No Growth to date    Blood culture [[id number]]  (Abnormal) Collected:  09/08/19 1215    Order Status:  Completed Specimen:  Blood from Peripheral, Forearm, Left Updated:  09/12/19 1031     Blood Culture, Routine Gram stain brian bottle: Gram positive cocci       Results called to and read back by:Milagro Valencia RN  09/09/2019  13:48      Gram stain aer bottle: Gram positive cocci 09/10/2019  15:46      GEMELLA (S.) MORBILLORUM  Susceptibility testing not routinely performed      Blood culture [[id number]]     Order Status:  Sent Specimen:  Blood     Gram stain [[id number]] Collected:  09/11/19 1355    Order Status:  Canceled Specimen:  CSF (Spinal Fluid) from CSF Tap, Tube 3     Culture, Respiratory with Gram Stain [[id number]]  (Abnormal)  (Susceptibility) Collected:  09/08/19 1158    Order Status:  Completed Specimen:  Respiratory from Tracheal Aspirate Updated:  09/11/19 0934     Respiratory Culture No S aureus or Pseudomonas isolated.      ESCHERICHIA COLI  Few  Normal respiratory connie also present       Gram Stain (Respiratory) Few WBC's     Gram Stain (Respiratory) Moderate Gram negative rods     Gram Stain (Respiratory) Rare Gram  positive cocci    Blood culture [[id number]]     Order Status:  Canceled Specimen:  Blood     Blood culture [[id number]]     Order Status:  Canceled Specimen:  Blood             Significant Diagnostics:  Angio: negative

## 2019-09-13 NOTE — PROGRESS NOTES
2005- Kingsley Aguilera NP called to bedside d/t pt MAPs on cuff reading 49. Ale at bedside. 2 kayla bumps given without resolution. Another 2 kayla bumps given, pt BP responding MAPs 60-70. Pts HR 45-47. Ordered to give .5 mg Atropine-HR now in 60s. New brachial arterial line placed by Ale.      2045- Fentanyl currently off and Propofol at 30 mcg/kg/min for pt comfort. MAPs 70s HR 80.

## 2019-09-13 NOTE — ASSESSMENT & PLAN NOTE
73 y/o female with pmhx CKD and HTN presented to outside hospital for seizure- like activity occurring on the left side. Found to have area of ill-defined enhancement on MRI brain w/ and w/out.  Most recent EEG showing bilateral PLEDs left greater than right.    -q 1 hr neuro checks  -Fu epilepsy recs  -Fu infectious rodriguez, CSF NGTD.  Would recommend documenting opening and closing pressure when performing LP to assess for elevated ICPs.  -EGD and possible FEDERICO today to eval for vegetations.  -Continue steroids for now  -WTE when medically stable.  -Angio reviewed, no evidence of vasculitis.  -Further care per NCC, will follow.

## 2019-09-13 NOTE — ASSESSMENT & PLAN NOTE
2/2 to brain mass   Keppra changed to Vimpat 9/6  Valproic acid started 9/7  Epilepsy following  -Continue current AEDs

## 2019-09-13 NOTE — CONSULTS
[facility]  Infectious Disease  Consult Note    Patient Name: Clemencia Nguyen  MRN: [id number]  Admission Date: 9/3/2019  Hospital Length of Stay: 10 days  Attending Physician: Antelmo Ramos MD  Primary Care Provider: SHELBY Castillo MD     Isolation Status: No active isolations    Patient information was obtained from caregiver / friend, past medical records and ER records.      Consults  Assessment/Plan:     Bacteremia  75yo woman with PMH HTN & CKD presents with acute onset of seizure like activity. Found to have area of ill-defined enhancement on MRI brain w/ and w/out.  Most recent EEG showing bilateral PLEDs left greater than right. ID consulted for bacteremia. Lap ccy 04/2018.  reports dental procedure (crown) ~2 months ago.     9/03 CSF: gram stain negative. 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     Recommendation:   -Obtain FEDERICO to evaluate for endocarditis (patient needing EGD clearance due to hx of esophageal strictures)  -Gemella can be PCN resistant. Continue ceftriaxone and gentamycin IV.  -Follow up CSF viral studies  -Please ensure 2 blood cultures are negative      Thank you for your consult. I will follow-up with patient. Please contact us if you have any additional questions.    Marry Candelaria MD  Infectious Disease  [facility]    Subjective:     Principal Problem: Brain lesion    HPI: Ms. Manriquez is a 75yo woman with PMH of HTN & CKD otherwise doing well who presented to [facility] with acute onset of Left sided seizure like activity  and LOC.  at bedside reports she was functioning well and appropriately until he had found her unconscious last week. She has never had seizures before. 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 He notes she did seem forgetful during square dancing over the past month but he had attributed it to her age. He also says she had a dental crown procedure a couple of months ago. Denies fevers, chills. Denies FHx of cancer. Denies Fhx of colorectal cancer.     On her way to [facility], she was given versed x 2 via EMS. CTH performed at outside hospital was negative for bleed.  MRI brain w/ and w/out obtained that showed large area of edema in right frontal lobe and ill defined enhancement in the frontal lobe.     She has been afebrile & hypertensive. Failed 2 extubations as family notes she became very anxious during trials.     Past Medical History:   Diagnosis Date    Depression     has been on tofranil for years;    Disorder of kidney and ureter     Esophageal stricture 06/12/2019    per pt per Dr. Garcia    Hyperlipemia     Hypertension     Hypothyroidism     Osteopenia     Thyroid disease     hypothyroid       Past Surgical History:   Procedure Laterality Date    cataract surgery      CHOLECYSTECTOMY  04/24/2018    CHOLECYSTECTOMY-LAPAROSCOPIC N/A 4/24/2018    [facility]    COLONOSCOPY  2011, again in 2014    repeat in 5    COLONOSCOPY W/ BIOPSIES  06/12/2019    polypectomies per Dr. Garcia    ESOPHAGOGASTRODUODENOSCOPY  06/12/2019    Dr. Garcia    HYSTERECTOMY      OOPHORECTOMY      TONSILLECTOMY         Review of patient's allergies indicates:  No Known Allergies    Medications:  No medications prior to admission.     Antibiotics (From admission, onward)    Start     Stop Route Frequency Ordered    09/12/19 1415  gentamicin (GARAMYCIN) 230 mg in sodium chloride 0.9% 100 mL IVPB      -- IV Every 24 hours (non-standard times)  09/12/19 1302    09/12/19 1215  cefTRIAXone (ROCEPHIN) 2 g in dextrose 5 % 50 mL IVPB      -- IV Every 12 hours (non-standard times) 09/12/19 1214        Antifungals (From admission, onward)    None        Antivirals (From admission, onward)    None           Immunization History   Administered Date(s) Administered    Influenza - High Dose - PF (65 years and older) 10/16/2012, 10/22/2013, 09/08/2015, 11/18/2016, 09/26/2017    Influenza Split 09/20/2011    Pneumococcal Conjugate 12/29/2015    Pneumococcal Polysaccharide - 23 Valent 11/09/2012    Tdap 05/27/2016       Family History     Problem Relation (Age of Onset)    Heart disease Mother    Hypertension Mother    Stroke Father        Social History     Socioeconomic History    Marital status:      Spouse name: Not on file    Number of children: 3    Years of education: Not on file    Highest education level: Not on file   Occupational History    Not on file   Social Needs    Financial resource strain: Not very hard    Food insecurity:     Worry: Never true     Inability: Never true    Transportation needs:     Medical: No     Non-medical: No   Tobacco Use    Smoking status: Never Smoker    Smokeless tobacco: Never Used   Substance and Sexual Activity    Alcohol use: No     Frequency: Never     Binge frequency: Never    Drug use: No    Sexual activity: Not Currently     Partners: Male     Birth control/protection: Surgical   Lifestyle    Physical activity:     Days per week: 4 days     Minutes per session: 60 min    Stress: Not at all   Relationships    Social connections:     Talks on phone: More than three times a week     Gets together: Twice a week     Attends Religion service: Not on file     Active member of club or organization: Yes     Attends meetings of clubs or organizations: More than 4 times per year     Relationship status:    Other Topics Concern    Not on file   Social History Narrative    Not on file     Review  of Systems   Unable to perform ROS: Intubated     Objective:     Vital Signs (Most Recent):  Temp: 98.9 °F (37.2 °C) (09/13/19 0705)  Pulse: 83 (09/13/19 0913)  Resp: (!) 22 (09/13/19 0913)  BP: 136/65 (09/13/19 0905)  SpO2: 100 % (09/13/19 0913) Vital Signs (24h Range):  Temp:  [98.5 °F (36.9 °C)-99.3 °F (37.4 °C)] 98.9 °F (37.2 °C)  Pulse:  [54-93] 83  Resp:  [15-31] 22  SpO2:  [94 %-100 %] 100 %  BP: ()/(37-85) 136/65  Arterial Line BP: ()/(26-72) 144/50     Weight: 78.6 kg (173 lb 4.5 oz)  Body mass index is 30.7 kg/m².    Estimated Creatinine Clearance: 47.5 mL/min (based on SCr of 1 mg/dL).    Physical Exam   Constitutional: She appears well-developed and well-nourished. No distress.   intubated   HENT:   Head: Normocephalic and atraumatic.   Eyes: Pupils are equal, round, and reactive to light. EOM are normal.   Neck: Normal range of motion. Neck supple. No JVD present. No thyromegaly present.   Cardiovascular: Normal rate, regular rhythm, normal heart sounds and intact distal pulses. Exam reveals no gallop and no friction rub.   No murmur heard.  Pulmonary/Chest: Effort normal and breath sounds normal. No stridor. No respiratory distress. She has no wheezes. She has no rales. She exhibits no tenderness.   Abdominal: Soft. Bowel sounds are normal. She exhibits no distension. There is no tenderness. There is no guarding.   Neurological: She displays normal reflexes.   Skin: Skin is warm and dry. Capillary refill takes less than 2 seconds. She is not diaphoretic.       Significant Labs:   Blood Culture:   Recent Labs   Lab 09/08/19  1210 09/08/19  1215 09/11/19  1811   LABBLOO No Growth to date  No Growth to date  No Growth to date  No Growth to date  No Growth to date Gram stain brian bottle: Gram positive cocci   Results called to and read back by:Milagro Valencia RN  09/09/2019  13:48  Gram stain aer bottle: Gram positive cocci 09/10/2019  15:46  GEMELLA (S.) MORBILLORUM  Susceptibility testing  not routinely performed  * No Growth to date  No Growth to date     C4 Count: No results for input(s): C4 in the last 48 hours.  CBC:   Recent Labs   Lab 09/12/19 0200 09/13/19 0146   WBC 11.49 11.85   HGB 11.7* 11.8*   HCT 37.9 35.9*    184     CMP:   Recent Labs   Lab 09/12/19  0200 09/13/19 0146    137   K 4.9 4.8    103   CO2 23 24   * 142*   BUN 30* 28*   CREATININE 0.9 1.0   CALCIUM 8.3* 8.3*   PROT 5.2* 5.7*   ALBUMIN 2.5* 2.7*   BILITOT 0.3 0.2   ALKPHOS 43* 47*   AST 42* 31   ALT 60* 67*   ANIONGAP 8 10   EGFRNONAA >60.0 54.9*     Coagulation: No results for input(s): PT, INR, APTT in the last 48 hours.  CSF:   Recent Labs   Lab 09/11/19  1355   CSFCULTURE No Growth to date     Hepatitis Panel: No results for input(s): HEPBSAG, HEPAIGM, HEPCAB in the last 48 hours.    Invalid input(s): HAPBIGM  HIV 1/2 Antibodies: No results for input(s): [id number] in the last 48 hours.    Significant Imaging: I have reviewed all pertinent imaging results/findings within the past 24 hours.

## 2019-09-13 NOTE — ANESTHESIA PREPROCEDURE EVALUATION
[facility]  Anesthesia Pre-Operative Evaluation         Patient Name: Clemencia Nguyen  YOB: 1945  MRN: [id number]    SUBJECTIVE:     Pre-operative evaluation for Procedure(s) (LRB):  EGD (ESOPHAGOGASTRODUODENOSCOPY) (N/A)     09/13/2019    Clemencia Nguyen is a 74 y.o. female w/ a significant PMHx of HTN, HLD, GERD, hypothyroidism, CKD stage III, and new seizures. Patient found down by  on morning of 09/13/19. CTH at OSH was negative for any head or brain bleed. Plan to evaluate heart in near future with FEDERICO but [facility] requesting EGD with possible dilation (Hx of lower esophageal ring) prior to this study.    Patient now presents for the above procedure(s).      LDA:       Peripheral IV - Single Lumen 09/06/19 1330 20 G;1 3/4 in Right;Anterior Upper Arm (Active)   Site Assessment Clean;Dry;Intact;No redness;No swelling 9/13/2019  7:05 AM   Line Status Infusing 9/13/2019  7:05 AM   Dressing Status Clean;Dry;Intact 9/13/2019  7:05 AM   Dressing Intervention Dressing reinforced 9/13/2019  7:05 AM   Dressing Change Due 09/14/19 9/13/2019  7:05 AM   Site Change Due 09/10/19 9/12/2019  7:05 PM   Reason Not Rotated Poor venous access 9/13/2019  7:05 AM   Number of days: 7            Peripheral IV - Single Lumen 09/11/19 1205 20 G;1 3/4 in Left Upper Arm (Active)   Site Assessment Clean;Dry;Intact;No redness;No swelling 9/13/2019  7:05 AM   Line Status Infusing 9/13/2019  7:05 AM   Dressing Status Clean;Dry;Intact 9/13/2019  7:05 AM   Dressing Intervention Dressing reinforced 9/13/2019  7:05 AM   Dressing Change Due 09/15/19 9/13/2019  7:05 AM   Site Change Due 09/15/19 9/12/2019  7:05 PM   Reason Not Rotated Not due 9/13/2019  7:05 AM   Number of days: 2            Arterial Line 09/12/19 2029 Right Brachial (Active)   Site Assessment Clean;Dry;Intact;No redness;No swelling 9/12/2019  8:30 PM   Line Status Pulsatile blood flow 9/12/2019  8:30 PM   Art Line Waveform  Appropriate;Square wave test performed 9/12/2019  8:30 PM   Arterial Line Interventions Zeroed and calibrated;Leveled;Connections checked and tightened;Line pulled back;Flushed per protocol 9/12/2019  8:30 PM   Color/Movement/Sensation Capillary refill less than 3 sec 9/12/2019  8:30 PM   Dressing Type Transparent 9/12/2019  8:30 PM   Dressing Status Biopatch in place;Intact;Clean;Dry 9/12/2019  8:30 PM   Number of days: 0            NG/OG Tube 09/03/19 1100 orogastric Center mouth (Active)   Placement Check placement verified by x-ray;placement verified by distal tube length measurement 9/13/2019  7:05 AM   Tolerance no signs/symptoms of discomfort 9/13/2019  7:05 AM   Securement secured to commercial device 9/13/2019  7:05 AM   Clamp Status/Tolerance clamped 9/13/2019  7:05 AM   Suction Setting/Drainage Method suction at the bedside 9/13/2019  3:05 AM   Insertion Site Appearance no redness, warmth, tenderness, skin breakdown, drainage 9/13/2019  3:05 AM   Flush/Irrigation flushed w/;water;no resistance met 9/13/2019  3:05 AM   Feeding Method continuous 9/11/2019  3:05 AM   Feeding Action feeding held 9/13/2019  3:05 AM   Current Rate (mL/hr) 0 mL/hr 9/11/2019  7:05 PM   Goal Rate (mL/hr) 45 mL/hr 9/11/2019  7:05 PM   Intake (mL) 50 mL 9/12/2019  9:05 AM   Water Bolus (mL) 60 mL 9/5/2019  6:00 AM   Rate Formula Tube Feeding (mL/hr) 45 mL/hr 9/9/2019  7:05 PM   Formula Name isosource 9/10/2019  3:00 PM   Intake (mL) - Formula Tube Feeding 45 9/11/2019 11:00 AM   Residual Amount (ml) 0 ml 9/11/2019  4:00 PM   Number of days: 10       Prev airway:  Method of Intubation: Direct laryngoscopy, Rapid Sequence Induction (small amount black fluid in throat vocal cords clear); Inserted by: CRNA; Airway Device: Endotracheal Tube; Mask Ventilation: Not Attempted; Intubated: Postinduction; Blade: Santos #2; Airway Device Size: 7.5; Style: Cuffed; Cuff Inflation: Minimal occlusive pressure; Placement Verified By: Auscultation,  Capnometry, ETT Condensation; Grade: Grade I; Complicating Factors: None; Intubation Findings: Positive EtCO2, Bilateral breath sounds, Atraumatic/Condition of teeth unchanged; Securment: Lips; Complications: None; Breath Sounds: Equal Bilateral; Insertion Attempts: 1.    Drips:    fentanyl 200 mcg/hr (09/13/19 1505)    lactated ringers 100 mL/hr at 09/13/19 1505    norepinephrine bitartrate-D5W 0.08 mcg/kg/min (09/13/19 1530)    propofol 10 mcg/kg/min (09/13/19 1520)       Patient Active Problem List   Diagnosis    Osteopenia    Hypothyroidism    Mixed hyperlipidemia    Mood disorder    Essential hypertension    Chronic kidney disease, stage III (moderate)    Gastroesophageal reflux disease    Pseudophakia of both eyes    Hyperlipidemia    Mild mood disorder    Acute cholecystitis    New onset seizure    Brain lesion    Jeremy's paralysis (postepileptic)    Vasogenic cerebral edema    Status epilepticus    Cerebral edema    Alteration in skin integrity    On mechanically assisted ventilation    Leukocytosis    Fever    Acute respiratory failure with hypoxia and hypercapnia    Bacteremia    Hypotension due to hypovolemia       Review of patient's allergies indicates:  No Known Allergies    Current Inpatient Medications:   cefTRIAXone (ROCEPHIN) IVPB  2 g Intravenous Q12H    chlorhexidine  15 mL Mouth/Throat BID    dexamethasone  4 mg Intravenous Q6H    famotidine  20 mg Per OG tube QHS    gentamicin IVPB (standard dosing)  230 mg Intravenous Q24H    heparin (porcine)  5,000 Units Subcutaneous Q8H    lacosamide  100 mg Per OG tube Q12H    levothyroxine  75 mcg Per OG tube Before breakfast    QUEtiapine  50 mg Per OG tube BID    senna-docusate 8.6-50 mg  1 tablet Per OG tube BID    valproic acid (as sodium salt)  750 mg Per OG tube Q8H       No current facility-administered medications on file prior to encounter.      No current outpatient medications on file prior to encounter.        Past Surgical History:   Procedure Laterality Date    cataract surgery      CHOLECYSTECTOMY  04/24/2018    CHOLECYSTECTOMY-LAPAROSCOPIC N/A 4/24/2018    [facility]    COLONOSCOPY  2011, again in 2014    repeat in 5    COLONOSCOPY W/ BIOPSIES  06/12/2019    polypectomies per Dr. Garcia    ESOPHAGOGASTRODUODENOSCOPY  06/12/2019    Dr. Garcia    HYSTERECTOMY      OOPHORECTOMY      TONSILLECTOMY         Social History     Socioeconomic History    Marital status:      Spouse name: Not on file    Number of children: 3    Years of education: Not on file    Highest education level: Not on file   Occupational History    Not on file   Social Needs    Financial resource strain: Not very hard    Food insecurity:     Worry: Never true     Inability: Never true    Transportation needs:     Medical: No     Non-medical: No   Tobacco Use    Smoking status: Never Smoker    Smokeless tobacco: Never Used   Substance and Sexual Activity    Alcohol use: No     Frequency: Never     Binge frequency: Never    Drug use: No    Sexual activity: Not Currently     Partners: Male     Birth control/protection: Surgical   Lifestyle    Physical activity:     Days per week: 4 days     Minutes per session: 60 min    Stress: Not at all   Relationships    Social connections:     Talks on phone: More than three times a week     Gets together: Twice a week     Attends Worship service: Not on file     Active member of club or organization: Yes     Attends meetings of clubs or organizations: More than 4 times per year     Relationship status:    Other Topics Concern    Not on file   Social History Narrative    Not on file       OBJECTIVE:     Vital Signs Range (Last 24H):  Temp:  [37 °C (98.6 °F)-37.4 °C (99.3 °F)]   Pulse:  [54-96]   Resp:  [15-40]   BP: ()/(37-83)   SpO2:  [96 %-100 %]   Arterial Line BP: ()/(26-81)       Significant Labs:  Lab Results    Component Value Date    WBC 11.85 09/13/2019    HGB 11.8 (L) 09/13/2019    HCT 35.9 (L) 09/13/2019     09/13/2019    CHOL 162 12/05/2018    TRIG 236 (H) 12/05/2018    HDL 34 (L) 12/05/2018    ALT 67 (H) 09/13/2019    AST 31 09/13/2019     09/13/2019    K 4.8 09/13/2019     09/13/2019    CREATININE 1.0 09/13/2019    BUN 28 (H) 09/13/2019    CO2 24 09/13/2019    TSH 0.280 (L) 06/14/2019    INR 0.9 09/07/2019    HGBA1C 5.7 (H) 09/02/2019       Diagnostic Studies: No relevant studies.    EKG:   Results for orders placed or performed during the hospital encounter of 09/03/19   EKG 12-lead    Collection Time: 09/07/19  1:06 PM    Narrative    Test Reason : G93.9,    Vent. Rate : 089 BPM     Atrial Rate : 089 BPM     P-R Int : 118 ms          QRS Dur : 066 ms      QT Int : 348 ms       P-R-T Axes : 071 046 073 degrees     QTc Int : 423 ms    Normal sinus rhythm  Normal ECG  When compared with ECG of 26-DEC-2014 12:00,  No significant change was found  Confirmed by CODI BAER MD ([id number]) on 9/9/2019 12:34:05 PM    Referred By: GILL CAMPOVERDE           Confirmed By:CODI BAER MD       2D ECHO:  TTE:  Results for orders placed or performed during the hospital encounter of 09/03/19   Echo   Result Value Ref Range    Ascending aorta 2.90 cm    STJ 2.50 cm    AV mean gradient 4 mmHg    Ao peak tanner 1.33 m/s    Ao VTI 25.52 cm    IVS 0.79 0.6 - 1.1 cm    LA size 3.08 cm    Left Atrium Major Axis 4.30 cm    Left Atrium Minor Axis 4.12 cm    LVIDD 2.79 (A) 3.5 - 6.0 cm    LVIDS 1.93 (A) 2.1 - 4.0 cm    LVOT diameter 1.84 cm    LVOT peak VTI 20.39 cm    PW 0.71 0.6 - 1.1 cm    MV Peak A Tanner 1.08 m/s    E wave decelartion time 276.66 msec    MV Peak E Tanner 0.89 m/s    RA Major Axis 3.27 cm    RA Width 2.16 cm    RVDD 2.58 cm    Sinus 2.22 cm    TAPSE 1.81 cm    TDI LATERAL 0.08 m/s    TDI SEPTAL 0.07 m/s    LA WIDTH 2.90 cm    LV Diastolic Volume 29.24 mL    LV Systolic Volume 11.65 mL    LVOT peak tanner  1.04 m/s    LV LATERAL E/E' RATIO 11.13 m/s    LV SEPTAL E/E' RATIO 12.71 m/s    FS 31 %    LA volume 31.95 cm3    LV mass 48.22 g    Left Ventricle Relative Wall Thickness 0.51 cm    AV valve area 2.12 cm2    AV Velocity Ratio 0.78     AV index (prosthetic) 0.80     E/A ratio 0.82     Mean e' 0.08 m/s    LVOT area 2.7 cm2    LVOT stroke volume 54.19 cm3    AV peak gradient 7 mmHg    E/E' ratio 11.87 m/s    LV Systolic Volume Index 6.7 mL/m2    LV Diastolic Volume Index 16.90 mL/m2    LA Volume Index 18.5 mL/m2    LV Mass Index 28 g/m2    BSA 1.76 m2    Narrative    · Normal left ventricular systolic function. The estimated ejection   fraction is 65-70%  · No wall motion abnormalities.  · Normal LV diastolic function.  · Concentric left ventricular remodeling.  · Normal right ventricular systolic function.          FEDERICO:  No results found for this or any previous visit.    ASSESSMENT/PLAN:     Anesthesia Evaluation    I have reviewed the Patient Summary Reports.    I have reviewed the Nursing Notes.   I have reviewed the Medications.     Review of Systems  Anesthesia Hx:  No problems with previous Anesthesia  Denies Family Hx of Anesthesia complications.   Denies Personal Hx of Anesthesia complications.   Social:  Non-Smoker, No Alcohol Use    Hematology/Oncology:        Denies Current/Recent Cancer   EENT/Dental:   denies chronic allergic rhinitis   Cardiovascular:   Hypertension Denies MI.  Denies CAD.    Denies CABG/stent.  Denies Dysrhythmias.          hyperlipidemia    Pulmonary:   Denies COPD.  Denies Asthma.  Denies Recent URI.  Denies Sleep Apnea.    Renal/:   Chronic Renal Disease, CRI    Hepatic/GI:   GERD Denies Liver Disease.    Neurological:   Denies TIA. Denies CVA. Seizures    Endocrine:   Denies Diabetes. Hypothyroidism    Psych:   Denies Psychiatric History.          Physical Exam  General:  Well nourished    Airway/Jaw/Neck:  Airway Findings: Mouth Opening: Normal Tongue: Normal  Pre-Existing  Airway Tube(s): Oral Endotracheal tube  Size: 7.5 mm  General Airway Assessment: Adult  TM Distance: Normal, at least 6 cm  Jaw/Neck Findings:  Neck ROM: Normal ROM      Dental:  Dental Findings: In tact   Chest/Lungs:  Chest/Lungs Findings: Clear to auscultation, Normal Respiratory Rate     Heart/Vascular:  Heart Findings: Rate: Normal  Rhythm: Regular Rhythm  Sounds: Normal     Abdomen:  Abdomen Findings:  Normal, Soft, Nontender     Musculoskeletal:  Musculoskeletal Findings: Normal   Skin:  Skin Findings: Normal    Mental Status:  Mental Status Findings:  Unconscious         Anesthesia Plan  Type of Anesthesia, risks & benefits discussed:  Anesthesia Type:  general  Patient's Preference:   Intra-op Monitoring Plan: standard ASA monitors  Intra-op Monitoring Plan Comments:   Post Op Pain Control Plan: multimodal analgesia, IV/PO Opioids PRN and per primary service following discharge from PACU  Post Op Pain Control Plan Comments:   Induction:   IV  Beta Blocker:  Patient is not currently on a Beta-Blocker (No further documentation required).       Informed Consent: Patient representative understands risks and agrees with Anesthesia plan.  Questions answered. Anesthesia consent signed with patient representative.  ASA Score: 4     Day of Surgery Review of History & Physical:    H&P update referred to the surgeon.         Ready For Surgery From Anesthesia Perspective.

## 2019-09-13 NOTE — PROCEDURES
"Clemencia Nguyen is a 74 y.o. female patient.    Temp: 98.5 °F (36.9 °C) (19 1505)  Pulse: 82 (19)  Resp: 15 (19)  BP: (!) 148/63 (19)  SpO2: 96 % (19)  Weight: 78.6 kg (173 lb 4.5 oz) (19 230)  Height: 5' 3" (160 cm) (19 09)       Arterial Line  Date/Time: 2019 9:10 PM  Performed by: Kingsley Aguilera NP  Authorized by: Kingsley Aguilera NP   Consent Done: Yes  Consent: Verbal consent obtained.  Consent given by: spouse  Required items: required blood products, implants, devices, and special equipment available  Patient identity confirmed: name and   Time out: Immediately prior to procedure a "time out" was called to verify the correct patient, procedure, equipment, support staff and site/side marked as required.  Preparation: Patient was prepped and draped in the usual sterile fashion.  Indications: multiple ABGs and hemodynamic monitoring  Location: right brachial  Needle gauge: 21.  Number of attempts: 1  Complications: No  Estimated blood loss (mL): 10  Specimens: No  Implants: No  Post-procedure: dressing applied  Post-procedure CMS: normal  Patient tolerance: Patient tolerated the procedure well with no immediate complications          Kingsley Aguilera  2019  "

## 2019-09-13 NOTE — ASSESSMENT & PLAN NOTE
Daily CXR, ABG  Continue Peridex, Famotidine, Heparin subq  Trial of extubation 9/6/2019- re-intubated shortly after extubation for airway protection  Continue to wean vent settings as tolerated  Still intubated 2/2 inability to protect airway     Vent Mode: A/C  Oxygen Concentration (%):  [35-50] 50  Resp Rate Total:  [15 br/min-21 br/min] 15 br/min  Vt Set:  [380 mL] 380 mL  PEEP/CPAP:  [5 cmH20] 5 cmH20  Pressure Support:  [0 cmH20] 0 cmH20  Mean Airway Pressure:  [6.7 cmH20-10 cmH20] 8.9 cmH20

## 2019-09-13 NOTE — SUBJECTIVE & OBJECTIVE
Past Medical History:   Diagnosis Date    Depression     has been on tofranil for years;    Disorder of kidney and ureter     Esophageal stricture 06/12/2019    per pt per Dr. Garcia    Hyperlipemia     Hypertension     Hypothyroidism     Osteopenia     Thyroid disease     hypothyroid       Past Surgical History:   Procedure Laterality Date    cataract surgery      CHOLECYSTECTOMY  04/24/2018    CHOLECYSTECTOMY-LAPAROSCOPIC N/A 4/24/2018    Performed by Marina [facility]ian [facility] OR    COLONOSCOPY  2011, again in 2014    repeat in 5    COLONOSCOPY W/ BIOPSIES  06/12/2019    polypectomies per Dr. Garcia    ESOPHAGOGASTRODUODENOSCOPY  06/12/2019    Dr. Garcia    HYSTERECTOMY      OOPHORECTOMY      TONSILLECTOMY         Review of patient's allergies indicates:  No Known Allergies    Medications:  No medications prior to admission.     Antibiotics (From admission, onward)    Start     Stop Route Frequency Ordered    09/12/19 1415  gentamicin (GARAMYCIN) 230 mg in sodium chloride 0.9% 100 mL IVPB      -- IV Every 24 hours (non-standard times) 09/12/19 1302    09/12/19 1215  cefTRIAXone (ROCEPHIN) 2 g in dextrose 5 % 50 mL IVPB      -- IV Every 12 hours (non-standard times) 09/12/19 1214        Antifungals (From admission, onward)    None        Antivirals (From admission, onward)    None           Immunization History   Administered Date(s) Administered    Influenza - High Dose - PF (65 years and older) 10/16/2012, 10/22/2013, 09/08/2015, 11/18/2016, 09/26/2017    Influenza Split 09/20/2011    Pneumococcal Conjugate 12/29/2015    Pneumococcal Polysaccharide - 23 Valent 11/09/2012    Tdap 05/27/2016       Family History     Problem Relation (Age of Onset)    Heart disease Mother    Hypertension Mother    Stroke Father        Social History     Socioeconomic History    Marital status:      Spouse name: Not on file    Number of children: 3    Years of education: Not on file    Highest  education level: Not on file   Occupational History    Not on file   Social Needs    Financial resource strain: Not very hard    Food insecurity:     Worry: Never true     Inability: Never true    Transportation needs:     Medical: No     Non-medical: No   Tobacco Use    Smoking status: Never Smoker    Smokeless tobacco: Never Used   Substance and Sexual Activity    Alcohol use: No     Frequency: Never     Binge frequency: Never    Drug use: No    Sexual activity: Not Currently     Partners: Male     Birth control/protection: Surgical   Lifestyle    Physical activity:     Days per week: 4 days     Minutes per session: 60 min    Stress: Not at all   Relationships    Social connections:     Talks on phone: More than three times a week     Gets together: Twice a week     Attends Yazidism service: Not on file     Active member of club or organization: Yes     Attends meetings of clubs or organizations: More than 4 times per year     Relationship status:    Other Topics Concern    Not on file   Social History Narrative    Not on file     Review of Systems   Unable to perform ROS: Intubated     Objective:     Vital Signs (Most Recent):  Temp: 98.9 °F (37.2 °C) (09/13/19 0705)  Pulse: 83 (09/13/19 0913)  Resp: (!) 22 (09/13/19 0913)  BP: 136/65 (09/13/19 0905)  SpO2: 100 % (09/13/19 0913) Vital Signs (24h Range):  Temp:  [98.5 °F (36.9 °C)-99.3 °F (37.4 °C)] 98.9 °F (37.2 °C)  Pulse:  [54-93] 83  Resp:  [15-31] 22  SpO2:  [94 %-100 %] 100 %  BP: ()/(37-85) 136/65  Arterial Line BP: ()/(26-72) 144/50     Weight: 78.6 kg (173 lb 4.5 oz)  Body mass index is 30.7 kg/m².    Estimated Creatinine Clearance: 47.5 mL/min (based on SCr of 1 mg/dL).    Physical Exam   Constitutional: She appears well-developed and well-nourished. No distress.   intubated   HENT:   Head: Normocephalic and atraumatic.   Eyes: Pupils are equal, round, and reactive to light. EOM are normal.   Neck: Normal range of  motion. Neck supple. No JVD present. No thyromegaly present.   Cardiovascular: Normal rate, regular rhythm, normal heart sounds and intact distal pulses. Exam reveals no gallop and no friction rub.   No murmur heard.  Pulmonary/Chest: Effort normal and breath sounds normal. No stridor. No respiratory distress. She has no wheezes. She has no rales. She exhibits no tenderness.   Abdominal: Soft. Bowel sounds are normal. She exhibits no distension. There is no tenderness. There is no guarding.   Neurological: She displays normal reflexes.   Skin: Skin is warm and dry. Capillary refill takes less than 2 seconds. She is not diaphoretic.       Significant Labs:   Blood Culture:   Recent Labs   Lab 09/08/19  1210 09/08/19  1215 09/11/19  1811   LABBLOO No Growth to date  No Growth to date  No Growth to date  No Growth to date  No Growth to date Gram stain brian bottle: Gram positive cocci   Results called to and read back by:Milagro Valencia RN  09/09/2019  13:48  Gram stain aer bottle: Gram positive cocci 09/10/2019  15:46  GEMELLA (S.) MORBILLORUM  Susceptibility testing not routinely performed  * No Growth to date  No Growth to date     C4 Count: No results for input(s): C4 in the last 48 hours.  CBC:   Recent Labs   Lab 09/12/19  0200 09/13/19  0146   WBC 11.49 11.85   HGB 11.7* 11.8*   HCT 37.9 35.9*    184     CMP:   Recent Labs   Lab 09/12/19  0200 09/13/19  0146    137   K 4.9 4.8    103   CO2 23 24   * 142*   BUN 30* 28*   CREATININE 0.9 1.0   CALCIUM 8.3* 8.3*   PROT 5.2* 5.7*   ALBUMIN 2.5* 2.7*   BILITOT 0.3 0.2   ALKPHOS 43* 47*   AST 42* 31   ALT 60* 67*   ANIONGAP 8 10   EGFRNONAA >60.0 54.9*     Coagulation: No results for input(s): PT, INR, APTT in the last 48 hours.  CSF:   Recent Labs   Lab 09/11/19  1355   CSFCULTURE No Growth to date     Hepatitis Panel: No results for input(s): HEPBSAG, HEPAIGM, HEPCAB in the last 48 hours.    Invalid input(s): HAPBIGM  HIV 1/2 Antibodies: No  results for input(s): TGT42DRNM in the last 48 hours.    Significant Imaging: I have reviewed all pertinent imaging results/findings within the past 24 hours.

## 2019-09-13 NOTE — SUBJECTIVE & OBJECTIVE
Interval History:     Issues with low HR and BP overnight and this morning, Given Shoaib bump and atropine. 24 hours of IVF and bolus. WBC decreasing and afebrile. Repeat blood cultures negative. Plan for EGD and FEDERICO today.      Review of Systems   Unable to perform ROS: Intubated       Objective:     Vitals:  Temp: 98.9 °F (37.2 °C)  Pulse: 83  Rhythm: normal sinus rhythm  BP: 136/65  MAP (mmHg): 93  Resp: (!) 22  SpO2: 100 %  Oxygen Concentration (%): 50  O2 Device (Oxygen Therapy): ventilator  Vent Mode: A/C  Set Rate: 15 bmp  Vt Set: 380 mL  Pressure Support: 0 cmH20  PEEP/CPAP: 5 cmH20  Peak Airway Pressure: 32 cmH2O  Mean Airway Pressure: 8.1 cmH20  Plateau Pressure: 0 cmH20    Temp  Min: 98.5 °F (36.9 °C)  Max: 99.3 °F (37.4 °C)  Pulse  Min: 54  Max: 93  BP  Min: 75/37  Max: 184/85  MAP (mmHg)  Min: 50  Max: 113  Resp  Min: 15  Max: 31  SpO2  Min: 92 %  Max: 100 %  Oxygen Concentration (%)  Min: 35  Max: 50    09/12 0701 - 09/13 0700  In: 671.5 [I.V.:421.5]  Out: -    Unmeasured Output  Urine Occurrence: 1  Stool Occurrence: 1  Emesis Occurrence: 0  Pad Count: 2       Physical Exam   Constitutional: She appears well-developed and well-nourished.   HENT:   Head: Normocephalic and atraumatic.   Eyes: Pupils are equal, round, and reactive to light.   Cardiovascular: Normal rate and regular rhythm.   Pulmonary/Chest: Effort normal. No respiratory distress.   intubated   Abdominal: She exhibits no distension.   Musculoskeletal: She exhibits no edema or deformity.   Skin: Skin is warm and dry.   Neuro:  --sedation: fentanyl  --GCS:  E2 V1T M5  --Mental Status: lethargic, intubated, moves spontaneously  --CN II-XII grossly intact.   --PERRL - 3 mm  --Motor: LUE - withdraws to pain, all other extremities move spontaneously   --sensory: intact    Medications:  Continuous    fentanyl Last Rate: 100 mcg/hr (09/13/19 0805)   lactated ringers Last Rate: 100 mL/hr (09/13/19 0900)   propofol Last Rate: 25 mcg/kg/min (09/13/19  0805)   Scheduled    amLODIPine 5 mg Daily   cefTRIAXone (ROCEPHIN) IVPB 2 g Q12H   chlorhexidine 15 mL BID   dexamethasone 4 mg Q6H   famotidine 20 mg QHS   gentamicin IVPB (standard dosing) 230 mg Q24H   heparin (porcine) 5,000 Units Q8H   lacosamide 100 mg Q12H   lactated ringers 500 mL Once   levothyroxine 75 mcg Before breakfast   QUEtiapine 50 mg BID   senna-docusate 8.6-50 mg 1 tablet BID   valproic acid (as sodium salt) 750 mg Q8H   PRN    acetaminophen 650 mg Q6H PRN   Dextrose 10% Bolus 12.5 g PRN   fentaNYL 100 mcg Q2H PRN   fentaNYL 50 mcg Q2H PRN   glucagon (human recombinant) 1 mg PRN   hydrALAZINE 10 mg Q6H PRN   insulin aspart U-100 1-10 Units Q6H PRN   labetalol 10 mg Q6H PRN   magnesium oxide 800 mg PRN   magnesium oxide 800 mg PRN   midazolam 2 mg Q5 Min PRN   potassium chloride 10% 40 mEq PRN   potassium chloride 10% 40 mEq PRN   potassium, sodium phosphates 2 packet PRN   potassium, sodium phosphates 2 packet PRN   potassium, sodium phosphates 2 packet PRN   racepinephrine 0.5 mL Q4H PRN   sodium chloride 0.9% 10 mL PRN   sodium chloride 0.9% 10 mL PRN     Today I personally reviewed pertinent medications, lines/drains/airways, imaging, cardiology results, laboratory results, microbiology results, notably:    Diet  Diet NPO Except for: Medication  Diet NPO Except for: Medication

## 2019-09-13 NOTE — PROGRESS NOTES
[facility]  Neurosurgery  Progress Note    Subjective:     History of Present Illness: 73 y/o female with pmhx CKD and HTN  presented to outside hospital for seizure- like activity this AM. Per  pt was found unconscious this AM, with seizure like activity occurring on the left side. Pt was given versed x 2 via EMS. CTH performed at outside hospital was negative for bleed. Pt had additional seizure in outside hospital ED and was given IV Keppra. EEG ordered and MRI brain w/out contrast concerning for possible infiltratrive process vs post ischemic sequela. Pt transferred to [facility] and admitted to [facility]. MRI brain w/ and w/out obtained that showed large area of edema in right frontal lobe and ill defined enhancement in the frontal lobe concerning for possible cerebritis vs. Neoplasm. NSGY was consulted to evaluate. Pt not on anti-coagulation.       Post-Op Info:  Procedure(s) (LRB):  EGD (ESOPHAGOGASTRODUODENOSCOPY) (N/A)         Interval History: naeon, angio negative yesterday.    Medications:  Continuous Infusions:   fentanyl 100 mcg/hr (09/13/19 0705)    propofol 25 mcg/kg/min (09/13/19 0705)     Scheduled Meds:   amLODIPine  5 mg Per OG tube Daily    cefTRIAXone (ROCEPHIN) IVPB  2 g Intravenous Q12H    chlorhexidine  15 mL Mouth/Throat BID    dexamethasone  4 mg Intravenous Q6H    famotidine  20 mg Per OG tube QHS    gentamicin IVPB (standard dosing)  230 mg Intravenous Q24H    heparin (porcine)  5,000 Units Subcutaneous Q8H    lacosamide  100 mg Per OG tube Q12H    levothyroxine  75 mcg Per OG tube Before breakfast    norepinephrine bitartrate-D5W        QUEtiapine  25 mg Per OG tube BID    senna-docusate 8.6-50 mg  1 tablet Per OG tube BID    valproic acid (as sodium salt)  750 mg Per OG tube Q8H     PRN Meds:acetaminophen, Dextrose 10% Bolus, fentaNYL, fentaNYL, glucagon (human recombinant), hydrALAZINE, insulin aspart U-100, labetalol, magnesium oxide, magnesium oxide,  midazolam, potassium chloride 10%, potassium chloride 10%, potassium, sodium phosphates, potassium, sodium phosphates, potassium, sodium phosphates, racepinephrine, sodium chloride 0.9%, sodium chloride 0.9%     Review of Systems  Objective:     Weight: 78.6 kg (173 lb 4.5 oz)  Body mass index is 30.7 kg/m².  Vital Signs (Most Recent):  Temp: 98.9 °F (37.2 °C) (09/13/19 0705)  Pulse: 87 (09/13/19 0705)  Resp: 20 (09/13/19 0705)  BP: (!) 166/67 (09/13/19 0705)  SpO2: 100 % (09/13/19 0705) Vital Signs (24h Range):  Temp:  [97.9 °F (36.6 °C)-99.3 °F (37.4 °C)] 98.9 °F (37.2 °C)  Pulse:  [54-93] 87  Resp:  [15-31] 20  SpO2:  [92 %-100 %] 100 %  BP: ()/(37-85) 166/67  Arterial Line BP: ()/(26-72) 155/55     Date 09/13/19 0700 - 09/14/19 0659   Shift 6958-2666 0525-6553 0715-5172 24 Hour Total   INTAKE   I.V.(mL/kg) 22.3(0.3)   22.3(0.3)   Shift Total(mL/kg) 22.3(0.3)   22.3(0.3)   OUTPUT   Shift Total(mL/kg)       Weight (kg) 78.6 78.6 78.6 78.6              Vent Mode: A/C  Oxygen Concentration (%):  [35-50] 50  Resp Rate Total:  [15 br/min-21 br/min] 17 br/min  Vt Set:  [380 mL] 380 mL  PEEP/CPAP:  [5 cmH20] 5 cmH20  Pressure Support:  [0 cmH20] 0 cmH20  Mean Airway Pressure:  [6.7 cmH20-9.5 cmH20] 6.7 cmH20         NG/OG Tube 09/03/19 1100 orogastric Center mouth (Active)   Placement Check placement verified by distal tube length measurement;placement verified by x-ray;physician notified 9/13/2019  3:05 AM   Tolerance no signs/symptoms of discomfort 9/13/2019  3:05 AM   Securement secured to nostril center w/ adhesive device 9/13/2019  3:05 AM   Clamp Status/Tolerance unclamped 9/13/2019  3:05 AM   Suction Setting/Drainage Method suction at the bedside 9/13/2019  3:05 AM   Insertion Site Appearance no redness, warmth, tenderness, skin breakdown, drainage 9/13/2019  3:05 AM   Flush/Irrigation flushed w/;water;no resistance met 9/13/2019  3:05 AM   Feeding Method continuous 9/11/2019  3:05 AM   Feeding Action  feeding held 9/13/2019  3:05 AM   Current Rate (mL/hr) 0 mL/hr 9/11/2019  7:05 PM   Goal Rate (mL/hr) 45 mL/hr 9/11/2019  7:05 PM   Intake (mL) 50 mL 9/12/2019  9:05 AM   Water Bolus (mL) 60 mL 9/5/2019  6:00 AM   Rate Formula Tube Feeding (mL/hr) 45 mL/hr 9/9/2019  7:05 PM   Formula Name isosource 9/10/2019  3:00 PM   Intake (mL) - Formula Tube Feeding 45 9/11/2019 11:00 AM   Residual Amount (ml) 0 ml 9/11/2019  4:00 PM       Neurosurgery Physical Exam   [id number]  PERRL  Right side and LLE spontaneous, WD in LUE  SILT    Significant Labs:  Recent Labs   Lab 09/12/19  0200 09/13/19 0146   * 142*    137   K 4.9 4.8    103   CO2 23 24   BUN 30* 28*   CREATININE 0.9 1.0   CALCIUM 8.3* 8.3*   MG 2.2 2.3     Recent Labs   Lab 09/12/19  0200 09/13/19 0146   WBC 11.49 11.85   HGB 11.7* 11.8*   HCT 37.9 35.9*    184     No results for input(s): LABPT, INR, APTT in the last 48 hours.  Microbiology Results (last 7 days)     Procedure Component Value Units Date/Time    CSF culture [[id number]] Collected:  09/11/19 1355    Order Status:  Completed Specimen:  CSF (Spinal Fluid) from CSF Tap, Tube 3 Updated:  09/13/19 0724     CSF CULTURE No Growth to date     Gram Stain Result Cytospin indicates:      Rare WBC's      No organisms seen    Blood culture [[id number]] Collected:  09/11/19 1811    Order Status:  Completed Specimen:  Blood from Peripheral, Hand, Left Updated:  09/12/19 2012     Blood Culture, Routine No Growth to date      No Growth to date    Blood culture [[id number]] Collected:  09/08/19 1210    Order Status:  Completed Specimen:  Blood from Peripheral, Ankle, Left Updated:  09/12/19 1412     Blood Culture, Routine No Growth to date      No Growth to date      No Growth to date      No Growth to date      No Growth to date    Blood culture [[id number]]  (Abnormal) Collected:  09/08/19 1215    Order Status:  Completed Specimen:  Blood from Peripheral, Forearm, Left Updated:  09/12/19 1033      Blood Culture, Routine Gram stain brian bottle: Gram positive cocci       Results called to and read back by:Milagro Valencia RN  09/09/2019  13:48      Gram stain aer bottle: Gram positive cocci 09/10/2019  15:46      GEMELLA (S.) MORBILLORUM  Susceptibility testing not routinely performed      Blood culture [[id number]]     Order Status:  Sent Specimen:  Blood     Gram stain [[id number]] Collected:  09/11/19 1355    Order Status:  Canceled Specimen:  CSF (Spinal Fluid) from CSF Tap, Tube 3     Culture, Respiratory with Gram Stain [[id number]]  (Abnormal)  (Susceptibility) Collected:  09/08/19 1158    Order Status:  Completed Specimen:  Respiratory from Tracheal Aspirate Updated:  09/11/19 0934     Respiratory Culture No S aureus or Pseudomonas isolated.      ESCHERICHIA COLI  Few  Normal respiratory connie also present       Gram Stain (Respiratory) Few WBC's     Gram Stain (Respiratory) Moderate Gram negative rods     Gram Stain (Respiratory) Rare Gram positive cocci    Blood culture [[id number]]     Order Status:  Canceled Specimen:  Blood     Blood culture [[id number]]     Order Status:  Canceled Specimen:  Blood             Significant Diagnostics:  Angio: negative    Assessment/Plan:     New onset seizure  75 y/o female with pmhx CKD and HTN presented to outside hospital for seizure- like activity occurring on the left side. Found to have area of ill-defined enhancement on MRI brain w/ and w/out.  Most recent EEG showing bilateral PLEDs left greater than right.    -q 1 hr neuro checks  -Fu epilepsy recs  -Fu infectious rodriguez, CSF NGTD.  Would recommend documenting opening and closing pressure when performing LP to assess for elevated ICPs.  -EGD and possible FEDERICO today to eval for vegetations.  -Continue steroids for now  -WTE when medically stable.  -Angio reviewed, no evidence of vasculitis.  -Further care per NCC, will follow.          Kingsley Lisa,   Neurosurgery  [facility]

## 2019-09-13 NOTE — ASSESSMENT & PLAN NOTE
74 year old admitted to unit on 9/3 following new onset seizure, with MRI showing large area of R cortical edema with flair and ill defined enhancement on contrast study, concern for glioma vs infectious/inflammatory process  -  repeat BC negative   - CSF cx no growth, HSV, VZV, Entero, WNV in process   - ID following   - continue decadron  - NSGY, epilepsy following   - neuro checks q1hr   - vital signs q1hr   -on SubQ heparin   -Repeat MRI Brain stable

## 2019-09-13 NOTE — ASSESSMENT & PLAN NOTE
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

## 2019-09-13 NOTE — ASSESSMENT & PLAN NOTE
-monitor with A-line  -24 hour IVF plus bolus   -keep net positive  -Levo gtt as needed   -also related to sedation medication

## 2019-09-13 NOTE — PROGRESS NOTES
Pharmacokinetic Initial Assessment: Gentamicin    Assessment:  Weight utilized for dose calculation: Actual Body Weight  Dosing method utilized: Gentamicin dosing for synergy for gram positive organisms     Plan: Gentamicin dosing for synergy for gram positive organisms: Gentamicin 230 mg IV every 24 hours, trough level to be drawn on 9/14 at 1345 prior to the third dose. (goal <1)    Pharmacy will continue to monitor.    Please contact pharmacy at extension [phone] with any questions regarding this assessment.    Thank you for the consult,    Jose Kelly       Patient brief summary:  Clemencia Nguyen is a 74 y.o. female initiated on aminoglycoside therapy for treatment of suspected bacteremia    Of note: for dosing recommendations in consults related to pulmonary exacerbations due to Cystic Fibrosis, please contact Infectious Disease (ID) or contact ID pharmacy.    Drug Allergies:   Review of patient's allergies indicates:  No Known Allergies    Actual Body Weight:   78.6 kg    Adjust Body Weight:   62.9 kg    Ideal Body Weight:  52.4 kg    Renal Function:   Estimated Creatinine Clearance: 47.5 mL/min (based on SCr of 1 mg/dL).,     Dialysis Method (if applicable):  N/A    CBC (last 72 hours):  Recent Labs   Lab Result Units 09/11/19 0115 09/12/19 0200 09/13/19  0146   WBC K/uL 10.72 11.49 11.85   Hemoglobin g/dL 12.7 11.7* 11.8*   Hematocrit % 40.0 37.9 35.9*   Platelets K/uL 223 178 184   Gran% % 70.0 81.0* 71.0   Lymph% % 15.0* 4.5* 9.0*   Mono% % 12.0 10.0 12.0   Eosinophil% % 0.0 0.0 0.0   Basophil% % 0.0 0.0 0.0   Differential Method  Manual Manual Manual       Metabolic Panel (last 72 hours):  Recent Labs   Lab Result Units 09/11/19  0115 09/11/19  1355 09/12/19  0200 09/13/19  0146   Sodium mmol/L 137  --  137 137   Potassium mmol/L 5.1  --  4.9 4.8   Chloride mmol/L 107  --  106 103   CO2 mmol/L 21*  --  23 24   Glucose mg/dL 230*  --  149* 142*   Glucose, CSF mg/dL  --  108*  --   --    BUN, Bld mg/dL  34*  --  30* 28*   Creatinine mg/dL 1.1  --  0.9 1.0   Albumin g/dL 2.5*  --  2.5* 2.7*   Total Bilirubin mg/dL 0.4  --  0.3 0.2   Alkaline Phosphatase U/L 48*  --  43* 47*   AST U/L 29  --  42* 31   ALT U/L 37  --  60* 67*   Magnesium mg/dL 2.2  --  2.2 2.3   Phosphorus mg/dL 3.2  --  4.0 4.1       Microbiologic Results:  Microbiology Results (last 7 days)     Procedure Component Value Units Date/Time    CSF culture [[id number]] Collected:  09/11/19 1355    Order Status:  Completed Specimen:  CSF (Spinal Fluid) from CSF Tap, Tube 3 Updated:  09/13/19 0724     CSF CULTURE No Growth to date     Gram Stain Result Cytospin indicates:      Rare WBC's      No organisms seen    Blood culture [[id number]] Collected:  09/11/19 1811    Order Status:  Completed Specimen:  Blood from Peripheral, Hand, Left Updated:  09/12/19 2012     Blood Culture, Routine No Growth to date      No Growth to date    Blood culture [[id number]] Collected:  09/08/19 1210    Order Status:  Completed Specimen:  Blood from Peripheral, Ankle, Left Updated:  09/12/19 1412     Blood Culture, Routine No Growth to date      No Growth to date      No Growth to date      No Growth to date      No Growth to date    Blood culture [[id number]]  (Abnormal) Collected:  09/08/19 1215    Order Status:  Completed Specimen:  Blood from Peripheral, Forearm, Left Updated:  09/12/19 1031     Blood Culture, Routine Gram stain brian bottle: Gram positive cocci       Results called to and read back by:Milagro Valencia RN  09/09/2019  13:48      Gram stain aer bottle: Gram positive cocci 09/10/2019  15:46      GEMELLA (S.) MORBILLORUM  Susceptibility testing not routinely performed      Blood culture [[id number]]     Order Status:  Sent Specimen:  Blood     Gram stain [[id number]] Collected:  09/11/19 1355    Order Status:  Canceled Specimen:  CSF (Spinal Fluid) from CSF Tap, Tube 3     Culture, Respiratory with Gram Stain [[id number]]  (Abnormal)  (Susceptibility) Collected:  09/08/19  1158    Order Status:  Completed Specimen:  Respiratory from Tracheal Aspirate Updated:  09/11/19 0934     Respiratory Culture No S aureus or Pseudomonas isolated.      ESCHERICHIA COLI  Few  Normal respiratory connie also present       Gram Stain (Respiratory) Few WBC's     Gram Stain (Respiratory) Moderate Gram negative rods     Gram Stain (Respiratory) Rare Gram positive cocci    Blood culture [[id number]]     Order Status:  Canceled Specimen:  Blood     Blood culture [[id number]]     Order Status:  Canceled Specimen:  Blood

## 2019-09-13 NOTE — TREATMENT PLAN
Plan for EGD on 9/16    Cruz Ugarte MD  Gastroenterology Fellow PGY IV   [facility]

## 2019-09-13 NOTE — PLAN OF CARE
Problem: Adult Inpatient Plan of Care  Goal: Plan of Care Review  Outcome: Ongoing (interventions implemented as appropriate)  POC reviewed with pt and family at 1400. Pt verbalized understanding. Questions and concerns addressed. Propofol and fentanyl gtts infusing. Wrist restraints in place. Hands dusky but with 2+ pulses. Will continue to monitor. See flowsheets for full assessment and VS info.

## 2019-09-13 NOTE — ASSESSMENT & PLAN NOTE
73yo woman with PMH HTN & CKD presents with acute onset of seizure like activity. Found to have area of ill-defined enhancement on MRI brain w/ and w/out.  Most recent EEG showing bilateral PLEDs left greater than right. ID consulted for bacteremia. Bc ccy 04/2018.  reports dental procedure (crown) ~2 months ago.     9/03 CSF: gram stain negative. 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     Recommendation:   -Obtain FEDERICO to evaluate for endocarditis   -Gemella can be PCN resistant. Continue ceftriaxone and gentamycin IV.  -Follow up CSF viral studies  -Please ensure 2 blood cultures are negative

## 2019-09-13 NOTE — ASSESSMENT & PLAN NOTE
SBP <180, MAP >65   -Patient started on Levo gtt as needed for low MAP   -new A-line placed overnight for continuous blood pressure monitoring

## 2019-09-13 NOTE — PROGRESS NOTES
[facility]  Neurocritical Care  Progress Note    Admit Date: 9/3/2019  Service Date: 09/13/2019  Length of Stay: 10    Subjective:     Chief Complaint: Brain lesion    History of Present Illness: Pt is  75 yo female with a PMHx of CKD 3, HTN, was transferred from [facility] to [facility] for new onset seizures and concern for right front lobe mass. The pt was found in her bedroom by her spouse at approximately 9:45 pm sitting her head against the bed, unresponsive, and having seizure like activity on the left-side. EMT was called and the pt was given Versed twice while en route to the hospital. Pt had a second witnessed seizures, left facial droop, left-sided weakness, and tongue ecchymosis in the ED. Neurology was consulted and The pt was given IV Keppra and Vimpat. MRI brain without contrast was acquired. The image showed right frontal lobe vasogenic edema with mass effect concerning for underlying mass. EEG was acquired at outside hospital concerning showing an abnormal result. The pt was given decadron IV and neurosurgical consult recommended. Pt transferred to [facility] and admitted to the [facility] for higher level of care and further evaluation.     Pt history acquired per chart review and from spouse present at the bedside. The pt's spouse denies prior history of seizures CVA, cancer history and up-to-date screenings    Hospital Course: --admitted to [facility] on 9/3 following new onset seizure, arrived intubated  --MRI with large area of R cortical edema with flair and ill defined enhancement on contrast study, concern for glioma vs infectious/inflammatory process  --LP appears non infectious, cytology pending  9/5- no acute events, awaiting LP finalization from pathology report., weaning vent.   09/06/2019: Very agitated overnight, requiring increased sedation. Patient extubated this morning on rounds, and reintubated shortly after. Unable to maintain airway and secretions. CSF gram stain negative.  09/07/2019: KATT.  EKG obtained, seroquel started. Valproic acid started.  09/08/2019: NAEON. Improved agitation with seroquel.  09/09/2019: MRI Brain w/wo ordered for today. BLE US ordered. Will need new LP later on in the week.   09/10/2019 LP for infx workup, CD4 lab, EEG monitoring per Epilepsy   09/11/2019: To IR for LP. Spoke to NSGY about possible bx of lesion. Discontinue cEEG.   9/13/2019 Issues with low HR and BP overnight and this morning, Given Shoaib bump and atropine. 24 hours of IVF and bolus. WBC decreasing and afebrile. Repeat blood cultures negative. Plan for EGD and FEDERICO today.     Interval History:     Issues with low HR and BP overnight and this morning, Given Shoaib bump and atropine. 24 hours of IVF and bolus. WBC decreasing and afebrile. Repeat blood cultures negative. Plan for EGD and FEDERICO today.      Review of Systems   Unable to perform ROS: Intubated       Objective:     Vitals:  Temp: 98.9 °F (37.2 °C)  Pulse: 83  Rhythm: normal sinus rhythm  BP: 136/65  MAP (mmHg): 93  Resp: (!) 22  SpO2: 100 %  Oxygen Concentration (%): 50  O2 Device (Oxygen Therapy): ventilator  Vent Mode: A/C  Set Rate: 15 bmp  Vt Set: 380 mL  Pressure Support: 0 cmH20  PEEP/CPAP: 5 cmH20  Peak Airway Pressure: 32 cmH2O  Mean Airway Pressure: 8.1 cmH20  Plateau Pressure: 0 cmH20    Temp  Min: 98.5 °F (36.9 °C)  Max: 99.3 °F (37.4 °C)  Pulse  Min: 54  Max: 93  BP  Min: 75/37  Max: 184/85  MAP (mmHg)  Min: 50  Max: 113  Resp  Min: 15  Max: 31  SpO2  Min: 92 %  Max: 100 %  Oxygen Concentration (%)  Min: 35  Max: 50    09/12 0701 - 09/13 0700  In: 671.5 [I.V.:421.5]  Out: -    Unmeasured Output  Urine Occurrence: 1  Stool Occurrence: 1  Emesis Occurrence: 0  Pad Count: 2       Physical Exam   Constitutional: She appears well-developed and well-nourished.   HENT:   Head: Normocephalic and atraumatic.   Eyes: Pupils are equal, round, and reactive to light.   Cardiovascular: Normal rate and regular rhythm.   Pulmonary/Chest: Effort normal. No  respiratory distress.   intubated   Abdominal: She exhibits no distension.   Musculoskeletal: She exhibits no edema or deformity.   Skin: Skin is warm and dry.   Neuro:  --sedation: fentanyl  --GCS:  E2 V1T M5  --Mental Status: lethargic, intubated, moves spontaneously  --CN II-XII grossly intact.   --PERRL - 3 mm  --Motor: LUE - withdraws to pain, all other extremities move spontaneously   --sensory: intact    Medications:  Continuous    fentanyl Last Rate: 100 mcg/hr (09/13/19 0805)   lactated ringers Last Rate: 100 mL/hr (09/13/19 0900)   propofol Last Rate: 25 mcg/kg/min (09/13/19 0805)   Scheduled    amLODIPine 5 mg Daily   cefTRIAXone (ROCEPHIN) IVPB 2 g Q12H   chlorhexidine 15 mL BID   dexamethasone 4 mg Q6H   famotidine 20 mg QHS   gentamicin IVPB (standard dosing) 230 mg Q24H   heparin (porcine) 5,000 Units Q8H   lacosamide 100 mg Q12H   lactated ringers 500 mL Once   levothyroxine 75 mcg Before breakfast   QUEtiapine 50 mg BID   senna-docusate 8.6-50 mg 1 tablet BID   valproic acid (as sodium salt) 750 mg Q8H   PRN    acetaminophen 650 mg Q6H PRN   Dextrose 10% Bolus 12.5 g PRN   fentaNYL 100 mcg Q2H PRN   fentaNYL 50 mcg Q2H PRN   glucagon (human recombinant) 1 mg PRN   hydrALAZINE 10 mg Q6H PRN   insulin aspart U-100 1-10 Units Q6H PRN   labetalol 10 mg Q6H PRN   magnesium oxide 800 mg PRN   magnesium oxide 800 mg PRN   midazolam 2 mg Q5 Min PRN   potassium chloride 10% 40 mEq PRN   potassium chloride 10% 40 mEq PRN   potassium, sodium phosphates 2 packet PRN   potassium, sodium phosphates 2 packet PRN   potassium, sodium phosphates 2 packet PRN   racepinephrine 0.5 mL Q4H PRN   sodium chloride 0.9% 10 mL PRN   sodium chloride 0.9% 10 mL PRN     Today I personally reviewed pertinent medications, lines/drains/airways, imaging, cardiology results, laboratory results, microbiology results, notably:    Diet  Diet NPO Except for: Medication  Diet NPO Except for: Medication        Assessment/Plan:     Neuro  *  Brain lesion  74 year old admitted to unit on 9/3 following new onset seizure, with MRI showing large area of R cortical edema with flair and ill defined enhancement on contrast study, concern for glioma vs infectious/inflammatory process  -  repeat BC negative   - CSF cx no growth, HSV, VZV, Entero, WNV in process   - ID following   - continue decadron  - NSGY, epilepsy following   - neuro checks q1hr   - vital signs q1hr   -on SubQ heparin   -Repeat MRI Brain stable      Cerebral edema  --continue decadron, see brain lesion    New onset seizure  2/2 to brain mass   Keppra changed to Vimpat 9/6  Valproic acid started 9/7  Epilepsy following  -Continue current AEDs        Pulmonary  Acute respiratory failure with hypoxia and hypercapnia  See [facility] vent    On mechanically assisted ventilation  Daily CXR, ABG  Continue Peridex, Famotidine, Heparin subq  Trial of extubation 9/6/2019- re-intubated shortly after extubation for airway protection  Continue to wean vent settings as tolerated  Still intubated 2/2 inability to protect airway     Vent Mode: A/C  Oxygen Concentration (%):  [35-50] 50  Resp Rate Total:  [15 br/min-21 br/min] 15 br/min  Vt Set:  [380 mL] 380 mL  PEEP/CPAP:  [5 cmH20] 5 cmH20  Pressure Support:  [0 cmH20] 0 cmH20  Mean Airway Pressure:  [6.7 cmH20-10 cmH20] 8.9 cmH20      Cardiac/Vascular  Essential hypertension  SBP <180, MAP >65   -Patient started on Levo gtt as needed for low MAP   -new A-line placed overnight for continuous blood pressure monitoring     Renal/  Chronic kidney disease, stage III (moderate)  -PMHx of CKD 3   -monitor renal function, I/Os       ID  Bacteremia  -repeat BC negative thus far     Oncology  Leukocytosis  Afebrile, WBC trending down   -repeat BC negative   -continue ceftriaxone and gentamicin for Gemella morbillorum    Other  Hypotension due to hypovolemia  -monitor with A-line  -24 hour IVF plus bolus   -keep net positive  -Levo gtt as needed   -also related to  sedation medication           The patient is being Prophylaxed for:  Venous Thromboembolism with: Chemical  Stress Ulcer with: H2B  Ventilator Pneumonia with: chlorhexidine oral care    Activity Orders          Diet NPO Except for: Medication: NPO starting at 09/13 0001    Straight Cath starting at 09/05 0034        Full Code     CC time 35 minutes     Critical secondary to Patient has a condition that poses threat to life and bodily function:      Critical care was time spent personally by me on the following activities: development of treatment plan with patient or surrogate and bedside caregivers, discussions with consultants, evaluation of patient's response to treatment, examination of patient, ordering and performing treatments and interventions, ordering and review of laboratory studies, ordering and review of radiographic studies, pulse oximetry, re-evaluation of patient's condition. This critical care time did not overlap with that of any other provider or involve time for any procedures.      Annabelle Quiñonez PA-C  Neurocritical Care  [facility]

## 2019-09-13 NOTE — PROGRESS NOTES
Subjective:  Exam unchanged, continues to be agitated  LP under IR completed  Csf bland/bloody, awaiting ms profile and viral studies    Objective:  Vital signs, lab studies, and imaging reviewed by me  Obtunded, eyes closed, agitated, bs reflexes in tact, moving all ext except L ue spontaneously and antigravity  Warm and well perfused, regular rate and rhythm, no murmurs  No dependent edema  Breathing comfortably on ac, bs clear  Belly soft, nontender, no hepatosplenomegaly    Assessment/Plan:    R>L frontal brain lesion complicated by some vasogenic cerebral edema complicated by acute encephalopathy, debility, and acute respiratory failure in setting of bacteremia with gemella. Unclear if bacteremia is associated with brain lesion at this time.   -plan for angiogram today   -ID consulted, appreciate reccs, will narrow abx and consult cardiology for domenica  -gi to see pt for esophageal stricture    I spent 31 min of uninterrupted critical care time caring for this critically ill patient exclusive of procedures and teaching.

## 2019-09-14 LAB
ALBUMIN CSF-MCNC: 79.3 MG/DL
ALBUMIN SERPL BCP-MCNC: 2.8 G/DL (ref 3.5–5.2)
ALBUMIN SERPL-MCNC: 2990 MG/DL (ref 3200–4800)
ALLENS TEST: ABNORMAL
ALP SERPL-CCNC: 48 U/L (ref 55–135)
ALT SERPL W/O P-5'-P-CCNC: 75 U/L (ref 10–44)
ANION GAP SERPL CALC-SCNC: 13 MMOL/L (ref 8–16)
ANISOCYTOSIS BLD QL SMEAR: SLIGHT
AST SERPL-CCNC: 29 U/L (ref 10–40)
BASOPHILS # BLD AUTO: ABNORMAL K/UL (ref 0–0.2)
BASOPHILS NFR BLD: 0 % (ref 0–1.9)
BILIRUB SERPL-MCNC: 0.3 MG/DL (ref 0.1–1)
BUN SERPL-MCNC: 27 MG/DL (ref 8–23)
BURR CELLS BLD QL SMEAR: ABNORMAL
CALCIUM SERPL-MCNC: 8.3 MG/DL (ref 8.7–10.5)
CHLORIDE SERPL-SCNC: 100 MMOL/L (ref 95–110)
CO2 SERPL-SCNC: 21 MMOL/L (ref 23–29)
CREAT SERPL-MCNC: 1.1 MG/DL (ref 0.5–1.4)
DELSYS: ABNORMAL
DIFFERENTIAL METHOD: ABNORMAL
EOSINOPHIL # BLD AUTO: ABNORMAL K/UL (ref 0–0.5)
EOSINOPHIL NFR BLD: 0 % (ref 0–8)
ERYTHROCYTE [DISTWIDTH] IN BLOOD BY AUTOMATED COUNT: 12.9 % (ref 11.5–14.5)
ERYTHROCYTE [SEDIMENTATION RATE] IN BLOOD BY WESTERGREN METHOD: 15 MM/H
EST. GFR  (AFRICAN AMERICAN): 56.4 ML/MIN/1.73 M^2
EST. GFR  (NON AFRICAN AMERICAN): 48.9 ML/MIN/1.73 M^2
FIO2: 50
GIANT PLATELETS BLD QL SMEAR: PRESENT
GLUCOSE SERPL-MCNC: 332 MG/DL (ref 70–110)
HCO3 UR-SCNC: 30.4 MMOL/L (ref 24–28)
HCT VFR BLD AUTO: 36.9 % (ref 37–48.5)
HGB BLD-MCNC: 11.6 G/DL (ref 12–16)
HYPOCHROMIA BLD QL SMEAR: ABNORMAL
IGG CSF-MCNC: 10.3 MG/DL
IGG SERPL-MCNC: 535 MG/DL (ref 767–1590)
IGG SYNTH RATE SER+CSF CALC-MRATE: 19.96 MG/24 H
IGG/ALB CLEAR SER+CSF-RTO: 0.72
IGG/ALB CSF: 0.13 {RATIO}
IGG/ALB SER: 0.18 {RATIO}
IMM GRANULOCYTES # BLD AUTO: ABNORMAL K/UL (ref 0–0.04)
IMM GRANULOCYTES NFR BLD AUTO: ABNORMAL % (ref 0–0.5)
LYMPHOCYTES # BLD AUTO: ABNORMAL K/UL (ref 1–4.8)
LYMPHOCYTES NFR BLD: 8 % (ref 18–48)
MAGNESIUM SERPL-MCNC: 2.3 MG/DL (ref 1.6–2.6)
MCH RBC QN AUTO: 28.7 PG (ref 27–31)
MCHC RBC AUTO-ENTMCNC: 31.4 G/DL (ref 32–36)
MCV RBC AUTO: 91 FL (ref 82–98)
METAMYELOCYTES NFR BLD MANUAL: 3 %
MODE: ABNORMAL
MONOCYTES # BLD AUTO: ABNORMAL K/UL (ref 0.3–1)
MONOCYTES NFR BLD: 8 % (ref 4–15)
MYELOCYTES NFR BLD MANUAL: 3 %
NEUTROPHILS NFR BLD: 75 % (ref 38–73)
NEUTS BAND NFR BLD MANUAL: 3 %
NRBC BLD-RTO: 0 /100 WBC
OLIGOCLONAL BANDS CSF ELPH-IMP: 0 BANDS
OLIGOCLONAL BANDS CSF ELPH-IMP: 0 BANDS
OLIGOCLONAL BANDS SERPL: 0 BANDS
OVALOCYTES BLD QL SMEAR: ABNORMAL
PCO2 BLDA: 43.7 MMHG (ref 35–45)
PEEP: 5
PH SMN: 7.45 [PH] (ref 7.35–7.45)
PHOSPHATE SERPL-MCNC: 3.6 MG/DL (ref 2.7–4.5)
PIP: 26
PLATELET # BLD AUTO: 420 K/UL (ref 150–350)
PLATELET BLD QL SMEAR: ABNORMAL
PMV BLD AUTO: 9.5 FL (ref 9.2–12.9)
PO2 BLDA: 172 MMHG (ref 80–100)
POC BE: 6 MMOL/L
POC SATURATED O2: 100 % (ref 95–100)
POC TCO2: 32 MMOL/L (ref 23–27)
POCT GLUCOSE: 307 MG/DL (ref 70–110)
POCT GLUCOSE: 391 MG/DL (ref 70–110)
POIKILOCYTOSIS BLD QL SMEAR: SLIGHT
POLYCHROMASIA BLD QL SMEAR: ABNORMAL
POTASSIUM SERPL-SCNC: 4.7 MMOL/L (ref 3.5–5.1)
PROT SERPL-MCNC: 5.8 G/DL (ref 6–8.4)
RBC # BLD AUTO: 4.04 M/UL (ref 4–5.4)
SAMPLE: ABNORMAL
SITE: ABNORMAL
SODIUM SERPL-SCNC: 134 MMOL/L (ref 136–145)
SP02: 50
VT: 380
WBC # BLD AUTO: 24.87 K/UL (ref 3.9–12.7)

## 2019-09-14 PROCEDURE — 63600175 PHARM REV CODE 636 W HCPCS: Performed by: NURSE PRACTITIONER

## 2019-09-14 PROCEDURE — 25000003 PHARM REV CODE 250: Performed by: STUDENT IN AN ORGANIZED HEALTH CARE EDUCATION/TRAINING PROGRAM

## 2019-09-14 PROCEDURE — 99291 PR CRITICAL CARE, E/M 30-74 MINUTES: ICD-10-PCS | Mod: ,,, | Performed by: PHYSICIAN ASSISTANT

## 2019-09-14 PROCEDURE — 80053 COMPREHEN METABOLIC PANEL: CPT

## 2019-09-14 PROCEDURE — 94003 VENT MGMT INPAT SUBQ DAY: CPT

## 2019-09-14 PROCEDURE — 82803 BLOOD GASES ANY COMBINATION: CPT

## 2019-09-14 PROCEDURE — 94761 N-INVAS EAR/PLS OXIMETRY MLT: CPT

## 2019-09-14 PROCEDURE — 27200966 HC CLOSED SUCTION SYSTEM

## 2019-09-14 PROCEDURE — 83735 ASSAY OF MAGNESIUM: CPT

## 2019-09-14 PROCEDURE — 25000003 PHARM REV CODE 250: Performed by: PSYCHIATRY & NEUROLOGY

## 2019-09-14 PROCEDURE — 99291 CRITICAL CARE FIRST HOUR: CPT | Mod: ,,, | Performed by: PHYSICIAN ASSISTANT

## 2019-09-14 PROCEDURE — 63600175 PHARM REV CODE 636 W HCPCS: Performed by: PSYCHIATRY & NEUROLOGY

## 2019-09-14 PROCEDURE — 25000003 PHARM REV CODE 250: Performed by: PHYSICIAN ASSISTANT

## 2019-09-14 PROCEDURE — 20000000 HC ICU ROOM

## 2019-09-14 PROCEDURE — 63600175 PHARM REV CODE 636 W HCPCS: Performed by: PHYSICIAN ASSISTANT

## 2019-09-14 PROCEDURE — 99900035 HC TECH TIME PER 15 MIN (STAT)

## 2019-09-14 PROCEDURE — 99233 SBSQ HOSP IP/OBS HIGH 50: CPT | Mod: ,,, | Performed by: INTERNAL MEDICINE

## 2019-09-14 PROCEDURE — C1751 CATH, INF, PER/CENT/MIDLINE: HCPCS

## 2019-09-14 PROCEDURE — 84100 ASSAY OF PHOSPHORUS: CPT

## 2019-09-14 PROCEDURE — 99233 PR SUBSEQUENT HOSPITAL CARE,LEVL III: ICD-10-PCS | Mod: ,,, | Performed by: INTERNAL MEDICINE

## 2019-09-14 PROCEDURE — 27000221 HC OXYGEN, UP TO 24 HOURS

## 2019-09-14 PROCEDURE — 85007 BL SMEAR W/DIFF WBC COUNT: CPT

## 2019-09-14 PROCEDURE — 85027 COMPLETE CBC AUTOMATED: CPT

## 2019-09-14 PROCEDURE — 99232 SBSQ HOSP IP/OBS MODERATE 35: CPT | Mod: ,,, | Performed by: NEUROLOGICAL SURGERY

## 2019-09-14 PROCEDURE — 76937 US GUIDE VASCULAR ACCESS: CPT

## 2019-09-14 PROCEDURE — 37799 UNLISTED PX VASCULAR SURGERY: CPT

## 2019-09-14 PROCEDURE — 36569 INSJ PICC 5 YR+ W/O IMAGING: CPT

## 2019-09-14 PROCEDURE — 99232 PR SUBSEQUENT HOSPITAL CARE,LEVL II: ICD-10-PCS | Mod: ,,, | Performed by: NEUROLOGICAL SURGERY

## 2019-09-14 PROCEDURE — 99900026 HC AIRWAY MAINTENANCE (STAT)

## 2019-09-14 RX ORDER — QUETIAPINE FUMARATE 100 MG/1
100 TABLET, FILM COATED ORAL 2 TIMES DAILY
Status: DISCONTINUED | OUTPATIENT
Start: 2019-09-14 | End: 2019-09-15

## 2019-09-14 RX ORDER — QUETIAPINE FUMARATE 25 MG/1
50 TABLET, FILM COATED ORAL ONCE
Status: COMPLETED | OUTPATIENT
Start: 2019-09-14 | End: 2019-09-14

## 2019-09-14 RX ADMIN — PROPOFOL 70 MCG/KG/MIN: 10 INJECTION, EMULSION INTRAVENOUS at 03:09

## 2019-09-14 RX ADMIN — INSULIN ASPART 10 UNITS: 100 INJECTION, SOLUTION INTRAVENOUS; SUBCUTANEOUS at 05:09

## 2019-09-14 RX ADMIN — FENTANYL CITRATE 100 MCG: 50 INJECTION INTRAMUSCULAR; INTRAVENOUS at 11:09

## 2019-09-14 RX ADMIN — HEPARIN SODIUM 5000 UNITS: 5000 INJECTION, SOLUTION INTRAVENOUS; SUBCUTANEOUS at 09:09

## 2019-09-14 RX ADMIN — SENNOSIDES AND DOCUSATE SODIUM 1 TABLET: 8.6; 5 TABLET ORAL at 08:09

## 2019-09-14 RX ADMIN — QUETIAPINE FUMARATE 50 MG: 25 TABLET ORAL at 08:09

## 2019-09-14 RX ADMIN — QUETIAPINE FUMARATE 100 MG: 100 TABLET ORAL at 08:09

## 2019-09-14 RX ADMIN — PROPOFOL 70 MCG/KG/MIN: 10 INJECTION, EMULSION INTRAVENOUS at 12:09

## 2019-09-14 RX ADMIN — DEXAMETHASONE SODIUM PHOSPHATE 4 MG: 4 INJECTION, SOLUTION INTRAMUSCULAR; INTRAVENOUS at 12:09

## 2019-09-14 RX ADMIN — CHLORHEXIDINE GLUCONATE 0.12% ORAL RINSE 15 ML: 1.2 LIQUID ORAL at 08:09

## 2019-09-14 RX ADMIN — QUETIAPINE FUMARATE 50 MG: 25 TABLET ORAL at 03:09

## 2019-09-14 RX ADMIN — FENTANYL CITRATE 50 MCG: 50 INJECTION INTRAMUSCULAR; INTRAVENOUS at 12:09

## 2019-09-14 RX ADMIN — LEVOTHYROXINE SODIUM 75 MCG: 75 TABLET ORAL at 05:09

## 2019-09-14 RX ADMIN — CEFTRIAXONE SODIUM 2 G: 2 INJECTION, SOLUTION INTRAVENOUS at 11:09

## 2019-09-14 RX ADMIN — DEXAMETHASONE SODIUM PHOSPHATE 4 MG: 4 INJECTION, SOLUTION INTRAMUSCULAR; INTRAVENOUS at 11:09

## 2019-09-14 RX ADMIN — SODIUM CHLORIDE, SODIUM LACTATE, POTASSIUM CHLORIDE, AND CALCIUM CHLORIDE: 600; 310; 30; 20 INJECTION, SOLUTION INTRAVENOUS at 03:09

## 2019-09-14 RX ADMIN — HEPARIN SODIUM 5000 UNITS: 5000 INJECTION, SOLUTION INTRAVENOUS; SUBCUTANEOUS at 05:09

## 2019-09-14 RX ADMIN — Medication 200 MCG/HR: at 08:09

## 2019-09-14 RX ADMIN — CEFTRIAXONE SODIUM 2 G: 2 INJECTION, SOLUTION INTRAVENOUS at 12:09

## 2019-09-14 RX ADMIN — VALPROIC ACID 750 MG: 250 SOLUTION ORAL at 08:09

## 2019-09-14 RX ADMIN — INSULIN ASPART 4 UNITS: 100 INJECTION, SOLUTION INTRAVENOUS; SUBCUTANEOUS at 02:09

## 2019-09-14 RX ADMIN — Medication 0.34 MCG/KG/MIN: at 05:09

## 2019-09-14 RX ADMIN — FAMOTIDINE 20 MG: 20 TABLET ORAL at 08:09

## 2019-09-14 RX ADMIN — Medication 0.3 MCG/KG/MIN: at 05:09

## 2019-09-14 RX ADMIN — LACOSAMIDE 100 MG: 50 TABLET, FILM COATED ORAL at 08:09

## 2019-09-14 RX ADMIN — Medication 0.26 MCG/KG/MIN: at 08:09

## 2019-09-14 RX ADMIN — PROPOFOL 15 MCG/KG/MIN: 10 INJECTION, EMULSION INTRAVENOUS at 12:09

## 2019-09-14 RX ADMIN — DEXAMETHASONE SODIUM PHOSPHATE 4 MG: 4 INJECTION, SOLUTION INTRAMUSCULAR; INTRAVENOUS at 05:09

## 2019-09-14 RX ADMIN — Medication 200 MCG/HR: at 09:09

## 2019-09-14 RX ADMIN — Medication 0.34 MCG/KG/MIN: at 03:09

## 2019-09-14 RX ADMIN — PROPOFOL 70 MCG/KG/MIN: 10 INJECTION, EMULSION INTRAVENOUS at 08:09

## 2019-09-14 RX ADMIN — HEPARIN SODIUM 5000 UNITS: 5000 INJECTION, SOLUTION INTRAVENOUS; SUBCUTANEOUS at 01:09

## 2019-09-14 RX ADMIN — VALPROIC ACID 750 MG: 250 SOLUTION ORAL at 11:09

## 2019-09-14 RX ADMIN — DEXAMETHASONE SODIUM PHOSPHATE 4 MG: 4 INJECTION, SOLUTION INTRAMUSCULAR; INTRAVENOUS at 06:09

## 2019-09-14 RX ADMIN — VALPROIC ACID 750 MG: 250 SOLUTION ORAL at 03:09

## 2019-09-14 RX ADMIN — Medication 0.34 MCG/KG/MIN: at 12:09

## 2019-09-14 NOTE — SUBJECTIVE & OBJECTIVE
Interval History:     No adverse events last 24 hours.    Review of Systems   Unable to perform ROS: Intubated     Objective:     Vital Signs (Most Recent):  Temp: 98.1 °F (36.7 °C) (09/14/19 0701)  Pulse: 93 (09/14/19 0909)  Resp: 15 (09/14/19 0909)  BP: (!) 191/86 (09/14/19 0801)  SpO2: 100 % (09/14/19 0909) Vital Signs (24h Range):  Temp:  [98.1 °F (36.7 °C)-98.8 °F (37.1 °C)] 98.1 °F (36.7 °C)  Pulse:  [] 93  Resp:  [15-40] 15  SpO2:  [97 %-100 %] 100 %  BP: (101-212)/() 191/86  Arterial Line BP: (115-199)/() 174/85     Weight: 78.6 kg (173 lb 4.5 oz)  Body mass index is 30.7 kg/m².    Estimated Creatinine Clearance: 43.2 mL/min (based on SCr of 1.1 mg/dL).    Physical Exam   Constitutional: She appears well-developed and well-nourished. No distress.   intubated   HENT:   Head: Normocephalic and atraumatic.   Eyes: Pupils are equal, round, and reactive to light. EOM are normal.   Neck: Normal range of motion. Neck supple. No JVD present. No thyromegaly present.   Cardiovascular: Normal rate, regular rhythm, normal heart sounds and intact distal pulses. Exam reveals no gallop and no friction rub.   No murmur heard.  Pulmonary/Chest: Effort normal and breath sounds normal. No stridor. No respiratory distress. She has no wheezes. She has no rales. She exhibits no tenderness.   Abdominal: Soft. Bowel sounds are normal. She exhibits no distension. There is no tenderness. There is no guarding.   Neurological: She displays normal reflexes.   Skin: Skin is warm and dry. Capillary refill takes less than 2 seconds. She is not diaphoretic.       Significant Labs:   Microbiology Results (last 7 days)     Procedure Component Value Units Date/Time    CSF culture [[id number]] Collected:  09/11/19 1355    Order Status:  Completed Specimen:  CSF (Spinal Fluid) from CSF Tap, Tube 3 Updated:  09/14/19 0714     CSF CULTURE No Growth to date     Gram Stain Result Cytospin indicates:      Rare WBC's      No organisms  seen    Blood culture [[id number]] Collected:  09/11/19 1811    Order Status:  Completed Specimen:  Blood from Peripheral, Hand, Left Updated:  09/13/19 2012     Blood Culture, Routine No Growth to date      No Growth to date      No Growth to date    Blood culture [[id number]] Collected:  09/08/19 1210    Order Status:  Completed Specimen:  Blood from Peripheral, Ankle, Left Updated:  09/13/19 1412     Blood Culture, Routine No growth after 5 days.    Blood culture [[id number]]  (Abnormal) Collected:  09/08/19 1215    Order Status:  Completed Specimen:  Blood from Peripheral, Forearm, Left Updated:  09/12/19 1031     Blood Culture, Routine Gram stain brian bottle: Gram positive cocci       Results called to and read back by:Milagro Valencia RN  09/09/2019  13:48      Gram stain aer bottle: Gram positive cocci 09/10/2019  15:46      GEMELLA (S.) MORBILLORUM  Susceptibility testing not routinely performed      Blood culture [[id number]]     Order Status:  Sent Specimen:  Blood     Gram stain [[id number]] Collected:  09/11/19 1355    Order Status:  Canceled Specimen:  CSF (Spinal Fluid) from CSF Tap, Tube 3     Culture, Respiratory with Gram Stain [[id number]]  (Abnormal)  (Susceptibility) Collected:  09/08/19 1158    Order Status:  Completed Specimen:  Respiratory from Tracheal Aspirate Updated:  09/11/19 0934     Respiratory Culture No S aureus or Pseudomonas isolated.      ESCHERICHIA COLI  Few  Normal respiratory connie also present       Gram Stain (Respiratory) Few WBC's     Gram Stain (Respiratory) Moderate Gram negative rods     Gram Stain (Respiratory) Rare Gram positive cocci    Blood culture [[id number]]     Order Status:  Canceled Specimen:  Blood     Blood culture [[id number]]     Order Status:  Canceled Specimen:  Blood           Significant Imaging: I have reviewed all pertinent imaging results/findings within the past 24 hours.

## 2019-09-14 NOTE — CONSULTS
Double lumen PICC placed to R-BRACHIAL vein.   31 cm in length, 0 cm exposed, and 30 cm arm circumference.  Lot # [id number]

## 2019-09-14 NOTE — ASSESSMENT & PLAN NOTE
Daily CXR, ABG  Continue Peridex, Famotidine, Heparin subq  Trial of extubation 9/6/2019- re-intubated shortly after extubation for airway protection  Continue to wean vent settings as tolerated  Still intubated 2/2 inability to protect airway     Vent Mode: A/C  Oxygen Concentration (%):  [40-50] 40  Resp Rate Total:  [12 br/min-30 br/min] 12 br/min  Vt Set:  [380 mL] 380 mL  PEEP/CPAP:  [5 cmH20] 5 cmH20  Pressure Support:  [0 cmH20] 0 cmH20  Mean Airway Pressure:  [3.6 cmH20-9.2 cmH20] 8.3 cmH20

## 2019-09-14 NOTE — ASSESSMENT & PLAN NOTE
Afebrile, WBC up today   -repeat BC negative   -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

## 2019-09-14 NOTE — ASSESSMENT & PLAN NOTE
74 year old admitted to unit on 9/3 following new onset seizure, with MRI showing large area of R cortical edema with flair and ill defined enhancement on contrast study, concern for glioma vs infectious/inflammatory process  -  repeat BC negative   - CSF cx no growth,   - some CSF still pending   - ID following   - continue decadron  - NSGY, epilepsy following   - neuro checks q1hr   - vital signs q1hr   -on SubQ heparin   -Repeat MRI Brain stable

## 2019-09-14 NOTE — PROGRESS NOTES
[facility]  Neurocritical Care  Progress Note    Admit Date: 9/3/2019  Service Date: 09/14/2019  Length of Stay: 11    Subjective:     Chief Complaint: Brain lesion    History of Present Illness: Pt is  73 yo female with a PMHx of CKD 3, HTN, was transferred from [facility] to [facility] for new onset seizures and concern for right front lobe mass. The pt was found in her bedroom by her spouse at approximately 9:45 pm sitting her head against the bed, unresponsive, and having seizure like activity on the left-side. EMT was called and the pt was given Versed twice while en route to the hospital. Pt had a second witnessed seizures, left facial droop, left-sided weakness, and tongue ecchymosis in the ED. Neurology was consulted and The pt was given IV Keppra and Vimpat. MRI brain without contrast was acquired. The image showed right frontal lobe vasogenic edema with mass effect concerning for underlying mass. EEG was acquired at outside hospital concerning showing an abnormal result. The pt was given decadron IV and neurosurgical consult recommended. Pt transferred to [facility] and admitted to the [facility] for higher level of care and further evaluation.     Pt history acquired per chart review and from spouse present at the bedside. The pt's spouse denies prior history of seizures CVA, cancer history and up-to-date screenings    Hospital Course: --admitted to [facility] on 9/3 following new onset seizure, arrived intubated  --MRI with large area of R cortical edema with flair and ill defined enhancement on contrast study, concern for glioma vs infectious/inflammatory process  --LP appears non infectious, cytology pending  9/5- no acute events, awaiting LP finalization from pathology report., weaning vent.   09/06/2019: Very agitated overnight, requiring increased sedation. Patient extubated this morning on rounds, and reintubated shortly after. Unable to maintain airway and secretions. CSF gram stain negative.  09/07/2019: KATT.  EKG obtained, seroquel started. Valproic acid started.  09/08/2019: NAEON. Improved agitation with seroquel.  09/09/2019: MRI Brain w/wo ordered for today. BLE US ordered. Will need new LP later on in the week.   09/10/2019 LP for infx workup, CD4 lab, EEG monitoring per Epilepsy   09/11/2019: To IR for LP. Spoke to NSGY about possible bx of lesion. Discontinue cEEG.   9/13/2019 Issues with low HR and BP overnight and this morning, Given Shoaib bump and atropine. 24 hours of IVF and bolus. WBC decreasing and afebrile. Repeat blood cultures negative. Plan for EGD and FEDERICO today.   9/14/2019 NAEO, EGD and FEDERICO moved until Monday. CSF still pending. Increasing WBC but afebrile. Start tube feeding     Interval History:    NAEO, EGD and FEDERICO moved until Monday. CSF still pending. Increasing WBC but afebrile. Start tube feeding     Review of Systems   Unable to perform ROS: Intubated       Objective:     Vitals:  Temp: 98.2 °F (36.8 °C)  Pulse: 64  Rhythm: sinus tachycardia  BP: (!) 109/52  MAP (mmHg): 75  Resp: 12  SpO2: 100 %  Oxygen Concentration (%): 40  O2 Device (Oxygen Therapy): ventilator  Vent Mode: A/C  Set Rate: 12 bmp  Vt Set: 380 mL  Pressure Support: 0 cmH20  PEEP/CPAP: 5 cmH20  Peak Airway Pressure: 29 cmH2O  Mean Airway Pressure: 8.3 cmH20  Plateau Pressure: 0 cmH20    Temp  Min: 98.1 °F (36.7 °C)  Max: 98.8 °F (37.1 °C)  Pulse  Min: 48  Max: 131  BP  Min: 78/43  Max: 214/92  MAP (mmHg)  Min: 62  Max: 123  Resp  Min: 12  Max: 48  SpO2  Min: 97 %  Max: 100 %  Oxygen Concentration (%)  Min: 40  Max: 50    09/13 0701 - 09/14 0700  In: 4668.6 [I.V.:3968.6]  Out: -    Unmeasured Output  Urine Occurrence: 3  Stool Occurrence: 0  Emesis Occurrence: 0  Pad Count: 3       Physical Exam   Constitutional: She appears well-developed and well-nourished.   HENT:   Head: Normocephalic and atraumatic.   Eyes: Pupils are equal, round, and reactive to light.   Cardiovascular: Normal rate and regular rhythm.   Pulmonary/Chest:  Effort normal. No respiratory distress.   intubated   Abdominal: She exhibits no distension.   Musculoskeletal: She exhibits no edema or deformity.   Skin: Skin is warm and dry.   Neuro:  --sedation: fentanyl  --GCS:  E2 V1T M5  --Mental Status: lethargic, intubated, agitated, moves spontaneously  --CN II-XII grossly intact.   --PERRL - 3 mm  --Motor: LUE - withdraws to pain, all other extremities move spontaneously   --sensory: intact    Medications:  Continuous    fentanyl Last Rate: 200 mcg/hr (09/14/19 1701)   norepinephrine bitartrate-D5W Last Rate: 0.3 mcg/kg/min (09/14/19 1759)   propofol Last Rate: 70 mcg/kg/min (09/14/19 1701)   Scheduled    cefTRIAXone (ROCEPHIN) IVPB 2 g Q12H   chlorhexidine 15 mL BID   dexamethasone 4 mg Q6H   famotidine 20 mg QHS   heparin (porcine) 5,000 Units Q8H   lacosamide 100 mg Q12H   levothyroxine 75 mcg Before breakfast   QUEtiapine 100 mg BID   senna-docusate 8.6-50 mg 1 tablet BID   valproic acid (as sodium salt) 750 mg Q8H   PRN    acetaminophen 650 mg Q6H PRN   Dextrose 10% Bolus 12.5 g PRN   fentaNYL 100 mcg Q2H PRN   fentaNYL 50 mcg Q2H PRN   glucagon (human recombinant) 1 mg PRN   hydrALAZINE 10 mg Q6H PRN   insulin aspart U-100 1-10 Units Q6H PRN   magnesium oxide 800 mg PRN   magnesium oxide 800 mg PRN   midazolam 2 mg Q5 Min PRN   OLANZapine 5 mg Q12H PRN   potassium chloride 10% 40 mEq PRN   potassium chloride 10% 40 mEq PRN   potassium, sodium phosphates 2 packet PRN   potassium, sodium phosphates 2 packet PRN   potassium, sodium phosphates 2 packet PRN   racepinephrine 0.5 mL Q4H PRN   sodium chloride 0.9% 10 mL PRN   sodium chloride 0.9% 10 mL PRN     Today I personally reviewed pertinent medications, lines/drains/airways, imaging, cardiology results, laboratory results, microbiology results, notably:    Diet  Diet NPO Except for: Medication  Diet NPO Except for: Medication  Diet NPO Except for: Medication  Diet NPO Except for: Medication        Assessment/Plan:      Neuro  * Brain lesion  74 year old admitted to unit on 9/3 following new onset seizure, with MRI showing large area of R cortical edema with flair and ill defined enhancement on contrast study, concern for glioma vs infectious/inflammatory process  -  repeat BC negative   - CSF cx no growth,   - some CSF still pending   - ID following   - continue decadron  - NSGY, epilepsy following   - neuro checks q1hr   - vital signs q1hr   -on SubQ heparin   -Repeat MRI Brain stable      Cerebral edema  --continue decadron, see brain lesion    New onset seizure  2/2 to brain mass   Keppra changed to Vimpat 9/6  Valproic acid started 9/7  Epilepsy following  -Continue current AEDs        Pulmonary  Acute respiratory failure with hypoxia and hypercapnia  See [facility] vent    On mechanically assisted ventilation  Daily CXR, ABG  Continue Peridex, Famotidine, Heparin subq  Trial of extubation 9/6/2019- re-intubated shortly after extubation for airway protection  Continue to wean vent settings as tolerated  Still intubated 2/2 inability to protect airway     Vent Mode: A/C  Oxygen Concentration (%):  [40-50] 40  Resp Rate Total:  [12 br/min-30 br/min] 12 br/min  Vt Set:  [380 mL] 380 mL  PEEP/CPAP:  [5 cmH20] 5 cmH20  Pressure Support:  [0 cmH20] 0 cmH20  Mean Airway Pressure:  [3.6 cmH20-9.2 cmH20] 8.3 cmH20      Cardiac/Vascular  Essential hypertension  SBP <180, MAP >65   - on Levo gtt as needed for low MAP   -A-line placed  for continuous blood pressure monitoring     Renal/  Chronic kidney disease, stage III (moderate)  -PMHx of CKD 3   -monitor renal function, I/Os       ID  Bacteremia  -repeat BC negative thus far     Oncology  Leukocytosis  Afebrile, WBC up today   -repeat BC negative   -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    Other  Hypotension due to hypovolemia  -monitor with A-line  -keep net positive  -Levo gtt   -started TF to increase intake          The patient is being Prophylaxed for:  Venous  Thromboembolism with: Chemical  Stress Ulcer with: H2B  Ventilator Pneumonia with: chlorhexidine oral care    Activity Orders          Diet NPO Except for: Medication: NPO starting at 09/16 0001    Diet NPO Except for: Medication: NPO starting at 09/13 0001    Straight Cath starting at 09/05 0034        Full Code    CC time 36 minutes   Critical secondary to Patient has a condition that poses threat to life and bodily function:      Critical care was time spent personally by me on the following activities: development of treatment plan with patient or surrogate and bedside caregivers, discussions with consultants, evaluation of patient's response to treatment, examination of patient, ordering and performing treatments and interventions, ordering and review of laboratory studies, ordering and review of radiographic studies, pulse oximetry, re-evaluation of patient's condition. This critical care time did not overlap with that of any other provider or involve time for any procedures.      JOSE AbarcaC  Neurocritical Care  [facility]

## 2019-09-14 NOTE — PLAN OF CARE
Problem: Adult Inpatient Plan of Care  Goal: Plan of Care Review  Outcome: Ongoing (interventions implemented as appropriate)  POC reviewed with pt and family at 1400. Pt verbalized understanding. Questions and concerns addressed. No acute events today. Pt progressing toward goals. Will continue to monitor. See flowsheets for full assessment and VS info.

## 2019-09-14 NOTE — PROGRESS NOTES
[facility]  Infectious Disease  Progress Note    Patient Name: Clemencia Nguyen  MRN: [id number]  Admission Date: 9/3/2019  Length of Stay: 11 days  Attending Physician: Antelmo Ramos MD  Primary Care Provider: SHELBY Castillo MD    Isolation Status: No active isolations  Assessment/Plan:      * Brain lesion  73 y/o female admitted with a bbeo lesions. 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  She is currently on IV ceftriaxone.  Blood cultures have cleared.  Recommend FEDERICO to determine if this could be an endocarditis with subsequent embolic phenomena to the brain.  We will continue to follow.    Bacteremia  75yo woman with PMH HTN & CKD presents with acute onset of seizure like activity. Found to have area of ill-defined enhancement on MRI brain w/ and w/out.  Most recent EEG showing bilateral PLEDs left greater than right. ID consulted for bacteremia.  reports dental procedure (crown) ~2 months ago.     9/03 CSF: gram stain negative. 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     Recommendation:   -Obtain FEDERICO to evaluate for endocarditis   - Continue ceftriaxone dosed at CNS infection doses for possible gemella endocarditis with emboli to brain  - I will discontinue the gentamicin for now due to concerns for acute kidney injury        Anticipated Disposition: TBD    Thank you for your consult. I will follow-up with patient. Please contact us if you have any additional questions.    Last Randolph MD  Infectious Disease  [facility]    Subjective:      Principal Problem:Brain lesion    HPI: Ms. Manriquez is a 75yo woman with PMH of HTN & CKD otherwise doing well who presented to [facility] with acute onset of Left sided seizure like activity and LOC.  at bedside reports she was functioning well and appropriately until he had found her unconscious last week. She has never had seizures before. 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 He notes she did seem forgetful during square dancing over the past month but he had attributed it to her age. He also says she had a dental crown procedure a couple of months ago. Denies fevers, chills. Denies FHx of cancer. Denies Fhx of colorectal cancer.     [facility], she was given versed x 2 via EMS. CTH performed at outside hospital was negative for bleed.  MRI brain w/ and w/out obtained that showed large area of edema in right frontal lobe and ill defined enhancement in the frontal lobe.     She has been afebrile & hypertensive. Failed 2 extubations as family notes she became very anxious during trials.   Interval History:     No adverse events last 24 hours.    Review of Systems   Unable to perform ROS: Intubated     Objective:     Vital Signs (Most Recent):  Temp: 98.1 °F (36.7 °C) (09/14/19 0701)  Pulse: 93 (09/14/19 0909)  Resp: 15 (09/14/19 0909)  BP: (!) 191/86 (09/14/19 0801)  SpO2: 100 % (09/14/19 0909) Vital Signs (24h Range):  Temp:  [98.1 °F (36.7 °C)-98.8 °F (37.1 °C)] 98.1 °F (36.7 °C)  Pulse:  [] 93  Resp:  [15-40] 15  SpO2:  [97 %-100 %] 100 %  BP: (101-212)/() 191/86  Arterial Line BP: (115-199)/() 174/85     Weight: 78.6 kg (173 lb 4.5 oz)  Body mass index is 30.7 kg/m².    Estimated Creatinine Clearance: 43.2 mL/min (based on SCr of 1.1 mg/dL).    Physical Exam   Constitutional: She appears well-developed and well-nourished. No distress.   intubated   HENT:   Head: Normocephalic and atraumatic.   Eyes: Pupils are equal, round, and reactive to  light. EOM are normal.   Neck: Normal range of motion. Neck supple. No JVD present. No thyromegaly present.   Cardiovascular: Normal rate, regular rhythm, normal heart sounds and intact distal pulses. Exam reveals no gallop and no friction rub.   No murmur heard.  Pulmonary/Chest: Effort normal and breath sounds normal. No stridor. No respiratory distress. She has no wheezes. She has no rales. She exhibits no tenderness.   Abdominal: Soft. Bowel sounds are normal. She exhibits no distension. There is no tenderness. There is no guarding.   Neurological: She displays normal reflexes.   Skin: Skin is warm and dry. Capillary refill takes less than 2 seconds. She is not diaphoretic.       Significant Labs:   Microbiology Results (last 7 days)     Procedure Component Value Units Date/Time    CSF culture [[id number]] Collected:  09/11/19 1355    Order Status:  Completed Specimen:  CSF (Spinal Fluid) from CSF Tap, Tube 3 Updated:  09/14/19 0714     CSF CULTURE No Growth to date     Gram Stain Result Cytospin indicates:      Rare WBC's      No organisms seen    Blood culture [[id number]] Collected:  09/11/19 1811    Order Status:  Completed Specimen:  Blood from Peripheral, Hand, Left Updated:  09/13/19 2012     Blood Culture, Routine No Growth to date      No Growth to date      No Growth to date    Blood culture [[id number]] Collected:  09/08/19 1210    Order Status:  Completed Specimen:  Blood from Peripheral, Ankle, Left Updated:  09/13/19 1412     Blood Culture, Routine No growth after 5 days.    Blood culture [[id number]]  (Abnormal) Collected:  09/08/19 1215    Order Status:  Completed Specimen:  Blood from Peripheral, Forearm, Left Updated:  09/12/19 1031     Blood Culture, Routine Gram stain brian bottle: Gram positive cocci       Results called to and read back by:Milagro Valencia RN  09/09/2019  13:48      Gram stain aer bottle: Gram positive cocci 09/10/2019  15:46      GEMELLA (S.) MORBILLORUM  Susceptibility testing not  routinely performed      Blood culture [[id number]]     Order Status:  Sent Specimen:  Blood     Gram stain [[id number]] Collected:  09/11/19 1355    Order Status:  Canceled Specimen:  CSF (Spinal Fluid) from CSF Tap, Tube 3     Culture, Respiratory with Gram Stain [[id number]]  (Abnormal)  (Susceptibility) Collected:  09/08/19 1158    Order Status:  Completed Specimen:  Respiratory from Tracheal Aspirate Updated:  09/11/19 0934     Respiratory Culture No S aureus or Pseudomonas isolated.      ESCHERICHIA COLI  Few  Normal respiratory connie also present       Gram Stain (Respiratory) Few WBC's     Gram Stain (Respiratory) Moderate Gram negative rods     Gram Stain (Respiratory) Rare Gram positive cocci    Blood culture [[id number]]     Order Status:  Canceled Specimen:  Blood     Blood culture [[id number]]     Order Status:  Canceled Specimen:  Blood           Significant Imaging: I have reviewed all pertinent imaging results/findings within the past 24 hours.

## 2019-09-14 NOTE — SUBJECTIVE & OBJECTIVE
Interval History:    NAEO, EGD and FEDERICO moved until Monday. CSF still pending. Increasing WBC but afebrile. Start tube feeding     Review of Systems   Unable to perform ROS: Intubated       Objective:     Vitals:  Temp: 98.2 °F (36.8 °C)  Pulse: 64  Rhythm: sinus tachycardia  BP: (!) 109/52  MAP (mmHg): 75  Resp: 12  SpO2: 100 %  Oxygen Concentration (%): 40  O2 Device (Oxygen Therapy): ventilator  Vent Mode: A/C  Set Rate: 12 bmp  Vt Set: 380 mL  Pressure Support: 0 cmH20  PEEP/CPAP: 5 cmH20  Peak Airway Pressure: 29 cmH2O  Mean Airway Pressure: 8.3 cmH20  Plateau Pressure: 0 cmH20    Temp  Min: 98.1 °F (36.7 °C)  Max: 98.8 °F (37.1 °C)  Pulse  Min: 48  Max: 131  BP  Min: 78/43  Max: 214/92  MAP (mmHg)  Min: 62  Max: 123  Resp  Min: 12  Max: 48  SpO2  Min: 97 %  Max: 100 %  Oxygen Concentration (%)  Min: 40  Max: 50    09/13 0701 - 09/14 0700  In: 4668.6 [I.V.:3968.6]  Out: -    Unmeasured Output  Urine Occurrence: 3  Stool Occurrence: 0  Emesis Occurrence: 0  Pad Count: 3       Physical Exam   Constitutional: She appears well-developed and well-nourished.   HENT:   Head: Normocephalic and atraumatic.   Eyes: Pupils are equal, round, and reactive to light.   Cardiovascular: Normal rate and regular rhythm.   Pulmonary/Chest: Effort normal. No respiratory distress.   intubated   Abdominal: She exhibits no distension.   Musculoskeletal: She exhibits no edema or deformity.   Skin: Skin is warm and dry.   Neuro:  --sedation: fentanyl  --GCS:  E2 V1T M5  --Mental Status: lethargic, intubated, agitated, moves spontaneously  --CN II-XII grossly intact.   --PERRL - 3 mm  --Motor: LUE - withdraws to pain, all other extremities move spontaneously   --sensory: intact    Medications:  Continuous    fentanyl Last Rate: 200 mcg/hr (09/14/19 1701)   norepinephrine bitartrate-D5W Last Rate: 0.3 mcg/kg/min (09/14/19 1759)   propofol Last Rate: 70 mcg/kg/min (09/14/19 5664)   Scheduled    cefTRIAXone (ROCEPHIN) IVPB 2 g Q12H    chlorhexidine 15 mL BID   dexamethasone 4 mg Q6H   famotidine 20 mg QHS   heparin (porcine) 5,000 Units Q8H   lacosamide 100 mg Q12H   levothyroxine 75 mcg Before breakfast   QUEtiapine 100 mg BID   senna-docusate 8.6-50 mg 1 tablet BID   valproic acid (as sodium salt) 750 mg Q8H   PRN    acetaminophen 650 mg Q6H PRN   Dextrose 10% Bolus 12.5 g PRN   fentaNYL 100 mcg Q2H PRN   fentaNYL 50 mcg Q2H PRN   glucagon (human recombinant) 1 mg PRN   hydrALAZINE 10 mg Q6H PRN   insulin aspart U-100 1-10 Units Q6H PRN   magnesium oxide 800 mg PRN   magnesium oxide 800 mg PRN   midazolam 2 mg Q5 Min PRN   OLANZapine 5 mg Q12H PRN   potassium chloride 10% 40 mEq PRN   potassium chloride 10% 40 mEq PRN   potassium, sodium phosphates 2 packet PRN   potassium, sodium phosphates 2 packet PRN   potassium, sodium phosphates 2 packet PRN   racepinephrine 0.5 mL Q4H PRN   sodium chloride 0.9% 10 mL PRN   sodium chloride 0.9% 10 mL PRN     Today I personally reviewed pertinent medications, lines/drains/airways, imaging, cardiology results, laboratory results, microbiology results, notably:    Diet  Diet NPO Except for: Medication  Diet NPO Except for: Medication  Diet NPO Except for: Medication  Diet NPO Except for: Medication

## 2019-09-14 NOTE — PROGRESS NOTES
Therapy with gentamicin has been discontinued by the Infectious Disease service due to concerns for RICHARD.  Pharmacy will sign off, please re-consult as needed.    Belem Fleming, PharmD, BCCCP  [phone]

## 2019-09-14 NOTE — ASSESSMENT & PLAN NOTE
75yo woman with PMH HTN & CKD presents with acute onset of seizure like activity. Found to have area of ill-defined enhancement on MRI brain w/ and w/out.  Most recent EEG showing bilateral PLEDs left greater than right. ID consulted for bacteremia.  reports dental procedure (crown) ~2 months ago.     9/03 CSF: gram stain negative. 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     Recommendation:   -Obtain FEDERICO to evaluate for endocarditis   - Continue ceftriaxone dosed at CNS infection doses for possible gemella endocarditis with emboli to brain  - I will discontinue the gentamicin for now due to concerns for acute kidney injury

## 2019-09-14 NOTE — PLAN OF CARE
Problem: Adult Inpatient Plan of Care  Goal: Plan of Care Review  Outcome: Ongoing (interventions implemented as appropriate)  POC reviewed with pt and family at 1800. Pts  verbalized understanding. Questions and concerns addressed. Fentanyl, levo, and propofol gtts titrated. LR @ 100. PICC team unable to place PICC until 48hrs after negative blood culture. Midline team called twice- unable to place today. EGD planned for Monday. Hands remain dusky with 2+ pulses. Agitated with stimulation. Will continue to monitor. See flowsheets for full assessment and VS info.

## 2019-09-14 NOTE — ASSESSMENT & PLAN NOTE
SBP <180, MAP >65   - on Levo gtt as needed for low MAP   -A-line placed  for continuous blood pressure monitoring

## 2019-09-14 NOTE — PROCEDURES
"Clemencia Nguyen is a 74 y.o. female patient.    Temp: 98.1 °F (36.7 °C) (09/14/19 0701)  Pulse: (!) 113 (09/14/19 1124)  Resp: (!) 33 (09/14/19 1124)  BP: (!) 214/92 (09/14/19 1124)  SpO2: 100 % (09/14/19 1124)  Weight: 78.6 kg (173 lb 4.5 oz) (09/07/19 2305)  Height: 5' 3" (160 cm) (09/04/19 0947)    PICC  Date/Time: 9/14/2019 11:38 AM  Performed by: Kade Avina RN  Consent Done: Yes  Indications: med administration and vascular access  Anesthesia: local infiltration  Local anesthetic: lidocaine 1% without epinephrine  Anesthetic Total (mL): 2  Preparation: skin prepped with ChloraPrep  Skin prep agent dried: skin prep agent completely dried prior to procedure  Sterile barriers: all five maximum sterile barriers used - cap, mask, sterile gown, sterile gloves, and large sterile sheet  Hand hygiene: hand hygiene performed prior to central venous catheter insertion  Location details: right brachial  Catheter type: double lumen  Catheter size: 5 Fr  Catheter Length: 31cm    Ultrasound guidance: yes  Vessel Caliber: medium and patent, compressibility normal  Vascular Doppler: not done  Needle advanced into vessel with real time Ultrasound guidance.  Guidewire confirmed in vessel.  Image recorded and saved.  Sterile sheath used.  no esophageal manometryNumber of attempts: 1  Post-procedure: blood return through all ports, chlorhexidine patch and sterile dressing applied  Implants: No  Assessment: placement verified by x-ray  Complications: none          Ellyn Trivedi  9/14/2019  "

## 2019-09-14 NOTE — ASSESSMENT & PLAN NOTE
73 y/o female admitted with a bebo lesions. 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  She is currently on IV ceftriaxone.  Blood cultures have cleared.  Recommend FEDERICO to determine if this could be an endocarditis with subsequent embolic phenomena to the brain.  We will continue to follow.

## 2019-09-14 NOTE — PLAN OF CARE
Problem: Adult Inpatient Plan of Care  Goal: Plan of Care Review  Outcome: Ongoing (interventions implemented as appropriate)  POC reviewed with pt at 0500. Pt unable to verbalized understanding due to intubation. POC reviewed with . Questions and concerns addressed. Pt continues to require sedation to reduce agitation. No acute events overnight. Pt progressing toward goals. Will continue to monitor. See flowsheets for full assessment and VS info

## 2019-09-15 LAB
ALBUMIN SERPL BCP-MCNC: 2.5 G/DL (ref 3.5–5.2)
ALLENS TEST: ABNORMAL
ALP SERPL-CCNC: 42 U/L (ref 55–135)
ALT SERPL W/O P-5'-P-CCNC: 69 U/L (ref 10–44)
ANION GAP SERPL CALC-SCNC: 7 MMOL/L (ref 8–16)
ANISOCYTOSIS BLD QL SMEAR: SLIGHT
AST SERPL-CCNC: 22 U/L (ref 10–40)
BASOPHILS # BLD AUTO: ABNORMAL K/UL (ref 0–0.2)
BASOPHILS NFR BLD: 0 % (ref 0–1.9)
BILIRUB SERPL-MCNC: 0.2 MG/DL (ref 0.1–1)
BUN SERPL-MCNC: 27 MG/DL (ref 8–23)
CALCIUM SERPL-MCNC: 8.1 MG/DL (ref 8.7–10.5)
CHLORIDE SERPL-SCNC: 102 MMOL/L (ref 95–110)
CK SERPL-CCNC: 65 U/L (ref 20–180)
CO2 SERPL-SCNC: 26 MMOL/L (ref 23–29)
CREAT SERPL-MCNC: 1.1 MG/DL (ref 0.5–1.4)
DELSYS: ABNORMAL
DIFFERENTIAL METHOD: ABNORMAL
EOSINOPHIL # BLD AUTO: ABNORMAL K/UL (ref 0–0.5)
EOSINOPHIL NFR BLD: 0 % (ref 0–8)
ERYTHROCYTE [DISTWIDTH] IN BLOOD BY AUTOMATED COUNT: 13.4 % (ref 11.5–14.5)
ERYTHROCYTE [SEDIMENTATION RATE] IN BLOOD BY WESTERGREN METHOD: 12 MM/H
EST. GFR  (AFRICAN AMERICAN): 56.4 ML/MIN/1.73 M^2
EST. GFR  (NON AFRICAN AMERICAN): 48.9 ML/MIN/1.73 M^2
FIO2: 40
GLUCOSE SERPL-MCNC: 397 MG/DL (ref 70–110)
HCO3 UR-SCNC: 26.1 MMOL/L (ref 24–28)
HCT VFR BLD AUTO: 32.6 % (ref 37–48.5)
HGB BLD-MCNC: 10.6 G/DL (ref 12–16)
HYPOCHROMIA BLD QL SMEAR: ABNORMAL
IMM GRANULOCYTES # BLD AUTO: ABNORMAL K/UL (ref 0–0.04)
IMM GRANULOCYTES NFR BLD AUTO: ABNORMAL % (ref 0–0.5)
LYMPHOCYTES # BLD AUTO: ABNORMAL K/UL (ref 1–4.8)
LYMPHOCYTES NFR BLD: 7 % (ref 18–48)
MAGNESIUM SERPL-MCNC: 2.3 MG/DL (ref 1.6–2.6)
MCH RBC QN AUTO: 29 PG (ref 27–31)
MCHC RBC AUTO-ENTMCNC: 32.5 G/DL (ref 32–36)
MCV RBC AUTO: 89 FL (ref 82–98)
METAMYELOCYTES NFR BLD MANUAL: 1 %
MIN VOL: 4.71
MODE: ABNORMAL
MONOCYTES # BLD AUTO: ABNORMAL K/UL (ref 0.3–1)
MONOCYTES NFR BLD: 10 % (ref 4–15)
NEUTROPHILS NFR BLD: 81 % (ref 38–73)
NEUTS BAND NFR BLD MANUAL: 1 %
NRBC BLD-RTO: 0 /100 WBC
OVALOCYTES BLD QL SMEAR: ABNORMAL
PCO2 BLDA: 49.9 MMHG (ref 35–45)
PEEP: 5
PH SMN: 7.33 [PH] (ref 7.35–7.45)
PHOSPHATE SERPL-MCNC: 3.3 MG/DL (ref 2.7–4.5)
PIP: 31
PLATELET # BLD AUTO: 250 K/UL (ref 150–350)
PMV BLD AUTO: 9.4 FL (ref 9.2–12.9)
PO2 BLDA: 106 MMHG (ref 80–100)
POC BE: 0 MMOL/L
POC SATURATED O2: 98 % (ref 95–100)
POC TCO2: 28 MMOL/L (ref 23–27)
POCT GLUCOSE: 170 MG/DL (ref 70–110)
POCT GLUCOSE: 177 MG/DL (ref 70–110)
POCT GLUCOSE: 182 MG/DL (ref 70–110)
POCT GLUCOSE: 190 MG/DL (ref 70–110)
POCT GLUCOSE: 200 MG/DL (ref 70–110)
POCT GLUCOSE: 209 MG/DL (ref 70–110)
POCT GLUCOSE: 217 MG/DL (ref 70–110)
POCT GLUCOSE: 227 MG/DL (ref 70–110)
POCT GLUCOSE: 229 MG/DL (ref 70–110)
POCT GLUCOSE: 277 MG/DL (ref 70–110)
POIKILOCYTOSIS BLD QL SMEAR: SLIGHT
POLYCHROMASIA BLD QL SMEAR: ABNORMAL
POTASSIUM SERPL-SCNC: 4.5 MMOL/L (ref 3.5–5.1)
PROT SERPL-MCNC: 5.1 G/DL (ref 6–8.4)
RBC # BLD AUTO: 3.66 M/UL (ref 4–5.4)
SAMPLE: ABNORMAL
SITE: ABNORMAL
SODIUM SERPL-SCNC: 135 MMOL/L (ref 136–145)
SP02: 100
TRIGL SERPL-MCNC: 228 MG/DL (ref 30–150)
VT: 380
WBC # BLD AUTO: 13.64 K/UL (ref 3.9–12.7)

## 2019-09-15 PROCEDURE — 25000003 PHARM REV CODE 250: Performed by: STUDENT IN AN ORGANIZED HEALTH CARE EDUCATION/TRAINING PROGRAM

## 2019-09-15 PROCEDURE — 82550 ASSAY OF CK (CPK): CPT

## 2019-09-15 PROCEDURE — 99291 PR CRITICAL CARE, E/M 30-74 MINUTES: ICD-10-PCS | Mod: GC,,, | Performed by: PSYCHIATRY & NEUROLOGY

## 2019-09-15 PROCEDURE — 94761 N-INVAS EAR/PLS OXIMETRY MLT: CPT

## 2019-09-15 PROCEDURE — 63600175 PHARM REV CODE 636 W HCPCS: Performed by: NURSE PRACTITIONER

## 2019-09-15 PROCEDURE — 94003 VENT MGMT INPAT SUBQ DAY: CPT

## 2019-09-15 PROCEDURE — 99900026 HC AIRWAY MAINTENANCE (STAT)

## 2019-09-15 PROCEDURE — 63600175 PHARM REV CODE 636 W HCPCS: Performed by: PSYCHIATRY & NEUROLOGY

## 2019-09-15 PROCEDURE — 36600 WITHDRAWAL OF ARTERIAL BLOOD: CPT

## 2019-09-15 PROCEDURE — 99233 PR SUBSEQUENT HOSPITAL CARE,LEVL III: ICD-10-PCS | Mod: ,,, | Performed by: INTERNAL MEDICINE

## 2019-09-15 PROCEDURE — 83735 ASSAY OF MAGNESIUM: CPT

## 2019-09-15 PROCEDURE — 25000003 PHARM REV CODE 250: Performed by: PHYSICIAN ASSISTANT

## 2019-09-15 PROCEDURE — 80053 COMPREHEN METABOLIC PANEL: CPT

## 2019-09-15 PROCEDURE — 27200966 HC CLOSED SUCTION SYSTEM

## 2019-09-15 PROCEDURE — 85007 BL SMEAR W/DIFF WBC COUNT: CPT

## 2019-09-15 PROCEDURE — 99232 PR SUBSEQUENT HOSPITAL CARE,LEVL II: ICD-10-PCS | Mod: ,,, | Performed by: NEUROLOGICAL SURGERY

## 2019-09-15 PROCEDURE — 99291 CRITICAL CARE FIRST HOUR: CPT | Mod: GC,,, | Performed by: PSYCHIATRY & NEUROLOGY

## 2019-09-15 PROCEDURE — 25000003 PHARM REV CODE 250: Performed by: PSYCHIATRY & NEUROLOGY

## 2019-09-15 PROCEDURE — 85027 COMPLETE CBC AUTOMATED: CPT

## 2019-09-15 PROCEDURE — 20000000 HC ICU ROOM

## 2019-09-15 PROCEDURE — 84100 ASSAY OF PHOSPHORUS: CPT

## 2019-09-15 PROCEDURE — 82803 BLOOD GASES ANY COMBINATION: CPT

## 2019-09-15 PROCEDURE — 84478 ASSAY OF TRIGLYCERIDES: CPT

## 2019-09-15 PROCEDURE — 99233 SBSQ HOSP IP/OBS HIGH 50: CPT | Mod: ,,, | Performed by: INTERNAL MEDICINE

## 2019-09-15 PROCEDURE — 99900035 HC TECH TIME PER 15 MIN (STAT)

## 2019-09-15 PROCEDURE — 63600175 PHARM REV CODE 636 W HCPCS: Performed by: PHYSICIAN ASSISTANT

## 2019-09-15 PROCEDURE — 99232 SBSQ HOSP IP/OBS MODERATE 35: CPT | Mod: ,,, | Performed by: NEUROLOGICAL SURGERY

## 2019-09-15 RX ORDER — QUETIAPINE FUMARATE 25 MG/1
75 TABLET, FILM COATED ORAL 2 TIMES DAILY
Status: DISCONTINUED | OUTPATIENT
Start: 2019-09-15 | End: 2019-09-19

## 2019-09-15 RX ADMIN — SODIUM CHLORIDE 1.5 UNITS/HR: 9 INJECTION, SOLUTION INTRAVENOUS at 03:09

## 2019-09-15 RX ADMIN — VALPROIC ACID 750 MG: 250 SOLUTION ORAL at 08:09

## 2019-09-15 RX ADMIN — PROPOFOL 40 MCG/KG/MIN: 10 INJECTION, EMULSION INTRAVENOUS at 08:09

## 2019-09-15 RX ADMIN — HEPARIN SODIUM 5000 UNITS: 5000 INJECTION, SOLUTION INTRAVENOUS; SUBCUTANEOUS at 05:09

## 2019-09-15 RX ADMIN — HEPARIN SODIUM 5000 UNITS: 5000 INJECTION, SOLUTION INTRAVENOUS; SUBCUTANEOUS at 09:09

## 2019-09-15 RX ADMIN — DEXAMETHASONE SODIUM PHOSPHATE 4 MG: 4 INJECTION, SOLUTION INTRAMUSCULAR; INTRAVENOUS at 05:09

## 2019-09-15 RX ADMIN — CHLORHEXIDINE GLUCONATE 0.12% ORAL RINSE 15 ML: 1.2 LIQUID ORAL at 08:09

## 2019-09-15 RX ADMIN — PROPOFOL 20 MCG/KG/MIN: 10 INJECTION, EMULSION INTRAVENOUS at 03:09

## 2019-09-15 RX ADMIN — PROPOFOL 10 MCG/KG/MIN: 10 INJECTION, EMULSION INTRAVENOUS at 01:09

## 2019-09-15 RX ADMIN — HEPARIN SODIUM 5000 UNITS: 5000 INJECTION, SOLUTION INTRAVENOUS; SUBCUTANEOUS at 01:09

## 2019-09-15 RX ADMIN — Medication 0.2 MCG/KG/MIN: at 01:09

## 2019-09-15 RX ADMIN — Medication 0.28 MCG/KG/MIN: at 12:09

## 2019-09-15 RX ADMIN — LEVOTHYROXINE SODIUM 75 MCG: 75 TABLET ORAL at 05:09

## 2019-09-15 RX ADMIN — VALPROIC ACID 750 MG: 250 SOLUTION ORAL at 11:09

## 2019-09-15 RX ADMIN — LACOSAMIDE 100 MG: 50 TABLET, FILM COATED ORAL at 08:09

## 2019-09-15 RX ADMIN — Medication 0.28 MCG/KG/MIN: at 05:09

## 2019-09-15 RX ADMIN — SENNOSIDES AND DOCUSATE SODIUM 1 TABLET: 8.6; 5 TABLET ORAL at 08:09

## 2019-09-15 RX ADMIN — FAMOTIDINE 20 MG: 20 TABLET ORAL at 08:09

## 2019-09-15 RX ADMIN — Medication 0.16 MCG/KG/MIN: at 10:09

## 2019-09-15 RX ADMIN — CEFTRIAXONE SODIUM 2 G: 2 INJECTION, SOLUTION INTRAVENOUS at 01:09

## 2019-09-15 RX ADMIN — QUETIAPINE FUMARATE 100 MG: 100 TABLET ORAL at 08:09

## 2019-09-15 RX ADMIN — DEXAMETHASONE SODIUM PHOSPHATE 4 MG: 4 INJECTION, SOLUTION INTRAMUSCULAR; INTRAVENOUS at 11:09

## 2019-09-15 RX ADMIN — VALPROIC ACID 750 MG: 250 SOLUTION ORAL at 03:09

## 2019-09-15 RX ADMIN — INSULIN ASPART 6 UNITS: 100 INJECTION, SOLUTION INTRAVENOUS; SUBCUTANEOUS at 01:09

## 2019-09-15 RX ADMIN — QUETIAPINE FUMARATE 75 MG: 25 TABLET ORAL at 08:09

## 2019-09-15 RX ADMIN — DEXAMETHASONE SODIUM PHOSPHATE 4 MG: 4 INJECTION, SOLUTION INTRAMUSCULAR; INTRAVENOUS at 12:09

## 2019-09-15 NOTE — ASSESSMENT & PLAN NOTE
75 y/o female with pmhx CKD and HTN presented to outside hospital for seizure- like activity occurring on the left side. Found to have area of ill-defined enhancement on MRI brain w/ and w/out.  Most recent EEG showing bilateral PLEDs left greater than right.    -q 1 hr neuro checks  -Fu epilepsy recs  -Fu infectious rodriguez, CSF NGTD.  Would recommend documenting opening and closing pressure when performing LP to assess for elevated ICPs.  -EGD and possible FEDERICO today to eval for vegetations.  -Continue steroids for now  -WTE when medically stable.  -Angio reviewed, no evidence of vasculitis.  -Further care per NCC, will follow.

## 2019-09-15 NOTE — ASSESSMENT & PLAN NOTE
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

## 2019-09-15 NOTE — PROGRESS NOTES
[facility]  Neurosurgery  Progress Note    Subjective:     History of Present Illness: 75 y/o female with pmhx CKD and HTN  presented to outside hospital for seizure- like activity this AM. Per  pt was found unconscious this AM, with seizure like activity occurring on the left side. Pt was given versed x 2 via EMS. CTH performed at outside hospital was negative for bleed. Pt had additional seizure in outside hospital ED and was given IV Keppra. EEG ordered and MRI brain w/out contrast concerning for possible infiltratrive process vs post ischemic sequela. Pt transferred to [facility] and admitted to [facility]. MRI brain w/ and w/out obtained that showed large area of edema in right frontal lobe and ill defined enhancement in the frontal lobe concerning for possible cerebritis vs. Neoplasm. NSGY was consulted to evaluate. Pt not on anti-coagulation.       Post-Op Info:  Procedure(s) (LRB):  EGD (ESOPHAGOGASTRODUODENOSCOPY) (N/A)         Interval History: NAEON, AFVSS, continued AMS despite negative angiogram    Medications:  Continuous Infusions:   fentanyl 200 mcg/hr (09/14/19 1801)    norepinephrine bitartrate-D5W 0.28 mcg/kg/min (09/14/19 1801)    propofol 50 mcg/kg/min (09/14/19 1905)     Scheduled Meds:   cefTRIAXone (ROCEPHIN) IVPB  2 g Intravenous Q12H    chlorhexidine  15 mL Mouth/Throat BID    dexamethasone  4 mg Intravenous Q6H    famotidine  20 mg Per OG tube QHS    heparin (porcine)  5,000 Units Subcutaneous Q8H    lacosamide  100 mg Per OG tube Q12H    levothyroxine  75 mcg Per OG tube Before breakfast    QUEtiapine  100 mg Per OG tube BID    senna-docusate 8.6-50 mg  1 tablet Per OG tube BID    valproic acid (as sodium salt)  750 mg Per OG tube Q8H     PRN Meds:acetaminophen, Dextrose 10% Bolus, fentaNYL, fentaNYL, glucagon (human recombinant), hydrALAZINE, insulin aspart U-100, magnesium oxide, magnesium oxide, midazolam, OLANZapine, potassium chloride 10%, potassium chloride  10%, potassium, sodium phosphates, potassium, sodium phosphates, potassium, sodium phosphates, racepinephrine, sodium chloride 0.9%, sodium chloride 0.9%     Review of Systems    Objective:     Weight: 78.6 kg (173 lb 4.5 oz)  Body mass index is 30.7 kg/m².  Vital Signs (Most Recent):  Temp: 98.4 °F (36.9 °C) (09/14/19 1905)  Pulse: 67 (09/14/19 1905)  Resp: 12 (09/14/19 1905)  BP: (!) 117/57 (09/14/19 1905)  SpO2: 100 % (09/14/19 1905) Vital Signs (24h Range):  Temp:  [98.1 °F (36.7 °C)-98.8 °F (37.1 °C)] 98.4 °F (36.9 °C)  Pulse:  [] 67  Resp:  [12-48] 12  SpO2:  [97 %-100 %] 100 %  BP: ()/() 117/57  Arterial Line BP: ()/() 131/65     Date 09/14/19 0700 - 09/15/19 0659   Shift 5441-4729 1283-3290 1274-6962 24 Hour Total   INTAKE   I.V.(mL/kg) 1171.5(14.9) 755.1(9.6)  1926.6(24.5)   NG/ 225  520   IV Piggyback 50   50   Shift Total(mL/kg) 1516.5(19.3) 980.1(12.5)  2496.6(31.8)   OUTPUT   Shift Total(mL/kg)       Weight (kg) 78.6 78.6 78.6 78.6              Vent Mode: A/C  Oxygen Concentration (%):  [40-50] 40  Resp Rate Total:  [12 br/min-30 br/min] 12 br/min  Vt Set:  [380 mL] 380 mL  PEEP/CPAP:  [5 cmH20] 5 cmH20  Pressure Support:  [0 cmH20] 0 cmH20  Mean Airway Pressure:  [3.6 cmH20-9.2 cmH20] 8.3 cmH20         NG/OG Tube 09/03/19 1100 orogastric Center mouth (Active)   Placement Check placement verified by distal tube length measurement;placement verified by x-ray;physician notified 9/13/2019  3:05 AM   Tolerance no signs/symptoms of discomfort 9/13/2019  3:05 AM   Securement secured to nostril center w/ adhesive device 9/13/2019  3:05 AM   Clamp Status/Tolerance unclamped 9/13/2019  3:05 AM   Suction Setting/Drainage Method suction at the bedside 9/13/2019  3:05 AM   Insertion Site Appearance no redness, warmth, tenderness, skin breakdown, drainage 9/13/2019  3:05 AM   Flush/Irrigation flushed w/;water;no resistance met 9/13/2019  3:05 AM   Feeding Method continuous  9/11/2019  3:05 AM   Feeding Action feeding held 9/13/2019  3:05 AM   Current Rate (mL/hr) 0 mL/hr 9/11/2019  7:05 PM   Goal Rate (mL/hr) 45 mL/hr 9/11/2019  7:05 PM   Intake (mL) 50 mL 9/12/2019  9:05 AM   Water Bolus (mL) 60 mL 9/5/2019  6:00 AM   Rate Formula Tube Feeding (mL/hr) 45 mL/hr 9/9/2019  7:05 PM   Formula Name isosource 9/10/2019  3:00 PM   Intake (mL) - Formula Tube Feeding 45 9/11/2019 11:00 AM   Residual Amount (ml) 0 ml 9/11/2019  4:00 PM       Neurosurgery Physical Exam     E2VTM5  PERRL  Right side and LLE spontaneous, WD in LUE  SILT    Significant Labs:  Recent Labs   Lab 09/13/19  0146 09/14/19  0106   * 332*    134*   K 4.8 4.7    100   CO2 24 21*   BUN 28* 27*   CREATININE 1.0 1.1   CALCIUM 8.3* 8.3*   MG 2.3 2.3     Recent Labs   Lab 09/13/19  0146 09/14/19  0106   WBC 11.85 24.87*   HGB 11.8* 11.6*   HCT 35.9* 36.9*    420*     No results for input(s): LABPT, INR, APTT in the last 48 hours.  Microbiology Results (last 7 days)     Procedure Component Value Units Date/Time    CSF culture [[id number]] Collected:  09/11/19 1355    Order Status:  Completed Specimen:  CSF (Spinal Fluid) from CSF Tap, Tube 3 Updated:  09/14/19 0714     CSF CULTURE No Growth to date     Gram Stain Result Cytospin indicates:      Rare WBC's      No organisms seen    Blood culture [[id number]] Collected:  09/11/19 1811    Order Status:  Completed Specimen:  Blood from Peripheral, Hand, Left Updated:  09/13/19 2012     Blood Culture, Routine No Growth to date      No Growth to date      No Growth to date    Blood culture [[id number]] Collected:  09/08/19 1210    Order Status:  Completed Specimen:  Blood from Peripheral, Ankle, Left Updated:  09/13/19 1412     Blood Culture, Routine No growth after 5 days.    Blood culture [[id number]]  (Abnormal) Collected:  09/08/19 1215    Order Status:  Completed Specimen:  Blood from Peripheral, Forearm, Left Updated:  09/12/19 1031     Blood Culture,  Routine Gram stain brian bottle: Gram positive cocci       Results called to and read back by:Milagro Valencia RN  09/09/2019  13:48      Gram stain aer bottle: Gram positive cocci 09/10/2019  15:46      GEMELLA (S.) MORBILLORUM  Susceptibility testing not routinely performed      Blood culture [[id number]]     Order Status:  Sent Specimen:  Blood     Gram stain [[id number]] Collected:  09/11/19 1355    Order Status:  Canceled Specimen:  CSF (Spinal Fluid) from CSF Tap, Tube 3     Culture, Respiratory with Gram Stain [[id number]]  (Abnormal)  (Susceptibility) Collected:  09/08/19 1158    Order Status:  Completed Specimen:  Respiratory from Tracheal Aspirate Updated:  09/11/19 0934     Respiratory Culture No S aureus or Pseudomonas isolated.      ESCHERICHIA COLI  Few  Normal respiratory connie also present       Gram Stain (Respiratory) Few WBC's     Gram Stain (Respiratory) Moderate Gram negative rods     Gram Stain (Respiratory) Rare Gram positive cocci    Blood culture [[id number]]     Order Status:  Canceled Specimen:  Blood     Blood culture [[id number]]     Order Status:  Canceled Specimen:  Blood             Significant Diagnostics:  All significant diagnostics reviewed    Assessment/Plan:     New onset seizure  73 y/o female with pmhx CKD and HTN presented to outside hospital for seizure- like activity occurring on the left side. Found to have area of ill-defined enhancement on MRI brain w/ and w/out.  Most recent EEG showing bilateral PLEDs left greater than right.    -q 1 hr neuro checks  -Fu epilepsy recs  -Fu infectious rodriguez, CSF NGTD.  Would recommend documenting opening and closing pressure when performing LP to assess for elevated ICPs.  -EGD and possible FEDERICO today to eval for vegetations.  -Continue steroids for now  -WTE when medically stable.  -Angio reviewed, no evidence of vasculitis.  -Further care per NCC, will follow.          Anup Schumacher MD  Neurosurgery  [facility]

## 2019-09-15 NOTE — PROGRESS NOTES
[facility]  Neurocritical Care  Progress Note    Admit Date: 9/3/2019  Service Date: 09/15/2019  Length of Stay: 12    Subjective:     Chief Complaint: Brain lesion    History of Present Illness: Pt is  75 yo female with a PMHx of CKD 3, HTN, was transferred from [facility] to [facility] for new onset seizures and concern for right front lobe mass. The pt was found in her bedroom by her spouse at approximately 9:45 pm sitting her head against the bed, unresponsive, and having seizure like activity on the left-side. EMT was called and the pt was given Versed twice while en route to the hospital. Pt had a second witnessed seizures, left facial droop, left-sided weakness, and tongue ecchymosis in the ED. Neurology was consulted and The pt was given IV Keppra and Vimpat. MRI brain without contrast was acquired. The image showed right frontal lobe vasogenic edema with mass effect concerning for underlying mass. EEG was acquired at outside hospital concerning showing an abnormal result. The pt was given decadron IV and neurosurgical consult recommended. Pt transferred to [facility] and admitted to the [facility] for higher level of care and further evaluation.     Pt history acquired per chart review and from spouse present at the bedside. The pt's spouse denies prior history of seizures CVA, cancer history and up-to-date screenings    Hospital Course: --admitted to [facility] on 9/3 following new onset seizure, arrived intubated  --MRI with large area of R cortical edema with flair and ill defined enhancement on contrast study, concern for glioma vs infectious/inflammatory process  --LP appears non infectious, cytology pending  9/5- no acute events, awaiting LP finalization from pathology report., weaning vent.   09/06/2019: Very agitated overnight, requiring increased sedation. Patient extubated this morning on rounds, and reintubated shortly after. Unable to maintain airway and secretions. CSF gram stain negative.  09/07/2019: KATT.  EKG obtained, seroquel started. Valproic acid started.  09/08/2019: NAEON. Improved agitation with seroquel.  09/09/2019: MRI Brain w/wo ordered for today. BLE US ordered. Will need new LP later on in the week.   09/10/2019 LP for infx workup, CD4 lab, EEG monitoring per Epilepsy   09/11/2019: To IR for LP. Spoke to NSGY about possible bx of lesion. Discontinue cEEG.   9/13/2019 Issues with low HR and BP overnight and this morning, Given Shoaib bump and atropine. 24 hours of IVF and bolus. WBC decreasing and afebrile. Repeat blood cultures negative. Plan for EGD and FEDERICO today.   9/14/2019 NAEO, EGD and FEDERICO moved until Monday. CSF still pending. Increasing WBC but afebrile. Start tube feeding   9/15/2019 NAEO, all CSF studies negative so far. CT negative. Begin insulin gtt. EGD and FEDERICO tomorrow      Interval History:     CSF studies negative so far, may need another LP after EGD and FEDERICO. CT head unchanged. Begin insulin gtt. EGD and FEDERICO tomorrow . WBC decreasing. Limit amount of propofol since triglycerides high.     Review of Systems   Unable to perform ROS: Intubated       Objective:     Vitals:  Temp: 98.3 °F (36.8 °C)  Pulse: (!) 116  Rhythm: normal sinus rhythm  BP: (!) 148/65  MAP (mmHg): 93  Resp: (!) 29  SpO2: 100 %  Oxygen Concentration (%): 40  O2 Device (Oxygen Therapy): ventilator  Vent Mode: A/C  Set Rate: 12 bmp  Vt Set: 380 mL  Pressure Support: 0 cmH20  PEEP/CPAP: 5 cmH20  Peak Airway Pressure: 9.5 cmH2O  Mean Airway Pressure: 2.4 cmH20  Plateau Pressure: 0 cmH20    Temp  Min: 98.3 °F (36.8 °C)  Max: 98.6 °F (37 °C)  Pulse  Min: 53  Max: 116  BP  Min: 83/44  Max: 216/121  MAP (mmHg)  Min: 58  Max: 143  Resp  Min: 12  Max: 33  SpO2  Min: 98 %  Max: 100 %  Oxygen Concentration (%)  Min: 40  Max: 40    09/14 0701 - 09/15 0700  In: 4344.6 [I.V.:1399.6]  Out: 650 [Urine:650]   Unmeasured Output  Urine Occurrence: 3  Stool Occurrence: 0  Emesis Occurrence: 0  Pad Count: 3       Physical Exam   Constitutional:  She appears well-developed and well-nourished.   HENT:   Head: Normocephalic and atraumatic.   Eyes: Pupils are equal, round, and reactive to light.   Cardiovascular: Normal rate and regular rhythm.   Pulmonary/Chest: Effort normal. No respiratory distress.   intubated   Abdominal: She exhibits no distension.   Musculoskeletal: She exhibits no edema or deformity.   Skin: Skin is warm and dry.   Neuro:  --sedation: fentanyl  --GCS:  E2 V1T M5  --Mental Status: lethargic, intubated, agitated, moves spontaneously  --CN II-XII grossly intact.   --PERRL - 3 mm  --Motor: LUE  -no movement, LLE withdraws, R extremities move spontaneously   --sensory: intact    Medications:  Continuous    fentanyl Last Rate: Stopped (09/15/19 0901)   insulin (HUMAN R) infusion (adults) Last Rate: 1.5 Units/hr (09/15/19 1527)   norepinephrine bitartrate-D5W Last Rate: 0.18 mcg/kg/min (09/15/19 1701)   propofol Last Rate: 15 mcg/kg/min (09/15/19 1701)   Scheduled    cefTRIAXone (ROCEPHIN) IVPB 2 g Q12H   chlorhexidine 15 mL BID   dexamethasone 4 mg Q6H   famotidine 20 mg QHS   heparin (porcine) 5,000 Units Q8H   lacosamide 100 mg Q12H   levothyroxine 75 mcg Before breakfast   QUEtiapine 75 mg BID   senna-docusate 8.6-50 mg 1 tablet BID   valproic acid (as sodium salt) 750 mg Q8H   PRN    acetaminophen 650 mg Q6H PRN   Dextrose 10% Bolus 12.5 g PRN   fentaNYL 100 mcg Q2H PRN   fentaNYL 50 mcg Q2H PRN   glucagon (human recombinant) 1 mg PRN   hydrALAZINE 10 mg Q6H PRN   magnesium oxide 800 mg PRN   magnesium oxide 800 mg PRN   midazolam 2 mg Q5 Min PRN   OLANZapine 5 mg Q12H PRN   potassium chloride 10% 40 mEq PRN   potassium chloride 10% 40 mEq PRN   potassium, sodium phosphates 2 packet PRN   potassium, sodium phosphates 2 packet PRN   potassium, sodium phosphates 2 packet PRN   racepinephrine 0.5 mL Q4H PRN   sodium chloride 0.9% 10 mL PRN   sodium chloride 0.9% 10 mL PRN     Today I personally reviewed pertinent medications,  lines/drains/airways, imaging, cardiology results, laboratory results, microbiology results, notably:    Diet  Diet NPO Except for: Medication  Diet NPO Except for: Medication        Assessment/Plan:     Neuro  * Brain lesion  74 year old admitted to unit on 9/3 following new onset seizure, with MRI showing large area of R cortical edema with flair and ill defined enhancement on contrast study, concern for glioma vs infectious/inflammatory process  -  repeat BC negative   - CSF cx no growth,   - CSF studies negative- VZV, enterovirus, HSV, MS,    -may need another LP   - ID following   - continue decadron  - NSGY, epilepsy following   - neuro checks q1hr   - vital signs q1hr   -on SubQ heparin   -Repeat CT head stable       Cerebral edema  --continue decadron, see brain lesion    New onset seizure  2/2 to brain mass   Keppra changed to Vimpat 9/6  Valproic acid started 9/7  Epilepsy following  -Continue current AEDs        Pulmonary  Acute respiratory failure with hypoxia and hypercapnia  See [facility] vent    On mechanically assisted ventilation  Daily CXR, ABG  Continue Peridex, Famotidine, Heparin subq  Trial of extubation 9/6/2019- re-intubated shortly after extubation for airway protection  Continue to wean vent settings as tolerated  Still intubated 2/2 inability to protect airway     Vent Mode: A/C  Oxygen Concentration (%):  [40] 40  Resp Rate Total:  [12 br/min-23 br/min] 20 br/min  Vt Set:  [380 mL] 380 mL  PEEP/CPAP:  [5 cmH20] 5 cmH20  Pressure Support:  [0 cmH20] 0 cmH20  Mean Airway Pressure:  [2.4 cmH20-9.9 cmH20] 2.4 cmH20      Cardiac/Vascular  Essential hypertension  SBP <180, MAP >65   - on Levo gtt as needed for low MAP   -A-line placed  for continuous blood pressure monitoring     Renal/  Chronic kidney disease, stage III (moderate)  -PMHx of CKD 3   -monitor renal function, I/Os       ID  Bacteremia  -repeat BC negative thus far     Oncology  Leukocytosis  Afebrile, WBC improving  -repeat BC  negative   -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    Other  Hypotension due to hypovolemia  -monitor with A-line  -keep net positive  -Levo gtt   -started TF to increase intake          The patient is being Prophylaxed for:  Venous Thromboembolism with: Chemical  Stress Ulcer with: H2B  Ventilator Pneumonia with: chlorhexidine oral care    Activity Orders          Diet NPO Except for: Medication: NPO starting at 09/16 0001    Straight Cath starting at 09/05 0034        Full Code     CC time 35 minutes   Critical secondary to Patient has a condition that poses threat to life and bodily function:      Critical care was time spent personally by me on the following activities: development of treatment plan with patient or surrogate and bedside caregivers, discussions with consultants, evaluation of patient's response to treatment, examination of patient, ordering and performing treatments and interventions, ordering and review of laboratory studies, ordering and review of radiographic studies, pulse oximetry, re-evaluation of patient's condition. This critical care time did not overlap with that of any other provider or involve time for any procedures.      Annabelle Quiñonez PA-C  Neurocritical Care  [facility]

## 2019-09-15 NOTE — PLAN OF CARE
Problem: Adult Inpatient Plan of Care  Goal: Plan of Care Review  Outcome: Ongoing (interventions implemented as appropriate)  POC reviewed with pt and family at 1400. Pt verbalized understanding. Questions and concerns addressed. No acute events today. Propofol gtt and levo gtt continued. Pt progressing toward goals. Will continue to monitor. See flowsheets for full assessment and VS info.

## 2019-09-15 NOTE — SUBJECTIVE & OBJECTIVE
Interval History:     CSF studies negative so far, may need another LP after EGD and FEDERICO. CT head unchanged. Begin insulin gtt. EGD and FEDERICO tomorrow . WBC decreasing. Limit amount of propofol since triglycerides high.     Review of Systems   Unable to perform ROS: Intubated       Objective:     Vitals:  Temp: 98.3 °F (36.8 °C)  Pulse: (!) 116  Rhythm: normal sinus rhythm  BP: (!) 148/65  MAP (mmHg): 93  Resp: (!) 29  SpO2: 100 %  Oxygen Concentration (%): 40  O2 Device (Oxygen Therapy): ventilator  Vent Mode: A/C  Set Rate: 12 bmp  Vt Set: 380 mL  Pressure Support: 0 cmH20  PEEP/CPAP: 5 cmH20  Peak Airway Pressure: 9.5 cmH2O  Mean Airway Pressure: 2.4 cmH20  Plateau Pressure: 0 cmH20    Temp  Min: 98.3 °F (36.8 °C)  Max: 98.6 °F (37 °C)  Pulse  Min: 53  Max: 116  BP  Min: 83/44  Max: 216/121  MAP (mmHg)  Min: 58  Max: 143  Resp  Min: 12  Max: 33  SpO2  Min: 98 %  Max: 100 %  Oxygen Concentration (%)  Min: 40  Max: 40    09/14 0701 - 09/15 0700  In: 4344.6 [I.V.:3229.6]  Out: 650 [Urine:650]   Unmeasured Output  Urine Occurrence: 3  Stool Occurrence: 0  Emesis Occurrence: 0  Pad Count: 3       Physical Exam   Constitutional: She appears well-developed and well-nourished.   HENT:   Head: Normocephalic and atraumatic.   Eyes: Pupils are equal, round, and reactive to light.   Cardiovascular: Normal rate and regular rhythm.   Pulmonary/Chest: Effort normal. No respiratory distress.   intubated   Abdominal: She exhibits no distension.   Musculoskeletal: She exhibits no edema or deformity.   Skin: Skin is warm and dry.   Neuro:  --sedation: fentanyl  --GCS:  E2 V1T M5  --Mental Status: lethargic, intubated, agitated, moves spontaneously  --CN II-XII grossly intact.   --PERRL - 3 mm  --Motor: LUE -no movement, LLE withdraws, R extremities move spontaneously   --sensory: intact    Medications:  Continuous    fentanyl Last Rate: Stopped (09/15/19 0901)   insulin (HUMAN R) infusion (adults) Last Rate: 1.5 Units/hr (09/15/19  1527)   norepinephrine bitartrate-D5W Last Rate: 0.18 mcg/kg/min (09/15/19 1701)   propofol Last Rate: 15 mcg/kg/min (09/15/19 1701)   Scheduled    cefTRIAXone (ROCEPHIN) IVPB 2 g Q12H   chlorhexidine 15 mL BID   dexamethasone 4 mg Q6H   famotidine 20 mg QHS   heparin (porcine) 5,000 Units Q8H   lacosamide 100 mg Q12H   levothyroxine 75 mcg Before breakfast   QUEtiapine 75 mg BID   senna-docusate 8.6-50 mg 1 tablet BID   valproic acid (as sodium salt) 750 mg Q8H   PRN    acetaminophen 650 mg Q6H PRN   Dextrose 10% Bolus 12.5 g PRN   fentaNYL 100 mcg Q2H PRN   fentaNYL 50 mcg Q2H PRN   glucagon (human recombinant) 1 mg PRN   hydrALAZINE 10 mg Q6H PRN   magnesium oxide 800 mg PRN   magnesium oxide 800 mg PRN   midazolam 2 mg Q5 Min PRN   OLANZapine 5 mg Q12H PRN   potassium chloride 10% 40 mEq PRN   potassium chloride 10% 40 mEq PRN   potassium, sodium phosphates 2 packet PRN   potassium, sodium phosphates 2 packet PRN   potassium, sodium phosphates 2 packet PRN   racepinephrine 0.5 mL Q4H PRN   sodium chloride 0.9% 10 mL PRN   sodium chloride 0.9% 10 mL PRN     Today I personally reviewed pertinent medications, lines/drains/airways, imaging, cardiology results, laboratory results, microbiology results, notably:    Diet  Diet NPO Except for: Medication  Diet NPO Except for: Medication

## 2019-09-15 NOTE — SUBJECTIVE & OBJECTIVE
Interval History: NAEON, AFVSS, continued AMS despite negative angiogram    Medications:  Continuous Infusions:   fentanyl 200 mcg/hr (09/14/19 1801)    norepinephrine bitartrate-D5W 0.28 mcg/kg/min (09/14/19 1801)    propofol 50 mcg/kg/min (09/14/19 1905)     Scheduled Meds:   cefTRIAXone (ROCEPHIN) IVPB  2 g Intravenous Q12H    chlorhexidine  15 mL Mouth/Throat BID    dexamethasone  4 mg Intravenous Q6H    famotidine  20 mg Per OG tube QHS    heparin (porcine)  5,000 Units Subcutaneous Q8H    lacosamide  100 mg Per OG tube Q12H    levothyroxine  75 mcg Per OG tube Before breakfast    QUEtiapine  100 mg Per OG tube BID    senna-docusate 8.6-50 mg  1 tablet Per OG tube BID    valproic acid (as sodium salt)  750 mg Per OG tube Q8H     PRN Meds:acetaminophen, Dextrose 10% Bolus, fentaNYL, fentaNYL, glucagon (human recombinant), hydrALAZINE, insulin aspart U-100, magnesium oxide, magnesium oxide, midazolam, OLANZapine, potassium chloride 10%, potassium chloride 10%, potassium, sodium phosphates, potassium, sodium phosphates, potassium, sodium phosphates, racepinephrine, sodium chloride 0.9%, sodium chloride 0.9%     Review of Systems    Objective:     Weight: 78.6 kg (173 lb 4.5 oz)  Body mass index is 30.7 kg/m².  Vital Signs (Most Recent):  Temp: 98.4 °F (36.9 °C) (09/14/19 1905)  Pulse: 67 (09/14/19 1905)  Resp: 12 (09/14/19 1905)  BP: (!) 117/57 (09/14/19 1905)  SpO2: 100 % (09/14/19 1905) Vital Signs (24h Range):  Temp:  [98.1 °F (36.7 °C)-98.8 °F (37.1 °C)] 98.4 °F (36.9 °C)  Pulse:  [] 67  Resp:  [12-48] 12  SpO2:  [97 %-100 %] 100 %  BP: ()/() 117/57  Arterial Line BP: ()/() 131/65     Date 09/14/19 0700 - 09/15/19 0659   Shift 4729-3577 7939-6145 5414-1487 24 Hour Total   INTAKE   I.V.(mL/kg) 1171.5(14.9) 755.1(9.6)  1926.6(24.5)   NG/ 225  520   IV Piggyback 50   50   Shift Total(mL/kg) 1516.5(19.3) 980.1(12.5)  2496.6(31.8)   OUTPUT   Shift Total(mL/kg)        Weight (kg) 78.6 78.6 78.6 78.6              Vent Mode: A/C  Oxygen Concentration (%):  [40-50] 40  Resp Rate Total:  [12 br/min-30 br/min] 12 br/min  Vt Set:  [380 mL] 380 mL  PEEP/CPAP:  [5 cmH20] 5 cmH20  Pressure Support:  [0 cmH20] 0 cmH20  Mean Airway Pressure:  [3.6 cmH20-9.2 cmH20] 8.3 cmH20         NG/OG Tube 09/03/19 1100 orogastric Center mouth (Active)   Placement Check placement verified by distal tube length measurement;placement verified by x-ray;physician notified 9/13/2019  3:05 AM   Tolerance no signs/symptoms of discomfort 9/13/2019  3:05 AM   Securement secured to nostril center w/ adhesive device 9/13/2019  3:05 AM   Clamp Status/Tolerance unclamped 9/13/2019  3:05 AM   Suction Setting/Drainage Method suction at the bedside 9/13/2019  3:05 AM   Insertion Site Appearance no redness, warmth, tenderness, skin breakdown, drainage 9/13/2019  3:05 AM   Flush/Irrigation flushed w/;water;no resistance met 9/13/2019  3:05 AM   Feeding Method continuous 9/11/2019  3:05 AM   Feeding Action feeding held 9/13/2019  3:05 AM   Current Rate (mL/hr) 0 mL/hr 9/11/2019  7:05 PM   Goal Rate (mL/hr) 45 mL/hr 9/11/2019  7:05 PM   Intake (mL) 50 mL 9/12/2019  9:05 AM   Water Bolus (mL) 60 mL 9/5/2019  6:00 AM   Rate Formula Tube Feeding (mL/hr) 45 mL/hr 9/9/2019  7:05 PM   Formula Name isosource 9/10/2019  3:00 PM   Intake (mL) - Formula Tube Feeding 45 9/11/2019 11:00 AM   Residual Amount (ml) 0 ml 9/11/2019  4:00 PM       Neurosurgery Physical Exam     E2VTM5  PERRL  Right side and LLE spontaneous, WD in LUE  SILT    Significant Labs:  Recent Labs   Lab 09/13/19  0146 09/14/19  0106   * 332*    134*   K 4.8 4.7    100   CO2 24 21*   BUN 28* 27*   CREATININE 1.0 1.1   CALCIUM 8.3* 8.3*   MG 2.3 2.3     Recent Labs   Lab 09/13/19  0146 09/14/19  0106   WBC 11.85 24.87*   HGB 11.8* 11.6*   HCT 35.9* 36.9*    420*     No results for input(s): LABPT, INR, APTT in the last 48  hours.  Microbiology Results (last 7 days)     Procedure Component Value Units Date/Time    CSF culture [[id number]] Collected:  09/11/19 1355    Order Status:  Completed Specimen:  CSF (Spinal Fluid) from CSF Tap, Tube 3 Updated:  09/14/19 0714     CSF CULTURE No Growth to date     Gram Stain Result Cytospin indicates:      Rare WBC's      No organisms seen    Blood culture [[id number]] Collected:  09/11/19 1811    Order Status:  Completed Specimen:  Blood from Peripheral, Hand, Left Updated:  09/13/19 2012     Blood Culture, Routine No Growth to date      No Growth to date      No Growth to date    Blood culture [[id number]] Collected:  09/08/19 1210    Order Status:  Completed Specimen:  Blood from Peripheral, Ankle, Left Updated:  09/13/19 1412     Blood Culture, Routine No growth after 5 days.    Blood culture [[id number]]  (Abnormal) Collected:  09/08/19 1215    Order Status:  Completed Specimen:  Blood from Peripheral, Forearm, Left Updated:  09/12/19 1031     Blood Culture, Routine Gram stain brian bottle: Gram positive cocci       Results called to and read back by:Milagro Valencia RN  09/09/2019  13:48      Gram stain aer bottle: Gram positive cocci 09/10/2019  15:46      GEMELLA (S.) MORBILLORUM  Susceptibility testing not routinely performed      Blood culture [[id number]]     Order Status:  Sent Specimen:  Blood     Gram stain [[id number]] Collected:  09/11/19 1355    Order Status:  Canceled Specimen:  CSF (Spinal Fluid) from CSF Tap, Tube 3     Culture, Respiratory with Gram Stain [[id number]]  (Abnormal)  (Susceptibility) Collected:  09/08/19 1158    Order Status:  Completed Specimen:  Respiratory from Tracheal Aspirate Updated:  09/11/19 0934     Respiratory Culture No S aureus or Pseudomonas isolated.      ESCHERICHIA COLI  Few  Normal respiratory connie also present       Gram Stain (Respiratory) Few WBC's     Gram Stain (Respiratory) Moderate Gram negative rods     Gram Stain (Respiratory) Rare Gram positive  cocci    Blood culture [[id number]]     Order Status:  Canceled Specimen:  Blood     Blood culture [[id number]]     Order Status:  Canceled Specimen:  Blood             Significant Diagnostics:  All significant diagnostics reviewed

## 2019-09-15 NOTE — ASSESSMENT & PLAN NOTE
74 year old admitted to unit on 9/3 following new onset seizure, with MRI showing large area of R cortical edema with flair and ill defined enhancement on contrast study, concern for glioma vs infectious/inflammatory process  -  repeat BC negative   - CSF cx no growth,   - CSF studies negative- VZV, enterovirus, HSV, MS,    -may need another LP   - ID following   - continue decadron  - NSGY, epilepsy following   - neuro checks q1hr   - vital signs q1hr   -on SubQ heparin   -Repeat CT head stable

## 2019-09-15 NOTE — ASSESSMENT & PLAN NOTE
Daily CXR, ABG  Continue Peridex, Famotidine, Heparin subq  Trial of extubation 9/6/2019- re-intubated shortly after extubation for airway protection  Continue to wean vent settings as tolerated  Still intubated 2/2 inability to protect airway     Vent Mode: A/C  Oxygen Concentration (%):  [40] 40  Resp Rate Total:  [12 br/min-23 br/min] 20 br/min  Vt Set:  [380 mL] 380 mL  PEEP/CPAP:  [5 cmH20] 5 cmH20  Pressure Support:  [0 cmH20] 0 cmH20  Mean Airway Pressure:  [2.4 cmH20-9.9 cmH20] 2.4 cmH20

## 2019-09-15 NOTE — PLAN OF CARE
Problem: Adult Inpatient Plan of Care  Goal: Plan of Care Review  Outcome: Ongoing (interventions implemented as appropriate)  POC reviewed with pt at 0500. Pt unable to verbalized understanding. POC reviewed with . Pt continue to require sedation to help manage agitation.   Questions and concerns addressed. No acute events overnight. Pt progressing toward goals. Will continue to monitor. See flowsheets for full assessment and VS info

## 2019-09-16 LAB
ALBUMIN SERPL BCP-MCNC: 2.7 G/DL (ref 3.5–5.2)
ALLENS TEST: ABNORMAL
ALP SERPL-CCNC: 42 U/L (ref 55–135)
ALT SERPL W/O P-5'-P-CCNC: 54 U/L (ref 10–44)
ANION GAP SERPL CALC-SCNC: 10 MMOL/L (ref 8–16)
ANISOCYTOSIS BLD QL SMEAR: SLIGHT
AST SERPL-CCNC: 13 U/L (ref 10–40)
BACTERIA BLD CULT: NORMAL
BACTERIA CSF CULT: NO GROWTH
BASO STIPL BLD QL SMEAR: ABNORMAL
BASOPHILS # BLD AUTO: ABNORMAL K/UL (ref 0–0.2)
BASOPHILS NFR BLD: 0 % (ref 0–1.9)
BILIRUB SERPL-MCNC: <0.1 MG/DL (ref 0.1–1)
BUN SERPL-MCNC: 27 MG/DL (ref 8–23)
CALCIUM SERPL-MCNC: 8.9 MG/DL (ref 8.7–10.5)
CHLORIDE SERPL-SCNC: 104 MMOL/L (ref 95–110)
CO2 SERPL-SCNC: 27 MMOL/L (ref 23–29)
CREAT SERPL-MCNC: 1 MG/DL (ref 0.5–1.4)
DELSYS: ABNORMAL
DIFFERENTIAL METHOD: ABNORMAL
EOSINOPHIL # BLD AUTO: ABNORMAL K/UL (ref 0–0.5)
EOSINOPHIL NFR BLD: 0 % (ref 0–8)
ERYTHROCYTE [DISTWIDTH] IN BLOOD BY AUTOMATED COUNT: 13.7 % (ref 11.5–14.5)
ERYTHROCYTE [SEDIMENTATION RATE] IN BLOOD BY WESTERGREN METHOD: 12 MM/H
EST. GFR  (AFRICAN AMERICAN): >60 ML/MIN/1.73 M^2
EST. GFR  (NON AFRICAN AMERICAN): 54.9 ML/MIN/1.73 M^2
FIO2: 40
GLUCOSE SERPL-MCNC: 139 MG/DL (ref 70–110)
GRAM STN SPEC: NORMAL
HCO3 UR-SCNC: 29.2 MMOL/L (ref 24–28)
HCT VFR BLD AUTO: 34.4 % (ref 37–48.5)
HGB BLD-MCNC: 11.3 G/DL (ref 12–16)
IMM GRANULOCYTES # BLD AUTO: ABNORMAL K/UL (ref 0–0.04)
IMM GRANULOCYTES NFR BLD AUTO: ABNORMAL % (ref 0–0.5)
LYMPHOCYTES # BLD AUTO: ABNORMAL K/UL (ref 1–4.8)
LYMPHOCYTES NFR BLD: 8 % (ref 18–48)
MAGNESIUM SERPL-MCNC: 2.3 MG/DL (ref 1.6–2.6)
MCH RBC QN AUTO: 29.2 PG (ref 27–31)
MCHC RBC AUTO-ENTMCNC: 32.8 G/DL (ref 32–36)
MCV RBC AUTO: 89 FL (ref 82–98)
METAMYELOCYTES NFR BLD MANUAL: 2 %
MIN VOL: 6.33
MODE: ABNORMAL
MONOCYTES # BLD AUTO: ABNORMAL K/UL (ref 0.3–1)
MONOCYTES NFR BLD: 11 % (ref 4–15)
MYELOCYTES NFR BLD MANUAL: 2 %
NEUTROPHILS NFR BLD: 76 % (ref 38–73)
NEUTS BAND NFR BLD MANUAL: 1 %
NRBC BLD-RTO: 0 /100 WBC
OVALOCYTES BLD QL SMEAR: ABNORMAL
PCO2 BLDA: 40.4 MMHG (ref 35–45)
PEEP: 5
PH SMN: 7.47 [PH] (ref 7.35–7.45)
PHOSPHATE SERPL-MCNC: 2.4 MG/DL (ref 2.7–4.5)
PIP: 19
PLATELET # BLD AUTO: 194 K/UL (ref 150–350)
PLATELET BLD QL SMEAR: ABNORMAL
PMV BLD AUTO: 9.1 FL (ref 9.2–12.9)
PO2 BLDA: 101 MMHG (ref 80–100)
POC BE: 5 MMOL/L
POC SATURATED O2: 98 % (ref 95–100)
POC TCO2: 30 MMOL/L (ref 23–27)
POCT GLUCOSE: 100 MG/DL (ref 70–110)
POCT GLUCOSE: 100 MG/DL (ref 70–110)
POCT GLUCOSE: 107 MG/DL (ref 70–110)
POCT GLUCOSE: 108 MG/DL (ref 70–110)
POCT GLUCOSE: 118 MG/DL (ref 70–110)
POCT GLUCOSE: 120 MG/DL (ref 70–110)
POCT GLUCOSE: 125 MG/DL (ref 70–110)
POCT GLUCOSE: 167 MG/DL (ref 70–110)
POCT GLUCOSE: 179 MG/DL (ref 70–110)
POCT GLUCOSE: 62 MG/DL (ref 70–110)
POCT GLUCOSE: 72 MG/DL (ref 70–110)
POCT GLUCOSE: 76 MG/DL (ref 70–110)
POCT GLUCOSE: 80 MG/DL (ref 70–110)
POCT GLUCOSE: 81 MG/DL (ref 70–110)
POCT GLUCOSE: 81 MG/DL (ref 70–110)
POCT GLUCOSE: 89 MG/DL (ref 70–110)
POCT GLUCOSE: 90 MG/DL (ref 70–110)
POCT GLUCOSE: 96 MG/DL (ref 70–110)
POIKILOCYTOSIS BLD QL SMEAR: SLIGHT
POTASSIUM SERPL-SCNC: 4.6 MMOL/L (ref 3.5–5.1)
PROT SERPL-MCNC: 5.5 G/DL (ref 6–8.4)
RBC # BLD AUTO: 3.87 M/UL (ref 4–5.4)
SAMPLE: ABNORMAL
SITE: ABNORMAL
SODIUM SERPL-SCNC: 141 MMOL/L (ref 136–145)
SP02: 100
SPECIMEN SOURCE: NORMAL
VT: 380
WBC # BLD AUTO: 11.98 K/UL (ref 3.9–12.7)
WNV RNA CSF QL NAA+PROBE: NEGATIVE

## 2019-09-16 PROCEDURE — 99900026 HC AIRWAY MAINTENANCE (STAT)

## 2019-09-16 PROCEDURE — D9220A PRA ANESTHESIA: Mod: ANES,,, | Performed by: ANESTHESIOLOGY

## 2019-09-16 PROCEDURE — 43249 PR EGD, FLEX, W/BALL DILATION, < 30MM: ICD-10-PCS | Mod: ,,, | Performed by: INTERNAL MEDICINE

## 2019-09-16 PROCEDURE — 85007 BL SMEAR W/DIFF WBC COUNT: CPT

## 2019-09-16 PROCEDURE — D9220A PRA ANESTHESIA: Mod: CRNA,,, | Performed by: NURSE ANESTHETIST, CERTIFIED REGISTERED

## 2019-09-16 PROCEDURE — 36600 WITHDRAWAL OF ARTERIAL BLOOD: CPT

## 2019-09-16 PROCEDURE — 63600175 PHARM REV CODE 636 W HCPCS: Performed by: NURSE PRACTITIONER

## 2019-09-16 PROCEDURE — 94003 VENT MGMT INPAT SUBQ DAY: CPT

## 2019-09-16 PROCEDURE — 63600175 PHARM REV CODE 636 W HCPCS: Performed by: NURSE ANESTHETIST, CERTIFIED REGISTERED

## 2019-09-16 PROCEDURE — 36620 ARTERIAL LINE: ICD-10-PCS | Mod: ,,, | Performed by: PSYCHIATRY & NEUROLOGY

## 2019-09-16 PROCEDURE — 85027 COMPLETE CBC AUTOMATED: CPT

## 2019-09-16 PROCEDURE — 99233 SBSQ HOSP IP/OBS HIGH 50: CPT | Mod: GC,,, | Performed by: INTERNAL MEDICINE

## 2019-09-16 PROCEDURE — 25000003 PHARM REV CODE 250: Performed by: PSYCHIATRY & NEUROLOGY

## 2019-09-16 PROCEDURE — 80053 COMPREHEN METABOLIC PANEL: CPT

## 2019-09-16 PROCEDURE — 25000003 PHARM REV CODE 250: Performed by: NURSE ANESTHETIST, CERTIFIED REGISTERED

## 2019-09-16 PROCEDURE — 25000003 PHARM REV CODE 250

## 2019-09-16 PROCEDURE — 99233 PR SUBSEQUENT HOSPITAL CARE,LEVL III: ICD-10-PCS | Mod: GC,,, | Performed by: INTERNAL MEDICINE

## 2019-09-16 PROCEDURE — 94761 N-INVAS EAR/PLS OXIMETRY MLT: CPT

## 2019-09-16 PROCEDURE — 36620 INSERTION CATHETER ARTERY: CPT | Mod: ,,, | Performed by: PSYCHIATRY & NEUROLOGY

## 2019-09-16 PROCEDURE — 43249 ESOPH EGD DILATION <30 MM: CPT | Performed by: INTERNAL MEDICINE

## 2019-09-16 PROCEDURE — 25000003 PHARM REV CODE 250: Performed by: PHYSICIAN ASSISTANT

## 2019-09-16 PROCEDURE — D9220A PRA ANESTHESIA: ICD-10-PCS | Mod: CRNA,,, | Performed by: NURSE ANESTHETIST, CERTIFIED REGISTERED

## 2019-09-16 PROCEDURE — 43249 ESOPH EGD DILATION <30 MM: CPT | Mod: ,,, | Performed by: INTERNAL MEDICINE

## 2019-09-16 PROCEDURE — 27200966 HC CLOSED SUCTION SYSTEM

## 2019-09-16 PROCEDURE — 83735 ASSAY OF MAGNESIUM: CPT

## 2019-09-16 PROCEDURE — 25000003 PHARM REV CODE 250: Performed by: STUDENT IN AN ORGANIZED HEALTH CARE EDUCATION/TRAINING PROGRAM

## 2019-09-16 PROCEDURE — 99900035 HC TECH TIME PER 15 MIN (STAT)

## 2019-09-16 PROCEDURE — 63600175 PHARM REV CODE 636 W HCPCS: Performed by: PSYCHIATRY & NEUROLOGY

## 2019-09-16 PROCEDURE — C1726 CATH, BAL DIL, NON-VASCULAR: HCPCS | Performed by: INTERNAL MEDICINE

## 2019-09-16 PROCEDURE — 84100 ASSAY OF PHOSPHORUS: CPT

## 2019-09-16 PROCEDURE — 82803 BLOOD GASES ANY COMBINATION: CPT

## 2019-09-16 PROCEDURE — 37000009 HC ANESTHESIA EA ADD 15 MINS: Performed by: INTERNAL MEDICINE

## 2019-09-16 PROCEDURE — 27000221 HC OXYGEN, UP TO 24 HOURS

## 2019-09-16 PROCEDURE — 99233 PR SUBSEQUENT HOSPITAL CARE,LEVL III: ICD-10-PCS | Mod: ,,, | Performed by: NURSE PRACTITIONER

## 2019-09-16 PROCEDURE — D9220A PRA ANESTHESIA: ICD-10-PCS | Mod: ANES,,, | Performed by: ANESTHESIOLOGY

## 2019-09-16 PROCEDURE — 99233 SBSQ HOSP IP/OBS HIGH 50: CPT | Mod: ,,, | Performed by: NURSE PRACTITIONER

## 2019-09-16 PROCEDURE — 37000008 HC ANESTHESIA 1ST 15 MINUTES: Performed by: INTERNAL MEDICINE

## 2019-09-16 PROCEDURE — 20000000 HC ICU ROOM

## 2019-09-16 RX ORDER — GLUCAGON 1 MG
1 KIT INJECTION
Status: DISCONTINUED | OUTPATIENT
Start: 2019-09-16 | End: 2019-09-24

## 2019-09-16 RX ORDER — PROPOFOL 10 MG/ML
VIAL (ML) INTRAVENOUS
Status: DISCONTINUED | OUTPATIENT
Start: 2019-09-16 | End: 2019-09-16

## 2019-09-16 RX ORDER — PHENYLEPHRINE HCL IN 0.9% NACL 1 MG/10 ML
SYRINGE (ML) INTRAVENOUS
Status: DISCONTINUED | OUTPATIENT
Start: 2019-09-16 | End: 2019-09-16

## 2019-09-16 RX ORDER — ETOMIDATE 2 MG/ML
INJECTION INTRAVENOUS
Status: DISCONTINUED | OUTPATIENT
Start: 2019-09-16 | End: 2019-09-16

## 2019-09-16 RX ORDER — LIDOCAINE HYDROCHLORIDE 10 MG/ML
INJECTION, SOLUTION EPIDURAL; INFILTRATION; INTRACAUDAL; PERINEURAL
Status: COMPLETED
Start: 2019-09-16 | End: 2019-09-16

## 2019-09-16 RX ORDER — INSULIN ASPART 100 [IU]/ML
1-10 INJECTION, SOLUTION INTRAVENOUS; SUBCUTANEOUS EVERY 6 HOURS PRN
Status: DISCONTINUED | OUTPATIENT
Start: 2019-09-16 | End: 2019-09-24

## 2019-09-16 RX ORDER — MIDAZOLAM HYDROCHLORIDE 1 MG/ML
INJECTION, SOLUTION INTRAMUSCULAR; INTRAVENOUS
Status: DISCONTINUED | OUTPATIENT
Start: 2019-09-16 | End: 2019-09-16

## 2019-09-16 RX ORDER — LIDOCAINE HYDROCHLORIDE 10 MG/ML
1 INJECTION INFILTRATION; PERINEURAL ONCE
Status: COMPLETED | OUTPATIENT
Start: 2019-09-16 | End: 2019-09-16

## 2019-09-16 RX ADMIN — DEXAMETHASONE SODIUM PHOSPHATE 4 MG: 4 INJECTION, SOLUTION INTRAMUSCULAR; INTRAVENOUS at 06:09

## 2019-09-16 RX ADMIN — LIDOCAINE HYDROCHLORIDE 50 MG: 10 INJECTION, SOLUTION EPIDURAL; INFILTRATION; INTRACAUDAL; PERINEURAL at 07:09

## 2019-09-16 RX ADMIN — PROPOFOL 20 MG: 10 INJECTION, EMULSION INTRAVENOUS at 03:09

## 2019-09-16 RX ADMIN — PROPOFOL 30 MCG/KG/MIN: 10 INJECTION, EMULSION INTRAVENOUS at 11:09

## 2019-09-16 RX ADMIN — CEFTRIAXONE SODIUM 2 G: 2 INJECTION, SOLUTION INTRAVENOUS at 12:09

## 2019-09-16 RX ADMIN — LACOSAMIDE 100 MG: 50 TABLET, FILM COATED ORAL at 08:09

## 2019-09-16 RX ADMIN — DEXAMETHASONE SODIUM PHOSPHATE 4 MG: 4 INJECTION, SOLUTION INTRAMUSCULAR; INTRAVENOUS at 11:09

## 2019-09-16 RX ADMIN — Medication 100 MCG: at 04:09

## 2019-09-16 RX ADMIN — LACOSAMIDE 100 MG: 50 TABLET, FILM COATED ORAL at 09:09

## 2019-09-16 RX ADMIN — SENNOSIDES AND DOCUSATE SODIUM 1 TABLET: 8.6; 5 TABLET ORAL at 09:09

## 2019-09-16 RX ADMIN — FENTANYL CITRATE 100 MCG: 50 INJECTION INTRAMUSCULAR; INTRAVENOUS at 03:09

## 2019-09-16 RX ADMIN — ETOMIDATE 4 MG: 2 INJECTION, SOLUTION INTRAVENOUS at 03:09

## 2019-09-16 RX ADMIN — LEVOTHYROXINE SODIUM 75 MCG: 75 TABLET ORAL at 05:09

## 2019-09-16 RX ADMIN — FENTANYL CITRATE 100 MCG: 50 INJECTION INTRAMUSCULAR; INTRAVENOUS at 01:09

## 2019-09-16 RX ADMIN — HEPARIN SODIUM 5000 UNITS: 5000 INJECTION, SOLUTION INTRAVENOUS; SUBCUTANEOUS at 05:09

## 2019-09-16 RX ADMIN — CEFTRIAXONE SODIUM 2 G: 2 INJECTION, SOLUTION INTRAVENOUS at 11:09

## 2019-09-16 RX ADMIN — QUETIAPINE FUMARATE 75 MG: 25 TABLET ORAL at 08:09

## 2019-09-16 RX ADMIN — ETOMIDATE 2 MG: 2 INJECTION, SOLUTION INTRAVENOUS at 03:09

## 2019-09-16 RX ADMIN — FAMOTIDINE 20 MG: 20 TABLET ORAL at 09:09

## 2019-09-16 RX ADMIN — PROPOFOL 30 MG: 10 INJECTION, EMULSION INTRAVENOUS at 03:09

## 2019-09-16 RX ADMIN — CHLORHEXIDINE GLUCONATE 0.12% ORAL RINSE 15 ML: 1.2 LIQUID ORAL at 08:09

## 2019-09-16 RX ADMIN — MIDAZOLAM HYDROCHLORIDE 2 MG: 1 INJECTION, SOLUTION INTRAMUSCULAR; INTRAVENOUS at 03:09

## 2019-09-16 RX ADMIN — VALPROIC ACID 750 MG: 250 SOLUTION ORAL at 08:09

## 2019-09-16 RX ADMIN — QUETIAPINE FUMARATE 75 MG: 25 TABLET ORAL at 09:09

## 2019-09-16 RX ADMIN — SENNOSIDES AND DOCUSATE SODIUM 1 TABLET: 8.6; 5 TABLET ORAL at 08:09

## 2019-09-16 RX ADMIN — Medication 0.16 MCG/KG/MIN: at 06:09

## 2019-09-16 RX ADMIN — DEXAMETHASONE SODIUM PHOSPHATE 4 MG: 4 INJECTION, SOLUTION INTRAMUSCULAR; INTRAVENOUS at 05:09

## 2019-09-16 RX ADMIN — Medication 150 MCG: at 04:09

## 2019-09-16 RX ADMIN — HEPARIN SODIUM 5000 UNITS: 5000 INJECTION, SOLUTION INTRAVENOUS; SUBCUTANEOUS at 09:09

## 2019-09-16 RX ADMIN — PROPOFOL 40 MCG/KG/MIN: 10 INJECTION, EMULSION INTRAVENOUS at 02:09

## 2019-09-16 RX ADMIN — LIDOCAINE HYDROCHLORIDE 50 MG: 10 INJECTION INFILTRATION; PERINEURAL at 07:09

## 2019-09-16 RX ADMIN — POTASSIUM & SODIUM PHOSPHATES POWDER PACK 280-160-250 MG 2 PACKET: 280-160-250 PACK at 08:09

## 2019-09-16 RX ADMIN — CHLORHEXIDINE GLUCONATE 0.12% ORAL RINSE 15 ML: 1.2 LIQUID ORAL at 09:09

## 2019-09-16 RX ADMIN — VALPROIC ACID 750 MG: 250 SOLUTION ORAL at 06:09

## 2019-09-16 RX ADMIN — POTASSIUM & SODIUM PHOSPHATES POWDER PACK 280-160-250 MG 2 PACKET: 280-160-250 PACK at 05:09

## 2019-09-16 RX ADMIN — HEPARIN SODIUM 5000 UNITS: 5000 INJECTION, SOLUTION INTRAVENOUS; SUBCUTANEOUS at 02:09

## 2019-09-16 NOTE — [employer]ATION PATIENT INSTRUCTIONS
Discharge Summary/Instructions after an Endoscopic Procedure  Patient Name: Clemencia Nguyen  Patient MRN: [id number]  Patient YOB: 1945 Monday, September 16, 2019  Chas Castillo MD  RESTRICTIONS:  During your procedure today, you received medications for sedation.  These   medications may affect your judgment, balance and coordination.  Therefore,   for 24 hours, you have the following restrictions:   - DO NOT drive a car, operate machinery, make legal/financial decisions,   sign important papers or drink alcohol.    ACTIVITY:  Today: no heavy lifting, straining or running due to procedural   sedation/anesthesia.  The following day: return to full activity including work.  DIET:  Eat and drink normally unless instructed otherwise.     TREATMENT FOR COMMON SIDE EFFECTS:  - Mild abdominal pain, nausea, belching, bloating or excessive gas:  rest,   eat lightly and use a heating pad.  - Sore Throat: treat with throat lozenges and/or gargle with warm salt   water.  - Because air was used during the procedure, expelling large amounts of air   from your rectum or belching is normal.  - If a bowel prep was taken, you may not have a bowel movement for 1-3 days.    This is normal.  SYMPTOMS TO WATCH FOR AND REPORT TO YOUR PHYSICIAN:  1. Abdominal pain or bloating, other than gas cramps.  2. Chest pain.  3. Back pain.  4. Signs of infection such as: chills or fever occurring within 24 hours   after the procedure.  5. Rectal bleeding, which would show as bright red, maroon, or black stools.   (A tablespoon of blood from the rectum is not serious, especially if   hemorrhoids are present.)  6. Vomiting.  7. Weakness or dizziness.  GO DIRECTLY TO THE NEAREST EMERGENCY ROOM IF YOU HAVE ANY OF THE FOLLOWING:      Difficulty breathing              Chills and/or fever over 101 F   Persistent vomiting and/or vomiting blood   Severe abdominal pain   Severe chest pain   Black, tarry stools   Bleeding- more than one  tablespoon   Any other symptom or condition that you feel may need urgent attention  Your doctor recommends these additional instructions:  If any biopsies were taken, your doctors clinic will contact you in 1 to 2   weeks with any results.  - [facility].   - Resume previous diet.   - Continue present medications.  For questions, problems or results please call your physician - Chas Castillo MD at Work:  [phone].  [facility], EMERGENCY ROOM PHONE NUMBER: [phone]  IF A COMPLICATION OR EMERGENCY SITUATION ARISES AND YOU ARE UNABLE TO REACH   YOUR PHYSICIAN - GO DIRECTLY TO THE EMERGENCY ROOM.  Chas Castillo MD  9/16/2019 5:11:47 PM  This report has been verified and signed electronically.  [employer]

## 2019-09-16 NOTE — PROGRESS NOTES
"[facility]  Adult Nutrition  Progress Note    SUMMARY       Recommendations    Recommendation/Intervention:   As medically able to restart TF, recommeng changing formula.   Impact Peptide @ goal rate 40mL/hr.   - Provides 1440kcals, 90g protein and 739mL free water.     RD to monitor.  Goals: Pt to receive >85% EEN and EPN by RD follow up  Nutrition Goal Status: goal not met  Communication of RD Recs: reviewed with RN    Reason for Assessment    Reason For Assessment: RD follow-up  Diagnosis: seizures  Relevant Medical History: CKD St 3, HTN  Interdisciplinary Rounds: attended  General Information Comments: Pt remains intubated and sedated. TF held s/p EGD. Pt's UBW approx 145-150lb > 8 years per family, confirmed by chart review. Pt with excellent appetite and intake PTA. No indications of malnutrition at this time.  Nutrition Discharge Planning: unable to determine at this time    Nutrition Risk Screen    Nutrition Risk Screen: tube feeding or parenteral nutrition    Nutrition/Diet History    Spiritual, Cultural Beliefs, Methodist Practices, Values that Affect Care: no  Factors Affecting Nutritional Intake: NPO, on mechanical ventilation    Anthropometrics    Temp: 99 °F (37.2 °C)  Height Method: Stated  Height: 5' 3" (160 cm)  Height (inches): 63 in  Weight Method: Bed Scale  Weight: 78.6 kg (173 lb 4.5 oz)  Weight (lb): 173.28 lb  Ideal Body Weight (IBW), Female: 115 lb  % Ideal Body Weight, Female (lb): 133.91 lb  BMI (Calculated): 27.3  BMI Grade: 25 - 29.9 - overweight       Lab/Procedures/Meds    Pertinent Labs Reviewed: reviewed  Pertinent Labs Comments: TRIG 228, POCT Glu 72-89  Pertinent Medications Reviewed: reviewed  Pertinent Medications Comments: insulin, heparin, propofol    Estimated/Assessed Needs    Weight Used For Calorie Calculations: 70.5 kg (155 lb 6.8 oz)  Energy Calorie Requirements (kcal): 1265  Energy Need Method: En State  Protein Requirements: " 85-106g(1.2-1.5g/kg)  Weight Used For Protein Calculations: 70.5 kg (155 lb 6.8 oz)  Fluid Requirements (mL): 1mL/kcal or per MD     RDA Method (mL): 1265         Nutrition Prescription Ordered    Current Diet Order: NPO  Nutrition Order Comments: TF held  Current Nutrition Support Formula Ordered: Isosource 1.5  Current Nutrition Support Rate Ordered: 45 (ml)  Current Nutrition Support Frequency Ordered: mL/hr    Evaluation of Received Nutrient/Fluid Intake    Enteral Calories (kcal): 1620  Enteral Protein (gm): 73  Enteral (Free Water) Fluid (mL): 825  Lipid Calories (kcals): 446 kcals(propofol @ 16.9mL/hr)  Total Calories (kcal): 2066    % Kcal Needs: 161  % Protein Needs: 86    I/O: +21L since admit, good UOP, LBM 9/15    Energy Calories Required: exceeds needs  Protein Required: not meeting needs  Fluid Required: other (see comments)(per MD)    Comments: 0mL residuals 9/11    % Intake of Estimated Energy Needs: Other: >100%  % Meal Intake: NPO    Nutrition Risk    Level of Risk/Frequency of Follow-up: low(f/u 1x/week)     Assessment and Plan    Nutrition Problem  Inadequate oral intake     Related to (etiology):   NPO     Signs and Symptoms (as evidenced by):   Pt requiring alternative means of nutrition to meet 85% EEN and EPN.      Intervention:  Collaboration of nutrition care with providers     Nutrition Diagnosis Status:   Continues       Monitor and Evaluation    Food and Nutrient Intake: enteral nutrition intake  Food and Nutrient Adminstration: enteral and parenteral nutrition administration  Anthropometric Measurements: weight, weight change, body mass index  Biochemical Data, Medical Tests and Procedures: electrolyte and renal panel, gastrointestinal profile, glucose/endocrine profile, inflammatory profile, lipid profile  Nutrition-Focused Physical Findings: overall appearance       Nutrition Follow-Up    RD Follow-up?: Yes

## 2019-09-16 NOTE — PROGRESS NOTES
[facility]  Infectious Disease  Progress Note    Patient Name: Clemencia Nguyen  MRN: [id number]  Admission Date: 9/3/2019  Length of Stay: 12 days  Attending Physician: Antelmo Ramos MD  Primary Care Provider: SHELBY Castillo MD    Isolation Status: No active isolations  Assessment/Plan:      * Brain lesion  73 y/o female admitted with a bebo lesions. 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  She is currently on IV ceftriaxone.  Blood cultures have cleared.  Recommend FEDERICO to determine if this could be an endocarditis with subsequent embolic phenomena to the brain.  We will continue to follow.    Bacteremia  73yo woman with PMH HTN & CKD presents with acute onset of seizure like activity. Found to have area of ill-defined enhancement on MRI brain w/ and w/out.  Most recent EEG showing bilateral PLEDs left greater than right. ID consulted for bacteremia.  reports dental procedure (crown) ~2 months ago.     9/03 CSF: gram stain negative. 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     Recommendation:   -Obtain FEDERICO to evaluate for endocarditis   - Continue ceftriaxone dosed at CNS infection doses for possible gemella endocarditis with emboli to brain        Anticipated Disposition: TBD    Thank you for your consult. I will follow-up with patient. Please contact us if you have any additional questions.    Last Randolph MD  Infectious Disease  [facility]    Subjective:     Principal Problem:Brain lesion    HPI: Ms. Manriquez is a 73yo woman with PMH of HTN &  CKD otherwise doing well who presented to [facility] with acute onset of Left sided seizure like activity and LOC.  at bedside reports she was functioning well and appropriately until he had found her unconscious last week. She has never had seizures before. 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 He notes she did seem forgetful during square dancing over the past month but he had attributed it to her age. He also says she had a dental crown procedure a couple of months ago. Denies fevers, chills. Denies FHx of cancer. Denies Fhx of colorectal cancer.     On her way to [facility], she was given versed x 2 via EMS. CTH performed at outside hospital was negative for bleed.  MRI brain w/ and w/out obtained that showed large area of edema in right frontal lobe and ill defined enhancement in the frontal lobe.     She has been afebrile & hypertensive. Failed 2 extubations as family notes she became very anxious during trials.   Interval History:     No adverse events.    Review of Systems   Unable to perform ROS: Intubated     Objective:     Vital Signs (Most Recent):  Temp: 98.6 °F (37 °C) (09/15/19 1905)  Pulse: 99 (09/15/19 2024)  Resp: 12 (09/15/19 2024)  BP: (!) 153/65 (09/15/19 2005)  SpO2: 100 % (09/15/19 2024) Vital Signs (24h Range):  Temp:  [98.3 °F (36.8 °C)-98.6 °F (37 °C)] 98.6 °F (37 °C)  Pulse:  [] 99  Resp:  [12-33] 12  SpO2:  [98 %-100 %] 100 %  BP: ()/() 153/65  Arterial Line BP: ()/(44-74) 135/70     Weight: 78.6 kg (173 lb 4.5 oz)  Body mass index is 30.7 kg/m².    Estimated Creatinine Clearance: 43.2 mL/min (based on SCr of 1.1 mg/dL).    Physical Exam   Constitutional: She appears well-developed and well-nourished. No distress.   intubated   HENT:   Head: Normocephalic and atraumatic.   Eyes: Pupils are equal, round, and reactive to light. EOM are normal.   Neck: Normal range of motion. Neck supple. No JVD present. No thyromegaly present.    Cardiovascular: Normal rate, regular rhythm, normal heart sounds and intact distal pulses. Exam reveals no gallop and no friction rub.   No murmur heard.  Pulmonary/Chest: Effort normal and breath sounds normal. No stridor. No respiratory distress. She has no wheezes. She has no rales. She exhibits no tenderness.   Abdominal: Soft. Bowel sounds are normal. She exhibits no distension. There is no tenderness. There is no guarding.   Neurological: She displays normal reflexes.   Skin: Skin is warm and dry. Capillary refill takes less than 2 seconds. She is not diaphoretic.       Significant Labs:   Microbiology Results (last 7 days)     Procedure Component Value Units Date/Time    Blood culture [[id number]] Collected:  09/11/19 1811    Order Status:  Completed Specimen:  Blood from Peripheral, Hand, Left Updated:  09/15/19 2012     Blood Culture, Routine No Growth to date      No Growth to date      No Growth to date      No Growth to date      No Growth to date    CSF culture [[id number]] Collected:  09/11/19 1355    Order Status:  Completed Specimen:  CSF (Spinal Fluid) from CSF Tap, Tube 3 Updated:  09/15/19 0719     CSF CULTURE No Growth to date     Gram Stain Result Cytospin indicates:      Rare WBC's      No organisms seen    Blood culture [[id number]] Collected:  09/08/19 1210    Order Status:  Completed Specimen:  Blood from Peripheral, Ankle, Left Updated:  09/13/19 1412     Blood Culture, Routine No growth after 5 days.    Blood culture [[id number]]  (Abnormal) Collected:  09/08/19 1215    Order Status:  Completed Specimen:  Blood from Peripheral, Forearm, Left Updated:  09/12/19 1031     Blood Culture, Routine Gram stain brian bottle: Gram positive cocci       Results called to and read back by:Milagro Valencia RN  09/09/2019  13:48      Gram stain aer bottle: Gram positive cocci 09/10/2019  15:46      GEMELLA (S.) MORBILLORUM  Susceptibility testing not routinely performed      Blood culture [[id number]]     Order  Status:  Sent Specimen:  Blood     Gram stain [[id number]] Collected:  09/11/19 1355    Order Status:  Canceled Specimen:  CSF (Spinal Fluid) from CSF Tap, Tube 3     Culture, Respiratory with Gram Stain [[id number]]  (Abnormal)  (Susceptibility) Collected:  09/08/19 1158    Order Status:  Completed Specimen:  Respiratory from Tracheal Aspirate Updated:  09/11/19 0934     Respiratory Culture No S aureus or Pseudomonas isolated.      ESCHERICHIA COLI  Few  Normal respiratory connie also present       Gram Stain (Respiratory) Few WBC's     Gram Stain (Respiratory) Moderate Gram negative rods     Gram Stain (Respiratory) Rare Gram positive cocci    Blood culture [[id number]]     Order Status:  Canceled Specimen:  Blood     Blood culture [[id number]]     Order Status:  Canceled Specimen:  Blood           Significant Imaging: I have reviewed all pertinent imaging results/findings within the past 24 hours.

## 2019-09-16 NOTE — PROCEDURES
"Clemencia Nguyen is a 74 y.o. female patient.    Temp: 98.5 °F (36.9 °C) (19 1101)  Pulse: 95 (19 1527)  Resp: (!) 25 (19 1527)  BP: (!) 172/73 (19 1527)  SpO2: 100 % (19 1527)  Weight: 78.6 kg (173 lb 4.5 oz) (19 2305)  Height: 5' 3" (160 cm) (19 0947)       Arterial Line  Date/Time: 2019 1:10 PM  Performed by: Eddie Tran MD  Authorized by: Eddie Tran MD   Consent Done: Yes  Consent: Written consent obtained.  Consent given by: spouse  Procedure consent: procedure consent matches procedure scheduled  Site marked: the operative site was marked  Patient identity confirmed: , MRN and name  Time out: Immediately prior to procedure a "time out" was called to verify the correct patient, procedure, equipment, support staff and site/side marked as required.  Preparation: Patient was prepped and draped in the usual sterile fashion.  Location: right radial  Anesthesia: see MAR for details  Patient sedated: yes  Sedation type: deep sedation  (See MAR for exact dosages of medications).  Needle gauge: 20  Seldinger technique: Seldinger technique used  Number of attempts: 1  Post-procedure: dressing applied          Eddie Tran  2019  "

## 2019-09-16 NOTE — ASSESSMENT & PLAN NOTE
73 y/o female with pmhx CKD and HTN presented to outside hospital for seizure- like activity occurring on the left side. Found to have area of ill-defined enhancement on MRI brain w/ and w/out.  Most recent EEG showing bilateral PLEDs left greater than right.    -q 1 hr neuro checks  -Fu epilepsy recs  -Fu infectious rodriguez, CSF NGTD, FEDERICO when possible  -Continue steroids for now  -WTE when medically stable.  -Angio reviewed, no evidence of vasculitis.  -Further care per NCC, will follow.

## 2019-09-16 NOTE — ASSESSMENT & PLAN NOTE
74 year old admitted to unit on 9/3 following new onset seizure, with MRI showing large area of R cortical edema with flair and ill defined enhancement on contrast study, concern for glioma vs infectious/inflammatory process  -  repeat BC negative   - CSF cx no growth,   - CSF studies negative- VZV, enterovirus, HSV, MS,    - ID following   - may need biopsy  - continue decadron 4mg q6h  - NSGY, epilepsy following   - neuro checks q1hr   - vital signs q1hr   -on SubQ heparin   -Repeat CT head stable

## 2019-09-16 NOTE — ASSESSMENT & PLAN NOTE
2/2 to brain mass   9/11  EEG noted encephalopathy, no electrographic epileptiform.   Vimpat 100mg q12h  Valproic acid 750mg q8h, monitor level  Epilepsy following

## 2019-09-16 NOTE — PROGRESS NOTES
[facility]  Infectious Disease  Progress Note    Patient Name: Clemencia Nguyen  MRN: [id number]  Admission Date: 9/3/2019  Length of Stay: 13 days  Attending Physician: Jelani Allison MD  Primary Care Provider: SHELBY Castillo MD    Isolation Status: No active isolations  Assessment/Plan:      * Brain lesion  73 y/o female admitted with a bebo lesions. 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  She is currently on IV ceftriaxone.  Blood cultures have cleared.  Recommend FEDERICO to determine if this could be an endocarditis with subsequent embolic phenomena to the brain.  We will continue to follow.    Bacteremia  73yo woman with PMH HTN & CKD presents with acute onset of seizure like activity. Found to have area of ill-defined enhancement on MRI brain w/ and w/out.  Most recent EEG showing bilateral PLEDs left greater than right. ID consulted for bacteremia.  reports dental procedure (crown) ~2 months ago.     9/03 CSF: gram stain negative. 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 I will follow-up with patient. Please contact us if you have any additional questions.    Marry Candelaria MD  Infectious Disease  [facility]    Subjective:     Principal Problem:Brain lesion    HPI: Ms. Manriquez is a 75yo woman with PMH of HTN & CKD otherwise doing well who presented to [facility] with acute onset of Left sided seizure like activity and LOC.  at bedside reports she was functioning well and appropriately until he had found her unconscious last week. She has never had seizures before. 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 He notes she did seem forgetful during square dancing over the past month but he had attributed it to her age. He also says she had a dental crown procedure a couple of months ago. Denies fevers, chills. Denies FHx of cancer. Denies Fhx of colorectal cancer.     [place] [facility] [place] versed x 2 via EMS. CTH performed at outside hospital was negative for bleed.  MRI brain w/ and w/out obtained that showed large area of edema in right frontal lobe and ill defined enhancement in the frontal lobe.     She has been afebrile & hypertensive. Failed 2 extubations as family notes she became very anxious during trials.   Interval History: Patient remains intubated. EGD planned for this PM.     Review of Systems   Unable to perform ROS: Intubated     Objective:     Vital Signs (Most Recent):  Temp: 98.5 °F (36.9 °C) (09/16/19 1101)  Pulse: 95 (09/16/19 1527)  Resp: (!) 25 (09/16/19 1527)  BP: (!) 172/73 (09/16/19 1527)  SpO2: 100 % (09/16/19 1527) Vital Signs (24h Range):  Temp:  [98.5 °F (36.9 °C)-99 °F (37.2 °C)] 98.5 °F (36.9 °C)  Pulse:  [] 95  Resp:  [12-50] 25  SpO2:  [96 %-100 %] 100 %  BP: ()/() 172/73  Arterial Line BP: ()/(37-81) 188/71     Weight: 78.6 kg (173 lb 4.5 oz)  Body mass index is 30.7 kg/m².    Estimated Creatinine Clearance: 47.5 mL/min (based on SCr of 1 mg/dL).    Physical Exam   Constitutional: She appears well-developed and well-nourished. No distress.   intubated   HENT:   Head: Normocephalic and atraumatic.    Eyes: Pupils are equal, round, and reactive to light. EOM are normal.   Neck: Normal range of motion. Neck supple. No JVD present. No thyromegaly present.   Cardiovascular: Normal rate, regular rhythm, normal heart sounds and intact distal pulses. Exam reveals no gallop and no friction rub.   No murmur heard.  Pulmonary/Chest: Effort normal and breath sounds normal. No stridor. No respiratory distress. She has no wheezes. She has no rales. She exhibits no tenderness.   Abdominal: Soft. Bowel sounds are normal. She exhibits no distension. There is no tenderness. There is no guarding.   Neurological: She displays normal reflexes.   Skin: Skin is warm and dry. Capillary refill takes less than 2 seconds. She is not diaphoretic.       Significant Labs:   Microbiology Results (last 7 days)     Procedure Component Value Units Date/Time    CSF culture [[id number]] Collected:  09/11/19 1355    Order Status:  Completed Specimen:  CSF (Spinal Fluid) from CSF Tap, Tube 3 Updated:  09/16/19 0650     CSF CULTURE No Growth     Gram Stain Result Cytospin indicates:      Rare WBC's      No organisms seen    Blood culture [[id number]] Collected:  09/11/19 1811    Order Status:  Completed Specimen:  Blood from Peripheral, Hand, Left Updated:  09/15/19 2012     Blood Culture, Routine No Growth to date      No Growth to date      No Growth to date      No Growth to date      No Growth to date    Blood culture [[id number]] Collected:  09/08/19 1210    Order Status:  Completed Specimen:  Blood from Peripheral, Ankle, Left Updated:  09/13/19 1412     Blood Culture, Routine No growth after 5 days.    Blood culture [[id number]]  (Abnormal) Collected:  09/08/19 1215    Order Status:  Completed Specimen:  Blood from Peripheral, Forearm, Left Updated:  09/12/19 1031     Blood Culture, Routine Gram stain brian bottle: Gram positive cocci       Results called to and read back by:Milagro Valencia RN  09/09/2019  13:48      Gram stain aer bottle: Gram  positive cocci 09/10/2019  15:46      GEMELLA (S.) MORBILLORUM  Susceptibility testing not routinely performed      Blood culture [[id number]]     Order Status:  Sent Specimen:  Blood     Gram stain [[id number]] Collected:  09/11/19 1355    Order Status:  Canceled Specimen:  CSF (Spinal Fluid) from CSF Tap, Tube 3     Culture, Respiratory with Gram Stain [[id number]]  (Abnormal)  (Susceptibility) Collected:  09/08/19 1158    Order Status:  Completed Specimen:  Respiratory from Tracheal Aspirate Updated:  09/11/19 0934     Respiratory Culture No S aureus or Pseudomonas isolated.      ESCHERICHIA COLI  Few  Normal respiratory connie also present       Gram Stain (Respiratory) Few WBC's     Gram Stain (Respiratory) Moderate Gram negative rods     Gram Stain (Respiratory) Rare Gram positive cocci    Blood culture [[id number]]     Order Status:  Canceled Specimen:  Blood     Blood culture [[id number]]     Order Status:  Canceled Specimen:  Blood           Significant Imaging: I have reviewed all pertinent imaging results/findings within the past 24 hours.

## 2019-09-16 NOTE — ASSESSMENT & PLAN NOTE
73yo woman with PMH HTN & CKD presents with acute onset of seizure like activity. Found to have area of ill-defined enhancement on MRI brain w/ and w/out.  Most recent EEG showing bilateral PLEDs left greater than right. ID consulted for bacteremia.  reports dental procedure (crown) ~2 months ago.     9/03 CSF: gram stain negative. 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

## 2019-09-16 NOTE — PROGRESS NOTES
[facility]  Neurocritical Care  Progress Note    Admit Date: 9/3/2019  Service Date: 09/16/2019  Length of Stay: 13    Subjective:     Chief Complaint: Brain lesion    History of Present Illness: Pt is  75 yo female with a PMHx of CKD 3, HTN, [place] [facility] [place] and concern for right front lobe mass. The pt was found in her bedroom by her spouse at approximately 9:45 pm sitting her head against the bed, unresponsive, and having seizure like activity on the left-side. EMT was called and the pt was given Versed twice while en route to the hospital. Pt had a second witnessed seizures, left facial droop, left-sided weakness, and tongue ecchymosis in the ED. Neurology was consulted and The pt was given IV Keppra and Vimpat. MRI brain without contrast was acquired. The image showed right frontal lobe vasogenic edema with mass effect concerning for underlying mass. EEG was acquired at outside hospital concerning showing an abnormal result. The pt was given decadron IV and neurosurgical consult recommended. [place] [facility] [place] [facility] for higher level of care and further evaluation.     Pt history acquired per chart review and from spouse present at the bedside. The pt's spouse denies prior history of seizures CVA, cancer history and up-to-date screenings    Hospital Course: --admitted to [facility] on 9/3 following new onset seizure, arrived intubated  --MRI with large area of R cortical edema with flair and ill defined enhancement on contrast study, concern for glioma vs infectious/inflammatory process  --LP appears non infectious, cytology pending  9/5- no acute events, awaiting LP finalization from pathology report., weaning vent.   09/06/2019: Very agitated overnight, requiring increased sedation. Patient extubated this morning on rounds, and reintubated shortly after. Unable to maintain airway and secretions. CSF gram stain negative.  09/07/2019: KATT.  EKG obtained, seroquel started. Valproic acid started.  09/08/2019: NAEON. Improved agitation with seroquel.  09/09/2019: MRI Brain w/wo ordered for today. BLE US ordered. Will need new LP later on in the week.   09/10/2019 LP for infx workup, CD4 lab, EEG monitoring per Epilepsy   09/11/2019: To IR for LP. Spoke to NSGY about possible bx of lesion. Discontinue cEEG.   9/13/2019 Issues with low HR and BP overnight and this morning, Given Shoaib bump and atropine. 24 hours of IVF and bolus. WBC decreasing and afebrile. Repeat blood cultures negative. Plan for EGD and FEDERICO today.   9/14/2019 NAEO, EGD and FEDERICO moved until Monday. CSF still pending. Increasing WBC but afebrile. Start tube feeding   9/15/2019 NAEO, all CSF studies negative so far. CT negative. Begin insulin gtt. EGD and FEDERICO tomorrow    9/16 No significant events over night. EGD done this afternoon for esophageal stricture. Esophagus stretched to 15. FEDERICO ordered for tomorrow.     Interval History:No significant events over night. EGD done this afternoon for esophageal stricture. Esophagus stretched to 15. FEDERICO ordered for tomorrow.     Review of Systems:  Unable to obtain a complete ROS due to level of consciousness.     Vitals:   Temp: 98.5 °F (36.9 °C)  Pulse: 95  Rhythm: normal sinus rhythm  BP: (!) 172/73  MAP (mmHg): 97  Resp: (!) 25  SpO2: 100 %  Oxygen Concentration (%): 40  O2 Device (Oxygen Therapy): ventilator  Vent Mode: A/C  Set Rate: 12 bmp  Vt Set: 380 mL  Pressure Support: 0 cmH20  PEEP/CPAP: 5 cmH20  Peak Airway Pressure: 29 cmH2O  Mean Airway Pressure: 9.5 cmH20  Plateau Pressure: 0 cmH20    Temp  Min: 98.5 °F (36.9 °C)  Max: 99 °F (37.2 °C)  Pulse  Min: 52  Max: 117  BP  Min: 89/42  Max: 188/79  MAP (mmHg)  Min: 60  Max: 124  Resp  Min: 12  Max: 50  SpO2  Min: 96 %  Max: 100 %  Oxygen Concentration (%)  Min: 40  Max: 40    09/15 0701 - 09/16 0700  In: 2853.6 [I.V.:1943.6]  Out: 1200 [Urine:1200]   Unmeasured Output  Urine Occurrence: 1  Stool  Occurrence: 0  Emesis Occurrence: 0  Pad Count: 1     Examination:   Constitutional: Well-nourished and -developed. No apparent distress.   Eyes: Conjunctiva clear, anicteric. Lids no lesions.  Head/Ears/Nose/Mouth/Throat/Neck: Moist mucous membranes. External ears, nose atraumatic.   Cardiovascular: Regular rhythm. No murmurs. No leg edema.  Respiratory: Mech. Ventilation. Bibasilar breath sounds diminished  Gastrointestinal: No hernia. Soft, nondistended, nontender. + bowel sounds.    Neurologic:  -GCS E2V1M5  -On propofol gtt. Intubated. Does not follow commands  -Cranial nerves opens eyes to pain PERRL3+ + cough gag corneals  -Motor spontaneous ly moves RUE/RLE and LLE no movement to LUE    Unable to test orientation, language, memory, judgment, insight, fund of knowledge, hearing, shoulder shrug, tongue protrusion, coordination, gait due to level of consciousness.    Medications:   Continuous  insulin (HUMAN R) infusion (adults) Last Rate: Stopped (09/16/19 0505)   norepinephrine bitartrate-D5W Last Rate: 0.08 mcg/kg/min (09/16/19 1622)   propofol Last Rate: 42 mcg/kg/min (09/16/19 1605)   Scheduled  cefTRIAXone (ROCEPHIN) IVPB 2 g Q12H   chlorhexidine 15 mL BID   dexamethasone 4 mg Q6H   famotidine 20 mg QHS   heparin (porcine) 5,000 Units Q8H   lacosamide 100 mg Q12H   levothyroxine 75 mcg Before breakfast   QUEtiapine 75 mg BID   senna-docusate 8.6-50 mg 1 tablet BID   valproic acid (as sodium salt) 750 mg Q8H   PRN  acetaminophen 650 mg Q6H PRN   Dextrose 10% Bolus 12.5 g PRN   fentaNYL 100 mcg Q2H PRN   fentaNYL 50 mcg Q2H PRN   glucagon (human recombinant) 1 mg PRN   hydrALAZINE 10 mg Q6H PRN   magnesium oxide 800 mg PRN   magnesium oxide 800 mg PRN   OLANZapine 5 mg Q12H PRN   potassium chloride 10% 40 mEq PRN   potassium chloride 10% 40 mEq PRN   potassium, sodium phosphates 2 packet PRN   potassium, sodium phosphates 2 packet PRN   potassium, sodium phosphates 2 packet PRN   racepinephrine 0.5 mL Q4H  PRN   sodium chloride 0.9% 10 mL PRN   sodium chloride 0.9% 10 mL PRN      Today I independently reviewed pertinent medications, lines/drains/airways, imaging, laboratory results, microbiology results, notably:     ISTAT: Recent Labs   Lab 09/16/19  0402   PH 7.467*   PCO2 40.4   PO2 101*   POCSATURATED 98   HCO3 29.2*   BE 5   POCTCO2 30*   SAMPLE ARTERIAL      Chem: Recent Labs   Lab 09/16/19  0110      K 4.6      CO2 27   *   BUN 27*   CREATININE 1.0   ESTGFRAFRICA >60.0   EGFRNONAA 54.9*   CALCIUM 8.9   MG 2.3   PHOS 2.4*   ANIONGAP 10   PROT 5.5*   ALBUMIN 2.7*   BILITOT <0.1*   ALKPHOS 42*   AST 13   ALT 54*     Heme: Recent Labs   Lab 09/16/19  0110   WBC 11.98   HGB 11.3*   HCT 34.4*        Endo:   Recent Labs   Lab 09/16/19  0821 09/16/19  0903 09/16/19  1013   POCTGLUCOSE 80 100 120*      Assessment/Plan:     Neuro  * Brain lesion  74 year old admitted to unit on 9/3 following new onset seizure, with MRI showing large area of R cortical edema with flair and ill defined enhancement on contrast study, concern for glioma vs infectious/inflammatory process  -  repeat BC negative   - CSF cx no growth,   - CSF studies negative- VZV, enterovirus, HSV, MS,    - ID following   - may need biopsy  - continue decadron 4mg q6h  - NSGY, epilepsy following   - neuro checks q1hr   - vital signs q1hr   -on SubQ heparin   -Repeat CT head stable       Cerebral edema  --continue decadron, see brain lesion  Monitor seial imaging  Monitor neuro exam    New onset seizure  2/2 to brain mass   9/11  EEG noted encephalopathy, no electrographic epileptiform.   Vimpat 100mg q12h  Valproic acid 750mg q8h, monitor level  Epilepsy following          Pulmonary  On mechanically assisted ventilation  Daily CXR, ABG  Continue Peridex, Famotidine, Heparin subq  Trial of extubation 9/6/2019- re-intubated shortly after extubation for airway protection  Continue to wean vent settings as tolerated  Still intubated 2/2  inability to protect airway     Vent Mode: A/C  Oxygen Concentration (%):  [40] 40  Resp Rate Total:  [12 br/min-26 br/min] 18 br/min  Vt Set:  [380 mL] 380 mL  PEEP/CPAP:  [5 cmH20] 5 cmH20  Pressure Support:  [0 cmH20] 0 cmH20  Mean Airway Pressure:  [7 cmH20-10 cmH20] 9.5 cmH20   Daily CXR/ABG    Cardiac/Vascular  Essential hypertension  SBP <180, MAP >65   - on Levo gtt to keep MAP >65  Echo: EF 65-70% concentric LV remodeling    FEDERICO 9/17 pending. NPO after MN  EGD performed 9/15 to stretch esophagus to 15 for FEDERICO tomorrow.    Renal/  Chronic kidney disease, stage III (moderate)  -PMHx of CKD 3   -monitor renal function, I/Os       ID  Bacteremia  -Blood culture + genella morbillorum  Continue ceftriaxone 2g q12h    Oncology  Leukocytosis  Afebrile, WBC improving  -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    Endocrine  Hypothyroidism  Continue levothyroxine 75mcg daily    GI  Gastroesophageal reflux disease  pepcid 20mg daily  Hx esophageal stricture  9/16 EGD. Stretched esophagus to 15,  For possible FEDERICO tomorrow. NPO after MN    Other  Hypotension due to hypovolemia  -monitor with A-line  -keep net positive  -Levo gtt , keep MAP > 65            The patient is being Prophylaxed for:  Venous Thromboembolism with: Mechanical or Chemical  Stress Ulcer with: H2B  Ventilator Pneumonia with: chlorhexidine oral care    Activity Orders          Diet NPO Except for: Medication: NPO starting at 09/16 0001    Straight Cath starting at 09/05 0034        Full Code     I have spent 35 min with this patient, with over 50% of this time spent coordinating care and speaking with the family    Patito Odell NP  Neurocritical Care  [facility]

## 2019-09-16 NOTE — PROGRESS NOTES
Spoke to NCC on call concerning insulin drip. BG at 15 min recheck 89. Ok given to leave off and continue hourly BG checks.

## 2019-09-16 NOTE — SUBJECTIVE & OBJECTIVE
Interval History: EGD yesterday for esophageal dilation in preparation for FEDERICO. CSF remains NGTD. Continues on Prop gtt for sedation.     Medications:  Continuous Infusions:   insulin (HUMAN R) infusion (adults) Stopped (09/16/19 0505)    norepinephrine bitartrate-D5W 0.3 mcg/kg/min (09/16/19 1030)    propofol 35 mcg/kg/min (09/16/19 1001)     Scheduled Meds:   cefTRIAXone (ROCEPHIN) IVPB  2 g Intravenous Q12H    chlorhexidine  15 mL Mouth/Throat BID    dexamethasone  4 mg Intravenous Q6H    famotidine  20 mg Per OG tube QHS    heparin (porcine)  5,000 Units Subcutaneous Q8H    lacosamide  100 mg Per OG tube Q12H    levothyroxine  75 mcg Per OG tube Before breakfast    QUEtiapine  75 mg Per OG tube BID    senna-docusate 8.6-50 mg  1 tablet Per OG tube BID    valproic acid (as sodium salt)  750 mg Per OG tube Q8H     PRN Meds:acetaminophen, Dextrose 10% Bolus, fentaNYL, fentaNYL, glucagon (human recombinant), hydrALAZINE, magnesium oxide, magnesium oxide, midazolam, OLANZapine, potassium chloride 10%, potassium chloride 10%, potassium, sodium phosphates, potassium, sodium phosphates, potassium, sodium phosphates, racepinephrine, sodium chloride 0.9%, sodium chloride 0.9%     Review of Systems  Objective:     Weight: 78.6 kg (173 lb 4.5 oz)  Body mass index is 30.7 kg/m².  Vital Signs (Most Recent):  Temp: 99 °F (37.2 °C) (09/16/19 0701)  Pulse: (!) 52 (09/16/19 1001)  Resp: 12 (09/16/19 1001)  BP: (!) 105/51 (09/16/19 1001)  SpO2: 100 % (09/16/19 1001) Vital Signs (24h Range):  Temp:  [98.3 °F (36.8 °C)-99 °F (37.2 °C)] 99 °F (37.2 °C)  Pulse:  [] 52  Resp:  [12-30] 12  SpO2:  [96 %-100 %] 100 %  BP: ()/() 105/51  Arterial Line BP: ()/(37-80) 125/50     Date 09/16/19 0700 - 09/17/19 0659   Shift 8041-7768 5817-8948 7237-8958 24 Hour Total   INTAKE   I.V.(mL/kg) 238.3(3)   238.3(3)   NG/GT 0   0   Shift Total(mL/kg) 238.3(3)   238.3(3)   OUTPUT   Urine(mL/kg/hr) 600   600   Shift  Total(mL/kg) 600(7.6)   600(7.6)   Weight (kg) 78.6 78.6 78.6 78.6              Vent Mode: A/C  Oxygen Concentration (%):  [40] 40  Resp Rate Total:  [12 br/min-26 br/min] 12 br/min  Vt Set:  [380 mL] 380 mL  PEEP/CPAP:  [5 cmH20] 5 cmH20  Pressure Support:  [0 cmH20] 0 cmH20  Mean Airway Pressure:  [2.4 cmH20-10 cmH20] 7.9 cmH20         NG/OG Tube 09/03/19 1100 orogastric Center mouth (Active)   Placement Check placement verified by aspirate characteristics;placement verified by distal tube length measurement 9/16/2019  7:01 AM   Tolerance no signs/symptoms of discomfort 9/16/2019  7:01 AM   Securement secured to nostril center w/ adhesive device 9/16/2019  7:01 AM   Clamp Status/Tolerance clamped 9/16/2019  7:01 AM   Suction Setting/Drainage Method suction at the bedside 9/13/2019  3:05 AM   Insertion Site Appearance no redness, warmth, tenderness, skin breakdown, drainage 9/16/2019  7:01 AM   Flush/Irrigation flushed w/;water;no resistance met 9/16/2019  7:01 AM   Feeding Method continuous 9/11/2019  3:05 AM   Feeding Action feeding restarted 9/16/2019  7:01 AM   Current Rate (mL/hr) 0 mL/hr 9/14/2019  7:01 AM   Goal Rate (mL/hr) 45 mL/hr 9/15/2019  7:01 AM   Intake (mL) 50 mL 9/12/2019  9:05 AM   Water Bolus (mL) 250 mL 9/14/2019  7:01 AM   Rate Formula Tube Feeding (mL/hr) 45 mL/hr 9/9/2019  7:05 PM   Formula Name isosource 9/10/2019  3:00 PM   Intake (mL) - Formula Tube Feeding 0 9/16/2019  9:01 AM   Residual Amount (ml) 0 ml 9/11/2019  4:00 PM       Female External Urinary Catheter 09/15/19 0537 (Active)   Skin perineum cleansed w/ soap and water 9/16/2019  7:01 AM   Tolerance no signs/symptoms of discomfort 9/16/2019  7:01 AM   Suction Continuous suction at 60 mmHg 9/15/2019  7:01 AM   Date of last wick change 09/15/19 9/15/2019  7:01 AM   Time of last wick change 0711 9/15/2019  7:01 AM   Output (mL) 600 mL 9/16/2019  9:01 AM       Neurosurgery Physical Exam  E1VTM5, sedated   PERRL  +c/c/g  Localizing in  RUE, RLE  Withdrawing in EEDUARDO  SIL    Significant Labs:  Recent Labs   Lab 09/15/19  0116 09/16/19  0110   * 139*   * 141   K 4.5 4.6    104   CO2 26 27   BUN 27* 27*   CREATININE 1.1 1.0   CALCIUM 8.1* 8.9   MG 2.3 2.3     Recent Labs   Lab 09/15/19  0116 09/16/19  0110   WBC 13.64* 11.98   HGB 10.6* 11.3*   HCT 32.6* 34.4*    194     No results for input(s): LABPT, INR, APTT in the last 48 hours.  Microbiology Results (last 7 days)     Procedure Component Value Units Date/Time    CSF culture [[id number]] Collected:  09/11/19 1355    Order Status:  Completed Specimen:  CSF (Spinal Fluid) from CSF Tap, Tube 3 Updated:  09/16/19 0650     CSF CULTURE No Growth     Gram Stain Result Cytospin indicates:      Rare WBC's      No organisms seen    Blood culture [[id number]] Collected:  09/11/19 1811    Order Status:  Completed Specimen:  Blood from Peripheral, Hand, Left Updated:  09/15/19 2012     Blood Culture, Routine No Growth to date      No Growth to date      No Growth to date      No Growth to date      No Growth to date    Blood culture [[id number]] Collected:  09/08/19 1210    Order Status:  Completed Specimen:  Blood from Peripheral, Ankle, Left Updated:  09/13/19 1412     Blood Culture, Routine No growth after 5 days.    Blood culture [[id number]]  (Abnormal) Collected:  09/08/19 1215    Order Status:  Completed Specimen:  Blood from Peripheral, Forearm, Left Updated:  09/12/19 1031     Blood Culture, Routine Gram stain brian bottle: Gram positive cocci       Results called to and read back by:Milagro Valencia RN  09/09/2019  13:48      Gram stain aer bottle: Gram positive cocci 09/10/2019  15:46      GEMELLA (S.) MORBILLORUM  Susceptibility testing not routinely performed      Blood culture [[id number]]     Order Status:  Sent Specimen:  Blood     Gram stain [[id number]] Collected:  09/11/19 1355    Order Status:  Canceled Specimen:  CSF (Spinal Fluid) from CSF Tap, Tube 3     Culture,  Respiratory with Gram Stain [[id number]]  (Abnormal)  (Susceptibility) Collected:  09/08/19 1158    Order Status:  Completed Specimen:  Respiratory from Tracheal Aspirate Updated:  09/11/19 0934     Respiratory Culture No S aureus or Pseudomonas isolated.      ESCHERICHIA COLI  Few  Normal respiratory connie also present       Gram Stain (Respiratory) Few WBC's     Gram Stain (Respiratory) Moderate Gram negative rods     Gram Stain (Respiratory) Rare Gram positive cocci    Blood culture [[id number]]     Order Status:  Canceled Specimen:  Blood     Blood culture [[id number]]     Order Status:  Canceled Specimen:  Blood             Significant Diagnostics:  X-ray Chest 1 View    Result Date: 9/17/2019  As above Electronically signed by: Nahun Kilpatrick MD Date:    09/17/2019 Time:    06:38    X-ray Abdomen Ap 1 View    Result Date: 9/16/2019  See above. Electronically signed by: Kemi Ortiz MD Date:    09/16/2019 Time:    18:27    Us Upper Extremity Veins Left    Result Date: 9/16/2019  Deep vein thrombus in 1 of the left brachial veins. Superficial thrombophlebitis of the left cephalic vein. This report was flagged in Epic as abnormal. Electronically signed by: Murphy Chairez MD Date:    09/16/2019 Time:    15:29

## 2019-09-16 NOTE — SUBJECTIVE & OBJECTIVE
Interval History: 9/15: NAEON, AFVSS, Exam stable    Medications:  Continuous Infusions:   insulin (HUMAN R) infusion (adults) 3.5 Units/hr (09/15/19 2005)    norepinephrine bitartrate-D5W 0.18 mcg/kg/min (09/15/19 1801)    propofol 40 mcg/kg/min (09/15/19 2040)     Scheduled Meds:   cefTRIAXone (ROCEPHIN) IVPB  2 g Intravenous Q12H    chlorhexidine  15 mL Mouth/Throat BID    dexamethasone  4 mg Intravenous Q6H    famotidine  20 mg Per OG tube QHS    heparin (porcine)  5,000 Units Subcutaneous Q8H    lacosamide  100 mg Per OG tube Q12H    levothyroxine  75 mcg Per OG tube Before breakfast    QUEtiapine  75 mg Per OG tube BID    senna-docusate 8.6-50 mg  1 tablet Per OG tube BID    valproic acid (as sodium salt)  750 mg Per OG tube Q8H     PRN Meds:acetaminophen, Dextrose 10% Bolus, fentaNYL, fentaNYL, glucagon (human recombinant), hydrALAZINE, magnesium oxide, magnesium oxide, midazolam, OLANZapine, potassium chloride 10%, potassium chloride 10%, potassium, sodium phosphates, potassium, sodium phosphates, potassium, sodium phosphates, racepinephrine, sodium chloride 0.9%, sodium chloride 0.9%     Review of Systems    Objective:     Weight: 78.6 kg (173 lb 4.5 oz)  Body mass index is 30.7 kg/m².  Vital Signs (Most Recent):  Temp: 98.6 °F (37 °C) (09/15/19 1905)  Pulse: 99 (09/15/19 2024)  Resp: 12 (09/15/19 2024)  BP: (!) 153/65 (09/15/19 2005)  SpO2: 100 % (09/15/19 2024) Vital Signs (24h Range):  Temp:  [98.3 °F (36.8 °C)-98.6 °F (37 °C)] 98.6 °F (37 °C)  Pulse:  [] 99  Resp:  [12-33] 12  SpO2:  [98 %-100 %] 100 %  BP: ()/() 153/65  Arterial Line BP: ()/(44-74) 135/70     Date 09/15/19 0700 - 09/16/19 0659   Shift 3352-3640 1352-9929 1755-9462 24 Hour Total   INTAKE   I.V.(mL/kg) 373.9(4.8) 744(9.5)  1118(14.2)   NG/ 270  630   Shift Total(mL/kg) 733.9(9.3) 1014(12.9)  1748(22.2)   OUTPUT   Urine(mL/kg/hr) 450(0.7)   450   Shift Total(mL/kg) 450(5.7)   450(5.7)   Weight  (kg) 78.6 78.6 78.6 78.6              Vent Mode: A/C  Oxygen Concentration (%):  [40] 40  Resp Rate Total:  [12 br/min-23 br/min] 14 br/min  Vt Set:  [380 mL] 380 mL  PEEP/CPAP:  [5 cmH20] 5 cmH20  Pressure Support:  [0 cmH20] 0 cmH20  Mean Airway Pressure:  [2.4 cmH20-9.9 cmH20] 8 cmH20         NG/OG Tube 09/03/19 1100 orogastric Center mouth (Active)   Placement Check placement verified by distal tube length measurement;placement verified by x-ray;physician notified 9/13/2019  3:05 AM   Tolerance no signs/symptoms of discomfort 9/13/2019  3:05 AM   Securement secured to nostril center w/ adhesive device 9/13/2019  3:05 AM   Clamp Status/Tolerance unclamped 9/13/2019  3:05 AM   Suction Setting/Drainage Method suction at the bedside 9/13/2019  3:05 AM   Insertion Site Appearance no redness, warmth, tenderness, skin breakdown, drainage 9/13/2019  3:05 AM   Flush/Irrigation flushed w/;water;no resistance met 9/13/2019  3:05 AM   Feeding Method continuous 9/11/2019  3:05 AM   Feeding Action feeding held 9/13/2019  3:05 AM   Current Rate (mL/hr) 0 mL/hr 9/11/2019  7:05 PM   Goal Rate (mL/hr) 45 mL/hr 9/11/2019  7:05 PM   Intake (mL) 50 mL 9/12/2019  9:05 AM   Water Bolus (mL) 60 mL 9/5/2019  6:00 AM   Rate Formula Tube Feeding (mL/hr) 45 mL/hr 9/9/2019  7:05 PM   Formula Name isosource 9/10/2019  3:00 PM   Intake (mL) - Formula Tube Feeding 45 9/11/2019 11:00 AM   Residual Amount (ml) 0 ml 9/11/2019  4:00 PM       Neurosurgery Physical Exam     E2VTM5, sedated   PERRL  Right side and LLE spontaneous, WD in LUE  SILT    Significant Labs:  Recent Labs   Lab 09/14/19  0106 09/15/19  0116   * 397*   * 135*   K 4.7 4.5    102   CO2 21* 26   BUN 27* 27*   CREATININE 1.1 1.1   CALCIUM 8.3* 8.1*   MG 2.3 2.3     Recent Labs   Lab 09/14/19  0106 09/15/19  0116   WBC 24.87* 13.64*   HGB 11.6* 10.6*   HCT 36.9* 32.6*   * 250     No results for input(s): LABPT, INR, APTT in the last 48 hours.  Microbiology  Results (last 7 days)     Procedure Component Value Units Date/Time    Blood culture [[id number]] Collected:  09/11/19 1811    Order Status:  Completed Specimen:  Blood from Peripheral, Hand, Left Updated:  09/15/19 2012     Blood Culture, Routine No Growth to date      No Growth to date      No Growth to date      No Growth to date      No Growth to date    CSF culture [[id number]] Collected:  09/11/19 1355    Order Status:  Completed Specimen:  CSF (Spinal Fluid) from CSF Tap, Tube 3 Updated:  09/15/19 0719     CSF CULTURE No Growth to date     Gram Stain Result Cytospin indicates:      Rare WBC's      No organisms seen    Blood culture [[id number]] Collected:  09/08/19 1210    Order Status:  Completed Specimen:  Blood from Peripheral, Ankle, Left Updated:  09/13/19 1412     Blood Culture, Routine No growth after 5 days.    Blood culture [[id number]]  (Abnormal) Collected:  09/08/19 1215    Order Status:  Completed Specimen:  Blood from Peripheral, Forearm, Left Updated:  09/12/19 1031     Blood Culture, Routine Gram stain brian bottle: Gram positive cocci       Results called to and read back by:Milagro Valencia RN  09/09/2019  13:48      Gram stain aer bottle: Gram positive cocci 09/10/2019  15:46      GEMELLA (S.) MORBILLORUM  Susceptibility testing not routinely performed      Blood culture [[id number]]     Order Status:  Sent Specimen:  Blood     Gram stain [[id number]] Collected:  09/11/19 1355    Order Status:  Canceled Specimen:  CSF (Spinal Fluid) from CSF Tap, Tube 3     Culture, Respiratory with Gram Stain [[id number]]  (Abnormal)  (Susceptibility) Collected:  09/08/19 1158    Order Status:  Completed Specimen:  Respiratory from Tracheal Aspirate Updated:  09/11/19 0934     Respiratory Culture No S aureus or Pseudomonas isolated.      ESCHERICHIA COLI  Few  Normal respiratory connie also present       Gram Stain (Respiratory) Few WBC's     Gram Stain (Respiratory) Moderate Gram negative rods     Gram Stain  (Respiratory) Rare Gram positive cocci    Blood culture [[id number]]     Order Status:  Canceled Specimen:  Blood     Blood culture [[id number]]     Order Status:  Canceled Specimen:  Blood             Significant Diagnostics:  All significant diagnostics reviewed

## 2019-09-16 NOTE — ASSESSMENT & PLAN NOTE
73yo woman with PMH HTN & CKD presents with acute onset of seizure like activity. Found to have area of ill-defined enhancement on MRI brain w/ and w/out.  Most recent EEG showing bilateral PLEDs left greater than right. ID consulted for bacteremia.  reports dental procedure (crown) ~2 months ago.     9/03 CSF: gram stain negative. 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

## 2019-09-16 NOTE — PROGRESS NOTES
[facility]  Neurosurgery  Progress Note    Subjective:     History of Present Illness: 75 y/o female with pmhx CKD and HTN  presented to outside hospital for seizure- like activity this AM. Per  pt was found unconscious this AM, with seizure like activity occurring on the left side. Pt was given versed x 2 via EMS. CTH performed at outside hospital was negative for bleed. Pt had additional seizure in outside hospital ED and was given IV Keppra. EEG ordered and MRI brain w/out contrast concerning for possible infiltratrive process vs post ischemic sequela. [place] [facility] [place] [facility]. MRI brain w/ and w/out obtained that showed large area of edema in right frontal lobe and ill defined enhancement in the frontal lobe concerning for possible cerebritis vs. Neoplasm. NSGY was consulted to evaluate. Pt not on anti-coagulation.       Post-Op Info:  Procedure(s) (LRB):  EGD (ESOPHAGOGASTRODUODENOSCOPY) (N/A)         Interval History: 9/15: NAEON, AFVSS, Exam stable    Medications:  Continuous Infusions:   insulin (HUMAN R) infusion (adults) 3.5 Units/hr (09/15/19 2005)    norepinephrine bitartrate-D5W 0.18 mcg/kg/min (09/15/19 1801)    propofol 40 mcg/kg/min (09/15/19 2040)     Scheduled Meds:   cefTRIAXone (ROCEPHIN) IVPB  2 g Intravenous Q12H    chlorhexidine  15 mL Mouth/Throat BID    dexamethasone  4 mg Intravenous Q6H    famotidine  20 mg Per OG tube QHS    heparin (porcine)  5,000 Units Subcutaneous Q8H    lacosamide  100 mg Per OG tube Q12H    levothyroxine  75 mcg Per OG tube Before breakfast    QUEtiapine  75 mg Per OG tube BID    senna-docusate 8.6-50 mg  1 tablet Per OG tube BID    valproic acid (as sodium salt)  750 mg Per OG tube Q8H     PRN Meds:acetaminophen, Dextrose 10% Bolus, fentaNYL, fentaNYL, glucagon (human recombinant), hydrALAZINE, magnesium oxide, magnesium oxide, midazolam, OLANZapine, potassium chloride 10%, potassium chloride 10%, potassium,  sodium phosphates, potassium, sodium phosphates, potassium, sodium phosphates, racepinephrine, sodium chloride 0.9%, sodium chloride 0.9%     Review of Systems    Objective:     Weight: 78.6 kg (173 lb 4.5 oz)  Body mass index is 30.7 kg/m².  Vital Signs (Most Recent):  Temp: 98.6 °F (37 °C) (09/15/19 1905)  Pulse: 99 (09/15/19 2024)  Resp: 12 (09/15/19 2024)  BP: (!) 153/65 (09/15/19 2005)  SpO2: 100 % (09/15/19 2024) Vital Signs (24h Range):  Temp:  [98.3 °F (36.8 °C)-98.6 °F (37 °C)] 98.6 °F (37 °C)  Pulse:  [] 99  Resp:  [12-33] 12  SpO2:  [98 %-100 %] 100 %  BP: ()/() 153/65  Arterial Line BP: ()/(44-74) 135/70     Date 09/15/19 0700 - 09/16/19 0659   Shift 5455-6375 6151-3656 7229-1943 24 Hour Total   INTAKE   I.V.(mL/kg) 373.9(4.8) 744(9.5)  1118(14.2)   NG/ 270  630   Shift Total(mL/kg) 733.9(9.3) 1014(12.9)  1748(22.2)   OUTPUT   Urine(mL/kg/hr) 450(0.7)   450   Shift Total(mL/kg) 450(5.7)   450(5.7)   Weight (kg) 78.6 78.6 78.6 78.6              Vent Mode: A/C  Oxygen Concentration (%):  [40] 40  Resp Rate Total:  [12 br/min-23 br/min] 14 br/min  Vt Set:  [380 mL] 380 mL  PEEP/CPAP:  [5 cmH20] 5 cmH20  Pressure Support:  [0 cmH20] 0 cmH20  Mean Airway Pressure:  [2.4 cmH20-9.9 cmH20] 8 cmH20         NG/OG Tube 09/03/19 1100 orogastric Center mouth (Active)   Placement Check placement verified by distal tube length measurement;placement verified by x-ray;physician notified 9/13/2019  3:05 AM   Tolerance no signs/symptoms of discomfort 9/13/2019  3:05 AM   Securement secured to nostril center w/ adhesive device 9/13/2019  3:05 AM   Clamp Status/Tolerance unclamped 9/13/2019  3:05 AM   Suction Setting/Drainage Method suction at the bedside 9/13/2019  3:05 AM   Insertion Site Appearance no redness, warmth, tenderness, skin breakdown, drainage 9/13/2019  3:05 AM   Flush/Irrigation flushed w/;water;no resistance met 9/13/2019  3:05 AM   Feeding Method continuous 9/11/2019  3:05 AM    Feeding Action feeding held 9/13/2019  3:05 AM   Current Rate (mL/hr) 0 mL/hr 9/11/2019  7:05 PM   Goal Rate (mL/hr) 45 mL/hr 9/11/2019  7:05 PM   Intake (mL) 50 mL 9/12/2019  9:05 AM   Water Bolus (mL) 60 mL 9/5/2019  6:00 AM   Rate Formula Tube Feeding (mL/hr) 45 mL/hr 9/9/2019  7:05 PM   Formula Name isosource 9/10/2019  3:00 PM   Intake (mL) - Formula Tube Feeding 45 9/11/2019 11:00 AM   Residual Amount (ml) 0 ml 9/11/2019  4:00 PM       Neurosurgery Physical Exam     E2VTM5, sedated   PERRL  Right side and LLE spontaneous, WD in LUE  SILT    Significant Labs:  Recent Labs   Lab 09/14/19  0106 09/15/19  0116   * 397*   * 135*   K 4.7 4.5    102   CO2 21* 26   BUN 27* 27*   CREATININE 1.1 1.1   CALCIUM 8.3* 8.1*   MG 2.3 2.3     Recent Labs   Lab 09/14/19  0106 09/15/19  0116   WBC 24.87* 13.64*   HGB 11.6* 10.6*   HCT 36.9* 32.6*   * 250     No results for input(s): LABPT, INR, APTT in the last 48 hours.  Microbiology Results (last 7 days)     Procedure Component Value Units Date/Time    Blood culture [[id number]] Collected:  09/11/19 1811    Order Status:  Completed Specimen:  Blood from Peripheral, Hand, Left Updated:  09/15/19 2012     Blood Culture, Routine No Growth to date      No Growth to date      No Growth to date      No Growth to date      No Growth to date    CSF culture [[id number]] Collected:  09/11/19 1355    Order Status:  Completed Specimen:  CSF (Spinal Fluid) from CSF Tap, Tube 3 Updated:  09/15/19 0719     CSF CULTURE No Growth to date     Gram Stain Result Cytospin indicates:      Rare WBC's      No organisms seen    Blood culture [[id number]] Collected:  09/08/19 1210    Order Status:  Completed Specimen:  Blood from Peripheral, Ankle, Left Updated:  09/13/19 1412     Blood Culture, Routine No growth after 5 days.    Blood culture [[id number]]  (Abnormal) Collected:  09/08/19 1215    Order Status:  Completed Specimen:  Blood from Peripheral, Forearm, Left Updated:   09/12/19 1031     Blood Culture, Routine Gram stain brian bottle: Gram positive cocci       Results called to and read back by:Milagro Valencia RN  09/09/2019  13:48      Gram stain aer bottle: Gram positive cocci 09/10/2019  15:46      GEMELLA (S.) MORBILLORUM  Susceptibility testing not routinely performed      Blood culture [[id number]]     Order Status:  Sent Specimen:  Blood     Gram stain [[id number]] Collected:  09/11/19 1355    Order Status:  Canceled Specimen:  CSF (Spinal Fluid) from CSF Tap, Tube 3     Culture, Respiratory with Gram Stain [[id number]]  (Abnormal)  (Susceptibility) Collected:  09/08/19 1158    Order Status:  Completed Specimen:  Respiratory from Tracheal Aspirate Updated:  09/11/19 0934     Respiratory Culture No S aureus or Pseudomonas isolated.      ESCHERICHIA COLI  Few  Normal respiratory connie also present       Gram Stain (Respiratory) Few WBC's     Gram Stain (Respiratory) Moderate Gram negative rods     Gram Stain (Respiratory) Rare Gram positive cocci    Blood culture [[id number]]     Order Status:  Canceled Specimen:  Blood     Blood culture [[id number]]     Order Status:  Canceled Specimen:  Blood             Significant Diagnostics:  All significant diagnostics reviewed    Assessment/Plan:     New onset seizure  75 y/o female with pmhx CKD and HTN presented to outside hospital for seizure- like activity occurring on the left side. Found to have area of ill-defined enhancement on MRI brain w/ and w/out.  Most recent EEG showing bilateral PLEDs left greater than right.    -q 1 hr neuro checks  -Fu epilepsy recs  -Fu infectious rodriguez, CSF NGTD, FEDERICO when possible  -Continue steroids for now  -WTE when medically stable.  -Angio reviewed, no evidence of vasculitis.  -Further care per NCC, will follow.          Anup Schumacher MD  Neurosurgery  [facility]

## 2019-09-16 NOTE — ASSESSMENT & PLAN NOTE
SBP <180, MAP >65   - on Levo gtt to keep MAP >65  Echo: EF 65-70% concentric LV remodeling    FEDERICO 9/17 pending. NPO after MN  EGD performed 9/15 to stretch esophagus to 15 for FEDERICO tomorrow.

## 2019-09-16 NOTE — ANESTHESIA POSTPROCEDURE EVALUATION
Anesthesia Post Evaluation    Patient: Clemencia Nguyen    Procedure(s) Performed: Procedure(s) (LRB):  EGD (ESOPHAGOGASTRODUODENOSCOPY) (N/A)    Final Anesthesia Type: general  Patient location during evaluation: ICU  Patient participation: No - Unable to Participate, Intubation  Level of consciousness: sedated  Post-procedure vital signs: reviewed and stable (Resumed Levophed infusion)  Pain management: adequate  Airway patency: patent  PONV status at discharge: No PONV  Anesthetic complications: no      Cardiovascular status: stable  Respiratory status: ventilator and intubated  Hydration status: euvolemic  Follow-up not needed.          Vitals Value Taken Time   /76 9/16/2019  5:52 PM   Temp 36.9 °C (98.5 °F) 9/16/2019 11:01 AM   Pulse 80 9/16/2019  5:52 PM   Resp 27 9/16/2019  5:52 PM   SpO2 100 % 9/16/2019  5:52 PM   Vitals shown include unvalidated device data.      No case tracking events are documented in the log.      Pain/Heidi Score: Pain Rating Prior to Med Admin: 8 (9/16/2019  1:18 PM)  Pain Rating Post Med Admin: 5 (9/15/2019  9:31 AM)

## 2019-09-16 NOTE — ADDENDUM NOTE
Addendum  created 09/16/19 1804 by Rhonda G Leopold, MD    Attestation recorded in Intraprocedure, Intraprocedure Attestations filed

## 2019-09-16 NOTE — H&P
Short Stay Endoscopy History and Physical    PCP - SHELBY Castillo MD     Procedure - EGD  ASA - per anesthesia  Mallampati - per anesthesia  History of Anesthesia problems - no  Family history Anesthesia problems -  no   Plan of anesthesia - General    HPI:  This is a 74 y.o. female here for evaluation of :     Requested by cardiology for EGD for clearance for FEDERICO. Prior history of dysphagia and distal esophageal stricture.    Reflux - no  Dysphagia - yes  Abdominal pain - no  Diarrhea - no    ROS:  Intubated    Medical History:  has a past medical history of Depression, Disorder of kidney and ureter, Esophageal stricture (06/12/2019), Hyperlipemia, Hypertension, Hypothyroidism, Osteopenia, and Thyroid disease.    Surgical History:  has a past surgical history that includes Hysterectomy; Tonsillectomy; cataract surgery; Oophorectomy; Colonoscopy (2011, again in 2014); Cholecystectomy (04/24/2018); Esophagogastroduodenoscopy (06/12/2019); and Colonoscopy w/ biopsies (06/12/2019).    Family History: family history includes Heart disease in her mother; Hypertension in her mother; Stroke in her father.. Otherwise no colon cancer, inflammatory bowel disease, or GI malignancies.    Social History:  reports that she has never smoked. She has never used smokeless tobacco. She reports that she does not drink alcohol or use drugs.    Review of patient's allergies indicates:  No Known Allergies    Medications:   No medications prior to admission.       Physical Exam:    Vital Signs:   Vitals:    09/16/19 1527   BP: (!) 172/73   Pulse: 95   Resp: (!) 25   Temp:        General Appearance: intubated, appears comfortable.  Eyes:    No scleral icterus  ENT: Neck supple, Lips, mucosa, and tongue normal; teeth and gums normal  Lungs: CTA anteriorly, intubated  Heart:  Regular rate, S1, S2 normal, no murmurs heard.  Abdomen: Soft, non tender, non distended with normal bowel sounds. No hepatosplenomegaly, ascites, or  mass.  Extremities: No edema  Skin: No rash    Labs:  Lab Results   Component Value Date    WBC 11.98 09/16/2019    HGB 11.3 (L) 09/16/2019    HCT 34.4 (L) 09/16/2019     09/16/2019    CHOL 162 12/05/2018    TRIG 228 (H) 09/15/2019    HDL 34 (L) 12/05/2018    ALT 54 (H) 09/16/2019    AST 13 09/16/2019     09/16/2019    K 4.6 09/16/2019     09/16/2019    CREATININE 1.0 09/16/2019    BUN 27 (H) 09/16/2019    CO2 27 09/16/2019    TSH 0.280 (L) 06/14/2019    INR 0.9 09/07/2019    HGBA1C 5.7 (H) 09/02/2019       I have explained the risks and benefits of endoscopy procedures to the patient including but not limited to bleeding, perforation, infection, and death.  The patient was asked if they understand and allowed to ask any further questions to their satisfaction.      Vaibhav García MD

## 2019-09-16 NOTE — PLAN OF CARE
Problem: Adult Inpatient Plan of Care  Goal: Plan of Care Review  Outcome: Ongoing (interventions implemented as appropriate)  POC reviewed with pt at 0500. Pt unable to verbalized understanding. Questions and concerns addressed. Pt agitated throughout shift. No acute events overnight. Pt progressing toward goals. Will continue to monitor. See flowsheets for full assessment and VS info

## 2019-09-16 NOTE — ASSESSMENT & PLAN NOTE
pepcid 20mg daily  Hx esophageal stricture  9/16 EGD. Stretched esophagus to 15,  For possible FEDERICO tomorrow. NPO after MN

## 2019-09-16 NOTE — SUBJECTIVE & OBJECTIVE
Interval History:     No adverse events.    Review of Systems   Unable to perform ROS: Intubated     Objective:     Vital Signs (Most Recent):  Temp: 98.6 °F (37 °C) (09/15/19 1905)  Pulse: 99 (09/15/19 2024)  Resp: 12 (09/15/19 2024)  BP: (!) 153/65 (09/15/19 2005)  SpO2: 100 % (09/15/19 2024) Vital Signs (24h Range):  Temp:  [98.3 °F (36.8 °C)-98.6 °F (37 °C)] 98.6 °F (37 °C)  Pulse:  [] 99  Resp:  [12-33] 12  SpO2:  [98 %-100 %] 100 %  BP: ()/() 153/65  Arterial Line BP: ()/(44-74) 135/70     Weight: 78.6 kg (173 lb 4.5 oz)  Body mass index is 30.7 kg/m².    Estimated Creatinine Clearance: 43.2 mL/min (based on SCr of 1.1 mg/dL).    Physical Exam   Constitutional: She appears well-developed and well-nourished. No distress.   intubated   HENT:   Head: Normocephalic and atraumatic.   Eyes: Pupils are equal, round, and reactive to light. EOM are normal.   Neck: Normal range of motion. Neck supple. No JVD present. No thyromegaly present.   Cardiovascular: Normal rate, regular rhythm, normal heart sounds and intact distal pulses. Exam reveals no gallop and no friction rub.   No murmur heard.  Pulmonary/Chest: Effort normal and breath sounds normal. No stridor. No respiratory distress. She has no wheezes. She has no rales. She exhibits no tenderness.   Abdominal: Soft. Bowel sounds are normal. She exhibits no distension. There is no tenderness. There is no guarding.   Neurological: She displays normal reflexes.   Skin: Skin is warm and dry. Capillary refill takes less than 2 seconds. She is not diaphoretic.       Significant Labs:   Microbiology Results (last 7 days)     Procedure Component Value Units Date/Time    Blood culture [[id number]] Collected:  09/11/19 1811    Order Status:  Completed Specimen:  Blood from Peripheral, Hand, Left Updated:  09/15/19 2012     Blood Culture, Routine No Growth to date      No Growth to date      No Growth to date      No Growth to date      No Growth to date     CSF culture [[id number]] Collected:  09/11/19 1355    Order Status:  Completed Specimen:  CSF (Spinal Fluid) from CSF Tap, Tube 3 Updated:  09/15/19 0719     CSF CULTURE No Growth to date     Gram Stain Result Cytospin indicates:      Rare WBC's      No organisms seen    Blood culture [[id number]] Collected:  09/08/19 1210    Order Status:  Completed Specimen:  Blood from Peripheral, Ankle, Left Updated:  09/13/19 1412     Blood Culture, Routine No growth after 5 days.    Blood culture [[id number]]  (Abnormal) Collected:  09/08/19 1215    Order Status:  Completed Specimen:  Blood from Peripheral, Forearm, Left Updated:  09/12/19 1031     Blood Culture, Routine Gram stain brian bottle: Gram positive cocci       Results called to and read back by:Milagro Valencia RN  09/09/2019  13:48      Gram stain aer bottle: Gram positive cocci 09/10/2019  15:46      GEMELLA (S.) MORBILLORUM  Susceptibility testing not routinely performed      Blood culture [[id number]]     Order Status:  Sent Specimen:  Blood     Gram stain [[id number]] Collected:  09/11/19 1355    Order Status:  Canceled Specimen:  CSF (Spinal Fluid) from CSF Tap, Tube 3     Culture, Respiratory with Gram Stain [[id number]]  (Abnormal)  (Susceptibility) Collected:  09/08/19 1158    Order Status:  Completed Specimen:  Respiratory from Tracheal Aspirate Updated:  09/11/19 0934     Respiratory Culture No S aureus or Pseudomonas isolated.      ESCHERICHIA COLI  Few  Normal respiratory connie also present       Gram Stain (Respiratory) Few WBC's     Gram Stain (Respiratory) Moderate Gram negative rods     Gram Stain (Respiratory) Rare Gram positive cocci    Blood culture [[id number]]     Order Status:  Canceled Specimen:  Blood     Blood culture [[id number]]     Order Status:  Canceled Specimen:  Blood           Significant Imaging: I have reviewed all pertinent imaging results/findings within the past 24 hours.

## 2019-09-16 NOTE — PLAN OF CARE
Problem: Adult Inpatient Plan of Care  Goal: Plan of Care Review  Outcome: Ongoing (interventions implemented as appropriate)  POC reviewed with pt and family at 1400. Family verbalized understanding. Questions and concerns addressed. EGD completed today without complication.  Arterial line replaced. Propofol and levo gtt continued. FEDERICO scheduled for tomorrow. Pt to remain NPO after MN. Pt progressing toward goals. Will continue to monitor. See flowsheets for full assessment and VS info.

## 2019-09-16 NOTE — SUBJECTIVE & OBJECTIVE
Interval History: Patient remains intubated. EGD planned for this PM.     Review of Systems   Unable to perform ROS: Intubated     Objective:     Vital Signs (Most Recent):  Temp: 98.5 °F (36.9 °C) (09/16/19 1101)  Pulse: 95 (09/16/19 1527)  Resp: (!) 25 (09/16/19 1527)  BP: (!) 172/73 (09/16/19 1527)  SpO2: 100 % (09/16/19 1527) Vital Signs (24h Range):  Temp:  [98.5 °F (36.9 °C)-99 °F (37.2 °C)] 98.5 °F (36.9 °C)  Pulse:  [] 95  Resp:  [12-50] 25  SpO2:  [96 %-100 %] 100 %  BP: ()/() 172/73  Arterial Line BP: ()/(37-81) 188/71     Weight: 78.6 kg (173 lb 4.5 oz)  Body mass index is 30.7 kg/m².    Estimated Creatinine Clearance: 47.5 mL/min (based on SCr of 1 mg/dL).    Physical Exam   Constitutional: She appears well-developed and well-nourished. No distress.   intubated   HENT:   Head: Normocephalic and atraumatic.   Eyes: Pupils are equal, round, and reactive to light. EOM are normal.   Neck: Normal range of motion. Neck supple. No JVD present. No thyromegaly present.   Cardiovascular: Normal rate, regular rhythm, normal heart sounds and intact distal pulses. Exam reveals no gallop and no friction rub.   No murmur heard.  Pulmonary/Chest: Effort normal and breath sounds normal. No stridor. No respiratory distress. She has no wheezes. She has no rales. She exhibits no tenderness.   Abdominal: Soft. Bowel sounds are normal. She exhibits no distension. There is no tenderness. There is no guarding.   Neurological: She displays normal reflexes.   Skin: Skin is warm and dry. Capillary refill takes less than 2 seconds. She is not diaphoretic.       Significant Labs:   Microbiology Results (last 7 days)     Procedure Component Value Units Date/Time    CSF culture [[id number]] Collected:  09/11/19 9326    Order Status:  Completed Specimen:  CSF (Spinal Fluid) from CSF Tap, Tube 3 Updated:  09/16/19 0650     CSF CULTURE No Growth     Gram Stain Result Cytospin indicates:      Rare WBC's      No  organisms seen    Blood culture [[id number]] Collected:  09/11/19 1811    Order Status:  Completed Specimen:  Blood from Peripheral, Hand, Left Updated:  09/15/19 2012     Blood Culture, Routine No Growth to date      No Growth to date      No Growth to date      No Growth to date      No Growth to date    Blood culture [[id number]] Collected:  09/08/19 1210    Order Status:  Completed Specimen:  Blood from Peripheral, Ankle, Left Updated:  09/13/19 1412     Blood Culture, Routine No growth after 5 days.    Blood culture [[id number]]  (Abnormal) Collected:  09/08/19 1215    Order Status:  Completed Specimen:  Blood from Peripheral, Forearm, Left Updated:  09/12/19 1031     Blood Culture, Routine Gram stain brian bottle: Gram positive cocci       Results called to and read back by:Milagro Valencia RN  09/09/2019  13:48      Gram stain aer bottle: Gram positive cocci 09/10/2019  15:46      GEMELLA (S.) MORBILLORUM  Susceptibility testing not routinely performed      Blood culture [[id number]]     Order Status:  Sent Specimen:  Blood     Gram stain [[id number]] Collected:  09/11/19 1355    Order Status:  Canceled Specimen:  CSF (Spinal Fluid) from CSF Tap, Tube 3     Culture, Respiratory with Gram Stain [[id number]]  (Abnormal)  (Susceptibility) Collected:  09/08/19 1158    Order Status:  Completed Specimen:  Respiratory from Tracheal Aspirate Updated:  09/11/19 0934     Respiratory Culture No S aureus or Pseudomonas isolated.      ESCHERICHIA COLI  Few  Normal respiratory connie also present       Gram Stain (Respiratory) Few WBC's     Gram Stain (Respiratory) Moderate Gram negative rods     Gram Stain (Respiratory) Rare Gram positive cocci    Blood culture [[id number]]     Order Status:  Canceled Specimen:  Blood     Blood culture [[id number]]     Order Status:  Canceled Specimen:  Blood           Significant Imaging: I have reviewed all pertinent imaging results/findings within the past 24 hours.

## 2019-09-16 NOTE — TRANSFER OF CARE
"Anesthesia Transfer of Care Note    Patient: Clemencia Nguyen    Procedure(s) Performed: Procedure(s) (LRB):  EGD (ESOPHAGOGASTRODUODENOSCOPY) (N/A)    Patient location: ICU    Anesthesia Type: general    Transport from OR: Continuous ECG monitoring in transport. Continuous SpO2 monitoring in transport. Continuos invasive BP monitoring in transport    Post pain: adequate analgesia    Post assessment: no apparent anesthetic complications and tolerated procedure well    Post vital signs: stable    Level of consciousness: sedated    Nausea/Vomiting: no nausea/vomiting    Complications: none    Transfer of care protocol was followed      Last vitals:   Visit Vitals  BP (!) 172/73   Pulse 95   Temp 36.9 °C (98.5 °F) (Oral)   Resp (!) 25   Ht 5' 3" (1.6 m)   Wt 78.6 kg (173 lb 4.5 oz)   SpO2 100%   Breastfeeding? No   BMI 30.70 kg/m²     "

## 2019-09-16 NOTE — ASSESSMENT & PLAN NOTE
Daily CXR, ABG  Continue Peridex, Famotidine, Heparin subq  Trial of extubation 9/6/2019- re-intubated shortly after extubation for airway protection  Continue to wean vent settings as tolerated  Still intubated 2/2 inability to protect airway     Vent Mode: A/C  Oxygen Concentration (%):  [40] 40  Resp Rate Total:  [12 br/min-26 br/min] 18 br/min  Vt Set:  [380 mL] 380 mL  PEEP/CPAP:  [5 cmH20] 5 cmH20  Pressure Support:  [0 cmH20] 0 cmH20  Mean Airway Pressure:  [7 cmH20-10 cmH20] 9.5 cmH20   Daily CXR/ABG

## 2019-09-17 LAB
ALBUMIN SERPL BCP-MCNC: 2.6 G/DL (ref 3.5–5.2)
ALLENS TEST: ABNORMAL
ALP SERPL-CCNC: 40 U/L (ref 55–135)
ALT SERPL W/O P-5'-P-CCNC: 45 U/L (ref 10–44)
ANION GAP SERPL CALC-SCNC: 8 MMOL/L (ref 8–16)
ASCENDING AORTA: 2.7 CM
AST SERPL-CCNC: 17 U/L (ref 10–40)
BASOPHILS # BLD AUTO: 0.01 K/UL (ref 0–0.2)
BASOPHILS NFR BLD: 0.2 % (ref 0–1.9)
BILIRUB SERPL-MCNC: 0.2 MG/DL (ref 0.1–1)
BSA FOR ECHO PROCEDURE: 1.76 M2
BUN SERPL-MCNC: 17 MG/DL (ref 8–23)
CALCIUM SERPL-MCNC: 8.5 MG/DL (ref 8.7–10.5)
CHLORIDE SERPL-SCNC: 101 MMOL/L (ref 95–110)
CO2 SERPL-SCNC: 32 MMOL/L (ref 23–29)
CREAT SERPL-MCNC: 0.9 MG/DL (ref 0.5–1.4)
DELSYS: ABNORMAL
DIFFERENTIAL METHOD: ABNORMAL
EOSINOPHIL # BLD AUTO: 0 K/UL (ref 0–0.5)
EOSINOPHIL NFR BLD: 0 % (ref 0–8)
ERYTHROCYTE [DISTWIDTH] IN BLOOD BY AUTOMATED COUNT: 13.6 % (ref 11.5–14.5)
ERYTHROCYTE [SEDIMENTATION RATE] IN BLOOD BY WESTERGREN METHOD: 12 MM/H
EST. GFR  (AFRICAN AMERICAN): >60 ML/MIN/1.73 M^2
EST. GFR  (NON AFRICAN AMERICAN): >60 ML/MIN/1.73 M^2
FIO2: 40
GLUCOSE SERPL-MCNC: 123 MG/DL (ref 70–110)
HCO3 UR-SCNC: 36.4 MMOL/L (ref 24–28)
HCT VFR BLD AUTO: 33.6 % (ref 37–48.5)
HGB BLD-MCNC: 10.3 G/DL (ref 12–16)
IMM GRANULOCYTES # BLD AUTO: 0.24 K/UL (ref 0–0.04)
IMM GRANULOCYTES NFR BLD AUTO: 4.1 % (ref 0–0.5)
LYMPHOCYTES # BLD AUTO: 1 K/UL (ref 1–4.8)
LYMPHOCYTES NFR BLD: 17.6 % (ref 18–48)
MAGNESIUM SERPL-MCNC: 2 MG/DL (ref 1.6–2.6)
MCH RBC QN AUTO: 28.5 PG (ref 27–31)
MCHC RBC AUTO-ENTMCNC: 30.7 G/DL (ref 32–36)
MCV RBC AUTO: 93 FL (ref 82–98)
MIN VOL: 235
MODE: ABNORMAL
MONOCYTES # BLD AUTO: 0.9 K/UL (ref 0.3–1)
MONOCYTES NFR BLD: 16.2 % (ref 4–15)
NEUTROPHILS # BLD AUTO: 3.6 K/UL (ref 1.8–7.7)
NEUTROPHILS NFR BLD: 61.9 % (ref 38–73)
NRBC BLD-RTO: 0 /100 WBC
PCO2 BLDA: 39.8 MMHG (ref 35–45)
PEEP: 5
PH SMN: 7.57 [PH] (ref 7.35–7.45)
PHOSPHATE SERPL-MCNC: 3.2 MG/DL (ref 2.7–4.5)
PIP: 23
PLATELET # BLD AUTO: 158 K/UL (ref 150–350)
PMV BLD AUTO: 9.1 FL (ref 9.2–12.9)
PO2 BLDA: 83 MMHG (ref 80–100)
POC BE: 14 MMOL/L
POC SATURATED O2: 98 % (ref 95–100)
POC TCO2: 38 MMOL/L (ref 23–27)
POCT GLUCOSE: 114 MG/DL (ref 70–110)
POCT GLUCOSE: 127 MG/DL (ref 70–110)
POCT GLUCOSE: 131 MG/DL (ref 70–110)
POCT GLUCOSE: 134 MG/DL (ref 70–110)
POTASSIUM SERPL-SCNC: 4.6 MMOL/L (ref 3.5–5.1)
PROT SERPL-MCNC: 5.2 G/DL (ref 6–8.4)
PROX AORTA: 2.4 CM
RBC # BLD AUTO: 3.61 M/UL (ref 4–5.4)
SAMPLE: ABNORMAL
SINUS: 2.8 CM
SITE: ABNORMAL
SODIUM SERPL-SCNC: 141 MMOL/L (ref 136–145)
SP02: 100
STJ: 2.3 CM
VALPROATE SERPL-MCNC: 53.7 UG/ML (ref 50–100)
VT: 380
WBC # BLD AUTO: 5.79 K/UL (ref 3.9–12.7)

## 2019-09-17 PROCEDURE — 63600175 PHARM REV CODE 636 W HCPCS: Performed by: NURSE PRACTITIONER

## 2019-09-17 PROCEDURE — 99233 SBSQ HOSP IP/OBS HIGH 50: CPT | Mod: GC,,, | Performed by: INTERNAL MEDICINE

## 2019-09-17 PROCEDURE — 82803 BLOOD GASES ANY COMBINATION: CPT

## 2019-09-17 PROCEDURE — 80053 COMPREHEN METABOLIC PANEL: CPT

## 2019-09-17 PROCEDURE — 63600175 PHARM REV CODE 636 W HCPCS: Performed by: PSYCHIATRY & NEUROLOGY

## 2019-09-17 PROCEDURE — 25000003 PHARM REV CODE 250: Performed by: PSYCHIATRY & NEUROLOGY

## 2019-09-17 PROCEDURE — 85025 COMPLETE CBC W/AUTO DIFF WBC: CPT

## 2019-09-17 PROCEDURE — 25000003 PHARM REV CODE 250: Performed by: PHYSICIAN ASSISTANT

## 2019-09-17 PROCEDURE — 36600 WITHDRAWAL OF ARTERIAL BLOOD: CPT

## 2019-09-17 PROCEDURE — 99233 PR SUBSEQUENT HOSPITAL CARE,LEVL III: ICD-10-PCS | Mod: ,,, | Performed by: NURSE PRACTITIONER

## 2019-09-17 PROCEDURE — 94003 VENT MGMT INPAT SUBQ DAY: CPT

## 2019-09-17 PROCEDURE — 25000003 PHARM REV CODE 250: Performed by: STUDENT IN AN ORGANIZED HEALTH CARE EDUCATION/TRAINING PROGRAM

## 2019-09-17 PROCEDURE — 99900035 HC TECH TIME PER 15 MIN (STAT)

## 2019-09-17 PROCEDURE — 84100 ASSAY OF PHOSPHORUS: CPT

## 2019-09-17 PROCEDURE — 83735 ASSAY OF MAGNESIUM: CPT

## 2019-09-17 PROCEDURE — 63600175 PHARM REV CODE 636 W HCPCS

## 2019-09-17 PROCEDURE — 99232 SBSQ HOSP IP/OBS MODERATE 35: CPT | Mod: ,,, | Performed by: NEUROLOGICAL SURGERY

## 2019-09-17 PROCEDURE — 99232 PR SUBSEQUENT HOSPITAL CARE,LEVL II: ICD-10-PCS | Mod: ,,, | Performed by: NEUROLOGICAL SURGERY

## 2019-09-17 PROCEDURE — 80164 ASSAY DIPROPYLACETIC ACD TOT: CPT

## 2019-09-17 PROCEDURE — 99900026 HC AIRWAY MAINTENANCE (STAT)

## 2019-09-17 PROCEDURE — 27000221 HC OXYGEN, UP TO 24 HOURS

## 2019-09-17 PROCEDURE — 94761 N-INVAS EAR/PLS OXIMETRY MLT: CPT

## 2019-09-17 PROCEDURE — 20000000 HC ICU ROOM

## 2019-09-17 PROCEDURE — 99233 PR SUBSEQUENT HOSPITAL CARE,LEVL III: ICD-10-PCS | Mod: GC,,, | Performed by: INTERNAL MEDICINE

## 2019-09-17 PROCEDURE — 99233 SBSQ HOSP IP/OBS HIGH 50: CPT | Mod: ,,, | Performed by: NURSE PRACTITIONER

## 2019-09-17 RX ORDER — PROPOFOL 10 MG/ML
INJECTION, EMULSION INTRAVENOUS
Status: COMPLETED
Start: 2019-09-17 | End: 2019-09-17

## 2019-09-17 RX ADMIN — FAMOTIDINE 20 MG: 20 TABLET ORAL at 09:09

## 2019-09-17 RX ADMIN — QUETIAPINE FUMARATE 75 MG: 25 TABLET ORAL at 09:09

## 2019-09-17 RX ADMIN — LACOSAMIDE 100 MG: 50 TABLET, FILM COATED ORAL at 09:09

## 2019-09-17 RX ADMIN — DEXAMETHASONE SODIUM PHOSPHATE 4 MG: 4 INJECTION, SOLUTION INTRAMUSCULAR; INTRAVENOUS at 05:09

## 2019-09-17 RX ADMIN — CEFTRIAXONE SODIUM 2 G: 2 INJECTION, SOLUTION INTRAVENOUS at 12:09

## 2019-09-17 RX ADMIN — PROPOFOL 50 MCG/KG/MIN: 10 INJECTION, EMULSION INTRAVENOUS at 12:09

## 2019-09-17 RX ADMIN — PROPOFOL 50 MCG/KG/MIN: 10 INJECTION, EMULSION INTRAVENOUS at 04:09

## 2019-09-17 RX ADMIN — VALPROIC ACID 750 MG: 250 SOLUTION ORAL at 08:09

## 2019-09-17 RX ADMIN — DEXAMETHASONE SODIUM PHOSPHATE 4 MG: 4 INJECTION, SOLUTION INTRAMUSCULAR; INTRAVENOUS at 12:09

## 2019-09-17 RX ADMIN — LEVOTHYROXINE SODIUM 75 MCG: 75 TABLET ORAL at 05:09

## 2019-09-17 RX ADMIN — HEPARIN SODIUM 5000 UNITS: 5000 INJECTION, SOLUTION INTRAVENOUS; SUBCUTANEOUS at 02:09

## 2019-09-17 RX ADMIN — VALPROIC ACID 750 MG: 250 SOLUTION ORAL at 04:09

## 2019-09-17 RX ADMIN — CHLORHEXIDINE GLUCONATE 0.12% ORAL RINSE 15 ML: 1.2 LIQUID ORAL at 09:09

## 2019-09-17 RX ADMIN — PROPOFOL 40 MCG/KG/MIN: 10 INJECTION, EMULSION INTRAVENOUS at 09:09

## 2019-09-17 RX ADMIN — HEPARIN SODIUM 5000 UNITS: 5000 INJECTION, SOLUTION INTRAVENOUS; SUBCUTANEOUS at 05:09

## 2019-09-17 RX ADMIN — VALPROIC ACID 750 MG: 250 SOLUTION ORAL at 12:09

## 2019-09-17 RX ADMIN — CEFTRIAXONE SODIUM 2 G: 2 INJECTION, SOLUTION INTRAVENOUS at 11:09

## 2019-09-17 RX ADMIN — PROPOFOL 50 MCG/KG/MIN: 10 INJECTION, EMULSION INTRAVENOUS at 08:09

## 2019-09-17 RX ADMIN — DEXAMETHASONE SODIUM PHOSPHATE 4 MG: 4 INJECTION, SOLUTION INTRAMUSCULAR; INTRAVENOUS at 07:09

## 2019-09-17 RX ADMIN — SENNOSIDES AND DOCUSATE SODIUM 1 TABLET: 8.6; 5 TABLET ORAL at 09:09

## 2019-09-17 RX ADMIN — HEPARIN SODIUM 5000 UNITS: 5000 INJECTION, SOLUTION INTRAVENOUS; SUBCUTANEOUS at 09:09

## 2019-09-17 NOTE — PROGRESS NOTES
[facility]  Neurosurgery  Progress Note    Subjective:     History of Present Illness: 73 y/o female with pmhx CKD and HTN  presented to outside hospital for seizure- like activity this AM. Per  pt was found unconscious this AM, with seizure like activity occurring on the left side. Pt was given versed x 2 via EMS. CTH performed at outside hospital was negative for bleed. Pt had additional seizure in outside hospital ED and was given IV Keppra. EEG ordered and MRI brain w/out contrast concerning for possible infiltratrive process vs post ischemic sequela. Pt transferred to [facility] and admitted to [facility]. MRI brain w/ and w/out obtained that showed large area of edema in right frontal lobe and ill defined enhancement in the frontal lobe concerning for possible cerebritis vs. Neoplasm. NSGY was consulted to evaluate. Pt not on anti-coagulation.       Post-Op Info:  Procedure(s) (LRB):  EGD (ESOPHAGOGASTRODUODENOSCOPY) (N/A)   1 Day Post-Op     Interval History: EGD yesterday for esophageal dilation in preparation for FEDERICO. CSF remains NGTD. Continues on Prop gtt for sedation.     Medications:  Continuous Infusions:   insulin (HUMAN R) infusion (adults) Stopped (09/16/19 0505)    norepinephrine bitartrate-D5W 0.3 mcg/kg/min (09/16/19 1030)    propofol 35 mcg/kg/min (09/16/19 1001)     Scheduled Meds:   cefTRIAXone (ROCEPHIN) IVPB  2 g Intravenous Q12H    chlorhexidine  15 mL Mouth/Throat BID    dexamethasone  4 mg Intravenous Q6H    famotidine  20 mg Per OG tube QHS    heparin (porcine)  5,000 Units Subcutaneous Q8H    lacosamide  100 mg Per OG tube Q12H    levothyroxine  75 mcg Per OG tube Before breakfast    QUEtiapine  75 mg Per OG tube BID    senna-docusate 8.6-50 mg  1 tablet Per OG tube BID    valproic acid (as sodium salt)  750 mg Per OG tube Q8H     PRN Meds:acetaminophen, Dextrose 10% Bolus, fentaNYL, fentaNYL, glucagon (human recombinant), hydrALAZINE, magnesium oxide,  magnesium oxide, midazolam, OLANZapine, potassium chloride 10%, potassium chloride 10%, potassium, sodium phosphates, potassium, sodium phosphates, potassium, sodium phosphates, racepinephrine, sodium chloride 0.9%, sodium chloride 0.9%     Review of Systems  Objective:     Weight: 78.6 kg (173 lb 4.5 oz)  Body mass index is 30.7 kg/m².  Vital Signs (Most Recent):  Temp: 99 °F (37.2 °C) (09/16/19 0701)  Pulse: (!) 52 (09/16/19 1001)  Resp: 12 (09/16/19 1001)  BP: (!) 105/51 (09/16/19 1001)  SpO2: 100 % (09/16/19 1001) Vital Signs (24h Range):  Temp:  [98.3 °F (36.8 °C)-99 °F (37.2 °C)] 99 °F (37.2 °C)  Pulse:  [] 52  Resp:  [12-30] 12  SpO2:  [96 %-100 %] 100 %  BP: ()/() 105/51  Arterial Line BP: ()/(37-80) 125/50     Date 09/16/19 0700 - 09/17/19 0659   Shift 3817-7797 0357-2540 7284-2230 24 Hour Total   INTAKE   I.V.(mL/kg) 238.3(3)   238.3(3)   NG/GT 0   0   Shift Total(mL/kg) 238.3(3)   238.3(3)   OUTPUT   Urine(mL/kg/hr) 600   600   Shift Total(mL/kg) 600(7.6)   600(7.6)   Weight (kg) 78.6 78.6 78.6 78.6              Vent Mode: A/C  Oxygen Concentration (%):  [40] 40  Resp Rate Total:  [12 br/min-26 br/min] 12 br/min  Vt Set:  [380 mL] 380 mL  PEEP/CPAP:  [5 cmH20] 5 cmH20  Pressure Support:  [0 cmH20] 0 cmH20  Mean Airway Pressure:  [2.4 cmH20-10 cmH20] 7.9 cmH20         NG/OG Tube 09/03/19 1100 orogastric Center mouth (Active)   Placement Check placement verified by aspirate characteristics;placement verified by distal tube length measurement 9/16/2019  7:01 AM   Tolerance no signs/symptoms of discomfort 9/16/2019  7:01 AM   Securement secured to nostril center w/ adhesive device 9/16/2019  7:01 AM   Clamp Status/Tolerance clamped 9/16/2019  7:01 AM   Suction Setting/Drainage Method suction at the bedside 9/13/2019  3:05 AM   Insertion Site Appearance no redness, warmth, tenderness, skin breakdown, drainage 9/16/2019  7:01 AM   Flush/Irrigation flushed w/;water;no resistance met  9/16/2019  7:01 AM   Feeding Method continuous 9/11/2019  3:05 AM   Feeding Action feeding restarted 9/16/2019  7:01 AM   Current Rate (mL/hr) 0 mL/hr 9/14/2019  7:01 AM   Goal Rate (mL/hr) 45 mL/hr 9/15/2019  7:01 AM   Intake (mL) 50 mL 9/12/2019  9:05 AM   Water Bolus (mL) 250 mL 9/14/2019  7:01 AM   Rate Formula Tube Feeding (mL/hr) 45 mL/hr 9/9/2019  7:05 PM   Formula Name isosource 9/10/2019  3:00 PM   Intake (mL) - Formula Tube Feeding 0 9/16/2019  9:01 AM   Residual Amount (ml) 0 ml 9/11/2019  4:00 PM       Female External Urinary Catheter 09/15/19 0537 (Active)   Skin perineum cleansed w/ soap and water 9/16/2019  7:01 AM   Tolerance no signs/symptoms of discomfort 9/16/2019  7:01 AM   Suction Continuous suction at 60 mmHg 9/15/2019  7:01 AM   Date of last wick change 09/15/19 9/15/2019  7:01 AM   Time of last wick change 0711 9/15/2019  7:01 AM   Output (mL) 600 mL 9/16/2019  9:01 AM       Neurosurgery Physical Exam  E1VTM5, sedated   PERRL  +c/c/g  Localizing in RUE, RLE  Withdrawing in LUE, LLE  SILT    Significant Labs:  Recent Labs   Lab 09/15/19  0116 09/16/19  0110   * 139*   * 141   K 4.5 4.6    104   CO2 26 27   BUN 27* 27*   CREATININE 1.1 1.0   CALCIUM 8.1* 8.9   MG 2.3 2.3     Recent Labs   Lab 09/15/19  0116 09/16/19  0110   WBC 13.64* 11.98   HGB 10.6* 11.3*   HCT 32.6* 34.4*    194     No results for input(s): LABPT, INR, APTT in the last 48 hours.  Microbiology Results (last 7 days)     Procedure Component Value Units Date/Time    CSF culture [[id number]] Collected:  09/11/19 1355    Order Status:  Completed Specimen:  CSF (Spinal Fluid) from CSF Tap, Tube 3 Updated:  09/16/19 0650     CSF CULTURE No Growth     Gram Stain Result Cytospin indicates:      Rare WBC's      No organisms seen    Blood culture [[id number]] Collected:  09/11/19 1811    Order Status:  Completed Specimen:  Blood from Peripheral, Hand, Left Updated:  09/15/19 2012     Blood Culture, Routine No  Growth to date      No Growth to date      No Growth to date      No Growth to date      No Growth to date    Blood culture [[id number]] Collected:  09/08/19 1210    Order Status:  Completed Specimen:  Blood from Peripheral, Ankle, Left Updated:  09/13/19 1412     Blood Culture, Routine No growth after 5 days.    Blood culture [[id number]]  (Abnormal) Collected:  09/08/19 1215    Order Status:  Completed Specimen:  Blood from Peripheral, Forearm, Left Updated:  09/12/19 1031     Blood Culture, Routine Gram stain brian bottle: Gram positive cocci       Results called to and read back by:Milagro Valencia RN  09/09/2019  13:48      Gram stain aer bottle: Gram positive cocci 09/10/2019  15:46      GEMELLA (S.) MORBILLORUM  Susceptibility testing not routinely performed      Blood culture [[id number]]     Order Status:  Sent Specimen:  Blood     Gram stain [[id number]] Collected:  09/11/19 1355    Order Status:  Canceled Specimen:  CSF (Spinal Fluid) from CSF Tap, Tube 3     Culture, Respiratory with Gram Stain [[id number]]  (Abnormal)  (Susceptibility) Collected:  09/08/19 1158    Order Status:  Completed Specimen:  Respiratory from Tracheal Aspirate Updated:  09/11/19 0934     Respiratory Culture No S aureus or Pseudomonas isolated.      ESCHERICHIA COLI  Few  Normal respiratory connie also present       Gram Stain (Respiratory) Few WBC's     Gram Stain (Respiratory) Moderate Gram negative rods     Gram Stain (Respiratory) Rare Gram positive cocci    Blood culture [[id number]]     Order Status:  Canceled Specimen:  Blood     Blood culture [[id number]]     Order Status:  Canceled Specimen:  Blood             Significant Diagnostics:  X-ray Chest 1 View    Result Date: 9/17/2019  As above Electronically signed by: Nahun Kilpatrick MD Date:    09/17/2019 Time:    06:38    X-ray Abdomen Ap 1 View    Result Date: 9/16/2019  See above. Electronically signed by: Kemi Ortiz MD Date:    09/16/2019 Time:    18:27    Us Upper Extremity Veins  Left    Result Date: 9/16/2019  Deep vein thrombus in 1 of the left brachial veins. Superficial thrombophlebitis of the left cephalic vein. This report was flagged in Epic as abnormal. Electronically signed by: Murphy Chairez MD Date:    09/16/2019 Time:    15:29      Assessment/Plan:     New onset seizure  73 y/o female with pmhx CKD and HTN presented to outside hospital for seizure- like activity occurring on the left side. Found to have area of ill-defined enhancement on MRI brain w/ and w/out.  Most recent EEG showing bilateral PLEDs left greater than right.    -q 1 hr neuro checks  -Fu epilepsy recs  -Fu infectious rodriguez, CSF NGTD, FEDERICO when possible  -Continue steroids for now  -WTE when medically stable.  -Angio reviewed, no evidence of vasculitis.  -Further care per NCC, will follow.          Chica Starks MD  Neurosurgery  [facility]

## 2019-09-17 NOTE — SUBJECTIVE & OBJECTIVE
Past Medical History:   Diagnosis Date    Depression     has been on tofranil for years;    Disorder of kidney and ureter     Esophageal stricture 06/12/2019    per pt per Dr. Garcia    Hyperlipemia     Hypertension     Hypothyroidism     Osteopenia     Thyroid disease     hypothyroid       Past Surgical History:   Procedure Laterality Date    cataract surgery      CHOLECYSTECTOMY  04/24/2018    CHOLECYSTECTOMY-LAPAROSCOPIC N/A 4/24/2018    Performed by Marina [facility]ian [facility] at [facility]    COLONOSCOPY  2011, again in 2014    repeat in 5    COLONOSCOPY W/ BIOPSIES  06/12/2019    polypectomies per Dr. Garcia    ESOPHAGOGASTRODUODENOSCOPY  06/12/2019    Dr. Garcia    HYSTERECTOMY      OOPHORECTOMY      TONSILLECTOMY         Review of patient's allergies indicates:  No Known Allergies    No current facility-administered medications on file prior to encounter.      No current outpatient medications on file prior to encounter.     Family History     Problem Relation (Age of Onset)    Heart disease Mother    Hypertension Mother    Stroke Father        Tobacco Use    Smoking status: Never Smoker    Smokeless tobacco: Never Used   Substance and Sexual Activity    Alcohol use: No     Frequency: Never     Binge frequency: Never    Drug use: No    Sexual activity: Not Currently     Partners: Male     Birth control/protection: Surgical     Review of Systems   Unable to perform ROS: intubated     Objective:     Vital Signs (Most Recent):  Temp: 98.9 °F (37.2 °C) (09/17/19 0700)  Pulse: 80 (09/17/19 1344)  Resp: (!) 23 (09/17/19 1344)  BP: (!) 177/71 (09/17/19 0920)  SpO2: 100 % (09/17/19 1344) Vital Signs (24h Range):  Temp:  [98.4 °F (36.9 °C)-99.3 °F (37.4 °C)] 98.9 °F (37.2 °C)  Pulse:  [61-95] 80  Resp:  [12-33] 23  SpO2:  [97 %-100 %] 100 %  BP: ()/(46-74) 177/71  Arterial Line BP: (101-188)/(41-81) 183/77     Weight: 78.6 kg (173 lb 4.5 oz)  Body mass index is 30.7 kg/m².    SpO2: 100 %  O2  Device (Oxygen Therapy): ventilator      Intake/Output Summary (Last 24 hours) at 9/17/2019 1359  Last data filed at 9/17/2019 1245  Gross per 24 hour   Intake 421.75 ml   Output 2700 ml   Net -2278.25 ml       Lines/Drains/Airways     Peripherally Inserted Central Catheter Line                 PICC Double Lumen 09/14/19 1136 right brachial 3 days          Drain                 Rectal Tube 09/15/19 0900 rectal tube w/ balloon (indicate number of mLs) 2 days    Female External Urinary Catheter 09/15/19 0537 2 days         NG/OG Tube 09/16/19 1805 Center mouth less than 1 day          Airway                 Airway - Non-Surgical 09/06/19 0915 Endotracheal Tube 11 days          Peripheral Intravenous Line                 Peripheral IV - Single Lumen 09/13/19 1730 20 G Anterior;Left Ankle 3 days                Physical Exam   Constitutional: She appears well-developed and well-nourished. She is sedated and intubated.   HENT:   Head: Normocephalic and atraumatic.   Neck: Normal range of motion. Neck supple.   Cardiovascular: Normal rate, regular rhythm, normal heart sounds and intact distal pulses.   Pulses:       Radial pulses are 2+ on the right side, and 2+ on the left side.   Pulmonary/Chest: Breath sounds normal. She is intubated.   Symmetrical expansion   Abdominal: Soft. Bowel sounds are normal. There is no hepatosplenomegaly.   Musculoskeletal: She exhibits no edema.   Skin: Skin is warm and dry. No rash noted. She is not diaphoretic.

## 2019-09-17 NOTE — PLAN OF CARE
Problem: Infection  Goal: Infection Symptom Resolution  Outcome: Ongoing (interventions implemented as appropriate)  Monitor for s/s of infection; administer antibiotics as prescribed; FEDERICO today to r/o endocarditis; neuro checks q1hr; will continue to monitor.

## 2019-09-17 NOTE — ASSESSMENT & PLAN NOTE
Daily CXR, ABG  Continue Peridex, Famotidine, Heparin subq  Trial of extubation 9/6/2019- re-intubated shortly after extubation for airway protection  Continue to wean vent settings as tolerated  Still intubated 2/2 inability to protect airway     Vent Mode: A/C  Oxygen Concentration (%):  [40] 40  Resp Rate Total:  [12 br/min-18 br/min] 14 br/min  Vt Set:  [380 mL] 380 mL  PEEP/CPAP:  [5 cmH20] 5 cmH20  Pressure Support:  [0 cmH20] 0 cmH20  Mean Airway Pressure:  [8.2 cmH20-9.7 cmH20] 8.4 cmH20   Daily CXR/ABG

## 2019-09-17 NOTE — HPI
This is a 75yo woman here for evaluation of cardio-embolic sources of her stroke via FEDERICO. Has a history of depression, obstructive uropathy with normal renal function, schatzki ring (now dilated on EGD yesterday), HLD, HTN, Hypothyroidism, Osteopenia, and Thyroid disease. Who is here after presenting with seizures, having a finding of MCA stroke with normal angiogram and no evident source/risk factors for ischemic stroke as well as a gram-negative bacteremia. ID suggests FEDERICO to rule out cardio-embolic etiology.     Dysphagia or odynophagia:  Yes, had EGD  Liver Disease, esophageal disease, or known varices:  None found on chart review  Upper GI Bleeding: None found on chart review  Snoring:  Intubated  Sleep Apnea:  Intubated  Prior neck surgery or radiation: None  History of anesthetic difficulties:  Sedated and intubated  Last oral intake:  Intubated, NPO  Able to move neck in all directions:  Yes per prior exams  Anticoagulation/Antiplatelets: DVT ppx with heparin    Platelet count:   Lab Results   Component Value Date     09/17/2019     Hgb:  Lab Results   Component Value Date    HGB 10.3 (L) 09/17/2019      INR:  Lab Results   Component Value Date    INR 0.9 09/07/2019    INR 1.0 09/03/2019     TT ECHO:  Transthoracic echo (TTE) complete (Cupid Only):   Results for orders placed or performed during the hospital encounter of 09/03/19   Echo   Result Value Ref Range    Ascending aorta 2.90 cm    STJ 2.50 cm    AV mean gradient 4 mmHg    Ao peak adi 1.33 m/s    Ao VTI 25.52 cm    IVS 0.79 0.6 - 1.1 cm    LA size 3.08 cm    Left Atrium Major Axis 4.30 cm    Left Atrium Minor Axis 4.12 cm    LVIDD 2.79 (A) 3.5 - 6.0 cm    LVIDS 1.93 (A) 2.1 - 4.0 cm    LVOT diameter 1.84 cm    LVOT peak VTI 20.39 cm    PW 0.71 0.6 - 1.1 cm    MV Peak A Adi 1.08 m/s    E wave decelartion time 276.66 msec    MV Peak E Adi 0.89 m/s    RA Major Axis 3.27 cm    RA Width 2.16 cm    RVDD 2.58 cm    Sinus 2.22 cm    TAPSE 1.81 cm     TDI LATERAL 0.08 m/s    TDI SEPTAL 0.07 m/s    LA WIDTH 2.90 cm    LV Diastolic Volume 29.24 mL    LV Systolic Volume 11.65 mL    LVOT peak tanner 1.04 m/s    LV LATERAL E/E' RATIO 11.13 m/s    LV SEPTAL E/E' RATIO 12.71 m/s    FS 31 %    LA volume 31.95 cm3    LV mass 48.22 g    Left Ventricle Relative Wall Thickness 0.51 cm    AV valve area 2.12 cm2    AV Velocity Ratio 0.78     AV index (prosthetic) 0.80     E/A ratio 0.82     Mean e' 0.08 m/s    LVOT area 2.7 cm2    LVOT stroke volume 54.19 cm3    AV peak gradient 7 mmHg    E/E' ratio 11.87 m/s    LV Systolic Volume Index 6.7 mL/m2    LV Diastolic Volume Index 16.90 mL/m2    LA Volume Index 18.5 mL/m2    LV Mass Index 28 g/m2    BSA 1.76 m2    Narrative    · Normal left ventricular systolic function. The estimated ejection   fraction is 65-70%  · No wall motion abnormalities.  · Normal LV diastolic function.  · Concentric left ventricular remodeling.  · Normal right ventricular systolic function.          EGD:   - Small hiatal hernia.                        - LA Grade A reflux esophagitis.                        - Mild Schatzki ring. Dilated.                        - Non-bleeding erosive gastropathy.                        - Normal examined duodenum.                        - No specimens collected.  Recommendation:       - [facility] care.                        - Resume previous diet.                        - Continue present medications.    Discussed with on-call GI fellow, ok to proceed with FEDERICO.

## 2019-09-17 NOTE — ASSESSMENT & PLAN NOTE
1. FEDERICO for evaluation of cardio-embolic source of stroke in the setting of gram negative bacteremia    -No absolute contraindications of esophageal stricture, tumor, perforation, laceration,or diverticulum and/or active GI bleed  -The risks, benefits & alternatives of the procedure were explained to the patient.   -The risks of transesophageal echo include but are not limited to:  Dental trauma, esophageal trauma/perforation, bleeding, laryngospasm/brochospasm, aspiration, sore throat/hoarseness, & dislodgement of the endotracheal tube/nasogastric tube (where applicable).    -The risks of moderate sedation include hypotension, respiratory depression, arrhythmias, bronchospasm, & death.    -Informed consent was obtained. The patient is agreeable to proceed with the procedure and all questions and concerns addressed.    Case discussed with an attending in echocardiography lab.     Further recommendations per attending addendum

## 2019-09-17 NOTE — SUBJECTIVE & OBJECTIVE
Interval History: Patient remains intubated. EGD s/p dilation done yesterday. FEDERICO today  Review of Systems   Unable to perform ROS: Intubated     Objective:     Vital Signs (Most Recent):  Temp: (P) 99.3 °F (37.4 °C) (09/17/19 1100)  Pulse: 79 (09/17/19 1400)  Resp: (!) 22 (09/17/19 1400)  BP: (!) 143/63 (09/17/19 1400)  SpO2: 100 % (09/17/19 1400) Vital Signs (24h Range):  Temp:  [98.4 °F (36.9 °C)-99.3 °F (37.4 °C)] (P) 99.3 °F (37.4 °C)  Pulse:  [61-95] 79  Resp:  [12-33] 22  SpO2:  [97 %-100 %] 100 %  BP: ()/(46-74) 143/63  Arterial Line BP: (101-183)/(41-77) 183/77     Weight: 78.6 kg (173 lb 4.5 oz)  Body mass index is 30.7 kg/m².    Estimated Creatinine Clearance: 52.8 mL/min (based on SCr of 0.9 mg/dL).    Physical Exam   Constitutional: She appears well-developed and well-nourished. No distress.   intubated   HENT:   Head: Normocephalic and atraumatic.   Eyes: Pupils are equal, round, and reactive to light. EOM are normal.   Neck: Normal range of motion. Neck supple. No JVD present. No thyromegaly present.   Cardiovascular: Normal rate, regular rhythm, normal heart sounds and intact distal pulses. Exam reveals no gallop and no friction rub.   No murmur heard.  Pulmonary/Chest: Effort normal and breath sounds normal. No stridor. No respiratory distress. She has no wheezes. She has no rales. She exhibits no tenderness.   Abdominal: Soft. Bowel sounds are normal. She exhibits no distension. There is no tenderness. There is no guarding.   Neurological: She displays normal reflexes.   Skin: Skin is warm and dry. Capillary refill takes less than 2 seconds. She is not diaphoretic.       Significant Labs:   Microbiology Results (last 7 days)     Procedure Component Value Units Date/Time    Blood culture [[id number]] Collected:  09/11/19 1811    Order Status:  Completed Specimen:  Blood from Peripheral, Hand, Left Updated:  09/16/19 2012     Blood Culture, Routine No growth after 5 days.    CSF culture  [[id number]] Collected:  09/11/19 1355    Order Status:  Completed Specimen:  CSF (Spinal Fluid) from CSF Tap, Tube 3 Updated:  09/16/19 0650     CSF CULTURE No Growth     Gram Stain Result Cytospin indicates:      Rare WBC's      No organisms seen    Blood culture [[id number]] Collected:  09/08/19 1210    Order Status:  Completed Specimen:  Blood from Peripheral, Ankle, Left Updated:  09/13/19 1412     Blood Culture, Routine No growth after 5 days.    Blood culture [[id number]]  (Abnormal) Collected:  09/08/19 1215    Order Status:  Completed Specimen:  Blood from Peripheral, Forearm, Left Updated:  09/12/19 1031     Blood Culture, Routine Gram stain brian bottle: Gram positive cocci       Results called to and read back by:Milagro Valencia RN  09/09/2019  13:48      Gram stain aer bottle: Gram positive cocci 09/10/2019  15:46      GEMELLA (S.) MORBILLORUM  Susceptibility testing not routinely performed      Blood culture [[id number]]     Order Status:  Sent Specimen:  Blood     Gram stain [[id number]] Collected:  09/11/19 1355    Order Status:  Canceled Specimen:  CSF (Spinal Fluid) from CSF Tap, Tube 3     Culture, Respiratory with Gram Stain [[id number]]  (Abnormal)  (Susceptibility) Collected:  09/08/19 1158    Order Status:  Completed Specimen:  Respiratory from Tracheal Aspirate Updated:  09/11/19 0934     Respiratory Culture No S aureus or Pseudomonas isolated.      ESCHERICHIA COLI  Few  Normal respiratory connie also present       Gram Stain (Respiratory) Few WBC's     Gram Stain (Respiratory) Moderate Gram negative rods     Gram Stain (Respiratory) Rare Gram positive cocci    Blood culture [[id number]]     Order Status:  Canceled Specimen:  Blood     Blood culture [[id number]]     Order Status:  Canceled Specimen:  Blood           Significant Imaging: I have reviewed all pertinent imaging results/findings within the past 24 hours.

## 2019-09-17 NOTE — PLAN OF CARE
Problem: Adult Inpatient Plan of Care  Goal: Plan of Care Review  Outcome: Ongoing (interventions implemented as appropriate)  Plan of care reviewed with  and son.All questions and concerns addressed.Propofol drip infusing to keep pt comfortable.No acute distress noted.

## 2019-09-17 NOTE — ASSESSMENT & PLAN NOTE
2/2 to brain mass   9/11  EEG noted encephalopathy, no electrographic epileptiform.   Vimpat 100mg q12h  Valproic acid 750mg q8h, valproate level pending  Epilepsy following

## 2019-09-17 NOTE — ASSESSMENT & PLAN NOTE
75 y/o female admitted with a bebo lesions. 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  She is currently on IV ceftriaxone.  Blood cultures have cleared.  Recommend FEDERICO to determine if this could be an endocarditis with subsequent embolic phenomena to the brain. S/p EGD clearance & dilation for FEDERICO today.     Recommendations:   -FEDERICO done today, will follow up with results  -If negative for valve vegetations, anticipate 2 week course of IV ceftriaxone  -Additionally may need brain biopsy if workup for her acute mental status remain inconclusive

## 2019-09-17 NOTE — PROGRESS NOTES
FEDERICO performed at bedside per Dr Britton and Jeremias from Cardiology.Pt tolerated procedure well.Cardiology team will explain finding to pt's  and son.

## 2019-09-17 NOTE — PROGRESS NOTES
Follow up on tongue injury         Able to visualize the tongue today and wounds continue to resolve . Scattered lesions are red, clean and healing .   Nurse denies any other skin concern at this time.  Abelino Tejada RN CWON  [id number]

## 2019-09-17 NOTE — PROGRESS NOTES
[facility]  Neurocritical Care  Progress Note    Admit Date: 9/3/2019  Service Date: 09/17/2019  Length of Stay: 14    Subjective:     Chief Complaint: Brain lesion    History of Present Illness: Pt is  75 yo female with a PMHx of CKD 3, HTN, was transferred from [facility] to [facility] for new onset seizures and concern for right front lobe mass. The pt was found in her bedroom by her spouse at approximately 9:45 pm sitting her head against the bed, unresponsive, and having seizure like activity on the left-side. EMT was called and the pt was given Versed twice while en route to the hospital. Pt had a second witnessed seizures, left facial droop, left-sided weakness, and tongue ecchymosis in the ED. Neurology was consulted and The pt was given IV Keppra and Vimpat. MRI brain without contrast was acquired. The image showed right frontal lobe vasogenic edema with mass effect concerning for underlying mass. EEG was acquired at outside hospital concerning showing an abnormal result. The pt was given decadron IV and neurosurgical consult recommended. Pt transferred to [facility] and admitted to the [facility] for higher level of care and further evaluation.     Pt history acquired per chart review and from spouse present at the bedside. The pt's spouse denies prior history of seizures CVA, cancer history and up-to-date screenings    Hospital Course: --admitted to [facility] on 9/3 following new onset seizure, arrived intubated  --MRI with large area of R cortical edema with flair and ill defined enhancement on contrast study, concern for glioma vs infectious/inflammatory process  --LP appears non infectious, cytology pending  9/5- no acute events, awaiting LP finalization from pathology report., weaning vent.   09/06/2019: Very agitated overnight, requiring increased sedation. Patient extubated this morning on rounds, and reintubated shortly after. Unable to maintain airway and secretions. CSF gram stain negative.  09/07/2019: KATT.  EKG obtained, seroquel started. Valproic acid started.  09/08/2019: NAEON. Improved agitation with seroquel.  09/09/2019: MRI Brain w/wo ordered for today. BLE US ordered. Will need new LP later on in the week.   09/10/2019 LP for infx workup, CD4 lab, EEG monitoring per Epilepsy   09/11/2019: To IR for LP. Spoke to NSGY about possible bx of lesion. Discontinue cEEG.   9/13/2019 Issues with low HR and BP overnight and this morning, Given Shoaib bump and atropine. 24 hours of IVF and bolus. WBC decreasing and afebrile. Repeat blood cultures negative. Plan for EGD and FEDERICO today.   9/14/2019 NAEO, EGD and FEDERICO moved until Monday. CSF still pending. Increasing WBC but afebrile. Start tube feeding   9/15/2019 NAEO, all CSF studies negative so far. CT negative. Begin insulin gtt. EGD and FEDERICO tomorrow    9/16 No significant events over night. EGD done this afternoon for esophageal stricture. Esophagus stretched to 15. FEDERICO ordered for tomorrow.   9/17 No significant vents over night NPO for FEDERICO today scheduled for 3pm. Off levophed.      Interval History: No significant vents over night NPO for FEDERICO today scheduled for 3pm. Off levophed.      Review of Systems:  Unable to obtain a complete ROS due to level of consciousness.     Vitals:   Temp: (P) 99.3 °F (37.4 °C)  Pulse: 79  BP: (!) 143/63  MAP (mmHg): 91  Resp: (!) 22  SpO2: 100 %  Oxygen Concentration (%): 40  O2 Device (Oxygen Therapy): ventilator  Vent Mode: A/C  Set Rate: 12 bmp  Vt Set: 380 mL  Pressure Support: 0 cmH20  PEEP/CPAP: 5 cmH20  Peak Airway Pressure: 23 cmH2O  Mean Airway Pressure: 8.4 cmH20  Plateau Pressure: 0 cmH20    Temp  Min: 98.4 °F (36.9 °C)  Max: 99.3 °F (37.4 °C)  Pulse  Min: 72  Max: 95  BP  Min: 121/58  Max: 177/71  MAP (mmHg)  Min: 82  Max: 103  Resp  Min: 14  Max: 33  SpO2  Min: 97 %  Max: 100 %  Oxygen Concentration (%)  Min: 40  Max: 40    09/16 0701 - 09/17 0700  In: 770.4 [I.V.:770.4]  Out: 2600 [Urine:2300]   Unmeasured Output  Urine  Occurrence: 1  Stool Occurrence: 0  Emesis Occurrence: 0  Pad Count: 1     Examination:   Constitutional: Well-nourished and -developed. No apparent distress.   Eyes: Conjunctiva clear, anicteric. Lids no lesions.  Head/Ears/Nose/Mouth/Throat/Neck: Moist mucous membranes. External ears, nose atraumatic.   Cardiovascular: Regular rhythm. No murmurs. No leg edema.  Respiratory: Mech. Ventilation. Bibasilar breath sounds diminished  Gastrointestinal: No hernia. Soft, nondistended, nontender. + bowel sounds.     Neurologic:  -GCS E2V1M5  -On propofol gtt. Intubated. Does not follow commands  -Cranial nerves opens eyes to pain PERRL3+ + cough gag corneals  -Motor spontaneous ly moves RUE/RLE and LLE no movement to LUE     Unable to test orientation, language, memory, judgment, insight, fund of knowledge, hearing, shoulder shrug, tongue protrusion, coordination, gait due to level of consciousness.      Medications:   Continuous  propofol Last Rate: 50 mcg/kg/min (09/17/19 1400)   Scheduled  cefTRIAXone (ROCEPHIN) IVPB 2 g Q12H   chlorhexidine 15 mL BID   dexamethasone 4 mg Q6H   famotidine 20 mg QHS   heparin (porcine) 5,000 Units Q8H   lacosamide 100 mg Q12H   levothyroxine 75 mcg Before breakfast   QUEtiapine 75 mg BID   senna-docusate 8.6-50 mg 1 tablet BID   valproic acid (as sodium salt) 750 mg Q8H   PRN  acetaminophen 650 mg Q6H PRN   Dextrose 10% Bolus 12.5 g PRN   fentaNYL 100 mcg Q2H PRN   fentaNYL 50 mcg Q2H PRN   glucagon (human recombinant) 1 mg PRN   hydrALAZINE 10 mg Q6H PRN   insulin aspart U-100 1-10 Units Q6H PRN   magnesium oxide 800 mg PRN   magnesium oxide 800 mg PRN   OLANZapine 5 mg Q12H PRN   potassium chloride 10% 40 mEq PRN   potassium chloride 10% 40 mEq PRN   potassium, sodium phosphates 2 packet PRN   potassium, sodium phosphates 2 packet PRN   potassium, sodium phosphates 2 packet PRN   racepinephrine 0.5 mL Q4H PRN   sodium chloride 0.9% 10 mL PRN   sodium chloride 0.9% 10 mL PRN      Today I  independently reviewed pertinent medications, lines/drains/airways, imaging, laboratory results, microbiology results, notably:     ISTAT: Recent Labs   Lab 09/17/19  0331   PH 7.569*   PCO2 39.8   PO2 83   POCSATURATED 98   HCO3 36.4*   BE 14   POCTCO2 38*   SAMPLE ARTERIAL      Chem: Recent Labs   Lab 09/17/19  0235      K 4.6      CO2 32*   *   BUN 17   CREATININE 0.9   ESTGFRAFRICA >60.0   EGFRNONAA >60.0   CALCIUM 8.5*   MG 2.0   PHOS 3.2   ANIONGAP 8   PROT 5.2*   ALBUMIN 2.6*   BILITOT 0.2   ALKPHOS 40*   AST 17   ALT 45*     Heme: Recent Labs   Lab 09/17/19  0235   WBC 5.79   HGB 10.3*   HCT 33.6*        Endo:   Recent Labs   Lab 09/16/19  2358 09/17/19  0622 09/17/19  1233   POCTGLUCOSE 114* 131* 134*      Assessment/Plan:     Neuro  * Brain lesion  74 year old admitted to unit on 9/3 following new onset seizure, with MRI showing large area of R cortical edema with flair and ill defined enhancement on contrast study, concern for glioma vs infectious/inflammatory process  -  repeat BC negative   - CSF cx no growth,   - CSF studies negative- VZV, enterovirus, HSV, MS,    - ID following   - may need biopsy  - continue decadron 4mg q6h  - NSGY, epilepsy following   - neuro checks q1hr   - vital signs q1hr   -on SubQ heparin   -Repeat CT head stable       Cerebral edema  --continue decadron, see brain lesion  Monitor seial imaging  Monitor neuro exam    New onset seizure  2/2 to brain mass   9/11  EEG noted encephalopathy, no electrographic epileptiform.   Vimpat 100mg q12h  Valproic acid 750mg q8h, valproate level pending  Epilepsy following          Pulmonary  On mechanically assisted ventilation  Daily CXR, ABG  Continue Peridex, Famotidine, Heparin subq  Trial of extubation 9/6/2019- re-intubated shortly after extubation for airway protection  Continue to wean vent settings as tolerated  Still intubated 2/2 inability to protect airway     Vent Mode: A/C  Oxygen Concentration (%):   [40] 40  Resp Rate Total:  [12 br/min-18 br/min] 14 br/min  Vt Set:  [380 mL] 380 mL  PEEP/CPAP:  [5 cmH20] 5 cmH20  Pressure Support:  [0 cmH20] 0 cmH20  Mean Airway Pressure:  [8.2 cmH20-9.7 cmH20] 8.4 cmH20   Daily CXR/ABG    Cardiac/Vascular  Essential hypertension  SBP <180, MAP >65   - on Levo gtt to keep MAP >65  Echo: EF 65-70% concentric LV remodeling  EGD performed 9/15 to stretch esophagus to 15 for FEDERICO tomorrow.  FEDERICO:  No vegetations present on any valves. No thombus in left atrium     and left atrial appendage. No ASD or PFO noted.      Normal left ventricular systolic function.      No interatrial septal defect present by color flow doppler.      Normal appearing left atrial appendage. Chicken-wing appendage:       Os is 1.5x1.2 cm, depth from os is 1.7 cm, depth of second    segment is 0.8-1.0cm No thrombus is present in the appendage.    Abnormal appendage velocities.     Grade 2 plaque present in the descending aorta.   Normal LV diastolic function.    Renal/  Chronic kidney disease, stage III (moderate)  -PMHx of CKD 3   -monitor renal function, I/Os       ID  Bacteremia  -Blood culture + genella morbillorum  Continue ceftriaxone 2g q12h    Oncology  Leukocytosis  Afebrile, WBC  WNL  -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    Endocrine  Hypothyroidism  Continue levothyroxine 75mcg daily    GI  Gastroesophageal reflux disease  pepcid 20mg daily  Hx esophageal stricture  9/16 EGD. Stretched esophagus to 15,  For possible FEDERICO tomorrow. NPO after MN    Other  Hypotension due to hypovolemia  -monitor with A-line  -keep net positive  -Levo gtt off today, keep MAP > 65            The patient is being Prophylaxed for:  Venous Thromboembolism with: Mechanical or Chemical  Stress Ulcer with: H2B  Ventilator Pneumonia with: chlorhexidine oral care    Activity Orders          Diet NPO Except for: Medication: NPO starting at 09/17 0001    Straight Cath starting at 09/05 0034        Full Code      I have spent 35 min with this patient, with over 50% of this time spent coordinating care and speaking with the family    Patito Odell NP  Neurocritical Care  [facility]

## 2019-09-17 NOTE — TREATMENT PLAN
EGD done today    Impression:           - Small hiatal hernia.                        - LA Grade A reflux esophagitis.                        - Mild Schatzki ring. Dilated.                        - Non-bleeding erosive gastropathy.                        - Normal examined duodenum.                        - No specimens collected.  Recommendation:       - [facility].                        - Resume previous diet.                        - Continue present medications.    Please contact us with further questions or concerns.

## 2019-09-17 NOTE — CONSULTS
[facility]  Cardiology  Consult Note    Patient Name: Clemencia Nguyen  MRN: [id number]  Admission Date: 9/3/2019  Hospital Length of Stay: 14 days  Code Status: Full Code   Attending Provider: Jelani Allison MD   Consulting Provider: Nash Britton MD  Primary Care Physician: SHELBY Castillo MD  Principal Problem:Brain lesion    Patient information was obtained from patient and past medical records.     Consults  Subjective:     Chief Complaint:  Stroke     HPI:   This is a 73yo woman here for evaluation of cardio-embolic sources of her stroke via FEDERICO. Has a history of depression, obstructive uropathy with normal renal function, schatzki ring (now dilated on EGD yesterday), HLD, HTN, Hypothyroidism, Osteopenia, and Thyroid disease. Who is here after presenting with seizures, having a finding of MCA stroke with normal angiogram and no evident source/risk factors for ischemic stroke as well as a gram-negative bacteremia. ID suggests FEDERICO to rule out cardio-embolic etiology.     Dysphagia or odynophagia:  Yes, had EGD  Liver Disease, esophageal disease, or known varices:  None found on chart review  Upper GI Bleeding: None found on chart review  Snoring:  Intubated  Sleep Apnea:  Intubated  Prior neck surgery or radiation: None  History of anesthetic difficulties:  Sedated and intubated  Last oral intake:  Intubated, NPO  Able to move neck in all directions:  Yes per prior exams  Anticoagulation/Antiplatelets: DVT ppx with heparin    Platelet count:   Lab Results   Component Value Date     09/17/2019     Hgb:  Lab Results   Component Value Date    HGB 10.3 (L) 09/17/2019      INR:  Lab Results   Component Value Date    INR 0.9 09/07/2019    INR 1.0 09/03/2019     TT ECHO:  Transthoracic echo (TTE) complete (Cupid Only):   Results for orders placed or performed during the hospital encounter of 09/03/19   Echo   Result Value Ref Range    Ascending aorta 2.90 cm    STJ 2.50 cm    AV mean  gradient 4 mmHg    Ao peak adi 1.33 m/s    Ao VTI 25.52 cm    IVS 0.79 0.6 - 1.1 cm    LA size 3.08 cm    Left Atrium Major Axis 4.30 cm    Left Atrium Minor Axis 4.12 cm    LVIDD 2.79 (A) 3.5 - 6.0 cm    LVIDS 1.93 (A) 2.1 - 4.0 cm    LVOT diameter 1.84 cm    LVOT peak VTI 20.39 cm    PW 0.71 0.6 - 1.1 cm    MV Peak A Adi 1.08 m/s    E wave decelartion time 276.66 msec    MV Peak E Adi 0.89 m/s    RA Major Axis 3.27 cm    RA Width 2.16 cm    RVDD 2.58 cm    Sinus 2.22 cm    TAPSE 1.81 cm    TDI LATERAL 0.08 m/s    TDI SEPTAL 0.07 m/s    LA WIDTH 2.90 cm    LV Diastolic Volume 29.24 mL    LV Systolic Volume 11.65 mL    LVOT peak adi 1.04 m/s    LV LATERAL E/E' RATIO 11.13 m/s    LV SEPTAL E/E' RATIO 12.71 m/s    FS 31 %    LA volume 31.95 cm3    LV mass 48.22 g    Left Ventricle Relative Wall Thickness 0.51 cm    AV valve area 2.12 cm2    AV Velocity Ratio 0.78     AV index (prosthetic) 0.80     E/A ratio 0.82     Mean e' 0.08 m/s    LVOT area 2.7 cm2    LVOT stroke volume 54.19 cm3    AV peak gradient 7 mmHg    E/E' ratio 11.87 m/s    LV Systolic Volume Index 6.7 mL/m2    LV Diastolic Volume Index 16.90 mL/m2    LA Volume Index 18.5 mL/m2    LV Mass Index 28 g/m2    BSA 1.76 m2    Narrative    · Normal left ventricular systolic function. The estimated ejection   fraction is 65-70%  · No wall motion abnormalities.  · Normal LV diastolic function.  · Concentric left ventricular remodeling.  · Normal right ventricular systolic function.          EGD:   - Small hiatal hernia.                        - LA Grade A reflux esophagitis.                        - Mild Schatzki ring. Dilated.                        - Non-bleeding erosive gastropathy.                        - Normal examined duodenum.                        - No specimens collected.  Recommendation:       - [facility].                        - Resume previous diet.                        - Continue present  medications.    Discussed with on-call GI fellow, ok to proceed with FEDERICO.    Past Medical History:   Diagnosis Date    Depression     has been on tofranil for years;    Disorder of kidney and ureter     Esophageal stricture 06/12/2019    per pt per Dr. Garcia    Hyperlipemia     Hypertension     Hypothyroidism     Osteopenia     Thyroid disease     hypothyroid       Past Surgical History:   Procedure Laterality Date    cataract surgery      CHOLECYSTECTOMY  04/24/2018    CHOLECYSTECTOMY-LAPAROSCOPIC N/A 4/24/2018    [facility]    COLONOSCOPY  2011, again in 2014    repeat in 5    COLONOSCOPY W/ BIOPSIES  06/12/2019    polypectomies per Dr. Garcia    ESOPHAGOGASTRODUODENOSCOPY  06/12/2019    Dr. Garcia    HYSTERECTOMY      OOPHORECTOMY      TONSILLECTOMY         Review of patient's allergies indicates:  No Known Allergies    No current facility-administered medications on file prior to encounter.      No current outpatient medications on file prior to encounter.     Family History     Problem Relation (Age of Onset)    Heart disease Mother    Hypertension Mother    Stroke Father        Tobacco Use    Smoking status: Never Smoker    Smokeless tobacco: Never Used   Substance and Sexual Activity    Alcohol use: No     Frequency: Never     Binge frequency: Never    Drug use: No    Sexual activity: Not Currently     Partners: Male     Birth control/protection: Surgical     Review of Systems   Unable to perform ROS: intubated     Objective:     Vital Signs (Most Recent):  Temp: 98.9 °F (37.2 °C) (09/17/19 0700)  Pulse: 80 (09/17/19 1344)  Resp: (!) 23 (09/17/19 1344)  BP: (!) 177/71 (09/17/19 0920)  SpO2: 100 % (09/17/19 1344) Vital Signs (24h Range):  Temp:  [98.4 °F (36.9 °C)-99.3 °F (37.4 °C)] 98.9 °F (37.2 °C)  Pulse:  [61-95] 80  Resp:  [12-33] 23  SpO2:  [97 %-100 %] 100 %  BP: ()/(46-74) 177/71  Arterial Line BP: (101-188)/(41-81) 183/77     Weight:  78.6 kg (173 lb 4.5 oz)  Body mass index is 30.7 kg/m².    SpO2: 100 %  O2 Device (Oxygen Therapy): ventilator      Intake/Output Summary (Last 24 hours) at 9/17/2019 1359  Last data filed at 9/17/2019 1245  Gross per 24 hour   Intake 421.75 ml   Output 2700 ml   Net -2278.25 ml       Lines/Drains/Airways     Peripherally Inserted Central Catheter Line                 PICC Double Lumen 09/14/19 1136 right brachial 3 days          Drain                 Rectal Tube 09/15/19 0900 rectal tube w/ balloon (indicate number of mLs) 2 days    Female External Urinary Catheter 09/15/19 0537 2 days         NG/OG Tube 09/16/19 1805 Center mouth less than 1 day          Airway                 Airway - Non-Surgical 09/06/19 0915 Endotracheal Tube 11 days          Peripheral Intravenous Line                 Peripheral IV - Single Lumen 09/13/19 1730 20 G Anterior;Left Ankle 3 days                Physical Exam   Constitutional: She appears well-developed and well-nourished. She is sedated and intubated.   HENT:   Head: Normocephalic and atraumatic.   Neck: Normal range of motion. Neck supple.   Cardiovascular: Normal rate, regular rhythm, normal heart sounds and intact distal pulses.   Pulses:       Radial pulses are 2+ on the right side, and 2+ on the left side.   Pulmonary/Chest: Breath sounds normal. She is intubated.   Symmetrical expansion   Abdominal: Soft. Bowel sounds are normal. There is no hepatosplenomegaly.   Musculoskeletal: She exhibits no edema.   Skin: Skin is warm and dry. No rash noted. She is not diaphoretic.       Assessment and Plan:     Bacteremia  1. FEDERICO for evaluation of cardio-embolic source of stroke in the setting of gram negative bacteremia    -No absolute contraindications of esophageal stricture, tumor, perforation, laceration,or diverticulum and/or active GI bleed  -The risks, benefits & alternatives of the procedure were explained to the patient.   -The risks of transesophageal echo include but are  not limited to:  Dental trauma, esophageal trauma/perforation, bleeding, laryngospasm/brochospasm, aspiration, sore throat/hoarseness, & dislodgement of the endotracheal tube/nasogastric tube (where applicable).    -The risks of moderate sedation include hypotension, respiratory depression, arrhythmias, bronchospasm, & death.    -Informed consent was obtained. The patient is agreeable to proceed with the procedure and all questions and concerns addressed.    Case discussed with an attending in echocardiography lab.     Further recommendations per attending addendum          VTE Risk Mitigation (From admission, onward)        Ordered     heparin (porcine) injection 5,000 Units  Every 8 hours      09/10/19 0909          Thank you for your consult. I will sign off. Please contact us if you have any additional questions.    Nash Britton MD  Cardiology   [facility]

## 2019-09-17 NOTE — PLAN OF CARE
09/16/19 1700   Discharge Reassessment   Assessment Type Discharge Planning Reassessment   Provided patient/caregiver education on the expected discharge date and the discharge plan No   Do you have any problems affording any of your prescribed medications? TBD   Discharge Plan A Long-term acute care facility (LTAC)   Discharge Plan B Skilled Nursing Facility   DME Needed Upon Discharge  other (see comments)  (tbd)   Patient choice form signed by patient/caregiver N/A   Anticipated Discharge Disposition LTAC   Can the patient answer the patient profile reliably? No, cognitively impaired   How does the patient rate their overall health at the present time?   (porsha)   Describe the patient's ability to walk at the present time. Does not walk or unable to take any steps at all   How often would a person be available to care for the patient? Whenever needed   Number of comorbid conditions (as recorded on the chart) Five or more   During the past month, has the patient often been bothered by feeling down, depressed or hopeless?   (porsha)   During the past month, has the patient often been bothered by little interest or pleasure in doing things?   (porsha)   Post-Acute Status   Post-Acute Authorization Placement   Discharge Delays None known at this time       Keely Malone RN, CCRN-K, [facility]  Neuro-Critical Care   [phone]

## 2019-09-17 NOTE — ASSESSMENT & PLAN NOTE
75 y/o female with pmhx CKD and HTN presented to outside hospital for seizure- like activity occurring on the left side. Found to have area of ill-defined enhancement on MRI brain w/ and w/out.  Most recent EEG showing bilateral PLEDs left greater than right.    -q 1 hr neuro checks  -Fu epilepsy recs  -Fu infectious rodriguez, CSF NGTD, FEDERICO when possible  -Continue steroids for now  -WTE when medically stable.  -Angio reviewed, no evidence of vasculitis.  -Further care per NCC, will follow.

## 2019-09-17 NOTE — PROGRESS NOTES
[facility]  Infectious Disease  Progress Note    Patient Name: Clemencia Nguyen  MRN: [id number]  Admission Date: 9/3/2019  Length of Stay: 14 days  Attending Physician: Jelani Allison MD  Primary Care Provider: SHELBY Castillo MD    Isolation Status: No active isolations  Assessment/Plan:      * Brain lesion  73 y/o female admitted with a bebo lesions. 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  She is currently on IV ceftriaxone.  Blood cultures have cleared.  Recommend FEDERICO to determine if this could be an endocarditis with subsequent embolic phenomena to the brain. S/p EGD clearance & dilation for FEDERICO today.     Recommendations:   -FEDERICO done today, will follow up with results  -If negative for valve vegetations, anticipate 2 week course of IV ceftriaxone  -Additionally may need brain biopsy if workup for her acute mental status remain inconclusive       Anticipated Disposition: per primary team    Thank you for your consult. I will follow-up with patient. Please contact us if you have any additional questions.    Marry Candelaria MD  Infectious Disease  [facility]    Subjective:     Principal Problem:Brain lesion    HPI: Ms. Manriquez is a 75yo woman with PMH of HTN & CKD otherwise doing well who presented to [facility] with acute onset of Left sided seizure like activity and LOC.  at bedside reports she was functioning well and appropriately until he had found her unconscious last week. She has never had seizures before. 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 He notes she did seem forgetful during square dancing over the past month but he had attributed it to her age. He also says she had a dental crown procedure a couple of months ago. Denies fevers, chills. Denies FHx of cancer. Denies Fhx of colorectal cancer.     [place] [facility] versed x 2 via EMS. CTH performed at outside hospital was negative for bleed.  MRI  brain w/ and w/out obtained that showed large area of edema in right frontal lobe and ill defined enhancement in the frontal lobe.     She has been afebrile & hypertensive. Failed 2 extubations as family notes she became very anxious during trials.   Interval History: Patient remains intubated. EGD s/p dilation done yesterday. FEDERICO today  Review of Systems   Unable to perform ROS: Intubated     Objective:     Vital Signs (Most Recent):  Temp: (P) 99.3 °F (37.4 °C) (09/17/19 1100)  Pulse: 79 (09/17/19 1400)  Resp: (!) 22 (09/17/19 1400)  BP: (!) 143/63 (09/17/19 1400)  SpO2: 100 % (09/17/19 1400) Vital Signs (24h Range):  Temp:  [98.4 °F (36.9 °C)-99.3 °F (37.4 °C)] (P) 99.3 °F (37.4 °C)  Pulse:  [61-95] 79  Resp:  [12-33] 22  SpO2:  [97 %-100 %] 100 %  BP: ()/(46-74) 143/63  Arterial Line BP: (101-183)/(41-77) 183/77     Weight: 78.6 kg (173 lb 4.5 oz)  Body mass index is 30.7 kg/m².    Estimated Creatinine Clearance: 52.8 mL/min (based on SCr of 0.9 mg/dL).    Physical Exam   Constitutional: She appears well-developed and well-nourished. No distress.   intubated   HENT:   Head: Normocephalic and atraumatic.   Eyes: Pupils are equal, round, and reactive to light. EOM are normal.   Neck: Normal range of motion. Neck supple. No JVD present. No thyromegaly present.   Cardiovascular: Normal rate, regular rhythm, normal heart sounds and intact distal pulses. Exam reveals no gallop and no friction rub.   No murmur heard.  Pulmonary/Chest: Effort normal and breath sounds normal. No stridor. No respiratory distress. She has no wheezes. She has no rales. She exhibits no tenderness.   Abdominal: Soft. Bowel sounds are normal. She exhibits no distension. There is no tenderness. There is no guarding.   Neurological: She displays normal reflexes.   Skin: Skin is warm and dry. Capillary refill takes less than 2 seconds. She is not diaphoretic.       Significant Labs:   Microbiology Results (last 7 days)     Procedure  Component Value Units Date/Time    Blood culture [[id number]] Collected:  09/11/19 1811    Order Status:  Completed Specimen:  Blood from Peripheral, Hand, Left Updated:  09/16/19 2012     Blood Culture, Routine No growth after 5 days.    CSF culture [[id number]] Collected:  09/11/19 1355    Order Status:  Completed Specimen:  CSF (Spinal Fluid) from CSF Tap, Tube 3 Updated:  09/16/19 0650     CSF CULTURE No Growth     Gram Stain Result Cytospin indicates:      Rare WBC's      No organisms seen    Blood culture [[id number]] Collected:  09/08/19 1210    Order Status:  Completed Specimen:  Blood from Peripheral, Ankle, Left Updated:  09/13/19 1412     Blood Culture, Routine No growth after 5 days.    Blood culture [[id number]]  (Abnormal) Collected:  09/08/19 1215    Order Status:  Completed Specimen:  Blood from Peripheral, Forearm, Left Updated:  09/12/19 1031     Blood Culture, Routine Gram stain brian bottle: Gram positive cocci       Results called to and read back by:Milagro Valencia RN  09/09/2019  13:48      Gram stain aer bottle: Gram positive cocci 09/10/2019  15:46      GEMELLA (S.) MORBILLORUM  Susceptibility testing not routinely performed      Blood culture [[id number]]     Order Status:  Sent Specimen:  Blood     Gram stain [[id number]] Collected:  09/11/19 1355    Order Status:  Canceled Specimen:  CSF (Spinal Fluid) from CSF Tap, Tube 3     Culture, Respiratory with Gram Stain [[id number]]  (Abnormal)  (Susceptibility) Collected:  09/08/19 1158    Order Status:  Completed Specimen:  Respiratory from Tracheal Aspirate Updated:  09/11/19 0934     Respiratory Culture No S aureus or Pseudomonas isolated.      ESCHERICHIA COLI  Few  Normal respiratory connie also present       Gram Stain (Respiratory) Few WBC's     Gram Stain (Respiratory) Moderate Gram negative rods     Gram Stain (Respiratory) Rare Gram positive cocci    Blood culture [[id number]]     Order Status:  Canceled Specimen:  Blood     Blood culture [[id number]]      Order Status:  Canceled Specimen:  Blood           Significant Imaging: I have reviewed all pertinent imaging results/findings within the past 24 hours.

## 2019-09-18 LAB
ALBUMIN SERPL BCP-MCNC: 2.8 G/DL (ref 3.5–5.2)
ALLENS TEST: ABNORMAL
ALP SERPL-CCNC: 39 U/L (ref 55–135)
ALT SERPL W/O P-5'-P-CCNC: 32 U/L (ref 10–44)
ANION GAP SERPL CALC-SCNC: 11 MMOL/L (ref 8–16)
AST SERPL-CCNC: 11 U/L (ref 10–40)
BASOPHILS NFR BLD: 0 % (ref 0–1.9)
BILIRUB SERPL-MCNC: 0.2 MG/DL (ref 0.1–1)
BUN SERPL-MCNC: 20 MG/DL (ref 8–23)
CALCIUM SERPL-MCNC: 8.7 MG/DL (ref 8.7–10.5)
CHLORIDE SERPL-SCNC: 100 MMOL/L (ref 95–110)
CK MB SERPL-MCNC: 1.7 NG/ML (ref 0.1–6.5)
CK MB SERPL-RTO: 6.3 % (ref 0–5)
CK SERPL-CCNC: 27 U/L (ref 20–180)
CO2 SERPL-SCNC: 29 MMOL/L (ref 23–29)
CREAT SERPL-MCNC: 1.1 MG/DL (ref 0.5–1.4)
DELSYS: ABNORMAL
DIFFERENTIAL METHOD: ABNORMAL
EOSINOPHIL NFR BLD: 0 % (ref 0–8)
ERYTHROCYTE [DISTWIDTH] IN BLOOD BY AUTOMATED COUNT: 13.8 % (ref 11.5–14.5)
ERYTHROCYTE [SEDIMENTATION RATE] IN BLOOD BY WESTERGREN METHOD: 12 MM/H
EST. GFR  (AFRICAN AMERICAN): 56.4 ML/MIN/1.73 M^2
EST. GFR  (NON AFRICAN AMERICAN): 48.9 ML/MIN/1.73 M^2
FIO2: 40
GLUCOSE SERPL-MCNC: 153 MG/DL (ref 70–110)
HCO3 UR-SCNC: 31 MMOL/L (ref 24–28)
HCT VFR BLD AUTO: 34.2 % (ref 37–48.5)
HGB BLD-MCNC: 11.1 G/DL (ref 12–16)
IMM GRANULOCYTES # BLD AUTO: ABNORMAL K/UL (ref 0–0.04)
IMM GRANULOCYTES NFR BLD AUTO: ABNORMAL % (ref 0–0.5)
LYMPHOCYTES NFR BLD: 9 % (ref 18–48)
MAGNESIUM SERPL-MCNC: 2.1 MG/DL (ref 1.6–2.6)
MCH RBC QN AUTO: 29.3 PG (ref 27–31)
MCHC RBC AUTO-ENTMCNC: 32.5 G/DL (ref 32–36)
MCV RBC AUTO: 90 FL (ref 82–98)
METAMYELOCYTES NFR BLD MANUAL: 2 %
MIN VOL: 10.5
MODE: ABNORMAL
MONOCYTES NFR BLD: 10 % (ref 4–15)
MYELOCYTES NFR BLD MANUAL: 1 %
NEUTROPHILS NFR BLD: 77 % (ref 38–73)
NEUTS BAND NFR BLD MANUAL: 1 %
NRBC BLD-RTO: 0 /100 WBC
PCO2 BLDA: 34.9 MMHG (ref 35–45)
PEEP: 5
PH SMN: 7.56 [PH] (ref 7.35–7.45)
PHOSPHATE SERPL-MCNC: 3.8 MG/DL (ref 2.7–4.5)
PIP: 20
PLATELET # BLD AUTO: 170 K/UL (ref 150–350)
PMV BLD AUTO: 9.1 FL (ref 9.2–12.9)
PO2 BLDA: 93 MMHG (ref 80–100)
POC BE: 9 MMOL/L
POC SATURATED O2: 98 % (ref 95–100)
POC TCO2: 32 MMOL/L (ref 23–27)
POCT GLUCOSE: 129 MG/DL (ref 70–110)
POCT GLUCOSE: 134 MG/DL (ref 70–110)
POCT GLUCOSE: 166 MG/DL (ref 70–110)
POCT GLUCOSE: 202 MG/DL (ref 70–110)
POTASSIUM SERPL-SCNC: 4.6 MMOL/L (ref 3.5–5.1)
PROT SERPL-MCNC: 5.8 G/DL (ref 6–8.4)
RBC # BLD AUTO: 3.79 M/UL (ref 4–5.4)
SAMPLE: ABNORMAL
SITE: ABNORMAL
SODIUM SERPL-SCNC: 140 MMOL/L (ref 136–145)
TROPONIN I SERPL DL<=0.01 NG/ML-MCNC: 0.02 NG/ML (ref 0–0.03)
VT: 380
WBC # BLD AUTO: 6.8 K/UL (ref 3.9–12.7)

## 2019-09-18 PROCEDURE — 36600 WITHDRAWAL OF ARTERIAL BLOOD: CPT

## 2019-09-18 PROCEDURE — 27200966 HC CLOSED SUCTION SYSTEM

## 2019-09-18 PROCEDURE — 82553 CREATINE MB FRACTION: CPT

## 2019-09-18 PROCEDURE — 63600175 PHARM REV CODE 636 W HCPCS: Performed by: PHYSICIAN ASSISTANT

## 2019-09-18 PROCEDURE — 99291 CRITICAL CARE FIRST HOUR: CPT | Mod: ,,, | Performed by: PHYSICIAN ASSISTANT

## 2019-09-18 PROCEDURE — 63600175 PHARM REV CODE 636 W HCPCS: Performed by: PSYCHIATRY & NEUROLOGY

## 2019-09-18 PROCEDURE — 20000000 HC ICU ROOM

## 2019-09-18 PROCEDURE — 93010 EKG 12-LEAD: ICD-10-PCS | Mod: ,,, | Performed by: INTERNAL MEDICINE

## 2019-09-18 PROCEDURE — 84100 ASSAY OF PHOSPHORUS: CPT

## 2019-09-18 PROCEDURE — 94761 N-INVAS EAR/PLS OXIMETRY MLT: CPT

## 2019-09-18 PROCEDURE — 80053 COMPREHEN METABOLIC PANEL: CPT

## 2019-09-18 PROCEDURE — 25000003 PHARM REV CODE 250: Performed by: PSYCHIATRY & NEUROLOGY

## 2019-09-18 PROCEDURE — 25000003 PHARM REV CODE 250: Performed by: PHYSICIAN ASSISTANT

## 2019-09-18 PROCEDURE — 63600175 PHARM REV CODE 636 W HCPCS: Performed by: NURSE PRACTITIONER

## 2019-09-18 PROCEDURE — 99900026 HC AIRWAY MAINTENANCE (STAT)

## 2019-09-18 PROCEDURE — 83735 ASSAY OF MAGNESIUM: CPT

## 2019-09-18 PROCEDURE — 99900035 HC TECH TIME PER 15 MIN (STAT)

## 2019-09-18 PROCEDURE — 85027 COMPLETE CBC AUTOMATED: CPT

## 2019-09-18 PROCEDURE — 82550 ASSAY OF CK (CPK): CPT

## 2019-09-18 PROCEDURE — 84484 ASSAY OF TROPONIN QUANT: CPT

## 2019-09-18 PROCEDURE — 94003 VENT MGMT INPAT SUBQ DAY: CPT

## 2019-09-18 PROCEDURE — 85007 BL SMEAR W/DIFF WBC COUNT: CPT

## 2019-09-18 PROCEDURE — 82803 BLOOD GASES ANY COMBINATION: CPT

## 2019-09-18 PROCEDURE — 25000003 PHARM REV CODE 250: Performed by: STUDENT IN AN ORGANIZED HEALTH CARE EDUCATION/TRAINING PROGRAM

## 2019-09-18 PROCEDURE — 93005 ELECTROCARDIOGRAM TRACING: CPT

## 2019-09-18 PROCEDURE — 27000221 HC OXYGEN, UP TO 24 HOURS

## 2019-09-18 PROCEDURE — 93010 ELECTROCARDIOGRAM REPORT: CPT | Mod: ,,, | Performed by: INTERNAL MEDICINE

## 2019-09-18 PROCEDURE — 63600175 PHARM REV CODE 636 W HCPCS: Performed by: STUDENT IN AN ORGANIZED HEALTH CARE EDUCATION/TRAINING PROGRAM

## 2019-09-18 PROCEDURE — 99291 PR CRITICAL CARE, E/M 30-74 MINUTES: ICD-10-PCS | Mod: ,,, | Performed by: PHYSICIAN ASSISTANT

## 2019-09-18 RX ORDER — FENTANYL CITRATE-0.9 % NACL/PF 10 MCG/ML
25 PLASTIC BAG, INJECTION (ML) INTRAVENOUS CONTINUOUS
Status: DISCONTINUED | OUTPATIENT
Start: 2019-09-18 | End: 2019-09-18

## 2019-09-18 RX ORDER — PHENYLEPHRINE HYDROCHLORIDE 10 MG/ML
100 INJECTION INTRAVENOUS ONCE
Status: COMPLETED | OUTPATIENT
Start: 2019-09-18 | End: 2019-09-18

## 2019-09-18 RX ORDER — DEXMEDETOMIDINE HYDROCHLORIDE 4 UG/ML
0.2 INJECTION, SOLUTION INTRAVENOUS CONTINUOUS
Status: DISCONTINUED | OUTPATIENT
Start: 2019-09-18 | End: 2019-09-18

## 2019-09-18 RX ORDER — NOREPINEPHRINE BITARTRATE/D5W 4MG/250ML
PLASTIC BAG, INJECTION (ML) INTRAVENOUS
Status: COMPLETED
Start: 2019-09-18 | End: 2019-09-18

## 2019-09-18 RX ADMIN — QUETIAPINE FUMARATE 75 MG: 25 TABLET ORAL at 08:09

## 2019-09-18 RX ADMIN — HEPARIN SODIUM 5000 UNITS: 5000 INJECTION, SOLUTION INTRAVENOUS; SUBCUTANEOUS at 06:09

## 2019-09-18 RX ADMIN — DEXAMETHASONE SODIUM PHOSPHATE 4 MG: 4 INJECTION, SOLUTION INTRAMUSCULAR; INTRAVENOUS at 01:09

## 2019-09-18 RX ADMIN — VALPROIC ACID 750 MG: 250 SOLUTION ORAL at 05:09

## 2019-09-18 RX ADMIN — INSULIN ASPART 2 UNITS: 100 INJECTION, SOLUTION INTRAVENOUS; SUBCUTANEOUS at 05:09

## 2019-09-18 RX ADMIN — SODIUM CHLORIDE 1000 ML: 0.9 INJECTION, SOLUTION INTRAVENOUS at 03:09

## 2019-09-18 RX ADMIN — FAMOTIDINE 20 MG: 20 TABLET ORAL at 08:09

## 2019-09-18 RX ADMIN — CEFTRIAXONE SODIUM 2 G: 2 INJECTION, SOLUTION INTRAVENOUS at 12:09

## 2019-09-18 RX ADMIN — FENTANYL CITRATE 25 MCG/HR: 50 INJECTION INTRAVENOUS at 03:09

## 2019-09-18 RX ADMIN — VALPROIC ACID 750 MG: 250 SOLUTION ORAL at 08:09

## 2019-09-18 RX ADMIN — VALPROIC ACID 750 MG: 250 SOLUTION ORAL at 01:09

## 2019-09-18 RX ADMIN — CHLORHEXIDINE GLUCONATE 0.12% ORAL RINSE 15 ML: 1.2 LIQUID ORAL at 08:09

## 2019-09-18 RX ADMIN — LACOSAMIDE 100 MG: 50 TABLET, FILM COATED ORAL at 08:09

## 2019-09-18 RX ADMIN — PROPOFOL 35 MCG/KG/MIN: 10 INJECTION, EMULSION INTRAVENOUS at 02:09

## 2019-09-18 RX ADMIN — CHLORHEXIDINE GLUCONATE 0.12% ORAL RINSE 15 ML: 1.2 LIQUID ORAL at 09:09

## 2019-09-18 RX ADMIN — PROPOFOL 35 MCG/KG/MIN: 10 INJECTION, EMULSION INTRAVENOUS at 06:09

## 2019-09-18 RX ADMIN — HEPARIN SODIUM 5000 UNITS: 5000 INJECTION, SOLUTION INTRAVENOUS; SUBCUTANEOUS at 09:09

## 2019-09-18 RX ADMIN — DEXMEDETOMIDINE HYDROCHLORIDE 0.4 MCG/KG/HR: 4 INJECTION, SOLUTION INTRAVENOUS at 11:09

## 2019-09-18 RX ADMIN — DEXAMETHASONE SODIUM PHOSPHATE 4 MG: 4 INJECTION, SOLUTION INTRAMUSCULAR; INTRAVENOUS at 06:09

## 2019-09-18 RX ADMIN — DEXAMETHASONE SODIUM PHOSPHATE 4 MG: 4 INJECTION, SOLUTION INTRAMUSCULAR; INTRAVENOUS at 12:09

## 2019-09-18 RX ADMIN — DEXAMETHASONE SODIUM PHOSPHATE 4 MG: 4 INJECTION, SOLUTION INTRAMUSCULAR; INTRAVENOUS at 05:09

## 2019-09-18 RX ADMIN — HEPARIN SODIUM 5000 UNITS: 5000 INJECTION, SOLUTION INTRAVENOUS; SUBCUTANEOUS at 02:09

## 2019-09-18 RX ADMIN — SENNOSIDES AND DOCUSATE SODIUM 1 TABLET: 8.6; 5 TABLET ORAL at 08:09

## 2019-09-18 RX ADMIN — PHENYLEPHRINE HYDROCHLORIDE 100 MCG: 10 INJECTION INTRAVENOUS at 03:09

## 2019-09-18 RX ADMIN — LEVOTHYROXINE SODIUM 75 MCG: 75 TABLET ORAL at 06:09

## 2019-09-18 NOTE — PLAN OF CARE
Problem: Adult Inpatient Plan of Care  Goal: Plan of Care Review  Outcome: Ongoing (interventions implemented as appropriate)  Plan of care reviewed with spouse and son.All questions and concerns addressed.Pt had several symptomatic hypotensive episodes related to Propofol and Precedex drips.NS 1000 cc bolus and Shoaib 1000 mcg returned bp back to baseline.Fentanyl drip titrated to comfort,bp stable.

## 2019-09-18 NOTE — ASSESSMENT & PLAN NOTE
Daily CXR, ABG  Continue Peridex, Famotidine, Heparin subq  Trial of extubation 9/6/2019- re-intubated shortly after extubation for airway protection  Continue to wean vent settings as tolerated  Still intubated 2/2 inability to protect airway  Propofol stopped   Precedex started     Vent Mode: Spont  Oxygen Concentration (%):  [] 40  Resp Rate Total:  [12 br/min-32 br/min] 21 br/min  Vt Set:  [380 mL] 380 mL  PEEP/CPAP:  [5 cmH20] 5 cmH20  Pressure Support:  [0 kjY51-75 cmH20] 12 cmH20  Mean Airway Pressure:  [7.6 cmH20-10 cmH20] 7.6 cmH20   Daily CXR/ABG

## 2019-09-18 NOTE — SUBJECTIVE & OBJECTIVE
Interval History: FEDERICO negative for vegetations.    Medications:  Continuous Infusions:   propofol 35 mcg/kg/min (09/18/19 0705)     Scheduled Meds:   cefTRIAXone (ROCEPHIN) IVPB  2 g Intravenous Q12H    chlorhexidine  15 mL Mouth/Throat BID    dexamethasone  4 mg Intravenous Q6H    famotidine  20 mg Per OG tube QHS    heparin (porcine)  5,000 Units Subcutaneous Q8H    lacosamide  100 mg Per OG tube Q12H    levothyroxine  75 mcg Per OG tube Before breakfast    QUEtiapine  75 mg Per OG tube BID    senna-docusate 8.6-50 mg  1 tablet Per OG tube BID    valproic acid (as sodium salt)  750 mg Per OG tube Q8H     PRN Meds:acetaminophen, Dextrose 10% Bolus, fentaNYL, fentaNYL, glucagon (human recombinant), hydrALAZINE, insulin aspart U-100, magnesium oxide, magnesium oxide, OLANZapine, potassium chloride 10%, potassium chloride 10%, potassium, sodium phosphates, potassium, sodium phosphates, potassium, sodium phosphates, racepinephrine, sodium chloride 0.9%, sodium chloride 0.9%     Review of Systems  Objective:     Weight: 78.6 kg (173 lb 4.5 oz)  Body mass index is 30.7 kg/m².  Vital Signs (Most Recent):  Temp: 99 °F (37.2 °C) (09/18/19 0700)  Pulse: 78 (09/18/19 0755)  Resp: (!) 33 (09/18/19 0755)  BP: 139/64 (09/18/19 0600)  SpO2: (!) 83 % (09/18/19 0755) Vital Signs (24h Range):  Temp:  [98.5 °F (36.9 °C)-99.3 °F (37.4 °C)] 99 °F (37.2 °C)  Pulse:  [70-93] 78  Resp:  [13-33] 33  SpO2:  [83 %-100 %] 83 %  BP: (100-177)/(49-73) 139/64     Date 09/18/19 0700 - 09/19/19 0659   Shift 7982-4695 5463-7362 9249-2341 24 Hour Total   INTAKE   I.V.(mL/kg) 16(0.2)   16(0.2)   NG/GT 50   50   Shift Total(mL/kg) 66(0.8)   66(0.8)   OUTPUT   Shift Total(mL/kg)       Weight (kg) 78.6 78.6 78.6 78.6              Vent Mode: A/C  Oxygen Concentration (%):  [] 40  Resp Rate Total:  [12 br/min-20 br/min] 20 br/min  Vt Set:  [380 mL] 380 mL  PEEP/CPAP:  [5 cmH20] 5 cmH20  Pressure Support:  [0 cmH20] 0 cmH20  Mean Airway  Pressure:  [7.8 cmH20-9.3 cmH20] 9.3 cmH20         NG/OG Tube 09/16/19 1805 orogastric Center mouth (Active)   Placement Check placement verified by aspirate characteristics 9/18/2019  7:00 AM   Tolerance no signs/symptoms of discomfort 9/18/2019  7:00 AM   Securement secured to commercial device 9/18/2019  7:00 AM   Clamp Status/Tolerance clamped 9/18/2019  7:00 AM   Insertion Site Appearance no redness, warmth, tenderness, skin breakdown, drainage 9/18/2019  7:00 AM   Flush/Irrigation flushed w/;water 9/18/2019  7:00 AM   Feeding Method bolus by pump 9/18/2019  3:00 AM   Feeding Action feeding continued 9/18/2019  7:00 AM   Current Rate (mL/hr) 10 mL/hr 9/18/2019  3:00 AM   Intake (mL) 30 mL 9/18/2019  7:00 AM   Intake (mL) - Formula Tube Feeding 10 9/18/2019  7:05 AM   Residual Amount (ml) 0 ml 9/18/2019  3:00 AM            Rectal Tube 09/15/19 0900 rectal tube w/ balloon (indicate number of mLs) (Active)   Balloon Inflation Volume (mL) 45 9/18/2019  7:00 AM   Outcome stool evacuated 9/18/2019  7:00 AM   Stool Color Brown 9/18/2019  7:00 AM   Insertion Site Appearance no redness, warmth, tenderness, skin breakdown, drainage 9/18/2019  7:00 AM   Flush/Irrigation flushed w/;water;no resistance met 9/18/2019  3:00 AM   Intake (mL) 60 mL 9/17/2019  9:00 PM   Rectal Tube Output 25 mL 9/18/2019  6:00 AM       Female External Urinary Catheter 09/15/19 0537 (Active)   Skin no breakdown 9/18/2019  7:00 AM   Tolerance no signs/symptoms of discomfort 9/18/2019  7:00 AM   Suction Continuous suction at 40 mmHg 9/17/2019  7:01 PM   Date of last wick change 09/16/19 9/17/2019  7:01 PM   Time of last wick change 2200 9/16/2019 11:01 PM   Output (mL) 500 mL 9/18/2019  6:00 AM       Neurosurgery Physical Exam   E1VTM5  PERRL  Localizing on right, WD on left  SILT    Significant Labs:  Recent Labs   Lab 09/17/19  0235 09/18/19  0456   * 153*    140   K 4.6 4.6    100   CO2 32* 29   BUN 17 20   CREATININE 0.9 1.1    CALCIUM 8.5* 8.7   MG 2.0 2.1     Recent Labs   Lab 09/17/19  0235 09/18/19  0456   WBC 5.79 6.80   HGB 10.3* 11.1*   HCT 33.6* 34.2*    170     No results for input(s): LABPT, INR, APTT in the last 48 hours.  Microbiology Results (last 7 days)     Procedure Component Value Units Date/Time    Blood culture [[id number]] Collected:  09/11/19 1811    Order Status:  Completed Specimen:  Blood from Peripheral, Hand, Left Updated:  09/16/19 2012     Blood Culture, Routine No growth after 5 days.    CSF culture [[id number]] Collected:  09/11/19 1355    Order Status:  Completed Specimen:  CSF (Spinal Fluid) from CSF Tap, Tube 3 Updated:  09/16/19 0650     CSF CULTURE No Growth     Gram Stain Result Cytospin indicates:      Rare WBC's      No organisms seen    Blood culture [[id number]] Collected:  09/08/19 1210    Order Status:  Completed Specimen:  Blood from Peripheral, Ankle, Left Updated:  09/13/19 1412     Blood Culture, Routine No growth after 5 days.    Blood culture [[id number]]  (Abnormal) Collected:  09/08/19 1215    Order Status:  Completed Specimen:  Blood from Peripheral, Forearm, Left Updated:  09/12/19 1031     Blood Culture, Routine Gram stain brian bottle: Gram positive cocci       Results called to and read back by:Milagro Valencia RN  09/09/2019  13:48      Gram stain aer bottle: Gram positive cocci 09/10/2019  15:46      GEMELLA (S.) MORBILLORUM  Susceptibility testing not routinely performed      Blood culture [[id number]]     Order Status:  Sent Specimen:  Blood     Gram stain [[id number]] Collected:  09/11/19 1355    Order Status:  Canceled Specimen:  CSF (Spinal Fluid) from CSF Tap, Tube 3     Culture, Respiratory with Gram Stain [[id number]]  (Abnormal)  (Susceptibility) Collected:  09/08/19 1158    Order Status:  Completed Specimen:  Respiratory from Tracheal Aspirate Updated:  09/11/19 0934     Respiratory Culture No S aureus or Pseudomonas isolated.      ESCHERICHIA COLI  Few  Normal respiratory connie  also present       Gram Stain (Respiratory) Few WBC's     Gram Stain (Respiratory) Moderate Gram negative rods     Gram Stain (Respiratory) Rare Gram positive cocci            Significant Diagnostics:

## 2019-09-18 NOTE — ASSESSMENT & PLAN NOTE
SBP <180, MAP >65   Echo: EF 65-70% concentric LV remodeling  Levo off  FEDERICO (9/17) negative for vegetation, ASD, PFO

## 2019-09-18 NOTE — PLAN OF CARE
09/18/19 1503   Discharge Reassessment   Assessment Type Discharge Planning Reassessment   Provided patient/caregiver education on the expected discharge date and the discharge plan No   Do you have any problems affording any of your prescribed medications? TBD   Discharge Plan A Long-term acute care facility (LTAC)   Discharge Plan B Skilled Nursing Facility   DME Needed Upon Discharge  other (see comments)  (tbd)   Patient choice form signed by patient/caregiver N/A   Anticipated Discharge Disposition LTAC   Can the patient answer the patient profile reliably? No, cognitively impaired   How does the patient rate their overall health at the present time?   (porsha)   Describe the patient's ability to walk at the present time. Does not walk or unable to take any steps at all   How often would a person be available to care for the patient? Whenever needed   Number of comorbid conditions (as recorded on the chart) Five or more   During the past month, has the patient often been bothered by feeling down, depressed or hopeless?   (uts)   During the past month, has the patient often been bothered by little interest or pleasure in doing things?   (porsha)   Post-Acute Status   Post-Acute Authorization Placement   Post-Acute Placement Status Awaiting Internal Medical Clearance  (vent)       Keely Malone RN, CCRN-K, [facility]  Neuro-Critical Care   [phone]

## 2019-09-18 NOTE — PROGRESS NOTES
[facility]  Neurosurgery  Progress Note    Subjective:     History of Present Illness: 73 y/o female with pmhx CKD and HTN  presented to outside hospital for seizure- like activity this AM. Per  pt was found unconscious this AM, with seizure like activity occurring on the left side. Pt was given versed x 2 via EMS. CTH performed at outside hospital was negative for bleed. Pt had additional seizure in outside hospital ED and was given IV Keppra. EEG ordered and MRI brain w/out contrast concerning for possible infiltratrive process vs post ischemic sequela. [place] [facility] and admitted to [facility]. MRI brain w/ and w/out obtained that showed large area of edema in right frontal lobe and ill defined enhancement in the frontal lobe concerning for possible cerebritis vs. Neoplasm. NSGY was consulted to evaluate. Pt not on anti-coagulation.       Post-Op Info:  Procedure(s) (LRB):  EGD (ESOPHAGOGASTRODUODENOSCOPY) (N/A)   2 Days Post-Op     Interval History: FEDERICO negative for vegetations.    Medications:  Continuous Infusions:   propofol 35 mcg/kg/min (09/18/19 0705)     Scheduled Meds:   cefTRIAXone (ROCEPHIN) IVPB  2 g Intravenous Q12H    chlorhexidine  15 mL Mouth/Throat BID    dexamethasone  4 mg Intravenous Q6H    famotidine  20 mg Per OG tube QHS    heparin (porcine)  5,000 Units Subcutaneous Q8H    lacosamide  100 mg Per OG tube Q12H    levothyroxine  75 mcg Per OG tube Before breakfast    QUEtiapine  75 mg Per OG tube BID    senna-docusate 8.6-50 mg  1 tablet Per OG tube BID    valproic acid (as sodium salt)  750 mg Per OG tube Q8H     PRN Meds:acetaminophen, Dextrose 10% Bolus, fentaNYL, fentaNYL, glucagon (human recombinant), hydrALAZINE, insulin aspart U-100, magnesium oxide, magnesium oxide, OLANZapine, potassium chloride 10%, potassium chloride 10%, potassium, sodium phosphates, potassium, sodium phosphates, potassium, sodium phosphates, racepinephrine, sodium chloride  0.9%, sodium chloride 0.9%     Review of Systems  Objective:     Weight: 78.6 kg (173 lb 4.5 oz)  Body mass index is 30.7 kg/m².  Vital Signs (Most Recent):  Temp: 99 °F (37.2 °C) (09/18/19 0700)  Pulse: 78 (09/18/19 0755)  Resp: (!) 33 (09/18/19 0755)  BP: 139/64 (09/18/19 0600)  SpO2: (!) 83 % (09/18/19 0755) Vital Signs (24h Range):  Temp:  [98.5 °F (36.9 °C)-99.3 °F (37.4 °C)] 99 °F (37.2 °C)  Pulse:  [70-93] 78  Resp:  [13-33] 33  SpO2:  [83 %-100 %] 83 %  BP: (100-177)/(49-73) 139/64     Date 09/18/19 0700 - 09/19/19 0659   Shift 5068-4243 8915-4896 4402-7301 24 Hour Total   INTAKE   I.V.(mL/kg) 16(0.2)   16(0.2)   NG/GT 50   50   Shift Total(mL/kg) 66(0.8)   66(0.8)   OUTPUT   Shift Total(mL/kg)       Weight (kg) 78.6 78.6 78.6 78.6              Vent Mode: A/C  Oxygen Concentration (%):  [] 40  Resp Rate Total:  [12 br/min-20 br/min] 20 br/min  Vt Set:  [380 mL] 380 mL  PEEP/CPAP:  [5 cmH20] 5 cmH20  Pressure Support:  [0 cmH20] 0 cmH20  Mean Airway Pressure:  [7.8 cmH20-9.3 cmH20] 9.3 cmH20         NG/OG Tube 09/16/19 1805 orogastric Center mouth (Active)   Placement Check placement verified by aspirate characteristics 9/18/2019  7:00 AM   Tolerance no signs/symptoms of discomfort 9/18/2019  7:00 AM   Securement secured to commercial device 9/18/2019  7:00 AM   Clamp Status/Tolerance clamped 9/18/2019  7:00 AM   Insertion Site Appearance no redness, warmth, tenderness, skin breakdown, drainage 9/18/2019  7:00 AM   Flush/Irrigation flushed w/;water 9/18/2019  7:00 AM   Feeding Method bolus by pump 9/18/2019  3:00 AM   Feeding Action feeding continued 9/18/2019  7:00 AM   Current Rate (mL/hr) 10 mL/hr 9/18/2019  3:00 AM   Intake (mL) 30 mL 9/18/2019  7:00 AM   Intake (mL) - Formula Tube Feeding 10 9/18/2019  7:05 AM   Residual Amount (ml) 0 ml 9/18/2019  3:00 AM            Rectal Tube 09/15/19 0900 rectal tube w/ balloon (indicate number of mLs) (Active)   Balloon Inflation Volume (mL) 45 9/18/2019  7:00  AM   Outcome stool evacuated 9/18/2019  7:00 AM   Stool Color Brown 9/18/2019  7:00 AM   Insertion Site Appearance no redness, warmth, tenderness, skin breakdown, drainage 9/18/2019  7:00 AM   Flush/Irrigation flushed w/;water;no resistance met 9/18/2019  3:00 AM   Intake (mL) 60 mL 9/17/2019  9:00 PM   Rectal Tube Output 25 mL 9/18/2019  6:00 AM       Female External Urinary Catheter 09/15/19 0537 (Active)   Skin no breakdown 9/18/2019  7:00 AM   Tolerance no signs/symptoms of discomfort 9/18/2019  7:00 AM   Suction Continuous suction at 40 mmHg 9/17/2019  7:01 PM   Date of last wick change 09/16/19 9/17/2019  7:01 PM   Time of last wick change 2200 9/16/2019 11:01 PM   Output (mL) 500 mL 9/18/2019  6:00 AM       Neurosurgery Physical Exam   E1VTM5  PERRL  Localizing on right, WD on left  SILT    Significant Labs:  Recent Labs   Lab 09/17/19  0235 09/18/19  0456   * 153*    140   K 4.6 4.6    100   CO2 32* 29   BUN 17 20   CREATININE 0.9 1.1   CALCIUM 8.5* 8.7   MG 2.0 2.1     Recent Labs   Lab 09/17/19  0235 09/18/19  0456   WBC 5.79 6.80   HGB 10.3* 11.1*   HCT 33.6* 34.2*    170     No results for input(s): LABPT, INR, APTT in the last 48 hours.  Microbiology Results (last 7 days)     Procedure Component Value Units Date/Time    Blood culture [[id number]] Collected:  09/11/19 1811    Order Status:  Completed Specimen:  Blood from Peripheral, Hand, Left Updated:  09/16/19 2012     Blood Culture, Routine No growth after 5 days.    CSF culture [[id number]] Collected:  09/11/19 1355    Order Status:  Completed Specimen:  CSF (Spinal Fluid) from CSF Tap, Tube 3 Updated:  09/16/19 0650     CSF CULTURE No Growth     Gram Stain Result Cytospin indicates:      Rare WBC's      No organisms seen    Blood culture [[id number]] Collected:  09/08/19 1210    Order Status:  Completed Specimen:  Blood from Peripheral, Ankle, Left Updated:  09/13/19 1412     Blood Culture, Routine No growth after 5 days.     Blood culture [[id number]]  (Abnormal) Collected:  09/08/19 1215    Order Status:  Completed Specimen:  Blood from Peripheral, Forearm, Left Updated:  09/12/19 1031     Blood Culture, Routine Gram stain brian bottle: Gram positive cocci       Results called to and read back by:Milagro Valencia RN  09/09/2019  13:48      Gram stain aer bottle: Gram positive cocci 09/10/2019  15:46      GEMELLA (S.) MORBILLORUM  Susceptibility testing not routinely performed      Blood culture [[id number]]     Order Status:  Sent Specimen:  Blood     Gram stain [[id number]] Collected:  09/11/19 1355    Order Status:  Canceled Specimen:  CSF (Spinal Fluid) from CSF Tap, Tube 3     Culture, Respiratory with Gram Stain [[id number]]  (Abnormal)  (Susceptibility) Collected:  09/08/19 1158    Order Status:  Completed Specimen:  Respiratory from Tracheal Aspirate Updated:  09/11/19 0934     Respiratory Culture No S aureus or Pseudomonas isolated.      ESCHERICHIA COLI  Few  Normal respiratory connie also present       Gram Stain (Respiratory) Few WBC's     Gram Stain (Respiratory) Moderate Gram negative rods     Gram Stain (Respiratory) Rare Gram positive cocci            Significant Diagnostics:      Assessment/Plan:     New onset seizure  75 y/o female with pmhx CKD and HTN presented to outside hospital for seizure- like activity occurring on the left side. Found to have area of ill-defined enhancement on MRI brain w/ and w/out.  RODRIGUEZ thus far has been unrevealing for source of encephalopathy.    -q 1 hr neuro checks  -FEDERICO negative  -Fu ID recs for abx duration  -Continue steroids for now  -WTE when medically stable.  -At this point, will consider biopsy as rodriguez has been unrevealing.  Will discuss timing with staff.  -Further care per NCC, will follow.          Kingsley Lisa,   Neurosurgery  [facility]

## 2019-09-18 NOTE — PROGRESS NOTES
[facility]  Neurocritical Care  Progress Note    Admit Date: 9/3/2019  Service Date: 09/18/2019  Length of Stay: 15    Subjective:     Chief Complaint: Brain lesion    History of Present Illness: Pt is  75 yo female with a PMHx of CKD 3, HTN, was transferred from [facility] to [facility] for new onset seizures and concern for right front lobe mass. The pt was found in her bedroom by her spouse at approximately 9:45 pm sitting her head against the bed, unresponsive, and having seizure like activity on the left-side. EMT was called and the pt was given Versed twice while en route to the hospital. Pt had a second witnessed seizures, left facial droop, left-sided weakness, and tongue ecchymosis in the ED. Neurology was consulted and The pt was given IV Keppra and Vimpat. MRI brain without contrast was acquired. The image showed right frontal lobe vasogenic edema with mass effect concerning for underlying mass. EEG was acquired at outside hospital concerning showing an abnormal result. The pt was given decadron IV and neurosurgical consult recommended. Pt transferred to [facility] and admitted to the [facility] for higher level of care and further evaluation.     Pt history acquired per chart review and from spouse present at the bedside. The pt's spouse denies prior history of seizures CVA, cancer history and up-to-date screenings    Hospital Course: --admitted to [facility] on 9/3 following new onset seizure, arrived intubated  --MRI with large area of R cortical edema with flair and ill defined enhancement on contrast study, concern for glioma vs infectious/inflammatory process  --LP appears non infectious, cytology pending  9/5- no acute events, awaiting LP finalization from pathology report., weaning vent.   09/06/2019: Very agitated overnight, requiring increased sedation. Patient extubated this morning on rounds, and reintubated shortly after. Unable to maintain airway and secretions. CSF gram stain negative.  09/07/2019: KATT.  EKG obtained, seroquel started. Valproic acid started.  09/08/2019: NAEON. Improved agitation with seroquel.  09/09/2019: MRI Brain w/wo ordered for today. BLE US ordered. Will need new LP later on in the week.   09/10/2019 LP for infx workup, CD4 lab, EEG monitoring per Epilepsy   09/11/2019: To IR for LP. Spoke to NSGY about possible bx of lesion. Discontinue cEEG.   9/13/2019 Issues with low HR and BP overnight and this morning, Given Shoaib bump and atropine. 24 hours of IVF and bolus. WBC decreasing and afebrile. Repeat blood cultures negative. Plan for EGD and FEDERICO today.   9/14/2019 NAEO, EGD and FEDERICO moved until Monday. CSF still pending. Increasing WBC but afebrile. Start tube feeding   9/15/2019 NAEO, all CSF studies negative so far. CT negative. Begin insulin gtt. EGD and FEDERICO tomorrow    9/16 No significant events over night. EGD done this afternoon for esophageal stricture. Esophagus stretched to 15. FEDERIOC ordered for tomorrow.   9/17 No significant vents over night NPO for FEDERICO today scheduled for 3pm. Off levophed.  9/18 FEDERICO negative. Tube feeds restarted. NSGY following and considering brain biopsy. Propofol stopped. Precedex started.    Interval History: FEDERICO negative for vegetation. NSGY following for possible brain biopsy. Issues with hypotension and bradycardia. Stopped propofol and precedex. Gave shoaib bump and BP and HR normalized. Will give fentanyl drip to keep comfortable.     Review of Systems:  Unable to obtain a complete ROS due to patient intubated.     Vitals:   Temp: 98.9 °F (37.2 °C)  Pulse: (!) 55  Rhythm: normal sinus rhythm  BP: (!) 122/59  MAP (mmHg): 85  Resp: 18  SpO2: 100 %  Oxygen Concentration (%): 40  O2 Device (Oxygen Therapy): ventilator  Vent Mode: Spont  Set Rate: 0 bmp  Vt Set: 380 mL  Pressure Support: 12 cmH20  PEEP/CPAP: 5 cmH20  Peak Airway Pressure: 17 cmH2O  Mean Airway Pressure: 7.6 cmH20  Plateau Pressure: 0 cmH20    Temp  Min: 98.5 °F (36.9 °C)  Max: 99.2 °F (37.3  °C)  Pulse  Min: 55  Max: 102  BP  Min: 85/41  Max: 183/82  MAP (mmHg)  Min: 59  Max: 118  Resp  Min: 12  Max: 33  SpO2  Min: 83 %  Max: 100 %  Oxygen Concentration (%)  Min: 40  Max: 100    09/17 0701 - 09/18 0700  In: 1105.7 [I.V.:520.7]  Out: 2645 [Urine:2600]   Unmeasured Output  Urine Occurrence: 1  Stool Occurrence: 0  Emesis Occurrence: 0  Pad Count: 1     Examination:   Constitutional: Well-nourished and -developed. No apparent distress.   Eyes: Conjunctiva clear, anicteric. Lids no lesions.  Head/Ears/Nose/Mouth/Throat/Neck: Moist mucous membranes. External ears, nose atraumatic.   Cardiovascular: Regular rhythm. No murmurs. No leg edema.  Respiratory: Comfortable respirations. Clear to auscultation.  Gastrointestinal: No hernia. Soft, nondistended, nontender. + bowel sounds.    Neurologic:  -GCS E2 V1T M5  -On precedex gtt. Intubated. Does not follow commands.  -opens eyes to pain. +cough/gag/corneals  - Spontaneous movement in BLE and LLE, no movement to LUE    Medications:   Continuous  dexmedetomidine (PRECEDEX) infusion Last Rate: 0.4 mcg/kg/hr (09/18/19 1116)   Scheduled  cefTRIAXone (ROCEPHIN) IVPB 2 g Q12H   chlorhexidine 15 mL BID   dexamethasone 4 mg Q6H   famotidine 20 mg QHS   heparin (porcine) 5,000 Units Q8H   lacosamide 100 mg Q12H   levothyroxine 75 mcg Before breakfast   QUEtiapine 75 mg BID   senna-docusate 8.6-50 mg 1 tablet BID   valproic acid (as sodium salt) 750 mg Q8H   PRN  acetaminophen 650 mg Q6H PRN   Dextrose 10% Bolus 12.5 g PRN   fentaNYL 100 mcg Q2H PRN   fentaNYL 50 mcg Q2H PRN   glucagon (human recombinant) 1 mg PRN   hydrALAZINE 10 mg Q6H PRN   insulin aspart U-100 1-10 Units Q6H PRN   magnesium oxide 800 mg PRN   magnesium oxide 800 mg PRN   OLANZapine 5 mg Q12H PRN   potassium chloride 10% 40 mEq PRN   potassium chloride 10% 40 mEq PRN   potassium, sodium phosphates 2 packet PRN   potassium, sodium phosphates 2 packet PRN   potassium, sodium phosphates 2 packet PRN    racepinephrine 0.5 mL Q4H PRN   sodium chloride 0.9% 10 mL PRN   sodium chloride 0.9% 10 mL PRN      Today I independently reviewed pertinent medications, lines/drains/airways, imaging, cardiology results, laboratory results, microbiology results,      FEDERICO negative for vegetation    ISTAT: Recent Labs   Lab 09/18/19  1047   PH 7.556*   PCO2 34.9*   PO2 93   POCSATURATED 98   HCO3 31.0*   BE 9   POCTCO2 32*   SAMPLE ARTERIAL      Chem: Recent Labs   Lab 09/18/19  0456 09/18/19  1029     --    K 4.6  --      --    CO2 29  --    *  --    BUN 20  --    CREATININE 1.1  --    ESTGFRAFRICA 56.4*  --    EGFRNONAA 48.9*  --    CALCIUM 8.7  --    MG 2.1  --    PHOS 3.8  --    ANIONGAP 11  --    PROT 5.8*  --    ALBUMIN 2.8*  --    BILITOT 0.2  --    ALKPHOS 39*  --    AST 11  --    ALT 32  --    TROPONINI  --  0.023     Heme: Recent Labs   Lab 09/18/19  0456   WBC 6.80   HGB 11.1*   HCT 34.2*        Endo:   Recent Labs   Lab 09/18/19  0144 09/18/19  0627 09/18/19  0945   POCTGLUCOSE 129* 134* 202*        Assessment/Plan:     Neuro  * Brain lesion  74 year old admitted to unit on 9/3 following new onset seizure, with MRI showing large area of R cortical edema with flair and ill defined enhancement on contrast study, concern for glioma vs infectious/inflammatory process  -  repeat BC negative   - CSF cx no growth,   - CSF studies negative- VZV, enterovirus, HSV, MS  - FEDERICO negative for vegetation     - ID following   - NSGY on board for possible brain biosy  - continue decadron 4mg q6h  - epilepsy following   - neuro checks q1hr   - vital signs q1hr   -on SubQ heparin   -Repeat CT head stable       New onset seizure  2/2 to brain mass   9/11  EEG noted encephalopathy, no electrographic epileptiform.  Vimpat 100mg q12h  Valproic acid 750mg q8h, valproate level 53.7 (9/18)  Epilepsy following          Pulmonary  Acute respiratory failure with hypoxia and hypercapnia  See [facility] vent    On mechanically  assisted ventilation  Daily CXR, ABG  Continue Peridex, Famotidine, Heparin subq  Trial of extubation 9/6/2019- re-intubated shortly after extubation for airway protection  Continue to wean vent settings as tolerated  Still intubated 2/2 inability to protect airway  Propofol stopped   Precedex started     Vent Mode: Spont  Oxygen Concentration (%):  [] 40  Resp Rate Total:  [12 br/min-32 br/min] 21 br/min  Vt Set:  [380 mL] 380 mL  PEEP/CPAP:  [5 cmH20] 5 cmH20  Pressure Support:  [0 xiR08-30 cmH20] 12 cmH20  Mean Airway Pressure:  [7.6 cmH20-10 cmH20] 7.6 cmH20   Daily CXR/ABG    Cardiac/Vascular  Essential hypertension  SBP <180, MAP >65   Echo: EF 65-70% concentric LV remodeling  Levo off  FEDERICO (9/17) negative for vegetation, ASD, PFO       Renal/  Chronic kidney disease, stage III (moderate)  -PMHx of CKD 3   -monitor renal function, I/Os       ID  Bacteremia  -Blood culture + genella morbillorum  Continue ceftriaxone 2g q12h for 2 weeks per ID recs    Oncology  Leukocytosis  Afebrile, WBC  WNL  -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 Stretched esophagus to 15 for FEDERICO    Other  Hypotension due to hypovolemia  -Levo and propofol off , keep MAP > 65              The patient is being Prophylaxed for:  Venous Thromboembolism with: Chemical  Stress Ulcer with: PPI  Ventilator Pneumonia with: chlorhexidine oral care    Activity Orders          Diet NPO Except for: Medication: NPO starting at 09/17 0001    Straight Cath starting at 09/05 0034        Full Code     Critical care time 40 minutes.     Teresa Teixeira PA-C  Neurocritical Care  [facility]

## 2019-09-18 NOTE — ASSESSMENT & PLAN NOTE
2/2 to brain mass   9/11  EEG noted encephalopathy, no electrographic epileptiform.  Vimpat 100mg q12h  Valproic acid 750mg q8h, valproate level 53.7 (9/18)  Epilepsy following

## 2019-09-18 NOTE — ASSESSMENT & PLAN NOTE
74 year old admitted to unit on 9/3 following new onset seizure, with MRI showing large area of R cortical edema with flair and ill defined enhancement on contrast study, concern for glioma vs infectious/inflammatory process  -  repeat BC negative   - CSF cx no growth,   - CSF studies negative- VZV, enterovirus, HSV, MS  - FEDERICO negative for vegetation     - ID following   - NSGY on board for possible brain biosy  - continue decadron 4mg q6h  - epilepsy following   - neuro checks q1hr   - vital signs q1hr   -on SubQ heparin   -Repeat CT head stable

## 2019-09-18 NOTE — ASSESSMENT & PLAN NOTE
73 y/o female with pmhx CKD and HTN presented to outside hospital for seizure- like activity occurring on the left side. Found to have area of ill-defined enhancement on MRI brain w/ and w/out.  RODRIGUEZ thus far has been unrevealing for source of encephalopathy.    -q 1 hr neuro checks  -FEDERICO negative  -Fu ID recs for abx duration  -Continue steroids for now  -WTE when medically stable.  -At this point, will consider biopsy as rodriguez has been unrevealing.  Will discuss timing with staff.  -Further care per NCC, will follow.

## 2019-09-19 LAB
ALBUMIN SERPL BCP-MCNC: 2.9 G/DL (ref 3.5–5.2)
ALLENS TEST: ABNORMAL
ALP SERPL-CCNC: 41 U/L (ref 55–135)
ALT SERPL W/O P-5'-P-CCNC: 30 U/L (ref 10–44)
ANION GAP SERPL CALC-SCNC: 9 MMOL/L (ref 8–16)
AST SERPL-CCNC: 14 U/L (ref 10–40)
BASOPHILS # BLD AUTO: 0.03 K/UL (ref 0–0.2)
BASOPHILS NFR BLD: 0.3 % (ref 0–1.9)
BILIRUB SERPL-MCNC: 0.2 MG/DL (ref 0.1–1)
BUN SERPL-MCNC: 24 MG/DL (ref 8–23)
CALCIUM SERPL-MCNC: 8.7 MG/DL (ref 8.7–10.5)
CHLORIDE SERPL-SCNC: 102 MMOL/L (ref 95–110)
CO2 SERPL-SCNC: 29 MMOL/L (ref 23–29)
CREAT SERPL-MCNC: 1.2 MG/DL (ref 0.5–1.4)
DELSYS: ABNORMAL
DIFFERENTIAL METHOD: ABNORMAL
EOSINOPHIL # BLD AUTO: 0 K/UL (ref 0–0.5)
EOSINOPHIL NFR BLD: 0 % (ref 0–8)
ERYTHROCYTE [DISTWIDTH] IN BLOOD BY AUTOMATED COUNT: 13.7 % (ref 11.5–14.5)
ERYTHROCYTE [SEDIMENTATION RATE] IN BLOOD BY WESTERGREN METHOD: 16 MM/H
EST. GFR  (AFRICAN AMERICAN): 50.7 ML/MIN/1.73 M^2
EST. GFR  (NON AFRICAN AMERICAN): 44 ML/MIN/1.73 M^2
FIO2: 40
GLUCOSE SERPL-MCNC: 169 MG/DL (ref 70–110)
HCO3 UR-SCNC: 27.8 MMOL/L (ref 24–28)
HCT VFR BLD AUTO: 35 % (ref 37–48.5)
HGB BLD-MCNC: 10.8 G/DL (ref 12–16)
IMM GRANULOCYTES # BLD AUTO: 0.42 K/UL (ref 0–0.04)
IMM GRANULOCYTES NFR BLD AUTO: 4.4 % (ref 0–0.5)
LYMPHOCYTES # BLD AUTO: 1.1 K/UL (ref 1–4.8)
LYMPHOCYTES NFR BLD: 11.7 % (ref 18–48)
MAGNESIUM SERPL-MCNC: 2.1 MG/DL (ref 1.6–2.6)
MCH RBC QN AUTO: 28.6 PG (ref 27–31)
MCHC RBC AUTO-ENTMCNC: 30.9 G/DL (ref 32–36)
MCV RBC AUTO: 93 FL (ref 82–98)
MIN VOL: 6.21
MODE: ABNORMAL
MONOCYTES # BLD AUTO: 1.8 K/UL (ref 0.3–1)
MONOCYTES NFR BLD: 18.4 % (ref 4–15)
NEUTROPHILS # BLD AUTO: 6.2 K/UL (ref 1.8–7.7)
NEUTROPHILS NFR BLD: 65.2 % (ref 38–73)
NRBC BLD-RTO: 0 /100 WBC
PCO2 BLDA: 37.5 MMHG (ref 35–45)
PEEP: 5
PH SMN: 7.48 [PH] (ref 7.35–7.45)
PHOSPHATE SERPL-MCNC: 3.1 MG/DL (ref 2.7–4.5)
PIP: 29
PLATELET # BLD AUTO: 182 K/UL (ref 150–350)
PMV BLD AUTO: 9.3 FL (ref 9.2–12.9)
PO2 BLDA: 111 MMHG (ref 80–100)
POC BE: 4 MMOL/L
POC SATURATED O2: 99 % (ref 95–100)
POC TCO2: 29 MMOL/L (ref 23–27)
POCT GLUCOSE: 122 MG/DL (ref 70–110)
POCT GLUCOSE: 132 MG/DL (ref 70–110)
POCT GLUCOSE: 159 MG/DL (ref 70–110)
POCT GLUCOSE: 208 MG/DL (ref 70–110)
POTASSIUM SERPL-SCNC: 4.2 MMOL/L (ref 3.5–5.1)
PROT SERPL-MCNC: 5.9 G/DL (ref 6–8.4)
RBC # BLD AUTO: 3.77 M/UL (ref 4–5.4)
SAMPLE: ABNORMAL
SITE: ABNORMAL
SODIUM SERPL-SCNC: 140 MMOL/L (ref 136–145)
SP02: 100
VT: 380
WBC # BLD AUTO: 9.49 K/UL (ref 3.9–12.7)

## 2019-09-19 PROCEDURE — 25000003 PHARM REV CODE 250: Performed by: NURSE PRACTITIONER

## 2019-09-19 PROCEDURE — 63600175 PHARM REV CODE 636 W HCPCS: Performed by: PSYCHIATRY & NEUROLOGY

## 2019-09-19 PROCEDURE — 63600175 PHARM REV CODE 636 W HCPCS: Performed by: PHYSICIAN ASSISTANT

## 2019-09-19 PROCEDURE — 85025 COMPLETE CBC W/AUTO DIFF WBC: CPT

## 2019-09-19 PROCEDURE — 63600175 PHARM REV CODE 636 W HCPCS: Performed by: STUDENT IN AN ORGANIZED HEALTH CARE EDUCATION/TRAINING PROGRAM

## 2019-09-19 PROCEDURE — 94761 N-INVAS EAR/PLS OXIMETRY MLT: CPT

## 2019-09-19 PROCEDURE — A9585 GADOBUTROL INJECTION: HCPCS | Performed by: PSYCHIATRY & NEUROLOGY

## 2019-09-19 PROCEDURE — 25000003 PHARM REV CODE 250: Performed by: PSYCHIATRY & NEUROLOGY

## 2019-09-19 PROCEDURE — 99232 SBSQ HOSP IP/OBS MODERATE 35: CPT | Mod: ,,, | Performed by: NEUROLOGICAL SURGERY

## 2019-09-19 PROCEDURE — 99232 PR SUBSEQUENT HOSPITAL CARE,LEVL II: ICD-10-PCS | Mod: ,,, | Performed by: NEUROLOGICAL SURGERY

## 2019-09-19 PROCEDURE — 83735 ASSAY OF MAGNESIUM: CPT

## 2019-09-19 PROCEDURE — 94003 VENT MGMT INPAT SUBQ DAY: CPT

## 2019-09-19 PROCEDURE — 80053 COMPREHEN METABOLIC PANEL: CPT

## 2019-09-19 PROCEDURE — 99291 PR CRITICAL CARE, E/M 30-74 MINUTES: ICD-10-PCS | Mod: ,,, | Performed by: PHYSICIAN ASSISTANT

## 2019-09-19 PROCEDURE — 99900026 HC AIRWAY MAINTENANCE (STAT)

## 2019-09-19 PROCEDURE — 99291 CRITICAL CARE FIRST HOUR: CPT | Mod: ,,, | Performed by: PHYSICIAN ASSISTANT

## 2019-09-19 PROCEDURE — 27000221 HC OXYGEN, UP TO 24 HOURS

## 2019-09-19 PROCEDURE — 84100 ASSAY OF PHOSPHORUS: CPT

## 2019-09-19 PROCEDURE — 25000003 PHARM REV CODE 250: Performed by: STUDENT IN AN ORGANIZED HEALTH CARE EDUCATION/TRAINING PROGRAM

## 2019-09-19 PROCEDURE — 20000000 HC ICU ROOM

## 2019-09-19 PROCEDURE — 25000003 PHARM REV CODE 250: Performed by: PHYSICIAN ASSISTANT

## 2019-09-19 PROCEDURE — 99900035 HC TECH TIME PER 15 MIN (STAT)

## 2019-09-19 PROCEDURE — 25500020 PHARM REV CODE 255: Performed by: PSYCHIATRY & NEUROLOGY

## 2019-09-19 RX ORDER — GADOBUTROL 604.72 MG/ML
9 INJECTION INTRAVENOUS
Status: COMPLETED | OUTPATIENT
Start: 2019-09-19 | End: 2019-09-19

## 2019-09-19 RX ORDER — QUETIAPINE FUMARATE 25 MG/1
75 TABLET, FILM COATED ORAL 2 TIMES DAILY
Status: DISCONTINUED | OUTPATIENT
Start: 2019-09-19 | End: 2019-09-20

## 2019-09-19 RX ORDER — PHENYLEPHRINE HCL IN 0.9% NACL 1 MG/10 ML
SYRINGE (ML) INTRAVENOUS
Status: DISPENSED
Start: 2019-09-19 | End: 2019-09-20

## 2019-09-19 RX ORDER — NOREPINEPHRINE BITARTRATE/D5W 4MG/250ML
0.02 PLASTIC BAG, INJECTION (ML) INTRAVENOUS CONTINUOUS
Status: DISCONTINUED | OUTPATIENT
Start: 2019-09-19 | End: 2019-09-20

## 2019-09-19 RX ORDER — PROPOFOL 10 MG/ML
10 INJECTION, EMULSION INTRAVENOUS CONTINUOUS
Status: DISCONTINUED | OUTPATIENT
Start: 2019-09-19 | End: 2019-09-20

## 2019-09-19 RX ORDER — FENTANYL CITRATE-0.9 % NACL/PF 10 MCG/ML
25 PLASTIC BAG, INJECTION (ML) INTRAVENOUS CONTINUOUS
Status: DISCONTINUED | OUTPATIENT
Start: 2019-09-19 | End: 2019-09-20

## 2019-09-19 RX ADMIN — HEPARIN SODIUM 5000 UNITS: 5000 INJECTION, SOLUTION INTRAVENOUS; SUBCUTANEOUS at 02:09

## 2019-09-19 RX ADMIN — LACOSAMIDE 100 MG: 50 TABLET, FILM COATED ORAL at 08:09

## 2019-09-19 RX ADMIN — CEFTRIAXONE SODIUM 2 G: 2 INJECTION, SOLUTION INTRAVENOUS at 12:09

## 2019-09-19 RX ADMIN — Medication 50 MCG/HR: at 11:09

## 2019-09-19 RX ADMIN — CEFTRIAXONE SODIUM 2 G: 2 INJECTION, SOLUTION INTRAVENOUS at 11:09

## 2019-09-19 RX ADMIN — HEPARIN SODIUM 5000 UNITS: 5000 INJECTION, SOLUTION INTRAVENOUS; SUBCUTANEOUS at 09:09

## 2019-09-19 RX ADMIN — LEVOTHYROXINE SODIUM 75 MCG: 75 TABLET ORAL at 05:09

## 2019-09-19 RX ADMIN — Medication 0.02 MCG/KG/MIN: at 03:09

## 2019-09-19 RX ADMIN — DEXAMETHASONE SODIUM PHOSPHATE 4 MG: 4 INJECTION, SOLUTION INTRAMUSCULAR; INTRAVENOUS at 08:09

## 2019-09-19 RX ADMIN — VALPROIC ACID 750 MG: 250 SOLUTION ORAL at 12:09

## 2019-09-19 RX ADMIN — SENNOSIDES AND DOCUSATE SODIUM 1 TABLET: 8.6; 5 TABLET ORAL at 09:09

## 2019-09-19 RX ADMIN — DEXAMETHASONE SODIUM PHOSPHATE 4 MG: 4 INJECTION, SOLUTION INTRAMUSCULAR; INTRAVENOUS at 05:09

## 2019-09-19 RX ADMIN — QUETIAPINE FUMARATE 75 MG: 25 TABLET ORAL at 11:09

## 2019-09-19 RX ADMIN — DEXAMETHASONE SODIUM PHOSPHATE 4 MG: 4 INJECTION, SOLUTION INTRAMUSCULAR; INTRAVENOUS at 11:09

## 2019-09-19 RX ADMIN — INSULIN ASPART 2 UNITS: 100 INJECTION, SOLUTION INTRAVENOUS; SUBCUTANEOUS at 05:09

## 2019-09-19 RX ADMIN — INSULIN ASPART 4 UNITS: 100 INJECTION, SOLUTION INTRAVENOUS; SUBCUTANEOUS at 06:09

## 2019-09-19 RX ADMIN — GADOBUTROL 9 ML: 604.72 INJECTION INTRAVENOUS at 04:09

## 2019-09-19 RX ADMIN — SENNOSIDES AND DOCUSATE SODIUM 1 TABLET: 8.6; 5 TABLET ORAL at 08:09

## 2019-09-19 RX ADMIN — HEPARIN SODIUM 5000 UNITS: 5000 INJECTION, SOLUTION INTRAVENOUS; SUBCUTANEOUS at 05:09

## 2019-09-19 RX ADMIN — FAMOTIDINE 20 MG: 20 TABLET ORAL at 09:09

## 2019-09-19 RX ADMIN — VALPROIC ACID 750 MG: 250 SOLUTION ORAL at 08:09

## 2019-09-19 RX ADMIN — VALPROIC ACID 750 MG: 250 SOLUTION ORAL at 05:09

## 2019-09-19 RX ADMIN — CHLORHEXIDINE GLUCONATE 0.12% ORAL RINSE 15 ML: 1.2 LIQUID ORAL at 09:09

## 2019-09-19 RX ADMIN — PROPOFOL 30 MCG/KG/MIN: 10 INJECTION, EMULSION INTRAVENOUS at 02:09

## 2019-09-19 RX ADMIN — ACETAMINOPHEN 650 MG: 325 TABLET ORAL at 07:09

## 2019-09-19 RX ADMIN — DEXAMETHASONE SODIUM PHOSPHATE 4 MG: 4 INJECTION, SOLUTION INTRAMUSCULAR; INTRAVENOUS at 12:09

## 2019-09-19 RX ADMIN — LACOSAMIDE 100 MG: 50 TABLET, FILM COATED ORAL at 09:09

## 2019-09-19 RX ADMIN — CHLORHEXIDINE GLUCONATE 0.12% ORAL RINSE 15 ML: 1.2 LIQUID ORAL at 08:09

## 2019-09-19 RX ADMIN — QUETIAPINE FUMARATE 75 MG: 25 TABLET ORAL at 08:09

## 2019-09-19 NOTE — PROGRESS NOTES
[facility]  Neurosurgery  Progress Note    Subjective:     History of Present Illness: 75 y/o female with pmhx CKD and HTN  presented to outside hospital for seizure- like activity this AM. Per  pt was found unconscious this AM, with seizure like activity occurring on the left side. Pt was given versed x 2 via EMS. CTH performed at outside hospital was negative for bleed. Pt had additional seizure in outside hospital ED and was given IV Keppra. EEG ordered and MRI brain w/out contrast concerning for possible infiltratrive process vs post ischemic sequela. Pt transferred to [facility] and admitted to [facility]. MRI brain w/ and w/out obtained that showed large area of edema in right frontal lobe and ill defined enhancement in the frontal lobe concerning for possible cerebritis vs. Neoplasm. NSGY was consulted to evaluate. Pt not on anti-coagulation.       Post-Op Info:  Procedure(s) (LRB):  EGD (ESOPHAGOGASTRODUODENOSCOPY) (N/A)   3 Days Post-Op     Interval History: naeon, off propofol this am.    Medications:  Continuous Infusions:  Scheduled Meds:   cefTRIAXone (ROCEPHIN) IVPB  2 g Intravenous Q12H    chlorhexidine  15 mL Mouth/Throat BID    dexamethasone  4 mg Intravenous Q6H    famotidine  20 mg Per OG tube QHS    heparin (porcine)  5,000 Units Subcutaneous Q8H    lacosamide  100 mg Per OG tube Q12H    levothyroxine  75 mcg Per OG tube Before breakfast    QUEtiapine  75 mg Per OG tube BID    senna-docusate 8.6-50 mg  1 tablet Per OG tube BID    valproic acid (as sodium salt)  750 mg Per OG tube Q8H     PRN Meds:acetaminophen, Dextrose 10% Bolus, fentaNYL, fentaNYL, glucagon (human recombinant), hydrALAZINE, insulin aspart U-100, magnesium oxide, magnesium oxide, OLANZapine, potassium chloride 10%, potassium chloride 10%, potassium, sodium phosphates, potassium, sodium phosphates, potassium, sodium phosphates, racepinephrine, sodium chloride 0.9%, sodium chloride 0.9%     Review of  Systems  Objective:     Weight: 78.6 kg (173 lb 4.5 oz)  Body mass index is 30.7 kg/m².  Vital Signs (Most Recent):  Temp: 99.4 °F (37.4 °C) (09/19/19 0705)  Pulse: 80 (09/19/19 0905)  Resp: 16 (09/19/19 0905)  BP: (!) 90/43 (09/19/19 0905)  SpO2: 98 % (09/19/19 0905) Vital Signs (24h Range):  Temp:  [98.8 °F (37.1 °C)-100.5 °F (38.1 °C)] 99.4 °F (37.4 °C)  Pulse:  [] 80  Resp:  [12-47] 16  SpO2:  [98 %-100 %] 98 %  BP: ()/() 90/43     Date 09/19/19 0700 - 09/20/19 0659   Shift 7065-0861 7402-5540 1807-2518 24 Hour Total   INTAKE   I.V.(mL/kg) 15(0.2)   15(0.2)   NG/   160   Shift Total(mL/kg) 175(2.2)   175(2.2)   OUTPUT   Shift Total(mL/kg)       Weight (kg) 78.6 78.6 78.6 78.6              Vent Mode: A/C  Oxygen Concentration (%):  [40] 40  Resp Rate Total:  [12 br/min-35 br/min] 16 br/min  Vt Set:  [380 mL] 380 mL  PEEP/CPAP:  [5 cmH20] 5 cmH20  Pressure Support:  [0 ngZ87-88 cmH20] 0 cmH20  Mean Airway Pressure:  [7.6 asS33-69 cmH20] 9.3 cmH20         NG/OG Tube 09/16/19 4036 orogastric Center mouth (Active)   Placement Check placement verified by aspirate characteristics;placement verified by x-ray;placement verified by distal tube length measurement 9/19/2019  7:05 AM   Tolerance no signs/symptoms of discomfort 9/19/2019  7:05 AM   Securement secured to commercial device 9/19/2019  7:05 AM   Clamp Status/Tolerance unclamped 9/19/2019  7:05 AM   Insertion Site Appearance no redness, warmth, tenderness, skin breakdown, drainage 9/19/2019  7:05 AM   Drainage None 9/19/2019  7:05 AM   Flush/Irrigation flushed w/;water;no resistance met 9/19/2019  7:05 AM   Feeding Method continuous 9/19/2019  7:05 AM   Feeding Action feeding continued 9/19/2019  7:05 AM   Current Rate (mL/hr) 30 mL/hr 9/19/2019  7:05 AM   Goal Rate (mL/hr) 45 mL/hr 9/19/2019  7:05 AM   Intake (mL) 60 mL 9/19/2019  8:05 AM   Formula Name Isosource 1.5 9/19/2019  7:05 AM   Intake (mL) - Formula Tube Feeding 40 9/19/2019   9:05 AM   Residual Amount (ml) 0 ml 9/19/2019  7:05 AM            Rectal Tube 09/15/19 0900 rectal tube w/ balloon (indicate number of mLs) (Active)   Balloon Inflation Volume (mL) 45 9/19/2019  7:05 AM   Reposition other (see comments) 9/19/2019  7:05 AM   Outcome gas expelled, stool evacuated 9/19/2019  7:05 AM   Stool Color Brown 9/19/2019  7:05 AM   Insertion Site Appearance no redness, warmth, tenderness, skin breakdown, drainage 9/19/2019  7:05 AM   Flush/Irrigation flushed w/;water;no resistance met 9/19/2019  7:05 AM   Intake (mL) 60 mL 9/18/2019  8:00 PM   Rectal Tube Output 25 mL 9/18/2019  6:00 AM       Neurosurgery Physical Exam   [id number]  PERRL  Right side and LLE spontaneously antigravity  Paretic in LUE.    Significant Labs:  Recent Labs   Lab 09/18/19  0456 09/19/19  0309   * 169*    140   K 4.6 4.2    102   CO2 29 29   BUN 20 24*   CREATININE 1.1 1.2   CALCIUM 8.7 8.7   MG 2.1 2.1     Recent Labs   Lab 09/18/19  0456 09/19/19  0309   WBC 6.80 9.49   HGB 11.1* 10.8*   HCT 34.2* 35.0*    182     No results for input(s): LABPT, INR, APTT in the last 48 hours.  Microbiology Results (last 7 days)     Procedure Component Value Units Date/Time    Blood culture [[id number]] Collected:  09/11/19 1811    Order Status:  Completed Specimen:  Blood from Peripheral, Hand, Left Updated:  09/16/19 2012     Blood Culture, Routine No growth after 5 days.    CSF culture [[id number]] Collected:  09/11/19 1355    Order Status:  Completed Specimen:  CSF (Spinal Fluid) from CSF Tap, Tube 3 Updated:  09/16/19 0650     CSF CULTURE No Growth     Gram Stain Result Cytospin indicates:      Rare WBC's      No organisms seen    Blood culture [[id number]] Collected:  09/08/19 1210    Order Status:  Completed Specimen:  Blood from Peripheral, Ankle, Left Updated:  09/13/19 1412     Blood Culture, Routine No growth after 5 days.    Blood culture [[id number]]  (Abnormal) Collected:  09/08/19 1215    Order  Status:  Completed Specimen:  Blood from Peripheral, Forearm, Left Updated:  09/12/19 1031     Blood Culture, Routine Gram stain brian bottle: Gram positive cocci       Results called to and read back by:Milagro Valencia RN  09/09/2019  13:48      Gram stain aer bottle: Gram positive cocci 09/10/2019  15:46      GEMELLA (S.) MORBILLORUM  Susceptibility testing not routinely performed              Significant Diagnostics:      Assessment/Plan:     New onset seizure  75 y/o female with pmhx CKD and HTN presented to outside hospital for seizure- like activity occurring on the left side. Found to have area of ill-defined enhancement on MRI brain w/ and w/out.  RODRIGUEZ thus far has been unrevealing for source of encephalopathy.    -q 1 hr neuro checks  -FEDERICO negative  -Fu ID recs for abx duration  -Continue steroids for now  -WTE when medically stable.  -At this point, will consider biopsy as rodriguez has been unrevealing.    -Repeat MRI brain with stealth to assess for any interval change in right frontal lesion and plan accordingly based on results.  -Further care per NCC, will follow.          Kingsley Lisa, DO  Neurosurgery  [facility]

## 2019-09-19 NOTE — ASSESSMENT & PLAN NOTE
SBP <180, MAP >65   Echo: EF 65-70% concentric LV remodeling  FEDERICO (9/17) negative for vegetation, ASD, PFO

## 2019-09-19 NOTE — ASSESSMENT & PLAN NOTE
74 year old admitted to unit on 9/3 following new onset seizure, with MRI showing large area of R cortical edema with flair and ill defined enhancement on contrast study, concern for glioma vs infectious/inflammatory process  -  repeat BC negative   - CSF cx no growth,   - CSF studies negative- VZV, enterovirus, HSV, MS  - FEDERICO negative for vegetation     - ID following   - continue decadron 4mg q6h  - epilepsy following   - neuro checks q1hr   - vital signs q1hr   -on SubQ heparin   -Repeat brain MRI W/WO with stealth today for possible brain biopsy per NSGY

## 2019-09-19 NOTE — PROGRESS NOTES
[facility]  Neurocritical Care  Progress Note    Admit Date: 9/3/2019  Service Date: 09/19/2019  Length of Stay: 16    Subjective:     Chief Complaint: Brain lesion    History of Present Illness: Pt is  75 yo female with a PMHx of CKD 3, HTN, was transferred from [facility] to [facility] for new onset seizures and concern for right front lobe mass. The pt was found in her bedroom by her spouse at approximately 9:45 pm sitting her head against the bed, unresponsive, and having seizure like activity on the left-side. EMT was called and the pt was given Versed twice while en route to the hospital. Pt had a second witnessed seizures, left facial droop, left-sided weakness, and tongue ecchymosis in the ED. Neurology was consulted and The pt was given IV Keppra and Vimpat. MRI brain without contrast was acquired. The image showed right frontal lobe vasogenic edema with mass effect concerning for underlying mass. EEG was acquired at outside hospital concerning showing an abnormal result. The pt was given decadron IV and neurosurgical consult recommended. Pt transferred to [facility] and admitted to the [facility] for higher level of care and further evaluation.     Pt history acquired per chart review and from spouse present at the bedside. The pt's spouse denies prior history of seizures CVA, cancer history and up-to-date screenings    Hospital Course: --admitted to [facility] on 9/3 following new onset seizure, arrived intubated  --MRI with large area of R cortical edema with flair and ill defined enhancement on contrast study, concern for glioma vs infectious/inflammatory process  --LP appears non infectious, cytology pending  9/5- no acute events, awaiting LP finalization from pathology report., weaning vent.   09/06/2019: Very agitated overnight, requiring increased sedation. Patient extubated this morning on rounds, and reintubated shortly after. Unable to maintain airway and secretions. CSF gram stain negative.  09/07/2019: KATT.  EKG obtained, seroquel started. Valproic acid started.  09/08/2019: NAEON. Improved agitation with seroquel.  09/09/2019: MRI Brain w/wo ordered for today. BLE US ordered. Will need new LP later on in the week.   09/10/2019 LP for infx workup, CD4 lab, EEG monitoring per Epilepsy   09/11/2019: To IR for LP. Spoke to NSGY about possible bx of lesion. Discontinue cEEG.   9/13/2019 Issues with low HR and BP overnight and this morning, Given Shoaib bump and atropine. 24 hours of IVF and bolus. WBC decreasing and afebrile. Repeat blood cultures negative. Plan for EGD and FEDERICO today.   9/14/2019 NAEO, EGD and FEDERICO moved until Monday. CSF still pending. Increasing WBC but afebrile. Start tube feeding   9/15/2019 NAEO, all CSF studies negative so far. CT negative. Begin insulin gtt. EGD and FEDERICO tomorrow    9/16 No significant events over night. EGD done this afternoon for esophageal stricture. Esophagus stretched to 15. FEDERICO ordered for tomorrow.   9/17 No significant vents over night NPO for FEDERICO today scheduled for 3pm. Off levophed.  9/18 FEDERICO negative. Tube feeds restarted. NSGY following and considering brain biopsy. Propofol stopped. Precedex started.  9/19: NSGY recommending MRI brain W/WO with stealth for possible brain biopsy as other work up has been negative. Patient continues with hypotensive episodes, on Fentanyl drip and recommend to space out medications.    Interval History: Patient continues to have issues with hypotensive episodes with multiple medications. Will put on fentanyl drip and monitor. Recommend spacing out daily medications. MRI brain spec and perfusion brain today per NSGY for possible brain biopsy.     Review of Systems: Unable to obtain a complete ROS due to patient is intubated and sedated     Vitals:   Temp: 99.4 °F (37.4 °C)  Pulse: 95  Rhythm: sinus tachycardia  BP: (!) 163/99  MAP (mmHg): 126  Resp: (!) 47  SpO2: 100 %  Oxygen Concentration (%): 40  O2 Device (Oxygen Therapy): ventilator  Vent  Mode: A/C  Set Rate: 16 bmp  Vt Set: 380 mL  Pressure Support: 0 cmH20  PEEP/CPAP: 5 cmH20  Peak Airway Pressure: 29 cmH2O  Mean Airway Pressure: 9.1 cmH20  Plateau Pressure: 0 cmH20    Temp  Min: 99.2 °F (37.3 °C)  Max: 100.5 °F (38.1 °C)  Pulse  Min: 67  Max: 112  BP  Min: 80/46  Max: 208/91  MAP (mmHg)  Min: 56  Max: 140  Resp  Min: 11  Max: 47  SpO2  Min: 98 %  Max: 100 %  Oxygen Concentration (%)  Min: 40  Max: 40    09/18 0701 - 09/19 0700  In: 2440.2 [I.V.:155.2]  Out: 650 [Urine:650]   Unmeasured Output  Urine Occurrence: 1  Stool Occurrence: 0  Emesis Occurrence: 0  Pad Count: 2     Examination:   Constitutional: Well-nourished and -developed. No apparent distress.   Eyes: Conjunctiva clear, anicteric. Lids no lesions.  Head/Ears/Nose/Mouth/Throat/Neck: Moist mucous membranes. External ears, nose atraumatic.   Cardiovascular: Regular rhythm. No murmurs.  Respiratory: on mechanical ventilationClear to auscultation.  Gastrointestinal: No hernia. Soft, nondistended, nontender. + bowel sounds.    Neurologic:  -GCS E2 V1T M5  -Intubated. Does not follow commands.  -opens eyes to pain. +cough/gag/corneals  - Spontaneous movement in BLE and LLE, no movement to LUE  Unable to test orientation, language, memory, judgment, insight, fund of knowledge, hearing, shoulder shrug, tongue protrusion, coordination, gait due to level of consciousness.    Medications:   Continuous  fentanyl Last Rate: Stopped (09/19/19 1440)   norepinephrine bitartrate-D5W    propofol Last Rate: 40 mcg/kg/min (09/19/19 1513)   Scheduled  phenylephrine HCl in 0.9% NaCl     cefTRIAXone (ROCEPHIN) IVPB 2 g Q12H   chlorhexidine 15 mL BID   dexamethasone 4 mg Q6H   famotidine 20 mg QHS   heparin (porcine) 5,000 Units Q8H   lacosamide 100 mg Q12H   levothyroxine 75 mcg Before breakfast   QUEtiapine 75 mg BID   senna-docusate 8.6-50 mg 1 tablet BID   valproic acid (as sodium salt) 750 mg Q8H   PRN  acetaminophen 650 mg Q6H PRN   Dextrose 10% Bolus  12.5 g PRN   fentaNYL 100 mcg Q2H PRN   fentaNYL 50 mcg Q2H PRN   glucagon (human recombinant) 1 mg PRN   hydrALAZINE 10 mg Q6H PRN   insulin aspart U-100 1-10 Units Q6H PRN   magnesium oxide 800 mg PRN   magnesium oxide 800 mg PRN   OLANZapine 5 mg Q12H PRN   potassium chloride 10% 40 mEq PRN   potassium chloride 10% 40 mEq PRN   potassium, sodium phosphates 2 packet PRN   potassium, sodium phosphates 2 packet PRN   potassium, sodium phosphates 2 packet PRN   racepinephrine 0.5 mL Q4H PRN   sodium chloride 0.9% 10 mL PRN   sodium chloride 0.9% 10 mL PRN      Today I independently reviewed pertinent medications, lines/drains/airways, imaging, cardiology results, laboratory results, microbiology results,     ISTAT: Recent Labs   Lab 09/19/19  1019   PH 7.479*   PCO2 37.5   PO2 111*   POCSATURATED 99   HCO3 27.8   BE 4   POCTCO2 29*   SAMPLE ARTERIAL      Chem: Recent Labs   Lab 09/19/19  0309      K 4.2      CO2 29   *   BUN 24*   CREATININE 1.2   ESTGFRAFRICA 50.7*   EGFRNONAA 44.0*   CALCIUM 8.7   MG 2.1   PHOS 3.1   ANIONGAP 9   PROT 5.9*   ALBUMIN 2.9*   BILITOT 0.2   ALKPHOS 41*   AST 14   ALT 30     Heme: Recent Labs   Lab 09/19/19  0309   WBC 9.49   HGB 10.8*   HCT 35.0*        Endo:   Recent Labs   Lab 09/19/19  0052 09/19/19  0502 09/19/19  1340   POCTGLUCOSE 122* 159* 132*      Assessment/Plan:     Neuro  * Brain lesion  74 year old admitted to unit on 9/3 following new onset seizure, with MRI showing large area of R cortical edema with flair and ill defined enhancement on contrast study, concern for glioma vs infectious/inflammatory process  -  repeat BC negative   - CSF cx no growth,   - CSF studies negative- VZV, enterovirus, HSV, MS  - FEDERICO negative for vegetation     - ID following   - continue decadron 4mg q6h  - epilepsy following   - neuro checks q1hr   - vital signs q1hr   -on SubQ heparin   -Repeat brain MRI W/WO with stealth today for possible brain biopsy per  NSGY        Cerebral edema  --continue decadron, see brain lesion      New onset seizure  2/2 to brain mass   9/11  EEG noted encephalopathy, no electrographic epileptiform.  Vimpat 100mg q12h  Valproic acid 750mg q8h, valproate level 53.7 (9/18)  Epilepsy following          Pulmonary  Acute respiratory failure with hypoxia and hypercapnia  See [facility] vent    On mechanically assisted ventilation  Daily CXR, ABG  Continue Peridex, Famotidine, Heparin subq  Trial of extubation 9/6/2019- re-intubated shortly after extubation for airway protection  Continue to wean vent settings as tolerated  Still intubated 2/2 inability to protect airway  Fentanyl drip for sedation    Vent Mode: A/C  Oxygen Concentration (%):  [40] 40  Resp Rate Total:  [12 br/min-35 br/min] 18 br/min  Vt Set:  [380 mL] 380 mL  PEEP/CPAP:  [5 cmH20] 5 cmH20  Pressure Support:  [0 cmH20] 0 cmH20  Mean Airway Pressure:  [7.8 lpL92-56 cmH20] 9.1 cmH20   Daily CXR/ABG    Cardiac/Vascular  Essential hypertension  SBP <180, MAP >65   Echo: EF 65-70% concentric LV remodeling  FEDERICO (9/17) negative for vegetation, ASD, PFO       Renal/  Chronic kidney disease, stage III (moderate)  -PMHx of CKD 3   -monitor renal function, I/Os       ID  Bacteremia  -Blood culture + genella morbillorum  Continue ceftriaxone 2g q12h for 2 weeks per ID recs    Oncology  Leukocytosis  Afebrile, WBC  WNL  -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 Stretched esophagus to 15 for FEDERICO    Other  Fever  afebrile          The patient is being Prophylaxed for:  Venous Thromboembolism with: Chemical  Stress Ulcer with: PPI  Ventilator Pneumonia with: chlorhexidine oral care    Activity Orders          Diet NPO Except for: Medication: NPO starting at 09/17 0001        Full Code     Critical care time 40  minutes    Teresa Teixeira PA-C  Neurocritical Care  [facility]

## 2019-09-19 NOTE — ASSESSMENT & PLAN NOTE
75 y/o female with pmhx CKD and HTN presented to outside hospital for seizure- like activity occurring on the left side. Found to have area of ill-defined enhancement on MRI brain w/ and w/out.  RODRIGUEZ thus far has been unrevealing for source of encephalopathy.    -q 1 hr neuro checks  -FEDERICO negative  -Fu ID recs for abx duration  -Continue steroids for now  -WTE when medically stable.  -At this point, will consider biopsy as rodriguez has been unrevealing.    -Repeat MRI brain with stealth to assess for any interval change in right frontal lesion and plan accordingly based on results.  -Further care per NCC, will follow.

## 2019-09-19 NOTE — PROGRESS NOTES
POC reviewed with pt and family at 1630.  and son verbalized understanding. Questions and concerns addressed. No acute events today. MRI completed. Fentanyl/Propofol gtt titrated for restlessness/agitation. Levo gtt titrated to maintain MAPs > 65. TF @ goal of 45cc/hr. Will continue to monitor. See flowsheets for full assessment and VS info.

## 2019-09-19 NOTE — PROGRESS NOTES
Pt transported to MRI with telemonitor and portable ventilator via bed by 1 RN and 1 RT. Currently sedated on propofol and levo infusing. VSS. Pt tolerated scan well. JOSIAS.

## 2019-09-19 NOTE — PROGRESS NOTES
Notified NCC of pts SBP sustaining high 90s and MAP sustaining low 60s. Verbal order to give 500cc bolus NS. Will continue to monitor very closely.

## 2019-09-19 NOTE — SUBJECTIVE & OBJECTIVE
Interval History: naeon, off propofol this am.    Medications:  Continuous Infusions:  Scheduled Meds:   cefTRIAXone (ROCEPHIN) IVPB  2 g Intravenous Q12H    chlorhexidine  15 mL Mouth/Throat BID    dexamethasone  4 mg Intravenous Q6H    famotidine  20 mg Per OG tube QHS    heparin (porcine)  5,000 Units Subcutaneous Q8H    lacosamide  100 mg Per OG tube Q12H    levothyroxine  75 mcg Per OG tube Before breakfast    QUEtiapine  75 mg Per OG tube BID    senna-docusate 8.6-50 mg  1 tablet Per OG tube BID    valproic acid (as sodium salt)  750 mg Per OG tube Q8H     PRN Meds:acetaminophen, Dextrose 10% Bolus, fentaNYL, fentaNYL, glucagon (human recombinant), hydrALAZINE, insulin aspart U-100, magnesium oxide, magnesium oxide, OLANZapine, potassium chloride 10%, potassium chloride 10%, potassium, sodium phosphates, potassium, sodium phosphates, potassium, sodium phosphates, racepinephrine, sodium chloride 0.9%, sodium chloride 0.9%     Review of Systems  Objective:     Weight: 78.6 kg (173 lb 4.5 oz)  Body mass index is 30.7 kg/m².  Vital Signs (Most Recent):  Temp: 99.4 °F (37.4 °C) (09/19/19 0705)  Pulse: 80 (09/19/19 0905)  Resp: 16 (09/19/19 0905)  BP: (!) 90/43 (09/19/19 0905)  SpO2: 98 % (09/19/19 0905) Vital Signs (24h Range):  Temp:  [98.8 °F (37.1 °C)-100.5 °F (38.1 °C)] 99.4 °F (37.4 °C)  Pulse:  [] 80  Resp:  [12-47] 16  SpO2:  [98 %-100 %] 98 %  BP: ()/() 90/43     Date 09/19/19 0700 - 09/20/19 0659   Shift 9901-0330 9954-0796 3410-6002 24 Hour Total   INTAKE   I.V.(mL/kg) 15(0.2)   15(0.2)   NG/   160   Shift Total(mL/kg) 175(2.2)   175(2.2)   OUTPUT   Shift Total(mL/kg)       Weight (kg) 78.6 78.6 78.6 78.6              Vent Mode: A/C  Oxygen Concentration (%):  [40] 40  Resp Rate Total:  [12 br/min-35 br/min] 16 br/min  Vt Set:  [380 mL] 380 mL  PEEP/CPAP:  [5 cmH20] 5 cmH20  Pressure Support:  [0 ymH36-28 cmH20] 0 cmH20  Mean Airway Pressure:  [7.6 fhC99-55 cmH20] 9.3  cmH20         NG/OG Tube 09/16/19 1805 orogastric Center mouth (Active)   Placement Check placement verified by aspirate characteristics;placement verified by x-ray;placement verified by distal tube length measurement 9/19/2019  7:05 AM   Tolerance no signs/symptoms of discomfort 9/19/2019  7:05 AM   Securement secured to commercial device 9/19/2019  7:05 AM   Clamp Status/Tolerance unclamped 9/19/2019  7:05 AM   Insertion Site Appearance no redness, warmth, tenderness, skin breakdown, drainage 9/19/2019  7:05 AM   Drainage None 9/19/2019  7:05 AM   Flush/Irrigation flushed w/;water;no resistance met 9/19/2019  7:05 AM   Feeding Method continuous 9/19/2019  7:05 AM   Feeding Action feeding continued 9/19/2019  7:05 AM   Current Rate (mL/hr) 30 mL/hr 9/19/2019  7:05 AM   Goal Rate (mL/hr) 45 mL/hr 9/19/2019  7:05 AM   Intake (mL) 60 mL 9/19/2019  8:05 AM   Formula Name Isosource 1.5 9/19/2019  7:05 AM   Intake (mL) - Formula Tube Feeding 40 9/19/2019  9:05 AM   Residual Amount (ml) 0 ml 9/19/2019  7:05 AM            Rectal Tube 09/15/19 0900 rectal tube w/ balloon (indicate number of mLs) (Active)   Balloon Inflation Volume (mL) 45 9/19/2019  7:05 AM   Reposition other (see comments) 9/19/2019  7:05 AM   Outcome gas expelled, stool evacuated 9/19/2019  7:05 AM   Stool Color Brown 9/19/2019  7:05 AM   Insertion Site Appearance no redness, warmth, tenderness, skin breakdown, drainage 9/19/2019  7:05 AM   Flush/Irrigation flushed w/;water;no resistance met 9/19/2019  7:05 AM   Intake (mL) 60 mL 9/18/2019  8:00 PM   Rectal Tube Output 25 mL 9/18/2019  6:00 AM       Neurosurgery Physical Exam   [id number]  PERRL  Right side and LLE spontaneously antigravity  Paretic in LUE.    Significant Labs:  Recent Labs   Lab 09/18/19  0456 09/19/19  0309   * 169*    140   K 4.6 4.2    102   CO2 29 29   BUN 20 24*   CREATININE 1.1 1.2   CALCIUM 8.7 8.7   MG 2.1 2.1     Recent Labs   Lab 09/18/19  0456 09/19/19  0308    WBC 6.80 9.49   HGB 11.1* 10.8*   HCT 34.2* 35.0*    182     No results for input(s): LABPT, INR, APTT in the last 48 hours.  Microbiology Results (last 7 days)     Procedure Component Value Units Date/Time    Blood culture [[id number]] Collected:  09/11/19 1811    Order Status:  Completed Specimen:  Blood from Peripheral, Hand, Left Updated:  09/16/19 2012     Blood Culture, Routine No growth after 5 days.    CSF culture [[id number]] Collected:  09/11/19 1355    Order Status:  Completed Specimen:  CSF (Spinal Fluid) from CSF Tap, Tube 3 Updated:  09/16/19 0650     CSF CULTURE No Growth     Gram Stain Result Cytospin indicates:      Rare WBC's      No organisms seen    Blood culture [[id number]] Collected:  09/08/19 1210    Order Status:  Completed Specimen:  Blood from Peripheral, Ankle, Left Updated:  09/13/19 1412     Blood Culture, Routine No growth after 5 days.    Blood culture [[id number]]  (Abnormal) Collected:  09/08/19 1215    Order Status:  Completed Specimen:  Blood from Peripheral, Forearm, Left Updated:  09/12/19 1031     Blood Culture, Routine Gram stain brian bottle: Gram positive cocci       Results called to and read back by:Milagro Valencia RN  09/09/2019  13:48      Gram stain aer bottle: Gram positive cocci 09/10/2019  15:46      GEMELLA (S.) MORBILLORUM  Susceptibility testing not routinely performed              Significant Diagnostics:

## 2019-09-19 NOTE — ASSESSMENT & PLAN NOTE
Daily CXR, ABG  Continue Peridex, Famotidine, Heparin subq  Trial of extubation 9/6/2019- re-intubated shortly after extubation for airway protection  Continue to wean vent settings as tolerated  Still intubated 2/2 inability to protect airway  Fentanyl drip for sedation    Vent Mode: A/C  Oxygen Concentration (%):  [40] 40  Resp Rate Total:  [12 br/min-35 br/min] 18 br/min  Vt Set:  [380 mL] 380 mL  PEEP/CPAP:  [5 cmH20] 5 cmH20  Pressure Support:  [0 cmH20] 0 cmH20  Mean Airway Pressure:  [7.8 lpB48-78 cmH20] 9.1 cmH20   Daily CXR/ABG

## 2019-09-20 LAB
ABO + RH BLD: NORMAL
ALBUMIN SERPL BCP-MCNC: 2.7 G/DL (ref 3.5–5.2)
ALLENS TEST: ABNORMAL
ALP SERPL-CCNC: 37 U/L (ref 55–135)
ALT SERPL W/O P-5'-P-CCNC: 30 U/L (ref 10–44)
ANION GAP SERPL CALC-SCNC: 10 MMOL/L (ref 8–16)
AST SERPL-CCNC: 17 U/L (ref 10–40)
BASOPHILS # BLD AUTO: 0.01 K/UL (ref 0–0.2)
BASOPHILS NFR BLD: 0.1 % (ref 0–1.9)
BILIRUB SERPL-MCNC: 0.2 MG/DL (ref 0.1–1)
BLD GP AB SCN CELLS X3 SERPL QL: NORMAL
BUN SERPL-MCNC: 21 MG/DL (ref 8–23)
CALCIUM SERPL-MCNC: 8.4 MG/DL (ref 8.7–10.5)
CHLORIDE SERPL-SCNC: 102 MMOL/L (ref 95–110)
CO2 SERPL-SCNC: 27 MMOL/L (ref 23–29)
CREAT SERPL-MCNC: 0.9 MG/DL (ref 0.5–1.4)
DELSYS: ABNORMAL
DIFFERENTIAL METHOD: ABNORMAL
EOSINOPHIL # BLD AUTO: 0 K/UL (ref 0–0.5)
EOSINOPHIL NFR BLD: 0 % (ref 0–8)
ERYTHROCYTE [DISTWIDTH] IN BLOOD BY AUTOMATED COUNT: 13.8 % (ref 11.5–14.5)
ERYTHROCYTE [SEDIMENTATION RATE] IN BLOOD BY WESTERGREN METHOD: 16 MM/H
EST. GFR  (AFRICAN AMERICAN): >60 ML/MIN/1.73 M^2
EST. GFR  (NON AFRICAN AMERICAN): >60 ML/MIN/1.73 M^2
FIO2: 40
GLUCOSE SERPL-MCNC: 132 MG/DL (ref 70–110)
HCO3 UR-SCNC: 30.1 MMOL/L (ref 24–28)
HCT VFR BLD AUTO: 31.7 % (ref 37–48.5)
HGB BLD-MCNC: 10.2 G/DL (ref 12–16)
IMM GRANULOCYTES # BLD AUTO: 0.34 K/UL (ref 0–0.04)
IMM GRANULOCYTES NFR BLD AUTO: 4.8 % (ref 0–0.5)
LYMPHOCYTES # BLD AUTO: 1 K/UL (ref 1–4.8)
LYMPHOCYTES NFR BLD: 13.8 % (ref 18–48)
MAGNESIUM SERPL-MCNC: 2.1 MG/DL (ref 1.6–2.6)
MCH RBC QN AUTO: 29.1 PG (ref 27–31)
MCHC RBC AUTO-ENTMCNC: 32.2 G/DL (ref 32–36)
MCV RBC AUTO: 90 FL (ref 82–98)
MIN VOL: 6.42
MODE: ABNORMAL
MONOCYTES # BLD AUTO: 1.2 K/UL (ref 0.3–1)
MONOCYTES NFR BLD: 17.1 % (ref 4–15)
NEUTROPHILS # BLD AUTO: 4.6 K/UL (ref 1.8–7.7)
NEUTROPHILS NFR BLD: 64.2 % (ref 38–73)
NRBC BLD-RTO: 0 /100 WBC
PCO2 BLDA: 34.1 MMHG (ref 35–45)
PEEP: 5
PH SMN: 7.55 [PH] (ref 7.35–7.45)
PHOSPHATE SERPL-MCNC: 3.1 MG/DL (ref 2.7–4.5)
PLATELET # BLD AUTO: 172 K/UL (ref 150–350)
PMV BLD AUTO: 9.4 FL (ref 9.2–12.9)
PO2 BLDA: 39 MMHG (ref 80–100)
POC BE: 8 MMOL/L
POC SATURATED O2: 81 % (ref 95–100)
POC TCO2: 31 MMOL/L (ref 23–27)
POCT GLUCOSE: 109 MG/DL (ref 70–110)
POCT GLUCOSE: 118 MG/DL (ref 70–110)
POCT GLUCOSE: 137 MG/DL (ref 70–110)
POCT GLUCOSE: 161 MG/DL (ref 70–110)
POTASSIUM SERPL-SCNC: 4.7 MMOL/L (ref 3.5–5.1)
PROT SERPL-MCNC: 5.4 G/DL (ref 6–8.4)
RBC # BLD AUTO: 3.51 M/UL (ref 4–5.4)
SAMPLE: ABNORMAL
SITE: ABNORMAL
SODIUM SERPL-SCNC: 139 MMOL/L (ref 136–145)
SP02: 100
VT: 380
WBC # BLD AUTO: 7.15 K/UL (ref 3.9–12.7)

## 2019-09-20 PROCEDURE — 63600175 PHARM REV CODE 636 W HCPCS: Performed by: PSYCHIATRY & NEUROLOGY

## 2019-09-20 PROCEDURE — 86850 RBC ANTIBODY SCREEN: CPT

## 2019-09-20 PROCEDURE — 93010 EKG 12-LEAD: ICD-10-PCS | Mod: ,,, | Performed by: INTERNAL MEDICINE

## 2019-09-20 PROCEDURE — 20000000 HC ICU ROOM

## 2019-09-20 PROCEDURE — 83735 ASSAY OF MAGNESIUM: CPT

## 2019-09-20 PROCEDURE — 95951 HC EEG MONITORING/VIDEO RECORD: CPT

## 2019-09-20 PROCEDURE — 99232 PR SUBSEQUENT HOSPITAL CARE,LEVL II: ICD-10-PCS | Mod: ,,, | Performed by: NEUROLOGICAL SURGERY

## 2019-09-20 PROCEDURE — 63600175 PHARM REV CODE 636 W HCPCS: Performed by: STUDENT IN AN ORGANIZED HEALTH CARE EDUCATION/TRAINING PROGRAM

## 2019-09-20 PROCEDURE — 80053 COMPREHEN METABOLIC PANEL: CPT

## 2019-09-20 PROCEDURE — 99900035 HC TECH TIME PER 15 MIN (STAT)

## 2019-09-20 PROCEDURE — 95951 PR EEG MONITORING/VIDEORECORD: CPT | Mod: 26,,, | Performed by: PSYCHIATRY & NEUROLOGY

## 2019-09-20 PROCEDURE — 99232 SBSQ HOSP IP/OBS MODERATE 35: CPT | Mod: ,,, | Performed by: NEUROLOGICAL SURGERY

## 2019-09-20 PROCEDURE — 99233 PR SUBSEQUENT HOSPITAL CARE,LEVL III: ICD-10-PCS | Mod: ,,, | Performed by: NURSE PRACTITIONER

## 2019-09-20 PROCEDURE — 63600175 PHARM REV CODE 636 W HCPCS: Performed by: PHYSICIAN ASSISTANT

## 2019-09-20 PROCEDURE — 95951 PR EEG MONITORING/VIDEORECORD: ICD-10-PCS | Mod: 26,,, | Performed by: PSYCHIATRY & NEUROLOGY

## 2019-09-20 PROCEDURE — 93010 ELECTROCARDIOGRAM REPORT: CPT | Mod: ,,, | Performed by: INTERNAL MEDICINE

## 2019-09-20 PROCEDURE — 99233 SBSQ HOSP IP/OBS HIGH 50: CPT | Mod: ,,, | Performed by: NURSE PRACTITIONER

## 2019-09-20 PROCEDURE — 63600175 PHARM REV CODE 636 W HCPCS: Performed by: NURSE PRACTITIONER

## 2019-09-20 PROCEDURE — 25000003 PHARM REV CODE 250: Performed by: PSYCHIATRY & NEUROLOGY

## 2019-09-20 PROCEDURE — 85025 COMPLETE CBC W/AUTO DIFF WBC: CPT

## 2019-09-20 PROCEDURE — 99900026 HC AIRWAY MAINTENANCE (STAT)

## 2019-09-20 PROCEDURE — 25000003 PHARM REV CODE 250: Performed by: STUDENT IN AN ORGANIZED HEALTH CARE EDUCATION/TRAINING PROGRAM

## 2019-09-20 PROCEDURE — 84100 ASSAY OF PHOSPHORUS: CPT

## 2019-09-20 PROCEDURE — 25000003 PHARM REV CODE 250: Performed by: NURSE PRACTITIONER

## 2019-09-20 PROCEDURE — 25000003 PHARM REV CODE 250: Performed by: PHYSICIAN ASSISTANT

## 2019-09-20 PROCEDURE — 94003 VENT MGMT INPAT SUBQ DAY: CPT

## 2019-09-20 PROCEDURE — 94761 N-INVAS EAR/PLS OXIMETRY MLT: CPT

## 2019-09-20 PROCEDURE — 93005 ELECTROCARDIOGRAM TRACING: CPT

## 2019-09-20 RX ORDER — NOREPINEPHRINE BITARTRATE/D5W 4MG/250ML
0.02 PLASTIC BAG, INJECTION (ML) INTRAVENOUS CONTINUOUS
Status: DISCONTINUED | OUTPATIENT
Start: 2019-09-20 | End: 2019-09-21

## 2019-09-20 RX ORDER — QUETIAPINE FUMARATE 100 MG/1
100 TABLET, FILM COATED ORAL 2 TIMES DAILY
Status: DISCONTINUED | OUTPATIENT
Start: 2019-09-20 | End: 2019-09-20

## 2019-09-20 RX ORDER — DEXMEDETOMIDINE HYDROCHLORIDE 4 UG/ML
0.2 INJECTION, SOLUTION INTRAVENOUS CONTINUOUS
Status: DISCONTINUED | OUTPATIENT
Start: 2019-09-20 | End: 2019-09-21

## 2019-09-20 RX ORDER — QUETIAPINE FUMARATE 100 MG/1
100 TABLET, FILM COATED ORAL 2 TIMES DAILY
Status: DISCONTINUED | OUTPATIENT
Start: 2019-09-20 | End: 2019-09-21

## 2019-09-20 RX ADMIN — SENNOSIDES AND DOCUSATE SODIUM 1 TABLET: 8.6; 5 TABLET ORAL at 08:09

## 2019-09-20 RX ADMIN — CEFTRIAXONE SODIUM 2 G: 2 INJECTION, SOLUTION INTRAVENOUS at 11:09

## 2019-09-20 RX ADMIN — DEXAMETHASONE SODIUM PHOSPHATE 4 MG: 4 INJECTION, SOLUTION INTRAMUSCULAR; INTRAVENOUS at 06:09

## 2019-09-20 RX ADMIN — INSULIN ASPART 1 UNITS: 100 INJECTION, SOLUTION INTRAVENOUS; SUBCUTANEOUS at 12:09

## 2019-09-20 RX ADMIN — QUETIAPINE FUMARATE 100 MG: 100 TABLET ORAL at 11:09

## 2019-09-20 RX ADMIN — CHLORHEXIDINE GLUCONATE 0.12% ORAL RINSE 15 ML: 1.2 LIQUID ORAL at 08:09

## 2019-09-20 RX ADMIN — HEPARIN SODIUM 5000 UNITS: 5000 INJECTION, SOLUTION INTRAVENOUS; SUBCUTANEOUS at 09:09

## 2019-09-20 RX ADMIN — DEXAMETHASONE SODIUM PHOSPHATE 4 MG: 4 INJECTION, SOLUTION INTRAMUSCULAR; INTRAVENOUS at 11:09

## 2019-09-20 RX ADMIN — DEXMEDETOMIDINE HYDROCHLORIDE 0.2 MCG/KG/HR: 4 INJECTION, SOLUTION INTRAVENOUS at 11:09

## 2019-09-20 RX ADMIN — LEVOTHYROXINE SODIUM 75 MCG: 75 TABLET ORAL at 05:09

## 2019-09-20 RX ADMIN — Medication 50 MCG/HR: at 04:09

## 2019-09-20 RX ADMIN — FAMOTIDINE 20 MG: 20 TABLET ORAL at 09:09

## 2019-09-20 RX ADMIN — DEXAMETHASONE SODIUM PHOSPHATE 4 MG: 4 INJECTION, SOLUTION INTRAMUSCULAR; INTRAVENOUS at 05:09

## 2019-09-20 RX ADMIN — LACOSAMIDE 100 MG: 50 TABLET, FILM COATED ORAL at 08:09

## 2019-09-20 RX ADMIN — SODIUM CHLORIDE 250 ML: 0.9 INJECTION, SOLUTION INTRAVENOUS at 11:09

## 2019-09-20 RX ADMIN — HEPARIN SODIUM 5000 UNITS: 5000 INJECTION, SOLUTION INTRAVENOUS; SUBCUTANEOUS at 01:09

## 2019-09-20 RX ADMIN — VALPROIC ACID 750 MG: 250 SOLUTION ORAL at 06:09

## 2019-09-20 RX ADMIN — Medication 150 MCG/HR: at 05:09

## 2019-09-20 RX ADMIN — FENTANYL CITRATE 50 MCG: 50 INJECTION INTRAMUSCULAR; INTRAVENOUS at 11:09

## 2019-09-20 RX ADMIN — SODIUM CHLORIDE 500 ML: 0.9 INJECTION, SOLUTION INTRAVENOUS at 02:09

## 2019-09-20 RX ADMIN — VALPROIC ACID 750 MG: 250 SOLUTION ORAL at 10:09

## 2019-09-20 RX ADMIN — VALPROIC ACID 750 MG: 250 SOLUTION ORAL at 02:09

## 2019-09-20 RX ADMIN — CHLORHEXIDINE GLUCONATE 0.12% ORAL RINSE 15 ML: 1.2 LIQUID ORAL at 09:09

## 2019-09-20 RX ADMIN — Medication 0.04 MCG/KG/MIN: at 02:09

## 2019-09-20 NOTE — PROGRESS NOTES
Notified BRIANNA Caputo with NCC about pts MAP in high 40s low 50s and SBP in mid 70s. Order to give 500cc NS bolus and 100mcg of Shoaib bump. Fentanyl gtt stopped.     BP now 112/53 MAP 76. Fentanyl gtt remaining off for now. Will continue to monitor very closely.

## 2019-09-20 NOTE — PROGRESS NOTES
Notified NCC team of pt having no movement to left upper extremity.  Will continue to monitor closely.

## 2019-09-20 NOTE — ASSESSMENT & PLAN NOTE
SBP <180, MAP >65   Requiring levophed gtt to maintain MAPs > 65 while on precedex gtt  Echo: EF 65-70% concentric LV remodeling  FEDERICO (9/17) negative for vegetation, ASD, PFO

## 2019-09-20 NOTE — PROGRESS NOTES
[facility]  Neurocritical Care  Progress Note    Admit Date: 9/3/2019  Service Date: 09/20/2019  Length of Stay: 17    Subjective:     Chief Complaint: Brain lesion    History of Present Illness: Pt is  73 yo female with a PMHx of CKD 3, HTN, was transferred from [facility] to [facility] for new onset seizures and concern for right front lobe mass. The pt was found in her bedroom by her spouse at approximately 9:45 pm sitting her head against the bed, unresponsive, and having seizure like activity on the left-side. EMT was called and the pt was given Versed twice while en route to the hospital. Pt had a second witnessed seizures, left facial droop, left-sided weakness, and tongue ecchymosis in the ED. Neurology was consulted and The pt was given IV Keppra and Vimpat. MRI brain without contrast was acquired. The image showed right frontal lobe vasogenic edema with mass effect concerning for underlying mass. EEG was acquired at outside hospital concerning showing an abnormal result. The pt was given decadron IV and neurosurgical consult recommended. Pt transferred to [facility] and admitted to the [facility] for higher level of care and further evaluation.     Pt history acquired per chart review and from spouse present at the bedside. The pt's spouse denies prior history of seizures CVA, cancer history and up-to-date screenings    Hospital Course: --admitted to [facility] on 9/3 following new onset seizure, arrived intubated  --MRI with large area of R cortical edema with flair and ill defined enhancement on contrast study, concern for glioma vs infectious/inflammatory process  --LP appears non infectious, cytology pending  9/5- no acute events, awaiting LP finalization from pathology report., weaning vent.   09/06/2019: Very agitated overnight, requiring increased sedation. Patient extubated this morning on rounds, and reintubated shortly after. Unable to maintain airway and secretions. CSF gram stain negative.  09/07/2019: KATT.  EKG obtained, seroquel started. Valproic acid started.  09/08/2019: NAEON. Improved agitation with seroquel.  09/09/2019: MRI Brain w/wo ordered for today. BLE US ordered. Will need new LP later on in the week.   09/10/2019 LP for infx workup, CD4 lab, EEG monitoring per Epilepsy   09/11/2019: To IR for LP. Spoke to NSGY about possible bx of lesion. Discontinue cEEG.   9/13/2019 Issues with low HR and BP overnight and this morning, Given Shoaib bump and atropine. 24 hours of IVF and bolus. WBC decreasing and afebrile. Repeat blood cultures negative. Plan for EGD and FEDERICO today.   9/14/2019 NAEO, EGD and FEDERICO moved until Monday. CSF still pending. Increasing WBC but afebrile. Start tube feeding   9/15/2019 NAEO, all CSF studies negative so far. CT negative. Begin insulin gtt. EGD and FEDERICO tomorrow    9/16 No significant events over night. EGD done this afternoon for esophageal stricture. Esophagus stretched to 15. FEDERICO ordered for tomorrow.   9/17 No significant vents over night NPO for FEDERICO today scheduled for 3pm. Off levophed.  9/18 FEDERICO negative. Tube feeds restarted. NSGY following and considering brain biopsy. Propofol stopped. Precedex started.  9/19: NSGY recommending MRI brain W/WO with stealth for possible brain biopsy as other work up has been negative. Patient continues with hypotensive episodes, on Fentanyl drip and recommend to space out medications.  9/20 no significant events over night. Hypotensive episodes after vimpat dose and seroquel dose requiring a shoaib bump, IVF bolus then  Levophed gtt started to maintain map>65. Spontaneous breathing trial this afternoon    Interval History: no significant events over night. Hypotensive episodes after vimpat dose and seroquel dose requiring a shoaib bump, IVF bolus then  Levophed gtt started to maintain map>65. Spontaneous breathing trial this afternoon. dcd vimpat today and placed on EEG.        Review of Systems: Unable to obtain a complete ROS due to level of  consciousness.     Vitals:   Temp: 98.7 °F (37.1 °C)  Pulse: 100  Rhythm: normal sinus rhythm  BP: (!) 162/71  MAP (mmHg): 100  Resp: (!) 36  SpO2: 100 %  Oxygen Concentration (%): 40  O2 Device (Oxygen Therapy): ventilator  Vent Mode: Spont  Set Rate: 0 bmp  Vt Set: 380 mL  Pressure Support: 7 cmH20  PEEP/CPAP: 5 cmH20  Peak Airway Pressure: 13 cmH2O  Mean Airway Pressure: 7.6 cmH20  Plateau Pressure: 0 cmH20    Temp  Min: 98.5 °F (36.9 °C)  Max: 99.7 °F (37.6 °C)  Pulse  Min: 51  Max: 112  BP  Min: 84/45  Max: 178/74  MAP (mmHg)  Min: 59  Max: 120  Resp  Min: 0  Max: 42  SpO2  Min: 96 %  Max: 100 %  Oxygen Concentration (%)  Min: 40  Max: 40    09/19 0701 - 09/20 0700  In: 1473.6 [I.V.:598.6]  Out: 100    Unmeasured Output  Urine Occurrence: 1  Stool Occurrence: 0  Emesis Occurrence: 0  Pad Count: 2     Examination:   Constitutional: Well-nourished and -developed. No apparent distress.   Eyes: Conjunctiva clear, anicteric. Lids no lesions.  Head/Ears/Nose/Mouth/Throat/Neck: Moist mucous membranes. External ears, nose atraumatic.   Cardiovascular: Regular rhythm. No murmurs.  Respiratory: on mechanical ventilationClear to auscultation.  Gastrointestinal: No hernia. Soft, nondistended, nontender. + bowel sounds.      Neurologic:  GCS E2 V1T M5  -Intubated. Does not follow commands.  -opens eyes spon. +cough/gag/corneals  - Spontaneous movement in BLE and LLE, no movement to LUE  Unable to test orientation, language, memory, judgment, insight, fund of knowledge, hearing, shoulder shrug, tongue protrusion, coordination, gait due to level of consciousness.          Medications:   Continuous  dexmedetomidine (PRECEDEX) infusion Last Rate: 0.4 mcg/kg/hr (09/20/19 1625)   norepinephrine bitartrate-D5W Last Rate: Stopped (09/20/19 1517)   Scheduled  cefTRIAXone (ROCEPHIN) IVPB 2 g Q12H   chlorhexidine 15 mL BID   dexamethasone 4 mg Q6H   famotidine 20 mg QHS   heparin (porcine) 5,000 Units Q8H   levothyroxine 75 mcg Before  breakfast   QUEtiapine 100 mg BID   valproic acid (as sodium salt) 750 mg Q8H   PRN  acetaminophen 650 mg Q6H PRN   Dextrose 10% Bolus 12.5 g PRN   fentaNYL 100 mcg Q2H PRN   fentaNYL 50 mcg Q2H PRN   glucagon (human recombinant) 1 mg PRN   hydrALAZINE 10 mg Q6H PRN   insulin aspart U-100 1-10 Units Q6H PRN   magnesium oxide 800 mg PRN   magnesium oxide 800 mg PRN   OLANZapine 5 mg Q12H PRN   potassium chloride 10% 40 mEq PRN   potassium chloride 10% 40 mEq PRN   potassium, sodium phosphates 2 packet PRN   potassium, sodium phosphates 2 packet PRN   potassium, sodium phosphates 2 packet PRN   racepinephrine 0.5 mL Q4H PRN   sodium chloride 0.9% 10 mL PRN   sodium chloride 0.9% 10 mL PRN      Today I independently reviewed pertinent medications, lines/drains/airways, imaging, laboratory results, microbiology results, notably:     ISTAT: No results for input(s): PH, PCO2, PO2, POCSATURATED, HCO3, BE, POCNA, POCK, POCTCO2, POCGLU, POCICA, POCLAC, SAMPLE in the last 24 hours.   Chem: Recent Labs   Lab 09/20/19 0226      K 4.7      CO2 27   *   BUN 21   CREATININE 0.9   ESTGFRAFRICA >60.0   EGFRNONAA >60.0   CALCIUM 8.4*   MG 2.1   PHOS 3.1   ANIONGAP 10   PROT 5.4*   ALBUMIN 2.7*   BILITOT 0.2   ALKPHOS 37*   AST 17   ALT 30     Heme: Recent Labs   Lab 09/20/19 0226   WBC 7.15   HGB 10.2*   HCT 31.7*        Endo:   Recent Labs   Lab 09/20/19  0018 09/20/19  0601 09/20/19  1301   POCTGLUCOSE 161* 118* 137*      Assessment/Plan:     Neuro  * Brain lesion  74 year old admitted to unit on 9/3 following new onset seizure, with MRI showing large area of R cortical edema with flair and ill defined enhancement on contrast study, concern for glioma vs infectious/inflammatory process  -  repeat BC negative   - CSF cx no growth,   - CSF studies negative- VZV, enterovirus, HSV, MS  - FEDERICO negative for vegetation     - ID following   - continue decadron 4mg q6h  - epilepsy following   - neuro checks q1hr    - vital signs q1hr   -on SubQ heparin   -Repeat brain MRI W/WO with stealth9/19, showed large  R frontal lobe lesion nonspecific for infectious or neoplastic process; small remote L basal banglia lacunar infarct. No new lesions or infarcts        Cerebral edema  --continue decadron 4mg q6h see brain lesion      New onset seizure  2/2 to brain mass   9/ 20 placed on EEG while weaning AEDs  Vimpat dcd 9/20  Continue Valproic acid 750mg q8h, valproate level 53.7 (9/18)  Epilepsy following          Pulmonary  On mechanically assisted ventilation  Daily CXR, ABG  Continue Peridex, Famotidine, Heparin subq  Trial of extubation 9/6/2019- re-intubated shortly after extubation for airway protection  Continue to wean vent settings as tolerated  Still intubated 2/2 inability to protect airway  precedex drip for sedation    Vent Mode: Spont  Oxygen Concentration (%):  [40] 40  Resp Rate Total:  [16 br/min-32 br/min] 32 br/min  Vt Set:  [380 mL] 380 mL  PEEP/CPAP:  [5 cmH20] 5 cmH20  Pressure Support:  [0 cmH20-7 cmH20] 7 cmH20  Mean Airway Pressure:  [7.6 cmH20-9.9 cmH20] 7.6 cmH20   Daily CXR/ABG    Cardiac/Vascular  Essential hypertension  SBP <180, MAP >65   Requiring levophed gtt to maintain MAPs > 65 while on precedex gtt  Echo: EF 65-70% concentric LV remodeling  FEDERICO (9/17) negative for vegetation, ASD, PFO       Renal/  Chronic kidney disease, stage III (moderate)  -PMHx of CKD 3   -monitor renal function, I/Os       ID  Bacteremia  -Blood culture + genella morbillorum  Continue ceftriaxone 2g q12h for 2 weeks per ID recs    Oncology  Leukocytosis  Afebrile, WBC  WNL  -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 Stretched esophagus to 15 for FEDERICO    Other  Hypotension due to hypovolemia  -after receiving vimpat and again after receiving  seroquel  bolused with 500cc, kayla bump x2 then levophed gtt started to maintain MAP > 65  , keep MAP > 65              The patient is being Prophylaxed for:  Venous Thromboembolism with: Mechanical or Chemical  Stress Ulcer with: H2B  Ventilator Pneumonia with: chlorhexidine oral care    Activity Orders          Diet NPO Except for: Medication: NPO starting at 09/19 2014        Full Code     I have spent 35 min with this patient, with over 50% of this time spent coordinating care and speaking with the family    Patito Odell NP  Neurocritical Care  [facility]

## 2019-09-20 NOTE — ASSESSMENT & PLAN NOTE
-after receiving vimpat and again after receiving seroquel  bolused with 500cc, kayla bump x2 then levophed gtt started to maintain MAP > 65  , keep MAP > 65

## 2019-09-20 NOTE — PROGRESS NOTES
[facility]  Neurosurgery  Progress Note    Subjective:     History of Present Illness: 73 y/o female with pmhx CKD and HTN  presented to outside hospital for seizure- like activity this AM. Per  pt was found unconscious this AM, with seizure like activity occurring on the left side. Pt was given versed x 2 via EMS. CTH performed at outside hospital was negative for bleed. Pt had additional seizure in outside hospital ED and was given IV Keppra. EEG ordered and MRI brain w/out contrast concerning for possible infiltratrive process vs post ischemic sequela. Pt transferred to [facility] and admitted to [facility]. MRI brain w/ and w/out obtained that showed large area of edema in right frontal lobe and ill defined enhancement in the frontal lobe concerning for possible cerebritis vs. Neoplasm. NSGY was consulted to evaluate. Pt not on anti-coagulation.       Post-Op Info:  Procedure(s) (LRB):  EGD (ESOPHAGOGASTRODUODENOSCOPY) (N/A)   4 Days Post-Op     Interval History: 9/20: NAEON, AFVSS, Exam stable, MRI 9/19 stable from previous    Medications:  Continuous Infusions:   dexmedetomidine (PRECEDEX) infusion 0.2 mcg/kg/hr (09/20/19 1125)     Scheduled Meds:   cefTRIAXone (ROCEPHIN) IVPB  2 g Intravenous Q12H    chlorhexidine  15 mL Mouth/Throat BID    dexamethasone  4 mg Intravenous Q6H    famotidine  20 mg Per OG tube QHS    heparin (porcine)  5,000 Units Subcutaneous Q8H    levothyroxine  75 mcg Per OG tube Before breakfast    QUEtiapine  100 mg Per OG tube BID    valproic acid (as sodium salt)  750 mg Per OG tube Q8H     PRN Meds:acetaminophen, Dextrose 10% Bolus, fentaNYL, fentaNYL, glucagon (human recombinant), hydrALAZINE, insulin aspart U-100, magnesium oxide, magnesium oxide, OLANZapine, potassium chloride 10%, potassium chloride 10%, potassium, sodium phosphates, potassium, sodium phosphates, potassium, sodium phosphates, racepinephrine, sodium chloride 0.9%, sodium chloride 0.9%      Review of Systems    Objective:     Weight: 78.6 kg (173 lb 4.5 oz)  Body mass index is 30.7 kg/m².  Vital Signs (Most Recent):  Temp: 98.5 °F (36.9 °C) (09/20/19 1105)  Pulse: 81 (09/20/19 1212)  Resp: 15 (09/20/19 1212)  BP: (!) 125/59 (09/20/19 1212)  SpO2: 100 % (09/20/19 1212) Vital Signs (24h Range):  Temp:  [98.5 °F (36.9 °C)-99.7 °F (37.6 °C)] 98.5 °F (36.9 °C)  Pulse:  [] 81  Resp:  [0-47] 15  SpO2:  [96 %-100 %] 100 %  BP: ()/() 125/59     Date 09/20/19 0700 - 09/21/19 0659   Shift 8428-6962 5054-7163 0732-1268 24 Hour Total   INTAKE   I.V.(mL/kg) 103.1(1.3)   103.1(1.3)   NG/GT 0   0   IV Piggyback 100   100   Shift Total(mL/kg) 203.1(2.6)   203.1(2.6)   OUTPUT   Shift Total(mL/kg)       Weight (kg) 78.6 78.6 78.6 78.6              Vent Mode: A/C  Oxygen Concentration (%):  [40] 40  Resp Rate Total:  [16 br/min-26 br/min] 16 br/min  Vt Set:  [380 mL] 380 mL  PEEP/CPAP:  [5 cmH20] 5 cmH20  Pressure Support:  [0 cmH20] 0 cmH20  Mean Airway Pressure:  [7.9 cmH20-9.9 cmH20] 9 cmH20         NG/OG Tube 09/16/19 1802 orogastric Center mouth (Active)   Placement Check placement verified by aspirate characteristics;placement verified by x-ray;placement verified by distal tube length measurement 9/19/2019  7:05 AM   Tolerance no signs/symptoms of discomfort 9/19/2019  7:05 AM   Securement secured to commercial device 9/19/2019  7:05 AM   Clamp Status/Tolerance unclamped 9/19/2019  7:05 AM   Insertion Site Appearance no redness, warmth, tenderness, skin breakdown, drainage 9/19/2019  7:05 AM   Drainage None 9/19/2019  7:05 AM   Flush/Irrigation flushed w/;water;no resistance met 9/19/2019  7:05 AM   Feeding Method continuous 9/19/2019  7:05 AM   Feeding Action feeding continued 9/19/2019  7:05 AM   Current Rate (mL/hr) 30 mL/hr 9/19/2019  7:05 AM   Goal Rate (mL/hr) 45 mL/hr 9/19/2019  7:05 AM   Intake (mL) 60 mL 9/19/2019  8:05 AM   Formula Name Isosource 1.5 9/19/2019  7:05 AM   Intake (mL) -  Formula Tube Feeding 40 9/19/2019  9:05 AM   Residual Amount (ml) 0 ml 9/19/2019  7:05 AM            Rectal Tube 09/15/19 0900 rectal tube w/ balloon (indicate number of mLs) (Active)   Balloon Inflation Volume (mL) 45 9/19/2019  7:05 AM   Reposition other (see comments) 9/19/2019  7:05 AM   Outcome gas expelled, stool evacuated 9/19/2019  7:05 AM   Stool Color Brown 9/19/2019  7:05 AM   Insertion Site Appearance no redness, warmth, tenderness, skin breakdown, drainage 9/19/2019  7:05 AM   Flush/Irrigation flushed w/;water;no resistance met 9/19/2019  7:05 AM   Intake (mL) 60 mL 9/18/2019  8:00 PM   Rectal Tube Output 25 mL 9/18/2019  6:00 AM       Neurosurgery Physical Exam     [id number]  PERRL  RUE/RLE > LLE spontaneously antigravity  Paretic in LUE.    Significant Labs:  Recent Labs   Lab 09/19/19  0309 09/20/19 0226   * 132*    139   K 4.2 4.7    102   CO2 29 27   BUN 24* 21   CREATININE 1.2 0.9   CALCIUM 8.7 8.4*   MG 2.1 2.1     Recent Labs   Lab 09/19/19 0309 09/20/19 0226   WBC 9.49 7.15   HGB 10.8* 10.2*   HCT 35.0* 31.7*    172     No results for input(s): LABPT, INR, APTT in the last 48 hours.  Microbiology Results (last 7 days)     Procedure Component Value Units Date/Time    Blood culture [[id number]] Collected:  09/11/19 1811    Order Status:  Completed Specimen:  Blood from Peripheral, Hand, Left Updated:  09/16/19 2012     Blood Culture, Routine No growth after 5 days.    CSF culture [[id number]] Collected:  09/11/19 1355    Order Status:  Completed Specimen:  CSF (Spinal Fluid) from CSF Tap, Tube 3 Updated:  09/16/19 0650     CSF CULTURE No Growth     Gram Stain Result Cytospin indicates:      Rare WBC's      No organisms seen    Blood culture [[id number]] Collected:  09/08/19 1210    Order Status:  Completed Specimen:  Blood from Peripheral, Ankle, Left Updated:  09/13/19 1412     Blood Culture, Routine No growth after 5 days.            Significant Diagnostics:  All  significant diagnostics reviewed    Assessment/Plan:     New onset seizure  73 y/o female with pmhx CKD and HTN presented to outside hospital for seizure- like activity occurring on the left side. Found to have area of ill-defined enhancement on MRI brain w/ and w/out.  GARCIA thus far has been unrevealing for source of encephalopathy.    -Admitted to Neuro-ICU  -q 1 hr neuro checks  -FEDERICO negative  -Fu ID recs for abx duration  -Continue steroids for now  -WTE when medically stable.  -MRI 9/19 with stable or even decreased lesion size.  -Taken together with stable/improving exam and MR imaging NSGY is less inclined to biopsy lesions  -Further care per NCC, will follow.          Anup Schumacher MD  Neurosurgery  [facility]

## 2019-09-20 NOTE — ASSESSMENT & PLAN NOTE
75 y/o female with pmhx CKD and HTN presented to outside hospital for seizure- like activity occurring on the left side. Found to have area of ill-defined enhancement on MRI brain w/ and w/out.  GARCIA thus far has been unrevealing for source of encephalopathy.    -Admitted to Neuro-ICU  -q 1 hr neuro checks  -FEDERICO negative  -Fu ID recs for abx duration  -Continue steroids for now  -WTE when medically stable.  -MRI 9/19 with stable or even decreased lesion size.  -Taken together with stable/improving exam and MR imaging NSGY is less inclined to biopsy lesions  -Further care per NCC, will follow.

## 2019-09-20 NOTE — ASSESSMENT & PLAN NOTE
Daily CXR, ABG  Continue Peridex, Famotidine, Heparin subq  Trial of extubation 9/6/2019- re-intubated shortly after extubation for airway protection  Continue to wean vent settings as tolerated  Still intubated 2/2 inability to protect airway  precedex drip for sedation    Vent Mode: Spont  Oxygen Concentration (%):  [40] 40  Resp Rate Total:  [16 br/min-32 br/min] 32 br/min  Vt Set:  [380 mL] 380 mL  PEEP/CPAP:  [5 cmH20] 5 cmH20  Pressure Support:  [0 cmH20-7 cmH20] 7 cmH20  Mean Airway Pressure:  [7.6 cmH20-9.9 cmH20] 7.6 cmH20   Daily CXR/ABG

## 2019-09-20 NOTE — ASSESSMENT & PLAN NOTE
2/2 to brain mass   9/ 20 placed on EEG while weaning AEDs  Vimpat dcd 9/20  Continue Valproic acid 750mg q8h, valproate level 53.7 (9/18)  Epilepsy following

## 2019-09-20 NOTE — SUBJECTIVE & OBJECTIVE
Interval History: 9/20: NAEON, AFVSS, Exam stable, MRI 9/19 stable from previous    Medications:  Continuous Infusions:   dexmedetomidine (PRECEDEX) infusion 0.2 mcg/kg/hr (09/20/19 1125)     Scheduled Meds:   cefTRIAXone (ROCEPHIN) IVPB  2 g Intravenous Q12H    chlorhexidine  15 mL Mouth/Throat BID    dexamethasone  4 mg Intravenous Q6H    famotidine  20 mg Per OG tube QHS    heparin (porcine)  5,000 Units Subcutaneous Q8H    levothyroxine  75 mcg Per OG tube Before breakfast    QUEtiapine  100 mg Per OG tube BID    valproic acid (as sodium salt)  750 mg Per OG tube Q8H     PRN Meds:acetaminophen, Dextrose 10% Bolus, fentaNYL, fentaNYL, glucagon (human recombinant), hydrALAZINE, insulin aspart U-100, magnesium oxide, magnesium oxide, OLANZapine, potassium chloride 10%, potassium chloride 10%, potassium, sodium phosphates, potassium, sodium phosphates, potassium, sodium phosphates, racepinephrine, sodium chloride 0.9%, sodium chloride 0.9%     Review of Systems    Objective:     Weight: 78.6 kg (173 lb 4.5 oz)  Body mass index is 30.7 kg/m².  Vital Signs (Most Recent):  Temp: 98.5 °F (36.9 °C) (09/20/19 1105)  Pulse: 81 (09/20/19 1212)  Resp: 15 (09/20/19 1212)  BP: (!) 125/59 (09/20/19 1212)  SpO2: 100 % (09/20/19 1212) Vital Signs (24h Range):  Temp:  [98.5 °F (36.9 °C)-99.7 °F (37.6 °C)] 98.5 °F (36.9 °C)  Pulse:  [] 81  Resp:  [0-47] 15  SpO2:  [96 %-100 %] 100 %  BP: ()/() 125/59     Date 09/20/19 0700 - 09/21/19 0659   Shift 5295-1044 1825-7303 9810-3749 24 Hour Total   INTAKE   I.V.(mL/kg) 103.1(1.3)   103.1(1.3)   NG/GT 0   0   IV Piggyback 100   100   Shift Total(mL/kg) 203.1(2.6)   203.1(2.6)   OUTPUT   Shift Total(mL/kg)       Weight (kg) 78.6 78.6 78.6 78.6              Vent Mode: A/C  Oxygen Concentration (%):  [40] 40  Resp Rate Total:  [16 br/min-26 br/min] 16 br/min  Vt Set:  [380 mL] 380 mL  PEEP/CPAP:  [5 cmH20] 5 cmH20  Pressure Support:  [0 cmH20] 0 cmH20  Mean Airway  Pressure:  [7.9 cmH20-9.9 cmH20] 9 cmH20         NG/OG Tube 09/16/19 1805 orogastric Center mouth (Active)   Placement Check placement verified by aspirate characteristics;placement verified by x-ray;placement verified by distal tube length measurement 9/19/2019  7:05 AM   Tolerance no signs/symptoms of discomfort 9/19/2019  7:05 AM   Securement secured to commercial device 9/19/2019  7:05 AM   Clamp Status/Tolerance unclamped 9/19/2019  7:05 AM   Insertion Site Appearance no redness, warmth, tenderness, skin breakdown, drainage 9/19/2019  7:05 AM   Drainage None 9/19/2019  7:05 AM   Flush/Irrigation flushed w/;water;no resistance met 9/19/2019  7:05 AM   Feeding Method continuous 9/19/2019  7:05 AM   Feeding Action feeding continued 9/19/2019  7:05 AM   Current Rate (mL/hr) 30 mL/hr 9/19/2019  7:05 AM   Goal Rate (mL/hr) 45 mL/hr 9/19/2019  7:05 AM   Intake (mL) 60 mL 9/19/2019  8:05 AM   Formula Name Isosource 1.5 9/19/2019  7:05 AM   Intake (mL) - Formula Tube Feeding 40 9/19/2019  9:05 AM   Residual Amount (ml) 0 ml 9/19/2019  7:05 AM            Rectal Tube 09/15/19 0900 rectal tube w/ balloon (indicate number of mLs) (Active)   Balloon Inflation Volume (mL) 45 9/19/2019  7:05 AM   Reposition other (see comments) 9/19/2019  7:05 AM   Outcome gas expelled, stool evacuated 9/19/2019  7:05 AM   Stool Color Brown 9/19/2019  7:05 AM   Insertion Site Appearance no redness, warmth, tenderness, skin breakdown, drainage 9/19/2019  7:05 AM   Flush/Irrigation flushed w/;water;no resistance met 9/19/2019  7:05 AM   Intake (mL) 60 mL 9/18/2019  8:00 PM   Rectal Tube Output 25 mL 9/18/2019  6:00 AM       Neurosurgery Physical Exam     E4VTM5  PERRL  RUE/RLE > LLE spontaneously antigravity  Paretic in LUE.    Significant Labs:  Recent Labs   Lab 09/19/19  0309 09/20/19  0226   * 132*    139   K 4.2 4.7    102   CO2 29 27   BUN 24* 21   CREATININE 1.2 0.9   CALCIUM 8.7 8.4*   MG 2.1 2.1     Recent Labs   Lab  09/19/19  0309 09/20/19  0226   WBC 9.49 7.15   HGB 10.8* 10.2*   HCT 35.0* 31.7*    172     No results for input(s): LABPT, INR, APTT in the last 48 hours.  Microbiology Results (last 7 days)     Procedure Component Value Units Date/Time    Blood culture [[id number]] Collected:  09/11/19 1811    Order Status:  Completed Specimen:  Blood from Peripheral, Hand, Left Updated:  09/16/19 2012     Blood Culture, Routine No growth after 5 days.    CSF culture [[id number]] Collected:  09/11/19 1355    Order Status:  Completed Specimen:  CSF (Spinal Fluid) from CSF Tap, Tube 3 Updated:  09/16/19 0650     CSF CULTURE No Growth     Gram Stain Result Cytospin indicates:      Rare WBC's      No organisms seen    Blood culture [[id number]] Collected:  09/08/19 1210    Order Status:  Completed Specimen:  Blood from Peripheral, Ankle, Left Updated:  09/13/19 1412     Blood Culture, Routine No growth after 5 days.            Significant Diagnostics:  All significant diagnostics reviewed

## 2019-09-20 NOTE — PLAN OF CARE
Problem: Adult Inpatient Plan of Care  Goal: Plan of Care Review  POC reviewed with pt at 0500. Pt intubated. Questions and concerns addressed. No acute events overnight. Pt progressing toward goals. Will continue to monitor. See flowsheets for full assessment and VS info. Pt on fentanyl drip.

## 2019-09-20 NOTE — ASSESSMENT & PLAN NOTE
74 year old admitted to unit on 9/3 following new onset seizure, with MRI showing large area of R cortical edema with flair and ill defined enhancement on contrast study, concern for glioma vs infectious/inflammatory process  -  repeat BC negative   - CSF cx no growth,   - CSF studies negative- VZV, enterovirus, HSV, MS  - FEDERICO negative for vegetation     - ID following   - continue decadron 4mg q6h  - epilepsy following   - neuro checks q1hr   - vital signs q1hr   -on SubQ heparin   -Repeat brain MRI W/WO with stealth9/19, showed large  R frontal lobe lesion nonspecific for infectious or neoplastic process; small remote L basal banglia lacunar infarct. No new lesions or infarcts

## 2019-09-21 LAB
ALBUMIN SERPL BCP-MCNC: 2.8 G/DL (ref 3.5–5.2)
ALLENS TEST: ABNORMAL
ALP SERPL-CCNC: 38 U/L (ref 55–135)
ALT SERPL W/O P-5'-P-CCNC: 37 U/L (ref 10–44)
ANION GAP SERPL CALC-SCNC: 9 MMOL/L (ref 8–16)
AST SERPL-CCNC: 19 U/L (ref 10–40)
BASOPHILS # BLD AUTO: 0.01 K/UL (ref 0–0.2)
BASOPHILS NFR BLD: 0.1 % (ref 0–1.9)
BILIRUB SERPL-MCNC: 0.3 MG/DL (ref 0.1–1)
BUN SERPL-MCNC: 19 MG/DL (ref 8–23)
CALCIUM SERPL-MCNC: 8.2 MG/DL (ref 8.7–10.5)
CHLORIDE SERPL-SCNC: 101 MMOL/L (ref 95–110)
CO2 SERPL-SCNC: 26 MMOL/L (ref 23–29)
CREAT SERPL-MCNC: 1 MG/DL (ref 0.5–1.4)
DELSYS: ABNORMAL
DIFFERENTIAL METHOD: ABNORMAL
EOSINOPHIL # BLD AUTO: 0 K/UL (ref 0–0.5)
EOSINOPHIL NFR BLD: 0 % (ref 0–8)
ERYTHROCYTE [DISTWIDTH] IN BLOOD BY AUTOMATED COUNT: 14 % (ref 11.5–14.5)
EST. GFR  (AFRICAN AMERICAN): >60 ML/MIN/1.73 M^2
EST. GFR  (NON AFRICAN AMERICAN): 54.9 ML/MIN/1.73 M^2
FIO2: 40
GLUCOSE SERPL-MCNC: 168 MG/DL (ref 70–110)
HCO3 UR-SCNC: 27.1 MMOL/L (ref 24–28)
HCT VFR BLD AUTO: 31.2 % (ref 37–48.5)
HGB BLD-MCNC: 10.1 G/DL (ref 12–16)
IMM GRANULOCYTES # BLD AUTO: 0.36 K/UL (ref 0–0.04)
IMM GRANULOCYTES NFR BLD AUTO: 3.8 % (ref 0–0.5)
LYMPHOCYTES # BLD AUTO: 1.1 K/UL (ref 1–4.8)
LYMPHOCYTES NFR BLD: 11.4 % (ref 18–48)
MAGNESIUM SERPL-MCNC: 1.9 MG/DL (ref 1.6–2.6)
MCH RBC QN AUTO: 29.4 PG (ref 27–31)
MCHC RBC AUTO-ENTMCNC: 32.4 G/DL (ref 32–36)
MCV RBC AUTO: 91 FL (ref 82–98)
MIN VOL: 12.8
MODE: ABNORMAL
MONOCYTES # BLD AUTO: 1.6 K/UL (ref 0.3–1)
MONOCYTES NFR BLD: 16.3 % (ref 4–15)
NEUTROPHILS # BLD AUTO: 6.5 K/UL (ref 1.8–7.7)
NEUTROPHILS NFR BLD: 68.4 % (ref 38–73)
NRBC BLD-RTO: 0 /100 WBC
PCO2 BLDA: 33.7 MMHG (ref 35–45)
PEEP: 5
PH SMN: 7.51 [PH] (ref 7.35–7.45)
PHOSPHATE SERPL-MCNC: 2.6 MG/DL (ref 2.7–4.5)
PLATELET # BLD AUTO: 162 K/UL (ref 150–350)
PMV BLD AUTO: 9.1 FL (ref 9.2–12.9)
PO2 BLDA: 98 MMHG (ref 80–100)
POC BE: 4 MMOL/L
POC SATURATED O2: 98 % (ref 95–100)
POC TCO2: 28 MMOL/L (ref 23–27)
POCT GLUCOSE: 95 MG/DL (ref 70–110)
POTASSIUM SERPL-SCNC: 4.7 MMOL/L (ref 3.5–5.1)
PROT SERPL-MCNC: 5.6 G/DL (ref 6–8.4)
PS: 7
RBC # BLD AUTO: 3.44 M/UL (ref 4–5.4)
SAMPLE: ABNORMAL
SITE: ABNORMAL
SODIUM SERPL-SCNC: 136 MMOL/L (ref 136–145)
SP02: 100
SPONT RATE: 39
WBC # BLD AUTO: 9.51 K/UL (ref 3.9–12.7)

## 2019-09-21 PROCEDURE — 25000003 PHARM REV CODE 250: Performed by: STUDENT IN AN ORGANIZED HEALTH CARE EDUCATION/TRAINING PROGRAM

## 2019-09-21 PROCEDURE — 25000003 PHARM REV CODE 250: Performed by: NURSE PRACTITIONER

## 2019-09-21 PROCEDURE — 25000003 PHARM REV CODE 250: Performed by: PSYCHIATRY & NEUROLOGY

## 2019-09-21 PROCEDURE — 27000221 HC OXYGEN, UP TO 24 HOURS

## 2019-09-21 PROCEDURE — 94010 BREATHING CAPACITY TEST: CPT

## 2019-09-21 PROCEDURE — 99900035 HC TECH TIME PER 15 MIN (STAT)

## 2019-09-21 PROCEDURE — 36600 WITHDRAWAL OF ARTERIAL BLOOD: CPT

## 2019-09-21 PROCEDURE — 63600175 PHARM REV CODE 636 W HCPCS: Performed by: PSYCHIATRY & NEUROLOGY

## 2019-09-21 PROCEDURE — 80053 COMPREHEN METABOLIC PANEL: CPT

## 2019-09-21 PROCEDURE — 25000003 PHARM REV CODE 250

## 2019-09-21 PROCEDURE — 82803 BLOOD GASES ANY COMBINATION: CPT

## 2019-09-21 PROCEDURE — 20000000 HC ICU ROOM

## 2019-09-21 PROCEDURE — 63600175 PHARM REV CODE 636 W HCPCS: Performed by: NURSE PRACTITIONER

## 2019-09-21 PROCEDURE — 94003 VENT MGMT INPAT SUBQ DAY: CPT

## 2019-09-21 PROCEDURE — 84100 ASSAY OF PHOSPHORUS: CPT

## 2019-09-21 PROCEDURE — 85025 COMPLETE CBC W/AUTO DIFF WBC: CPT

## 2019-09-21 PROCEDURE — 94761 N-INVAS EAR/PLS OXIMETRY MLT: CPT

## 2019-09-21 PROCEDURE — 99900026 HC AIRWAY MAINTENANCE (STAT)

## 2019-09-21 PROCEDURE — 99233 PR SUBSEQUENT HOSPITAL CARE,LEVL III: ICD-10-PCS | Mod: ,,, | Performed by: NURSE PRACTITIONER

## 2019-09-21 PROCEDURE — 83735 ASSAY OF MAGNESIUM: CPT

## 2019-09-21 PROCEDURE — 82800 BLOOD PH: CPT

## 2019-09-21 PROCEDURE — 99233 SBSQ HOSP IP/OBS HIGH 50: CPT | Mod: ,,, | Performed by: NURSE PRACTITIONER

## 2019-09-21 RX ORDER — DEXMEDETOMIDINE HYDROCHLORIDE 4 UG/ML
0.2 INJECTION, SOLUTION INTRAVENOUS CONTINUOUS
Status: DISCONTINUED | OUTPATIENT
Start: 2019-09-21 | End: 2019-09-21

## 2019-09-21 RX ORDER — FENTANYL CITRATE 50 UG/ML
50 INJECTION, SOLUTION INTRAMUSCULAR; INTRAVENOUS
Status: DISCONTINUED | OUTPATIENT
Start: 2019-09-21 | End: 2019-09-21

## 2019-09-21 RX ORDER — FENTANYL CITRATE-0.9 % NACL/PF 10 MCG/ML
300 PLASTIC BAG, INJECTION (ML) INTRAVENOUS CONTINUOUS
Status: DISCONTINUED | OUTPATIENT
Start: 2019-09-21 | End: 2019-09-25

## 2019-09-21 RX ORDER — NOREPINEPHRINE BITARTRATE/D5W 4MG/250ML
PLASTIC BAG, INJECTION (ML) INTRAVENOUS
Status: DISPENSED
Start: 2019-09-21 | End: 2019-09-22

## 2019-09-21 RX ORDER — NOREPINEPHRINE BITARTRATE/D5W 4MG/250ML
0.02 PLASTIC BAG, INJECTION (ML) INTRAVENOUS CONTINUOUS
Status: DISCONTINUED | OUTPATIENT
Start: 2019-09-21 | End: 2019-09-23

## 2019-09-21 RX ORDER — PHENYLEPHRINE HCL IN 0.9% NACL 1 MG/10 ML
SYRINGE (ML) INTRAVENOUS
Status: COMPLETED
Start: 2019-09-21 | End: 2019-09-21

## 2019-09-21 RX ORDER — FENTANYL CITRATE-0.9 % NACL/PF 10 MCG/ML
25 PLASTIC BAG, INJECTION (ML) INTRAVENOUS CONTINUOUS
Status: DISCONTINUED | OUTPATIENT
Start: 2019-09-21 | End: 2019-09-21

## 2019-09-21 RX ORDER — MIDAZOLAM HYDROCHLORIDE 1 MG/ML
2 INJECTION INTRAMUSCULAR; INTRAVENOUS ONCE
Status: COMPLETED | OUTPATIENT
Start: 2019-09-21 | End: 2019-09-21

## 2019-09-21 RX ORDER — MIDAZOLAM HYDROCHLORIDE 1 MG/ML
2 INJECTION INTRAMUSCULAR; INTRAVENOUS ONCE
Status: COMPLETED | OUTPATIENT
Start: 2019-09-22 | End: 2019-09-22

## 2019-09-21 RX ADMIN — VALPROIC ACID 750 MG: 250 SOLUTION ORAL at 02:09

## 2019-09-21 RX ADMIN — FENTANYL CITRATE 50 MCG: 50 INJECTION INTRAMUSCULAR; INTRAVENOUS at 08:09

## 2019-09-21 RX ADMIN — CHLORHEXIDINE GLUCONATE 0.12% ORAL RINSE 15 ML: 1.2 LIQUID ORAL at 09:09

## 2019-09-21 RX ADMIN — HEPARIN SODIUM 5000 UNITS: 5000 INJECTION, SOLUTION INTRAVENOUS; SUBCUTANEOUS at 09:09

## 2019-09-21 RX ADMIN — HEPARIN SODIUM 5000 UNITS: 5000 INJECTION, SOLUTION INTRAVENOUS; SUBCUTANEOUS at 02:09

## 2019-09-21 RX ADMIN — HEPARIN SODIUM 5000 UNITS: 5000 INJECTION, SOLUTION INTRAVENOUS; SUBCUTANEOUS at 05:09

## 2019-09-21 RX ADMIN — LEVOTHYROXINE SODIUM 75 MCG: 75 TABLET ORAL at 05:09

## 2019-09-21 RX ADMIN — Medication: at 07:09

## 2019-09-21 RX ADMIN — ACETAMINOPHEN 650 MG: 325 TABLET ORAL at 04:09

## 2019-09-21 RX ADMIN — CHLORHEXIDINE GLUCONATE 0.12% ORAL RINSE 15 ML: 1.2 LIQUID ORAL at 08:09

## 2019-09-21 RX ADMIN — ACETAMINOPHEN 650 MG: 325 TABLET ORAL at 10:09

## 2019-09-21 RX ADMIN — FENTANYL CITRATE 100 MCG: 50 INJECTION INTRAMUSCULAR; INTRAVENOUS at 04:09

## 2019-09-21 RX ADMIN — VALPROIC ACID 750 MG: 250 SOLUTION ORAL at 05:09

## 2019-09-21 RX ADMIN — Medication 150 MCG/HR: at 04:09

## 2019-09-21 RX ADMIN — DEXAMETHASONE SODIUM PHOSPHATE 4 MG: 4 INJECTION, SOLUTION INTRAMUSCULAR; INTRAVENOUS at 05:09

## 2019-09-21 RX ADMIN — FAMOTIDINE 20 MG: 20 TABLET ORAL at 09:09

## 2019-09-21 RX ADMIN — CEFTRIAXONE SODIUM 2 G: 2 INJECTION, SOLUTION INTRAVENOUS at 12:09

## 2019-09-21 RX ADMIN — MIDAZOLAM HYDROCHLORIDE 2 MG: 1 INJECTION, SOLUTION INTRAMUSCULAR; INTRAVENOUS at 06:09

## 2019-09-21 RX ADMIN — FENTANYL CITRATE 50 MCG: 50 INJECTION INTRAMUSCULAR; INTRAVENOUS at 02:09

## 2019-09-21 RX ADMIN — CEFTRIAXONE SODIUM 2 G: 2 INJECTION, SOLUTION INTRAVENOUS at 11:09

## 2019-09-21 RX ADMIN — DEXAMETHASONE SODIUM PHOSPHATE 4 MG: 4 INJECTION, SOLUTION INTRAMUSCULAR; INTRAVENOUS at 11:09

## 2019-09-21 RX ADMIN — VALPROIC ACID 750 MG: 250 SOLUTION ORAL at 10:09

## 2019-09-21 RX ADMIN — HYDRALAZINE HYDROCHLORIDE 10 MG: 20 INJECTION INTRAMUSCULAR; INTRAVENOUS at 11:09

## 2019-09-21 RX ADMIN — POTASSIUM & SODIUM PHOSPHATES POWDER PACK 280-160-250 MG 2 PACKET: 280-160-250 PACK at 05:09

## 2019-09-21 RX ADMIN — DEXMEDETOMIDINE HYDROCHLORIDE 0.2 MCG/KG/HR: 4 INJECTION, SOLUTION INTRAVENOUS at 12:09

## 2019-09-21 NOTE — PROGRESS NOTES
NCC team aware of pt eyes appearing to be hippus.  No new orders at this time will continue to monitor.

## 2019-09-21 NOTE — PLAN OF CARE
Problem: Adult Inpatient Plan of Care  Goal: Plan of Care Review  Outcome: Ongoing (interventions implemented as appropriate)  POC reviewed with pt and family at 1830. Spouse verbalized understanding. Questions and concerns addressed. No acute events today. Fentanyl gtt d/c per MD orders. Precedex gtt titrated for agitation. TF held for possible extubation. Will continue to monitor. See flowsheets for full assessment and VS info.

## 2019-09-21 NOTE — ASSESSMENT & PLAN NOTE
Daily CXR, ABG  Vap protocol  Trial extubation 9/6/2019- re-intubated shortly after extubation for airway protection. Still intubated 2/2 inability to protect airway   9/21Spontaneous breathing trial off sedation, very agitated and restless, not following commands  Family decided on trach and peg instead of extubation.  Gen. Surg consulted for trach and peg next week  Fentanyl gtt ( nontitrating) for sedation    Vent Mode: A/C  Oxygen Concentration (%):  [40] 40  Resp Rate Total:  [16 br/min-47 br/min] 16 br/min  Vt Set:  [380 mL] 380 mL  PEEP/CPAP:  [5 cmH20] 5 cmH20  Pressure Support:  [0 cmH20-7 cmH20] 0 cmH20  Mean Airway Pressure:  [6.9 cfC55-85 cmH20] 8.7 cmH20   Daily CXR/ABG

## 2019-09-21 NOTE — PROGRESS NOTES
Notified NCC team of pt MAP dropping to 40's.  precedex stopped, ordered to administer 1cc kayla bump, 250cc NS bolus.  Pt has since stabilized, /86.  Will continue to monitor pt closely.

## 2019-09-21 NOTE — ASSESSMENT & PLAN NOTE
74 year old admitted to unit on 9/3 following new onset seizure, with MRI showing large area of R cortical edema with flair and ill defined enhancement on contrast study, concern for glioma vs infectious/inflammatory process  -  repeat BC negative   - CSF cx no growth,   - CSF studies negative- VZV, enterovirus, HSV, MS  - FEDERICO negative for vegetation     - continue decadron 4mg q6h  - neuro checks q1hr   - vital signs q1hr   -on SubQ heparin   -Repeat brain MRI W/WO with stealth9/19, showed large  R frontal lobe lesion nonspecific for infectious or neoplastic process; small remote L basal banglia lacunar infarct. No new lesions or infarcts  NSGY, no biopsy at this time

## 2019-09-21 NOTE — PLAN OF CARE
Problem: Adult Inpatient Plan of Care  Goal: Plan of Care Review  POC reviewed with pt at 0500. Pt intubated Questions and concerns addressed. No acute events overnight. Pt progressing toward goals. Will continue to monitor. See flowsheets for full assessment and VS info.  Pt agitated and restless most of night. Fentanyl and precedex administered in attempt to calm pt down.  Pt on precedex drip.  Spontaneous breathing trial scheduled tentatively for today.

## 2019-09-21 NOTE — PROGRESS NOTES
[facility]  Neurocritical Care  Progress Note    Admit Date: 9/3/2019  Service Date: 09/21/2019  Length of Stay: 18    Subjective:     Chief Complaint: Brain lesion    History of Present Illness: Pt is  75 yo female with a PMHx of CKD 3, HTN, [facility] for new onset seizures and concern for right front lobe mass. The pt was found in her bedroom by her spouse at approximately 9:45 pm sitting her head against the bed, unresponsive, and having seizure like activity on the left-side. EMT was called and the pt was given Versed twice while en route to the hospital. Pt had a second witnessed seizures, left facial droop, left-sided weakness, and tongue ecchymosis in the ED. Neurology was consulted and The pt was given IV Keppra and Vimpat. MRI brain without contrast was acquired. The image showed right frontal lobe vasogenic edema with mass effect concerning for underlying mass. EEG was acquired at outside hospital concerning showing an abnormal result. The pt was given decadron IV and neurosurgical consult recommended. Pt transferred [facility] and admitted to the [facility] for higher level of care and further evaluation.     Pt history acquired per chart review and from spouse present at the bedside. The pt's spouse denies prior history of seizures CVA, cancer history and up-to-date screenings    Hospital Course: --admitted to [facility] on 9/3 following new onset seizure, arrived intubated  --MRI with large area of R cortical edema with flair and ill defined enhancement on contrast study, concern for glioma vs infectious/inflammatory process  --LP appears non infectious, cytology pending  9/5- no acute events, awaiting LP finalization from pathology report., weaning vent.   09/06/2019: Very agitated overnight, requiring increased sedation. Patient extubated this morning on rounds, and reintubated shortly after. Unable to maintain airway and secretions. CSF gram stain negative.  09/07/2019: KATT.  EKG obtained, seroquel started. Valproic acid started.  09/08/2019: NAEON. Improved agitation with seroquel.  09/09/2019: MRI Brain w/wo ordered for today. BLE US ordered. Will need new LP later on in the week.   09/10/2019 LP for infx workup, CD4 lab, EEG monitoring per Epilepsy   09/11/2019: To IR for LP. Spoke to NSGY about possible bx of lesion. Discontinue cEEG.   9/13/2019 Issues with low HR and BP overnight and this morning, Given Shoaib bump and atropine. 24 hours of IVF and bolus. WBC decreasing and afebrile. Repeat blood cultures negative. Plan for EGD and FEDERICO today.   9/14/2019 NAEO, EGD and FEDERICO moved until Monday. CSF still pending. Increasing WBC but afebrile. Start tube feeding   9/15/2019 NAEO, all CSF studies negative so far. CT negative. Begin insulin gtt. EGD and FEDERICO tomorrow    9/16 No significant events over night. EGD done this afternoon for esophageal stricture. Esophagus stretched to 15. FEDERICO ordered for tomorrow.   9/17 No significant vents over night NPO for FEDERICO today scheduled for 3pm. Off levophed.  9/18 FEDERICO negative. Tube feeds restarted. NSGY following and considering brain biopsy. Propofol stopped. Precedex started.  9/19: NSGY recommending MRI brain W/WO with stealth for possible brain biopsy as other work up has been negative. Patient continues with hypotensive episodes, on Fentanyl drip and recommend to space out medications.  9/20 no significant events over night. Hypotensive episodes after vimpat dose and seroquel dose requiring a shoaib bump, IVF bolus then  Levophed gtt started to maintain map>65. Spontaneous breathing trial this afternoon  9/21 no significant events over night . Discussions with family daily about trach and peg. Trial of spon breathing off all sedation, pt. Agitated, restless not following commands. Family decided to have trach and peg done . gen surg. Consulted for trach and peg next week. Restarted fentanyl gtt for sedation. DCd EEG, not having seizures    Interval  History: no significant events over night . Discussions with family daily about trach and peg. Trial of spon breathing off all sedation, pt. Agitated, restless not following commands. Family decided to have trach and peg done . gen surg. Consulted for trach and peg next week. Restarted fentanyl gtt for sedation. DCd EEG, not having seizures    Review of Systems:  Unable to obtain a complete ROS due to level of consciousness.     Vitals:   Temp: 100 °F (37.8 °C)  Pulse: 94  Rhythm: normal sinus rhythm  BP: (!) 168/79  MAP (mmHg): 114  Resp: (!) 21  SpO2: 99 %  Oxygen Concentration (%): 40  O2 Device (Oxygen Therapy): ventilator  Vent Mode: A/C  Set Rate: 16 bmp  Vt Set: 380 mL  Pressure Support: 0 cmH20  PEEP/CPAP: 5 cmH20  Peak Airway Pressure: 27 cmH2O  Mean Airway Pressure: 8.7 cmH20  Plateau Pressure: 0 cmH20    Temp  Min: 98.5 °F (36.9 °C)  Max: 100.9 °F (38.3 °C)  Pulse  Min: 59  Max: 111  BP  Min: 92/46  Max: 184/80  MAP (mmHg)  Min: 74  Max: 122  Resp  Min: 18  Max: 56  SpO2  Min: 97 %  Max: 100 %  Oxygen Concentration (%)  Min: 40  Max: 40    09/20 0701 - 09/21 0700  In: 365 [I.V.:240]  Out: 250    Unmeasured Output  Urine Occurrence: 1  Stool Occurrence: 0  Emesis Occurrence: 0  Pad Count: 2     Examination:   Constitutional: Well-nourished and -developed. No apparent distress.   Eyes: Conjunctiva clear, anicteric. Lids no lesions.  Head/Ears/Nose/Mouth/Throat/Neck: Moist mucous membranes. External ears, nose atraumatic.   Cardiovascular: Regular rhythm. No murmurs.  Respiratory: on mechanical ventilationClear to auscultation.  Gastrointestinal: No hernia. Soft, nondistended, nontender. + bowel sounds.        Neurologic:  GCS E2 V1T M5  -Intubated. Does not follow commands.  -opens eyes spon. +cough/gag/corneals  - Spontaneous movement in BLE and LLE, no movement to LUE  Unable to test orientation, language, memory, judgment, insight, fund of knowledge, hearing, shoulder shrug, tongue protrusion,  coordination, gait due to level of consciousness.      Medications:   Continuous  fentanyl Last Rate: 150 mcg/hr (09/21/19 1605)   Scheduled  cefTRIAXone (ROCEPHIN) IVPB 2 g Q12H   chlorhexidine 15 mL BID   dexamethasone 4 mg Q6H   famotidine 20 mg QHS   heparin (porcine) 5,000 Units Q8H   levothyroxine 75 mcg Before breakfast   valproic acid (as sodium salt) 750 mg Q8H   PRN  acetaminophen 650 mg Q6H PRN   Dextrose 10% Bolus 12.5 g PRN   glucagon (human recombinant) 1 mg PRN   hydrALAZINE 10 mg Q6H PRN   insulin aspart U-100 1-10 Units Q6H PRN   magnesium oxide 800 mg PRN   magnesium oxide 800 mg PRN   potassium chloride 10% 40 mEq PRN   potassium chloride 10% 40 mEq PRN   potassium, sodium phosphates 2 packet PRN   potassium, sodium phosphates 2 packet PRN   potassium, sodium phosphates 2 packet PRN   racepinephrine 0.5 mL Q4H PRN   sodium chloride 0.9% 10 mL PRN   sodium chloride 0.9% 10 mL PRN      Today I independently reviewed pertinent medications, lines/drains/airways, imaging, laboratory results, microbiology results, notably:     ISTAT: Recent Labs   Lab 09/21/19  0348   PH 7.512*   PCO2 33.7*   PO2 98   POCSATURATED 98   HCO3 27.1   BE 4   POCTCO2 28*   SAMPLE ARTERIAL      Chem: Recent Labs   Lab 09/21/19  0323      K 4.7      CO2 26   *   BUN 19   CREATININE 1.0   ESTGFRAFRICA >60.0   EGFRNONAA 54.9*   CALCIUM 8.2*   MG 1.9   PHOS 2.6*   ANIONGAP 9   PROT 5.6*   ALBUMIN 2.8*   BILITOT 0.3   ALKPHOS 38*   AST 19   ALT 37     Heme: Recent Labs   Lab 09/21/19  0323   WBC 9.51   HGB 10.1*   HCT 31.2*        Endo:   Recent Labs   Lab 09/20/19  2349 09/21/19  0611   POCTGLUCOSE 109 95      Assessment/Plan:     Neuro  * Brain lesion  74 year old admitted to unit on 9/3 following new onset seizure, with MRI showing large area of R cortical edema with flair and ill defined enhancement on contrast study, concern for glioma vs infectious/inflammatory process  -  repeat BC negative   - CSF  cx no growth,   - CSF studies negative- VZV, enterovirus, HSV, MS  - FEDERICO negative for vegetation     - continue decadron 4mg q6h  - neuro checks q1hr   - vital signs q1hr   -on SubQ heparin   -Repeat brain MRI W/WO with stealth9/19, showed large  R frontal lobe lesion nonspecific for infectious or neoplastic process; small remote L basal banglia lacunar infarct. No new lesions or infarcts  NSGY, no biopsy at this time        New onset seizure  2/2 to brain mass   9/ 20 placed on EEG while weaning AEDs  9/21 EEG dcd. No electrographic seizures for 24h  Vimpat dcd 9/20  Continue Valproic acid 750mg q8h, valproate level 53.7 (9/18)  Epilepsy following          Pulmonary  Acute respiratory failure with hypoxia and hypercapnia  See [facility] vent    On mechanically assisted ventilation  Daily CXR, ABG  Vap protocol  Trial extubation 9/6/2019- re-intubated shortly after extubation for airway protection. Still intubated 2/2 inability to protect airway   9/21Spontaneous breathing trial off sedation, very agitated and restless, not following commands  Family decided on trach and peg instead of extubation.  Gen. Surg consulted for trach and peg next week  Fentanyl gtt ( nontitrating) for sedation    Vent Mode: A/C  Oxygen Concentration (%):  [40] 40  Resp Rate Total:  [16 br/min-47 br/min] 16 br/min  Vt Set:  [380 mL] 380 mL  PEEP/CPAP:  [5 cmH20] 5 cmH20  Pressure Support:  [0 cmH20-7 cmH20] 0 cmH20  Mean Airway Pressure:  [6.9 fuR61-31 cmH20] 8.7 cmH20   Daily CXR/ABG    Cardiac/Vascular  Essential hypertension  SBP <180, MAP >65   Requiring levophed gtt to maintain MAPs > 65 while on precedex gtt  Echo: EF 65-70% concentric LV remodeling  FEDERICO (9/17) negative for vegetation, ASD, PFO       ID  Bacteremia  -Blood culture + genella morbillorum  Continue ceftriaxone 2g q12h for 2 weeks per ID recs    Oncology  Leukocytosis  Afebrile, WBC  WNL  -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 Stretched esophagus to 15 for FEDERICO          The patient is being Prophylaxed for:  Venous Thromboembolism with: Mechanical or Chemical  Stress Ulcer with: H2B  Ventilator Pneumonia with: chlorhexidine oral care    Activity Orders          Diet NPO Except for: Medication: NPO starting at 09/19 2014        Full Code     I have spent 35 min with this patient, with over 50% of this time spent coordinating care and speaking with the family    Patito Odell NP  Neurocritical Care  [facility]

## 2019-09-21 NOTE — PROCEDURES
DATE OF SERVICE:  09/20/2019 and 09/21/2019    ICU EEG/VIDEO MONITORING REPORT     METHODOLOGY:  Electroencephalographic (EEG) is recorded with electrodes placed   according to the International 10-20 placement system.  Thirty two (32) channels   of digital signal (sampling rate of 512/sec), including T1 and T2, were   simultaneously recorded from the scalp and may include EKG, EMG, and/or eye   monitors.  Recording band pass was 0.1 to 512 Hz.  Digital video recording of   the patient is simultaneously recorded with the EEG.  The patient is instructed   to report clinical symptoms which may occur during the recording session.  EEG   and video recording are stored and archived in digital format.  Activation   procedures, which include photic stimulation, hyperventilation and instructing   patients to perform simple tasks, are done in selected patients.    The EEG is displayed on a monitor screen and can be reviewed using different   montages.  Computer-assisted analysis is employed to detect spike and   electrographic seizure activity.   The entire record is submitted for computer   analysis.  The entire recording is visually reviewed, and the times identified   by computer analysis as being spikes or seizures are reviewed again.    Compressed spectral analysis (CSA) is also performed on the activity recorded   from each individual channel.  This is displayed as a power display of   frequencies from 0 to 30 Hz over time.   The CSA is reviewed looking for   asymmetries in power between homologous areas of the scalp, then compared with   the original EEG recording.    [employer] software was also utilized in the review of this study.  This software   suite analyzes the EEG recording in multiple domains.  Coherence and rhythmicity   are computed to identify EEG sections which may contain organized seizures.    Each channel undergoes analysis to detect the presence of spike and sharp waves   which have special and  morphological characteristics of epileptic activity.  The   routine EEG recording is converted from special into frequency domain.  This is   then displayed comparing homologous areas to identify areas of significant   asymmetry.  Algorithm to identify non-cortically generated artifact is used to   separate artifact from the EEG.      RECORDING TIMES:  Start on 09/20/2019 at 1516  Stop on 09/21/2019 at 1238  A total of 21 hours and 7 minutes of EEG were recorded.    EEG FINDINGS:  This study reveals diffusely slow background with abundant   superimposed faster frequencies in both the alpha and beta frequency band   frequently seen as well.  At times, alpha activity in the 10 Hz range in the   occipital regions is seen resembling a normal posterior dominant rhythm.  At   times, a mix of alpha and beta activity can be seen at the vertex and more   diffusely.  The overall background contains delta activity on and off   throughout, sometimes at lower amplitude, sometimes at medium to high amplitude.    There is good variability overall in terms of morphology and superimposed   higher frequency content.  Epileptiform discharges are not seen.  Seizures are   not seen.  At times, relative attenuation of faster activity is appreciated in   the right hemisphere rather than the left with better-formed alpha activity seen   in the left hemisphere suggesting hemispheric asymmetry.    INTERPRETATION:  Abnormal EEG due to moderate diffuse fluctuating encephalopathy   with some right hemispheric slowing as described above.  No evidence of   seizures was seen.      NBB/HN  dd: 09/21/2019 13:05:11 (CDT)  td: 09/21/2019 13:13:11 (CDT)  Doc ID   #[id number]  Job ID #[id number]    CC:

## 2019-09-21 NOTE — ASSESSMENT & PLAN NOTE
2/2 to brain mass   9/ 20 placed on EEG while weaning AEDs  9/21 EEG dcd. No electrographic seizures for 24h  Vimpat dcd 9/20  Continue Valproic acid 750mg q8h, valproate level 53.7 (9/18)  Epilepsy following

## 2019-09-22 LAB
ALBUMIN SERPL BCP-MCNC: 2.9 G/DL (ref 3.5–5.2)
ALLENS TEST: ABNORMAL
ALP SERPL-CCNC: 43 U/L (ref 55–135)
ALT SERPL W/O P-5'-P-CCNC: 36 U/L (ref 10–44)
ANION GAP SERPL CALC-SCNC: 11 MMOL/L (ref 8–16)
AST SERPL-CCNC: 17 U/L (ref 10–40)
BASOPHILS # BLD AUTO: 0.02 K/UL (ref 0–0.2)
BASOPHILS NFR BLD: 0.2 % (ref 0–1.9)
BILIRUB SERPL-MCNC: 0.5 MG/DL (ref 0.1–1)
BUN SERPL-MCNC: 20 MG/DL (ref 8–23)
CALCIUM SERPL-MCNC: 8.5 MG/DL (ref 8.7–10.5)
CHLORIDE SERPL-SCNC: 102 MMOL/L (ref 95–110)
CO2 SERPL-SCNC: 24 MMOL/L (ref 23–29)
CREAT SERPL-MCNC: 1 MG/DL (ref 0.5–1.4)
DELSYS: ABNORMAL
DIFFERENTIAL METHOD: ABNORMAL
EOSINOPHIL # BLD AUTO: 0 K/UL (ref 0–0.5)
EOSINOPHIL NFR BLD: 0 % (ref 0–8)
ERYTHROCYTE [DISTWIDTH] IN BLOOD BY AUTOMATED COUNT: 14.2 % (ref 11.5–14.5)
ERYTHROCYTE [SEDIMENTATION RATE] IN BLOOD BY WESTERGREN METHOD: 16 MM/H
EST. GFR  (AFRICAN AMERICAN): >60 ML/MIN/1.73 M^2
EST. GFR  (NON AFRICAN AMERICAN): 54.9 ML/MIN/1.73 M^2
FIO2: 40
GLUCOSE SERPL-MCNC: 157 MG/DL (ref 70–110)
HCO3 UR-SCNC: 25.5 MMOL/L (ref 24–28)
HCT VFR BLD AUTO: 36.4 % (ref 37–48.5)
HGB BLD-MCNC: 11.3 G/DL (ref 12–16)
IMM GRANULOCYTES # BLD AUTO: 0.27 K/UL (ref 0–0.04)
IMM GRANULOCYTES NFR BLD AUTO: 2.6 % (ref 0–0.5)
LYMPHOCYTES # BLD AUTO: 1.1 K/UL (ref 1–4.8)
LYMPHOCYTES NFR BLD: 10.6 % (ref 18–48)
MAGNESIUM SERPL-MCNC: 2.2 MG/DL (ref 1.6–2.6)
MCH RBC QN AUTO: 29.2 PG (ref 27–31)
MCHC RBC AUTO-ENTMCNC: 31 G/DL (ref 32–36)
MCV RBC AUTO: 94 FL (ref 82–98)
MIN VOL: 6.41
MODE: ABNORMAL
MONOCYTES # BLD AUTO: 1.7 K/UL (ref 0.3–1)
MONOCYTES NFR BLD: 16.1 % (ref 4–15)
NEUTROPHILS # BLD AUTO: 7.4 K/UL (ref 1.8–7.7)
NEUTROPHILS NFR BLD: 70.5 % (ref 38–73)
NRBC BLD-RTO: 0 /100 WBC
PCO2 BLDA: 34.6 MMHG (ref 35–45)
PEEP: 5
PH SMN: 7.48 [PH] (ref 7.35–7.45)
PHOSPHATE SERPL-MCNC: 3.6 MG/DL (ref 2.7–4.5)
PLATELET # BLD AUTO: 194 K/UL (ref 150–350)
PMV BLD AUTO: 8.9 FL (ref 9.2–12.9)
PO2 BLDA: 129 MMHG (ref 80–100)
POC BE: 2 MMOL/L
POC SATURATED O2: 99 % (ref 95–100)
POC TCO2: 27 MMOL/L (ref 23–27)
POCT GLUCOSE: 117 MG/DL (ref 70–110)
POCT GLUCOSE: 126 MG/DL (ref 70–110)
POCT GLUCOSE: 199 MG/DL (ref 70–110)
POCT GLUCOSE: 199 MG/DL (ref 70–110)
POCT GLUCOSE: 202 MG/DL (ref 70–110)
POCT GLUCOSE: 211 MG/DL (ref 70–110)
POTASSIUM SERPL-SCNC: 4.4 MMOL/L (ref 3.5–5.1)
PROT SERPL-MCNC: 6.1 G/DL (ref 6–8.4)
RBC # BLD AUTO: 3.87 M/UL (ref 4–5.4)
SAMPLE: ABNORMAL
SITE: ABNORMAL
SODIUM SERPL-SCNC: 137 MMOL/L (ref 136–145)
SP02: 100
T4 FREE SERPL-MCNC: 0.96 NG/DL (ref 0.71–1.51)
T4 SERPL-MCNC: 6.5 UG/DL (ref 4.5–11.5)
TSH SERPL DL<=0.005 MIU/L-ACNC: 0.47 UIU/ML (ref 0.4–4)
VT: 380
WBC # BLD AUTO: 10.45 K/UL (ref 3.9–12.7)

## 2019-09-22 PROCEDURE — 94003 VENT MGMT INPAT SUBQ DAY: CPT

## 2019-09-22 PROCEDURE — 99900035 HC TECH TIME PER 15 MIN (STAT)

## 2019-09-22 PROCEDURE — 63600175 PHARM REV CODE 636 W HCPCS: Performed by: PSYCHIATRY & NEUROLOGY

## 2019-09-22 PROCEDURE — 25000003 PHARM REV CODE 250: Performed by: PSYCHIATRY & NEUROLOGY

## 2019-09-22 PROCEDURE — 82803 BLOOD GASES ANY COMBINATION: CPT

## 2019-09-22 PROCEDURE — 94761 N-INVAS EAR/PLS OXIMETRY MLT: CPT

## 2019-09-22 PROCEDURE — 63600175 PHARM REV CODE 636 W HCPCS: Performed by: NURSE PRACTITIONER

## 2019-09-22 PROCEDURE — 84443 ASSAY THYROID STIM HORMONE: CPT

## 2019-09-22 PROCEDURE — 83735 ASSAY OF MAGNESIUM: CPT

## 2019-09-22 PROCEDURE — 84439 ASSAY OF FREE THYROXINE: CPT

## 2019-09-22 PROCEDURE — 86256 FLUORESCENT ANTIBODY TITER: CPT

## 2019-09-22 PROCEDURE — 84436 ASSAY OF TOTAL THYROXINE: CPT

## 2019-09-22 PROCEDURE — 36600 WITHDRAWAL OF ARTERIAL BLOOD: CPT

## 2019-09-22 PROCEDURE — 80053 COMPREHEN METABOLIC PANEL: CPT

## 2019-09-22 PROCEDURE — 20000000 HC ICU ROOM

## 2019-09-22 PROCEDURE — 84100 ASSAY OF PHOSPHORUS: CPT

## 2019-09-22 PROCEDURE — 99233 PR SUBSEQUENT HOSPITAL CARE,LEVL III: ICD-10-PCS | Mod: ,,, | Performed by: NURSE PRACTITIONER

## 2019-09-22 PROCEDURE — 85025 COMPLETE CBC W/AUTO DIFF WBC: CPT

## 2019-09-22 PROCEDURE — 87205 SMEAR GRAM STAIN: CPT

## 2019-09-22 PROCEDURE — 99900026 HC AIRWAY MAINTENANCE (STAT)

## 2019-09-22 PROCEDURE — 25000003 PHARM REV CODE 250: Performed by: NURSE PRACTITIONER

## 2019-09-22 PROCEDURE — 99233 SBSQ HOSP IP/OBS HIGH 50: CPT | Mod: ,,, | Performed by: NURSE PRACTITIONER

## 2019-09-22 PROCEDURE — 87070 CULTURE OTHR SPECIMN AEROBIC: CPT

## 2019-09-22 PROCEDURE — 82800 BLOOD PH: CPT

## 2019-09-22 RX ORDER — MIDAZOLAM HYDROCHLORIDE 1 MG/ML
0.5 INJECTION INTRAMUSCULAR; INTRAVENOUS EVERY 4 HOURS PRN
Status: DISCONTINUED | OUTPATIENT
Start: 2019-09-22 | End: 2019-09-23

## 2019-09-22 RX ORDER — MIDAZOLAM HYDROCHLORIDE 1 MG/ML
0.5 INJECTION INTRAMUSCULAR; INTRAVENOUS ONCE
Status: COMPLETED | OUTPATIENT
Start: 2019-09-22 | End: 2019-09-22

## 2019-09-22 RX ORDER — MIDAZOLAM HYDROCHLORIDE 1 MG/ML
2 INJECTION INTRAMUSCULAR; INTRAVENOUS ONCE
Status: COMPLETED | OUTPATIENT
Start: 2019-09-22 | End: 2019-09-22

## 2019-09-22 RX ADMIN — Medication 150 MCG/HR: at 07:09

## 2019-09-22 RX ADMIN — MIDAZOLAM HYDROCHLORIDE 2 MG: 1 INJECTION, SOLUTION INTRAMUSCULAR; INTRAVENOUS at 12:09

## 2019-09-22 RX ADMIN — MIDAZOLAM HYDROCHLORIDE 0.5 MG: 1 INJECTION, SOLUTION INTRAMUSCULAR; INTRAVENOUS at 06:09

## 2019-09-22 RX ADMIN — HEPARIN SODIUM 5000 UNITS: 5000 INJECTION, SOLUTION INTRAVENOUS; SUBCUTANEOUS at 02:09

## 2019-09-22 RX ADMIN — CEFTRIAXONE SODIUM 2 G: 2 INJECTION, SOLUTION INTRAVENOUS at 11:09

## 2019-09-22 RX ADMIN — Medication 0.1 MCG/KG/MIN: at 09:09

## 2019-09-22 RX ADMIN — MIDAZOLAM HYDROCHLORIDE 0.5 MG: 1 INJECTION, SOLUTION INTRAMUSCULAR; INTRAVENOUS at 09:09

## 2019-09-22 RX ADMIN — CHLORHEXIDINE GLUCONATE 0.12% ORAL RINSE 15 ML: 1.2 LIQUID ORAL at 08:09

## 2019-09-22 RX ADMIN — INSULIN ASPART 1 UNITS: 100 INJECTION, SOLUTION INTRAVENOUS; SUBCUTANEOUS at 11:09

## 2019-09-22 RX ADMIN — DEXAMETHASONE SODIUM PHOSPHATE 4 MG: 4 INJECTION, SOLUTION INTRAMUSCULAR; INTRAVENOUS at 06:09

## 2019-09-22 RX ADMIN — CHLORHEXIDINE GLUCONATE 0.12% ORAL RINSE 15 ML: 1.2 LIQUID ORAL at 09:09

## 2019-09-22 RX ADMIN — INSULIN ASPART 2 UNITS: 100 INJECTION, SOLUTION INTRAVENOUS; SUBCUTANEOUS at 06:09

## 2019-09-22 RX ADMIN — MIDAZOLAM HYDROCHLORIDE 0.5 MG: 1 INJECTION, SOLUTION INTRAMUSCULAR; INTRAVENOUS at 03:09

## 2019-09-22 RX ADMIN — VALPROIC ACID 750 MG: 250 SOLUTION ORAL at 02:09

## 2019-09-22 RX ADMIN — MIDAZOLAM HYDROCHLORIDE 1 MG: 1 INJECTION, SOLUTION INTRAMUSCULAR; INTRAVENOUS at 11:09

## 2019-09-22 RX ADMIN — INSULIN ASPART 4 UNITS: 100 INJECTION, SOLUTION INTRAVENOUS; SUBCUTANEOUS at 02:09

## 2019-09-22 RX ADMIN — DEXAMETHASONE SODIUM PHOSPHATE 4 MG: 4 INJECTION, SOLUTION INTRAMUSCULAR; INTRAVENOUS at 11:09

## 2019-09-22 RX ADMIN — VALPROIC ACID 750 MG: 250 SOLUTION ORAL at 10:09

## 2019-09-22 RX ADMIN — Medication 200 MCG/HR: at 06:09

## 2019-09-22 RX ADMIN — HEPARIN SODIUM 5000 UNITS: 5000 INJECTION, SOLUTION INTRAVENOUS; SUBCUTANEOUS at 09:09

## 2019-09-22 RX ADMIN — CEFTRIAXONE SODIUM 2 G: 2 INJECTION, SOLUTION INTRAVENOUS at 12:09

## 2019-09-22 RX ADMIN — DEXAMETHASONE SODIUM PHOSPHATE 4 MG: 4 INJECTION, SOLUTION INTRAMUSCULAR; INTRAVENOUS at 12:09

## 2019-09-22 RX ADMIN — HEPARIN SODIUM 5000 UNITS: 5000 INJECTION, SOLUTION INTRAVENOUS; SUBCUTANEOUS at 06:09

## 2019-09-22 RX ADMIN — LEVOTHYROXINE SODIUM 75 MCG: 75 TABLET ORAL at 06:09

## 2019-09-22 RX ADMIN — FAMOTIDINE 20 MG: 20 TABLET ORAL at 08:09

## 2019-09-22 NOTE — CONSULTS
[facility]  General Surgery  Consult Note    Patient Name: Clemencia Nguyen  MRN: [id number]  Code Status: Full Code  Admission Date: 9/3/2019  Hospital Length of Stay: 18 days  Attending Physician: Jelani Allison MD  Primary Care Provider: SHELBY Castillo MD    Patient information was obtained from relative(s), past medical records and ER records.     Inpatient consult to General Surgery  Consult performed by: Get Rubio MD  Consult ordered by: Jelani Allison MD        Subjective:     Principal Problem: Brain lesion    History of Present Illness: Ms. Nguyen is a 73 yo female with a Hx of CKD 3, HTN, Schatzki ring (distal 1/3 of esophagus dilated to 15mm 9/16/19) was transferred from [facility] to [facility] for new onset seizures and concern for right front lobe mass. The pt was found in her bedroom by her spouse unresponsive, and having seizure like activity. She has been intubated with failed extubation twice. Surgery is consulted for trach/PEG.    No current facility-administered medications on file prior to encounter.      No current outpatient medications on file prior to encounter.       Review of patient's allergies indicates:  No Known Allergies    Past Medical History:   Diagnosis Date    Depression     has been on tofranil for years;    Disorder of kidney and ureter     Esophageal stricture 06/12/2019    per pt per Dr. Garcia    Hyperlipemia     Hypertension     Hypothyroidism     Osteopenia     Thyroid disease     hypothyroid     Past Surgical History:   Procedure Laterality Date    cataract surgery      CHOLECYSTECTOMY  04/24/2018    CHOLECYSTECTOMY-LAPAROSCOPIC N/A 4/24/2018    Performed by Marina [facility]ian [facility] at [facility] OR    COLONOSCOPY  2011, again in 2014    repeat in 5    COLONOSCOPY W/ BIOPSIES  06/12/2019    polypectomies per Dr. Garcia    EGD (ESOPHAGOGASTRODUODENOSCOPY) N/A 9/16/2019    Performed by Chas Castillo [facility] at [facility] (2ND FLR)     ESOPHAGOGASTRODUODENOSCOPY  06/12/2019    Dr. Garcia    HYSTERECTOMY      OOPHORECTOMY      TONSILLECTOMY       Family History     Problem Relation (Age of Onset)    Heart disease Mother    Hypertension Mother    Stroke Father        Tobacco Use    Smoking status: Never Smoker    Smokeless tobacco: Never Used   Substance and Sexual Activity    Alcohol use: No     Frequency: Never     Binge frequency: Never    Drug use: No    Sexual activity: Not Currently     Partners: Male     Birth control/protection: Surgical     Review of Systems   Unable to perform ROS: Intubated     Objective:     Vital Signs (Most Recent):  Temp: 100 °F (37.8 °C) (09/21/19 1505)  Pulse: 93 (09/21/19 2003)  Resp: (!) 38 (09/21/19 2003)  BP: (!) 149/65 (09/21/19 2003)  SpO2: 100 % (09/21/19 2003) Vital Signs (24h Range):  Temp:  [98.5 °F (36.9 °C)-100.9 °F (38.3 °C)] 100 °F (37.8 °C)  Pulse:  [] 93  Resp:  [18-56] 38  SpO2:  [97 %-100 %] 100 %  BP: (102-184)/() 149/65     Weight: 78.6 kg (173 lb 4.5 oz)  Body mass index is 30.7 kg/m².    Physical Exam   Constitutional: She appears well-developed. She is intubated.   HENT:   Head: Normocephalic.   Eyes: Conjunctivae are normal.   Cardiovascular: Normal rate and regular rhythm.   Pulmonary/Chest: She is intubated.   Vent Mode: A/C  Oxygen Concentration (%):  (40) 40  Resp Rate Total:  (16 br/min-47 br/min) 27 br/min  Vt Set:  (380 mL) 380 mL  PEEP/CPAP:  (5 cmH20) 5 cmH20  Pressure Support:  (0 cmH20-7 cmH20) 0 cmH20  Mean Airway Pressure:  (6.9 ihN81-86 cmH20) 9.9 cmH20   Abdominal: Soft. She exhibits no distension.   Bruising   Skin: Skin is warm.       Significant Labs:  CBC:   Recent Labs   Lab 09/21/19  0323   WBC 9.51   RBC 3.44*   HGB 10.1*   HCT 31.2*      MCV 91   MCH 29.4   MCHC 32.4     CMP:   Recent Labs   Lab 09/21/19  0323   *   CALCIUM 8.2*   ALBUMIN 2.8*   PROT 5.6*      K 4.7   CO2 26      BUN 19   CREATININE 1.0   ALKPHOS 38*   ALT  37   AST 19   BILITOT 0.3     Assessment/Plan:     Acute respiratory failure with hypoxia and hypercapnia  75yo F with frontal lobe lesion, intubated with failed extubation x 2.    - Schatzki ring in distal 1/3 of esophagus, GI did EGD 9/16/19 with dilation to 15mm; did not note issue passing scope  - Plan for Trach/PEG 9/24/19  - Please hold TFs at midnight prior to procedure  - Care per primary team      VTE Risk Mitigation (From admission, onward)        Ordered     heparin (porcine) injection 5,000 Units  Every 8 hours      09/10/19 0909          Thank you for your consult. I will follow-up with patient. Please contact us if you have any additional questions.    Get Rubio MD  General Surgery  [facility]

## 2019-09-22 NOTE — PROGRESS NOTES
NCC team notified of pt very lethargic with MAP's in the 40's, ordered to give kayla bumps 3cc as well as 500cc NS bolus. Levo started momentarily then turned off.  Pt has since stabilized.  Will continue to monitor closely.

## 2019-09-22 NOTE — ASSESSMENT & PLAN NOTE
SBP <180, MAP >65   Requiring levophed gtt to maintain MAPs > 65 while on fentanyl gtt  Echo: EF 65-70% concentric LV remodeling  FEDERICO (9/17) negative for vegetation, ASD, PFO

## 2019-09-22 NOTE — PLAN OF CARE
POC reviewed with pt at 0500. Pt intubated. Questions and concerns addressed. No acute events overnight. Pt progressing toward goals. Will continue to monitor. See flowsheets for full assessment and VS info.  Pt on fentanyl drip.

## 2019-09-22 NOTE — ASSESSMENT & PLAN NOTE
2/2 to brain mass   9/21 EEG dcd. No electrographic seizures for 24h  Vimpat dcd 9/20  Continue Valproic acid 750mg q8h, valproate level 53.7 (9/18)

## 2019-09-22 NOTE — ASSESSMENT & PLAN NOTE
-after receiving sedation  bolused with 500cc, kayla bump x3 then levophed gtt started to maintain MAP > 65

## 2019-09-22 NOTE — ASSESSMENT & PLAN NOTE
74 year old admitted to unit on 9/3 following new onset seizure, with MRI showing large area of R cortical edema with flair and ill defined enhancement on contrast study, concern for glioma vs infectious/inflammatory process  - 9/22 repeat BC, pending   - CSF cx no growth,   - CSF studies negative- VZV, enterovirus, HSV, MS  - FEDERICO negative for vegetation     - continue decadron 4mg q6h  - neuro checks/ VS q1hr   -on SubQ heparin   -Repeat brain MRI W/WO with stealth9/19, showed large  R frontal lobe lesion nonspecific for infectious or neoplastic process; small remote L basal banglia lacunar infarct. No new lesions or infarcts  NSGY, no biopsy at this time, will readdress need for biopsy with NSGY next week.  9/22 paraneoplastic panel sent

## 2019-09-22 NOTE — SUBJECTIVE & OBJECTIVE
No current facility-administered medications on file prior to encounter.      No current outpatient medications on file prior to encounter.       Review of patient's allergies indicates:  No Known Allergies    Past Medical History:   Diagnosis Date    Depression     has been on tofranil for years;    Disorder of kidney and ureter     Esophageal stricture 06/12/2019    per pt per Dr. Garcia    Hyperlipemia     Hypertension     Hypothyroidism     Osteopenia     Thyroid disease     hypothyroid     Past Surgical History:   Procedure Laterality Date    cataract surgery      CHOLECYSTECTOMY  04/24/2018    CHOLECYSTECTOMY-LAPAROSCOPIC N/A 4/24/2018    [facility]    COLONOSCOPY  2011, again in 2014    repeat in 5    COLONOSCOPY W/ BIOPSIES  06/12/2019    polypectomies per Dr. Garcia    EGD (ESOPHAGOGASTRODUODENOSCOPY) N/A 9/16/2019    Per[facility] at [facility] ([facility])    ESOPHAGOGASTRODUODENOSCOPY  06/12/2019    Dr. Garcia    HYSTERECTOMY      OOPHORECTOMY      TONSILLECTOMY       Family History     Problem Relation (Age of Onset)    Heart disease Mother    Hypertension Mother    Stroke Father        Tobacco Use    Smoking status: Never Smoker    Smokeless tobacco: Never Used   Substance and Sexual Activity    Alcohol use: No     Frequency: Never     Binge frequency: Never    Drug use: No    Sexual activity: Not Currently     Partners: Male     Birth control/protection: Surgical     Review of Systems   Unable to perform ROS: Intubated     Objective:     Vital Signs (Most Recent):  Temp: 100 °F (37.8 °C) (09/21/19 1505)  Pulse: 93 (09/21/19 2003)  Resp: (!) 38 (09/21/19 2003)  BP: (!) 149/65 (09/21/19 2003)  SpO2: 100 % (09/21/19 2003) Vital Signs (24h Range):  Temp:  [98.5 °F (36.9 °C)-100.9 °F (38.3 °C)] 100 °F (37.8 °C)  Pulse:  [] 93  Resp:  [18-56] 38  SpO2:  [97 %-100 %] 100 %  BP: (102-184)/() 149/65     Weight: 78.6 kg (173  lb 4.5 oz)  Body mass index is 30.7 kg/m².    Physical Exam   Constitutional: She appears well-developed. She is intubated.   HENT:   Head: Normocephalic.   Eyes: Conjunctivae are normal.   Cardiovascular: Normal rate and regular rhythm.   Pulmonary/Chest: She is intubated.   Vent Mode: A/C  Oxygen Concentration (%):  (40) 40  Resp Rate Total:  (16 br/min-47 br/min) 27 br/min  Vt Set:  (380 mL) 380 mL  PEEP/CPAP:  (5 cmH20) 5 cmH20  Pressure Support:  (0 cmH20-7 cmH20) 0 cmH20  Mean Airway Pressure:  (6.9 kdG51-54 cmH20) 9.9 cmH20   Abdominal: Soft. She exhibits no distension.   Bruising   Skin: Skin is warm.       Significant Labs:  CBC:   Recent Labs   Lab 09/21/19  0323   WBC 9.51   RBC 3.44*   HGB 10.1*   HCT 31.2*      MCV 91   MCH 29.4   MCHC 32.4     CMP:   Recent Labs   Lab 09/21/19  0323   *   CALCIUM 8.2*   ALBUMIN 2.8*   PROT 5.6*      K 4.7   CO2 26      BUN 19   CREATININE 1.0   ALKPHOS 38*   ALT 37   AST 19   BILITOT 0.3

## 2019-09-22 NOTE — PLAN OF CARE
Plan of care reviewed with spouse.All questions and concerns addressed.Fentanyl drip increased 200 mcg/hr for agitation.Levo drip titrated to keep MAP>65.

## 2019-09-22 NOTE — ASSESSMENT & PLAN NOTE
Daily CXR, ABG  Vap protocol  Trial extubation 9/6/2019- re-intubated shortly after extubation for airway protection. Still intubated 2/2 inability to protect airway   9/21Spontaneous breathing trial off sedation, very agitated and restless, not following commands  Family decided on trach and peg instead of extubation.  Gen. Surg consulted for trach and peg next week  Fentanyl gtt 200mcg/h ( nontitrating) for sedation still restless. Gave versed 1mg IVP. Remains on levophed gtt for hypotensive episodes    Vent Mode: A/C  Oxygen Concentration (%):  [40] 40  Resp Rate Total:  [16 br/min-36 br/min] 18 br/min  Vt Set:  [380 mL] 380 mL  PEEP/CPAP:  [5 cmH20] 5 cmH20  Pressure Support:  [0 cmH20] 0 cmH20  Mean Airway Pressure:  [8.1 cmH20-10 cmH20] 10 cmH20   Daily CXR/ABG

## 2019-09-22 NOTE — PROGRESS NOTES
[facility]  Neurocritical Care  Progress Note    Admit Date: 9/3/2019  Service Date: 09/22/2019  Length of Stay: 19    Subjective:     Chief Complaint: Brain lesion    History of Present Illness: Pt is  75 yo female with a PMHx of CKD 3, HTN, [facility] for new onset seizures and concern for right front lobe mass. The pt was found in her bedroom by her spouse at approximately 9:45 pm sitting her head against the bed, unresponsive, and having seizure like activity on the left-side. EMT was called and the pt was given Versed twice while en route to the hospital. Pt had a second witnessed seizures, left facial droop, left-sided weakness, and tongue ecchymosis in the ED. Neurology was consulted and The pt was given IV Keppra and Vimpat. MRI brain without contrast was acquired. The image showed right frontal lobe vasogenic edema with mass effect concerning for underlying mass. EEG was acquired at outside hospital concerning showing an abnormal result. The pt was given decadron IV and neurosurgical consult recommended. [facility] transferred [facility] and admitted to the [facility] for higher level of care and further evaluation.     Pt history acquired per chart review and from spouse present at the bedside. The pt's spouse denies prior history of seizures CVA, cancer history and up-to-date screenings    Hospital Course: --admitted to [facility] on 9/3 following new onset seizure, arrived intubated  --MRI with large area of R cortical edema with flair and ill defined enhancement on contrast study, concern for glioma vs infectious/inflammatory process  --LP appears non infectious, cytology pending  9/5- no acute events, awaiting LP finalization from pathology report., weaning vent.   09/06/2019: Very agitated overnight, requiring increased sedation. Patient extubated this morning on rounds, and reintubated shortly after. Unable to maintain airway and secretions. CSF gram stain negative.  09/07/2019: KATT.  EKG obtained, seroquel started. Valproic acid started.  09/08/2019: NAEON. Improved agitation with seroquel.  09/09/2019: MRI Brain w/wo ordered for today. BLE US ordered. Will need new LP later on in the week.   09/10/2019 LP for infx workup, CD4 lab, EEG monitoring per Epilepsy   09/11/2019: To IR for LP. Spoke to NSGY about possible bx of lesion. Discontinue cEEG.   9/13/2019 Issues with low HR and BP overnight and this morning, Given Shoaib bump and atropine. 24 hours of IVF and bolus. WBC decreasing and afebrile. Repeat blood cultures negative. Plan for EGD and FEDERICO today.   9/14/2019 NAEO, EGD and FEDERICO moved until Monday. CSF still pending. Increasing WBC but afebrile. Start tube feeding   9/15/2019 NAEO, all CSF studies negative so far. CT negative. Begin insulin gtt. EGD and FEDERICO tomorrow    9/16 No significant events over night. EGD done this afternoon for esophageal stricture. Esophagus stretched to 15. FEDERICO ordered for tomorrow.   9/17 No significant vents over night NPO for FEDERICO today scheduled for 3pm. Off levophed.  9/18 FEDERICO negative. Tube feeds restarted. NSGY following and considering brain biopsy. Propofol stopped. Precedex started.  9/19: NSGY recommending MRI brain W/WO with stealth for possible brain biopsy as other work up has been negative. Patient continues with hypotensive episodes, on Fentanyl drip and recommend to space out medications.  9/20 no significant events over night. Hypotensive episodes after vimpat dose and seroquel dose requiring a shoaib bump, IVF bolus then  Levophed gtt started to maintain map>65. Spontaneous breathing trial this afternoon  9/21 no significant events over night . Discussions with family daily about trach and peg. Trial of spon breathing off all sedation, pt. Agitated, restless not following commands. Family decided to have trach and peg done . gen surg. Consulted for trach and peg next week. Restarted fentanyl gtt for sedation. DCd EEG, not having seizures  9/22  Hypotensive episode last night required 500cc bolus and neobump x3. Levophed gtt started. Fentanyl gtt off till SBP back up. This am remains restless on fentanyl gtt. Will try versed IVP at lower dose.  Currently on levophed. Trach and peg scheduled for Tuesday. Paraneoplastic panel sent. Febrile over night blood and sputum cultures sent. Will readdress biopsy with NSGY next week.    Interval History: Hypotensive episode last night required 500cc bolus and neobump x3. Levophed gtt started. Fentanyl gtt off till SBP back up. This am remains restless on fentanyl gtt. Will try versed IVP at lower dose.  Currently on levophed. Trach and peg scheduled for Tuesday. Paraneoplastic panel sent. Febrile over night blood and sputum cultures sent. Will readdress biopsy with NSGY next week.    Review of Systems: Unable to obtain a complete ROS due to level of consciousness.     Vitals:   Temp: 98.5 °F (36.9 °C)  Pulse: 98  Rhythm: (P) normal sinus rhythm  BP: (!) 129/59  MAP (mmHg): 85  Resp: (!) 22  SpO2: 100 %  Oxygen Concentration (%): 40  O2 Device (Oxygen Therapy): ventilator  Vent Mode: A/C  Set Rate: 16 bmp  Vt Set: 380 mL  Pressure Support: 0 cmH20  PEEP/CPAP: 5 cmH20  Peak Airway Pressure: 20 cmH2O  Mean Airway Pressure: 10 cmH20  Plateau Pressure: 0 cmH20    Temp  Min: 98.5 °F (36.9 °C)  Max: 100.1 °F (37.8 °C)  Pulse  Min: 54  Max: 103  BP  Min: 82/44  Max: 197/91  MAP (mmHg)  Min: 57  Max: 132  Resp  Min: 13  Max: 50  SpO2  Min: 99 %  Max: 100 %  Oxygen Concentration (%)  Min: 40  Max: 40    09/21 0701 - 09/22 0700  In: 948.8 [I.V.:258.8]  Out: 150    Unmeasured Output  Urine Occurrence: 1  Stool Occurrence: 0  Emesis Occurrence: 0  Pad Count: 2     Examination:   Constitutional: Well-nourished and -developed. No apparent distress.   Eyes: Conjunctiva clear, anicteric. Lids no lesions.  Head/Ears/Nose/Mouth/Throat/Neck: Moist mucous membranes. External ears, nose atraumatic.   Cardiovascular: Regular rhythm. No  murmurs.  Respiratory: on mechanical ventilationClear to auscultation.  Gastrointestinal: No hernia. Soft, nondistended, nontender. + bowel sounds.        Neurologic:  GCS E2 V1T M5  -Intubated. Does not follow commands.  -opens eyes spon. +cough/gag/corneals  - Spontaneous movement in BLE and LLE, no movement to LUE  Unable to test orientation, language, memory, judgment, insight, fund of knowledge, hearing, shoulder shrug, tongue protrusion, coordination, gait due to level of consciousness.         Medications:   Continuous  fentanyl Last Rate: 200 mcg/hr (09/22/19 1500)   norepinephrine bitartrate-D5W Last Rate: 0.03 mcg/kg/min (09/22/19 1500)   Scheduled  cefTRIAXone (ROCEPHIN) IVPB 2 g Q12H   chlorhexidine 15 mL BID   dexamethasone 4 mg Q6H   famotidine 20 mg QHS   heparin (porcine) 5,000 Units Q8H   levothyroxine 75 mcg Before breakfast   valproic acid (as sodium salt) 750 mg Q8H   PRN  acetaminophen 650 mg Q6H PRN   Dextrose 10% Bolus 12.5 g PRN   glucagon (human recombinant) 1 mg PRN   hydrALAZINE 10 mg Q6H PRN   insulin aspart U-100 1-10 Units Q6H PRN   magnesium oxide 800 mg PRN   magnesium oxide 800 mg PRN   potassium chloride 10% 40 mEq PRN   potassium chloride 10% 40 mEq PRN   potassium, sodium phosphates 2 packet PRN   potassium, sodium phosphates 2 packet PRN   potassium, sodium phosphates 2 packet PRN   racepinephrine 0.5 mL Q4H PRN   sodium chloride 0.9% 10 mL PRN   sodium chloride 0.9% 10 mL PRN      Today I independently reviewed pertinent medications, lines/drains/airways, imaging, laboratory results, microbiology results, notably:     ISTAT: Recent Labs   Lab 09/22/19  0459   PH 7.477*   PCO2 34.6*   PO2 129*   POCSATURATED 99   HCO3 25.5   BE 2   POCTCO2 27   SAMPLE ARTERIAL      Chem: Recent Labs   Lab 09/22/19  0019      K 4.4      CO2 24   *   BUN 20   CREATININE 1.0   ESTGFRAFRICA >60.0   EGFRNONAA 54.9*   CALCIUM 8.5*   MG 2.2   PHOS 3.6   ANIONGAP 11   PROT 6.1    ALBUMIN 2.9*   BILITOT 0.5   ALKPHOS 43*   AST 17   ALT 36     Heme: Recent Labs   Lab 09/22/19  0019   WBC 10.45   HGB 11.3*   HCT 36.4*        Endo:   Recent Labs   Lab 09/22/19  0615 09/22/19  1417 09/22/19  1418   POCTGLUCOSE 126* 211* 202*      Assessment/Plan:     Neuro  * Brain lesion  74 year old admitted to unit on 9/3 following new onset seizure, with MRI showing large area of R cortical edema with flair and ill defined enhancement on contrast study, concern for glioma vs infectious/inflammatory process  - 9/22 repeat BC, pending   - CSF cx no growth,   - CSF studies negative- VZV, enterovirus, HSV, MS  - FEDERICO negative for vegetation     - continue decadron 4mg q6h  - neuro checks/ VS q1hr   -on SubQ heparin   -Repeat brain MRI W/WO with stealth9/19, showed large  R frontal lobe lesion nonspecific for infectious or neoplastic process; small remote L basal banglia lacunar infarct. No new lesions or infarcts  NSGY, no biopsy at this time, will readdress need for biopsy with NSGY next week.  9/22 paraneoplastic panel sent        Cerebral edema  --continue decadron 4mg q6h see brain lesion      New onset seizure  2/2 to brain mass   9/21 EEG dcd. No electrographic seizures for 24h  Vimpat dcd 9/20  Continue Valproic acid 750mg q8h, valproate level 53.7 (9/18)            Pulmonary  Acute respiratory failure with hypoxia and hypercapnia  See [facility] vent    On mechanically assisted ventilation  Daily CXR, ABG  Vap protocol  Trial extubation 9/6/2019- re-intubated shortly after extubation for airway protection. Still intubated 2/2 inability to protect airway   9/21Spontaneous breathing trial off sedation, very agitated and restless, not following commands  Family decided on trach and peg instead of extubation.  Gen. Surg consulted for trach and peg next week  Fentanyl gtt 200mcg/h ( nontitrating) for sedation still restless. Gave versed 1mg IVP. Remains on levophed gtt for hypotensive episodes  9/21 Gen   Surg. Consulted.   Scheduled for trach and Peg Tuesday 9/24    Vent Mode: A/C  Oxygen Concentration (%):  [40] 40  Resp Rate Total:  [16 br/min-36 br/min] 18 br/min  Vt Set:  [380 mL] 380 mL  PEEP/CPAP:  [5 cmH20] 5 cmH20  Pressure Support:  [0 cmH20] 0 cmH20  Mean Airway Pressure:  [8.1 cmH20-10 cmH20] 10 cmH20   Daily CXR/ABG    Cardiac/Vascular  Essential hypertension  SBP <180, MAP >65   Requiring levophed gtt to maintain MAPs > 65 while on fentanyl gtt  Echo: EF 65-70% concentric LV remodeling  FEDERICO (9/17) negative for vegetation, ASD, PFO       Renal/  Chronic kidney disease, stage III (moderate)  -PMHx of CKD 3   -monitor renal function, I/Os       ID  Bacteremia  -Blood culture + genella morbillorum  Continue ceftriaxone 2g q12h for 2 weeks per ID recs  9/22 Re cultured Blood and sputum results pending    Oncology  Leukocytosis  Afebrile, WBC  WNL  -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 Stretched esophagus to 15 for FEDERICO    Other  Hypotension due to hypovolemia  -after receiving sedation  bolused with 500cc, kayla bump x3 then levophed gtt started to maintain MAP > 65                The patient is being Prophylaxed for:  Venous Thromboembolism with: Mechanical or Chemical  Stress Ulcer with: H2B  Ventilator Pneumonia with: chlorhexidine oral care    Activity Orders          Diet NPO Except for: Medication: NPO starting at 09/19 2014        Full Code    Patito Odell NP  Neurocritical Care  [facility]

## 2019-09-22 NOTE — ASSESSMENT & PLAN NOTE
75yo F with frontal lobe lesion, intubated with failed extubation x 2.    - Schatzki ring in distal 1/3 of esophagus, GI did EGD 9/16/19 with dilation to 15mm; did not note issue passing scope  - Plan for Trach/PEG 9/24/19  - Please hold TFs at midnight prior to procedure  - Care per primary team

## 2019-09-22 NOTE — ASSESSMENT & PLAN NOTE
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

## 2019-09-22 NOTE — HPI
Ms. Nguyen is a 75 yo female with a Hx of CKD 3, HTN, Schatzki ring (distal 1/3 of esophagus dilated to 15mm 9/16/19) was transferred from [facility] to [facility] for new onset seizures and concern for right front lobe mass. The pt was found in her bedroom by her spouse unresponsive, and having seizure like activity. She has been intubated with failed extubation twice. Surgery is consulted for trach/PEG.

## 2019-09-22 NOTE — ASSESSMENT & PLAN NOTE
-Blood culture + genella morbillorum  Continue ceftriaxone 2g q12h for 2 weeks per ID recs  9/22 Re cultured Blood and sputum results pending

## 2019-09-23 LAB
ALBUMIN SERPL BCP-MCNC: 3 G/DL (ref 3.5–5.2)
ALLENS TEST: ABNORMAL
ALLENS TEST: ABNORMAL
ALP SERPL-CCNC: 48 U/L (ref 55–135)
ALT SERPL W/O P-5'-P-CCNC: 73 U/L (ref 10–44)
ANION GAP SERPL CALC-SCNC: 8 MMOL/L (ref 8–16)
ANISOCYTOSIS BLD QL SMEAR: SLIGHT
AST SERPL-CCNC: 43 U/L (ref 10–40)
BACTERIA #/AREA URNS AUTO: ABNORMAL /HPF
BASO STIPL BLD QL SMEAR: ABNORMAL
BASOPHILS # BLD AUTO: 0.03 K/UL (ref 0–0.2)
BASOPHILS NFR BLD: 0.2 % (ref 0–1.9)
BILIRUB SERPL-MCNC: 0.3 MG/DL (ref 0.1–1)
BILIRUB UR QL STRIP: NEGATIVE
BUN SERPL-MCNC: 27 MG/DL (ref 8–23)
CALCIUM SERPL-MCNC: 8.4 MG/DL (ref 8.7–10.5)
CHLORIDE SERPL-SCNC: 104 MMOL/L (ref 95–110)
CLARITY UR REFRACT.AUTO: ABNORMAL
CO2 SERPL-SCNC: 24 MMOL/L (ref 23–29)
COLOR UR AUTO: YELLOW
CORTIS SERPL-MCNC: <1 UG/DL (ref 3.1–16.7)
CREAT SERPL-MCNC: 0.9 MG/DL (ref 0.5–1.4)
DELSYS: ABNORMAL
DELSYS: ABNORMAL
DIFFERENTIAL METHOD: ABNORMAL
EOSINOPHIL # BLD AUTO: 0 K/UL (ref 0–0.5)
EOSINOPHIL NFR BLD: 0 % (ref 0–8)
ERYTHROCYTE [DISTWIDTH] IN BLOOD BY AUTOMATED COUNT: 14.5 % (ref 11.5–14.5)
ERYTHROCYTE [SEDIMENTATION RATE] IN BLOOD BY WESTERGREN METHOD: 16 MM/H
ERYTHROCYTE [SEDIMENTATION RATE] IN BLOOD BY WESTERGREN METHOD: 16 MM/H
EST. GFR  (AFRICAN AMERICAN): >60 ML/MIN/1.73 M^2
EST. GFR  (NON AFRICAN AMERICAN): >60 ML/MIN/1.73 M^2
FIO2: 40
FIO2: 40
GLUCOSE SERPL-MCNC: 206 MG/DL (ref 70–110)
GLUCOSE UR QL STRIP: ABNORMAL
HCO3 UR-SCNC: 26.2 MMOL/L (ref 24–28)
HCO3 UR-SCNC: 27.5 MMOL/L (ref 24–28)
HCT VFR BLD AUTO: 35.5 % (ref 37–48.5)
HGB BLD-MCNC: 11 G/DL (ref 12–16)
HGB UR QL STRIP: NEGATIVE
HYALINE CASTS UR QL AUTO: 0 /LPF
IMM GRANULOCYTES # BLD AUTO: 0.28 K/UL (ref 0–0.04)
IMM GRANULOCYTES NFR BLD AUTO: 1.9 % (ref 0–0.5)
KETONES UR QL STRIP: ABNORMAL
LEUKOCYTE ESTERASE UR QL STRIP: NEGATIVE
LYMPHOCYTES # BLD AUTO: 0.8 K/UL (ref 1–4.8)
LYMPHOCYTES NFR BLD: 5.2 % (ref 18–48)
MAGNESIUM SERPL-MCNC: 2.2 MG/DL (ref 1.6–2.6)
MCH RBC QN AUTO: 29.3 PG (ref 27–31)
MCHC RBC AUTO-ENTMCNC: 31 G/DL (ref 32–36)
MCV RBC AUTO: 95 FL (ref 82–98)
MICROSCOPIC COMMENT: ABNORMAL
MIN VOL: 10.1
MODE: ABNORMAL
MODE: ABNORMAL
MONOCYTES # BLD AUTO: 2.4 K/UL (ref 0.3–1)
MONOCYTES NFR BLD: 16.2 % (ref 4–15)
NEUTROPHILS # BLD AUTO: 11.2 K/UL (ref 1.8–7.7)
NEUTROPHILS NFR BLD: 76.5 % (ref 38–73)
NITRITE UR QL STRIP: NEGATIVE
NRBC BLD-RTO: 0 /100 WBC
PCO2 BLDA: 39.4 MMHG (ref 35–45)
PCO2 BLDA: 43.9 MMHG (ref 35–45)
PEEP: 5
PEEP: 7
PH SMN: 7.41 [PH] (ref 7.35–7.45)
PH SMN: 7.43 [PH] (ref 7.35–7.45)
PH UR STRIP: 5 [PH] (ref 5–8)
PHOSPHATE SERPL-MCNC: 2.7 MG/DL (ref 2.7–4.5)
PIP: 17
PLATELET # BLD AUTO: 231 K/UL (ref 150–350)
PLATELET BLD QL SMEAR: ABNORMAL
PMV BLD AUTO: 9.3 FL (ref 9.2–12.9)
PO2 BLDA: 134 MMHG (ref 80–100)
PO2 BLDA: 57 MMHG (ref 80–100)
POC BE: 2 MMOL/L
POC BE: 3 MMOL/L
POC SATURATED O2: 90 % (ref 95–100)
POC SATURATED O2: 99 % (ref 95–100)
POC TCO2: 27 MMOL/L (ref 23–27)
POC TCO2: 29 MMOL/L (ref 23–27)
POCT GLUCOSE: 160 MG/DL (ref 70–110)
POCT GLUCOSE: 183 MG/DL (ref 70–110)
POLYCHROMASIA BLD QL SMEAR: ABNORMAL
POTASSIUM SERPL-SCNC: 4.7 MMOL/L (ref 3.5–5.1)
PROT SERPL-MCNC: 6.2 G/DL (ref 6–8.4)
PROT UR QL STRIP: ABNORMAL
RBC # BLD AUTO: 3.75 M/UL (ref 4–5.4)
RBC #/AREA URNS AUTO: 10 /HPF (ref 0–4)
SAMPLE: ABNORMAL
SAMPLE: ABNORMAL
SITE: ABNORMAL
SITE: ABNORMAL
SODIUM SERPL-SCNC: 136 MMOL/L (ref 136–145)
SP GR UR STRIP: 1.03 (ref 1–1.03)
SP02: 100
SP02: 100
SQUAMOUS #/AREA URNS AUTO: 4 /HPF
TROPONIN I SERPL DL<=0.01 NG/ML-MCNC: 0.09 NG/ML (ref 0–0.03)
URN SPEC COLLECT METH UR: ABNORMAL
VT: 380
VT: 380
WBC # BLD AUTO: 14.65 K/UL (ref 3.9–12.7)
WBC #/AREA URNS AUTO: 3 /HPF (ref 0–5)

## 2019-09-23 PROCEDURE — 63600175 PHARM REV CODE 636 W HCPCS: Performed by: PSYCHIATRY & NEUROLOGY

## 2019-09-23 PROCEDURE — 36620 INSERTION CATHETER ARTERY: CPT | Mod: ,,, | Performed by: NURSE PRACTITIONER

## 2019-09-23 PROCEDURE — 25000003 PHARM REV CODE 250: Performed by: NURSE PRACTITIONER

## 2019-09-23 PROCEDURE — 20000000 HC ICU ROOM

## 2019-09-23 PROCEDURE — 99291 PR CRITICAL CARE, E/M 30-74 MINUTES: ICD-10-PCS | Mod: GC,,, | Performed by: PSYCHIATRY & NEUROLOGY

## 2019-09-23 PROCEDURE — 25000003 PHARM REV CODE 250: Performed by: PSYCHIATRY & NEUROLOGY

## 2019-09-23 PROCEDURE — 63600175 PHARM REV CODE 636 W HCPCS: Performed by: NURSE PRACTITIONER

## 2019-09-23 PROCEDURE — 27200966 HC CLOSED SUCTION SYSTEM

## 2019-09-23 PROCEDURE — 99232 PR SUBSEQUENT HOSPITAL CARE,LEVL II: ICD-10-PCS | Mod: ,,, | Performed by: NEUROLOGICAL SURGERY

## 2019-09-23 PROCEDURE — 83735 ASSAY OF MAGNESIUM: CPT

## 2019-09-23 PROCEDURE — 36600 WITHDRAWAL OF ARTERIAL BLOOD: CPT

## 2019-09-23 PROCEDURE — 94003 VENT MGMT INPAT SUBQ DAY: CPT

## 2019-09-23 PROCEDURE — 99900026 HC AIRWAY MAINTENANCE (STAT)

## 2019-09-23 PROCEDURE — 36620 INSERTION CATHETER ARTERY: CPT

## 2019-09-23 PROCEDURE — 82803 BLOOD GASES ANY COMBINATION: CPT

## 2019-09-23 PROCEDURE — 87040 BLOOD CULTURE FOR BACTERIA: CPT

## 2019-09-23 PROCEDURE — 85025 COMPLETE CBC W/AUTO DIFF WBC: CPT

## 2019-09-23 PROCEDURE — 80053 COMPREHEN METABOLIC PANEL: CPT

## 2019-09-23 PROCEDURE — 99232 SBSQ HOSP IP/OBS MODERATE 35: CPT | Mod: ,,, | Performed by: NEUROLOGICAL SURGERY

## 2019-09-23 PROCEDURE — 81001 URINALYSIS AUTO W/SCOPE: CPT

## 2019-09-23 PROCEDURE — 84484 ASSAY OF TROPONIN QUANT: CPT

## 2019-09-23 PROCEDURE — 36620 ARTERIAL LINE: ICD-10-PCS | Mod: ,,, | Performed by: NURSE PRACTITIONER

## 2019-09-23 PROCEDURE — 84100 ASSAY OF PHOSPHORUS: CPT

## 2019-09-23 PROCEDURE — 99900035 HC TECH TIME PER 15 MIN (STAT)

## 2019-09-23 PROCEDURE — 82533 TOTAL CORTISOL: CPT

## 2019-09-23 PROCEDURE — 94761 N-INVAS EAR/PLS OXIMETRY MLT: CPT

## 2019-09-23 PROCEDURE — 25000003 PHARM REV CODE 250: Performed by: STUDENT IN AN ORGANIZED HEALTH CARE EDUCATION/TRAINING PROGRAM

## 2019-09-23 PROCEDURE — 99291 CRITICAL CARE FIRST HOUR: CPT | Mod: GC,,, | Performed by: PSYCHIATRY & NEUROLOGY

## 2019-09-23 PROCEDURE — 27000221 HC OXYGEN, UP TO 24 HOURS

## 2019-09-23 RX ORDER — NOREPINEPHRINE BITARTRATE/D5W 4MG/250ML
0.02 PLASTIC BAG, INJECTION (ML) INTRAVENOUS CONTINUOUS
Status: DISCONTINUED | OUTPATIENT
Start: 2019-09-23 | End: 2019-09-24

## 2019-09-23 RX ORDER — MIDAZOLAM HYDROCHLORIDE 1 MG/ML
1 INJECTION INTRAMUSCULAR; INTRAVENOUS
Status: DISCONTINUED | OUTPATIENT
Start: 2019-09-23 | End: 2019-09-24

## 2019-09-23 RX ORDER — DEXAMETHASONE SODIUM PHOSPHATE 4 MG/ML
4 INJECTION, SOLUTION INTRA-ARTICULAR; INTRALESIONAL; INTRAMUSCULAR; INTRAVENOUS; SOFT TISSUE EVERY 8 HOURS
Status: DISCONTINUED | OUTPATIENT
Start: 2019-09-23 | End: 2019-09-24

## 2019-09-23 RX ORDER — SODIUM CHLORIDE, SODIUM LACTATE, POTASSIUM CHLORIDE, CALCIUM CHLORIDE 600; 310; 30; 20 MG/100ML; MG/100ML; MG/100ML; MG/100ML
INJECTION, SOLUTION INTRAVENOUS CONTINUOUS
Status: DISCONTINUED | OUTPATIENT
Start: 2019-09-23 | End: 2019-09-25

## 2019-09-23 RX ORDER — LIDOCAINE HYDROCHLORIDE 10 MG/ML
INJECTION, SOLUTION EPIDURAL; INFILTRATION; INTRACAUDAL; PERINEURAL
Status: DISCONTINUED
Start: 2019-09-23 | End: 2019-09-23 | Stop reason: WASHOUT

## 2019-09-23 RX ORDER — LIDOCAINE HYDROCHLORIDE 10 MG/ML
5 INJECTION INFILTRATION; PERINEURAL ONCE
Status: COMPLETED | OUTPATIENT
Start: 2019-09-23 | End: 2019-09-23

## 2019-09-23 RX ORDER — MIDAZOLAM HYDROCHLORIDE 1 MG/ML
2 INJECTION INTRAMUSCULAR; INTRAVENOUS ONCE
Status: COMPLETED | OUTPATIENT
Start: 2019-09-23 | End: 2019-09-23

## 2019-09-23 RX ORDER — MIDAZOLAM HYDROCHLORIDE 1 MG/ML
3 INJECTION INTRAMUSCULAR; INTRAVENOUS ONCE
Status: DISCONTINUED | OUTPATIENT
Start: 2019-09-23 | End: 2019-09-23

## 2019-09-23 RX ADMIN — CEFTRIAXONE SODIUM 2 G: 2 INJECTION, SOLUTION INTRAVENOUS at 11:09

## 2019-09-23 RX ADMIN — Medication 0.04 MCG/KG/MIN: at 06:09

## 2019-09-23 RX ADMIN — HEPARIN SODIUM 5000 UNITS: 5000 INJECTION, SOLUTION INTRAVENOUS; SUBCUTANEOUS at 09:09

## 2019-09-23 RX ADMIN — MIDAZOLAM HYDROCHLORIDE 1 MG: 1 INJECTION, SOLUTION INTRAMUSCULAR; INTRAVENOUS at 04:09

## 2019-09-23 RX ADMIN — SODIUM CHLORIDE, SODIUM LACTATE, POTASSIUM CHLORIDE, AND CALCIUM CHLORIDE: .6; .31; .03; .02 INJECTION, SOLUTION INTRAVENOUS at 01:09

## 2019-09-23 RX ADMIN — VALPROIC ACID 750 MG: 250 SOLUTION ORAL at 05:09

## 2019-09-23 RX ADMIN — POTASSIUM & SODIUM PHOSPHATES POWDER PACK 280-160-250 MG 2 PACKET: 280-160-250 PACK at 06:09

## 2019-09-23 RX ADMIN — VALPROIC ACID 750 MG: 250 SOLUTION ORAL at 03:09

## 2019-09-23 RX ADMIN — Medication 200 MCG/HR: at 08:09

## 2019-09-23 RX ADMIN — HEPARIN SODIUM 5000 UNITS: 5000 INJECTION, SOLUTION INTRAVENOUS; SUBCUTANEOUS at 06:09

## 2019-09-23 RX ADMIN — MIDAZOLAM HYDROCHLORIDE 2 MG: 1 INJECTION, SOLUTION INTRAMUSCULAR; INTRAVENOUS at 12:09

## 2019-09-23 RX ADMIN — POTASSIUM & SODIUM PHOSPHATES POWDER PACK 280-160-250 MG 2 PACKET: 280-160-250 PACK at 09:09

## 2019-09-23 RX ADMIN — SODIUM CHLORIDE, SODIUM LACTATE, POTASSIUM CHLORIDE, AND CALCIUM CHLORIDE 500 ML: .6; .31; .03; .02 INJECTION, SOLUTION INTRAVENOUS at 01:09

## 2019-09-23 RX ADMIN — INSULIN ASPART 2 UNITS: 100 INJECTION, SOLUTION INTRAVENOUS; SUBCUTANEOUS at 06:09

## 2019-09-23 RX ADMIN — LIDOCAINE HYDROCHLORIDE 5 ML: 10 INJECTION, SOLUTION INFILTRATION; PERINEURAL at 04:09

## 2019-09-23 RX ADMIN — Medication 0.05 MCG/KG/MIN: at 08:09

## 2019-09-23 RX ADMIN — LEVOTHYROXINE SODIUM 75 MCG: 75 TABLET ORAL at 06:09

## 2019-09-23 RX ADMIN — DEXAMETHASONE SODIUM PHOSPHATE 4 MG: 4 INJECTION, SOLUTION INTRAMUSCULAR; INTRAVENOUS at 06:09

## 2019-09-23 RX ADMIN — MIDAZOLAM HYDROCHLORIDE 1 MG: 1 INJECTION, SOLUTION INTRAMUSCULAR; INTRAVENOUS at 08:09

## 2019-09-23 RX ADMIN — HEPARIN SODIUM 5000 UNITS: 5000 INJECTION, SOLUTION INTRAVENOUS; SUBCUTANEOUS at 01:09

## 2019-09-23 RX ADMIN — DEXAMETHASONE SODIUM PHOSPHATE 4 MG: 4 INJECTION, SOLUTION INTRAMUSCULAR; INTRAVENOUS at 11:09

## 2019-09-23 RX ADMIN — Medication 0.07 MCG/KG/MIN: at 02:09

## 2019-09-23 RX ADMIN — FAMOTIDINE 20 MG: 20 TABLET ORAL at 08:09

## 2019-09-23 RX ADMIN — VALPROIC ACID 750 MG: 250 SOLUTION ORAL at 09:09

## 2019-09-23 RX ADMIN — CHLORHEXIDINE GLUCONATE 0.12% ORAL RINSE 15 ML: 1.2 LIQUID ORAL at 08:09

## 2019-09-23 RX ADMIN — DEXAMETHASONE SODIUM PHOSPHATE 4 MG: 4 INJECTION, SOLUTION INTRAMUSCULAR; INTRAVENOUS at 09:09

## 2019-09-23 RX ADMIN — INSULIN ASPART 2 UNITS: 100 INJECTION, SOLUTION INTRAVENOUS; SUBCUTANEOUS at 04:09

## 2019-09-23 NOTE — PROGRESS NOTES
"[facility]  Adult Nutrition  Progress Note    SUMMARY       Recommendations    Recommendation/Intervention:   s/p trach/PEG recommend changing TF formula to better meet pt's needs.  Impact Peptide @ goal rate 45mL/hr.   - Provides 1620kcals, 101g protein and 832mL free water.   - Hold for residuals >500mL.     RD to monitor.    Goals: Pt to receive >85% EEN and EPN by RD follow up  Nutrition Goal Status: goal not met  Communication of RD Recs: reviewed with RN    Reason for Assessment    Reason For Assessment: RD follow-up  Diagnosis: seizures  Relevant Medical History: CKD St 3, HTN  Interdisciplinary Rounds: attended  General Information Comments: Pt remains intubated. TF at 40mL/hr, to be advanced to 45mL/hr per nsg. Plans for trach/PEG tomorrow. Pt's UBW approx 145-150lb > 8 years per family, confirmed by chart review. Pt with excellent appetite and intake PTA. No indications of malnutrition at this time. Pt has been receiving TF since admission and tolerating appropriately.   Nutrition Discharge Planning: unable to determine at this time    Nutrition Risk Screen    Nutrition Risk Screen: tube feeding or parenteral nutrition    Nutrition/Diet History    Spiritual, Cultural Beliefs, Uatsdin Practices, Values that Affect Care: no  Factors Affecting Nutritional Intake: NPO, on mechanical ventilation    Anthropometrics    Temp: 99.4 °F (37.4 °C)  Height Method: Stated  Height: 5' 3" (160 cm)  Height (inches): 63 in  Weight Method: Bed Scale  Weight: 78.6 kg (173 lb 4.5 oz)  Weight (lb): 173.28 lb  Ideal Body Weight (IBW), Female: 115 lb  % Ideal Body Weight, Female (lb): 133.91 lb  BMI (Calculated): 27.3  BMI Grade: 25 - 29.9 - overweight       Lab/Procedures/Meds    Pertinent Labs Reviewed: reviewed  Pertinent Labs Comments: POCT Glu 160-199  Pertinent Medications Reviewed: reviewed  Pertinent Medications Comments: fentanyl, heparin    Estimated/Assessed Needs    Weight Used For Calorie " Calculations: 70.5 kg (155 lb 6.8 oz)  Energy Calorie Requirements (kcal): 1451  Energy Need Method: [place]  Protein Requirements: 85-106g(1.2-1.5g/kg)  Weight Used For Protein Calculations: 70.5 kg (155 lb 6.8 oz)  Fluid Requirements (mL): 1mL/kcal or per MD     RDA Method (mL): 1451         Nutrition Prescription Ordered    Current Diet Order: NPO  Nutrition Order Comments: TF at 40mL/hr  Current Nutrition Support Formula Ordered: Isosource 1.5  Current Nutrition Support Rate Ordered: 45 (ml)  Current Nutrition Support Frequency Ordered: mL/hr    Evaluation of Received Nutrient/Fluid Intake    Enteral Calories (kcal): 1620  Enteral Protein (gm): 73  Enteral (Free Water) Fluid (mL): 825    % Kcal Needs: 112  % Protein Needs: 86    I/O: +23.5L since admit, +UOP, LBM 9/21    Energy Calories Required: meeting needs  Protein Required: not meeting needs  Fluid Required: other (see comments)(per MD)    Comments: 0mL residuals 9/11  Tolerance: tolerating    % Intake of Estimated Energy Needs: 75 - 100 %  % Meal Intake: NPO    Nutrition Risk    Level of Risk/Frequency of Follow-up: low(f/u 1x/week)     Assessment and Plan    Nutrition Problem  Inadequate oral intake     Related to (etiology):   NPO     Signs and Symptoms (as evidenced by):   Pt requiring alternative means of nutrition to meet 85% EEN and EPN.      Intervention:  Collaboration of nutrition care with providers     Nutrition Diagnosis Status:   Continues       Monitor and Evaluation    Food and Nutrient Intake: enteral nutrition intake  Food and Nutrient Adminstration: enteral and parenteral nutrition administration  Anthropometric Measurements: weight, weight change, body mass index  Biochemical Data, Medical Tests and Procedures: electrolyte and renal panel, gastrointestinal profile, glucose/endocrine profile, inflammatory profile, lipid profile  Nutrition-Focused Physical Findings: overall appearance        Nutrition Follow-Up    RD Follow-up?:  Yes

## 2019-09-23 NOTE — PLAN OF CARE
VS and assessment per flow sheet, patient progressing towards goal as tolerated. Pt agitated and restless throughout shift. Versed administered with precaution d/t cardiac suppression. Levo gtt on and off to keep MAP>65. Fentanyl gtt maxed. Medications given per MD orders. TF at goal and continued. RUE and RLE restrained renewed and continued. Trache/peg consents obtained, procedure tomorrow, NPO at midnight,. Plan of care reviewed with Clemencia Nguyen's spouse all concerns addressed. Emotional support provided. Will continue to monitor.

## 2019-09-23 NOTE — PROCEDURES
"Clemencia Nguyen is a 74 y.o. female patient.    Temp: 99.4 °F (37.4 °C) (19 1501)  Pulse: 105 (19 1539)  Resp: (!) 36 (19 1539)  BP: (!) 165/72 (19 1501)  SpO2: 100 % (19 1539)  Weight: 78.6 kg (173 lb 4.5 oz) (19 2305)  Height: 5' 3" (160 cm) (19 0947)       Arterial Line  Date/Time: 2019 4:03 PM  Location procedure was performed: [facility] NEURO CRITICAL CARE  Performed by: Tosha Barnes NP  Authorized by: Tosha Barnes NP   Consent Done: Yes  Consent: Written consent obtained.  Risks and benefits: risks, benefits and alternatives were discussed  Consent given by:  at bedside.  Required items: required blood products, implants, devices, and special equipment available  Patient identity confirmed: , name and MRN  Time out: Immediately prior to procedure a "time out" was called to verify the correct patient, procedure, equipment, support staff and site/side marked as required.  Preparation: Patient was prepped and draped in the usual sterile fashion.  Indications: multiple ABGs and hemodynamic monitoring  Location: right radial  Patient sedated: no  Doni's test normal: yes  Needle gauge: 20  Seldinger technique: Seldinger technique used  Number of attempts: 1  Complications: No  Specimens: No  Implants: No  Post-procedure: dressing applied  Post-procedure CMS: normal  Patient tolerance: Patient tolerated the procedure well with no immediate complications          Tosha Barnes  2019  "

## 2019-09-23 NOTE — ASSESSMENT & PLAN NOTE
SBP <180, MAP >65   Requiring levophed gtt to maintain MAPs > 65 while on fentanyl gtt  Echo: EF 65-70% concentric LV remodeling  FEDERICO (9/17) negative for vegetation, ASD, PFO   Cont to hold anti-hypertensives  Wean off levophed, hypotension is fluid responsive

## 2019-09-23 NOTE — SUBJECTIVE & OBJECTIVE
Review of Systems   Unable to perform ROS: Intubated       Objective:     Vitals:  Temp: 99.7 °F (37.6 °C)  Pulse: 93  Rhythm: sinus bradycardia  BP: (!) 197/82  MAP (mmHg): 63  Resp: (!) 39  SpO2: 100 %  Oxygen Concentration (%): 40  O2 Device (Oxygen Therapy): ventilator  Vent Mode: A/C  Set Rate: 16 bmp  Vt Set: 380 mL  Pressure Support: 0 cmH20  PEEP/CPAP: 7 cmH20  Peak Airway Pressure: 17 cmH2O  Mean Airway Pressure: 9.9 cmH20  Plateau Pressure: 0 cmH20    Temp  Min: 97.6 °F (36.4 °C)  Max: 99.7 °F (37.6 °C)  Pulse  Min: 45  Max: 99  BP  Min: 85/46  Max: 197/82  MAP (mmHg)  Min: 63  Max: 122  Resp  Min: 5  Max: 51  SpO2  Min: 98 %  Max: 100 %  Oxygen Concentration (%)  Min: 40  Max: 40    09/22 0701 - 09/23 0700  In: 1799.8 [I.V.:919.8]  Out: 325 [Urine:300]   Unmeasured Output  Urine Occurrence: 1  Stool Occurrence: 0  Emesis Occurrence: 0  Pad Count: 0       Physical Exam   Constitutional:   moderately obese   HENT:   Head: Normocephalic.   Eyes: Pupils are equal, round, and reactive to light.   Neck: No JVD present.   Cardiovascular: Regular rhythm.   Pulmonary/Chest: She has wheezes.   Abdominal: Soft. Bowel sounds are normal.   Musculoskeletal: She exhibits no edema.   Neurological:   When awake, agitated  Left upper>lower extremity weakness   Skin: Capillary refill takes less than 2 seconds.   Nursing note and vitals reviewed.        Medications:  Continuous  fentanyl Last Rate: Stopped (09/23/19 1235)   lactated ringers Last Rate: 75 mL/hr at 09/23/19 1314   norepinephrine bitartrate-D5W    Scheduled  cefTRIAXone (ROCEPHIN) IVPB 2 g Q12H   dexamethasone 4 mg Q8H   famotidine 20 mg QHS   heparin (porcine) 5,000 Units Q8H   levothyroxine 75 mcg Before breakfast   valproic acid (as sodium salt) 750 mg Q8H   PRN  acetaminophen 650 mg Q6H PRN   Dextrose 10% Bolus 12.5 g PRN   glucagon (human recombinant) 1 mg PRN   hydrALAZINE 10 mg Q6H PRN   insulin aspart U-100 1-10 Units Q6H PRN   magnesium oxide 800  mg PRN   magnesium oxide 800 mg PRN   midazolam 1 mg Q2H PRN   potassium chloride 10% 40 mEq PRN   potassium chloride 10% 40 mEq PRN   potassium, sodium phosphates 2 packet PRN   potassium, sodium phosphates 2 packet PRN   potassium, sodium phosphates 2 packet PRN   racepinephrine 0.5 mL Q4H PRN   sodium chloride 0.9% 10 mL PRN   sodium chloride 0.9% 10 mL PRN     Today I personally reviewed pertinent medications, lines/drains/airways, imaging, laboratory results, notably:    Diet  Diet NPO Except for: Medication  Diet NPO Except for: Medication  Diet NPO Except for: Medication  Diet NPO Except for: Medication

## 2019-09-23 NOTE — ASSESSMENT & PLAN NOTE
Daily CXR, ABG  Vap protocol  Trial extubation 9/6/2019- re-intubated shortly after extubation for airway protection. Still intubated 2/2 inability to protect airway   9/21Spontaneous breathing trial off sedation, very agitated and restless, not following commands  Family decided on trach and peg instead of extubation.  Gen. Surg consulted for trach and peg next week  Fentanyl gtt 200mcg/h ( nontitrating) for sedation still restless. Gave versed 1mg IVP. Remains on levophed gtt for hypotensive episodes    Vent Mode: A/C  Oxygen Concentration (%):  [40] 40  Resp Rate Total:  [7.3 br/min-26 br/min] 26 br/min  Vt Set:  [380 mL] 380 mL  PEEP/CPAP:  [5 cmH20-7 cmH20] 7 cmH20  Pressure Support:  [0 cmH20] 0 cmH20  Mean Airway Pressure:  [6.8 rtF38-98 cmH20] 9.5 cmH20   Daily CXR/ABG  Does better in terms of airway pressures and synchrony on psv setting

## 2019-09-23 NOTE — PROGRESS NOTES
[facility]  Neurocritical Care  Progress Note    Admit Date: 9/3/2019  Service Date: 09/23/2019  Length of Stay: 20    Subjective:     Chief Complaint: Brain lesion    History of Present Illness: Pt is  73 yo female with a PMHx of CKD 3, HTN, was transferred from [facility] to [facility] for new onset seizures and concern for right front lobe mass. The pt was found in her bedroom by her spouse at approximately 9:45 pm sitting her head against the bed, unresponsive, and having seizure like activity on the left-side. EMT was called and the pt was given Versed twice while en route to the hospital. Pt had a second witnessed seizures, left facial droop, left-sided weakness, and tongue ecchymosis in the ED. Neurology was consulted and The pt was given IV Keppra and Vimpat. MRI brain without contrast was acquired. The image showed right frontal lobe vasogenic edema with mass effect concerning for underlying mass. EEG was acquired at outside hospital concerning showing an abnormal result. The pt was given decadron IV and neurosurgical consult recommended. Pt transferred to [facility] and admitted to the [facility] for higher level of care and further evaluation.     Pt history acquired per chart review and from spouse present at the bedside. The pt's spouse denies prior history of seizures CVA, cancer history and up-to-date screenings    Hospital Course: --admitted to [facility] on 9/3 following new onset seizure, arrived intubated  --MRI with large area of R cortical edema with flair and ill defined enhancement on contrast study, concern for glioma vs infectious/inflammatory process  --LP appears non infectious, cytology pending  9/5- no acute events, awaiting LP finalization from pathology report., weaning vent.   09/06/2019: Very agitated overnight, requiring increased sedation. Patient extubated this morning on rounds, and reintubated shortly after. Unable to maintain airway and secretions. CSF gram stain negative.  09/07/2019: KATT.  EKG obtained, seroquel started. Valproic acid started.  09/08/2019: NAEON. Improved agitation with seroquel.  09/09/2019: MRI Brain w/wo ordered for today. BLE US ordered. Will need new LP later on in the week.   09/10/2019 LP for infx workup, CD4 lab, EEG monitoring per Epilepsy   09/11/2019: To IR for LP. Spoke to NSGY about possible bx of lesion. Discontinue cEEG.   9/13/2019 Issues with low HR and BP overnight and this morning, Given Shoaib bump and atropine. 24 hours of IVF and bolus. WBC decreasing and afebrile. Repeat blood cultures negative. Plan for EGD and FEDERICO today.   9/14/2019 NAEO, EGD and FEDERICO moved until Monday. CSF still pending. Increasing WBC but afebrile. Start tube feeding   9/15/2019 NAEO, all CSF studies negative so far. CT negative. Begin insulin gtt. EGD and FEDERICO tomorrow    9/16 No significant events over night. EGD done this afternoon for esophageal stricture. Esophagus stretched to 15. FEDERICO ordered for tomorrow.   9/17 No significant vents over night NPO for FEDERICO today scheduled for 3pm. Off levophed.  9/18 FEDERICO negative. Tube feeds restarted. NSGY following and considering brain biopsy. Propofol stopped. Precedex started.  9/19: NSGY recommending MRI brain W/WO with stealth for possible brain biopsy as other work up has been negative. Patient continues with hypotensive episodes, on Fentanyl drip and recommend to space out medications.  9/20 no significant events over night. Hypotensive episodes after vimpat dose and seroquel dose requiring a shoaib bump, IVF bolus then  Levophed gtt started to maintain map>65. Spontaneous breathing trial this afternoon  9/21 no significant events over night . Discussions with family daily about trach and peg. Trial of spon breathing off all sedation, pt. Agitated, restless not following commands. Family decided to have trach and peg done . gen surg. Consulted for trach and peg next week. Restarted fentanyl gtt for sedation. DCd EEG, not having seizures  9/22  Hypotensive episode last night required 500cc bolus and neobump x3. Levophed gtt started. Fentanyl gtt off till SBP back up. This am remains restless on fentanyl gtt. Will try versed IVP at lower dose.  Currently on levophed. Trach and peg scheduled for Tuesday. Paraneoplastic panel sent. Febrile over night blood and sputum cultures sent. Will readdress biopsy with NSGY next week.  9/23: likely intravascularly dry, fluid bolus again administered and placed on continuous rate, use prn versed 1mg  q2 hrs prn and resume fentanyl gtt when awakens, could not tolerate 2 mg dose        Review of Systems   Unable to perform ROS: Intubated       Objective:     Vitals:  Temp: 99.7 °F (37.6 °C)  Pulse: 93  Rhythm: sinus bradycardia  BP: (!) 197/82  MAP (mmHg): 63  Resp: (!) 39  SpO2: 100 %  Oxygen Concentration (%): 40  O2 Device (Oxygen Therapy): ventilator  Vent Mode: A/C  Set Rate: 16 bmp  Vt Set: 380 mL  Pressure Support: 0 cmH20  PEEP/CPAP: 7 cmH20  Peak Airway Pressure: 17 cmH2O  Mean Airway Pressure: 9.9 cmH20  Plateau Pressure: 0 cmH20    Temp  Min: 97.6 °F (36.4 °C)  Max: 99.7 °F (37.6 °C)  Pulse  Min: 45  Max: 99  BP  Min: 85/46  Max: 197/82  MAP (mmHg)  Min: 63  Max: 122  Resp  Min: 5  Max: 51  SpO2  Min: 98 %  Max: 100 %  Oxygen Concentration (%)  Min: 40  Max: 40    09/22 0701 - 09/23 0700  In: 1799.8 [I.V.:919.8]  Out: 325 [Urine:300]   Unmeasured Output  Urine Occurrence: 1  Stool Occurrence: 0  Emesis Occurrence: 0  Pad Count: 0       Physical Exam   Constitutional:   moderately obese   HENT:   Head: Normocephalic.   Eyes: Pupils are equal, round, and reactive to light.   Neck: No JVD present.   Cardiovascular: Regular rhythm.   Pulmonary/Chest: She has wheezes.   Abdominal: Soft. Bowel sounds are normal.   Musculoskeletal: She exhibits no edema.   Neurological:   When awake, agitated  Left upper>lower extremity weakness   Skin: Capillary refill takes less than 2 seconds.   Nursing note and vitals  reviewed.        Medications:  Continuous  fentanyl Last Rate: Stopped (09/23/19 1235)   lactated ringers Last Rate: 75 mL/hr at 09/23/19 1314   norepinephrine bitartrate-D5W    Scheduled  cefTRIAXone (ROCEPHIN) IVPB 2 g Q12H   dexamethasone 4 mg Q8H   famotidine 20 mg QHS   heparin (porcine) 5,000 Units Q8H   levothyroxine 75 mcg Before breakfast   valproic acid (as sodium salt) 750 mg Q8H   PRN  acetaminophen 650 mg Q6H PRN   Dextrose 10% Bolus 12.5 g PRN   glucagon (human recombinant) 1 mg PRN   hydrALAZINE 10 mg Q6H PRN   insulin aspart U-100 1-10 Units Q6H PRN   magnesium oxide 800 mg PRN   magnesium oxide 800 mg PRN   midazolam 1 mg Q2H PRN   potassium chloride 10% 40 mEq PRN   potassium chloride 10% 40 mEq PRN   potassium, sodium phosphates 2 packet PRN   potassium, sodium phosphates 2 packet PRN   potassium, sodium phosphates 2 packet PRN   racepinephrine 0.5 mL Q4H PRN   sodium chloride 0.9% 10 mL PRN   sodium chloride 0.9% 10 mL PRN     Today I personally reviewed pertinent medications, lines/drains/airways, imaging, laboratory results, notably:    Diet  Diet NPO Except for: Medication  Diet NPO Except for: Medication  Diet NPO Except for: Medication  Diet NPO Except for: Medication        Assessment/Plan:     Neuro  * Brain lesion  74 year old admitted to unit on 9/3 following new onset seizure, with MRI showing large area of R cortical edema with flair and ill defined enhancement on contrast study, concern for glioma vs infectious/inflammatory process  - 9/22 repeat BC, pending   - CSF cx no growth,   - CSF studies negative- VZV, enterovirus, HSV, MS  - FEDERICO negative for vegetation     - continue decadron 4mg q6h  - neuro checks/ VS q1hr   -on SubQ heparin   -Repeat brain MRI W/WO with stealth9/19, showed large  R frontal lobe lesion nonspecific for infectious or neoplastic process; small remote L basal banglia lacunar infarct. No new lesions or infarcts  NSGY, no biopsy at this time, will readdress need  for biopsy with NSGY next week.  9/22 paraneoplastic panel sent        New onset seizure  2/2 to brain mass   9/21 EEG dcd. No electrographic seizures for 24h  Vimpat dcd 9/20  Continue Valproic acid 750mg q8h, valproate level 53.7 (9/18)            Pulmonary  On mechanically assisted ventilation  Daily CXR, ABG  Vap protocol  Trial extubation 9/6/2019- re-intubated shortly after extubation for airway protection. Still intubated 2/2 inability to protect airway   9/21Spontaneous breathing trial off sedation, very agitated and restless, not following commands  Family decided on trach and peg instead of extubation.  Gen. Surg consulted for trach and peg next week  Fentanyl gtt 200mcg/h ( nontitrating) for sedation still restless. Gave versed 1mg IVP. Remains on levophed gtt for hypotensive episodes    Vent Mode: A/C  Oxygen Concentration (%):  [40] 40  Resp Rate Total:  [7.3 br/min-26 br/min] 26 br/min  Vt Set:  [380 mL] 380 mL  PEEP/CPAP:  [5 cmH20-7 cmH20] 7 cmH20  Pressure Support:  [0 cmH20] 0 cmH20  Mean Airway Pressure:  [6.8 piC28-40 cmH20] 9.5 cmH20   Daily CXR/ABG  Does better in terms of airway pressures and synchrony on psv setting    Cardiac/Vascular  Essential hypertension  SBP <180, MAP >65   Requiring levophed gtt to maintain MAPs > 65 while on fentanyl gtt  Echo: EF 65-70% concentric LV remodeling  FEDERICO (9/17) negative for vegetation, ASD, PFO   Cont to hold anti-hypertensives  Wean off levophed, hypotension is fluid responsive      Renal/  Chronic kidney disease, stage III (moderate)  -PMHx of CKD 3   -monitor renal function, I/Os   Currently creatinine nl    Other  Hypotension due to hypovolemia  -after receiving sedation  bolused with 500cc, kayla bump x3 then levophed gtt started to maintain MAP > 65  Cont aggressive hydration, check cortisol                The patient is being Prophylaxed for:  Venous Thromboembolism with: Chemical  Stress Ulcer with: PPI  Ventilator Pneumonia with: chlorhexidine  oral care    Activity Orders          Diet NPO Except for: Medication: NPO starting at 09/24 0001    Diet NPO Except for: Medication: NPO starting at 09/19 2014      CRC 40 min not including procedure time  Full Code    Eugene Simms MD  Neurocritical Care  [facility]

## 2019-09-23 NOTE — NURSING
Pt HR 38-42. Atropine at bedside. NP stated to have on hold if pt HR drops less than 35 to  administer 0.5 mg IVP.

## 2019-09-23 NOTE — SUBJECTIVE & OBJECTIVE
Interval History: 9/23: NAEON, AFVSS, Exam stable, remains intubated with poor prognosis for wean, plan for PEG/Trach in AM    Medications:  Continuous Infusions:   fentanyl 200 mcg/hr (09/23/19 1101)    lactated ringers      midazolam (VERSED) infusion (titrating)       Scheduled Meds:   cefTRIAXone (ROCEPHIN) IVPB  2 g Intravenous Q12H    chlorhexidine  15 mL Mouth/Throat BID    dexamethasone  4 mg Intravenous Q8H    famotidine  20 mg Per OG tube QHS    heparin (porcine)  5,000 Units Subcutaneous Q8H    levothyroxine  75 mcg Per OG tube Before breakfast    valproic acid (as sodium salt)  750 mg Per OG tube Q8H     PRN Meds:acetaminophen, Dextrose 10% Bolus, glucagon (human recombinant), hydrALAZINE, insulin aspart U-100, magnesium oxide, magnesium oxide, midazolam, potassium chloride 10%, potassium chloride 10%, potassium, sodium phosphates, potassium, sodium phosphates, potassium, sodium phosphates, racepinephrine, sodium chloride 0.9%, sodium chloride 0.9%     Review of Systems    Objective:     Weight: 78.6 kg (173 lb 4.5 oz)  Body mass index is 30.7 kg/m².  Vital Signs (Most Recent):  Temp: 99.7 °F (37.6 °C) (09/23/19 1101)  Pulse: 95 (09/23/19 1136)  Resp: (!) 47 (09/23/19 1136)  BP: (!) 161/66 (09/23/19 1136)  SpO2: 99 % (09/23/19 1136) Vital Signs (24h Range):  Temp:  [97.6 °F (36.4 °C)-99.7 °F (37.6 °C)] 99.7 °F (37.6 °C)  Pulse:  [52-99] 95  Resp:  [16-51] 47  SpO2:  [98 %-100 %] 99 %  BP: ()/(51-88) 161/66     Date 09/23/19 0700 - 09/24/19 0659   Shift 4934-3107 7824-9013 3416-1740 24 Hour Total   INTAKE   I.V.(mL/kg) 195.1(2.5)   195.1(2.5)   NG/   210   IV Piggyback 50   50   Shift Total(mL/kg) 455.1(5.8)   455.1(5.8)   OUTPUT   Shift Total(mL/kg)       Weight (kg) 78.6 78.6 78.6 78.6              Vent Mode: A/C  Oxygen Concentration (%):  [40] 40  Resp Rate Total:  [16 br/min-36 br/min] 16 br/min  Vt Set:  [380 mL] 380 mL  PEEP/CPAP:  [5 cmH20] 5 cmH20  Pressure Support:  [0  cmH20] 0 cmH20  Mean Airway Pressure:  [6.8 cmH20-10 cmH20] 7.8 cmH20         NG/OG Tube 09/16/19 1805 orogastric Center mouth (Active)   Placement Check placement verified by aspirate characteristics;placement verified by x-ray;placement verified by distal tube length measurement 9/19/2019  7:05 AM   Tolerance no signs/symptoms of discomfort 9/19/2019  7:05 AM   Securement secured to commercial device 9/19/2019  7:05 AM   Clamp Status/Tolerance unclamped 9/19/2019  7:05 AM   Insertion Site Appearance no redness, warmth, tenderness, skin breakdown, drainage 9/19/2019  7:05 AM   Drainage None 9/19/2019  7:05 AM   Flush/Irrigation flushed w/;water;no resistance met 9/19/2019  7:05 AM   Feeding Method continuous 9/19/2019  7:05 AM   Feeding Action feeding continued 9/19/2019  7:05 AM   Current Rate (mL/hr) 30 mL/hr 9/19/2019  7:05 AM   Goal Rate (mL/hr) 45 mL/hr 9/19/2019  7:05 AM   Intake (mL) 60 mL 9/19/2019  8:05 AM   Formula Name Isosource 1.5 9/19/2019  7:05 AM   Intake (mL) - Formula Tube Feeding 40 9/19/2019  9:05 AM   Residual Amount (ml) 0 ml 9/19/2019  7:05 AM            Rectal Tube 09/15/19 0900 rectal tube w/ balloon (indicate number of mLs) (Active)   Balloon Inflation Volume (mL) 45 9/19/2019  7:05 AM   Reposition other (see comments) 9/19/2019  7:05 AM   Outcome gas expelled, stool evacuated 9/19/2019  7:05 AM   Stool Color Brown 9/19/2019  7:05 AM   Insertion Site Appearance no redness, warmth, tenderness, skin breakdown, drainage 9/19/2019  7:05 AM   Flush/Irrigation flushed w/;water;no resistance met 9/19/2019  7:05 AM   Intake (mL) 60 mL 9/18/2019  8:00 PM   Rectal Tube Output 25 mL 9/18/2019  6:00 AM       Neurosurgery Physical Exam     [id number]  PERRL  RUE/RLE > LLE spontaneously antigravity  Paretic in LUE.    Significant Labs:  Recent Labs   Lab 09/22/19  0019 09/23/19  0238   * 206*    136   K 4.4 4.7    104   CO2 24 24   BUN 20 27*   CREATININE 1.0 0.9   CALCIUM 8.5* 8.4*   MG  2.2 2.2     Recent Labs   Lab 09/22/19  0019 09/23/19  0238   WBC 10.45 14.65*   HGB 11.3* 11.0*   HCT 36.4* 35.5*    231     No results for input(s): LABPT, INR, APTT in the last 48 hours.  Microbiology Results (last 7 days)     Procedure Component Value Units Date/Time    Blood culture [[id number]] Collected:  09/23/19 0241    Order Status:  Completed Specimen:  Blood from Peripheral, Forearm, Right Updated:  09/23/19 1115     Blood Culture, Routine No Growth to date    Narrative:       Blood cultures from 2 different sites. 4 bottles total.  Please draw before starting antibiotics.    Blood culture [[id number]] Collected:  09/23/19 0241    Order Status:  Completed Specimen:  Blood from Peripheral, Forearm, Left Updated:  09/23/19 1115     Blood Culture, Routine No Growth to date    Narrative:       Blood cultures x 2 different sites. 4 bottles total. Please  draw cultures before administering antibiotics.    Culture, Respiratory with Gram Stain [[id number]] Collected:  09/22/19 1957    Order Status:  Completed Specimen:  Respiratory from Endotracheal Aspirate Updated:  09/23/19 0802     Respiratory Culture Insufficient incubation, culture in progress     Gram Stain (Respiratory) Few WBC's     Gram Stain (Respiratory) Few Gram positive cocci    Culture, Respiratory with Gram Stain [[id number]]     Order Status:  Canceled Specimen:  Respiratory     Blood culture [[id number]] Collected:  09/11/19 1811    Order Status:  Completed Specimen:  Blood from Peripheral, Hand, Left Updated:  09/16/19 2012     Blood Culture, Routine No growth after 5 days.            Significant Diagnostics:  All significant diagnostics reviewed

## 2019-09-23 NOTE — PLAN OF CARE
POC reviewed with pt and  at 0500. Pt intubated, thus cannot verbalize understanding. Questions and concerns addressed with . No acute events overnight. Blood cultures and UA obtained. Minimal UOP over night. Pt. Constantly restless with Fentanyl gtt @ 200 and Q4 PRN Versed given x2. Pt progressing toward goals. Will continue to monitor. See flowsheets for full assessment and VS info

## 2019-09-23 NOTE — ASSESSMENT & PLAN NOTE
-after receiving sedation  bolused with 500cc, kayla bump x3 then levophed gtt started to maintain MAP > 65  Cont aggressive hydration, check cortisol

## 2019-09-23 NOTE — PROGRESS NOTES
[facility]  Neurosurgery  Progress Note    Subjective:     History of Present Illness: 73 y/o female with pmhx CKD and HTN  presented to outside hospital for seizure- like activity this AM. Per  pt was found unconscious this AM, with seizure like activity occurring on the left side. Pt was given versed x 2 via EMS. CTH performed at outside hospital was negative for bleed. Pt had additional seizure in outside hospital ED and was given IV Keppra. EEG ordered and MRI brain w/out contrast concerning for possible infiltratrive process vs post ischemic sequela. [place] [facility] [place] [facility]. MRI brain w/ and w/out obtained that showed large area of edema in right frontal lobe and ill defined enhancement in the frontal lobe concerning for possible cerebritis vs. Neoplasm. NSGY was consulted to evaluate. Pt not on anti-coagulation.       Post-Op Info:  Procedure(s) (LRB):  EGD (ESOPHAGOGASTRODUODENOSCOPY) (N/A)   7 Days Post-Op     Interval History: 9/23: NAEON, AFVSS, Exam stable, remains intubated with poor prognosis for wean, plan for PEG/Trach in AM    Medications:  Continuous Infusions:   fentanyl 200 mcg/hr (09/23/19 1101)    lactated ringers      midazolam (VERSED) infusion (titrating)       Scheduled Meds:   cefTRIAXone (ROCEPHIN) IVPB  2 g Intravenous Q12H    chlorhexidine  15 mL Mouth/Throat BID    dexamethasone  4 mg Intravenous Q8H    famotidine  20 mg Per OG tube QHS    heparin (porcine)  5,000 Units Subcutaneous Q8H    levothyroxine  75 mcg Per OG tube Before breakfast    valproic acid (as sodium salt)  750 mg Per OG tube Q8H     PRN Meds:acetaminophen, Dextrose 10% Bolus, glucagon (human recombinant), hydrALAZINE, insulin aspart U-100, magnesium oxide, magnesium oxide, midazolam, potassium chloride 10%, potassium chloride 10%, potassium, sodium phosphates, potassium, sodium phosphates, potassium, sodium phosphates, racepinephrine, sodium chloride 0.9%, sodium  chloride 0.9%     Review of Systems    Objective:     Weight: 78.6 kg (173 lb 4.5 oz)  Body mass index is 30.7 kg/m².  Vital Signs (Most Recent):  Temp: 99.7 °F (37.6 °C) (09/23/19 1101)  Pulse: 95 (09/23/19 1136)  Resp: (!) 47 (09/23/19 1136)  BP: (!) 161/66 (09/23/19 1136)  SpO2: 99 % (09/23/19 1136) Vital Signs (24h Range):  Temp:  [97.6 °F (36.4 °C)-99.7 °F (37.6 °C)] 99.7 °F (37.6 °C)  Pulse:  [52-99] 95  Resp:  [16-51] 47  SpO2:  [98 %-100 %] 99 %  BP: ()/(51-88) 161/66     Date 09/23/19 0700 - 09/24/19 0659   Shift 5120-1491 9026-3838 5104-3285 24 Hour Total   INTAKE   I.V.(mL/kg) 195.1(2.5)   195.1(2.5)   NG/   210   IV Piggyback 50   50   Shift Total(mL/kg) 455.1(5.8)   455.1(5.8)   OUTPUT   Shift Total(mL/kg)       Weight (kg) 78.6 78.6 78.6 78.6              Vent Mode: A/C  Oxygen Concentration (%):  [40] 40  Resp Rate Total:  [16 br/min-36 br/min] 16 br/min  Vt Set:  [380 mL] 380 mL  PEEP/CPAP:  [5 cmH20] 5 cmH20  Pressure Support:  [0 cmH20] 0 cmH20  Mean Airway Pressure:  [6.8 cmH20-10 cmH20] 7.8 cmH20         NG/OG Tube 09/16/19 2531 orogastric Center mouth (Active)   Placement Check placement verified by aspirate characteristics;placement verified by x-ray;placement verified by distal tube length measurement 9/19/2019  7:05 AM   Tolerance no signs/symptoms of discomfort 9/19/2019  7:05 AM   Securement secured to commercial device 9/19/2019  7:05 AM   Clamp Status/Tolerance unclamped 9/19/2019  7:05 AM   Insertion Site Appearance no redness, warmth, tenderness, skin breakdown, drainage 9/19/2019  7:05 AM   Drainage None 9/19/2019  7:05 AM   Flush/Irrigation flushed w/;water;no resistance met 9/19/2019  7:05 AM   Feeding Method continuous 9/19/2019  7:05 AM   Feeding Action feeding continued 9/19/2019  7:05 AM   Current Rate (mL/hr) 30 mL/hr 9/19/2019  7:05 AM   Goal Rate (mL/hr) 45 mL/hr 9/19/2019  7:05 AM   Intake (mL) 60 mL 9/19/2019  8:05 AM   Formula Name Isosource 1.5 9/19/2019  7:05  AM   Intake (mL) - Formula Tube Feeding 40 9/19/2019  9:05 AM   Residual Amount (ml) 0 ml 9/19/2019  7:05 AM            Rectal Tube 09/15/19 0900 rectal tube w/ balloon (indicate number of mLs) (Active)   Balloon Inflation Volume (mL) 45 9/19/2019  7:05 AM   Reposition other (see comments) 9/19/2019  7:05 AM   Outcome gas expelled, stool evacuated 9/19/2019  7:05 AM   Stool Color Brown 9/19/2019  7:05 AM   Insertion Site Appearance no redness, warmth, tenderness, skin breakdown, drainage 9/19/2019  7:05 AM   Flush/Irrigation flushed w/;water;no resistance met 9/19/2019  7:05 AM   Intake (mL) 60 mL 9/18/2019  8:00 PM   Rectal Tube Output 25 mL 9/18/2019  6:00 AM       Neurosurgery Physical Exam     E4VTM5  PERRL  RUE/RLE > LLE spontaneously antigravity  Paretic in LUE.    Significant Labs:  Recent Labs   Lab 09/22/19 0019 09/23/19 0238   * 206*    136   K 4.4 4.7    104   CO2 24 24   BUN 20 27*   CREATININE 1.0 0.9   CALCIUM 8.5* 8.4*   MG 2.2 2.2     Recent Labs   Lab 09/22/19 0019 09/23/19 0238   WBC 10.45 14.65*   HGB 11.3* 11.0*   HCT 36.4* 35.5*    231     No results for input(s): LABPT, INR, APTT in the last 48 hours.  Microbiology Results (last 7 days)     Procedure Component Value Units Date/Time    Blood culture [[id number]] Collected:  09/23/19 0241    Order Status:  Completed Specimen:  Blood from Peripheral, Forearm, Right Updated:  09/23/19 1115     Blood Culture, Routine No Growth to date    Narrative:       Blood cultures from 2 different sites. 4 bottles total.  Please draw before starting antibiotics.    Blood culture [[id number]] Collected:  09/23/19 0241    Order Status:  Completed Specimen:  Blood from Peripheral, Forearm, Left Updated:  09/23/19 1115     Blood Culture, Routine No Growth to date    Narrative:       Blood cultures x 2 different sites. 4 bottles total. Please  draw cultures before administering antibiotics.    Culture, Respiratory with Gram Stain  [[id number]] Collected:  09/22/19 1957    Order Status:  Completed Specimen:  Respiratory from Endotracheal Aspirate Updated:  09/23/19 0802     Respiratory Culture Insufficient incubation, culture in progress     Gram Stain (Respiratory) Few WBC's     Gram Stain (Respiratory) Few Gram positive cocci    Culture, Respiratory with Gram Stain [[id number]]     Order Status:  Canceled Specimen:  Respiratory     Blood culture [[id number]] Collected:  09/11/19 1811    Order Status:  Completed Specimen:  Blood from Peripheral, Hand, Left Updated:  09/16/19 2012     Blood Culture, Routine No growth after 5 days.            Significant Diagnostics:  All significant diagnostics reviewed    Assessment/Plan:     New onset seizure  73 y/o female with pmhx CKD and HTN presented to outside hospital for seizure- like activity occurring on the left side. Found to have area of ill-defined enhancement on MRI brain w/ and w/out.  GARCIA thus far has been unrevealing for source of encephalopathy.    -Admitted to Neuro-ICU  -q 1 hr neuro checks  -FEDERICO negative  -Fu ID recs for abx duration  -Continue steroids for now  -Plan for PEG/trach in AM  -MRI 9/19 with stable or even decreased lesion size.  -NSGY will discuss risks vs benefits of biopsy with primary team   -Further care per NCC, will follow.          Anup Schumacher MD  Neurosurgery  [facility]

## 2019-09-23 NOTE — PLAN OF CARE
SW attempted to see Pt family twice to provide LTAC list, but Pt had MD personnel both times.     Toña Henning, RENEE  Neurocritical Care   [facility]  [phone]

## 2019-09-23 NOTE — NURSING
1235 2g of versed administered IVP d/t pt agiation per MD order. Post administration of versed apena alarms triggered on vent. Molly PA with NCC notified. RT at bedside to place pt back on a/c rate, sats at 100%. Pt BP Also subsequently dropped, levo gtt restarted.     1240 Levo gtt needs steadily increasing, Pt MAP in the 50's via cuff pressures, HR at 41. Fentanyl gtt stopped. Molly NP with NCC notified gave verbal order to give 100mcg of kayla IVP and 500cc NS Bolus..     1242 Molly NP with NCC at bedside. Pt pressure improved to 200's/100's. Levo gtt titrated down, NS bolus stopped per NP orders    1225 Mendez LOPEZ with NCC at bedside gave verbal order to  administer 500cc bolus of LR.

## 2019-09-23 NOTE — ASSESSMENT & PLAN NOTE
75 y/o female with pmhx CKD and HTN presented to outside hospital for seizure- like activity occurring on the left side. Found to have area of ill-defined enhancement on MRI brain w/ and w/out.  GARCIA thus far has been unrevealing for source of encephalopathy.    -Admitted to Neuro-ICU  -q 1 hr neuro checks  -FEDERICO negative  -Fu ID recs for abx duration  -Continue steroids for now  -Plan for PEG/trach in AM  -MRI 9/19 with stable or even decreased lesion size.  -NSGY will discuss risks vs benefits of biopsy with primary team   -Further care per NCC, will follow.

## 2019-09-23 NOTE — PLAN OF CARE
Pt still on Levophed as of this morning.  If not off pressors by tomorrow morning, will defer surgery.  Call with questions.  Please hold TFs at midnight.

## 2019-09-24 LAB
ABO + RH BLD: NORMAL
ALBUMIN SERPL BCP-MCNC: 2.9 G/DL (ref 3.5–5.2)
ALLENS TEST: ABNORMAL
ALP SERPL-CCNC: 51 U/L (ref 55–135)
ALT SERPL W/O P-5'-P-CCNC: 150 U/L (ref 10–44)
ANION GAP SERPL CALC-SCNC: 7 MMOL/L (ref 8–16)
ANISOCYTOSIS BLD QL SMEAR: SLIGHT
AST SERPL-CCNC: 47 U/L (ref 10–40)
BACTERIA SPEC AEROBE CULT: NORMAL
BACTERIA SPEC AEROBE CULT: NORMAL
BASO STIPL BLD QL SMEAR: ABNORMAL
BASOPHILS # BLD AUTO: 0.05 K/UL (ref 0–0.2)
BASOPHILS NFR BLD: 0.2 % (ref 0–1.9)
BILIRUB SERPL-MCNC: 0.4 MG/DL (ref 0.1–1)
BLD GP AB SCN CELLS X3 SERPL QL: NORMAL
BUN SERPL-MCNC: 23 MG/DL (ref 8–23)
C DIFF GDH STL QL: NEGATIVE
C DIFF TOX A+B STL QL IA: NEGATIVE
CALCIUM SERPL-MCNC: 8.2 MG/DL (ref 8.7–10.5)
CHLORIDE SERPL-SCNC: 101 MMOL/L (ref 95–110)
CO2 SERPL-SCNC: 26 MMOL/L (ref 23–29)
CREAT SERPL-MCNC: 0.9 MG/DL (ref 0.5–1.4)
DELSYS: ABNORMAL
DIFFERENTIAL METHOD: ABNORMAL
EOSINOPHIL # BLD AUTO: 0 K/UL (ref 0–0.5)
EOSINOPHIL NFR BLD: 0 % (ref 0–8)
ERYTHROCYTE [DISTWIDTH] IN BLOOD BY AUTOMATED COUNT: 14.8 % (ref 11.5–14.5)
ERYTHROCYTE [SEDIMENTATION RATE] IN BLOOD BY WESTERGREN METHOD: 16 MM/H
EST. GFR  (AFRICAN AMERICAN): >60 ML/MIN/1.73 M^2
EST. GFR  (NON AFRICAN AMERICAN): >60 ML/MIN/1.73 M^2
FIO2: 40
GLUCOSE SERPL-MCNC: 263 MG/DL (ref 70–110)
GRAM STN SPEC: NORMAL
GRAM STN SPEC: NORMAL
HCO3 UR-SCNC: 25.3 MMOL/L (ref 24–28)
HCT VFR BLD AUTO: 34.1 % (ref 37–48.5)
HGB BLD-MCNC: 10.3 G/DL (ref 12–16)
IMM GRANULOCYTES # BLD AUTO: 0.3 K/UL (ref 0–0.04)
IMM GRANULOCYTES NFR BLD AUTO: 1.3 % (ref 0–0.5)
LYMPHOCYTES # BLD AUTO: 1.5 K/UL (ref 1–4.8)
LYMPHOCYTES NFR BLD: 6.7 % (ref 18–48)
MAGNESIUM SERPL-MCNC: 1.8 MG/DL (ref 1.6–2.6)
MCH RBC QN AUTO: 29 PG (ref 27–31)
MCHC RBC AUTO-ENTMCNC: 30.2 G/DL (ref 32–36)
MCV RBC AUTO: 96 FL (ref 82–98)
MODE: ABNORMAL
MONOCYTES # BLD AUTO: 3.8 K/UL (ref 0.3–1)
MONOCYTES NFR BLD: 16.8 % (ref 4–15)
NEUTROPHILS # BLD AUTO: 16.8 K/UL (ref 1.8–7.7)
NEUTROPHILS NFR BLD: 75 % (ref 38–73)
NRBC BLD-RTO: 0 /100 WBC
PCO2 BLDA: 32.5 MMHG (ref 35–45)
PEEP: 7
PH SMN: 7.5 [PH] (ref 7.35–7.45)
PHOSPHATE SERPL-MCNC: 2.6 MG/DL (ref 2.7–4.5)
PLATELET # BLD AUTO: 254 K/UL (ref 150–350)
PLATELET BLD QL SMEAR: ABNORMAL
PMV BLD AUTO: 9.4 FL (ref 9.2–12.9)
PO2 BLDA: 24 MMHG (ref 40–60)
POC BE: 2 MMOL/L
POC SATURATED O2: 51 % (ref 95–100)
POC TCO2: 26 MMOL/L (ref 24–29)
POCT GLUCOSE: 133 MG/DL (ref 70–110)
POCT GLUCOSE: 152 MG/DL (ref 70–110)
POCT GLUCOSE: 175 MG/DL (ref 70–110)
POCT GLUCOSE: 261 MG/DL (ref 70–110)
POCT GLUCOSE: 323 MG/DL (ref 70–110)
POCT GLUCOSE: 69 MG/DL (ref 70–110)
POCT GLUCOSE: 89 MG/DL (ref 70–110)
POLYCHROMASIA BLD QL SMEAR: ABNORMAL
POTASSIUM SERPL-SCNC: 4.9 MMOL/L (ref 3.5–5.1)
PROT SERPL-MCNC: 5.9 G/DL (ref 6–8.4)
RBC # BLD AUTO: 3.55 M/UL (ref 4–5.4)
SAMPLE: ABNORMAL
SITE: ABNORMAL
SODIUM SERPL-SCNC: 134 MMOL/L (ref 136–145)
SP02: 100
VT: 380
WBC # BLD AUTO: 22.47 K/UL (ref 3.9–12.7)

## 2019-09-24 PROCEDURE — 86901 BLOOD TYPING SEROLOGIC RH(D): CPT

## 2019-09-24 PROCEDURE — 63600175 PHARM REV CODE 636 W HCPCS: Performed by: PSYCHIATRY & NEUROLOGY

## 2019-09-24 PROCEDURE — 85025 COMPLETE CBC W/AUTO DIFF WBC: CPT

## 2019-09-24 PROCEDURE — 83735 ASSAY OF MAGNESIUM: CPT

## 2019-09-24 PROCEDURE — 87449 NOS EACH ORGANISM AG IA: CPT

## 2019-09-24 PROCEDURE — 63600175 PHARM REV CODE 636 W HCPCS: Performed by: NURSE PRACTITIONER

## 2019-09-24 PROCEDURE — 99499 NO LOS: ICD-10-PCS | Mod: ,,, | Performed by: SURGERY

## 2019-09-24 PROCEDURE — 99499 UNLISTED E&M SERVICE: CPT | Mod: ,,, | Performed by: SURGERY

## 2019-09-24 PROCEDURE — 99232 PR SUBSEQUENT HOSPITAL CARE,LEVL II: ICD-10-PCS | Mod: ,,, | Performed by: NEUROLOGICAL SURGERY

## 2019-09-24 PROCEDURE — 84100 ASSAY OF PHOSPHORUS: CPT

## 2019-09-24 PROCEDURE — 25000003 PHARM REV CODE 250: Performed by: PSYCHIATRY & NEUROLOGY

## 2019-09-24 PROCEDURE — 80053 COMPREHEN METABOLIC PANEL: CPT

## 2019-09-24 PROCEDURE — 82803 BLOOD GASES ANY COMBINATION: CPT

## 2019-09-24 PROCEDURE — 99232 SBSQ HOSP IP/OBS MODERATE 35: CPT | Mod: ,,, | Performed by: NEUROLOGICAL SURGERY

## 2019-09-24 PROCEDURE — 27200966 HC CLOSED SUCTION SYSTEM

## 2019-09-24 PROCEDURE — 93010 ELECTROCARDIOGRAM REPORT: CPT | Mod: ,,, | Performed by: INTERNAL MEDICINE

## 2019-09-24 PROCEDURE — 99291 PR CRITICAL CARE, E/M 30-74 MINUTES: ICD-10-PCS | Mod: GC,,, | Performed by: PSYCHIATRY & NEUROLOGY

## 2019-09-24 PROCEDURE — 99900035 HC TECH TIME PER 15 MIN (STAT)

## 2019-09-24 PROCEDURE — 93005 ELECTROCARDIOGRAM TRACING: CPT

## 2019-09-24 PROCEDURE — 99900026 HC AIRWAY MAINTENANCE (STAT)

## 2019-09-24 PROCEDURE — 94003 VENT MGMT INPAT SUBQ DAY: CPT

## 2019-09-24 PROCEDURE — 27000221 HC OXYGEN, UP TO 24 HOURS

## 2019-09-24 PROCEDURE — 20000000 HC ICU ROOM

## 2019-09-24 PROCEDURE — 89220 SPUTUM SPECIMEN COLLECTION: CPT

## 2019-09-24 PROCEDURE — 25000003 PHARM REV CODE 250: Performed by: NURSE PRACTITIONER

## 2019-09-24 PROCEDURE — 94761 N-INVAS EAR/PLS OXIMETRY MLT: CPT

## 2019-09-24 PROCEDURE — 99291 CRITICAL CARE FIRST HOUR: CPT | Mod: GC,,, | Performed by: PSYCHIATRY & NEUROLOGY

## 2019-09-24 PROCEDURE — 93010 EKG 12-LEAD: ICD-10-PCS | Mod: ,,, | Performed by: INTERNAL MEDICINE

## 2019-09-24 RX ORDER — GLUCAGON 1 MG
1 KIT INJECTION
Status: DISCONTINUED | OUTPATIENT
Start: 2019-09-24 | End: 2019-10-04 | Stop reason: HOSPADM

## 2019-09-24 RX ORDER — INSULIN ASPART 100 [IU]/ML
1-10 INJECTION, SOLUTION INTRAVENOUS; SUBCUTANEOUS EVERY 4 HOURS PRN
Status: DISCONTINUED | OUTPATIENT
Start: 2019-09-24 | End: 2019-10-04 | Stop reason: HOSPADM

## 2019-09-24 RX ORDER — DEXAMETHASONE SODIUM PHOSPHATE 4 MG/ML
4 INJECTION, SOLUTION INTRA-ARTICULAR; INTRALESIONAL; INTRAMUSCULAR; INTRAVENOUS; SOFT TISSUE EVERY 6 HOURS
Status: DISCONTINUED | OUTPATIENT
Start: 2019-09-24 | End: 2019-09-27

## 2019-09-24 RX ORDER — MIDODRINE HYDROCHLORIDE 5 MG/1
10 TABLET ORAL
Status: DISCONTINUED | OUTPATIENT
Start: 2019-09-24 | End: 2019-09-25

## 2019-09-24 RX ORDER — HALOPERIDOL 5 MG/ML
5 INJECTION INTRAMUSCULAR EVERY 6 HOURS PRN
Status: DISCONTINUED | OUTPATIENT
Start: 2019-09-24 | End: 2019-09-25

## 2019-09-24 RX ADMIN — Medication 200 MCG/HR: at 11:09

## 2019-09-24 RX ADMIN — HALOPERIDOL LACTATE 5 MG: 5 INJECTION, SOLUTION INTRAMUSCULAR at 07:09

## 2019-09-24 RX ADMIN — INSULIN ASPART 8 UNITS: 100 INJECTION, SOLUTION INTRAVENOUS; SUBCUTANEOUS at 07:09

## 2019-09-24 RX ADMIN — CEFTRIAXONE SODIUM 2 G: 2 INJECTION, SOLUTION INTRAVENOUS at 11:09

## 2019-09-24 RX ADMIN — MIDODRINE HYDROCHLORIDE 10 MG: 5 TABLET ORAL at 07:09

## 2019-09-24 RX ADMIN — CEFTRIAXONE SODIUM 2 G: 2 INJECTION, SOLUTION INTRAVENOUS at 12:09

## 2019-09-24 RX ADMIN — INSULIN ASPART 1 UNITS: 100 INJECTION, SOLUTION INTRAVENOUS; SUBCUTANEOUS at 12:09

## 2019-09-24 RX ADMIN — HEPARIN SODIUM 5000 UNITS: 5000 INJECTION, SOLUTION INTRAVENOUS; SUBCUTANEOUS at 10:09

## 2019-09-24 RX ADMIN — MIDODRINE HYDROCHLORIDE 10 MG: 5 TABLET ORAL at 12:09

## 2019-09-24 RX ADMIN — VALPROIC ACID 750 MG: 250 SOLUTION ORAL at 02:09

## 2019-09-24 RX ADMIN — LEVOTHYROXINE SODIUM 75 MCG: 75 TABLET ORAL at 05:09

## 2019-09-24 RX ADMIN — MIDAZOLAM HYDROCHLORIDE 1 MG: 1 INJECTION, SOLUTION INTRAMUSCULAR; INTRAVENOUS at 04:09

## 2019-09-24 RX ADMIN — VALPROIC ACID 750 MG: 250 SOLUTION ORAL at 06:09

## 2019-09-24 RX ADMIN — Medication 0.1 MCG/KG/MIN: at 03:09

## 2019-09-24 RX ADMIN — DEXAMETHASONE SODIUM PHOSPHATE 4 MG: 4 INJECTION, SOLUTION INTRAMUSCULAR; INTRAVENOUS at 11:09

## 2019-09-24 RX ADMIN — DEXAMETHASONE SODIUM PHOSPHATE 4 MG: 4 INJECTION, SOLUTION INTRAMUSCULAR; INTRAVENOUS at 06:09

## 2019-09-24 RX ADMIN — DEXAMETHASONE SODIUM PHOSPHATE 4 MG: 4 INJECTION, SOLUTION INTRAMUSCULAR; INTRAVENOUS at 12:09

## 2019-09-24 RX ADMIN — INSULIN ASPART 2 UNITS: 100 INJECTION, SOLUTION INTRAVENOUS; SUBCUTANEOUS at 07:09

## 2019-09-24 RX ADMIN — SODIUM CHLORIDE, SODIUM LACTATE, POTASSIUM CHLORIDE, AND CALCIUM CHLORIDE 1000 ML: .6; .31; .03; .02 INJECTION, SOLUTION INTRAVENOUS at 10:09

## 2019-09-24 RX ADMIN — DEXAMETHASONE SODIUM PHOSPHATE 4 MG: 4 INJECTION, SOLUTION INTRAMUSCULAR; INTRAVENOUS at 05:09

## 2019-09-24 RX ADMIN — Medication 200 MCG/HR: at 10:09

## 2019-09-24 RX ADMIN — INSULIN ASPART 2 UNITS: 100 INJECTION, SOLUTION INTRAVENOUS; SUBCUTANEOUS at 03:09

## 2019-09-24 RX ADMIN — HEPARIN SODIUM 5000 UNITS: 5000 INJECTION, SOLUTION INTRAVENOUS; SUBCUTANEOUS at 05:09

## 2019-09-24 RX ADMIN — MIDAZOLAM HYDROCHLORIDE 1 MG: 1 INJECTION, SOLUTION INTRAMUSCULAR; INTRAVENOUS at 09:09

## 2019-09-24 RX ADMIN — MIDAZOLAM HYDROCHLORIDE 1 MG: 1 INJECTION, SOLUTION INTRAMUSCULAR; INTRAVENOUS at 12:09

## 2019-09-24 RX ADMIN — HEPARIN SODIUM 5000 UNITS: 5000 INJECTION, SOLUTION INTRAVENOUS; SUBCUTANEOUS at 01:09

## 2019-09-24 RX ADMIN — HALOPERIDOL LACTATE 5 MG: 5 INJECTION, SOLUTION INTRAMUSCULAR at 01:09

## 2019-09-24 RX ADMIN — FAMOTIDINE 20 MG: 20 TABLET ORAL at 08:09

## 2019-09-24 RX ADMIN — HYDRALAZINE HYDROCHLORIDE 10 MG: 20 INJECTION INTRAMUSCULAR; INTRAVENOUS at 06:09

## 2019-09-24 NOTE — PLAN OF CARE
09/24/19 1119   Post-Acute Status   Post-Acute Authorization Placement   Post-Acute Placement Status Patient List Provided  (LTAC)     SW met with Pt and Pt family members at bedside. Discussed trache/peg and LTAC. Provided list. Family to review and give choices asap.    SW met with Khloe with [facility] and took her to introduce her to this family as Pt  was not at bedside and Pt family reported he would have questions. Pt 's questions were addressed.      Toña Henning LMSW  Neurocritical Care   [facility]  [phone]

## 2019-09-24 NOTE — NURSING
BRIANNA Aguilera notified of pt's increase in WBC from 14.65 - 22.47, as well as Na levels of 134. No new orders at the time.

## 2019-09-24 NOTE — ASSESSMENT & PLAN NOTE
75 y/o female with pmhx CKD and HTN presented to outside hospital for seizure- like activity occurring on the left side. Found to have area of ill-defined enhancement on MRI brain w/ and w/out.  GARCIA thus far has been unrevealing for source of encephalopathy.    -Admitted to Neuro-ICU  -q 1 hr neuro checks  -FEDERICO negative  -Fu ID recs for abx duration  -Continue steroids for now  -Plan for PEG/trach today  -MRI 9/19 with stable or even decreased lesion size.  -Will consider biopsy  -Further care per NCC, will follow.

## 2019-09-24 NOTE — PLAN OF CARE
POC reviewed with pt at 0500. Pt verbalized understanding. Questions and concerns addressed. No acute events overnight. Pt progressing toward goals. Will continue to monitor. See flowsheets for full assessment and VS info

## 2019-09-24 NOTE — NURSING
Kingsley REED informed of A-line not correlating with cuff pressure as it previously has, Wave extremely dampened, is not drawing back blood and flushing with a lot of resistance. RN has powered flushed it, leveled, zeroed and repositioned right hand many times. Per NP, take A-line out and use cuff pressure.

## 2019-09-24 NOTE — PROGRESS NOTES
[facility]  Neurosurgery  Progress Note    Subjective:     History of Present Illness: 75 y/o female with pmhx CKD and HTN  presented to outside hospital for seizure- like activity this AM. Per  pt was found unconscious this AM, with seizure like activity occurring on the left side. Pt was given versed x 2 via EMS. CTH performed at outside hospital was negative for bleed. Pt had additional seizure in outside hospital ED and was given IV Keppra. EEG ordered and MRI brain w/out contrast concerning for possible infiltratrive process vs post ischemic sequela. Pt transferred [place] [facility] [place] and admitted to [facility]. MRI brain w/ and w/out obtained that showed large area of edema in right frontal lobe and ill defined enhancement in the frontal lobe concerning for possible cerebritis vs. Neoplasm. NSGY was consulted to evaluate. Pt not on anti-coagulation.       Post-Op Info:  Procedure(s) (LRB):  EGD (ESOPHAGOGASTRODUODENOSCOPY) (N/A)   8 Days Post-Op     Interval History: naeon    Medications:  Continuous Infusions:   fentanyl 200 mcg/hr (09/24/19 0602)    lactated ringers 75 mL/hr at 09/24/19 0602    norepinephrine bitartrate-D5W 0.04 mcg/kg/min (09/24/19 0602)     Scheduled Meds:   cefTRIAXone (ROCEPHIN) IVPB  2 g Intravenous Q12H    dexamethasone  4 mg Intravenous Q8H    famotidine  20 mg Per OG tube QHS    heparin (porcine)  5,000 Units Subcutaneous Q8H    levothyroxine  75 mcg Per OG tube Before breakfast    valproic acid (as sodium salt)  750 mg Per OG tube Q8H     PRN Meds:acetaminophen, Dextrose 10% Bolus, glucagon (human recombinant), hydrALAZINE, insulin aspart U-100, magnesium oxide, magnesium oxide, midazolam, potassium chloride 10%, potassium chloride 10%, potassium, sodium phosphates, potassium, sodium phosphates, potassium, sodium phosphates, racepinephrine, sodium chloride 0.9%, sodium chloride 0.9%     Review of Systems  Objective:     Weight: 78.6 kg (173 lb 4.5 oz)  Body mass  index is 30.7 kg/m².  Vital Signs (Most Recent):  Temp: 99 °F (37.2 °C) (09/24/19 0302)  Pulse: 97 (09/24/19 0602)  Resp: (!) 33 (09/24/19 0602)  BP: (!) 171/109 (09/24/19 0602)  SpO2: 100 % (09/24/19 0602) Vital Signs (24h Range):  Temp:  [98.9 °F (37.2 °C)-99.7 °F (37.6 °C)] 99 °F (37.2 °C)  Pulse:  [] 97  Resp:  [5-60] 33  SpO2:  [97 %-100 %] 100 %  BP: ()/() 171/109  Arterial Line BP: ()/() 77/62                Vent Mode: A/C  Oxygen Concentration (%):  [40] 40  Resp Rate Total:  [7.3 br/min-52 br/min] 17 br/min  Vt Set:  [380 mL] 380 mL  PEEP/CPAP:  [5 cmH20-7 cmH20] 7 cmH20  Pressure Support:  [0 cmH20] 0 cmH20  Mean Airway Pressure:  [7.8 ifI50-26 cmH20] 17 cmH20         NG/OG Tube 09/16/19 1805 orogastric Center mouth (Active)   Placement Check placement verified by aspirate characteristics;placement verified by distal tube length measurement 9/24/2019  3:02 AM   Tolerance no signs/symptoms of discomfort 9/24/2019  3:02 AM   Securement secured to commercial device 9/24/2019  3:02 AM   Clamp Status/Tolerance unclamped 9/24/2019  3:02 AM   Insertion Site Appearance no redness, warmth, tenderness, skin breakdown, drainage 9/24/2019  3:02 AM   Drainage None 9/23/2019  3:01 PM   Flush/Irrigation flushed w/;water;no resistance met 9/24/2019  3:02 AM   Feeding Method continuous 9/24/2019  3:02 AM   Feeding Action feeding continued 9/24/2019  3:02 AM   Current Rate (mL/hr) 45 mL/hr 9/23/2019  7:02 PM   Goal Rate (mL/hr) 45 mL/hr 9/23/2019  7:02 PM   Intake (mL) 60 mL 9/23/2019  6:05 AM   Rate Formula Tube Feeding (mL/hr) 45 mL/hr 9/23/2019  7:02 PM   Formula Name isosource 9/24/2019  3:02 AM   Intake (mL) - Formula Tube Feeding 45 9/24/2019 12:02 AM   Residual Amount (ml) 0 ml 9/23/2019  7:01 AM            Rectal Tube 09/15/19 0900 rectal tube w/ balloon (indicate number of mLs) (Active)   Balloon Inflation Volume (mL) 45 9/23/2019  3:01 PM   Reposition other (see comments) 9/24/2019   3:02 AM   Outcome gas expelled, stool evacuated 9/24/2019  3:02 AM   Stool Color Brown 9/22/2019  3:00 PM   Insertion Site Appearance no redness, warmth, tenderness, skin breakdown, drainage 9/24/2019  3:02 AM   Flush/Irrigation flushed w/;water 9/22/2019  3:00 PM   Intake (mL) 45 mL 9/24/2019  6:02 AM   Rectal Tube Output 75 mL 9/24/2019  6:02 AM       Female External Urinary Catheter 09/23/19 0900 (Active)   Skin no redness;no breakdown 9/24/2019  3:02 AM   Tolerance no signs/symptoms of discomfort 9/24/2019  3:02 AM   Suction Continuous suction at 60 mmHg 9/23/2019  7:02 PM   Date of last wick change 09/23/19 9/23/2019  7:02 PM   Time of last wick change 0900 9/23/2019  7:02 PM   Output (mL) 350 mL 9/24/2019  6:02 AM       Neurosurgery Physical Exam   E4VTM5  PERRL  R spontaneously  WD in LLE, paretic in LUE    Significant Labs:  Recent Labs   Lab 09/23/19  0238 09/24/19  0024   * 263*    134*   K 4.7 4.9    101   CO2 24 26   BUN 27* 23   CREATININE 0.9 0.9   CALCIUM 8.4* 8.2*   MG 2.2 1.8     Recent Labs   Lab 09/23/19  0238 09/24/19  0024   WBC 14.65* 22.47*   HGB 11.0* 10.3*   HCT 35.5* 34.1*    254     No results for input(s): LABPT, INR, APTT in the last 48 hours.  Microbiology Results (last 7 days)     Procedure Component Value Units Date/Time    Culture, Respiratory with Gram Stain [[id number]] Collected:  09/22/19 1957    Order Status:  Completed Specimen:  Respiratory from Endotracheal Aspirate Updated:  09/24/19 0720     Respiratory Culture Normal respiratory connie      No S aureus or Pseudomonas isolated.     Gram Stain (Respiratory) Few WBC's     Gram Stain (Respiratory) Few Gram positive cocci    Blood culture [[id number]] Collected:  09/23/19 0241    Order Status:  Completed Specimen:  Blood from Peripheral, Forearm, Right Updated:  09/24/19 0612     Blood Culture, Routine No Growth to date      No Growth to date    Narrative:       Blood cultures from 2 different sites. 4  bottles total.  Please draw before starting antibiotics.    Blood culture [[id number]] Collected:  09/23/19 0241    Order Status:  Completed Specimen:  Blood from Peripheral, Forearm, Left Updated:  09/24/19 0612     Blood Culture, Routine No Growth to date      No Growth to date    Narrative:       Blood cultures x 2 different sites. 4 bottles total. Please  draw cultures before administering antibiotics.    Culture, Respiratory with Gram Stain [[id number]]     Order Status:  Canceled Specimen:  Respiratory             Significant Diagnostics:      Assessment/Plan:     New onset seizure  75 y/o female with pmhx CKD and HTN presented to outside hospital for seizure- like activity occurring on the left side. Found to have area of ill-defined enhancement on MRI brain w/ and w/out.  GARCIA thus far has been unrevealing for source of encephalopathy.    -Admitted to Neuro-ICU  -q 1 hr neuro checks  -FEDERICO negative  -Fu ID recs for abx duration  -Continue steroids for now  -Plan for PEG/trach today  -MRI 9/19 with stable or even decreased lesion size.  -Will consider biopsy  -Further care per NCC, will follow.          Kingsley Lisa, DO  Neurosurgery  [facility]

## 2019-09-24 NOTE — SUBJECTIVE & OBJECTIVE
Interval History: naeon    Medications:  Continuous Infusions:   fentanyl 200 mcg/hr (09/24/19 0602)    lactated ringers 75 mL/hr at 09/24/19 0602    norepinephrine bitartrate-D5W 0.04 mcg/kg/min (09/24/19 0602)     Scheduled Meds:   cefTRIAXone (ROCEPHIN) IVPB  2 g Intravenous Q12H    dexamethasone  4 mg Intravenous Q8H    famotidine  20 mg Per OG tube QHS    heparin (porcine)  5,000 Units Subcutaneous Q8H    levothyroxine  75 mcg Per OG tube Before breakfast    valproic acid (as sodium salt)  750 mg Per OG tube Q8H     PRN Meds:acetaminophen, Dextrose 10% Bolus, glucagon (human recombinant), hydrALAZINE, insulin aspart U-100, magnesium oxide, magnesium oxide, midazolam, potassium chloride 10%, potassium chloride 10%, potassium, sodium phosphates, potassium, sodium phosphates, potassium, sodium phosphates, racepinephrine, sodium chloride 0.9%, sodium chloride 0.9%     Review of Systems  Objective:     Weight: 78.6 kg (173 lb 4.5 oz)  Body mass index is 30.7 kg/m².  Vital Signs (Most Recent):  Temp: 99 °F (37.2 °C) (09/24/19 0302)  Pulse: 97 (09/24/19 0602)  Resp: (!) 33 (09/24/19 0602)  BP: (!) 171/109 (09/24/19 0602)  SpO2: 100 % (09/24/19 0602) Vital Signs (24h Range):  Temp:  [98.9 °F (37.2 °C)-99.7 °F (37.6 °C)] 99 °F (37.2 °C)  Pulse:  [] 97  Resp:  [5-60] 33  SpO2:  [97 %-100 %] 100 %  BP: ()/() 171/109  Arterial Line BP: ()/() 77/62                Vent Mode: A/C  Oxygen Concentration (%):  [40] 40  Resp Rate Total:  [7.3 br/min-52 br/min] 17 br/min  Vt Set:  [380 mL] 380 mL  PEEP/CPAP:  [5 cmH20-7 cmH20] 7 cmH20  Pressure Support:  [0 cmH20] 0 cmH20  Mean Airway Pressure:  [7.8 omK31-96 cmH20] 17 cmH20         NG/OG Tube 09/16/19 0067 orogastric Center mouth (Active)   Placement Check placement verified by aspirate characteristics;placement verified by distal tube length measurement 9/24/2019  3:02 AM   Tolerance no signs/symptoms of discomfort 9/24/2019  3:02 AM    Securement secured to commercial device 9/24/2019  3:02 AM   Clamp Status/Tolerance unclamped 9/24/2019  3:02 AM   Insertion Site Appearance no redness, warmth, tenderness, skin breakdown, drainage 9/24/2019  3:02 AM   Drainage None 9/23/2019  3:01 PM   Flush/Irrigation flushed w/;water;no resistance met 9/24/2019  3:02 AM   Feeding Method continuous 9/24/2019  3:02 AM   Feeding Action feeding continued 9/24/2019  3:02 AM   Current Rate (mL/hr) 45 mL/hr 9/23/2019  7:02 PM   Goal Rate (mL/hr) 45 mL/hr 9/23/2019  7:02 PM   Intake (mL) 60 mL 9/23/2019  6:05 AM   Rate Formula Tube Feeding (mL/hr) 45 mL/hr 9/23/2019  7:02 PM   Formula Name isosource 9/24/2019  3:02 AM   Intake (mL) - Formula Tube Feeding 45 9/24/2019 12:02 AM   Residual Amount (ml) 0 ml 9/23/2019  7:01 AM            Rectal Tube 09/15/19 0900 rectal tube w/ balloon (indicate number of mLs) (Active)   Balloon Inflation Volume (mL) 45 9/23/2019  3:01 PM   Reposition other (see comments) 9/24/2019  3:02 AM   Outcome gas expelled, stool evacuated 9/24/2019  3:02 AM   Stool Color Brown 9/22/2019  3:00 PM   Insertion Site Appearance no redness, warmth, tenderness, skin breakdown, drainage 9/24/2019  3:02 AM   Flush/Irrigation flushed w/;water 9/22/2019  3:00 PM   Intake (mL) 45 mL 9/24/2019  6:02 AM   Rectal Tube Output 75 mL 9/24/2019  6:02 AM       Female External Urinary Catheter 09/23/19 0900 (Active)   Skin no redness;no breakdown 9/24/2019  3:02 AM   Tolerance no signs/symptoms of discomfort 9/24/2019  3:02 AM   Suction Continuous suction at 60 mmHg 9/23/2019  7:02 PM   Date of last wick change 09/23/19 9/23/2019  7:02 PM   Time of last wick change 0900 9/23/2019  7:02 PM   Output (mL) 350 mL 9/24/2019  6:02 AM       Neurosurgery Physical Exam   E4VTM5  PERRL  R spontaneously  WD in LLE, paretic in LUE    Significant Labs:  Recent Labs   Lab 09/23/19  0238 09/24/19  0024   * 263*    134*   K 4.7 4.9    101   CO2 24 26   BUN 27* 23    CREATININE 0.9 0.9   CALCIUM 8.4* 8.2*   MG 2.2 1.8     Recent Labs   Lab 09/23/19  0238 09/24/19  0024   WBC 14.65* 22.47*   HGB 11.0* 10.3*   HCT 35.5* 34.1*    254     No results for input(s): LABPT, INR, APTT in the last 48 hours.  Microbiology Results (last 7 days)     Procedure Component Value Units Date/Time    Culture, Respiratory with Gram Stain [[id number]] Collected:  09/22/19 1957    Order Status:  Completed Specimen:  Respiratory from Endotracheal Aspirate Updated:  09/24/19 0720     Respiratory Culture Normal respiratory conine      No S aureus or Pseudomonas isolated.     Gram Stain (Respiratory) Few WBC's     Gram Stain (Respiratory) Few Gram positive cocci    Blood culture [[id number]] Collected:  09/23/19 0241    Order Status:  Completed Specimen:  Blood from Peripheral, Forearm, Right Updated:  09/24/19 0612     Blood Culture, Routine No Growth to date      No Growth to date    Narrative:       Blood cultures from 2 different sites. 4 bottles total.  Please draw before starting antibiotics.    Blood culture [[id number]] Collected:  09/23/19 0241    Order Status:  Completed Specimen:  Blood from Peripheral, Forearm, Left Updated:  09/24/19 0612     Blood Culture, Routine No Growth to date      No Growth to date    Narrative:       Blood cultures x 2 different sites. 4 bottles total. Please  draw cultures before administering antibiotics.    Culture, Respiratory with Gram Stain [[id number]]     Order Status:  Canceled Specimen:  Respiratory             Significant Diagnostics:

## 2019-09-24 NOTE — PLAN OF CARE
09/24/19 1058   Discharge Reassessment   Assessment Type Discharge Planning Reassessment   Provided patient/caregiver education on the expected discharge date and the discharge plan Yes   Do you have any problems affording any of your prescribed medications? No   Discharge Plan A Long-term acute care facility (LTAC)   Discharge Plan B Skilled Nursing Facility

## 2019-09-24 NOTE — PLAN OF CARE
Pt on up to Levo 0.2 mcg/kg/min overnight, this morning down to 0.02 mcg/kg/min. Also with WBC increased to 22k this morning.  Will cancel case today.   Pt will need to be off pressors completely before undergoing this elective procedure.  Please contact our team once she has been off pressors for ideally 24h.

## 2019-09-24 NOTE — PROGRESS NOTES
[facility]  Neurocritical Care  Progress Note    Admit Date: 9/3/2019  Service Date: 09/24/2019  Length of Stay: 21    Subjective:     Chief Complaint: Brain lesion    History of Present Illness: Pt is  73 yo female with a PMHx of CKD 3, HTN, was transferred from [facility] to [facility] for new onset seizures and concern for right front lobe mass. The pt was found in her bedroom by her spouse at approximately 9:45 pm sitting her head against the bed, unresponsive, and having seizure like activity on the left-side. EMT was called and the pt was given Versed twice while en route to the hospital. Pt had a second witnessed seizures, left facial droop, left-sided weakness, and tongue ecchymosis in the ED. Neurology was consulted and The pt was given IV Keppra and Vimpat. MRI brain without contrast was acquired. The image showed right frontal lobe vasogenic edema with mass effect concerning for underlying mass. EEG was acquired at outside hospital concerning showing an abnormal result. The pt was given decadron IV and neurosurgical consult recommended. Pt transferred to [facility] and admitted to the [facility] for higher level of care and further evaluation.     Pt history acquired per chart review and from spouse present at the bedside. The pt's spouse denies prior history of seizures CVA, cancer history and up-to-date screenings    Hospital Course: --admitted to [facility] on 9/3 following new onset seizure, arrived intubated  --MRI with large area of R cortical edema with flair and ill defined enhancement on contrast study, concern for glioma vs infectious/inflammatory process  --LP appears non infectious, cytology pending  9/5- no acute events, awaiting LP finalization from pathology report., weaning vent.   09/06/2019: Very agitated overnight, requiring increased sedation. Patient extubated this morning on rounds, and reintubated shortly after. Unable to maintain airway and secretions. CSF gram stain negative.  09/07/2019: KATT.  EKG obtained, seroquel started. Valproic acid started.  09/08/2019: NAEON. Improved agitation with seroquel.  09/09/2019: MRI Brain w/wo ordered for today. BLE US ordered. Will need new LP later on in the week.   09/10/2019 LP for infx workup, CD4 lab, EEG monitoring per Epilepsy   09/11/2019: To IR for LP. Spoke to NSGY about possible bx of lesion. Discontinue cEEG.   9/13/2019 Issues with low HR and BP overnight and this morning, Given Shoaib bump and atropine. 24 hours of IVF and bolus. WBC decreasing and afebrile. Repeat blood cultures negative. Plan for EGD and FEDERICO today.   9/14/2019 NAEO, EGD and FEDERICO moved until Monday. CSF still pending. Increasing WBC but afebrile. Start tube feeding   9/15/2019 NAEO, all CSF studies negative so far. CT negative. Begin insulin gtt. EGD and FEDERICO tomorrow    9/16 No significant events over night. EGD done this afternoon for esophageal stricture. Esophagus stretched to 15. FEDERICO ordered for tomorrow.   9/17 No significant vents over night NPO for FEDERICO today scheduled for 3pm. Off levophed.  9/18 FEDERICO negative. Tube feeds restarted. NSGY following and considering brain biopsy. Propofol stopped. Precedex started.  9/19: NSGY recommending MRI brain W/WO with stealth for possible brain biopsy as other work up has been negative. Patient continues with hypotensive episodes, on Fentanyl drip and recommend to space out medications.  9/20 no significant events over night. Hypotensive episodes after vimpat dose and seroquel dose requiring a shoaib bump, IVF bolus then  Levophed gtt started to maintain map>65. Spontaneous breathing trial this afternoon  9/21 no significant events over night . Discussions with family daily about trach and peg. Trial of spon breathing off all sedation, pt. Agitated, restless not following commands. Family decided to have trach and peg done . gen surg. Consulted for trach and peg next week. Restarted fentanyl gtt for sedation. DCd EEG, not having seizures  9/22  Hypotensive episode last night required 500cc bolus and neobump x3. Levophed gtt started. Fentanyl gtt off till SBP back up. This am remains restless on fentanyl gtt. Will try versed IVP at lower dose.  Currently on levophed. Trach and peg scheduled for Tuesday. Paraneoplastic panel sent. Febrile over night blood and sputum cultures sent. Will readdress biopsy with NSGY next week.  9/23: likely intravascularly dry, fluid bolus again administered and placed on continuous rate, use prn versed 1mg  q2 hrs prn and resume fentanyl gtt when awakens, could not tolerate 2 mg dose  9/24: Patient required pressors overnight, so postponing PEG & Trach for another 24 Hr, starting haldol PRN for agitation, holding pressors and IVF to meed MAP goals, also midodrine, start SSI.    Review of Systems   Unable to perform ROS: Intubated     Objective:      Vitals:    09/24/19 0842 09/24/19 0905 09/24/19 0913 09/24/19 1005   BP:  (!) 174/104  (!) 165/72   BP Location:  Right leg  Right leg   Patient Position:  Lying  Lying   Pulse: 91 95 91 73   Resp: (!) 33 20 (!) 46 16   Temp:       TempSrc:       SpO2: 100% 100% 100% 100%   Weight:       Height:         Physical Exam   Constitutional:   moderately obese   HENT:   Head: Normocephalic.   Eyes: PERRL  Neck: No JVD present.   Cardiovascular: Regular rhythm.   Pulmonary/Chest: She has wheezes.   Abdominal: Soft. Bowel sounds are normal.   Musculoskeletal: She exhibits no edema.   Neurological:   Left upper>lower extremity weakness   Skin: Capillary refill takes less than 2 seconds.   Nursing note and vitals reviewed.    Assessment/Plan:     Neuro  * Brain lesion  74 year old admitted to unit on 9/3 following new onset seizure, with MRI showing large area of R cortical edema with flair and ill defined enhancement on contrast study, concern for glioma vs infectious/inflammatory process  - 9/22 repeat BC, pending   - CSF cx no growth,   -CSF studies negative- VZV, enterovirus, HSV, MS  -  FEDERICO negative for vegetation     - continue decadron 4mg q6h  - neuro checks/ VS q1hr   -on SubQ heparin   -Repeat brain MRI W/WO with stealth 9/19, showed large  R frontal lobe lesion nonspecific for infectious or neoplastic process; small remote L basal banglia lacunar infarct. No new lesions or infarcts  NSGY, no biopsy at this time, will readdress need for biopsy with NSGY next week.  9/22 paraneoplastic panel sent     New onset seizure  2/2 to brain mass   9/21 EEG dcd. No electrographic seizures for 24h  Vimpat dcd 9/20  Continue Valproic acid 750mg q8h, valproate level 53.7 (9/18)     Pulmonary  On mechanically assisted ventilation  Daily CXR, ABG  Vap protocol  Trial extubation 9/6/2019- re-intubated shortly after extubation for airway protection. Still intubated 2/2 inability to protect airway   9/21Spontaneous breathing trial off sedation, very agitated and restless, not following commands  Family decided on trach and peg instead of extubation.  Gen. Surg consulted for trach and peg, holding, likely 9/25/19        Cardiac/Vascular  Essential hypertension  SBP <180, MAP >60  Requiring levophed gtt to maintain MAPs > 60 while on fentanyl gtt  Echo: EF 65-70% concentric LV remodeling  FEDERICO (9/17) negative for vegetation, ASD, PFO   Cont to hold anti-hypertensives  Pressors stopped 9/24  IVF & midodrine      Renal/  Chronic kidney disease, stage III (moderate)  -PMHx of CKD 3   -monitor renal function, I/Os     Other  Hypotension due to hypovolemia  Pressors stopped 9/24  IVF & midodrine     The patient is being Prophylaxed for:  Venous Thromboembolism with: Chemical  Stress Ulcer with: PPI  Ventilator Pneumonia with: chlorhexidine oral care      Activity Orders          Diet NPO Except for: Medication: NPO starting at 09/24 0001        Full Code    Caden Crouch MD  Neurocritical Care  [facility]

## 2019-09-24 NOTE — PLAN OF CARE
POC reviewed with pt and family at 1700. Pt unable to verbalize understanding due to intubation; family verbalized understanding. All questions and concerns addressed. Trach/PEG cancelled today due to patient's pressor requirements. Pt remains on Fentanyl gtt at 200 mcg/hr. LR increased to 150 ml/hr. Pt's versed discontinued due to hypotension and Haldol ordered PRN. Will continue to monitor. See flowsheets for full assessment and VS info.

## 2019-09-25 ENCOUNTER — ANESTHESIA EVENT (OUTPATIENT)
Dept: SURGERY | Facility: HOSPITAL | Age: 74
DRG: 004 | End: 2019-09-25
Payer: MEDICARE

## 2019-09-25 LAB
ABO + RH BLD: NORMAL
ALBUMIN SERPL BCP-MCNC: 2.5 G/DL (ref 3.5–5.2)
ALLENS TEST: ABNORMAL
ALP SERPL-CCNC: 48 U/L (ref 55–135)
ALT SERPL W/O P-5'-P-CCNC: 113 U/L (ref 10–44)
ANION GAP SERPL CALC-SCNC: 9 MMOL/L (ref 8–16)
APTT BLDCRRT: <21 SEC (ref 21–32)
AST SERPL-CCNC: 34 U/L (ref 10–40)
BASOPHILS # BLD AUTO: 0.01 K/UL (ref 0–0.2)
BASOPHILS NFR BLD: 0.1 % (ref 0–1.9)
BILIRUB SERPL-MCNC: 0.2 MG/DL (ref 0.1–1)
BLD GP AB SCN CELLS X3 SERPL QL: NORMAL
BUN SERPL-MCNC: 22 MG/DL (ref 8–23)
CALCIUM SERPL-MCNC: 8.3 MG/DL (ref 8.7–10.5)
CHLORIDE SERPL-SCNC: 100 MMOL/L (ref 95–110)
CO2 SERPL-SCNC: 26 MMOL/L (ref 23–29)
CORTIS SERPL-MCNC: <1 UG/DL
CREAT SERPL-MCNC: 0.9 MG/DL (ref 0.5–1.4)
DELSYS: ABNORMAL
DIFFERENTIAL METHOD: ABNORMAL
EOSINOPHIL # BLD AUTO: 0 K/UL (ref 0–0.5)
EOSINOPHIL NFR BLD: 0 % (ref 0–8)
ERYTHROCYTE [DISTWIDTH] IN BLOOD BY AUTOMATED COUNT: 15.7 % (ref 11.5–14.5)
ERYTHROCYTE [SEDIMENTATION RATE] IN BLOOD BY WESTERGREN METHOD: 16 MM/H
EST. GFR  (AFRICAN AMERICAN): >60 ML/MIN/1.73 M^2
EST. GFR  (NON AFRICAN AMERICAN): >60 ML/MIN/1.73 M^2
FIO2: 40
GLUCOSE SERPL-MCNC: 148 MG/DL (ref 70–110)
HCO3 UR-SCNC: 29.9 MMOL/L (ref 24–28)
HCT VFR BLD AUTO: 29.8 % (ref 37–48.5)
HGB BLD-MCNC: 9.4 G/DL (ref 12–16)
IMM GRANULOCYTES # BLD AUTO: 0.07 K/UL (ref 0–0.04)
IMM GRANULOCYTES NFR BLD AUTO: 0.8 % (ref 0–0.5)
INR PPP: 1 (ref 0.8–1.2)
LYMPHOCYTES # BLD AUTO: 0.5 K/UL (ref 1–4.8)
LYMPHOCYTES NFR BLD: 5.5 % (ref 18–48)
MAGNESIUM SERPL-MCNC: 1.8 MG/DL (ref 1.6–2.6)
MCH RBC QN AUTO: 29.6 PG (ref 27–31)
MCHC RBC AUTO-ENTMCNC: 31.5 G/DL (ref 32–36)
MCV RBC AUTO: 94 FL (ref 82–98)
MIN VOL: 8.36
MODE: ABNORMAL
MONOCYTES # BLD AUTO: 1.4 K/UL (ref 0.3–1)
MONOCYTES NFR BLD: 15 % (ref 4–15)
NEUTROPHILS # BLD AUTO: 7.3 K/UL (ref 1.8–7.7)
NEUTROPHILS NFR BLD: 78.6 % (ref 38–73)
NRBC BLD-RTO: 0 /100 WBC
PCO2 BLDA: 36.9 MMHG (ref 35–45)
PEEP: 7
PH SMN: 7.52 [PH] (ref 7.35–7.45)
PHOSPHATE SERPL-MCNC: 3.5 MG/DL (ref 2.7–4.5)
PIP: 31
PLATELET # BLD AUTO: 148 K/UL (ref 150–350)
PMV BLD AUTO: 9.8 FL (ref 9.2–12.9)
PO2 BLDA: 151 MMHG (ref 80–100)
POC BE: 7 MMOL/L
POC SATURATED O2: 100 % (ref 95–100)
POC TCO2: 31 MMOL/L (ref 23–27)
POCT GLUCOSE: 113 MG/DL (ref 70–110)
POCT GLUCOSE: 156 MG/DL (ref 70–110)
POCT GLUCOSE: 159 MG/DL (ref 70–110)
POCT GLUCOSE: 190 MG/DL (ref 70–110)
POTASSIUM SERPL-SCNC: 4.9 MMOL/L (ref 3.5–5.1)
POTASSIUM SERPL-SCNC: 5.5 MMOL/L (ref 3.5–5.1)
PROT SERPL-MCNC: 5.5 G/DL (ref 6–8.4)
PROTHROMBIN TIME: 10.4 SEC (ref 9–12.5)
RBC # BLD AUTO: 3.18 M/UL (ref 4–5.4)
SAMPLE: ABNORMAL
SITE: ABNORMAL
SODIUM SERPL-SCNC: 135 MMOL/L (ref 136–145)
SP02: 100
VT: 380
WBC # BLD AUTO: 9.29 K/UL (ref 3.9–12.7)

## 2019-09-25 PROCEDURE — 25000003 PHARM REV CODE 250: Performed by: STUDENT IN AN ORGANIZED HEALTH CARE EDUCATION/TRAINING PROGRAM

## 2019-09-25 PROCEDURE — 63600175 PHARM REV CODE 636 W HCPCS: Performed by: PSYCHIATRY & NEUROLOGY

## 2019-09-25 PROCEDURE — 83735 ASSAY OF MAGNESIUM: CPT

## 2019-09-25 PROCEDURE — 85025 COMPLETE CBC W/AUTO DIFF WBC: CPT

## 2019-09-25 PROCEDURE — 27200966 HC CLOSED SUCTION SYSTEM

## 2019-09-25 PROCEDURE — 25000003 PHARM REV CODE 250: Performed by: PSYCHIATRY & NEUROLOGY

## 2019-09-25 PROCEDURE — 99900035 HC TECH TIME PER 15 MIN (STAT)

## 2019-09-25 PROCEDURE — 25000003 PHARM REV CODE 250: Performed by: NURSE PRACTITIONER

## 2019-09-25 PROCEDURE — 99232 SBSQ HOSP IP/OBS MODERATE 35: CPT | Mod: ,,, | Performed by: NEUROLOGICAL SURGERY

## 2019-09-25 PROCEDURE — 27000221 HC OXYGEN, UP TO 24 HOURS

## 2019-09-25 PROCEDURE — 20000000 HC ICU ROOM

## 2019-09-25 PROCEDURE — 85730 THROMBOPLASTIN TIME PARTIAL: CPT

## 2019-09-25 PROCEDURE — 84132 ASSAY OF SERUM POTASSIUM: CPT

## 2019-09-25 PROCEDURE — 99233 SBSQ HOSP IP/OBS HIGH 50: CPT | Mod: GC,,, | Performed by: PSYCHIATRY & NEUROLOGY

## 2019-09-25 PROCEDURE — 25000003 PHARM REV CODE 250: Performed by: INTERNAL MEDICINE

## 2019-09-25 PROCEDURE — 63600175 PHARM REV CODE 636 W HCPCS: Performed by: INTERNAL MEDICINE

## 2019-09-25 PROCEDURE — 84100 ASSAY OF PHOSPHORUS: CPT

## 2019-09-25 PROCEDURE — 86850 RBC ANTIBODY SCREEN: CPT

## 2019-09-25 PROCEDURE — 94003 VENT MGMT INPAT SUBQ DAY: CPT

## 2019-09-25 PROCEDURE — 99900026 HC AIRWAY MAINTENANCE (STAT)

## 2019-09-25 PROCEDURE — 85610 PROTHROMBIN TIME: CPT

## 2019-09-25 PROCEDURE — 94761 N-INVAS EAR/PLS OXIMETRY MLT: CPT

## 2019-09-25 PROCEDURE — 99233 PR SUBSEQUENT HOSPITAL CARE,LEVL III: ICD-10-PCS | Mod: GC,,, | Performed by: PSYCHIATRY & NEUROLOGY

## 2019-09-25 PROCEDURE — 80053 COMPREHEN METABOLIC PANEL: CPT

## 2019-09-25 PROCEDURE — 82533 TOTAL CORTISOL: CPT

## 2019-09-25 PROCEDURE — 99232 PR SUBSEQUENT HOSPITAL CARE,LEVL II: ICD-10-PCS | Mod: ,,, | Performed by: NEUROLOGICAL SURGERY

## 2019-09-25 RX ORDER — HALOPERIDOL 5 MG/ML
5 INJECTION INTRAMUSCULAR EVERY 6 HOURS PRN
Status: DISCONTINUED | OUTPATIENT
Start: 2019-09-25 | End: 2019-09-25

## 2019-09-25 RX ORDER — FENTANYL CITRATE-0.9 % NACL/PF 10 MCG/ML
25 PLASTIC BAG, INJECTION (ML) INTRAVENOUS CONTINUOUS
Status: DISCONTINUED | OUTPATIENT
Start: 2019-09-25 | End: 2019-10-02

## 2019-09-25 RX ORDER — FENTANYL CITRATE-0.9 % NACL/PF 10 MCG/ML
25 PLASTIC BAG, INJECTION (ML) INTRAVENOUS CONTINUOUS
Status: DISCONTINUED | OUTPATIENT
Start: 2019-09-25 | End: 2019-09-25

## 2019-09-25 RX ORDER — MIDODRINE HYDROCHLORIDE 5 MG/1
5 TABLET ORAL
Status: DISCONTINUED | OUTPATIENT
Start: 2019-09-25 | End: 2019-09-27

## 2019-09-25 RX ORDER — HALOPERIDOL 5 MG/ML
10 INJECTION INTRAMUSCULAR EVERY 6 HOURS PRN
Status: DISCONTINUED | OUTPATIENT
Start: 2019-09-25 | End: 2019-09-30

## 2019-09-25 RX ADMIN — HALOPERIDOL LACTATE 5 MG: 5 INJECTION, SOLUTION INTRAMUSCULAR at 10:09

## 2019-09-25 RX ADMIN — DEXAMETHASONE SODIUM PHOSPHATE 4 MG: 4 INJECTION, SOLUTION INTRAMUSCULAR; INTRAVENOUS at 05:09

## 2019-09-25 RX ADMIN — VALPROIC ACID 750 MG: 250 SOLUTION ORAL at 01:09

## 2019-09-25 RX ADMIN — VALPROIC ACID 750 MG: 250 SOLUTION ORAL at 10:09

## 2019-09-25 RX ADMIN — Medication 225 MCG/HR: at 12:09

## 2019-09-25 RX ADMIN — MIDODRINE HYDROCHLORIDE 5 MG: 5 TABLET ORAL at 01:09

## 2019-09-25 RX ADMIN — DEXAMETHASONE SODIUM PHOSPHATE 4 MG: 4 INJECTION, SOLUTION INTRAMUSCULAR; INTRAVENOUS at 11:09

## 2019-09-25 RX ADMIN — MIDODRINE HYDROCHLORIDE 5 MG: 5 TABLET ORAL at 08:09

## 2019-09-25 RX ADMIN — VALPROIC ACID 750 MG: 250 SOLUTION ORAL at 06:09

## 2019-09-25 RX ADMIN — Medication 800 MG: at 03:09

## 2019-09-25 RX ADMIN — INSULIN ASPART 2 UNITS: 100 INJECTION, SOLUTION INTRAVENOUS; SUBCUTANEOUS at 08:09

## 2019-09-25 RX ADMIN — HALOPERIDOL LACTATE 10 MG: 5 INJECTION, SOLUTION INTRAMUSCULAR at 03:09

## 2019-09-25 RX ADMIN — LEVOTHYROXINE SODIUM 75 MCG: 75 TABLET ORAL at 05:09

## 2019-09-25 RX ADMIN — MIDODRINE HYDROCHLORIDE 10 MG: 5 TABLET ORAL at 03:09

## 2019-09-25 RX ADMIN — DEXAMETHASONE SODIUM PHOSPHATE 4 MG: 4 INJECTION, SOLUTION INTRAMUSCULAR; INTRAVENOUS at 01:09

## 2019-09-25 RX ADMIN — DEXAMETHASONE SODIUM PHOSPHATE 4 MG: 4 INJECTION, SOLUTION INTRAMUSCULAR; INTRAVENOUS at 06:09

## 2019-09-25 RX ADMIN — HALOPERIDOL LACTATE 5 MG: 5 INJECTION, SOLUTION INTRAMUSCULAR at 08:09

## 2019-09-25 RX ADMIN — HALOPERIDOL LACTATE 5 MG: 5 INJECTION, SOLUTION INTRAMUSCULAR at 01:09

## 2019-09-25 RX ADMIN — Medication 300 MCG/HR: at 08:09

## 2019-09-25 RX ADMIN — FAMOTIDINE 20 MG: 20 TABLET ORAL at 08:09

## 2019-09-25 RX ADMIN — INSULIN ASPART 1 UNITS: 100 INJECTION, SOLUTION INTRAVENOUS; SUBCUTANEOUS at 03:09

## 2019-09-25 RX ADMIN — CEFTRIAXONE SODIUM 2 G: 2 INJECTION, SOLUTION INTRAVENOUS at 01:09

## 2019-09-25 NOTE — PROGRESS NOTES
[facility]  Neurocritical Care  Progress Note    Admit Date: 9/3/2019  Service Date: 09/25/2019  Length of Stay: 22    Subjective:     Chief Complaint: Brain lesion    History of Present Illness: Pt is  73 yo female with a PMHx of CKD 3, HTN, [place] [facility] [place] [facility] [place] and concern for right front lobe mass. The pt was found in her bedroom by her spouse at approximately 9:45 pm sitting her head against the bed, unresponsive, and having seizure like activity on the left-side. EMT was called and the pt was given Versed twice while en route to the hospital. Pt had a second witnessed seizures, left facial droop, left-sided weakness, and tongue ecchymosis in the ED. Neurology was consulted and The pt was given IV Keppra and Vimpat. MRI brain without contrast was acquired. The image showed right frontal lobe vasogenic edema with mass effect concerning for underlying mass. EEG was acquired at outside hospital concerning showing an abnormal result. The pt was given decadron IV and neurosurgical consult recommended. [place] [facility] [place] to the [facility] for higher level of care and further evaluation.     Pt history acquired per chart review and from spouse present at the bedside. The pt's spouse denies prior history of seizures CVA, cancer history and up-to-date screenings    Hospital Course: --admitted to [facility] on 9/3 following new onset seizure, arrived intubated  --MRI with large area of R cortical edema with flair and ill defined enhancement on contrast study, concern for glioma vs infectious/inflammatory process  --LP appears non infectious, cytology pending  9/5- no acute events, awaiting LP finalization from pathology report., weaning vent.   09/06/2019: Very agitated overnight, requiring increased sedation. Patient extubated this morning on rounds, and reintubated shortly after. Unable to maintain airway and secretions. CSF gram stain negative.  09/07/2019: KATT.  EKG obtained, seroquel started. Valproic acid started.  09/08/2019: NAEON. Improved agitation with seroquel.  09/09/2019: MRI Brain w/wo ordered for today. BLE US ordered. Will need new LP later on in the week.   09/10/2019 LP for infx workup, CD4 lab, EEG monitoring per Epilepsy   09/11/2019: To IR for LP. Spoke to NSGY about possible bx of lesion. Discontinue cEEG.   9/13/2019 Issues with low HR and BP overnight and this morning, Given Shoaib bump and atropine. 24 hours of IVF and bolus. WBC decreasing and afebrile. Repeat blood cultures negative. Plan for EGD and FEDERICO today.   9/14/2019 NAEO, EGD and FEDERICO moved until Monday. CSF still pending. Increasing WBC but afebrile. Start tube feeding   9/15/2019 NAEO, all CSF studies negative so far. CT negative. Begin insulin gtt. EGD and FEDERICO tomorrow    9/16 No significant events over night. EGD done this afternoon for esophageal stricture. Esophagus stretched to 15. FEDERICO ordered for tomorrow.   9/17 No significant vents over night NPO for FEDERICO today scheduled for 3pm. Off levophed.  9/18 FEDERICO negative. Tube feeds restarted. NSGY following and considering brain biopsy. Propofol stopped. Precedex started.  9/19: NSGY recommending MRI brain W/WO with stealth for possible brain biopsy as other work up has been negative. Patient continues with hypotensive episodes, on Fentanyl drip and recommend to space out medications.  9/20 no significant events over night. Hypotensive episodes after vimpat dose and seroquel dose requiring a shoaib bump, IVF bolus then  Levophed gtt started to maintain map>65. Spontaneous breathing trial this afternoon  9/21 no significant events over night . Discussions with family daily about trach and peg. Trial of spon breathing off all sedation, pt. Agitated, restless not following commands. Family decided to have trach and peg done . gen surg. Consulted for trach and peg next week. Restarted fentanyl gtt for sedation. DCd EEG, not having seizures  9/22  Hypotensive episode last night required 500cc bolus and neobump x3. Levophed gtt started. Fentanyl gtt off till SBP back up. This am remains restless on fentanyl gtt. Will try versed IVP at lower dose.  Currently on levophed. Trach and peg scheduled for Tuesday. Paraneoplastic panel sent. Febrile over night blood and sputum cultures sent. Will readdress biopsy with NSGY next week.  9/23: likely intravascularly dry, fluid bolus again administered and placed on continuous rate, use prn versed 1mg  q2 hrs prn and resume fentanyl gtt when awakens, could not tolerate 2 mg dose  9/24: Patient required pressors overnight, so postponing PEG & Trach for another 24 Hr, starting haldol PRN for agitation, holding pressors and IVF to meed MAP goals, also midodrine, start SSI.  9/25: Patient BP not requiring pressors, decrease midodrine to 5, WBC 9 this AM, Pending PEG/Trach, per conversation with surgery likely tomorrow, hold tube feeds midnight, Pending NSGY second opinion.     Review of Systems   Unable to perform ROS: Intubated     Objective:      Vitals:    09/25/19 0800 09/25/19 0844 09/25/19 0900 09/25/19 0916   BP: (!) 160/68 (!) 132/58 (!) 167/73    BP Location:       Patient Position:       Pulse: 97 76 80 104   Resp: 15  (!) 0 (!) 30   Temp:       TempSrc:       SpO2: 100% 100% 100% 100%   Weight:       Height:         Physical Exam   Constitutional:   moderately obese   HENT:   Head: Normocephalic.   Eyes: PERRL  Neck: No JVD present.   Cardiovascular: Regular rhythm.   Pulmonary/Chest: She has wheezes.   Abdominal: Soft. Bowel sounds are normal.   Musculoskeletal: She exhibits no edema.   Neurological:   Left upper>lower extremity weakness   Skin: Capillary refill takes less than 2 seconds.   Nursing note and vitals reviewed.    Assessment/Plan:     Neuro  * Brain lesion  74 year old admitted to unit on 9/3 following new onset seizure, with MRI showing large area of R cortical edema with flair and ill defined  enhancement on contrast study, concern for glioma vs infectious/inflammatory process  - 9/22 repeat BC, pending   - CSF cx no growth,   -CSF studies negative- VZV, enterovirus, HSV, MS  - FEDERICO negative for vegetation     - continue decadron 4mg q6h  - neuro checks/ VS q1hr   - on SubQ heparin   - Repeat brain MRI W/WO with stealth 9/19, showed large  R frontal lobe lesion nonspecific for infectious or neoplastic process; small remote L basal banglia lacunar infarct. No new lesions or infarcts  NSGY, no biopsy at this time, will readdress need for biopsy with NSGY next week.  9/22 paraneoplastic panel sent     New onset seizure  2/2 to brain mass   9/21 EEG dcd. No electrographic seizures for 24h  Vimpat dcd 9/20  Continue Valproic acid 750mg q8h, valproate level 53.7 (9/18)     Pulmonary  On mechanically assisted ventilation  Daily CXR, ABG  Vap protocol  Trial extubation 9/6/2019- re-intubated shortly after extubation for airway protection. Still intubated 2/2 inability to protect airway   9/21Spontaneous breathing trial off sedation, very agitated and restless, not following commands  Family decided on trach and peg instead of extubation.  Gen. Surg consulted for trach and peg,likely 9/26/19        Cardiac/Vascular  Essential hypertension  SBP <180, MAP >60  Requiring levophed gtt to maintain MAPs > 60 while on fentanyl gtt  Echo: EF 65-70% concentric LV remodeling  FEDERICO (9/17) negative for vegetation, ASD, PFO   Cont to hold anti-hypertensives  Pressors stopped 9/24  IVF & midodrine      Renal/  Chronic kidney disease, stage III (moderate)  -PMHx of CKD 3   -monitor renal function, I/Os     Other  Hypotension due to hypovolemia  Pressors stopped 9/24  IVF & midodrine     The patient is being Prophylaxed for:  Venous Thromboembolism with: Chemical  Stress Ulcer with: PPI  Ventilator Pneumonia with: chlorhexidine oral care      Activity Orders          Diet NPO Except for: Medication: NPO starting at 09/24 0001         Full Code    Caden Crouch MD  Neurocritical Care  [facility]

## 2019-09-25 NOTE — ANESTHESIA PREPROCEDURE EVALUATION
[facility]  Anesthesia Pre-Operative Evaluation         Patient Name: Clemencia Nguyen  YOB: 1945  MRN: [id number]    SUBJECTIVE:     Pre-operative evaluation for Procedure(s) (LRB):  CREATION, TRACHEOSTOMY open (N/A)  EGD, WITH PEG TUBE INSERTION (N/A)     09/25/2019    Clemencia Nguyen is a 74 y.o. female w/ a significant PMHx of CKD 3 and HTN. She was transferred from [facility] to [facility] for new onset seizures. Imaging showed right frontal lobe vasogenic edema with mass effect concerning for underlying mass.    **Currently Intubated    Patient now presents for the above procedure(s).      LDA:        PICC Double Lumen 09/14/19 1136 right brachial (Active)   Site Assessment Clean;Dry;Intact;No redness;No swelling 9/25/2019 11:00 AM   Lumen 1 Status Flushed 9/25/2019 11:00 AM   Lumen 2 Status Flushed 9/25/2019 11:00 AM   Length melissa (cm) 31 cm 9/14/2019 11:35 AM   Current Exposed Catheter (cm) 0 cm 9/14/2019 11:35 AM   Extremity Circumference (cm) 31 cm 9/14/2019 11:35 AM   Dressing Type Transparent 9/25/2019 11:00 AM   Dressing Status Biopatch in place;Clean;Dry;Intact 9/25/2019 11:00 AM   Dressing Intervention Dressing reinforced 9/25/2019 11:00 AM   Dressing Change Due 09/28/19 9/25/2019 11:00 AM   Daily Line Review Performed 9/25/2019 11:00 AM   Number of days: 11            NG/OG Tube 09/16/19 1805 orogastric Center mouth (Active)   Placement Check placement verified by aspirate characteristics;placement verified by distal tube length measurement 9/25/2019 11:00 AM   Tolerance no signs/symptoms of discomfort 9/25/2019 11:00 AM   Securement secured to commercial device 9/25/2019 11:00 AM   Clamp Status/Tolerance clamped 9/25/2019 11:00 AM   Insertion Site Appearance no redness, warmth, tenderness, skin breakdown, drainage 9/25/2019 11:00 AM   Drainage None 9/25/2019 11:00 AM   Flush/Irrigation flushed w/;water;no resistance met 9/25/2019 11:00 AM   Feeding  Method continuous 9/25/2019  3:05 AM   Feeding Action feeding held 9/25/2019 11:00 AM   Current Rate (mL/hr) 0 mL/hr 9/25/2019 12:05 AM   Goal Rate (mL/hr) 45 mL/hr 9/24/2019  7:05 PM   Intake (mL) 60 mL 9/23/2019  6:05 AM   Rate Formula Tube Feeding (mL/hr) 0 mL/hr 9/25/2019 12:05 AM   Formula Name Devorah 9/25/2019  3:05 AM   Intake (mL) - Formula Tube Feeding 0 9/25/2019 12:05 AM   Residual Amount (ml) 0 ml 9/23/2019  7:01 AM   Number of days: 8            Rectal Tube 09/15/19 0900 rectal tube w/ balloon (indicate number of mLs) (Active)   Balloon Inflation Volume (mL) 45 9/25/2019 11:00 AM   Reposition drainage bags for BMS & Bazzi on opposite sides of bed 9/25/2019 11:00 AM   Outcome gas expelled, stool evacuated 9/25/2019 11:00 AM   Stool Color Brown 9/25/2019 11:00 AM   Insertion Site Appearance no redness, warmth, tenderness, skin breakdown, drainage 9/25/2019 11:00 AM   Flush/Irrigation flushed w/;water;no resistance met 9/25/2019 11:00 AM   Intake (mL) 45 mL 9/25/2019  5:05 AM   Rectal Tube Output 250 mL 9/25/2019  5:05 AM   Number of days: 10       Female External Urinary Catheter 09/23/19 0900 (Active)   Skin no redness;no breakdown 9/25/2019 11:00 AM   Tolerance no signs/symptoms of discomfort 9/25/2019 11:00 AM   Suction Continuous suction at 50 mmHg 9/25/2019  7:01 AM   Date of last wick change 09/24/19 9/24/2019 11:05 PM   Time of last wick change 2305 9/24/2019 11:05 PM   Output (mL) 250 mL 9/25/2019  5:05 AM   Number of days: 2       Prev airway: Currently Intubated    Placement Date: 09/06/19; Placement Time: 0915; Method of Intubation: Direct laryngoscopy; Inserted by: MD; Staff/Resident Names: MILLA Gamboa MD; Airway Device: Endotracheal Tube; Mask Ventilation: Easy; Blade: Esequiel #3; Airway Device Size: 7.5; Style: Cuffed; Placement Verified By: Chest X-ray, Auscultation, Colorimetric EtCO2 device; Grade: Grade I; Intubation Findings: Positive EtCO2, Bilateral breath sounds;  Depth of  Insertion: 23    Drips:    fentanyl 225 mcg/hr (09/25/19 1115)       Patient Active Problem List   Diagnosis    Osteopenia    Hypothyroidism    Mixed hyperlipidemia    Mood disorder    Essential hypertension    Chronic kidney disease, stage III (moderate)    Gastroesophageal reflux disease    Pseudophakia of both eyes    Hyperlipidemia    Mild mood disorder    Acute cholecystitis    New onset seizure    Brain lesion    Jeremy's paralysis (postepileptic)    Vasogenic cerebral edema    Status epilepticus    Cerebral edema    Alteration in skin integrity    On mechanically assisted ventilation    Leukocytosis    Fever    Acute respiratory failure with hypoxia and hypercapnia    Bacteremia    Hypotension due to hypovolemia       Review of patient's allergies indicates:  No Known Allergies    Current Inpatient Medications:   cefTRIAXone (ROCEPHIN) IVPB  2 g Intravenous Q12H    dexamethasone  4 mg Intravenous Q6H    famotidine  20 mg Per OG tube QHS    heparin (porcine)  5,000 Units Subcutaneous Q8H    levothyroxine  75 mcg Per OG tube Before breakfast    midodrine  5 mg Per NG tube Q8H    valproic acid (as sodium salt)  750 mg Per OG tube Q8H       No current facility-administered medications on file prior to encounter.      No current outpatient medications on file prior to encounter.       Past Surgical History:   Procedure Laterality Date    cataract surgery      CHOLECYSTECTOMY  04/24/2018    COLONOSCOPY  2011, again in 2014    repeat in 5    COLONOSCOPY W/ BIOPSIES  06/12/2019    polypectomies per Dr. Garcia    ESOPHAGOGASTRODUODENOSCOPY  06/12/2019    Dr. Garcia    ESOPHAGOGASTRODUODENOSCOPY N/A 9/16/2019    Procedure: EGD (ESOPHAGOGASTRODUODENOSCOPY);  Surgeon: Chas Castillo MD;  Location: [address];  Service: Endoscopy;  Laterality: N/A;    ESOPHAGOGASTRODUODENOSCOPY W/ PEG N/A 9/24/2019    Procedure: EGD, WITH PEG TUBE INSERTION;  Surgeon: Maria Luisa Piña  MD;  Location: [facility] OR [address] [place] [address];  Service: General;  Laterality: N/A;    HYSTERECTOMY      OOPHORECTOMY      TONSILLECTOMY      TRACHEOSTOMY N/A 9/24/2019    Procedure: CREATION, TRACHEOSTOMY;  Surgeon: Maria Luisa Piña MD;  Location: [facility] OR [address] [place] [address];  Service: General;  Laterality: N/A;       Social History     Socioeconomic History    Marital status:      Spouse name: Not on file    Number of children: 3    Years of education: Not on file    Highest education level: Not on file   Occupational History    Not on file   Social Needs    Financial resource strain: Not very hard    Food insecurity:     Worry: Never true     Inability: Never true    Transportation needs:     Medical: No     Non-medical: No   Tobacco Use    Smoking status: Never Smoker    Smokeless tobacco: Never Used   Substance and Sexual Activity    Alcohol use: No     Frequency: Never     Binge frequency: Never    Drug use: No    Sexual activity: Not Currently     Partners: Male     Birth control/protection: Surgical   Lifestyle    Physical activity:     Days per week: 4 days     Minutes per session: 60 min    Stress: Not at all   Relationships    Social connections:     Talks on phone: More than three times a week     Gets together: Twice a week     Attends Taoist service: Not on file     Active member of club or organization: Yes     Attends meetings of clubs or organizations: More than 4 times per year     Relationship status:    Other Topics Concern    Not on file   Social History Narrative    Not on file       OBJECTIVE:     Vital Signs Range (Last 24H):  Temp:  [36.6 °C (97.9 °F)-37.5 °C (99.5 °F)]   Pulse:  []   Resp:  [0-43]   BP: ()/()   SpO2:  [99 %-100 %]       Significant Labs:  Lab Results   Component Value Date    WBC 9.29 09/25/2019    HGB 9.4 (L) 09/25/2019    HCT 29.8 (L) 09/25/2019     (L) 09/25/2019    CHOL 162 12/05/2018    TRIG 228 (H) 09/15/2019    HDL 34 (L)  12/05/2018     (H) 09/25/2019    AST 34 09/25/2019     (L) 09/25/2019    K 4.9 09/25/2019     09/25/2019    CREATININE 0.9 09/25/2019    BUN 22 09/25/2019    CO2 26 09/25/2019    TSH 0.471 09/22/2019    INR 0.9 09/07/2019    HGBA1C 5.7 (H) 09/02/2019       Diagnostic Studies: No relevant studies.    EKG: No recent studies available.    2D ECHO:  No results found for this or any previous visit.      ASSESSMENT/PLAN:       Anesthesia Evaluation    I have reviewed the Patient Summary Reports.    I have reviewed the Nursing Notes.   I have reviewed the Medications.     Review of Systems  Anesthesia Hx:  No problems with previous Anesthesia  History of prior surgery of interest to airway management or planning: Previous anesthesia: General Denies Family Hx of Anesthesia complications.   Denies Personal Hx of Anesthesia complications.   Social:  Non-Smoker, No Alcohol Use    Cardiovascular:   Hypertension    Renal/:   Chronic Renal Disease    Hepatic/GI:   GERD    Neurological:   Seizures        Physical Exam  General:  Well nourished, Obesity    Airway/Jaw/Neck:  Airway Findings: Pre-Existing Airway Tube(s): Oral Endotracheal tube General Airway Assessment: Adult  TM Distance: 4 - 6 cm       Chest/Lungs:  Chest/Lungs Findings: Rhonchi, Rales, Basilar     Heart/Vascular:  Heart Findings: Rate: Normal  Rhythm: Regular Rhythm  Sounds: Normal  Vascular Findings:  Vascular Access: Peripheral IV(s), PICC Line  Edema Locations: RLE, LLE, LUE  Edema: +1 or +2     Abdomen:  Abdomen Findings:  Soft, Distention     Musculoskeletal:  Musculoskeletal Findings:    Skin:  Skin Findings:  Excoriations     Mental Status:  Mental Status Findings:  Sedated, opens eyes, withdrawals to painful stimuli on right side       Anesthesia Plan  Type of Anesthesia, risks & benefits discussed:  Anesthesia Type:  general  Patient's Preference:   Intra-op Monitoring Plan: standard ASA monitors  Intra-op Monitoring Plan Comments:    Post Op Pain Control Plan: multimodal analgesia, IV/PO Opioids PRN and per primary service following discharge from PACU  Post Op Pain Control Plan Comments:   Induction:   IV  Beta Blocker:  Patient is not currently on a Beta-Blocker (No further documentation required).       Informed Consent: Patient representative understands risks and agrees with Anesthesia plan.  Questions answered. Anesthesia consent signed with patient representative.  ASA Score: 4     Day of Surgery Review of History & Physical:    H&P update referred to the surgeon.         Ready For Surgery From Anesthesia Perspective.

## 2019-09-25 NOTE — PLAN OF CARE
POC reviewed with pt and  at 0500. Pt  verbalized understanding. Pt unable to verbalize understanding due to cognitive state. Mg replaced. Haldol given x2 for agitation per order. Pt NPO at midnight for possible PEG/trach today. Questions and concerns addressed. No acute events overnight. Pt progressing toward goals. Will continue to monitor. See flowsheets for full assessment and VS info

## 2019-09-25 NOTE — PLAN OF CARE
Pt seen and examined. Off pressors since yesterday morning. Currently on Fi02 40% and PEEP 7. WBC wnl.     Will plan on trach and PEG tomorrow 9/26/19.  Discussed with , at bedside.  Consent obtained and in chart.  Please continue to hold tube feeds at midnight (they have been off today).

## 2019-09-26 ENCOUNTER — ANESTHESIA (OUTPATIENT)
Dept: SURGERY | Facility: HOSPITAL | Age: 74
DRG: 004 | End: 2019-09-26
Payer: MEDICARE

## 2019-09-26 LAB
ALBUMIN SERPL BCP-MCNC: 2.3 G/DL (ref 3.5–5.2)
ALLENS TEST: ABNORMAL
ALP SERPL-CCNC: 39 U/L (ref 55–135)
ALT SERPL W/O P-5'-P-CCNC: 72 U/L (ref 10–44)
ANION GAP SERPL CALC-SCNC: 8 MMOL/L (ref 8–16)
AST SERPL-CCNC: 17 U/L (ref 10–40)
BASOPHILS # BLD AUTO: 0 K/UL (ref 0–0.2)
BASOPHILS NFR BLD: 0 % (ref 0–1.9)
BILIRUB SERPL-MCNC: 0.2 MG/DL (ref 0.1–1)
BUN SERPL-MCNC: 20 MG/DL (ref 8–23)
CALCIUM SERPL-MCNC: 7.9 MG/DL (ref 8.7–10.5)
CHLORIDE SERPL-SCNC: 101 MMOL/L (ref 95–110)
CO2 SERPL-SCNC: 27 MMOL/L (ref 23–29)
CREAT SERPL-MCNC: 0.8 MG/DL (ref 0.5–1.4)
DELSYS: ABNORMAL
DIFFERENTIAL METHOD: ABNORMAL
EOSINOPHIL # BLD AUTO: 0 K/UL (ref 0–0.5)
EOSINOPHIL NFR BLD: 0 % (ref 0–8)
ERYTHROCYTE [DISTWIDTH] IN BLOOD BY AUTOMATED COUNT: 15.7 % (ref 11.5–14.5)
ERYTHROCYTE [SEDIMENTATION RATE] IN BLOOD BY WESTERGREN METHOD: 380 MM/H
EST. GFR  (AFRICAN AMERICAN): >60 ML/MIN/1.73 M^2
EST. GFR  (NON AFRICAN AMERICAN): >60 ML/MIN/1.73 M^2
FIO2: 40
GLUCOSE SERPL-MCNC: 115 MG/DL (ref 70–110)
HCO3 UR-SCNC: 32 MMOL/L (ref 24–28)
HCT VFR BLD AUTO: 26.5 % (ref 37–48.5)
HGB BLD-MCNC: 7.9 G/DL (ref 12–16)
IMM GRANULOCYTES # BLD AUTO: 0.03 K/UL (ref 0–0.04)
IMM GRANULOCYTES NFR BLD AUTO: 0.6 % (ref 0–0.5)
LYMPHOCYTES # BLD AUTO: 0.4 K/UL (ref 1–4.8)
LYMPHOCYTES NFR BLD: 7.4 % (ref 18–48)
MAGNESIUM SERPL-MCNC: 2 MG/DL (ref 1.6–2.6)
MCH RBC QN AUTO: 29.6 PG (ref 27–31)
MCHC RBC AUTO-ENTMCNC: 29.8 G/DL (ref 32–36)
MCV RBC AUTO: 99 FL (ref 82–98)
MIN VOL: 7
MODE: ABNORMAL
MONOCYTES # BLD AUTO: 0.8 K/UL (ref 0.3–1)
MONOCYTES NFR BLD: 17 % (ref 4–15)
NEUTROPHILS # BLD AUTO: 3.7 K/UL (ref 1.8–7.7)
NEUTROPHILS NFR BLD: 75 % (ref 38–73)
NRBC BLD-RTO: 0 /100 WBC
PCO2 BLDA: 30.6 MMHG (ref 35–45)
PEEP: 7
PH SMN: 7.63 [PH] (ref 7.35–7.45)
PHOSPHATE SERPL-MCNC: 3 MG/DL (ref 2.7–4.5)
PIP: 28
PLATELET # BLD AUTO: 116 K/UL (ref 150–350)
PMV BLD AUTO: 10 FL (ref 9.2–12.9)
PO2 BLDA: 155 MMHG (ref 80–100)
POC BE: 11 MMOL/L
POC SATURATED O2: 100 % (ref 95–100)
POC TCO2: 33 MMOL/L (ref 23–27)
POCT GLUCOSE: 148 MG/DL (ref 70–110)
POCT GLUCOSE: 152 MG/DL (ref 70–110)
POCT GLUCOSE: 163 MG/DL (ref 70–110)
POTASSIUM SERPL-SCNC: 4.7 MMOL/L (ref 3.5–5.1)
PROT SERPL-MCNC: 4.7 G/DL (ref 6–8.4)
RBC # BLD AUTO: 2.67 M/UL (ref 4–5.4)
SAMPLE: ABNORMAL
SITE: ABNORMAL
SODIUM SERPL-SCNC: 136 MMOL/L (ref 136–145)
SP02: 100
VT: 16
WBC # BLD AUTO: 4.89 K/UL (ref 3.9–12.7)

## 2019-09-26 PROCEDURE — 99900035 HC TECH TIME PER 15 MIN (STAT)

## 2019-09-26 PROCEDURE — 25000003 PHARM REV CODE 250: Performed by: NURSE ANESTHETIST, CERTIFIED REGISTERED

## 2019-09-26 PROCEDURE — 36600 WITHDRAWAL OF ARTERIAL BLOOD: CPT

## 2019-09-26 PROCEDURE — 63600175 PHARM REV CODE 636 W HCPCS: Performed by: NURSE PRACTITIONER

## 2019-09-26 PROCEDURE — 85025 COMPLETE CBC W/AUTO DIFF WBC: CPT

## 2019-09-26 PROCEDURE — 43246 EGD PLACE GASTROSTOMY TUBE: CPT | Mod: 59,,, | Performed by: SURGERY

## 2019-09-26 PROCEDURE — 83735 ASSAY OF MAGNESIUM: CPT

## 2019-09-26 PROCEDURE — 36000707: Performed by: SURGERY

## 2019-09-26 PROCEDURE — 94003 VENT MGMT INPAT SUBQ DAY: CPT

## 2019-09-26 PROCEDURE — D9220A PRA ANESTHESIA: Mod: ANES,,, | Performed by: ANESTHESIOLOGY

## 2019-09-26 PROCEDURE — 94761 N-INVAS EAR/PLS OXIMETRY MLT: CPT

## 2019-09-26 PROCEDURE — 99233 SBSQ HOSP IP/OBS HIGH 50: CPT | Mod: GC,,, | Performed by: PSYCHIATRY & NEUROLOGY

## 2019-09-26 PROCEDURE — 63600175 PHARM REV CODE 636 W HCPCS: Performed by: INTERNAL MEDICINE

## 2019-09-26 PROCEDURE — 63600175 PHARM REV CODE 636 W HCPCS: Performed by: NURSE ANESTHETIST, CERTIFIED REGISTERED

## 2019-09-26 PROCEDURE — 99233 PR SUBSEQUENT HOSPITAL CARE,LEVL III: ICD-10-PCS | Mod: GC,,, | Performed by: PSYCHIATRY & NEUROLOGY

## 2019-09-26 PROCEDURE — 31600 PR TRACHEOSTOMY, PLANNED: ICD-10-PCS | Mod: ,,, | Performed by: SURGERY

## 2019-09-26 PROCEDURE — 43246 PR EGD, FLEX, W/PLCMT, GASTROSTOMY TUBE: ICD-10-PCS | Mod: 59,,, | Performed by: SURGERY

## 2019-09-26 PROCEDURE — 63600175 PHARM REV CODE 636 W HCPCS: Performed by: PSYCHIATRY & NEUROLOGY

## 2019-09-26 PROCEDURE — D9220A PRA ANESTHESIA: ICD-10-PCS | Mod: ANES,,, | Performed by: ANESTHESIOLOGY

## 2019-09-26 PROCEDURE — 27201423 OPTIME MED/SURG SUP & DEVICES STERILE SUPPLY: Performed by: SURGERY

## 2019-09-26 PROCEDURE — 37000008 HC ANESTHESIA 1ST 15 MINUTES: Performed by: SURGERY

## 2019-09-26 PROCEDURE — 37000009 HC ANESTHESIA EA ADD 15 MINS: Performed by: SURGERY

## 2019-09-26 PROCEDURE — 80053 COMPREHEN METABOLIC PANEL: CPT

## 2019-09-26 PROCEDURE — 25000003 PHARM REV CODE 250: Performed by: PSYCHIATRY & NEUROLOGY

## 2019-09-26 PROCEDURE — 84100 ASSAY OF PHOSPHORUS: CPT

## 2019-09-26 PROCEDURE — D9220A PRA ANESTHESIA: ICD-10-PCS | Mod: CRNA,,, | Performed by: NURSE ANESTHETIST, CERTIFIED REGISTERED

## 2019-09-26 PROCEDURE — 25000003 PHARM REV CODE 250: Performed by: INTERNAL MEDICINE

## 2019-09-26 PROCEDURE — 27000221 HC OXYGEN, UP TO 24 HOURS

## 2019-09-26 PROCEDURE — 20000000 HC ICU ROOM

## 2019-09-26 PROCEDURE — 36000706: Performed by: SURGERY

## 2019-09-26 PROCEDURE — 31600 PLANNED TRACHEOSTOMY: CPT | Mod: ,,, | Performed by: SURGERY

## 2019-09-26 PROCEDURE — D9220A PRA ANESTHESIA: Mod: CRNA,,, | Performed by: NURSE ANESTHETIST, CERTIFIED REGISTERED

## 2019-09-26 PROCEDURE — 99900026 HC AIRWAY MAINTENANCE (STAT)

## 2019-09-26 PROCEDURE — 82803 BLOOD GASES ANY COMBINATION: CPT

## 2019-09-26 PROCEDURE — 27800903 OPTIME MED/SURG SUP & DEVICES OTHER IMPLANTS: Performed by: SURGERY

## 2019-09-26 RX ORDER — QUETIAPINE FUMARATE 25 MG/1
50 TABLET, FILM COATED ORAL
Status: DISCONTINUED | OUTPATIENT
Start: 2019-09-26 | End: 2019-10-04 | Stop reason: HOSPADM

## 2019-09-26 RX ORDER — DEXAMETHASONE SODIUM PHOSPHATE 4 MG/ML
INJECTION, SOLUTION INTRA-ARTICULAR; INTRALESIONAL; INTRAMUSCULAR; INTRAVENOUS; SOFT TISSUE
Status: DISCONTINUED | OUTPATIENT
Start: 2019-09-26 | End: 2019-09-26

## 2019-09-26 RX ORDER — FENTANYL CITRATE 50 UG/ML
INJECTION, SOLUTION INTRAMUSCULAR; INTRAVENOUS
Status: DISCONTINUED | OUTPATIENT
Start: 2019-09-26 | End: 2019-09-26

## 2019-09-26 RX ORDER — EPHEDRINE SULFATE 50 MG/ML
INJECTION, SOLUTION INTRAVENOUS
Status: DISCONTINUED | OUTPATIENT
Start: 2019-09-26 | End: 2019-09-26

## 2019-09-26 RX ORDER — VALPROIC ACID 250 MG/5ML
750 SOLUTION ORAL
Status: DISCONTINUED | OUTPATIENT
Start: 2019-09-26 | End: 2019-09-29

## 2019-09-26 RX ORDER — ONDANSETRON 2 MG/ML
INJECTION INTRAMUSCULAR; INTRAVENOUS
Status: DISCONTINUED | OUTPATIENT
Start: 2019-09-26 | End: 2019-09-26

## 2019-09-26 RX ORDER — ROCURONIUM BROMIDE 10 MG/ML
INJECTION, SOLUTION INTRAVENOUS
Status: DISCONTINUED | OUTPATIENT
Start: 2019-09-26 | End: 2019-09-26

## 2019-09-26 RX ORDER — CEFAZOLIN SODIUM 1 G/3ML
INJECTION, POWDER, FOR SOLUTION INTRAMUSCULAR; INTRAVENOUS
Status: DISCONTINUED | OUTPATIENT
Start: 2019-09-26 | End: 2019-09-26

## 2019-09-26 RX ORDER — PHENYLEPHRINE HYDROCHLORIDE 10 MG/ML
INJECTION INTRAVENOUS
Status: DISCONTINUED | OUTPATIENT
Start: 2019-09-26 | End: 2019-09-26

## 2019-09-26 RX ORDER — PROPOFOL 10 MG/ML
VIAL (ML) INTRAVENOUS
Status: DISCONTINUED | OUTPATIENT
Start: 2019-09-26 | End: 2019-09-26

## 2019-09-26 RX ADMIN — EPHEDRINE SULFATE 5 MG: 50 INJECTION, SOLUTION INTRAMUSCULAR; INTRAVENOUS; SUBCUTANEOUS at 07:09

## 2019-09-26 RX ADMIN — HALOPERIDOL LACTATE 10 MG: 5 INJECTION, SOLUTION INTRAMUSCULAR at 12:09

## 2019-09-26 RX ADMIN — EPHEDRINE SULFATE 5 MG: 50 INJECTION, SOLUTION INTRAMUSCULAR; INTRAVENOUS; SUBCUTANEOUS at 08:09

## 2019-09-26 RX ADMIN — MIDODRINE HYDROCHLORIDE 5 MG: 5 TABLET ORAL at 08:09

## 2019-09-26 RX ADMIN — HYDRALAZINE HYDROCHLORIDE 10 MG: 20 INJECTION INTRAMUSCULAR; INTRAVENOUS at 01:09

## 2019-09-26 RX ADMIN — DEXAMETHASONE SODIUM PHOSPHATE 4 MG: 4 INJECTION, SOLUTION INTRAMUSCULAR; INTRAVENOUS at 01:09

## 2019-09-26 RX ADMIN — DEXAMETHASONE SODIUM PHOSPHATE 4 MG: 4 INJECTION, SOLUTION INTRAMUSCULAR; INTRAVENOUS at 05:09

## 2019-09-26 RX ADMIN — FENTANYL CITRATE 50 MCG: 50 INJECTION, SOLUTION INTRAMUSCULAR; INTRAVENOUS at 07:09

## 2019-09-26 RX ADMIN — HALOPERIDOL LACTATE 10 MG: 5 INJECTION, SOLUTION INTRAMUSCULAR at 05:09

## 2019-09-26 RX ADMIN — FAMOTIDINE 20 MG: 20 TABLET ORAL at 08:09

## 2019-09-26 RX ADMIN — DEXAMETHASONE SODIUM PHOSPHATE 8 MG: 4 INJECTION, SOLUTION INTRAMUSCULAR; INTRAVENOUS at 08:09

## 2019-09-26 RX ADMIN — Medication 300 MCG/HR: at 05:09

## 2019-09-26 RX ADMIN — INSULIN ASPART 2 UNITS: 100 INJECTION, SOLUTION INTRAVENOUS; SUBCUTANEOUS at 08:09

## 2019-09-26 RX ADMIN — SODIUM CHLORIDE, SODIUM GLUCONATE, SODIUM ACETATE, POTASSIUM CHLORIDE, MAGNESIUM CHLORIDE, SODIUM PHOSPHATE, DIBASIC, AND POTASSIUM PHOSPHATE: .53; .5; .37; .037; .03; .012; .00082 INJECTION, SOLUTION INTRAVENOUS at 07:09

## 2019-09-26 RX ADMIN — CEFAZOLIN 2 G: 330 INJECTION, POWDER, FOR SOLUTION INTRAMUSCULAR; INTRAVENOUS at 07:09

## 2019-09-26 RX ADMIN — ROCURONIUM BROMIDE 20 MG: 10 INJECTION, SOLUTION INTRAVENOUS at 07:09

## 2019-09-26 RX ADMIN — MIDODRINE HYDROCHLORIDE 5 MG: 5 TABLET ORAL at 03:09

## 2019-09-26 RX ADMIN — ROCURONIUM BROMIDE 30 MG: 10 INJECTION, SOLUTION INTRAVENOUS at 07:09

## 2019-09-26 RX ADMIN — PROPOFOL 30 MG: 10 INJECTION, EMULSION INTRAVENOUS at 07:09

## 2019-09-26 RX ADMIN — ONDANSETRON 4 MG: 2 INJECTION INTRAMUSCULAR; INTRAVENOUS at 08:09

## 2019-09-26 RX ADMIN — VALPROIC ACID 750 MG: 250 SOLUTION ORAL at 01:09

## 2019-09-26 RX ADMIN — HALOPERIDOL LACTATE 10 MG: 5 INJECTION, SOLUTION INTRAMUSCULAR at 09:09

## 2019-09-26 RX ADMIN — Medication 300 MCG/HR: at 04:09

## 2019-09-26 RX ADMIN — INSULIN ASPART 2 UNITS: 100 INJECTION, SOLUTION INTRAVENOUS; SUBCUTANEOUS at 04:09

## 2019-09-26 RX ADMIN — PHENYLEPHRINE HYDROCHLORIDE 200 MCG: 10 INJECTION INTRAVENOUS at 07:09

## 2019-09-26 RX ADMIN — DEXTROSE 750 MG: 50 INJECTION, SOLUTION INTRAVENOUS at 11:09

## 2019-09-26 RX ADMIN — VALPROIC ACID 750 MG: 250 SOLUTION ORAL at 05:09

## 2019-09-26 RX ADMIN — QUETIAPINE FUMARATE 50 MG: 25 TABLET ORAL at 04:09

## 2019-09-26 NOTE — CARE UPDATE
09/26/19 0851   Airway Safety   Ambu bag with the patient? Yes, Adult Ambu   Is mask with the patient? Yes, Adult Mask   Suction set is at the bedside? Yes   Extra trach at bedside? Yes   Extra trach sizes at bedside? 4;6   Is obturator available? Yes   Location of obturator?  Other (see comments)  (On window)   Pt arrived from OR with size 6.0 Shiley cuffed. Pt placed back on documented vent settings. Will continue to monitor.

## 2019-09-26 NOTE — PLAN OF CARE
Routine Post-Op PEG Care:   · Please keep tube to gravity for the next 24 hours.  · May administer meds after 6 hours and clamp for 30 minutes after medication administration.  · Restart tube feeds 24h from placement.   · Please notify General Surgery with any significant changes in patient's vital signs within the first 24 hours after PEG placement.  · Please call with questions about PEG tube.

## 2019-09-26 NOTE — TRANSFER OF CARE
"Anesthesia Transfer of Care Note    Patient: Clemencia Nguyen    Procedure(s) Performed: Procedure(s) (LRB):  CREATION, TRACHEOSTOMY open (N/A)  EGD, WITH PEG TUBE INSERTION (N/A)    Patient location: ICU    Anesthesia Type: general    Transport from OR: Upon arrival to PACU/ICU, patient attached to ventilator and auscultated to confirm bilateral breath sounds and adequate TV. Transported from OR intubated on 100% O2 by AMBU with adequate controlled ventilation    Post pain: adequate analgesia    Post assessment: no apparent anesthetic complications    Post vital signs: stable    Level of consciousness: sedated and unresponsive    Nausea/Vomiting: no nausea/vomiting    Complications: none    Transfer of care protocol was followed      Last vitals:   Visit Vitals  BP (!) 180/78   Pulse 94   Temp 36.7 °C (98 °F) (Oral)   Resp 19   Ht 5' 3" (1.6 m)   Wt 78.6 kg (173 lb 4.5 oz)   SpO2 100%   Breastfeeding? No   BMI 30.70 kg/m²     "

## 2019-09-26 NOTE — PROGRESS NOTES
[facility]  Neurocritical Care  Progress Note    Admit Date: 9/3/2019  Service Date: 09/26/2019  Length of Stay: 23    Subjective:     Chief Complaint: Brain lesion    History of Present Illness: Pt is  75 yo female with a PMHx of CKD 3, HTN, was transferred from [facility] to [facility] for new onset seizures and concern for right front lobe mass. The pt was found in her bedroom by her spouse at approximately 9:45 pm sitting her head against the bed, unresponsive, and having seizure like activity on the left-side. EMT was called and the pt was given Versed twice while en route to the hospital. Pt had a second witnessed seizures, left facial droop, left-sided weakness, and tongue ecchymosis in the ED. Neurology was consulted and The pt was given IV Keppra and Vimpat. MRI brain without contrast was acquired. The image showed right frontal lobe vasogenic edema with mass effect concerning for underlying mass. EEG was acquired at outside hospital concerning showing an abnormal result. The pt was given decadron IV and neurosurgical consult recommended. Pt transferred to [facility] and admitted to the [facility] for higher level of care and further evaluation.     Pt history acquired per chart review and from spouse present at the bedside. The pt's spouse denies prior history of seizures CVA, cancer history and up-to-date screenings    Hospital Course: --admitted to [facility] on 9/3 following new onset seizure, arrived intubated  --MRI with large area of R cortical edema with flair and ill defined enhancement on contrast study, concern for glioma vs infectious/inflammatory process  --LP appears non infectious, cytology pending  9/5- no acute events, awaiting LP finalization from pathology report., weaning vent.   09/06/2019: Very agitated overnight, requiring increased sedation. Patient extubated this morning on rounds, and reintubated shortly after. Unable to maintain airway and secretions. CSF gram stain negative.  09/07/2019: KATT.  EKG obtained, seroquel started. Valproic acid started.  09/08/2019: NAEON. Improved agitation with seroquel.  09/09/2019: MRI Brain w/wo ordered for today. BLE US ordered. Will need new LP later on in the week.   09/10/2019 LP for infx workup, CD4 lab, EEG monitoring per Epilepsy   09/11/2019: To IR for LP. Spoke to NSGY about possible bx of lesion. Discontinue cEEG.   9/13/2019 Issues with low HR and BP overnight and this morning, Given Shoaib bump and atropine. 24 hours of IVF and bolus. WBC decreasing and afebrile. Repeat blood cultures negative. Plan for EGD and FEDERICO today.   9/14/2019 NAEO, EGD and FEDERICO moved until Monday. CSF still pending. Increasing WBC but afebrile. Start tube feeding   9/15/2019 NAEO, all CSF studies negative so far. CT negative. Begin insulin gtt. EGD and FEDERICO tomorrow    9/16 No significant events over night. EGD done this afternoon for esophageal stricture. Esophagus stretched to 15. FEDERICO ordered for tomorrow.   9/17 No significant vents over night NPO for FEDERICO today scheduled for 3pm. Off levophed.  9/18 FEDERICO negative. Tube feeds restarted. NSGY following and considering brain biopsy. Propofol stopped. Precedex started.  9/19: NSGY recommending MRI brain W/WO with stealth for possible brain biopsy as other work up has been negative. Patient continues with hypotensive episodes, on Fentanyl drip and recommend to space out medications.  9/20 no significant events over night. Hypotensive episodes after vimpat dose and seroquel dose requiring a shoaib bump, IVF bolus then  Levophed gtt started to maintain map>65. Spontaneous breathing trial this afternoon  9/21 no significant events over night . Discussions with family daily about trach and peg. Trial of spon breathing off all sedation, pt. Agitated, restless not following commands. Family decided to have trach and peg done . gen surg. Consulted for trach and peg next week. Restarted fentanyl gtt for sedation. DCd EEG, not having seizures  9/22  Hypotensive episode last night required 500cc bolus and neobump x3. Levophed gtt started. Fentanyl gtt off till SBP back up. This am remains restless on fentanyl gtt. Will try versed IVP at lower dose.  Currently on levophed. Trach and peg scheduled for Tuesday. Paraneoplastic panel sent. Febrile over night blood and sputum cultures sent. Will readdress biopsy with NSGY next week.  9/23: likely intravascularly dry, fluid bolus again administered and placed on continuous rate, use prn versed 1mg  q2 hrs prn and resume fentanyl gtt when awakens, could not tolerate 2 mg dose  9/24: Patient required pressors overnight, so postponing PEG & Trach for another 24 Hr, starting haldol PRN for agitation, holding pressors and IVF to meed MAP goals, also midodrine, start SSI.  9/25: Patient BP not requiring pressors, decrease midodrine to 5, WBC 9 this AM, Pending PEG/Trach, per conversation with surgery likely tomorrow, hold tube feeds midnight, Pending NSGY second opinion.   9/26: patient S/P PEG/Trach this AM, stable. Pending NSGY second opinion regarding biopsy, Intubated on SPONT mode, weaning fentanyl, Likely MRI tomorrow.    Review of Systems   Unable to perform ROS: Intubated     Objective:      Vitals:    09/26/19 0520 09/26/19 0605 09/26/19 0701 09/26/19 0900   BP:  138/60 (!) 180/78 (!) 192/92   BP Location:       Patient Position:       Pulse: 84 66 94 77   Resp: 17 16 19 16   Temp:   98 °F (36.7 °C)    TempSrc:   Oral    SpO2: 100% 100% 100% 100%   Weight:       Height:         Physical Exam   Constitutional:   moderately obese   HENT:   Head: Normocephalic.   Eyes: PERRL  Neck: No JVD present.   Cardiovascular: Regular rhythm.   Pulmonary/Chest: Intubated  Abdominal: Soft. Bowel sounds are normal.   Musculoskeletal: She exhibits no edema.   Neurological:   Left upper>lower extremity weakness   Skin: Capillary refill takes less than 2 seconds.   Nursing note and vitals reviewed.    Assessment/Plan:     Neuro  *  Brain lesion  74 year old admitted to unit on 9/3 following new onset seizure, with MRI showing large area of R cortical edema with flair and ill defined enhancement on contrast study, concern for glioma vs infectious/inflammatory process  - 9/22 repeat BC, pending   - CSF cx no growth,   -CSF studies negative- VZV, enterovirus, HSV, MS  - FEDERICO negative for vegetation     - continue decadron 4mg q6h  - neuro checks  - Repeat brain MRI W/WO with stealth 9/19, showed large  R frontal lobe lesion nonspecific for infectious or neoplastic process; small remote L basal banglia lacunar infarct. No new lesions or infarcts.  - NSGY, no biopsy initially ,not getting second opinion.  - Paraneoplastic panel sent on 9/22     New onset seizure  2/2 to brain mass   9/21 EEG dcd. No electrographic seizures for 24h  Vimpat dcd 9/20  Continue Valproic acid 750mg q8h, valproate level 53.7 (9/18)     Pulmonary  On mechanically assisted ventilation  Daily CXR, ABG  Vap protocol  Trial extubation 9/6/2019- re-intubated shortly after extubation for airway protection. Still intubated 2/2 inability to protect airway   9/21Spontaneous breathing trial off sedation, very agitated and restless, not following commands  Family decided on trach and peg instead of extubation.  S/P Trach and PEG  9/26/19        Cardiac/Vascular  Essential hypertension  SBP <180, MAP >60  Requiring levophed gtt to maintain MAPs > 60 while on fentanyl gtt  Echo: EF 65-70% concentric LV remodeling  FEDERICO (9/17) negative for vegetation, ASD, PFO   Cont to hold anti-hypertensives  Pressors stopped 9/24  IVF & midodrine      Renal/  Chronic kidney disease, stage III (moderate)  -PMHx of CKD 3   -monitor renal function, I/Os     Other  Hypotension due to hypovolemia  Pressors stopped 9/24  IVF & midodrine     The patient is being Prophylaxed for:  Venous Thromboembolism with: Chemical  Stress Ulcer with: PPI  Ventilator Pneumonia with: chlorhexidine oral care      Activity  Orders          Diet NPO Except for: Medication: NPO starting at 09/24 0001        Full Code    Caden Crouch MD  Neurocritical Care  [facility]

## 2019-09-26 NOTE — ANESTHESIA POSTPROCEDURE EVALUATION
Anesthesia Post Evaluation    Patient: Clemencia Nguyen    Procedure(s) Performed: Procedure(s) (LRB):  CREATION, TRACHEOSTOMY open (N/A)  EGD, WITH PEG TUBE INSERTION (N/A)    Final Anesthesia Type: general  Patient location during evaluation: ICU  Patient participation: No - Unable to Participate, Other Reason (see comments) (Trach and sedated)  Level of consciousness: sedated  Post-procedure vital signs: reviewed and stable  Pain management: adequate  Airway patency: patent  PONV status at discharge: No PONV  Anesthetic complications: no      Cardiovascular status: hemodynamically stable  Respiratory status: ventilator (Trach)  Hydration status: euvolemic  Follow-up not needed.          Vitals Value Taken Time   /78 9/26/2019  7:01 AM   Temp 36.7 °C (98 °F) 9/26/2019  7:01 AM   Pulse 94 9/26/2019  7:01 AM   Resp 19 9/26/2019  7:01 AM   SpO2 100 % 9/26/2019  7:01 AM         No case tracking events are documented in the log.      Pain/Heidi Score: Pain Rating Prior to Med Admin: 0 (9/26/2019  7:01 AM)

## 2019-09-26 NOTE — PROGRESS NOTES
Pt seen this morning.  No acute events overnight.  Has not required pressors overnight.  On FiO2 40% and PEEP 5. Fentanyl 300 mcg/kg/hr.  Consents in chart.  Will proceed to OR today for trach/peg.

## 2019-09-26 NOTE — PLAN OF CARE
POC reviewed with pt at 0500. Pt verbalized understanding. Questions and concerns addressed. No acute events overnight. Haldol given PRN for agitation per order. Fentanyl gtt continued at max. Will continue to monitor. See flowsheets for full assessment and VS info

## 2019-09-26 NOTE — OP NOTE
[facility]  Endocrine Surgery  Operative Note    SUMMARY     Date of Procedure: 9/26/2019     Procedure:   Percutaneous tracheostomy.     Attending Surgeon: Maria Luisa Piña M.D.     [place] Surgeon: TRENT Cobb MD    Pre-Operative Diagnosis:   1.  Seizure disorder   2. Respiratory failure    Post-Operative Diagnosis:   1.  Same   2. Respiratory failure    Anesthesia: General    Indications: Clemencia Nguyen is a 74 y.o. female admitted to [facility] with seizure disorder. The patient has failed attempts to wean from the ventilator. We have recommended to the family that the patient would benefit from placement of a tracheostomy tube for the anticipated prolonged need for mechanical ventilation. We did obtain informed consent from the patient's family who expressed understanding of the risks and benefits and gave consent to proceed.      After informed consent was obtained by the patient's family, the patient was brought to the Operative Theater and placed in in the supine position. After adequate anesthesia the patient was prepped and draped in the usual sterile fashion.     A timeout procedure was performed and a 2.5cm vertical incision was made in the neck, inferior to the cricoid cartilage and superior to the sternal notch. Dissection was carried down through platysma and between the strap muscles with Bovie electrocautery until the trachea was identified. The isthmus was split to facilitate exposure.  This was completed using electrocautery.  The 3rd and 4th tracheal rings were identified. A 1.5cm transverse incision was made in the in the space between the 3rd and 4th ring, and then a .5cm vertical incisions were made through the 3rd tracheal ring (creating a trapdoor).  A 3-0 prolene stay suture was placed directly through the cartilage of the flap.  An 6-0 shiley trach was placed in the trachea and the cuff was inflated. Anesthesia confirmed good return of tidal volumes with  tracheostomy tube adjustment following initial difficulty ventilating. The trach was sutured in place with 2 2-0 prolene sutures and secured with a tie.  The vertical incision was approximated at the deep dermal level using 3 0 Vicryl suture.  The patient tolerated the procedure well and was transported to the ICU in stable condition.     We then directed our attention to the left upper quadrant for gastrostomy tube placement. The patient's abdomen was prepped and draped. An upper endoscope was introduced into the oropharynx and guided down into the esophagus and stomach. The stomach was insufflated with air. Appropriate position for gastrostomy tube placement 2 finger-breadths below the left subcostal margin was chosen. Palpation of the anterior abdominal wall at this point was visualized endoscopically and transillumination from the endoscope was visualized through the anterior abdominal wall. We made a 1.5 cm transverse skin incision. At this point, the stomach was cannulated with a catheter loaded on a needle. This was grasped by a snare which had been passed through the endoscope. The needle was removed, and a guidewire was placed, and the snare was used to grasp the guidewire. The endoscope, snare, and guidewire were all withdrawn from the patient's mouth. A 20-Faroese gastrostomy tube was loaded onto the guidewire and pulled through the anterior abdominal wall via Seldinger technique. Repeat endoscopy was performed with the gastrostomy tube at the 4 cm melissa at the skin. There was no blanching of the gastric mucosa, and when the tube was twisted, the button did not grab the mucosa. The insufflation in the stomach was evacuated, and the endoscope was removed. The gastrostomy tube was secured in place using the supplied devices and connected to a bag for gravity drainage.    The patient tolerated the procedure without complication. All needle sponge and instrument counts were correct at the end of the case. I was  present and scrubbed or directed for the entire procedure.      Intraoperative Findings:  1) A #6 Shiley tracheostomy was placed without apparent complication  2)   A 20 Wolof PEG tube placed without apparent difficulty    Significant Surgical Tasks Conducted by the Assistant(s), if Applicable: None    Estimated Blood Loss (EBL):  Minimal           Implants: * No implants in log *    Specimens:   Specimen (12h ago, onward)    None           Condition: Good    Disposition: ICU - intubated and hemodynamically stable.    Attestation: I was present and scrubbed for the entire procedure.

## 2019-09-26 NOTE — PLAN OF CARE
POC reviewed with pt and family at 1400.   Family verbalized understanding.   Questions and concerns addressed.   See flowsheets for full assessment and VS info.     NPO sp trach/Peg.  Fentanyl gtt for sedation.  PRN haldol for agitation.  PICC dressing changed.

## 2019-09-26 NOTE — NURSING
Pt back in room 9022 post procedure.  Pt connected to wall monitor and ventilator, VSS at this time.

## 2019-09-26 NOTE — BRIEF OP NOTE
[facility]  Brief Operative Note    SUMMARY     Surgery Date: 9/26/2019     Surgeon(s) and Role:     * Maria Luisa Piña MD - Primary     * Sara Cobb MD - Resident - Assisting    Pre-op Diagnosis:  Status epilepticus [G40.901]    Post-op Diagnosis:  Post-Op Diagnosis Codes:     * Status epilepticus [G40.901]    Procedure(s) (LRB):  CREATION, TRACHEOSTOMY open (N/A)  EGD, WITH PEG TUBE INSERTION (N/A)    Anesthesia: Choice    Description of Procedure: open tracheostomy, PEG placement with EGD    Estimated Blood Loss: 5 cc         Specimens:   Specimen (12h ago, onward)    None

## 2019-09-27 LAB
ALBUMIN SERPL BCP-MCNC: 2.5 G/DL (ref 3.5–5.2)
ALLENS TEST: ABNORMAL
ALP SERPL-CCNC: 44 U/L (ref 55–135)
ALT SERPL W/O P-5'-P-CCNC: 55 U/L (ref 10–44)
ANION GAP SERPL CALC-SCNC: 8 MMOL/L (ref 8–16)
AST SERPL-CCNC: 13 U/L (ref 10–40)
BASOPHILS # BLD AUTO: 0 K/UL (ref 0–0.2)
BASOPHILS NFR BLD: 0 % (ref 0–1.9)
BILIRUB SERPL-MCNC: 0.3 MG/DL (ref 0.1–1)
BUN SERPL-MCNC: 14 MG/DL (ref 8–23)
CALCIUM SERPL-MCNC: 8.1 MG/DL (ref 8.7–10.5)
CHLORIDE SERPL-SCNC: 100 MMOL/L (ref 95–110)
CO2 SERPL-SCNC: 28 MMOL/L (ref 23–29)
CREAT SERPL-MCNC: 0.9 MG/DL (ref 0.5–1.4)
DELSYS: ABNORMAL
DIFFERENTIAL METHOD: ABNORMAL
EOSINOPHIL # BLD AUTO: 0 K/UL (ref 0–0.5)
EOSINOPHIL NFR BLD: 0 % (ref 0–8)
ERYTHROCYTE [DISTWIDTH] IN BLOOD BY AUTOMATED COUNT: 15.8 % (ref 11.5–14.5)
ERYTHROCYTE [SEDIMENTATION RATE] IN BLOOD BY WESTERGREN METHOD: 16 MM/H
EST. GFR  (AFRICAN AMERICAN): >60 ML/MIN/1.73 M^2
EST. GFR  (NON AFRICAN AMERICAN): >60 ML/MIN/1.73 M^2
FIO2: 40
GLUCOSE SERPL-MCNC: 110 MG/DL (ref 70–110)
HCO3 UR-SCNC: 32.1 MMOL/L (ref 24–28)
HCT VFR BLD AUTO: 28.1 % (ref 37–48.5)
HGB BLD-MCNC: 8.8 G/DL (ref 12–16)
IMM GRANULOCYTES # BLD AUTO: 0.03 K/UL (ref 0–0.04)
IMM GRANULOCYTES NFR BLD AUTO: 0.5 % (ref 0–0.5)
LYMPHOCYTES # BLD AUTO: 0.5 K/UL (ref 1–4.8)
LYMPHOCYTES NFR BLD: 7.6 % (ref 18–48)
MAGNESIUM SERPL-MCNC: 2.2 MG/DL (ref 1.6–2.6)
MCH RBC QN AUTO: 29.7 PG (ref 27–31)
MCHC RBC AUTO-ENTMCNC: 31.3 G/DL (ref 32–36)
MCV RBC AUTO: 95 FL (ref 82–98)
MODE: ABNORMAL
MONOCYTES # BLD AUTO: 1 K/UL (ref 0.3–1)
MONOCYTES NFR BLD: 15.1 % (ref 4–15)
NEUTROPHILS # BLD AUTO: 5 K/UL (ref 1.8–7.7)
NEUTROPHILS NFR BLD: 76.8 % (ref 38–73)
NRBC BLD-RTO: 0 /100 WBC
PCO2 BLDA: 47.4 MMHG (ref 35–45)
PEEP: 7
PH SMN: 7.44 [PH] (ref 7.35–7.45)
PHOSPHATE SERPL-MCNC: 3.3 MG/DL (ref 2.7–4.5)
PIP: 17
PLATELET # BLD AUTO: 140 K/UL (ref 150–350)
PMV BLD AUTO: 9.3 FL (ref 9.2–12.9)
PO2 BLDA: 113 MMHG (ref 80–100)
POC BE: 8 MMOL/L
POC SATURATED O2: 99 % (ref 95–100)
POC TCO2: 34 MMOL/L (ref 23–27)
POCT GLUCOSE: 113 MG/DL (ref 70–110)
POCT GLUCOSE: 117 MG/DL (ref 70–110)
POCT GLUCOSE: 132 MG/DL (ref 70–110)
POCT GLUCOSE: 145 MG/DL (ref 70–110)
POCT GLUCOSE: 146 MG/DL (ref 70–110)
POCT GLUCOSE: 98 MG/DL (ref 70–110)
POTASSIUM SERPL-SCNC: 4.7 MMOL/L (ref 3.5–5.1)
PROT SERPL-MCNC: 5.3 G/DL (ref 6–8.4)
PS: 10
RBC # BLD AUTO: 2.96 M/UL (ref 4–5.4)
SAMPLE: ABNORMAL
SITE: ABNORMAL
SODIUM SERPL-SCNC: 136 MMOL/L (ref 136–145)
SP02: 100
VT: 380
WBC # BLD AUTO: 6.49 K/UL (ref 3.9–12.7)

## 2019-09-27 PROCEDURE — 25000003 PHARM REV CODE 250: Performed by: INTERNAL MEDICINE

## 2019-09-27 PROCEDURE — 94761 N-INVAS EAR/PLS OXIMETRY MLT: CPT

## 2019-09-27 PROCEDURE — 63600175 PHARM REV CODE 636 W HCPCS: Performed by: INTERNAL MEDICINE

## 2019-09-27 PROCEDURE — 99291 PR CRITICAL CARE, E/M 30-74 MINUTES: ICD-10-PCS | Mod: GC,,, | Performed by: PSYCHIATRY & NEUROLOGY

## 2019-09-27 PROCEDURE — 84100 ASSAY OF PHOSPHORUS: CPT

## 2019-09-27 PROCEDURE — 36600 WITHDRAWAL OF ARTERIAL BLOOD: CPT

## 2019-09-27 PROCEDURE — 82803 BLOOD GASES ANY COMBINATION: CPT

## 2019-09-27 PROCEDURE — 25000003 PHARM REV CODE 250: Performed by: PSYCHIATRY & NEUROLOGY

## 2019-09-27 PROCEDURE — 27000221 HC OXYGEN, UP TO 24 HOURS

## 2019-09-27 PROCEDURE — 63600175 PHARM REV CODE 636 W HCPCS: Performed by: PSYCHIATRY & NEUROLOGY

## 2019-09-27 PROCEDURE — 80053 COMPREHEN METABOLIC PANEL: CPT

## 2019-09-27 PROCEDURE — 94003 VENT MGMT INPAT SUBQ DAY: CPT

## 2019-09-27 PROCEDURE — 99900035 HC TECH TIME PER 15 MIN (STAT)

## 2019-09-27 PROCEDURE — 99291 CRITICAL CARE FIRST HOUR: CPT | Mod: GC,,, | Performed by: PSYCHIATRY & NEUROLOGY

## 2019-09-27 PROCEDURE — 20000000 HC ICU ROOM

## 2019-09-27 PROCEDURE — 99900026 HC AIRWAY MAINTENANCE (STAT)

## 2019-09-27 PROCEDURE — 85025 COMPLETE CBC W/AUTO DIFF WBC: CPT

## 2019-09-27 PROCEDURE — 83735 ASSAY OF MAGNESIUM: CPT

## 2019-09-27 PROCEDURE — 63600175 PHARM REV CODE 636 W HCPCS: Performed by: NURSE PRACTITIONER

## 2019-09-27 PROCEDURE — 86256 FLUORESCENT ANTIBODY TITER: CPT | Mod: 91

## 2019-09-27 RX ORDER — MIDODRINE HYDROCHLORIDE 5 MG/1
10 TABLET ORAL
Status: DISCONTINUED | OUTPATIENT
Start: 2019-09-27 | End: 2019-09-27

## 2019-09-27 RX ORDER — FLUDROCORTISONE ACETATE 0.1 MG/1
200 TABLET ORAL 2 TIMES DAILY
Status: DISCONTINUED | OUTPATIENT
Start: 2019-09-27 | End: 2019-09-28

## 2019-09-27 RX ORDER — LIDOCAINE HYDROCHLORIDE 10 MG/ML
1 INJECTION INFILTRATION; PERINEURAL ONCE
Status: COMPLETED | OUTPATIENT
Start: 2019-09-27 | End: 2019-09-27

## 2019-09-27 RX ORDER — FLUDROCORTISONE ACETATE 0.1 MG/1
200 TABLET ORAL 2 TIMES DAILY
Status: DISCONTINUED | OUTPATIENT
Start: 2019-09-27 | End: 2019-09-27

## 2019-09-27 RX ORDER — DEXAMETHASONE SODIUM PHOSPHATE 4 MG/ML
4 INJECTION, SOLUTION INTRA-ARTICULAR; INTRALESIONAL; INTRAMUSCULAR; INTRAVENOUS; SOFT TISSUE EVERY 8 HOURS
Status: DISCONTINUED | OUTPATIENT
Start: 2019-09-27 | End: 2019-10-04 | Stop reason: HOSPADM

## 2019-09-27 RX ORDER — MIDODRINE HYDROCHLORIDE 5 MG/1
5 TABLET ORAL
Status: DISCONTINUED | OUTPATIENT
Start: 2019-09-27 | End: 2019-09-28

## 2019-09-27 RX ADMIN — QUETIAPINE FUMARATE 50 MG: 25 TABLET ORAL at 03:09

## 2019-09-27 RX ADMIN — LIDOCAINE HYDROCHLORIDE 1 ML: 10 INJECTION, SOLUTION INFILTRATION; PERINEURAL at 05:09

## 2019-09-27 RX ADMIN — QUETIAPINE FUMARATE 50 MG: 25 TABLET ORAL at 04:09

## 2019-09-27 RX ADMIN — VALPROIC ACID 750 MG: 250 SOLUTION ORAL at 09:09

## 2019-09-27 RX ADMIN — FLUDROCORTISONE ACETATE 200 MCG: 0.1 TABLET ORAL at 08:09

## 2019-09-27 RX ADMIN — Medication 287.5 MCG/HR: at 09:09

## 2019-09-27 RX ADMIN — FLUDROCORTISONE ACETATE 200 MCG: 0.1 TABLET ORAL at 11:09

## 2019-09-27 RX ADMIN — FAMOTIDINE 20 MG: 20 TABLET ORAL at 08:09

## 2019-09-27 RX ADMIN — MIDODRINE HYDROCHLORIDE 5 MG: 5 TABLET ORAL at 04:09

## 2019-09-27 RX ADMIN — MIDODRINE HYDROCHLORIDE 5 MG: 5 TABLET ORAL at 11:09

## 2019-09-27 RX ADMIN — DEXAMETHASONE SODIUM PHOSPHATE 4 MG: 4 INJECTION, SOLUTION INTRAMUSCULAR; INTRAVENOUS at 10:09

## 2019-09-27 RX ADMIN — DEXAMETHASONE SODIUM PHOSPHATE 4 MG: 4 INJECTION, SOLUTION INTRAMUSCULAR; INTRAVENOUS at 03:09

## 2019-09-27 RX ADMIN — Medication 300 MCG/HR: at 06:09

## 2019-09-27 RX ADMIN — MIDODRINE HYDROCHLORIDE 5 MG: 5 TABLET ORAL at 08:09

## 2019-09-27 RX ADMIN — HYDRALAZINE HYDROCHLORIDE 10 MG: 20 INJECTION INTRAMUSCULAR; INTRAVENOUS at 08:09

## 2019-09-27 RX ADMIN — VALPROIC ACID 750 MG: 250 SOLUTION ORAL at 02:09

## 2019-09-27 RX ADMIN — Medication 300 MCG/HR: at 05:09

## 2019-09-27 RX ADMIN — LEVOTHYROXINE SODIUM 75 MCG: 75 TABLET ORAL at 05:09

## 2019-09-27 RX ADMIN — VALPROIC ACID 750 MG: 250 SOLUTION ORAL at 06:09

## 2019-09-27 RX ADMIN — DEXAMETHASONE SODIUM PHOSPHATE 4 MG: 4 INJECTION, SOLUTION INTRAMUSCULAR; INTRAVENOUS at 06:09

## 2019-09-27 RX ADMIN — DEXAMETHASONE SODIUM PHOSPHATE 4 MG: 4 INJECTION, SOLUTION INTRAMUSCULAR; INTRAVENOUS at 12:09

## 2019-09-27 NOTE — NURSING
Transported pt to Radiology for LP with RN, RT, and PCT via bed with fentanyl, portable vent, and ambubag at bedside.

## 2019-09-27 NOTE — PROGRESS NOTES
[facility]  Neurocritical Care  Progress Note    Admit Date: 9/3/2019  Service Date: 09/27/2019  Length of Stay: 24    Subjective:     Chief Complaint: Brain lesion    History of Present Illness: Pt is  73 yo female with a PMHx of CKD 3, HTN, was transferred from [facility] to [facility] for new onset seizures and concern for right front lobe mass. The pt was found in her bedroom by her spouse at approximately 9:45 pm sitting her head against the bed, unresponsive, and having seizure like activity on the left-side. EMT was called and the pt was given Versed twice while en route to the hospital. Pt had a second witnessed seizures, left facial droop, left-sided weakness, and tongue ecchymosis in the ED. Neurology was consulted and The pt was given IV Keppra and Vimpat. MRI brain without contrast was acquired. The image showed right frontal lobe vasogenic edema with mass effect concerning for underlying mass. EEG was acquired at outside hospital concerning showing an abnormal result. The pt was given decadron IV and neurosurgical consult recommended. Pt transferred to [facility] and admitted to the [facility] for higher level of care and further evaluation.     Pt history acquired per chart review and from spouse present at the bedside. The pt's spouse denies prior history of seizures CVA, cancer history and up-to-date screenings    Hospital Course: --admitted to [facility] on 9/3 following new onset seizure, arrived intubated  --MRI with large area of R cortical edema with flair and ill defined enhancement on contrast study, concern for glioma vs infectious/inflammatory process  --LP appears non infectious, cytology pending  9/5- no acute events, awaiting LP finalization from pathology report., weaning vent.   09/06/2019: Very agitated overnight, requiring increased sedation. Patient extubated this morning on rounds, and reintubated shortly after. Unable to maintain airway and secretions. CSF gram stain negative.  09/07/2019: KATT.  EKG obtained, seroquel started. Valproic acid started.  09/08/2019: NAEON. Improved agitation with seroquel.  09/09/2019: MRI Brain w/wo ordered for today. BLE US ordered. Will need new LP later on in the week.   09/10/2019 LP for infx workup, CD4 lab, EEG monitoring per Epilepsy   09/11/2019: To IR for LP. Spoke to NSGY about possible bx of lesion. Discontinue cEEG.   9/13/2019 Issues with low HR and BP overnight and this morning, Given Shoaib bump and atropine. 24 hours of IVF and bolus. WBC decreasing and afebrile. Repeat blood cultures negative. Plan for EGD and FEDERICO today.   9/14/2019 NAEO, EGD and FEDERICO moved until Monday. CSF still pending. Increasing WBC but afebrile. Start tube feeding   9/15/2019 NAEO, all CSF studies negative so far. CT negative. Begin insulin gtt. EGD and FEDERICO tomorrow    9/16 No significant events over night. EGD done this afternoon for esophageal stricture. Esophagus stretched to 15. FEDERICO ordered for tomorrow.   9/17 No significant vents over night NPO for FEDERICO today scheduled for 3pm. Off levophed.  9/18 FEDERICO negative. Tube feeds restarted. NSGY following and considering brain biopsy. Propofol stopped. Precedex started.  9/19: NSGY recommending MRI brain W/WO with stealth for possible brain biopsy as other work up has been negative. Patient continues with hypotensive episodes, on Fentanyl drip and recommend to space out medications.  9/20 no significant events over night. Hypotensive episodes after vimpat dose and seroquel dose requiring a shoaib bump, IVF bolus then  Levophed gtt started to maintain map>65. Spontaneous breathing trial this afternoon  9/21 no significant events over night . Discussions with family daily about trach and peg. Trial of spon breathing off all sedation, pt. Agitated, restless not following commands. Family decided to have trach and peg done . gen surg. Consulted for trach and peg next week. Restarted fentanyl gtt for sedation. DCd EEG, not having seizures  9/22  Hypotensive episode last night required 500cc bolus and neobump x3. Levophed gtt started. Fentanyl gtt off till SBP back up. This am remains restless on fentanyl gtt. Will try versed IVP at lower dose.  Currently on levophed. Trach and peg scheduled for Tuesday. Paraneoplastic panel sent. Febrile over night blood and sputum cultures sent. Will readdress biopsy with NSGY next week.  9/23: likely intravascularly dry, fluid bolus again administered and placed on continuous rate, use prn versed 1mg  q2 hrs prn and resume fentanyl gtt when awakens, could not tolerate 2 mg dose  9/24: Patient required pressors overnight, so postponing PEG & Trach for another 24 Hr, starting haldol PRN for agitation, holding pressors and IVF to meed MAP goals, also midodrine, start SSI.  9/25: Patient BP not requiring pressors, decrease midodrine to 5, WBC 9 this AM, Pending PEG/Trach, per conversation with surgery likely tomorrow, hold tube feeds midnight, Pending NSGY second opinion.   9/26: patient S/P PEG/Trach this AM, stable. Pending NSGY second opinion regarding biopsy, Intubated on SPONT mode, weaning fentanyl, Likely MRI tomorrow.  9/27: Start Fludrocortisone, continue Decadron for adrenal insufficiency. Plan for LP today with CSF paraneoplastic panel.      Review of Systems   Unable to perform ROS: Intubated     Objective:      Vitals:    09/27/19 0801 09/27/19 0816 09/27/19 0901 09/27/19 0911   BP: (!) 183/87  (!) 172/72    BP Location: Left leg  Left leg    Patient Position: Lying  Lying    Pulse: 99 95 86 94   Resp: 17 18 17 17   Temp:       TempSrc:       SpO2: 100% 100% 100% 100%   Weight:       Height:         Physical Exam   Constitutional:   moderately obese   HENT:   Head: Normocephalic.   Eyes: PERRL  Neck: No JVD present.   Cardiovascular: Regular rhythm.   Pulmonary/Chest: Intubated  Abdominal: Soft. Bowel sounds are normal.   Musculoskeletal: She exhibits no edema.   Neurological:   PERRL, open eyes spontaneous, not  following comands  Left upper>lower extremity weakness       Assessment/Plan:     Neuro  * Brain lesion  74 year old admitted to unit on 9/3 following new onset seizure, with MRI showing large area of R cortical edema with flair and ill defined enhancement on contrast study, concern for glioma vs infectious/inflammatory process  - 9/22 repeat BC, pending   - CSF cx no growth,   -CSF studies negative- VZV, enterovirus, HSV, MS  - FEDERICO negative for vegetation     - continue decadron 4mg q6h  - Fludrocortisone   - neuro checks  - Repeat brain MRI W/WO with stealth 9/19, showed large  R frontal lobe lesion nonspecific for infectious or neoplastic process; small remote L basal banglia lacunar infarct. No new lesions or infarcts.  - NSGY, no biopsy initially ,now getting second opinion.  - Serum Paraneoplastic panel sent on 9/22  - LP 9/27 for CSF paraneoplastic panel.      New onset seizure  2/2 to brain mass   9/21 EEG dcd. No electrographic seizures for 24h  Vimpat dcd 9/20  Continue Valproic acid 750mg q8h, valproate level 53.7 (9/18)     Pulmonary  On mechanically assisted ventilation  Daily CXR, ABG  Vap protocol  Trial extubation 9/6/2019- re-intubated shortly after extubation for airway protection. Still intubated 2/2 inability to protect airway   9/21Spontaneous breathing trial off sedation, very agitated and restless, not following commands  Family decided on trach and peg instead of extubation.  S/P Trach and PEG  9/26/19     Cardiac/Vascular  Essential hypertension  SBP <180, MAP >60  Requiring levophed gtt to maintain MAPs > 60 while on fentanyl gtt  Echo: EF 65-70% concentric LV remodeling  FEDERICO (9/17) negative for vegetation, ASD, PFO   Cont to hold anti-hypertensives  Pressors stopped 9/24  IVF & midodrine      Renal/  Chronic kidney disease, stage III (moderate)  -PMHx of CKD 3   -monitor renal function, I/Os     Other  Hypotension due to hypovolemia  Pressors stopped 9/24  IVF & midodrine     The patient  is being Prophylaxed for:  Venous Thromboembolism with: Chemical  Stress Ulcer with: PPI  Ventilator Pneumonia with: chlorhexidine oral care      Activity Orders          Diet NPO Except for: Medication: NPO starting at 09/24 0001        Full Code    Caden Crouch MD  Neurocritical Care  [facility]

## 2019-09-27 NOTE — PLAN OF CARE
Patient S/p trach and peg today    Per MD, Pending NSGY second opinion regarding biopsy, weaning fentanyl, Likely MRI tomorrow.  Not medically stable for discharge.   to tour facilities and provide choices to SW.            09/26/19 1397   Discharge Reassessment   Assessment Type Discharge Planning Reassessment   Provided patient/caregiver education on the expected discharge date and the discharge plan Yes   Do you have any problems affording any of your prescribed medications? No   Discharge Plan A Long-term acute care facility (LTAC)   Discharge Plan B Skilled Nursing Facility   DME Needed Upon Discharge  other (see comments)  (tbd)   Patient choice form signed by patient/caregiver N/A   Anticipated Discharge Disposition LTAC   Can the patient answer the patient profile reliably? No, cognitively impaired   How does the patient rate their overall health at the present time?   (porsha)   Describe the patient's ability to walk at the present time. Does not walk or unable to take any steps at all   How often would a person be available to care for the patient? Whenever needed   Number of comorbid conditions (as recorded on the chart) Five or more   During the past month, has the patient often been bothered by feeling down, depressed or hopeless?   (porsha)   During the past month, has the patient often been bothered by little interest or pleasure in doing things?   (porsha)       Keely Malone RN, CCRN-K, [facility]  Neuro-Critical Care   [phone]

## 2019-09-27 NOTE — CONSULTS
Interventional Radiology Consult:    Consult received for lumbar puncture.  LPs are performed in the fluoroscopy department.  Please contact that department for assistance ([phone]).    Josi Hill MD  Resident  Department of Radiology  Pager: [phone]

## 2019-09-27 NOTE — PROCEDURES
Radiology Post-Procedure Note    Pre Op Diagnosis: AMS    Post Op Diagnosis: Same    Procedure: lumbar puncture    Procedure performed by: Cass Holman PGY3    Written Informed Consent Obtained: Yes    Specimen Removed: 5 mL CSF    Estimated Blood Loss: Minimal    Findings: Following written informed consent and sterile prep and drape, a 22 gauge spinal needle was inserted at L3 - L4 intralaminar space under fluoroscopic surveillance.  5 mL clear CSF removed and sent to the lab for further analysis.  There were no complications.    Patient tolerated procedure well.    Cass Holman, PGY3

## 2019-09-27 NOTE — H&P
Inpatient Radiology Pre-procedure Note    History of Present Illness:  Clemencia Nguyen is a 74 y.o. female who presents for lumbar puncture.  Admission H&P reviewed.  Past Medical History:   Diagnosis Date    Depression     has been on tofranil for years;    Disorder of kidney and ureter     Esophageal stricture 06/12/2019    per pt per Dr. Garcia    Hyperlipemia     Hypertension     Hypothyroidism     Osteopenia     Thyroid disease     hypothyroid     Past Surgical History:   Procedure Laterality Date    cataract surgery      CHOLECYSTECTOMY  04/24/2018    COLONOSCOPY  2011, again in 2014    repeat in 5    COLONOSCOPY W/ BIOPSIES  06/12/2019    polypectomies per Dr. Garcia    ESOPHAGOGASTRODUODENOSCOPY  06/12/2019    Dr. Garcia    ESOPHAGOGASTRODUODENOSCOPY N/A 9/16/2019    Procedure: EGD (ESOPHAGOGASTRODUODENOSCOPY);  Surgeon: Chas Castillo MD;  Location: [address]);  Service: Endoscopy;  Laterality: N/A;    ESOPHAGOGASTRODUODENOSCOPY W/ PEG N/A 9/24/2019    Procedure: EGD, WITH PEG TUBE INSERTION;  Surgeon: Maria Luisa Piña MD;  Location: [address];  Service: General;  Laterality: N/A;    ESOPHAGOGASTRODUODENOSCOPY W/ PEG N/A 9/26/2019    Procedure: EGD, WITH PEG TUBE INSERTION;  Surgeon: Maria Luisa Piña MD;  Location: [address];  Service: General;  Laterality: N/A;    HYSTERECTOMY      OOPHORECTOMY      TONSILLECTOMY      TRACHEOSTOMY N/A 9/24/2019    Procedure: CREATION, TRACHEOSTOMY;  Surgeon: Maria Luisa Piña MD;  Location: [address];  Service: General;  Laterality: N/A;    TRACHEOSTOMY N/A 9/26/2019    Procedure: CREATION, TRACHEOSTOMY open;  Surgeon: Maria Luisa Piña MD;  Location: [address];  Service: General;  Laterality: N/A;       Review of Systems:   As documented in primary team H&P    Home Meds:   Prior to Admission medications    Not on File     Scheduled Meds:    dexamethasone  4 mg Intravenous Q8H    famotidine  20  mg Per OG tube QHS    fludrocortisone  200 mcg Per G Tube BID    levothyroxine  75 mcg Per OG tube Before breakfast    midodrine  5 mg Per NG tube Q8H    QUEtiapine  50 mg Per G Tube Q12H    valproic acid (as sodium salt)  750 mg Per OG tube Q8H     Continuous Infusions:    fentanyl 300 mcg/hr (09/27/19 1501)     PRN Meds:acetaminophen, Dextrose 10% Bolus, glucagon (human recombinant), haloperidol lactate, hydrALAZINE, insulin aspart U-100, magnesium oxide, magnesium oxide, potassium chloride 10%, potassium chloride 10%, potassium, sodium phosphates, potassium, sodium phosphates, potassium, sodium phosphates, racepinephrine, sodium chloride 0.9%, sodium chloride 0.9%  Anticoagulants/Antiplatelets: no anticoagulation    Allergies: Review of patient's allergies indicates:  No Known Allergies  Sedation Hx: have not been any systemic reactions    Labs:  Recent Labs   Lab 09/25/19 1717   INR 1.0       Recent Labs   Lab 09/27/19  0007   WBC 6.49   HGB 8.8*   HCT 28.1*   MCV 95   *      Recent Labs   Lab 09/27/19  0007         K 4.7      CO2 28   BUN 14   CREATININE 0.9   CALCIUM 8.1*   MG 2.2   ALT 55*   AST 13   ALBUMIN 2.5*   BILITOT 0.3         Vitals:  Temp: 98.1 °F (36.7 °C) (09/27/19 1501)  Pulse: 94 (09/27/19 1501)  Resp: 20 (09/27/19 1501)  BP: (Abnormal) 178/76 (09/27/19 1501)  SpO2: 100 % (09/27/19 1501)     Physical Exam:  General: no acute distress  Mental Status: alert and oriented to person, place and time  HEENT: normocephalic, atraumatic  Chest: unlabored breathing  Heart: regular heart rate  Abdomen: nondistended  Extremity: moves all extremities    Plan: lumbar puncture  Sedation Plan: local    Cass Holman, PGY-3

## 2019-09-27 NOTE — PLAN OF CARE
POC reviewed with pt and spouse at 0200. Pt's spouse was able to verbalize understanding. Questions and concerns addressed. No acute events overnight.     Trach/Peg yesterday   Fentanyl at 300 mcg  Flexi    Will continue to monitor.

## 2019-09-27 NOTE — PLAN OF CARE
POC reviewed with pt and family at 1400. Pt unable to verbalize understanding d/t trach. Questions and concerns addressed with  and in agreement with POC. No acute events today. Sys <180 maintained with no issues. Fentanyl gtt titrated to maintain RASS 0. LP done today at radiology d/t bedside LP unsuccessful. Pt progressing toward goals. Will continue to monitor. See flowsheets for full assessment and VS info.

## 2019-09-27 NOTE — CARE UPDATE
Pt transported to IR for lumbar puncture, and back to the ICU Vent. No resp distress, will continue to monitor

## 2019-09-27 NOTE — PLAN OF CARE
DEEP met with Pt  and family members at bedside. Discussed LTAC list and search so far. Pt  still has to tour facilities. Discussed possible timeline. He advised he did not get the sense from the MD team that the Pt would be ready until at least next week but he reported he will tour soon.    Toña Henning, RENEE  Neurocritical Care   [facility]  [phone]

## 2019-09-27 NOTE — PROGRESS NOTES
Pt seen and examined.  Trach and PEG look good.  May start tube feeds this afternoon.  Please contact gen surg for any change in abdominal exam or clinical status.  Will sign off at this time.

## 2019-09-27 NOTE — NURSING
Verified with MD Miko to not re-start TF until after IR has assessed pt and determines if lumbar puncture in IR will occur today. Will continue to monitor.

## 2019-09-28 PROBLEM — R45.1 RESTLESSNESS AND AGITATION: Status: ACTIVE | Noted: 2019-09-28

## 2019-09-28 PROBLEM — G90.1 DYSAUTONOMIA: Status: ACTIVE | Noted: 2019-09-28

## 2019-09-28 LAB
ABO GROUP BLD: NORMAL
ALBUMIN SERPL BCP-MCNC: 2.6 G/DL (ref 3.5–5.2)
ALLENS TEST: ABNORMAL
ALLENS TEST: ABNORMAL
ALP SERPL-CCNC: 50 U/L (ref 55–135)
ALT SERPL W/O P-5'-P-CCNC: 46 U/L (ref 10–44)
ANION GAP SERPL CALC-SCNC: 9 MMOL/L (ref 8–16)
AST SERPL-CCNC: 16 U/L (ref 10–40)
BACTERIA BLD CULT: NORMAL
BACTERIA BLD CULT: NORMAL
BASOPHILS # BLD AUTO: 0 K/UL (ref 0–0.2)
BASOPHILS NFR BLD: 0 % (ref 0–1.9)
BILIRUB SERPL-MCNC: 0.4 MG/DL (ref 0.1–1)
BLD GP AB SCN CELLS X3 SERPL QL: NORMAL
BUN SERPL-MCNC: 17 MG/DL (ref 8–23)
CALCIUM SERPL-MCNC: 8.4 MG/DL (ref 8.7–10.5)
CHLORIDE SERPL-SCNC: 100 MMOL/L (ref 95–110)
CO2 SERPL-SCNC: 28 MMOL/L (ref 23–29)
CREAT SERPL-MCNC: 0.8 MG/DL (ref 0.5–1.4)
DELSYS: ABNORMAL
DELSYS: ABNORMAL
DIFFERENTIAL METHOD: ABNORMAL
EOSINOPHIL # BLD AUTO: 0 K/UL (ref 0–0.5)
EOSINOPHIL NFR BLD: 0 % (ref 0–8)
ERYTHROCYTE [DISTWIDTH] IN BLOOD BY AUTOMATED COUNT: 15.9 % (ref 11.5–14.5)
ERYTHROCYTE [SEDIMENTATION RATE] IN BLOOD BY WESTERGREN METHOD: 12 MM/H
EST. GFR  (AFRICAN AMERICAN): >60 ML/MIN/1.73 M^2
EST. GFR  (NON AFRICAN AMERICAN): >60 ML/MIN/1.73 M^2
FIO2: 40
FIO2: 40
GLUCOSE SERPL-MCNC: 136 MG/DL (ref 70–110)
HCO3 UR-SCNC: 30.4 MMOL/L (ref 24–28)
HCO3 UR-SCNC: 32.2 MMOL/L (ref 24–28)
HCT VFR BLD AUTO: 30.9 % (ref 37–48.5)
HGB BLD-MCNC: 9.5 G/DL (ref 12–16)
IMM GRANULOCYTES # BLD AUTO: 0.07 K/UL (ref 0–0.04)
IMM GRANULOCYTES NFR BLD AUTO: 1.1 % (ref 0–0.5)
LYMPHOCYTES # BLD AUTO: 0.4 K/UL (ref 1–4.8)
LYMPHOCYTES NFR BLD: 6.5 % (ref 18–48)
MAGNESIUM SERPL-MCNC: 2.2 MG/DL (ref 1.6–2.6)
MCH RBC QN AUTO: 30.4 PG (ref 27–31)
MCHC RBC AUTO-ENTMCNC: 30.7 G/DL (ref 32–36)
MCV RBC AUTO: 99 FL (ref 82–98)
MIN VOL: 4.44
MODE: ABNORMAL
MODE: ABNORMAL
MONOCYTES # BLD AUTO: 1 K/UL (ref 0.3–1)
MONOCYTES NFR BLD: 14.4 % (ref 4–15)
NEUTROPHILS # BLD AUTO: 5.2 K/UL (ref 1.8–7.7)
NEUTROPHILS NFR BLD: 78 % (ref 38–73)
NRBC BLD-RTO: 0 /100 WBC
PCO2 BLDA: 40.5 MMHG (ref 35–45)
PCO2 BLDA: 42.2 MMHG (ref 35–45)
PEEP: 7
PEEP: 7
PH SMN: 7.48 [PH] (ref 7.35–7.45)
PH SMN: 7.49 [PH] (ref 7.35–7.45)
PHOSPHATE SERPL-MCNC: 3 MG/DL (ref 2.7–4.5)
PIP: 32
PLATELET # BLD AUTO: 148 K/UL (ref 150–350)
PMV BLD AUTO: 9.1 FL (ref 9.2–12.9)
PO2 BLDA: 157 MMHG (ref 80–100)
PO2 BLDA: 164 MMHG (ref 80–100)
POC BE: 7 MMOL/L
POC BE: 9 MMOL/L
POC SATURATED O2: 100 % (ref 95–100)
POC SATURATED O2: 100 % (ref 95–100)
POC TCO2: 32 MMOL/L (ref 23–27)
POC TCO2: 33 MMOL/L (ref 23–27)
POCT GLUCOSE: 115 MG/DL (ref 70–110)
POCT GLUCOSE: 134 MG/DL (ref 70–110)
POCT GLUCOSE: 150 MG/DL (ref 70–110)
POCT GLUCOSE: 175 MG/DL (ref 70–110)
POCT GLUCOSE: 177 MG/DL (ref 70–110)
POTASSIUM SERPL-SCNC: 4.7 MMOL/L (ref 3.5–5.1)
PROT SERPL-MCNC: 5.7 G/DL (ref 6–8.4)
PS: 14
PS: 15
RBC # BLD AUTO: 3.12 M/UL (ref 4–5.4)
RH BLD: NORMAL
SAMPLE: ABNORMAL
SAMPLE: ABNORMAL
SITE: ABNORMAL
SITE: ABNORMAL
SODIUM SERPL-SCNC: 137 MMOL/L (ref 136–145)
SP02: 98
SP02: 99
SPONT RATE: 8
VT: 380
WBC # BLD AUTO: 6.6 K/UL (ref 3.9–12.7)

## 2019-09-28 PROCEDURE — 82803 BLOOD GASES ANY COMBINATION: CPT

## 2019-09-28 PROCEDURE — 86900 BLOOD TYPING SEROLOGIC ABO: CPT

## 2019-09-28 PROCEDURE — 99900026 HC AIRWAY MAINTENANCE (STAT)

## 2019-09-28 PROCEDURE — 63600175 PHARM REV CODE 636 W HCPCS: Performed by: NURSE PRACTITIONER

## 2019-09-28 PROCEDURE — 86901 BLOOD TYPING SEROLOGIC RH(D): CPT

## 2019-09-28 PROCEDURE — 36600 WITHDRAWAL OF ARTERIAL BLOOD: CPT

## 2019-09-28 PROCEDURE — 99900035 HC TECH TIME PER 15 MIN (STAT)

## 2019-09-28 PROCEDURE — 99233 SBSQ HOSP IP/OBS HIGH 50: CPT | Mod: GC,,, | Performed by: PSYCHIATRY & NEUROLOGY

## 2019-09-28 PROCEDURE — 27000221 HC OXYGEN, UP TO 24 HOURS

## 2019-09-28 PROCEDURE — 63600175 PHARM REV CODE 636 W HCPCS: Performed by: PHYSICIAN ASSISTANT

## 2019-09-28 PROCEDURE — 83735 ASSAY OF MAGNESIUM: CPT

## 2019-09-28 PROCEDURE — 25000003 PHARM REV CODE 250: Performed by: PSYCHIATRY & NEUROLOGY

## 2019-09-28 PROCEDURE — 85025 COMPLETE CBC W/AUTO DIFF WBC: CPT

## 2019-09-28 PROCEDURE — 63600175 PHARM REV CODE 636 W HCPCS: Performed by: INTERNAL MEDICINE

## 2019-09-28 PROCEDURE — 25000003 PHARM REV CODE 250: Performed by: PHYSICIAN ASSISTANT

## 2019-09-28 PROCEDURE — 80053 COMPREHEN METABOLIC PANEL: CPT

## 2019-09-28 PROCEDURE — 25000003 PHARM REV CODE 250: Performed by: INTERNAL MEDICINE

## 2019-09-28 PROCEDURE — 86850 RBC ANTIBODY SCREEN: CPT

## 2019-09-28 PROCEDURE — 99233 PR SUBSEQUENT HOSPITAL CARE,LEVL III: ICD-10-PCS | Mod: GC,,, | Performed by: PSYCHIATRY & NEUROLOGY

## 2019-09-28 PROCEDURE — 84100 ASSAY OF PHOSPHORUS: CPT

## 2019-09-28 PROCEDURE — 94761 N-INVAS EAR/PLS OXIMETRY MLT: CPT

## 2019-09-28 PROCEDURE — 20000000 HC ICU ROOM

## 2019-09-28 PROCEDURE — 27200966 HC CLOSED SUCTION SYSTEM

## 2019-09-28 PROCEDURE — 94003 VENT MGMT INPAT SUBQ DAY: CPT

## 2019-09-28 PROCEDURE — 63600175 PHARM REV CODE 636 W HCPCS: Performed by: PSYCHIATRY & NEUROLOGY

## 2019-09-28 RX ORDER — FLUDROCORTISONE ACETATE 0.1 MG/1
100 TABLET ORAL 2 TIMES DAILY
Status: DISCONTINUED | OUTPATIENT
Start: 2019-09-28 | End: 2019-10-04 | Stop reason: HOSPADM

## 2019-09-28 RX ORDER — SODIUM CHLORIDE, SODIUM LACTATE, POTASSIUM CHLORIDE, CALCIUM CHLORIDE 600; 310; 30; 20 MG/100ML; MG/100ML; MG/100ML; MG/100ML
INJECTION, SOLUTION INTRAVENOUS CONTINUOUS
Status: DISCONTINUED | OUTPATIENT
Start: 2019-09-28 | End: 2019-09-29

## 2019-09-28 RX ORDER — GABAPENTIN 300 MG/1
300 CAPSULE ORAL 3 TIMES DAILY
Status: DISCONTINUED | OUTPATIENT
Start: 2019-09-28 | End: 2019-09-29

## 2019-09-28 RX ORDER — ENOXAPARIN SODIUM 100 MG/ML
40 INJECTION SUBCUTANEOUS EVERY 24 HOURS
Status: DISCONTINUED | OUTPATIENT
Start: 2019-09-28 | End: 2019-10-04 | Stop reason: HOSPADM

## 2019-09-28 RX ORDER — MIDODRINE HYDROCHLORIDE 2.5 MG/1
2.5 TABLET ORAL
Status: DISCONTINUED | OUTPATIENT
Start: 2019-09-28 | End: 2019-09-28

## 2019-09-28 RX ADMIN — LEVOTHYROXINE SODIUM 75 MCG: 75 TABLET ORAL at 06:09

## 2019-09-28 RX ADMIN — VALPROIC ACID 750 MG: 250 SOLUTION ORAL at 02:09

## 2019-09-28 RX ADMIN — DEXAMETHASONE SODIUM PHOSPHATE 4 MG: 4 INJECTION, SOLUTION INTRAMUSCULAR; INTRAVENOUS at 11:09

## 2019-09-28 RX ADMIN — INSULIN ASPART 2 UNITS: 100 INJECTION, SOLUTION INTRAVENOUS; SUBCUTANEOUS at 08:09

## 2019-09-28 RX ADMIN — FAMOTIDINE 20 MG: 20 TABLET ORAL at 08:09

## 2019-09-28 RX ADMIN — SODIUM CHLORIDE, SODIUM LACTATE, POTASSIUM CHLORIDE, AND CALCIUM CHLORIDE: .6; .31; .03; .02 INJECTION, SOLUTION INTRAVENOUS at 11:09

## 2019-09-28 RX ADMIN — HYDRALAZINE HYDROCHLORIDE 10 MG: 20 INJECTION INTRAMUSCULAR; INTRAVENOUS at 03:09

## 2019-09-28 RX ADMIN — VALPROIC ACID 750 MG: 250 SOLUTION ORAL at 05:09

## 2019-09-28 RX ADMIN — ENOXAPARIN SODIUM 40 MG: 100 INJECTION SUBCUTANEOUS at 05:09

## 2019-09-28 RX ADMIN — QUETIAPINE FUMARATE 50 MG: 25 TABLET ORAL at 03:09

## 2019-09-28 RX ADMIN — MIDODRINE HYDROCHLORIDE 2.5 MG: 2.5 TABLET ORAL at 01:09

## 2019-09-28 RX ADMIN — FLUDROCORTISONE ACETATE 100 MCG: 0.1 TABLET ORAL at 08:09

## 2019-09-28 RX ADMIN — FLUDROCORTISONE ACETATE 200 MCG: 0.1 TABLET ORAL at 09:09

## 2019-09-28 RX ADMIN — DEXAMETHASONE SODIUM PHOSPHATE 4 MG: 4 INJECTION, SOLUTION INTRAMUSCULAR; INTRAVENOUS at 01:09

## 2019-09-28 RX ADMIN — VALPROIC ACID 750 MG: 250 SOLUTION ORAL at 09:09

## 2019-09-28 RX ADMIN — GABAPENTIN 300 MG: 300 CAPSULE ORAL at 03:09

## 2019-09-28 RX ADMIN — HYDRALAZINE HYDROCHLORIDE 10 MG: 20 INJECTION INTRAMUSCULAR; INTRAVENOUS at 09:09

## 2019-09-28 RX ADMIN — GABAPENTIN 300 MG: 300 CAPSULE ORAL at 08:09

## 2019-09-28 RX ADMIN — DEXAMETHASONE SODIUM PHOSPHATE 4 MG: 4 INJECTION, SOLUTION INTRAMUSCULAR; INTRAVENOUS at 06:09

## 2019-09-28 RX ADMIN — INSULIN ASPART 2 UNITS: 100 INJECTION, SOLUTION INTRAVENOUS; SUBCUTANEOUS at 05:09

## 2019-09-28 RX ADMIN — Medication 300 MCG/HR: at 01:09

## 2019-09-28 RX ADMIN — QUETIAPINE FUMARATE 50 MG: 25 TABLET ORAL at 04:09

## 2019-09-28 NOTE — NURSING
NCC team. ALEJANDRO Taylor notified of Pts systalic increase to 216. Pressure was decreased after administering Hydralazine. MD/PA updated. Will continue to monitor.

## 2019-09-28 NOTE — ASSESSMENT & PLAN NOTE
Daily CXR, ABG  Vap protocol  Trial extubation 9/6/2019- re-intubated shortly after extubation for airway protection. Still intubated 2/2 inability to protect airway   9/21Spontaneous breathing trial off sedation, very agitated and restless, not following commands  Family decided on trach and peg instead of extubation.  Gen. Surg consulted for trach and peg next week  Fentanyl gtt 200mcg/h ( nontitrating) for sedation still restless. Gave versed 1mg IVP. Remains on levophed gtt for hypotensive episodes    Vent Mode: Spont  Oxygen Concentration (%):  [40] 40  Resp Rate Total:  [5.6 br/min-25 br/min] 13 br/min  Vt Set:  [380 mL] 380 mL  PEEP/CPAP:  [7 cmH20] 7 cmH20  Pressure Support:  [14 cmH20-15 cmH20] 14 cmH20  Mean Airway Pressure:  [6.4 snX77-44 cmH20] 10 cmH20   Daily CXR/ABG  Does better in terms of airway pressures and synchrony on psv setting

## 2019-09-28 NOTE — PLAN OF CARE
POC reviewed with pt and spouse at 0300. Pt's  verbalized understanding. Questions and concerns addressed. No acute events overnight. See flowsheets for full assessment and VS info    Fentanyl at 300 mcg/hr  Hydralazine given for SBP >180     Will continue to monitor.

## 2019-09-28 NOTE — SUBJECTIVE & OBJECTIVE
Review of Systems   Unable to perform ROS: Intubated       Objective:     Vitals:  Temp: 98.7 °F (37.1 °C)  Pulse: 96  Rhythm: normal sinus rhythm, sinus tachycardia  BP: (!) 184/76  MAP (mmHg): 109  Resp: 14  SpO2: 99 %  Oxygen Concentration (%): 40  O2 Device (Oxygen Therapy): ventilator  Vent Mode: Spont  Set Rate: 0 bmp  Vt Set: 380 mL  Pressure Support: 14 cmH20  PEEP/CPAP: 7 cmH20  Peak Airway Pressure: 22 cmH2O  Mean Airway Pressure: 10 cmH20  Plateau Pressure: 0 cmH20    Temp  Min: 98.1 °F (36.7 °C)  Max: 98.9 °F (37.2 °C)  Pulse  Min: 77  Max: 120  BP  Min: 120/58  Max: 223/145  MAP (mmHg)  Min: 83  Max: 154  Resp  Min: 9  Max: 34  SpO2  Min: 98 %  Max: 100 %  Oxygen Concentration (%)  Min: 40  Max: 40    09/27 0701 - 09/28 0700  In: 925.2 [I.V.:925.2]  Out: 1200 [Urine:1200]   Unmeasured Output  Urine Occurrence: 1  Stool Occurrence: 0  Emesis Occurrence: 0  Pad Count: 1       Physical Exam   Constitutional:   moderately obese   HENT:   Head: Normocephalic.   Eyes: Pupils are equal, round, and reactive to light.   Neck: No JVD present.   Cardiovascular: Regular rhythm.   Pulmonary/Chest: She has wheezes.   Abdominal: Soft. Bowel sounds are normal.   Musculoskeletal: She exhibits no edema.   Neurological:   Agitation improved  Left upper>lower extremity weakness  Occasional extension movements with elevation of bp and hr   Skin: Capillary refill takes less than 2 seconds.   Nursing note and vitals reviewed.        Medications:  Continuous    fentanyl Last Rate: 300 mcg/hr (09/28/19 1405)   lactated ringers Last Rate: 50 mL/hr at 09/28/19 1405   Scheduled    dexamethasone 4 mg Q8H   enoxaparin 40 mg Daily   famotidine 20 mg QHS   fludrocortisone 100 mcg BID   gabapentin 300 mg TID   levothyroxine 75 mcg Before breakfast   QUEtiapine 50 mg Q12H   valproic acid (as sodium salt) 750 mg Q8H   PRN    acetaminophen 650 mg Q6H PRN   Dextrose 10% Bolus 12.5 g PRN   glucagon (human recombinant) 1 mg PRN    haloperidol lactate 10 mg Q6H PRN   hydrALAZINE 10 mg Q6H PRN   insulin aspart U-100 1-10 Units Q4H PRN   magnesium oxide 800 mg PRN   magnesium oxide 800 mg PRN   potassium chloride 10% 40 mEq PRN   potassium chloride 10% 40 mEq PRN   potassium, sodium phosphates 2 packet PRN   potassium, sodium phosphates 2 packet PRN   potassium, sodium phosphates 2 packet PRN   racepinephrine 0.5 mL Q4H PRN   sodium chloride 0.9% 10 mL PRN   sodium chloride 0.9% 10 mL PRN     Today I personally reviewed pertinent medications, lines/drains/airways, imaging, laboratory results, notably:    Diet  Diet NPO Except for: Medication  Diet NPO Except for: Medication

## 2019-09-28 NOTE — ASSESSMENT & PLAN NOTE
SBP <180, MAP >65   Requiring levophed gtt to maintain MAPs > 65 while on fentanyl gtt  Echo: EF 65-70% concentric LV remodeling  FEDERICO (9/17) negative for vegetation, ASD, PFO   Cont to hold anti-hypertensives  Wean off levophed, hypotension is fluid responsive   Due to adrenal insuffucincy, florinef added  Wean midodrin off

## 2019-09-28 NOTE — PROGRESS NOTES
[facility]  Neurocritical Care  Progress Note    Admit Date: 9/3/2019  Service Date: 09/28/2019  Length of Stay: 25    Subjective:     Chief Complaint: Brain lesion    History of Present Illness: Pt is  73 yo female with a PMHx of CKD 3, HTN, was transferred from [facility] to [facility] for new onset seizures and concern for right front lobe mass. The pt was found in her bedroom by her spouse at approximately 9:45 pm sitting her head against the bed, unresponsive, and having seizure like activity on the left-side. EMT was called and the pt was given Versed twice while en route to the hospital. Pt had a second witnessed seizures, left facial droop, left-sided weakness, and tongue ecchymosis in the ED. Neurology was consulted and The pt was given IV Keppra and Vimpat. MRI brain without contrast was acquired. The image showed right frontal lobe vasogenic edema with mass effect concerning for underlying mass. EEG was acquired at outside hospital concerning showing an abnormal result. The pt was given decadron IV and neurosurgical consult recommended. Pt transferred to [facility] and admitted to the [facility] for higher level of care and further evaluation.     Pt history acquired per chart review and from spouse present at the bedside. The pt's spouse denies prior history of seizures CVA, cancer history and up-to-date screenings    Hospital Course: --admitted to [facility] on 9/3 following new onset seizure, arrived intubated  --MRI with large area of R cortical edema with flair and ill defined enhancement on contrast study, concern for glioma vs infectious/inflammatory process  --LP appears non infectious, cytology pending  9/5- no acute events, awaiting LP finalization from pathology report., weaning vent.   09/06/2019: Very agitated overnight, requiring increased sedation. Patient extubated this morning on rounds, and reintubated shortly after. Unable to maintain airway and secretions. CSF gram stain negative.  09/07/2019: KATT.  EKG obtained, seroquel started. Valproic acid started.  09/08/2019: NAEON. Improved agitation with seroquel.  09/09/2019: MRI Brain w/wo ordered for today. BLE US ordered. Will need new LP later on in the week.   09/10/2019 LP for infx workup, CD4 lab, EEG monitoring per Epilepsy   09/11/2019: To IR for LP. Spoke to NSGY about possible bx of lesion. Discontinue cEEG.   9/13/2019 Issues with low HR and BP overnight and this morning, Given Shoaib bump and atropine. 24 hours of IVF and bolus. WBC decreasing and afebrile. Repeat blood cultures negative. Plan for EGD and FEDERICO today.   9/14/2019 NAEO, EGD and FEDERICO moved until Monday. CSF still pending. Increasing WBC but afebrile. Start tube feeding   9/15/2019 NAEO, all CSF studies negative so far. CT negative. Begin insulin gtt. EGD and FEDERICO tomorrow    9/16 No significant events over night. EGD done this afternoon for esophageal stricture. Esophagus stretched to 15. FEDERICO ordered for tomorrow.   9/17 No significant vents over night NPO for FEDERICO today scheduled for 3pm. Off levophed.  9/18 FEDERCIO negative. Tube feeds restarted. NSGY following and considering brain biopsy. Propofol stopped. Precedex started.  9/19: NSGY recommending MRI brain W/WO with stealth for possible brain biopsy as other work up has been negative. Patient continues with hypotensive episodes, on Fentanyl drip and recommend to space out medications.  9/20 no significant events over night. Hypotensive episodes after vimpat dose and seroquel dose requiring a shoaib bump, IVF bolus then  Levophed gtt started to maintain map>65. Spontaneous breathing trial this afternoon  9/21 no significant events over night . Discussions with family daily about trach and peg. Trial of spon breathing off all sedation, pt. Agitated, restless not following commands. Family decided to have trach and peg done . gen surg. Consulted for trach and peg next week. Restarted fentanyl gtt for sedation. DCd EEG, not having seizures  9/22  Hypotensive episode last night required 500cc bolus and neobump x3. Levophed gtt started. Fentanyl gtt off till SBP back up. This am remains restless on fentanyl gtt. Will try versed IVP at lower dose.  Currently on levophed. Trach and peg scheduled for Tuesday. Paraneoplastic panel sent. Febrile over night blood and sputum cultures sent. Will readdress biopsy with NSGY next week.  9/23: likely intravascularly dry, fluid bolus again administered and placed on continuous rate, use prn versed 1mg  q2 hrs prn and resume fentanyl gtt when awakens, could not tolerate 2 mg dose        Review of Systems   Unable to perform ROS: Intubated       Objective:     Vitals:  Temp: 98.7 °F (37.1 °C)  Pulse: 96  Rhythm: normal sinus rhythm, sinus tachycardia  BP: (!) 184/76  MAP (mmHg): 109  Resp: 14  SpO2: 99 %  Oxygen Concentration (%): 40  O2 Device (Oxygen Therapy): ventilator  Vent Mode: Spont  Set Rate: 0 bmp  Vt Set: 380 mL  Pressure Support: 14 cmH20  PEEP/CPAP: 7 cmH20  Peak Airway Pressure: 22 cmH2O  Mean Airway Pressure: 10 cmH20  Plateau Pressure: 0 cmH20    Temp  Min: 98.1 °F (36.7 °C)  Max: 98.9 °F (37.2 °C)  Pulse  Min: 77  Max: 120  BP  Min: 120/58  Max: 223/145  MAP (mmHg)  Min: 83  Max: 154  Resp  Min: 9  Max: 34  SpO2  Min: 98 %  Max: 100 %  Oxygen Concentration (%)  Min: 40  Max: 40    09/27 0701 - 09/28 0700  In: 925.2 [I.V.:925.2]  Out: 1200 [Urine:1200]   Unmeasured Output  Urine Occurrence: 1  Stool Occurrence: 0  Emesis Occurrence: 0  Pad Count: 1       Physical Exam   Constitutional:   moderately obese   HENT:   Head: Normocephalic.   Eyes: Pupils are equal, round, and reactive to light.   Neck: No JVD present.   Cardiovascular: Regular rhythm.   Pulmonary/Chest: She has wheezes.   Abdominal: Soft. Bowel sounds are normal.   Musculoskeletal: She exhibits no edema.   Neurological:   Agitation improved  Left upper>lower extremity weakness  Occasional extension movements with elevation of bp and hr   Skin:  Capillary refill takes less than 2 seconds.   Nursing note and vitals reviewed.        Medications:  Continuous    fentanyl Last Rate: 300 mcg/hr (09/28/19 1405)   lactated ringers Last Rate: 50 mL/hr at 09/28/19 1405   Scheduled    dexamethasone 4 mg Q8H   enoxaparin 40 mg Daily   famotidine 20 mg QHS   fludrocortisone 100 mcg BID   gabapentin 300 mg TID   levothyroxine 75 mcg Before breakfast   QUEtiapine 50 mg Q12H   valproic acid (as sodium salt) 750 mg Q8H   PRN    acetaminophen 650 mg Q6H PRN   Dextrose 10% Bolus 12.5 g PRN   glucagon (human recombinant) 1 mg PRN   haloperidol lactate 10 mg Q6H PRN   hydrALAZINE 10 mg Q6H PRN   insulin aspart U-100 1-10 Units Q4H PRN   magnesium oxide 800 mg PRN   magnesium oxide 800 mg PRN   potassium chloride 10% 40 mEq PRN   potassium chloride 10% 40 mEq PRN   potassium, sodium phosphates 2 packet PRN   potassium, sodium phosphates 2 packet PRN   potassium, sodium phosphates 2 packet PRN   racepinephrine 0.5 mL Q4H PRN   sodium chloride 0.9% 10 mL PRN   sodium chloride 0.9% 10 mL PRN     Today I personally reviewed pertinent medications, lines/drains/airways, imaging, laboratory results, notably:    Diet  Diet NPO Except for: Medication  Diet NPO Except for: Medication        Assessment/Plan:     Neuro  Dysautonomia  Gabapentin added, titrate to 900mg dose tid over 3 days    New onset seizure  2/2 to brain mass   9/21 EEG dcd. No electrographic seizures for 24h  Vimpat dcd 9/20  Continue Valproic acid 750mg q8h, valproate level 53.7 (9/18)            Psychiatric  Restlessness and agitation  Responded well to seroquel  D/c if sedation develops with gabapentin titration    Pulmonary  On mechanically assisted ventilation  Daily CXR, ABG  Vap protocol  Trial extubation 9/6/2019- re-intubated shortly after extubation for airway protection. Still intubated 2/2 inability to protect airway   9/21Spontaneous breathing trial off sedation, very agitated and restless, not following  commands  Family decided on trach and peg instead of extubation.  Gen. Surg consulted for trach and peg next week  Fentanyl gtt 200mcg/h ( nontitrating) for sedation still restless. Gave versed 1mg IVP. Remains on levophed gtt for hypotensive episodes    Vent Mode: Spont  Oxygen Concentration (%):  [40] 40  Resp Rate Total:  [5.6 br/min-25 br/min] 13 br/min  Vt Set:  [380 mL] 380 mL  PEEP/CPAP:  [7 cmH20] 7 cmH20  Pressure Support:  [14 cmH20-15 cmH20] 14 cmH20  Mean Airway Pressure:  [6.4 weV66-73 cmH20] 10 cmH20   Daily CXR/ABG  Does better in terms of airway pressures and synchrony on psv setting    Cardiac/Vascular  Essential hypertension  SBP <180, MAP >65   Requiring levophed gtt to maintain MAPs > 65 while on fentanyl gtt  Echo: EF 65-70% concentric LV remodeling  FEDERICO (9/17) negative for vegetation, ASD, PFO   Cont to hold anti-hypertensives  Wean off levophed, hypotension is fluid responsive   Due to adrenal insuffucincy, florinef added  Wean midodrin off            The patient is being Prophylaxed for:  Venous Thromboembolism with: Chemical  Stress Ulcer with: H2B  Ventilator Pneumonia with: not applicable    Activity Orders          Diet NPO Except for: Medication: NPO starting at 09/24 0001        Full Code    Eugene Simms MD  Neurocritical Care  [facility]

## 2019-09-29 LAB
ALBUMIN SERPL BCP-MCNC: 2.1 G/DL (ref 3.5–5.2)
ALLENS TEST: ABNORMAL
ALP SERPL-CCNC: 40 U/L (ref 55–135)
ALT SERPL W/O P-5'-P-CCNC: 33 U/L (ref 10–44)
ANION GAP SERPL CALC-SCNC: 5 MMOL/L (ref 8–16)
AST SERPL-CCNC: 12 U/L (ref 10–40)
BASOPHILS # BLD AUTO: 0 K/UL (ref 0–0.2)
BASOPHILS NFR BLD: 0 % (ref 0–1.9)
BILIRUB SERPL-MCNC: 0.3 MG/DL (ref 0.1–1)
BUN SERPL-MCNC: 25 MG/DL (ref 8–23)
CALCIUM SERPL-MCNC: 8 MG/DL (ref 8.7–10.5)
CHLORIDE SERPL-SCNC: 102 MMOL/L (ref 95–110)
CO2 SERPL-SCNC: 31 MMOL/L (ref 23–29)
CREAT SERPL-MCNC: 0.9 MG/DL (ref 0.5–1.4)
DELSYS: ABNORMAL
DIFFERENTIAL METHOD: ABNORMAL
EOSINOPHIL # BLD AUTO: 0 K/UL (ref 0–0.5)
EOSINOPHIL NFR BLD: 0 % (ref 0–8)
EOSINOPHIL NFR BLD: 0.3 % (ref 0–8)
ERYTHROCYTE [DISTWIDTH] IN BLOOD BY AUTOMATED COUNT: 15.9 % (ref 11.5–14.5)
ERYTHROCYTE [DISTWIDTH] IN BLOOD BY AUTOMATED COUNT: 15.9 % (ref 11.5–14.5)
ERYTHROCYTE [DISTWIDTH] IN BLOOD BY AUTOMATED COUNT: 16 % (ref 11.5–14.5)
ERYTHROCYTE [DISTWIDTH] IN BLOOD BY AUTOMATED COUNT: 16.1 % (ref 11.5–14.5)
ERYTHROCYTE [SEDIMENTATION RATE] IN BLOOD BY WESTERGREN METHOD: 9 MM/H
EST. GFR  (AFRICAN AMERICAN): >60 ML/MIN/1.73 M^2
EST. GFR  (NON AFRICAN AMERICAN): >60 ML/MIN/1.73 M^2
FIO2: 40
GLUCOSE SERPL-MCNC: 120 MG/DL (ref 70–110)
HCO3 UR-SCNC: 34.1 MMOL/L (ref 24–28)
HCT VFR BLD AUTO: 25.2 % (ref 37–48.5)
HCT VFR BLD AUTO: 26.9 % (ref 37–48.5)
HCT VFR BLD AUTO: 29.6 % (ref 37–48.5)
HCT VFR BLD AUTO: 29.8 % (ref 37–48.5)
HGB BLD-MCNC: 7.4 G/DL (ref 12–16)
HGB BLD-MCNC: 8.3 G/DL (ref 12–16)
HGB BLD-MCNC: 8.9 G/DL (ref 12–16)
HGB BLD-MCNC: 9.2 G/DL (ref 12–16)
IMM GRANULOCYTES # BLD AUTO: 0.04 K/UL (ref 0–0.04)
IMM GRANULOCYTES # BLD AUTO: 0.04 K/UL (ref 0–0.04)
IMM GRANULOCYTES # BLD AUTO: 0.05 K/UL (ref 0–0.04)
IMM GRANULOCYTES # BLD AUTO: 0.06 K/UL (ref 0–0.04)
IMM GRANULOCYTES NFR BLD AUTO: 0.8 % (ref 0–0.5)
IMM GRANULOCYTES NFR BLD AUTO: 1.1 % (ref 0–0.5)
IMM GRANULOCYTES NFR BLD AUTO: 1.1 % (ref 0–0.5)
IMM GRANULOCYTES NFR BLD AUTO: 1.3 % (ref 0–0.5)
INR PPP: 1 (ref 0.8–1.2)
LYMPHOCYTES # BLD AUTO: 0.4 K/UL (ref 1–4.8)
LYMPHOCYTES # BLD AUTO: 0.5 K/UL (ref 1–4.8)
LYMPHOCYTES # BLD AUTO: 0.5 K/UL (ref 1–4.8)
LYMPHOCYTES # BLD AUTO: 0.7 K/UL (ref 1–4.8)
LYMPHOCYTES NFR BLD: 10.6 % (ref 18–48)
LYMPHOCYTES NFR BLD: 11.4 % (ref 18–48)
LYMPHOCYTES NFR BLD: 12.4 % (ref 18–48)
LYMPHOCYTES NFR BLD: 14.2 % (ref 18–48)
MAGNESIUM SERPL-MCNC: 2.2 MG/DL (ref 1.6–2.6)
MCH RBC QN AUTO: 29.4 PG (ref 27–31)
MCH RBC QN AUTO: 29.7 PG (ref 27–31)
MCH RBC QN AUTO: 29.8 PG (ref 27–31)
MCH RBC QN AUTO: 30.7 PG (ref 27–31)
MCHC RBC AUTO-ENTMCNC: 29.4 G/DL (ref 32–36)
MCHC RBC AUTO-ENTMCNC: 29.9 G/DL (ref 32–36)
MCHC RBC AUTO-ENTMCNC: 30.9 G/DL (ref 32–36)
MCHC RBC AUTO-ENTMCNC: 31.1 G/DL (ref 32–36)
MCV RBC AUTO: 100 FL (ref 82–98)
MCV RBC AUTO: 102 FL (ref 82–98)
MCV RBC AUTO: 96 FL (ref 82–98)
MCV RBC AUTO: 98 FL (ref 82–98)
MODE: ABNORMAL
MONOCYTES # BLD AUTO: 0.6 K/UL (ref 0.3–1)
MONOCYTES # BLD AUTO: 0.6 K/UL (ref 0.3–1)
MONOCYTES # BLD AUTO: 0.7 K/UL (ref 0.3–1)
MONOCYTES # BLD AUTO: 1.1 K/UL (ref 0.3–1)
MONOCYTES NFR BLD: 15.3 % (ref 4–15)
MONOCYTES NFR BLD: 16.6 % (ref 4–15)
MONOCYTES NFR BLD: 16.6 % (ref 4–15)
MONOCYTES NFR BLD: 17.8 % (ref 4–15)
NEUTROPHILS # BLD AUTO: 2.5 K/UL (ref 1.8–7.7)
NEUTROPHILS # BLD AUTO: 2.6 K/UL (ref 1.8–7.7)
NEUTROPHILS # BLD AUTO: 3.4 K/UL (ref 1.8–7.7)
NEUTROPHILS # BLD AUTO: 4.3 K/UL (ref 1.8–7.7)
NEUTROPHILS NFR BLD: 67.8 % (ref 38–73)
NEUTROPHILS NFR BLD: 69.9 % (ref 38–73)
NEUTROPHILS NFR BLD: 70 % (ref 38–73)
NEUTROPHILS NFR BLD: 72.8 % (ref 38–73)
NRBC BLD-RTO: 0 /100 WBC
NRBC BLD-RTO: 1 /100 WBC
PCO2 BLDA: 52.2 MMHG (ref 35–45)
PEEP: 7
PH SMN: 7.42 [PH] (ref 7.35–7.45)
PHOSPHATE SERPL-MCNC: 3.2 MG/DL (ref 2.7–4.5)
PLATELET # BLD AUTO: 106 K/UL (ref 150–350)
PLATELET # BLD AUTO: 112 K/UL (ref 150–350)
PLATELET # BLD AUTO: 134 K/UL (ref 150–350)
PLATELET # BLD AUTO: 161 K/UL (ref 150–350)
PMV BLD AUTO: 10.1 FL (ref 9.2–12.9)
PMV BLD AUTO: 9.1 FL (ref 9.2–12.9)
PMV BLD AUTO: 9.2 FL (ref 9.2–12.9)
PMV BLD AUTO: 9.3 FL (ref 9.2–12.9)
PO2 BLDA: 139 MMHG (ref 80–100)
POC BE: 10 MMOL/L
POC SATURATED O2: 99 % (ref 95–100)
POC TCO2: 36 MMOL/L (ref 23–27)
POCT GLUCOSE: 123 MG/DL (ref 70–110)
POCT GLUCOSE: 123 MG/DL (ref 70–110)
POCT GLUCOSE: 125 MG/DL (ref 70–110)
POCT GLUCOSE: 125 MG/DL (ref 70–110)
POCT GLUCOSE: 129 MG/DL (ref 70–110)
POCT GLUCOSE: 143 MG/DL (ref 70–110)
POTASSIUM SERPL-SCNC: 4.1 MMOL/L (ref 3.5–5.1)
PROT SERPL-MCNC: 4.4 G/DL (ref 6–8.4)
PROTHROMBIN TIME: 10.8 SEC (ref 9–12.5)
PS: 14
RBC # BLD AUTO: 2.48 M/UL (ref 4–5.4)
RBC # BLD AUTO: 2.7 M/UL (ref 4–5.4)
RBC # BLD AUTO: 3.03 M/UL (ref 4–5.4)
RBC # BLD AUTO: 3.1 M/UL (ref 4–5.4)
SAMPLE: ABNORMAL
SITE: ABNORMAL
SODIUM SERPL-SCNC: 138 MMOL/L (ref 136–145)
SP02: 100
VT: 380
WBC # BLD AUTO: 3.56 K/UL (ref 3.9–12.7)
WBC # BLD AUTO: 3.8 K/UL (ref 3.9–12.7)
WBC # BLD AUTO: 4.72 K/UL (ref 3.9–12.7)
WBC # BLD AUTO: 6.12 K/UL (ref 3.9–12.7)

## 2019-09-29 PROCEDURE — 84100 ASSAY OF PHOSPHORUS: CPT

## 2019-09-29 PROCEDURE — 25000003 PHARM REV CODE 250: Performed by: PSYCHIATRY & NEUROLOGY

## 2019-09-29 PROCEDURE — 27000221 HC OXYGEN, UP TO 24 HOURS

## 2019-09-29 PROCEDURE — 63600175 PHARM REV CODE 636 W HCPCS: Performed by: PSYCHIATRY & NEUROLOGY

## 2019-09-29 PROCEDURE — 25000003 PHARM REV CODE 250: Performed by: INTERNAL MEDICINE

## 2019-09-29 PROCEDURE — 94003 VENT MGMT INPAT SUBQ DAY: CPT

## 2019-09-29 PROCEDURE — 99233 SBSQ HOSP IP/OBS HIGH 50: CPT | Mod: GC,,, | Performed by: ANESTHESIOLOGY

## 2019-09-29 PROCEDURE — 20000000 HC ICU ROOM

## 2019-09-29 PROCEDURE — 99900035 HC TECH TIME PER 15 MIN (STAT)

## 2019-09-29 PROCEDURE — 85610 PROTHROMBIN TIME: CPT

## 2019-09-29 PROCEDURE — 25000003 PHARM REV CODE 250: Performed by: PHYSICIAN ASSISTANT

## 2019-09-29 PROCEDURE — 85025 COMPLETE CBC W/AUTO DIFF WBC: CPT

## 2019-09-29 PROCEDURE — 82803 BLOOD GASES ANY COMBINATION: CPT

## 2019-09-29 PROCEDURE — 99900026 HC AIRWAY MAINTENANCE (STAT)

## 2019-09-29 PROCEDURE — 94761 N-INVAS EAR/PLS OXIMETRY MLT: CPT

## 2019-09-29 PROCEDURE — 83735 ASSAY OF MAGNESIUM: CPT

## 2019-09-29 PROCEDURE — 99233 PR SUBSEQUENT HOSPITAL CARE,LEVL III: ICD-10-PCS | Mod: GC,,, | Performed by: ANESTHESIOLOGY

## 2019-09-29 PROCEDURE — 80053 COMPREHEN METABOLIC PANEL: CPT

## 2019-09-29 PROCEDURE — 63600175 PHARM REV CODE 636 W HCPCS: Performed by: PHYSICIAN ASSISTANT

## 2019-09-29 PROCEDURE — 36600 WITHDRAWAL OF ARTERIAL BLOOD: CPT

## 2019-09-29 RX ORDER — OXYCODONE HYDROCHLORIDE 5 MG/1
5 TABLET ORAL EVERY 8 HOURS PRN
Status: DISCONTINUED | OUTPATIENT
Start: 2019-09-29 | End: 2019-09-30

## 2019-09-29 RX ORDER — GABAPENTIN 300 MG/1
600 CAPSULE ORAL 3 TIMES DAILY
Status: DISCONTINUED | OUTPATIENT
Start: 2019-09-29 | End: 2019-09-30

## 2019-09-29 RX ORDER — ACETAZOLAMIDE 250 MG/1
500 TABLET ORAL 2 TIMES DAILY
Status: COMPLETED | OUTPATIENT
Start: 2019-09-29 | End: 2019-09-30

## 2019-09-29 RX ORDER — LACOSAMIDE 10 MG/ML
100 SOLUTION ORAL EVERY 12 HOURS
Status: DISCONTINUED | OUTPATIENT
Start: 2019-09-29 | End: 2019-10-04 | Stop reason: HOSPADM

## 2019-09-29 RX ORDER — OXYCODONE HCL 10 MG/1
10 TABLET, FILM COATED, EXTENDED RELEASE ORAL EVERY 12 HOURS
Status: DISCONTINUED | OUTPATIENT
Start: 2019-09-29 | End: 2019-09-29

## 2019-09-29 RX ORDER — FUROSEMIDE 10 MG/ML
40 INJECTION INTRAMUSCULAR; INTRAVENOUS ONCE
Status: COMPLETED | OUTPATIENT
Start: 2019-09-29 | End: 2019-09-29

## 2019-09-29 RX ORDER — FENTANYL CITRATE 50 UG/ML
75 INJECTION, SOLUTION INTRAMUSCULAR; INTRAVENOUS EVERY 30 MIN PRN
Status: DISCONTINUED | OUTPATIENT
Start: 2019-09-29 | End: 2019-09-30

## 2019-09-29 RX ADMIN — FUROSEMIDE 40 MG: 10 INJECTION, SOLUTION INTRAMUSCULAR; INTRAVENOUS at 02:09

## 2019-09-29 RX ADMIN — DEXAMETHASONE SODIUM PHOSPHATE 4 MG: 4 INJECTION, SOLUTION INTRAMUSCULAR; INTRAVENOUS at 02:09

## 2019-09-29 RX ADMIN — FLUDROCORTISONE ACETATE 100 MCG: 0.1 TABLET ORAL at 08:09

## 2019-09-29 RX ADMIN — LACOSAMIDE 100 MG: 10 SOLUTION ORAL at 04:09

## 2019-09-29 RX ADMIN — LEVOTHYROXINE SODIUM 75 MCG: 75 TABLET ORAL at 05:09

## 2019-09-29 RX ADMIN — ENOXAPARIN SODIUM 40 MG: 100 INJECTION SUBCUTANEOUS at 05:09

## 2019-09-29 RX ADMIN — ACETAZOLAMIDE 500 MG: 250 TABLET ORAL at 08:09

## 2019-09-29 RX ADMIN — QUETIAPINE FUMARATE 50 MG: 25 TABLET ORAL at 03:09

## 2019-09-29 RX ADMIN — GABAPENTIN 600 MG: 300 CAPSULE ORAL at 08:09

## 2019-09-29 RX ADMIN — GABAPENTIN 300 MG: 300 CAPSULE ORAL at 08:09

## 2019-09-29 RX ADMIN — Medication 225 MCG/HR: at 09:09

## 2019-09-29 RX ADMIN — VALPROIC ACID 750 MG: 250 SOLUTION ORAL at 09:09

## 2019-09-29 RX ADMIN — Medication 275 MCG/HR: at 09:09

## 2019-09-29 RX ADMIN — VALPROIC ACID 750 MG: 250 SOLUTION ORAL at 03:09

## 2019-09-29 RX ADMIN — GABAPENTIN 300 MG: 300 CAPSULE ORAL at 03:09

## 2019-09-29 RX ADMIN — DEXAMETHASONE SODIUM PHOSPHATE 4 MG: 4 INJECTION, SOLUTION INTRAMUSCULAR; INTRAVENOUS at 10:09

## 2019-09-29 RX ADMIN — DEXAMETHASONE SODIUM PHOSPHATE 4 MG: 4 INJECTION, SOLUTION INTRAMUSCULAR; INTRAVENOUS at 05:09

## 2019-09-29 RX ADMIN — FAMOTIDINE 20 MG: 20 TABLET ORAL at 08:09

## 2019-09-29 NOTE — NURSING TRANSFER
Nursing Transfer Note      9/29/2019     Transfer to 70 from 90    Transfer via bed    Transfer with O2 and cardiac monitoring    Transported by RNx3 and RT     Medicines sent: Fentanyl, Insulin pen and Atropine pen.    Chart send with patient: Yes    Notified: spouse    Patient reassessed at 2310 on 9/28/19    Upon arrival to floor bedside handoff was performed as well as neuro assessment. Vital signs stable. Will continue to monitor.

## 2019-09-29 NOTE — PROGRESS NOTES
[facility]  Neurocritical Care  Progress Note    Admit Date: 9/3/2019  Service Date: 09/29/2019  Length of Stay: 26    Subjective:     Chief Complaint: Brain lesion    History of Present Illness: Pt is  75 yo female with a PMHx of CKD 3, HTN, was transferred from [facility] to [facility] for new onset seizures and concern for right front lobe mass. The pt was found in her bedroom by her spouse at approximately 9:45 pm sitting her head against the bed, unresponsive, and having seizure like activity on the left-side. EMT was called and the pt was given Versed twice while en route to the hospital. Pt had a second witnessed seizures, left facial droop, left-sided weakness, and tongue ecchymosis in the ED. Neurology was consulted and The pt was given IV Keppra and Vimpat. MRI brain without contrast was acquired. The image showed right frontal lobe vasogenic edema with mass effect concerning for underlying mass. EEG was acquired at outside hospital concerning showing an abnormal result. The pt was given decadron IV and neurosurgical consult recommended. Pt transferred to [facility] and admitted to the [facility] for higher level of care and further evaluation.     Pt history acquired per chart review and from spouse present at the bedside. The pt's spouse denies prior history of seizures CVA, cancer history and up-to-date screenings    Hospital Course: --admitted to [facility] on 9/3 following new onset seizure, arrived intubated  --MRI with large area of R cortical edema with flair and ill defined enhancement on contrast study, concern for glioma vs infectious/inflammatory process  --LP appears non infectious, cytology pending  9/5- no acute events, awaiting LP finalization from pathology report., weaning vent.   09/06/2019: Very agitated overnight, requiring increased sedation. Patient extubated this morning on rounds, and reintubated shortly after. Unable to maintain airway and secretions. CSF gram stain negative.  09/07/2019: KATT.  EKG obtained, seroquel started. Valproic acid started.  09/08/2019: NAEON. Improved agitation with seroquel.  09/09/2019: MRI Brain w/wo ordered for today. BLE US ordered. Will need new LP later on in the week.   09/10/2019 LP for infx workup, CD4 lab, EEG monitoring per Epilepsy   09/11/2019: To IR for LP. Spoke to NSGY about possible bx of lesion. Discontinue cEEG.   9/13/2019 Issues with low HR and BP overnight and this morning, Given Shoaib bump and atropine. 24 hours of IVF and bolus. WBC decreasing and afebrile. Repeat blood cultures negative. Plan for EGD and FEDREICO today.   9/14/2019 NAEO, EGD and FEDERICO moved until Monday. CSF still pending. Increasing WBC but afebrile. Start tube feeding   9/15/2019 NAEO, all CSF studies negative so far. CT negative. Begin insulin gtt. EGD and FEDERICO tomorrow    9/16 No significant events over night. EGD done this afternoon for esophageal stricture. Esophagus stretched to 15. FEDERICO ordered for tomorrow.   9/17 No significant vents over night NPO for FEDERICO today scheduled for 3pm. Off levophed.  9/18 FEDERICO negative. Tube feeds restarted. NSGY following and considering brain biopsy. Propofol stopped. Precedex started.  9/19: NSGY recommending MRI brain W/WO with stealth for possible brain biopsy as other work up has been negative. Patient continues with hypotensive episodes, on Fentanyl drip and recommend to space out medications.  9/20 no significant events over night. Hypotensive episodes after vimpat dose and seroquel dose requiring a shoaib bump, IVF bolus then  Levophed gtt started to maintain map>65. Spontaneous breathing trial this afternoon  9/21 no significant events over night . Discussions with family daily about trach and peg. Trial of spon breathing off all sedation, pt. Agitated, restless not following commands. Family decided to have trach and peg done . gen surg. Consulted for trach and peg next week. Restarted fentanyl gtt for sedation. DCd EEG, not having seizures  9/22  Hypotensive episode last night required 500cc bolus and neobump x3. Levophed gtt started. Fentanyl gtt off till SBP back up. This am remains restless on fentanyl gtt. Will try versed IVP at lower dose.  Currently on levophed. Trach and peg scheduled for Tuesday. Paraneoplastic panel sent. Febrile over night blood and sputum cultures sent. Will readdress biopsy with NSGY next week.  9/23: likely intravascularly dry, fluid bolus again administered and placed on continuous rate, use prn versed 1mg  q2 hrs prn and resume fentanyl gtt when awakens, could not tolerate 2 mg dose  9/24: Patient required pressors overnight, so postponing PEG & Trach for another 24 Hr, starting haldol PRN for agitation, holding pressors and IVF to meed MAP goals, also midodrine, start SSI.  9/25: Patient BP not requiring pressors, decrease midodrine to 5, WBC 9 this AM, Pending PEG/Trach, per conversation with surgery likely tomorrow, hold tube feeds midnight, Pending NSGY second opinion.   9/26: patient S/P PEG/Trach this AM, stable. Pending NSGY second opinion regarding biopsy, Intubated on SPONT mode, weaning fentanyl, Likely MRI tomorrow.  9/27: Start Fludrocortisone, continue Decadron for adrenal insufficiency. Plan for LP today with CSF paraneoplastic panel.    9/29/19 No acute events over night, abdominal distention KUB,    Review of Systems   Unable to perform ROS: Intubated    Objective:      Vitals:    09/29/19 1002 09/29/19 1102 09/29/19 1110 09/29/19 1202   BP: 133/60 (!) 109/52  (!) 156/69   BP Location:  Left leg     Patient Position:  Lying     Pulse: 80 68 66 63   Resp: (!) 9 (!) 8 11 18   Temp:  97.8 °F (36.6 °C)     TempSrc:  Axillary     SpO2: 100% 100% 100% 100%   Weight:       Height:          Physical Exam   Constitutional:   moderately obese   HENT:   Head: Normocephalic.   Eyes: Pupils are equal, round, and reactive to light.   Neck: No JVD present.   Cardiovascular: Regular rhythm.   Pulmonary/Chest: She has wheezes.    Abdominal: Soft. Bowel sounds are normal.   Musculoskeletal: She exhibits no edema.   Neurological:   Agitation improved, follows some commands  Left upper>lower extremity weakness  Skin: Capillary refill takes less than 2 seconds.     Assessment/Plan:     Neuro  Dysautonomia  Gabapentin added, titrate to 900mg dose tid over 3 days     New onset seizure  2/2 to brain mass   9/21 EEG dcd. No electrographic seizures for 24h  Vimpat dcd 9/20  Continue Valproic acid 750mg q8h, valproate level 53.7 (9/18)  Likely Leucopenia 2/2 valproate       Psychiatric  Restlessness and agitation  Responded well to seroquel  D/c if sedation develops with gabapentin titration     Pulmonary  On mechanically assisted ventilation  Daily CXR, ABG  Vap protocol  Trial extubation 9/6/2019- re-intubated shortly after extubation for airway protection. Still intubated 2/2 inability to protect airway   9/21Spontaneous breathing trial off sedation, very agitated and restless, not following commands  Family decided on trach and peg instead of extubation.  S/P PEG/Trach  Fentanyl gtt 200mcg/h ( nontitrating) for sedation still restless. Gave versed 1mg IVP.        Cardiac/Vascular  Essential hypertension  SBP <180, MAP >65   Requiring levophed gtt to maintain MAPs > 65 while on fentanyl gtt  Echo: EF 65-70% concentric LV remodeling  FEDERICO (9/17) negative for vegetation, ASD, PFO   Cont to hold anti-hypertensives  Wean off levophed, hypotension is fluid responsive   Due to adrenal insuffucincy, florinef added  Weaned midodrin off       The patient is being Prophylaxed for:  Venous Thromboembolism with: Chemical  Stress Ulcer with: H2B  Ventilator Pneumonia with: not applicable    Activity Orders          Diet NPO Except for: Medication: NPO starting at 09/24 0001        Full Code    Caden Crouch MD  Neurocritical Care  [facility]

## 2019-09-29 NOTE — PLAN OF CARE
POC reviewed with family at 1400. Pt unable to verbalize understanding. Family verbalized understanding. Fentanyl running at 300mcg/hr . LR @50 Questions and concerns addressed. No acute events today.  Will continue to monitor. See flowsheets for full assessment and VS info.

## 2019-09-29 NOTE — CARE UPDATE
Pt successfully transported to differing bed on unit. She was transported via ventilator and placed on unit vent upon arrival. Handoff to RT was successful.

## 2019-09-29 NOTE — PLAN OF CARE
POC reviewed with pt and family at 1400. Pts son verbalized understanding. Questions and concerns addressed. No acute events today. Pt progressing toward goals. Will continue to monitor. See flowsheets for full assessment and VS info.

## 2019-09-30 LAB
ALBUMIN SERPL BCP-MCNC: 2.2 G/DL (ref 3.5–5.2)
ALLENS TEST: ABNORMAL
ALLENS TEST: ABNORMAL
ALP SERPL-CCNC: 42 U/L (ref 55–135)
ALT SERPL W/O P-5'-P-CCNC: 28 U/L (ref 10–44)
ANION GAP SERPL CALC-SCNC: 6 MMOL/L (ref 8–16)
AST SERPL-CCNC: 12 U/L (ref 10–40)
BASOPHILS # BLD AUTO: 0 K/UL (ref 0–0.2)
BASOPHILS # BLD AUTO: 0 K/UL (ref 0–0.2)
BASOPHILS NFR BLD: 0 % (ref 0–1.9)
BASOPHILS NFR BLD: 0 % (ref 0–1.9)
BILIRUB SERPL-MCNC: 0.4 MG/DL (ref 0.1–1)
BUN SERPL-MCNC: 21 MG/DL (ref 8–23)
CALCIUM SERPL-MCNC: 7.9 MG/DL (ref 8.7–10.5)
CHLORIDE SERPL-SCNC: 100 MMOL/L (ref 95–110)
CO2 SERPL-SCNC: 31 MMOL/L (ref 23–29)
CREAT SERPL-MCNC: 0.8 MG/DL (ref 0.5–1.4)
DELSYS: ABNORMAL
DELSYS: ABNORMAL
DIFFERENTIAL METHOD: ABNORMAL
DIFFERENTIAL METHOD: ABNORMAL
EOSINOPHIL # BLD AUTO: 0 K/UL (ref 0–0.5)
EOSINOPHIL # BLD AUTO: 0 K/UL (ref 0–0.5)
EOSINOPHIL NFR BLD: 0 % (ref 0–8)
EOSINOPHIL NFR BLD: 0 % (ref 0–8)
ERYTHROCYTE [DISTWIDTH] IN BLOOD BY AUTOMATED COUNT: 15.5 % (ref 11.5–14.5)
ERYTHROCYTE [DISTWIDTH] IN BLOOD BY AUTOMATED COUNT: 15.6 % (ref 11.5–14.5)
ERYTHROCYTE [SEDIMENTATION RATE] IN BLOOD BY WESTERGREN METHOD: 10 MM/H
ERYTHROCYTE [SEDIMENTATION RATE] IN BLOOD BY WESTERGREN METHOD: 10 MM/H
EST. GFR  (AFRICAN AMERICAN): >60 ML/MIN/1.73 M^2
EST. GFR  (NON AFRICAN AMERICAN): >60 ML/MIN/1.73 M^2
FIO2: 40
FIO2: 40
GLUCOSE SERPL-MCNC: 113 MG/DL (ref 70–110)
HCO3 UR-SCNC: 30.5 MMOL/L (ref 24–28)
HCO3 UR-SCNC: 31.4 MMOL/L (ref 24–28)
HCT VFR BLD AUTO: 26.4 % (ref 37–48.5)
HCT VFR BLD AUTO: 27.1 % (ref 37–48.5)
HGB BLD-MCNC: 7.9 G/DL (ref 12–16)
HGB BLD-MCNC: 8.1 G/DL (ref 12–16)
IMM GRANULOCYTES # BLD AUTO: 0.03 K/UL (ref 0–0.04)
IMM GRANULOCYTES # BLD AUTO: 0.04 K/UL (ref 0–0.04)
IMM GRANULOCYTES NFR BLD AUTO: 0.7 % (ref 0–0.5)
IMM GRANULOCYTES NFR BLD AUTO: 1.1 % (ref 0–0.5)
LYMPHOCYTES # BLD AUTO: 0.5 K/UL (ref 1–4.8)
LYMPHOCYTES # BLD AUTO: 0.5 K/UL (ref 1–4.8)
LYMPHOCYTES NFR BLD: 11.8 % (ref 18–48)
LYMPHOCYTES NFR BLD: 14 % (ref 18–48)
MAGNESIUM SERPL-MCNC: 2.1 MG/DL (ref 1.6–2.6)
MCH RBC QN AUTO: 29.7 PG (ref 27–31)
MCH RBC QN AUTO: 31 PG (ref 27–31)
MCHC RBC AUTO-ENTMCNC: 29.2 G/DL (ref 32–36)
MCHC RBC AUTO-ENTMCNC: 30.7 G/DL (ref 32–36)
MCV RBC AUTO: 101 FL (ref 82–98)
MCV RBC AUTO: 102 FL (ref 82–98)
MODE: ABNORMAL
MODE: ABNORMAL
MONOCYTES # BLD AUTO: 0.6 K/UL (ref 0.3–1)
MONOCYTES # BLD AUTO: 0.7 K/UL (ref 0.3–1)
MONOCYTES NFR BLD: 15.4 % (ref 4–15)
MONOCYTES NFR BLD: 15.8 % (ref 4–15)
NEUTROPHILS # BLD AUTO: 2.5 K/UL (ref 1.8–7.7)
NEUTROPHILS # BLD AUTO: 3.2 K/UL (ref 1.8–7.7)
NEUTROPHILS NFR BLD: 69.5 % (ref 38–73)
NEUTROPHILS NFR BLD: 71.7 % (ref 38–73)
NRBC BLD-RTO: 0 /100 WBC
NRBC BLD-RTO: 0 /100 WBC
PCO2 BLDA: 46.6 MMHG (ref 35–45)
PCO2 BLDA: 46.8 MMHG (ref 35–45)
PEEP: 5
PEEP: 5
PH SMN: 7.42 [PH] (ref 7.35–7.45)
PH SMN: 7.43 [PH] (ref 7.35–7.45)
PHOSPHATE SERPL-MCNC: 3.2 MG/DL (ref 2.7–4.5)
PLATELET # BLD AUTO: 133 K/UL (ref 150–350)
PLATELET # BLD AUTO: 140 K/UL (ref 150–350)
PMV BLD AUTO: 9.2 FL (ref 9.2–12.9)
PMV BLD AUTO: 9.3 FL (ref 9.2–12.9)
PO2 BLDA: 151 MMHG (ref 80–100)
PO2 BLDA: 44 MMHG (ref 80–100)
POC BE: 6 MMOL/L
POC BE: 7 MMOL/L
POC SATURATED O2: 80 % (ref 95–100)
POC SATURATED O2: 99 % (ref 95–100)
POC TCO2: 32 MMOL/L (ref 23–27)
POC TCO2: 33 MMOL/L (ref 23–27)
POCT GLUCOSE: 113 MG/DL (ref 70–110)
POCT GLUCOSE: 114 MG/DL (ref 70–110)
POCT GLUCOSE: 114 MG/DL (ref 70–110)
POCT GLUCOSE: 132 MG/DL (ref 70–110)
POTASSIUM SERPL-SCNC: 3.7 MMOL/L (ref 3.5–5.1)
POTASSIUM SERPL-SCNC: 4.6 MMOL/L (ref 3.5–5.1)
PROT SERPL-MCNC: 4.9 G/DL (ref 6–8.4)
PS: 12
PS: 12
RBC # BLD AUTO: 2.61 M/UL (ref 4–5.4)
RBC # BLD AUTO: 2.66 M/UL (ref 4–5.4)
SAMPLE: ABNORMAL
SAMPLE: ABNORMAL
SITE: ABNORMAL
SITE: ABNORMAL
SODIUM SERPL-SCNC: 137 MMOL/L (ref 136–145)
SP02: 100
SP02: 100
VT: 380
VT: 380
WBC # BLD AUTO: 3.58 K/UL (ref 3.9–12.7)
WBC # BLD AUTO: 4.5 K/UL (ref 3.9–12.7)

## 2019-09-30 PROCEDURE — 97803 MED NUTRITION INDIV SUBSEQ: CPT

## 2019-09-30 PROCEDURE — 25000003 PHARM REV CODE 250: Performed by: PSYCHIATRY & NEUROLOGY

## 2019-09-30 PROCEDURE — 99233 SBSQ HOSP IP/OBS HIGH 50: CPT | Mod: GC,,, | Performed by: PSYCHIATRY & NEUROLOGY

## 2019-09-30 PROCEDURE — 27000221 HC OXYGEN, UP TO 24 HOURS

## 2019-09-30 PROCEDURE — 20000000 HC ICU ROOM

## 2019-09-30 PROCEDURE — 80053 COMPREHEN METABOLIC PANEL: CPT

## 2019-09-30 PROCEDURE — 84100 ASSAY OF PHOSPHORUS: CPT

## 2019-09-30 PROCEDURE — 94003 VENT MGMT INPAT SUBQ DAY: CPT

## 2019-09-30 PROCEDURE — 25000003 PHARM REV CODE 250: Performed by: INTERNAL MEDICINE

## 2019-09-30 PROCEDURE — 25000003 PHARM REV CODE 250: Performed by: PHYSICIAN ASSISTANT

## 2019-09-30 PROCEDURE — 94640 AIRWAY INHALATION TREATMENT: CPT

## 2019-09-30 PROCEDURE — 94761 N-INVAS EAR/PLS OXIMETRY MLT: CPT

## 2019-09-30 PROCEDURE — 82803 BLOOD GASES ANY COMBINATION: CPT

## 2019-09-30 PROCEDURE — 99233 PR SUBSEQUENT HOSPITAL CARE,LEVL III: ICD-10-PCS | Mod: GC,,, | Performed by: PSYCHIATRY & NEUROLOGY

## 2019-09-30 PROCEDURE — 84132 ASSAY OF SERUM POTASSIUM: CPT

## 2019-09-30 PROCEDURE — 63600175 PHARM REV CODE 636 W HCPCS: Performed by: PHYSICIAN ASSISTANT

## 2019-09-30 PROCEDURE — 25000003 PHARM REV CODE 250: Performed by: STUDENT IN AN ORGANIZED HEALTH CARE EDUCATION/TRAINING PROGRAM

## 2019-09-30 PROCEDURE — 99900035 HC TECH TIME PER 15 MIN (STAT)

## 2019-09-30 PROCEDURE — 99900026 HC AIRWAY MAINTENANCE (STAT)

## 2019-09-30 PROCEDURE — 63600175 PHARM REV CODE 636 W HCPCS: Performed by: PSYCHIATRY & NEUROLOGY

## 2019-09-30 PROCEDURE — 36600 WITHDRAWAL OF ARTERIAL BLOOD: CPT

## 2019-09-30 PROCEDURE — 83735 ASSAY OF MAGNESIUM: CPT

## 2019-09-30 PROCEDURE — 85025 COMPLETE CBC W/AUTO DIFF WBC: CPT | Mod: 91

## 2019-09-30 RX ORDER — OXYCODONE HYDROCHLORIDE 5 MG/1
5 TABLET ORAL EVERY 6 HOURS
Status: DISCONTINUED | OUTPATIENT
Start: 2019-09-30 | End: 2019-10-02

## 2019-09-30 RX ORDER — ROCURONIUM BROMIDE 10 MG/ML
INJECTION, SOLUTION INTRAVENOUS
Status: DISPENSED
Start: 2019-09-30 | End: 2019-10-01

## 2019-09-30 RX ORDER — GABAPENTIN 300 MG/1
900 CAPSULE ORAL 3 TIMES DAILY
Status: DISCONTINUED | OUTPATIENT
Start: 2019-09-30 | End: 2019-10-04 | Stop reason: HOSPADM

## 2019-09-30 RX ORDER — ETOMIDATE 2 MG/ML
INJECTION INTRAVENOUS
Status: DISPENSED
Start: 2019-09-30 | End: 2019-10-01

## 2019-09-30 RX ADMIN — DEXAMETHASONE SODIUM PHOSPHATE 4 MG: 4 INJECTION, SOLUTION INTRAMUSCULAR; INTRAVENOUS at 06:09

## 2019-09-30 RX ADMIN — ENOXAPARIN SODIUM 40 MG: 100 INJECTION SUBCUTANEOUS at 05:09

## 2019-09-30 RX ADMIN — GABAPENTIN 600 MG: 300 CAPSULE ORAL at 08:09

## 2019-09-30 RX ADMIN — LEVOTHYROXINE SODIUM 75 MCG: 75 TABLET ORAL at 06:09

## 2019-09-30 RX ADMIN — LACOSAMIDE 100 MG: 10 SOLUTION ORAL at 08:09

## 2019-09-30 RX ADMIN — GABAPENTIN 600 MG: 300 CAPSULE ORAL at 02:09

## 2019-09-30 RX ADMIN — QUETIAPINE FUMARATE 50 MG: 25 TABLET ORAL at 04:09

## 2019-09-30 RX ADMIN — LACOSAMIDE 100 MG: 10 SOLUTION ORAL at 09:09

## 2019-09-30 RX ADMIN — DEXAMETHASONE SODIUM PHOSPHATE 4 MG: 4 INJECTION, SOLUTION INTRAMUSCULAR; INTRAVENOUS at 09:09

## 2019-09-30 RX ADMIN — OXYCODONE HYDROCHLORIDE 5 MG: 5 TABLET ORAL at 12:09

## 2019-09-30 RX ADMIN — FLUDROCORTISONE ACETATE 100 MCG: 0.1 TABLET ORAL at 09:09

## 2019-09-30 RX ADMIN — GABAPENTIN 900 MG: 300 CAPSULE ORAL at 09:09

## 2019-09-30 RX ADMIN — Medication 212.5 MCG/HR: at 10:09

## 2019-09-30 RX ADMIN — DEXAMETHASONE SODIUM PHOSPHATE 4 MG: 4 INJECTION, SOLUTION INTRAMUSCULAR; INTRAVENOUS at 02:09

## 2019-09-30 RX ADMIN — OXYCODONE HYDROCHLORIDE 5 MG: 5 TABLET ORAL at 06:09

## 2019-09-30 RX ADMIN — POTASSIUM CHLORIDE 40 MEQ: 20 SOLUTION ORAL at 08:09

## 2019-09-30 RX ADMIN — ACETAZOLAMIDE 500 MG: 250 TABLET ORAL at 08:09

## 2019-09-30 RX ADMIN — FAMOTIDINE 20 MG: 20 TABLET ORAL at 09:09

## 2019-09-30 RX ADMIN — FLUDROCORTISONE ACETATE 100 MCG: 0.1 TABLET ORAL at 08:09

## 2019-09-30 NOTE — ASSESSMENT & PLAN NOTE
2/2 to brain mass   9/21 EEG dcd. No electrographic seizures for 24h  Vimpat dcd 9/20  Continue Valproic acid

## 2019-09-30 NOTE — PLAN OF CARE
SW received call from Pt  regarding having heard during rounds that Pt may be ready soon.     SW attempted to meet with the Pt family at bedside, but no one was present.    Left message for Pt  and spoke with Pt son. He advised Pt  is likely still in the hospital.     Toña Henning, RENEE  Neurocritical Care   [facility]  [id number]

## 2019-09-30 NOTE — ASSESSMENT & PLAN NOTE
Daily CXR, ABG  Vap protocol  Trial extubation 9/6/2019- re-intubated shortly after extubation for airway protection. Still intubated 2/2 inability to protect airway   9/21Spontaneous breathing trial off sedation, very agitated and restless, not following commands  Family decided on trach and peg instead of extubation.  Gen. Surg consulted for trach and peg next week  Fentanyl gtt 200mcg/h ( nontitrating) for sedation still restless. Gave versed 1mg IVP. Remains on levophed gtt for hypotensive episodes    Vent Mode: SIMV  Oxygen Concentration (%):  [40] 40  Resp Rate Total:  [10 br/min-35 br/min] 13 br/min  Vt Set:  [380 mL] 380 mL  PEEP/CPAP:  [5 cmH20] 5 cmH20  Pressure Support:  [12 cmH20] 12 cmH20  Mean Airway Pressure:  [5.1 xcW04-21 cmH20] 9.5 cmH20   Daily CXR/ABG  Does better in terms of airway pressures and synchrony on psv setting

## 2019-09-30 NOTE — PROCEDURES
Results for ANAM MACHADO (MRN [id number]) as of 9/30/2019 07:19   Ref. Range 9/30/2019 04:16   POC PH Latest Ref Range: 7.35 - 7.45  7.434   POC PCO2 Latest Ref Range: 35 - 45 mmHg 46.8 (H)   POC PO2 Latest Ref Range: 80 - 100 mmHg 44 (LL)   POC BE Latest Ref Range: -2 to 2 mmol/L 7   POC HCO3 Latest Ref Range: 24 - 28 mmol/L 31.4 (H)   POC SATURATED O2 Latest Ref Range: 95 - 100 % 80 (L)   POC TCO2 Latest Ref Range: 23 - 27 mmol/L 33 (H)   FiO2 Unknown 40   Vt Unknown 380   PEEP Unknown 5   Sample Unknown ARTERIAL   DelSys Unknown Adult Vent   Allens Test Unknown Pass   Site Unknown LR   Mode Unknown SIMV   Rate Unknown 10

## 2019-09-30 NOTE — PLAN OF CARE
Patient S/p trach and peg.  LTAC placement pending medical stability.     has not provided LTAC choices to SW.  SW will follow up.    Per MD, patient may be LTAC ready soon.         09/30/19 1610   Discharge Reassessment   Assessment Type Discharge Planning Reassessment   Provided patient/caregiver education on the expected discharge date and the discharge plan Yes   Do you have any problems affording any of your prescribed medications? No   Discharge Plan A Long-term acute care facility (LTAC)   Discharge Plan B Skilled Nursing Facility   DME Needed Upon Discharge  none   Patient choice form signed by patient/caregiver N/A   Anticipated Discharge Disposition LTAC   Can the patient answer the patient profile reliably? No, cognitively impaired   How does the patient rate their overall health at the present time?   (porsha)   Describe the patient's ability to walk at the present time. Does not walk or unable to take any steps at all   How often would a person be available to care for the patient? Whenever needed   Number of comorbid conditions (as recorded on the chart) Five or more   During the past month, has the patient often been bothered by feeling down, depressed or hopeless?   (porsha)   During the past month, has the patient often been bothered by little interest or pleasure in doing things?   (porsha)   Post-Acute Status   Post-Acute Authorization Placement   Post-Acute Placement Status Awaiting Internal Medical Clearance   Discharge Delays None known at this time       Keely Malone RN, CCRN-K, [facility]  Neuro-Critical Care   [phone]

## 2019-09-30 NOTE — ASSESSMENT & PLAN NOTE
-Blood culture + genella morbillorum  Continue ceftriaxone 2g q12h for 2 weeks per ID recs  Blood cultures resolved immediately    Likely contaminated

## 2019-09-30 NOTE — PROGRESS NOTES
[facility]  Neurocritical Care  Progress Note    Admit Date: 9/3/2019  Service Date: 09/30/2019  Length of Stay: 27    Subjective:     Chief Complaint: Brain lesion    History of Present Illness: Pt is  73 yo female with a PMHx of CKD 3, HTN, [place] [facility] [place] [facility] [place] and concern for right front lobe mass. The pt was found in her bedroom by her spouse at approximately 9:45 pm sitting her head against the bed, unresponsive, and having seizure like activity on the left-side. EMT was called and the pt was given Versed twice while en route to the hospital. Pt had a second witnessed seizures, left facial droop, left-sided weakness, and tongue ecchymosis in the ED. Neurology was consulted and The pt was given IV Keppra and Vimpat. MRI brain without contrast was acquired. The image showed right frontal lobe vasogenic edema with mass effect concerning for underlying mass. EEG was acquired at outside hospital concerning showing an abnormal result. The pt was given decadron IV and neurosurgical consult recommended. [place] [facility] [place] the [facility] for higher level of care and further evaluation.     Pt history acquired per chart review and from spouse present at the bedside. The pt's spouse denies prior history of seizures CVA, cancer history and up-to-date screenings    Hospital Course: --admitted to [facility] on 9/3 following new onset seizure, arrived intubated  --MRI with large area of R cortical edema with flair and ill defined enhancement on contrast study, concern for glioma vs infectious/inflammatory process  --LP appears non infectious, cytology pending  9/5- no acute events, awaiting LP finalization from pathology report., weaning vent.   09/06/2019: Very agitated overnight, requiring increased sedation. Patient extubated this morning on rounds, and reintubated shortly after. Unable to maintain airway and secretions. CSF gram stain negative.  09/07/2019: KATT.  EKG obtained, seroquel started. Valproic acid started.  09/08/2019: NAEON. Improved agitation with seroquel.  09/09/2019: MRI Brain w/wo ordered for today. BLE US ordered. Will need new LP later on in the week.   09/10/2019 LP for infx workup, CD4 lab, EEG monitoring per Epilepsy   09/11/2019: To IR for LP. Spoke to NSGY about possible bx of lesion. Discontinue cEEG.   9/13/2019 Issues with low HR and BP overnight and this morning, Given Shoaib bump and atropine. 24 hours of IVF and bolus. WBC decreasing and afebrile. Repeat blood cultures negative. Plan for EGD and FEDERICO today.   9/14/2019 NAEO, EGD and FEDERICO moved until Monday. CSF still pending. Increasing WBC but afebrile. Start tube feeding   9/15/2019 NAEO, all CSF studies negative so far. CT negative. Begin insulin gtt. EGD and FEDERICO tomorrow    9/16 No significant events over night. EGD done this afternoon for esophageal stricture. Esophagus stretched to 15. FEDERICO ordered for tomorrow.   9/17 No significant vents over night NPO for FEDERICO today scheduled for 3pm. Off levophed.  9/18 FEDERICO negative. Tube feeds restarted. NSGY following and considering brain biopsy. Propofol stopped. Precedex started.  9/19: NSGY recommending MRI brain W/WO with stealth for possible brain biopsy as other work up has been negative. Patient continues with hypotensive episodes, on Fentanyl drip and recommend to space out medications.  9/20 no significant events over night. Hypotensive episodes after vimpat dose and seroquel dose requiring a shoaib bump, IVF bolus then  Levophed gtt started to maintain map>65. Spontaneous breathing trial this afternoon  9/21 no significant events over night . Discussions with family daily about trach and peg. Trial of spon breathing off all sedation, pt. Agitated, restless not following commands. Family decided to have trach and peg done . gen surg. Consulted for trach and peg next week. Restarted fentanyl gtt for sedation. DCd EEG, not having seizures  9/22  Hypotensive episode last night required 500cc bolus and neobump x3. Levophed gtt started. Fentanyl gtt off till SBP back up. This am remains restless on fentanyl gtt. Will try versed IVP at lower dose.  Currently on levophed. Trach and peg scheduled for Tuesday. Paraneoplastic panel sent. Febrile over night blood and sputum cultures sent. Will readdress biopsy with NSGY next week.  9/23: likely intravascularly dry, fluid bolus again administered and placed on continuous rate, use prn versed 1mg  q2 hrs prn and resume fentanyl gtt when awakens, could not tolerate 2 mg dose  9/30: wean fentanyl, increase gabapentin, KUB, family updated    Review of Symptoms:   Unable to obtain, intubated, neuro-exam    Physical Exam:  GA: comfortable, no acute distress.   HEENT: No scleral icterus or JVD.   Pulmonary: Clear to auscultation A/L. No wheezing, crackles, or rhonchi. intubated  Cardiac: RRR S1 & S2 w/o rubs/murmurs/gallops.   Abdominal: Bowel sounds present x 4. No appreciable hepatosplenomegaly.  Skin: No jaundice, rashes, or visible lesions.  Pulses: 1+ Dp bilat    Neuro:  --sedation: fentanyl  --GCS: Q5H4yK9  --Mental Status: sedated, intermittently follows commands to open eyes and stick tongue out, not for me this morning   --CN II-XII grossly intact.   --Pupils 3-->2mm, PERRL.   --brainstem: inatct  --Motor: right side spontaneous, with right leg intermittent kicking improving, left side sporadic movements  --sensory: see motor  --Reflexes: not tested  --Gait: deferred    Recent Labs   Lab 09/30/19  0812   WBC 3.58*   RBC 2.66*   HGB 7.9*   HCT 27.1*   *   *   MCH 29.7   MCHC 29.2*     Recent Labs   Lab 09/30/19  0349 09/30/19  1218   CALCIUM 7.9*  --    PROT 4.9*  --      --    K 3.7 4.6   CO2 31*  --      --    BUN 21  --    CREATININE 0.8  --    ALKPHOS 42*  --    ALT 28  --    AST 12  --    BILITOT 0.4  --      No results for input(s): PT, INR, APTT in the last 24 hours.  Vent Mode:  SIMV  Oxygen Concentration (%):  [40] 40  Resp Rate Total:  [10 br/min-35 br/min] 13 br/min  Vt Set:  [380 mL] 380 mL  PEEP/CPAP:  [5 cmH20] 5 cmH20  Pressure Support:  [12 cmH20] 12 cmH20  Mean Airway Pressure:  [5.1 meV05-67 cmH20] 9.5 cmH20    Temp: 97.2 °F (36.2 °C)  Pulse: 64  Rhythm: normal sinus rhythm  BP: 126/79  MAP (mmHg): 97  Resp: 10  SpO2: 100 %  Oxygen Concentration (%): 40  O2 Device (Oxygen Therapy): ventilator  Vent Mode: SIMV  Set Rate: 10 bmp  Vt Set: 380 mL  Pressure Support: 12 cmH20  PEEP/CPAP: 5 cmH20  Peak Airway Pressure: 28 cmH2O  Mean Airway Pressure: 9.5 cmH20  Plateau Pressure: 6.3 cmH20    Temp  Min: 97.2 °F (36.2 °C)  Max: 99 °F (37.2 °C)  Pulse  Min: 62  Max: 99  BP  Min: 101/50  Max: 197/77  MAP (mmHg)  Min: 72  Max: 112  Resp  Min: 10  Max: 26  SpO2  Min: 94 %  Max: 100 %  Oxygen Concentration (%)  Min: 40  Max: 40    09/29 0701 - 09/30 0700  In: 1256.1 [I.V.:1001.1]  Out: 2650 [Urine:2650]   Unmeasured Output  Urine Occurrence: 1  Stool Occurrence: 0  Emesis Occurrence: 0  Pad Count: 1    Nutrition Prescription Ordered  Current Diet Order: NPO  Nutrition Order Comments: -  Current Nutrition Support Formula Ordered: Isosource 1.5  Current Nutrition Support Rate Ordered: 45 (ml)  Current Nutrition Support Frequency Ordered: mL/hr  Last Bowel Movement: 09/29/19    Body mass index is 30.7 kg/m².      I have personally reviewed all labs, imaging, and studies today      Assessment/Plan:     Neuro  * Brain lesion  74 year old admitted to unit on 9/3 following new onset seizure, with MRI showing large area of R cortical edema with flair and ill defined enhancement on contrast study, concern for glioma vs infectious/inflammatory process  - 9/22 repeat BC, pending   - CSF cx no growth,   - CSF studies negative- VZV, enterovirus, HSV, MS  - FEDERICO negative for vegetation     - continue decadron 4mg q6h  - neuro checks/ VS q1hr   -on SubQ heparin   -Repeat brain MRI W/WO with stealth9/19, showed  large  R frontal lobe lesion nonspecific for infectious or neoplastic process; small remote L basal banglia lacunar infarct. No new lesions or infarcts  NSGY, no biopsy at this time, will readdress need for biopsy with NSGY next week.  9/22 paraneoplastic panel sent        Dysautonomia  Gabapentin added, titrate to 900mg dose tid over 3 days    New onset seizure  2/2 to brain mass   9/21 EEG dcd. No electrographic seizures for 24h  Vimpat dcd 9/20  Continue Valproic acid             Psychiatric  Restlessness and agitation  Responded well to seroquel  Wean sedation    Pulmonary  On mechanically assisted ventilation  Daily CXR, ABG  Vap protocol  Trial extubation 9/6/2019- re-intubated shortly after extubation for airway protection. Still intubated 2/2 inability to protect airway   9/21Spontaneous breathing trial off sedation, very agitated and restless, not following commands  Family decided on trach and peg instead of extubation.  Gen. Surg consulted for trach and peg next week  Fentanyl gtt 200mcg/h ( nontitrating) for sedation still restless. Gave versed 1mg IVP. Remains on levophed gtt for hypotensive episodes    Vent Mode: SIMV  Oxygen Concentration (%):  [40] 40  Resp Rate Total:  [10 br/min-35 br/min] 13 br/min  Vt Set:  [380 mL] 380 mL  PEEP/CPAP:  [5 cmH20] 5 cmH20  Pressure Support:  [12 cmH20] 12 cmH20  Mean Airway Pressure:  [5.1 iuU21-91 cmH20] 9.5 cmH20   Daily CXR/ABG  Does better in terms of airway pressures and synchrony on psv setting    Renal/  Chronic kidney disease, stage III (moderate)  -PMHx of CKD 3   -monitor renal function, I/Os   Currently creatinine nl    ID  Bacteremia  -Blood culture + genella morbillorum  Continue ceftriaxone 2g q12h for 2 weeks per ID recs  Blood cultures resolved immediately    Likely contaminated          The patient is being Prophylaxed for:  Venous Thromboembolism with: Chemical  Stress Ulcer with: H2B  Ventilator Pneumonia with: chlorhexidine oral  care    Activity Orders          Diet NPO Except for: Medication: NPO starting at 09/24 0001        Full Code    Jelani Allison MD  Neurocritical Care  [facility]      Level III

## 2019-09-30 NOTE — PROGRESS NOTES
"[facility]  Adult Nutrition  Progress Note    SUMMARY       Recommendations  Recommendation/Intervention:   To better meet patient's needs, recommend modifying TF to Impact Peptide 1.5 at 45 mL/hr - to provide 1620 kcal/day, 102g protein/day, and 832mL free fluid/day.   RD to monitor.    Goals: Pt to receive >85% EEN and EPN by RD follow up  Nutrition Goal Status: goal not met  Communication of RD Recs: reviewed with RN    Reason for Assessment  Reason For Assessment: RD follow-up  Diagnosis: seizures  Relevant Medical History: CKD St 3, HTN  Interdisciplinary Rounds: attended  General Information Comments: S/p trach/PEG 9/26. Currently trach/vent. TF started 9/27, was tolerating at goal but held yesterday. Physical exam remains unchanged, continues to appear nourished.  Nutrition Discharge Planning: Adequate nutrition via TF.    Nutrition Risk Screen  Nutrition Risk Screen: tube feeding or parenteral nutrition    Nutrition/Diet History  Spiritual, Cultural Beliefs, Yarsanism Practices, Values that Affect Care: no  Factors Affecting Nutritional Intake: NPO, on mechanical ventilation    Anthropometrics  Temp: 97.9 °F (36.6 °C)  Height Method: Stated  Height: 5' 3" (160 cm)  Height (inches): 63 in  Weight Method: Bed Scale  Weight: 78.6 kg (173 lb 4.5 oz)  Weight (lb): 173.28 lb  Ideal Body Weight (IBW), Female: 115 lb  % Ideal Body Weight, Female (lb): 133.91 lb  BMI (Calculated): 27.3  BMI Grade: 25 - 29.9 - overweight    Lab/Procedures/Meds  Pertinent Labs Reviewed: reviewed  Pertinent Labs Comments: Glu 113, Ca 7.9, Alb 2.2  Pertinent Medications Reviewed: reviewed  Pertinent Medications Comments: famotidine, fentanyl    Estimated/Assessed Needs  Weight Used For Calorie Calculations: 70.3 kg (154 lb 15.7 oz)(admit weight)  Energy Calorie Requirements (kcal): 1389 kcal/day  Energy Need Method: [facility]  Protein Requirements: 85-106g(1.2-1.5g/kg)  Weight Used For Protein Calculations: 70.5 kg " (155 lb 6.8 oz)  Fluid Requirements (mL): 1mL/kcal or per MD  Estimated Fluid Requirement Method: RDA Method  RDA Method (mL): 1389    Nutrition Prescription Ordered  Current Diet Order: NPO  Current Nutrition Support Formula Ordered: Isosource 1.5  Current Nutrition Support Rate Ordered: 45 (ml)  Current Nutrition Support Frequency Ordered: mL/hr    Evaluation of Received Nutrient/Fluid Intake  Enteral Calories (kcal): 1620  Enteral Protein (gm): 73  Enteral (Free Water) Fluid (mL): 825  % Kcal Needs: 117  % Protein Needs: 86  I/O: -1.4L x 24hrs, +32L since admit  Energy Calories Required: meeting needs  Protein Required: not meeting needs  Fluid Required: other (see comments)(per MD)  Comments: LBM 9/29  Tolerance: tolerating  % Intake of Estimated Energy Needs: Other: > 100%  % Meal Intake: NPO    Nutrition Risk  Level of Risk/Frequency of Follow-up: low(f/u 1x/week)     Assessment and Plan  Nutrition Problem  Inadequate oral intake     Related to (etiology):   NPO     Signs and Symptoms (as evidenced by):   Pt requiring alternative means of nutrition to meet 85% EEN and EPN.      Intervention:  Collaboration of nutrition care with providers     Nutrition Diagnosis Status:   Continues    Monitor and Evaluation  Food and Nutrient Intake: enteral nutrition intake  Food and Nutrient Adminstration: enteral and parenteral nutrition administration  Anthropometric Measurements: weight, weight change, body mass index  Biochemical Data, Medical Tests and Procedures: electrolyte and renal panel, gastrointestinal profile, glucose/endocrine profile, inflammatory profile, lipid profile  Nutrition-Focused Physical Findings: overall appearance     Nutrition Follow-Up  RD Follow-up?: Yes

## 2019-10-01 LAB
ALBUMIN SERPL BCP-MCNC: 2.4 G/DL (ref 3.5–5.2)
ALLENS TEST: ABNORMAL
ALP SERPL-CCNC: 48 U/L (ref 55–135)
ALT SERPL W/O P-5'-P-CCNC: 29 U/L (ref 10–44)
ANION GAP SERPL CALC-SCNC: 9 MMOL/L (ref 8–16)
ANISOCYTOSIS BLD QL SMEAR: SLIGHT
AST SERPL-CCNC: 14 U/L (ref 10–40)
BASO STIPL BLD QL SMEAR: ABNORMAL
BASOPHILS # BLD AUTO: 0 K/UL (ref 0–0.2)
BASOPHILS NFR BLD: 0 % (ref 0–1.9)
BILIRUB SERPL-MCNC: 0.3 MG/DL (ref 0.1–1)
BUN SERPL-MCNC: 22 MG/DL (ref 8–23)
CALCIUM SERPL-MCNC: 8.3 MG/DL (ref 8.7–10.5)
CHLORIDE SERPL-SCNC: 104 MMOL/L (ref 95–110)
CO2 SERPL-SCNC: 24 MMOL/L (ref 23–29)
CREAT SERPL-MCNC: 0.8 MG/DL (ref 0.5–1.4)
DELSYS: ABNORMAL
DIFFERENTIAL METHOD: ABNORMAL
EOSINOPHIL # BLD AUTO: 0 K/UL (ref 0–0.5)
EOSINOPHIL NFR BLD: 0 % (ref 0–8)
ERYTHROCYTE [DISTWIDTH] IN BLOOD BY AUTOMATED COUNT: 15.1 % (ref 11.5–14.5)
ERYTHROCYTE [SEDIMENTATION RATE] IN BLOOD BY WESTERGREN METHOD: 10 MM/H
EST. GFR  (AFRICAN AMERICAN): >60 ML/MIN/1.73 M^2
EST. GFR  (NON AFRICAN AMERICAN): >60 ML/MIN/1.73 M^2
FIO2: 40
FLOW: 60
GLUCOSE SERPL-MCNC: 146 MG/DL (ref 70–110)
HCO3 UR-SCNC: 24.2 MMOL/L (ref 24–28)
HCT VFR BLD AUTO: 29.1 % (ref 37–48.5)
HGB BLD-MCNC: 9.1 G/DL (ref 12–16)
IMM GRANULOCYTES # BLD AUTO: 0.05 K/UL (ref 0–0.04)
IMM GRANULOCYTES NFR BLD AUTO: 1.2 % (ref 0–0.5)
LYMPHOCYTES # BLD AUTO: 0.6 K/UL (ref 1–4.8)
LYMPHOCYTES NFR BLD: 13.5 % (ref 18–48)
MAGNESIUM SERPL-MCNC: 2 MG/DL (ref 1.6–2.6)
MCH RBC QN AUTO: 30.2 PG (ref 27–31)
MCHC RBC AUTO-ENTMCNC: 31.3 G/DL (ref 32–36)
MCV RBC AUTO: 97 FL (ref 82–98)
MODE: ABNORMAL
MONOCYTES # BLD AUTO: 0.6 K/UL (ref 0.3–1)
MONOCYTES NFR BLD: 14.7 % (ref 4–15)
NEUTROPHILS # BLD AUTO: 3 K/UL (ref 1.8–7.7)
NEUTROPHILS NFR BLD: 70.6 % (ref 38–73)
NRBC BLD-RTO: 0 /100 WBC
PCO2 BLDA: 45.6 MMHG (ref 35–45)
PEEP: 5
PH SMN: 7.33 [PH] (ref 7.35–7.45)
PHOSPHATE SERPL-MCNC: 3.1 MG/DL (ref 2.7–4.5)
PLATELET # BLD AUTO: 132 K/UL (ref 150–350)
PLATELET BLD QL SMEAR: ABNORMAL
PMV BLD AUTO: 9 FL (ref 9.2–12.9)
PO2 BLDA: 35 MMHG (ref 40–60)
POC BE: -2 MMOL/L
POC SATURATED O2: 62 % (ref 95–100)
POC TCO2: 26 MMOL/L (ref 24–29)
POCT GLUCOSE: 123 MG/DL (ref 70–110)
POCT GLUCOSE: 138 MG/DL (ref 70–110)
POCT GLUCOSE: 148 MG/DL (ref 70–110)
POCT GLUCOSE: 149 MG/DL (ref 70–110)
POLYCHROMASIA BLD QL SMEAR: ABNORMAL
POTASSIUM SERPL-SCNC: 4.1 MMOL/L (ref 3.5–5.1)
PROT SERPL-MCNC: 5.4 G/DL (ref 6–8.4)
PS: 12
RBC # BLD AUTO: 3.01 M/UL (ref 4–5.4)
SAMPLE: ABNORMAL
SITE: ABNORMAL
SODIUM SERPL-SCNC: 137 MMOL/L (ref 136–145)
SP02: 100
VT: 380
WBC # BLD AUTO: 4.3 K/UL (ref 3.9–12.7)

## 2019-10-01 PROCEDURE — 82800 BLOOD PH: CPT

## 2019-10-01 PROCEDURE — 80053 COMPREHEN METABOLIC PANEL: CPT

## 2019-10-01 PROCEDURE — 99900035 HC TECH TIME PER 15 MIN (STAT)

## 2019-10-01 PROCEDURE — 84100 ASSAY OF PHOSPHORUS: CPT

## 2019-10-01 PROCEDURE — 94761 N-INVAS EAR/PLS OXIMETRY MLT: CPT

## 2019-10-01 PROCEDURE — 27200966 HC CLOSED SUCTION SYSTEM

## 2019-10-01 PROCEDURE — 99232 SBSQ HOSP IP/OBS MODERATE 35: CPT | Mod: ,,, | Performed by: NEUROLOGICAL SURGERY

## 2019-10-01 PROCEDURE — 82803 BLOOD GASES ANY COMBINATION: CPT

## 2019-10-01 PROCEDURE — 25000003 PHARM REV CODE 250: Performed by: PSYCHIATRY & NEUROLOGY

## 2019-10-01 PROCEDURE — 85025 COMPLETE CBC W/AUTO DIFF WBC: CPT

## 2019-10-01 PROCEDURE — 94003 VENT MGMT INPAT SUBQ DAY: CPT

## 2019-10-01 PROCEDURE — 83735 ASSAY OF MAGNESIUM: CPT

## 2019-10-01 PROCEDURE — 99232 PR SUBSEQUENT HOSPITAL CARE,LEVL II: ICD-10-PCS | Mod: ,,, | Performed by: NEUROLOGICAL SURGERY

## 2019-10-01 PROCEDURE — 25000003 PHARM REV CODE 250: Performed by: PHYSICIAN ASSISTANT

## 2019-10-01 PROCEDURE — 27000221 HC OXYGEN, UP TO 24 HOURS

## 2019-10-01 PROCEDURE — 63600175 PHARM REV CODE 636 W HCPCS: Performed by: PSYCHIATRY & NEUROLOGY

## 2019-10-01 PROCEDURE — 25000003 PHARM REV CODE 250: Performed by: INTERNAL MEDICINE

## 2019-10-01 PROCEDURE — 63600175 PHARM REV CODE 636 W HCPCS: Performed by: PHYSICIAN ASSISTANT

## 2019-10-01 PROCEDURE — 94760 N-INVAS EAR/PLS OXIMETRY 1: CPT

## 2019-10-01 PROCEDURE — 63600175 PHARM REV CODE 636 W HCPCS: Performed by: NURSE PRACTITIONER

## 2019-10-01 PROCEDURE — 99233 PR SUBSEQUENT HOSPITAL CARE,LEVL III: ICD-10-PCS | Mod: GC,,, | Performed by: PSYCHIATRY & NEUROLOGY

## 2019-10-01 PROCEDURE — 99233 SBSQ HOSP IP/OBS HIGH 50: CPT | Mod: GC,,, | Performed by: PSYCHIATRY & NEUROLOGY

## 2019-10-01 PROCEDURE — 99900026 HC AIRWAY MAINTENANCE (STAT)

## 2019-10-01 PROCEDURE — 20000000 HC ICU ROOM

## 2019-10-01 RX ADMIN — ENOXAPARIN SODIUM 40 MG: 100 INJECTION SUBCUTANEOUS at 04:10

## 2019-10-01 RX ADMIN — OXYCODONE HYDROCHLORIDE 5 MG: 5 TABLET ORAL at 12:10

## 2019-10-01 RX ADMIN — LACOSAMIDE 100 MG: 10 SOLUTION ORAL at 08:10

## 2019-10-01 RX ADMIN — FAMOTIDINE 20 MG: 20 TABLET ORAL at 08:10

## 2019-10-01 RX ADMIN — OXYCODONE HYDROCHLORIDE 5 MG: 5 TABLET ORAL at 06:10

## 2019-10-01 RX ADMIN — GABAPENTIN 900 MG: 300 CAPSULE ORAL at 08:10

## 2019-10-01 RX ADMIN — GABAPENTIN 900 MG: 300 CAPSULE ORAL at 02:10

## 2019-10-01 RX ADMIN — OXYCODONE HYDROCHLORIDE 5 MG: 5 TABLET ORAL at 05:10

## 2019-10-01 RX ADMIN — Medication 187.5 MCG/HR: at 12:10

## 2019-10-01 RX ADMIN — HYDRALAZINE HYDROCHLORIDE 10 MG: 20 INJECTION INTRAMUSCULAR; INTRAVENOUS at 04:10

## 2019-10-01 RX ADMIN — DEXAMETHASONE SODIUM PHOSPHATE 4 MG: 4 INJECTION, SOLUTION INTRAMUSCULAR; INTRAVENOUS at 02:10

## 2019-10-01 RX ADMIN — DEXAMETHASONE SODIUM PHOSPHATE 4 MG: 4 INJECTION, SOLUTION INTRAMUSCULAR; INTRAVENOUS at 05:10

## 2019-10-01 RX ADMIN — LEVOTHYROXINE SODIUM 75 MCG: 75 TABLET ORAL at 05:10

## 2019-10-01 RX ADMIN — FLUDROCORTISONE ACETATE 100 MCG: 0.1 TABLET ORAL at 08:10

## 2019-10-01 RX ADMIN — DEXAMETHASONE SODIUM PHOSPHATE 4 MG: 4 INJECTION, SOLUTION INTRAMUSCULAR; INTRAVENOUS at 10:10

## 2019-10-01 RX ADMIN — QUETIAPINE FUMARATE 50 MG: 25 TABLET ORAL at 04:10

## 2019-10-01 RX ADMIN — QUETIAPINE FUMARATE 50 MG: 25 TABLET ORAL at 03:10

## 2019-10-01 NOTE — PLAN OF CARE
SW met with Pt  at bedside. Confirmed his preference is [facility]. Addressed questions and will continue to keep him updated.     DEEP spoke with Khloe with [facility] to give update on this Pt. They can take the fentinal drip if she is ready otherwise. SW to advise once team rounds.    DEEP received call from RN to report that per MD rounds, if Pt tolerates TF overnight and LTAC can take the flexi, the Pt would be ready tomorrow. They have advised the Pt  already. DEEP updated Khloe from [facility] and verified they can do a flexi. They will also have a bed tomorrow.     Toña Henning LMSW  Neurocritical Care   [facility]  [id number]

## 2019-10-01 NOTE — ASSESSMENT & PLAN NOTE
74 year old admitted to unit on 9/3 following new onset seizure, with MRI showing large area of R cortical edema with flair and ill defined enhancement on contrast study, concern for glioma vs infectious/inflammatory process  - 9/22 repeat BC, pending   - CSF cx no growth,   - CSF studies negative- VZV, enterovirus, HSV, MS  - FEDERICO negative for vegetation     - continue decadron 4mg q6h  - neuro checks/ VS q1hr   -on SubQ heparin   -Repeat brain MRI W/WO with stealth9/19, showed large  R frontal lobe lesion nonspecific for infectious or neoplastic process; small remote L basal banglia lacunar infarct. No new lesions or infarcts  NSGY, no biopsy at this time, will readdress need for biopsy with NSGY next week.  9/22 paraneoplastic panel sent and pending    Patient intermittently following simple commands

## 2019-10-01 NOTE — SUBJECTIVE & OBJECTIVE
Interval History: naeon, on fentanyl for agitation    Medications:  Continuous Infusions:   fentanyl 187.5 mcg/hr (10/01/19 0900)     Scheduled Meds:   dexamethasone  4 mg Intravenous Q8H    enoxaparin  40 mg Subcutaneous Daily    famotidine  20 mg Per OG tube QHS    fludrocortisone  100 mcg Per G Tube BID    gabapentin  900 mg Per G Tube TID    lacosamide  100 mg Per G Tube Q12H    levothyroxine  75 mcg Per OG tube Before breakfast    oxyCODONE  5 mg Per G Tube Q6H    QUEtiapine  50 mg Per G Tube Q12H     PRN Meds:acetaminophen, Dextrose 10% Bolus, glucagon (human recombinant), hydrALAZINE, insulin aspart U-100, magnesium oxide, magnesium oxide, potassium chloride 10%, potassium chloride 10%, potassium, sodium phosphates, potassium, sodium phosphates, potassium, sodium phosphates, racepinephrine, sodium chloride 0.9%, sodium chloride 0.9%     Review of Systems  Objective:     Weight: 78.6 kg (173 lb 4.5 oz)  Body mass index is 30.7 kg/m².  Vital Signs (Most Recent):  Temp: 98 °F (36.7 °C) (10/01/19 0701)  Pulse: 87 (10/01/19 0900)  Resp: (!) 24 (10/01/19 0900)  BP: (!) 177/74 (10/01/19 0900)  SpO2: 100 % (10/01/19 0900) Vital Signs (24h Range):  Temp:  [97.2 °F (36.2 °C)-98.2 °F (36.8 °C)] 98 °F (36.7 °C)  Pulse:  [60-99] 87  Resp:  [8-30] 24  SpO2:  [99 %-100 %] 100 %  BP: (106-205)/() 177/74     Date 10/01/19 0700 - 10/02/19 0659   Shift 3394-4349 8252-3901 4355-2709 24 Hour Total   INTAKE   I.V.(mL/kg) 56.1(0.7)   56.1(0.7)   NG/GT 20   20   Shift Total(mL/kg) 76.1(1)   76.1(1)   OUTPUT   Urine(mL/kg/hr) 75   75   Shift Total(mL/kg) 75(1)   75(1)   Weight (kg) 78.6 78.6 78.6 78.6              Vent Mode: SIMV  Oxygen Concentration (%):  [40] 40  Resp Rate Total:  [10 br/min-24 br/min] 16 br/min  Vt Set:  [380 mL] 380 mL  PEEP/CPAP:  [5 cmH20] 5 cmH20  Pressure Support:  [12 cmH20] 12 cmH20  Mean Airway Pressure:  [6.8 alM34-12 cmH20] 12 cmH20         Gastrostomy/Enterostomy 09/26/19 0778  Percutaneous endoscopic gastrostomy (PEG) midline feeding (Active)   Securement secured to abdomen 10/1/2019  3:01 AM   Interventions Prior to Feeding patency checked;residual checked 10/1/2019  3:01 AM   Suction Setting/Drainage Method other (see comments) 9/30/2019  3:00 PM   Drainage yellow 9/29/2019  3:02 AM   Feeding Type continuous 9/29/2019  3:02 AM   Clamp Status/Tolerance clamped 10/1/2019  3:01 AM   Feeding Action feeding held 10/1/2019  3:01 AM   Dressing dry and intact 10/1/2019  3:01 AM   Insertion Site dry;no warmth;no drainage;no tenderness;no swelling;no redness 10/1/2019  3:01 AM   Site Care sterile 4 x 4 gauze dressing applied 10/1/2019  3:01 AM   Flush/Irrigation flushed w/;water;no resistance met 10/1/2019  3:01 AM   Current Rate (mL/hr) 0 mL/hr 9/29/2019 11:01 PM   Intake (mL) 240 mL 10/1/2019  6:01 AM   Tube Feeding Intake (mL) 10 10/1/2019  8:00 AM   Residual Amount (ml) 0 ml 9/30/2019  3:00 PM            Rectal Tube 09/15/19 0900 rectal tube w/ balloon (indicate number of mLs) (Active)   Balloon Inflation Volume (mL) 45 10/1/2019  3:01 AM   Reposition drainage bags for BMS & Bazzi on opposite sides of bed 10/1/2019  3:01 AM   Outcome gas expelled, stool evacuated 10/1/2019  3:01 AM   Stool Color Brown 9/30/2019  3:00 PM   Insertion Site Appearance no redness, warmth, tenderness, skin breakdown, drainage 10/1/2019  3:01 AM   Flush/Irrigation flushed w/;water;no resistance met 10/1/2019  3:01 AM   Intake (mL) 45 mL 9/29/2019 11:01 PM   Rectal Tube Output 200 mL 10/1/2019  6:01 AM       Female External Urinary Catheter 09/23/19 0900 (Active)   Skin no redness;no breakdown;perineum cleansed w/ soap and water 10/1/2019  3:01 AM   Tolerance no signs/symptoms of discomfort 10/1/2019  3:01 AM   Suction Continuous suction at 70 mmHg 9/30/2019  7:01 PM   Date of last wick change 09/30/19 9/30/2019  3:00 PM   Time of last wick change 1500 9/30/2019  3:00 PM   Output (mL) 75 mL 10/1/2019  8:00 AM        Neurosurgery Physical Exam   [id number]  PERRL  Moves right spontaeously  WD in LLE, paretic in LUE.    Significant Labs:  Recent Labs   Lab 09/30/19  0349 09/30/19  1218 10/01/19  0333   *  --  146*     --  137   K 3.7 4.6 4.1     --  104   CO2 31*  --  24   BUN 21  --  22   CREATININE 0.8  --  0.8   CALCIUM 7.9*  --  8.3*   MG 2.1  --  2.0     Recent Labs   Lab 09/30/19  0349 09/30/19  0812 10/01/19  0333   WBC 4.50 3.58* 4.30   HGB 8.1* 7.9* 9.1*   HCT 26.4* 27.1* 29.1*   * 140* 132*     Recent Labs   Lab 09/29/19  1329   INR 1.0     Microbiology Results (last 7 days)     Procedure Component Value Units Date/Time    Blood culture [[id number]] Collected:  09/23/19 0241    Order Status:  Completed Specimen:  Blood from Peripheral, Forearm, Left Updated:  09/28/19 0612     Blood Culture, Routine No growth after 5 days.    Narrative:       Blood cultures x 2 different sites. 4 bottles total. Please  draw cultures before administering antibiotics.    Blood culture [[id number]] Collected:  09/23/19 0241    Order Status:  Completed Specimen:  Blood from Peripheral, Forearm, Right Updated:  09/28/19 0612     Blood Culture, Routine No growth after 5 days.    Narrative:       Blood cultures from 2 different sites. 4 bottles total.  Please draw before starting antibiotics.    Clostridium difficile EIA [[id number]] Collected:  09/24/19 1137    Order Status:  Completed Specimen:  Stool Updated:  09/24/19 2218     C. diff Antigen Negative     C difficile Toxins A+B, EIA Negative     Comment: Testing not recommended for children <24 months old.               Significant Diagnostics:

## 2019-10-01 NOTE — PROGRESS NOTES
[facility]  Neurosurgery  Progress Note    Subjective:     History of Present Illness: 73 y/o female with pmhx CKD and HTN  presented to outside hospital for seizure- like activity this AM. Per  pt was found unconscious this AM, with seizure like activity occurring on the left side. Pt was given versed x 2 via EMS. CTH performed at outside hospital was negative for bleed. Pt had additional seizure in outside hospital ED and was given IV Keppra. EEG ordered and MRI brain w/out contrast concerning for possible infiltratrive process vs post ischemic sequela. Pt transferred to [facility] and admitted to [facility]. MRI brain w/ and w/out obtained that showed large area of edema in right frontal lobe and ill defined enhancement in the frontal lobe concerning for possible cerebritis vs. Neoplasm. NSGY was consulted to evaluate. Pt not on anti-coagulation.       Post-Op Info:  Procedure(s) (LRB):  CREATION, TRACHEOSTOMY open (N/A)  EGD, WITH PEG TUBE INSERTION (N/A)   5 Days Post-Op     Interval History: tu, on fentanyl for agitation    Medications:  Continuous Infusions:   fentanyl 187.5 mcg/hr (10/01/19 0900)     Scheduled Meds:   dexamethasone  4 mg Intravenous Q8H    enoxaparin  40 mg Subcutaneous Daily    famotidine  20 mg Per OG tube QHS    fludrocortisone  100 mcg Per G Tube BID    gabapentin  900 mg Per G Tube TID    lacosamide  100 mg Per G Tube Q12H    levothyroxine  75 mcg Per OG tube Before breakfast    oxyCODONE  5 mg Per G Tube Q6H    QUEtiapine  50 mg Per G Tube Q12H     PRN Meds:acetaminophen, Dextrose 10% Bolus, glucagon (human recombinant), hydrALAZINE, insulin aspart U-100, magnesium oxide, magnesium oxide, potassium chloride 10%, potassium chloride 10%, potassium, sodium phosphates, potassium, sodium phosphates, potassium, sodium phosphates, racepinephrine, sodium chloride 0.9%, sodium chloride 0.9%     Review of Systems  Objective:     Weight: 78.6 kg (173 lb 4.5 oz)  Body  mass index is 30.7 kg/m².  Vital Signs (Most Recent):  Temp: 98 °F (36.7 °C) (10/01/19 0701)  Pulse: 87 (10/01/19 0900)  Resp: (!) 24 (10/01/19 0900)  BP: (!) 177/74 (10/01/19 0900)  SpO2: 100 % (10/01/19 0900) Vital Signs (24h Range):  Temp:  [97.2 °F (36.2 °C)-98.2 °F (36.8 °C)] 98 °F (36.7 °C)  Pulse:  [60-99] 87  Resp:  [8-30] 24  SpO2:  [99 %-100 %] 100 %  BP: (106-205)/() 177/74     Date 10/01/19 0700 - 10/02/19 0659   Shift 0198-0065 8676-0744 1888-5690 24 Hour Total   INTAKE   I.V.(mL/kg) 56.1(0.7)   56.1(0.7)   NG/GT 20   20   Shift Total(mL/kg) 76.1(1)   76.1(1)   OUTPUT   Urine(mL/kg/hr) 75   75   Shift Total(mL/kg) 75(1)   75(1)   Weight (kg) 78.6 78.6 78.6 78.6              Vent Mode: SIMV  Oxygen Concentration (%):  [40] 40  Resp Rate Total:  [10 br/min-24 br/min] 16 br/min  Vt Set:  [380 mL] 380 mL  PEEP/CPAP:  [5 cmH20] 5 cmH20  Pressure Support:  [12 cmH20] 12 cmH20  Mean Airway Pressure:  [6.8 faX82-89 cmH20] 12 cmH20         Gastrostomy/Enterostomy 09/26/19 0745 Percutaneous endoscopic gastrostomy (PEG) midline feeding (Active)   Securement secured to abdomen 10/1/2019  3:01 AM   Interventions Prior to Feeding patency checked;residual checked 10/1/2019  3:01 AM   Suction Setting/Drainage Method other (see comments) 9/30/2019  3:00 PM   Drainage yellow 9/29/2019  3:02 AM   Feeding Type continuous 9/29/2019  3:02 AM   Clamp Status/Tolerance clamped 10/1/2019  3:01 AM   Feeding Action feeding held 10/1/2019  3:01 AM   Dressing dry and intact 10/1/2019  3:01 AM   Insertion Site dry;no warmth;no drainage;no tenderness;no swelling;no redness 10/1/2019  3:01 AM   Site Care sterile 4 x 4 gauze dressing applied 10/1/2019  3:01 AM   Flush/Irrigation flushed w/;water;no resistance met 10/1/2019  3:01 AM   Current Rate (mL/hr) 0 mL/hr 9/29/2019 11:01 PM   Intake (mL) 240 mL 10/1/2019  6:01 AM   Tube Feeding Intake (mL) 10 10/1/2019  8:00 AM   Residual Amount (ml) 0 ml 9/30/2019  3:00 PM             Rectal Tube 09/15/19 0900 rectal tube w/ balloon (indicate number of mLs) (Active)   Balloon Inflation Volume (mL) 45 10/1/2019  3:01 AM   Reposition drainage bags for BMS & Bazzi on opposite sides of bed 10/1/2019  3:01 AM   Outcome gas expelled, stool evacuated 10/1/2019  3:01 AM   Stool Color Brown 9/30/2019  3:00 PM   Insertion Site Appearance no redness, warmth, tenderness, skin breakdown, drainage 10/1/2019  3:01 AM   Flush/Irrigation flushed w/;water;no resistance met 10/1/2019  3:01 AM   Intake (mL) 45 mL 9/29/2019 11:01 PM   Rectal Tube Output 200 mL 10/1/2019  6:01 AM       Female External Urinary Catheter 09/23/19 0900 (Active)   Skin no redness;no breakdown;perineum cleansed w/ soap and water 10/1/2019  3:01 AM   Tolerance no signs/symptoms of discomfort 10/1/2019  3:01 AM   Suction Continuous suction at 70 mmHg 9/30/2019  7:01 PM   Date of last wick change 09/30/19 9/30/2019  3:00 PM   Time of last wick change 1500 9/30/2019  3:00 PM   Output (mL) 75 mL 10/1/2019  8:00 AM       Neurosurgery Physical Exam   [id number]  PERRL  Moves right spontaeously  WD in LLE, paretic in LUE.    Significant Labs:  Recent Labs   Lab 09/30/19 0349 09/30/19  1218 10/01/19  0333   *  --  146*     --  137   K 3.7 4.6 4.1     --  104   CO2 31*  --  24   BUN 21  --  22   CREATININE 0.8  --  0.8   CALCIUM 7.9*  --  8.3*   MG 2.1  --  2.0     Recent Labs   Lab 09/30/19 0349 09/30/19  0812 10/01/19  0333   WBC 4.50 3.58* 4.30   HGB 8.1* 7.9* 9.1*   HCT 26.4* 27.1* 29.1*   * 140* 132*     Recent Labs   Lab 09/29/19  1329   INR 1.0     Microbiology Results (last 7 days)     Procedure Component Value Units Date/Time    Blood culture [[id number]] Collected:  09/23/19 0241    Order Status:  Completed Specimen:  Blood from Peripheral, Forearm, Left Updated:  09/28/19 0612     Blood Culture, Routine No growth after 5 days.    Narrative:       Blood cultures x 2 different sites. 4 bottles total. Please  draw  cultures before administering antibiotics.    Blood culture [[id number]] Collected:  09/23/19 0241    Order Status:  Completed Specimen:  Blood from Peripheral, Forearm, Right Updated:  09/28/19 0612     Blood Culture, Routine No growth after 5 days.    Narrative:       Blood cultures from 2 different sites. 4 bottles total.  Please draw before starting antibiotics.    Clostridium difficile EIA [[id number]] Collected:  09/24/19 1137    Order Status:  Completed Specimen:  Stool Updated:  09/24/19 2218     C. diff Antigen Negative     C difficile Toxins A+B, EIA Negative     Comment: Testing not recommended for children <24 months old.               Significant Diagnostics:      Assessment/Plan:     New onset seizure  73 y/o female with pmhx CKD and HTN presented to outside hospital for seizure- like activity occurring on the left side. Found to have area of ill-defined enhancement on MRI brain w/ and w/out.  GARCIA thus far has been unrevealing for source of encephalopathy.    -Admitted to Neuro-ICU  -q 1 hr neuro checks  -FEDERICO negative  -CSF NGTD, negative for viral panel.  -Fu paraneoplastic panel.  -Wean steroids as tolerated  -Further care per NCC, will follow.          Kingsley Lisa,   Neurosurgery  [facility]

## 2019-10-01 NOTE — ASSESSMENT & PLAN NOTE
2/2 to brain mass   9/21 EEG dcd. No electrographic seizures for 24h  Vimpat dcd 9/20  Valproic acid d/c'd  Following simple commands, avoid AEDs

## 2019-10-01 NOTE — ASSESSMENT & PLAN NOTE
75 y/o female with pmhx CKD and HTN presented to outside hospital for seizure- like activity occurring on the left side. Found to have area of ill-defined enhancement on MRI brain w/ and w/out.  GARCIA thus far has been unrevealing for source of encephalopathy.    -Admitted to Neuro-ICU  -q 1 hr neuro checks  -FEDERICO negative  -CSF NGTD, negative for viral panel.  -Fu paraneoplastic panel.  -Wean steroids as tolerated  -Further care per NCC, will follow.

## 2019-10-01 NOTE — PLAN OF CARE
POC reviewed with pt and family at 1100.  verbalized understanding. Questions and concerns addressed. No acute events today. Pt progressing toward goals. Will continue to monitor. See flowsheets for full assessment and VS info. Plan to increase TF and transfer tomorrow to LTAC.

## 2019-10-01 NOTE — PROGRESS NOTES
[facility]  Neurocritical Care  Progress Note    Admit Date: 9/3/2019  Service Date: 10/01/2019  Length of Stay: 28    Subjective:     Chief Complaint: Brain lesion    History of Present Illness: Pt is  75 yo female with a PMHx of CKD 3, HTN, was transferred from [facility] to [facility] for new onset seizures and concern for right front lobe mass. The pt was found in her bedroom by her spouse at approximately 9:45 pm sitting her head against the bed, unresponsive, and having seizure like activity on the left-side. EMT was called and the pt was given Versed twice while en route to the hospital. Pt had a second witnessed seizures, left facial droop, left-sided weakness, and tongue ecchymosis in the ED. Neurology was consulted and The pt was given IV Keppra and Vimpat. MRI brain without contrast was acquired. The image showed right frontal lobe vasogenic edema with mass effect concerning for underlying mass. EEG was acquired at outside hospital concerning showing an abnormal result. The pt was given decadron IV and neurosurgical consult recommended. Pt transferred to [facility] and admitted to the [facility] for higher level of care and further evaluation.     Pt history acquired per chart review and from spouse present at the bedside. The pt's spouse denies prior history of seizures CVA, cancer history and up-to-date screenings    Hospital Course: --admitted to [facility] on 9/3 following new onset seizure, arrived intubated  --MRI with large area of R cortical edema with flair and ill defined enhancement on contrast study, concern for glioma vs infectious/inflammatory process  --LP appears non infectious, cytology pending  9/5- no acute events, awaiting LP finalization from pathology report., weaning vent.   09/06/2019: Very agitated overnight, requiring increased sedation. Patient extubated this morning on rounds, and reintubated shortly after. Unable to maintain airway and secretions. CSF gram stain negative.  09/07/2019: KATT.  EKG obtained, seroquel started. Valproic acid started.  09/08/2019: NAEON. Improved agitation with seroquel.  09/09/2019: MRI Brain w/wo ordered for today. BLE US ordered. Will need new LP later on in the week.   09/10/2019 LP for infx workup, CD4 lab, EEG monitoring per Epilepsy   09/11/2019: To IR for LP. Spoke to NSGY about possible bx of lesion. Discontinue cEEG.   9/13/2019 Issues with low HR and BP overnight and this morning, Given Shoaib bump and atropine. 24 hours of IVF and bolus. WBC decreasing and afebrile. Repeat blood cultures negative. Plan for EGD and FEDERICO today.   9/14/2019 NAEO, EGD and FEDERICO moved until Monday. CSF still pending. Increasing WBC but afebrile. Start tube feeding   9/15/2019 NAEO, all CSF studies negative so far. CT negative. Begin insulin gtt. EGD and FEDERICO tomorrow    9/16 No significant events over night. EGD done this afternoon for esophageal stricture. Esophagus stretched to 15. FEDERICO ordered for tomorrow.   9/17 No significant vents over night NPO for FEDERICO today scheduled for 3pm. Off levophed.  9/18 FEDERICO negative. Tube feeds restarted. NSGY following and considering brain biopsy. Propofol stopped. Precedex started.  9/19: NSGY recommending MRI brain W/WO with stealth for possible brain biopsy as other work up has been negative. Patient continues with hypotensive episodes, on Fentanyl drip and recommend to space out medications.  9/20 no significant events over night. Hypotensive episodes after vimpat dose and seroquel dose requiring a shoaib bump, IVF bolus then  Levophed gtt started to maintain map>65. Spontaneous breathing trial this afternoon  9/21 no significant events over night . Discussions with family daily about trach and peg. Trial of spon breathing off all sedation, pt. Agitated, restless not following commands. Family decided to have trach and peg done . gen surg. Consulted for trach and peg next week. Restarted fentanyl gtt for sedation. DCd EEG, not having seizures  9/22  Hypotensive episode last night required 500cc bolus and neobump x3. Levophed gtt started. Fentanyl gtt off till SBP back up. This am remains restless on fentanyl gtt. Will try versed IVP at lower dose.  Currently on levophed. Trach and peg scheduled for Tuesday. Paraneoplastic panel sent. Febrile over night blood and sputum cultures sent. Will readdress biopsy with NSGY next week.  9/23: likely intravascularly dry, fluid bolus again administered and placed on continuous rate, use prn versed 1mg  q2 hrs prn and resume fentanyl gtt when awakens, could not tolerate 2 mg dose  9/30: wean fentanyl, increase gabapentin, KUB, family updated  10/1: advance tube feeds, LTAC ready    Physical Exam:  GA: comfortable, no acute distress.   HEENT: No scleral icterus or JVD.   Pulmonary: Clear to auscultation A/L. No wheezing, crackles, or rhonchi. intubated  Cardiac: RRR S1 & S2 w/o rubs/murmurs/gallops.   Abdominal: Bowel sounds present x 4. No appreciable hepatosplenomegaly.  Skin: No jaundice, rashes, or visible lesions.  Pulses: 1+ Dp bilat     Neuro:  --sedation: fentanyl  --GCS: O5V9oQ2  --Mental Status: sedated, intermittently follows commands to open eyes and stick tongue out, nods and shakes head to questions  --CN II-XII grossly intact.   --Pupils 3-->2mm, PERRL.   --brainstem: inatct  --Motor: right side spontaneous, with right leg intermittent kicking improving, left side sporadic movements  --sensory: see motor  --Reflexes: not tested  --Gait: deferred    Per  she recognized family members and mouth correct names of children    Recent Labs   Lab 10/01/19  0333   WBC 4.30   RBC 3.01*   HGB 9.1*   HCT 29.1*   *   MCV 97   MCH 30.2   MCHC 31.3*     Recent Labs   Lab 10/01/19  0333   CALCIUM 8.3*   PROT 5.4*      K 4.1   CO2 24      BUN 22   CREATININE 0.8   ALKPHOS 48*   ALT 29   AST 14   BILITOT 0.3     No results for input(s): PT, INR, APTT in the last 24 hours.  Vent Mode: SIMV  Oxygen  Concentration (%):  [40] 40  Resp Rate Total:  [10 br/min-26 br/min] 12 br/min  Vt Set:  [380 mL] 380 mL  PEEP/CPAP:  [5 cmH20] 5 cmH20  Pressure Support:  [12 cmH20] 12 cmH20  Mean Airway Pressure:  [8.7 fhE13-47 cmH20] 12 cmH20    Temp: 97.3 °F (36.3 °C)  Pulse: 70  Rhythm: normal sinus rhythm  BP: (!) 172/74  MAP (mmHg): 107  Resp: 14  SpO2: 100 %  Oxygen Concentration (%): 40  O2 Device (Oxygen Therapy): ventilator  Vent Mode: SIMV  Set Rate: 10 bmp  Vt Set: 380 mL  Pressure Support: 12 cmH20  PEEP/CPAP: 5 cmH20  Peak Airway Pressure: 17 cmH2O  Mean Airway Pressure: 12 cmH20  Plateau Pressure: 6.3 cmH20    Temp  Min: 97.3 °F (36.3 °C)  Max: 98.2 °F (36.8 °C)  Pulse  Min: 60  Max: 100  BP  Min: 109/55  Max: 205/77  MAP (mmHg)  Min: 78  Max: 111  Resp  Min: 8  Max: 32  SpO2  Min: 99 %  Max: 100 %  Oxygen Concentration (%)  Min: 40  Max: 40    09/30 0701 - 10/01 0700  In: 1141.9 [I.V.:611.9]  Out: 950 [Urine:675]   Unmeasured Output  Urine Occurrence: 1  Stool Occurrence: 0  Emesis Occurrence: 0  Pad Count: 2    Nutrition Prescription Ordered  Current Diet Order: NPO  Nutrition Order Comments: -  Current Nutrition Support Formula Ordered: Isosource 1.5  Current Nutrition Support Rate Ordered: 45 (ml)  Current Nutrition Support Frequency Ordered: mL/hr  Last Bowel Movement: 09/29/19    Body mass index is 30.7 kg/m².      I have personally reviewed all labs, imaging, and studies today    Assessment/Plan:     Neuro  * Brain lesion  74 year old admitted to unit on 9/3 following new onset seizure, with MRI showing large area of R cortical edema with flair and ill defined enhancement on contrast study, concern for glioma vs infectious/inflammatory process  - 9/22 repeat BC, pending   - CSF cx no growth,   - CSF studies negative- VZV, enterovirus, HSV, MS  - FEDERICO negative for vegetation     - continue decadron 4mg q6h  - neuro checks/ VS q1hr   -on SubQ heparin   -Repeat brain MRI W/WO with stealth9/19, showed large  R  frontal lobe lesion nonspecific for infectious or neoplastic process; small remote L basal banglia lacunar infarct. No new lesions or infarcts  NSGY, no biopsy at this time, will readdress need for biopsy with NSGY next week.  9/22 paraneoplastic panel sent and pending    Patient intermittently following simple commands        Dysautonomia  Gabapentin 900mg dose tid     New onset seizure  2/2 to brain mass   9/21 EEG dcd. No electrographic seizures for 24h  Vimpat dcd 9/20  Valproic acid d/c'd  Following simple commands, avoid AEDs            Psychiatric  Restlessness and agitation  Responded well to seroquel  Wean sedation    Pulmonary  On mechanically assisted ventilation  Daily CXR, ABG    Vent Mode: SIMV  Oxygen Concentration (%):  [40] 40  Resp Rate Total:  [10 br/min-26 br/min] 12 br/min  Vt Set:  [380 mL] 380 mL  PEEP/CPAP:  [5 cmH20] 5 cmH20  Pressure Support:  [12 cmH20] 12 cmH20  Mean Airway Pressure:  [8.7 hgM26-53 cmH20] 12 cmH20   Daily CXR/ABG  Does better in terms of airway pressures and synchrony on psv setting    Renal/  Chronic kidney disease, stage III (moderate)  -PMHx of CKD 3   -monitor renal function, I/Os   Currently creatinine nl    ID  Bacteremia  -Blood culture + genella morbillorum  Continue ceftriaxone 2g q12h for 2 weeks per ID recs  Blood cultures resolved immediately    Likely contaminated          The patient is being Prophylaxed for:  Venous Thromboembolism with: Chemical  Stress Ulcer with: H2B  Ventilator Pneumonia with: chlorhexidine oral care    Activity Orders          Diet NPO Except for: Medication: NPO starting at 09/24 0001        Full Code    Jelani Allison MD  Neurocritical Care  [facility]    Level III

## 2019-10-01 NOTE — PLAN OF CARE
POC reviewed with pt at 0500. Pt verbalized understanding. Questions and concerns addressed. No acute events overnight. Pt progressing toward goals. Sedation continued to weaned down. Flexi still in place with liquid stool output. Will continue to monitor. See flowsheets for full assessment and VS info

## 2019-10-01 NOTE — ASSESSMENT & PLAN NOTE
Daily CXR, ABG    Vent Mode: SIMV  Oxygen Concentration (%):  [40] 40  Resp Rate Total:  [10 br/min-26 br/min] 12 br/min  Vt Set:  [380 mL] 380 mL  PEEP/CPAP:  [5 cmH20] 5 cmH20  Pressure Support:  [12 cmH20] 12 cmH20  Mean Airway Pressure:  [8.7 zzY45-95 cmH20] 12 cmH20   Daily CXR/ABG  Does better in terms of airway pressures and synchrony on psv setting

## 2019-10-01 NOTE — PLAN OF CARE
POC reviewed with pt and family at 1100. Family verbalized understanding. Questions and concerns addressed. No acute events today. Pt progressing toward goals. Will continue to monitor. See flowsheets for full assessment and VS info. LTAC planning in progress. Weaning fentanyl , scheduled oxy per PEG.

## 2019-10-02 PROBLEM — R78.81 BACTEREMIA: Status: RESOLVED | Noted: 2019-09-12 | Resolved: 2019-10-02

## 2019-10-02 PROBLEM — E86.1 HYPOTENSION DUE TO HYPOVOLEMIA: Status: RESOLVED | Noted: 2019-09-13 | Resolved: 2019-10-02

## 2019-10-02 PROBLEM — G40.901 STATUS EPILEPTICUS: Status: RESOLVED | Noted: 2019-09-03 | Resolved: 2019-10-02

## 2019-10-02 LAB
ALBUMIN SERPL BCP-MCNC: 2.3 G/DL (ref 3.5–5.2)
ALLENS TEST: ABNORMAL
ALP SERPL-CCNC: 48 U/L (ref 55–135)
ALT SERPL W/O P-5'-P-CCNC: 22 U/L (ref 10–44)
ANION GAP SERPL CALC-SCNC: 5 MMOL/L (ref 8–16)
AST SERPL-CCNC: 9 U/L (ref 10–40)
BASOPHILS # BLD AUTO: 0 K/UL (ref 0–0.2)
BASOPHILS NFR BLD: 0 % (ref 0–1.9)
BILIRUB SERPL-MCNC: 0.3 MG/DL (ref 0.1–1)
BUN SERPL-MCNC: 21 MG/DL (ref 8–23)
CALCIUM SERPL-MCNC: 8.2 MG/DL (ref 8.7–10.5)
CHLORIDE SERPL-SCNC: 105 MMOL/L (ref 95–110)
CO2 SERPL-SCNC: 27 MMOL/L (ref 23–29)
CREAT SERPL-MCNC: 0.7 MG/DL (ref 0.5–1.4)
DELSYS: ABNORMAL
DIFFERENTIAL METHOD: ABNORMAL
EOSINOPHIL # BLD AUTO: 0 K/UL (ref 0–0.5)
EOSINOPHIL NFR BLD: 0 % (ref 0–8)
ERYTHROCYTE [DISTWIDTH] IN BLOOD BY AUTOMATED COUNT: 15.2 % (ref 11.5–14.5)
ERYTHROCYTE [SEDIMENTATION RATE] IN BLOOD BY WESTERGREN METHOD: 10 MM/H
EST. GFR  (AFRICAN AMERICAN): >60 ML/MIN/1.73 M^2
EST. GFR  (NON AFRICAN AMERICAN): >60 ML/MIN/1.73 M^2
FIO2: 40
GLUCOSE SERPL-MCNC: 184 MG/DL (ref 70–110)
HCO3 UR-SCNC: 26.9 MMOL/L (ref 24–28)
HCT VFR BLD AUTO: 27.6 % (ref 37–48.5)
HGB BLD-MCNC: 8.6 G/DL (ref 12–16)
IMM GRANULOCYTES # BLD AUTO: 0.05 K/UL (ref 0–0.04)
IMM GRANULOCYTES NFR BLD AUTO: 1.6 % (ref 0–0.5)
LYMPHOCYTES # BLD AUTO: 0.5 K/UL (ref 1–4.8)
LYMPHOCYTES NFR BLD: 14.8 % (ref 18–48)
MAGNESIUM SERPL-MCNC: 1.9 MG/DL (ref 1.6–2.6)
MCH RBC QN AUTO: 30.6 PG (ref 27–31)
MCHC RBC AUTO-ENTMCNC: 31.2 G/DL (ref 32–36)
MCV RBC AUTO: 98 FL (ref 82–98)
MODE: ABNORMAL
MONOCYTES # BLD AUTO: 0.5 K/UL (ref 0.3–1)
MONOCYTES NFR BLD: 16.7 % (ref 4–15)
NEUTROPHILS # BLD AUTO: 2.1 K/UL (ref 1.8–7.7)
NEUTROPHILS NFR BLD: 66.9 % (ref 38–73)
NRBC BLD-RTO: 0 /100 WBC
PCO2 BLDA: 48.9 MMHG (ref 35–45)
PEEP: 5
PH SMN: 7.35 [PH] (ref 7.35–7.45)
PHOSPHATE SERPL-MCNC: 2.5 MG/DL (ref 2.7–4.5)
PLATELET # BLD AUTO: 114 K/UL (ref 150–350)
PMV BLD AUTO: 8.3 FL (ref 9.2–12.9)
PO2 BLDA: 35 MMHG (ref 40–60)
POC BE: 1 MMOL/L
POC SATURATED O2: 64 % (ref 95–100)
POC TCO2: 28 MMOL/L (ref 24–29)
POCT GLUCOSE: 147 MG/DL (ref 70–110)
POCT GLUCOSE: 164 MG/DL (ref 70–110)
POCT GLUCOSE: 195 MG/DL (ref 70–110)
POCT GLUCOSE: 202 MG/DL (ref 70–110)
POTASSIUM SERPL-SCNC: 3.9 MMOL/L (ref 3.5–5.1)
PROT SERPL-MCNC: 5.1 G/DL (ref 6–8.4)
PS: 12
RBC # BLD AUTO: 2.81 M/UL (ref 4–5.4)
SAMPLE: ABNORMAL
SITE: ABNORMAL
SODIUM SERPL-SCNC: 137 MMOL/L (ref 136–145)
VT: 380
WBC # BLD AUTO: 3.18 K/UL (ref 3.9–12.7)

## 2019-10-02 PROCEDURE — 99233 SBSQ HOSP IP/OBS HIGH 50: CPT | Mod: GC,,, | Performed by: PSYCHIATRY & NEUROLOGY

## 2019-10-02 PROCEDURE — 63600175 PHARM REV CODE 636 W HCPCS: Performed by: INTERNAL MEDICINE

## 2019-10-02 PROCEDURE — 25000003 PHARM REV CODE 250: Performed by: NURSE PRACTITIONER

## 2019-10-02 PROCEDURE — 25000003 PHARM REV CODE 250: Performed by: STUDENT IN AN ORGANIZED HEALTH CARE EDUCATION/TRAINING PROGRAM

## 2019-10-02 PROCEDURE — 99233 PR SUBSEQUENT HOSPITAL CARE,LEVL III: ICD-10-PCS | Mod: GC,,, | Performed by: PSYCHIATRY & NEUROLOGY

## 2019-10-02 PROCEDURE — 25000003 PHARM REV CODE 250: Performed by: PHYSICIAN ASSISTANT

## 2019-10-02 PROCEDURE — 85025 COMPLETE CBC W/AUTO DIFF WBC: CPT

## 2019-10-02 PROCEDURE — 25000003 PHARM REV CODE 250: Performed by: PSYCHIATRY & NEUROLOGY

## 2019-10-02 PROCEDURE — 83735 ASSAY OF MAGNESIUM: CPT

## 2019-10-02 PROCEDURE — 80053 COMPREHEN METABOLIC PANEL: CPT

## 2019-10-02 PROCEDURE — 84100 ASSAY OF PHOSPHORUS: CPT

## 2019-10-02 PROCEDURE — 20000000 HC ICU ROOM

## 2019-10-02 PROCEDURE — 27000221 HC OXYGEN, UP TO 24 HOURS

## 2019-10-02 PROCEDURE — 27200966 HC CLOSED SUCTION SYSTEM

## 2019-10-02 PROCEDURE — 63600175 PHARM REV CODE 636 W HCPCS: Performed by: NURSE PRACTITIONER

## 2019-10-02 PROCEDURE — 63600175 PHARM REV CODE 636 W HCPCS: Performed by: PSYCHIATRY & NEUROLOGY

## 2019-10-02 PROCEDURE — 25000003 PHARM REV CODE 250: Performed by: INTERNAL MEDICINE

## 2019-10-02 PROCEDURE — 99900035 HC TECH TIME PER 15 MIN (STAT)

## 2019-10-02 PROCEDURE — 99900026 HC AIRWAY MAINTENANCE (STAT)

## 2019-10-02 PROCEDURE — 94003 VENT MGMT INPAT SUBQ DAY: CPT

## 2019-10-02 PROCEDURE — 94761 N-INVAS EAR/PLS OXIMETRY MLT: CPT

## 2019-10-02 PROCEDURE — 63600175 PHARM REV CODE 636 W HCPCS: Performed by: PHYSICIAN ASSISTANT

## 2019-10-02 RX ORDER — ENOXAPARIN SODIUM 100 MG/ML
40 INJECTION SUBCUTANEOUS DAILY
Start: 2019-10-02 | End: 2019-10-04

## 2019-10-02 RX ORDER — DEXAMETHASONE SODIUM PHOSPHATE 4 MG/ML
4 INJECTION, SOLUTION INTRA-ARTICULAR; INTRALESIONAL; INTRAMUSCULAR; INTRAVENOUS; SOFT TISSUE EVERY 8 HOURS
Qty: 1 ML | Refills: 1
Start: 2019-10-02 | End: 2019-10-04

## 2019-10-02 RX ORDER — DEXAMETHASONE SODIUM PHOSPHATE 4 MG/ML
4 INJECTION, SOLUTION INTRA-ARTICULAR; INTRALESIONAL; INTRAMUSCULAR; INTRAVENOUS; SOFT TISSUE EVERY 8 HOURS
Status: CANCELLED | OUTPATIENT
Start: 2019-10-02

## 2019-10-02 RX ORDER — LANOLIN ALCOHOL/MO/W.PET/CERES
800 CREAM (GRAM) TOPICAL
Status: CANCELLED | OUTPATIENT
Start: 2019-10-02

## 2019-10-02 RX ORDER — HYDRALAZINE HYDROCHLORIDE 20 MG/ML
10 INJECTION INTRAMUSCULAR; INTRAVENOUS EVERY 6 HOURS PRN
Status: CANCELLED | OUTPATIENT
Start: 2019-10-02

## 2019-10-02 RX ORDER — QUETIAPINE FUMARATE 25 MG/1
50 TABLET, FILM COATED ORAL
Status: CANCELLED | OUTPATIENT
Start: 2019-10-03

## 2019-10-02 RX ORDER — OXYCODONE HYDROCHLORIDE 5 MG/1
5 TABLET ORAL EVERY 6 HOURS
Refills: 0
Start: 2019-10-02 | End: 2019-10-04 | Stop reason: HOSPADM

## 2019-10-02 RX ORDER — GLUCAGON 1 MG
1 KIT INJECTION
Status: CANCELLED | OUTPATIENT
Start: 2019-10-02

## 2019-10-02 RX ORDER — FAMOTIDINE 20 MG/1
20 TABLET, FILM COATED ORAL NIGHTLY
Qty: 30 TABLET | Refills: 11
Start: 2019-10-02 | End: 2019-10-04

## 2019-10-02 RX ORDER — MORPHINE SULFATE 2 MG/ML
2 INJECTION, SOLUTION INTRAMUSCULAR; INTRAVENOUS
Status: DISCONTINUED | OUTPATIENT
Start: 2019-10-02 | End: 2019-10-03

## 2019-10-02 RX ORDER — ENOXAPARIN SODIUM 100 MG/ML
40 INJECTION SUBCUTANEOUS EVERY 24 HOURS
Status: CANCELLED | OUTPATIENT
Start: 2019-10-02

## 2019-10-02 RX ORDER — LEVOTHYROXINE SODIUM 75 UG/1
75 TABLET ORAL
Status: CANCELLED | OUTPATIENT
Start: 2019-10-03

## 2019-10-02 RX ORDER — FLUDROCORTISONE ACETATE 0.1 MG/1
100 TABLET ORAL 2 TIMES DAILY
Qty: 60 TABLET | Refills: 0 | Status: SHIPPED | OUTPATIENT
Start: 2019-10-02 | End: 2019-10-04

## 2019-10-02 RX ORDER — FENTANYL CITRATE-0.9 % NACL/PF 10 MCG/ML
25 PLASTIC BAG, INJECTION (ML) INTRAVENOUS CONTINUOUS
Status: CANCELLED | OUTPATIENT
Start: 2019-10-02

## 2019-10-02 RX ORDER — SODIUM,POTASSIUM PHOSPHATES 280-250MG
2 POWDER IN PACKET (EA) ORAL
Status: CANCELLED | OUTPATIENT
Start: 2019-10-02

## 2019-10-02 RX ORDER — OXYCODONE HYDROCHLORIDE 5 MG/1
5 TABLET ORAL ONCE
Status: COMPLETED | OUTPATIENT
Start: 2019-10-02 | End: 2019-10-02

## 2019-10-02 RX ORDER — OXYCODONE HYDROCHLORIDE 5 MG/1
5 TABLET ORAL EVERY 6 HOURS
Status: CANCELLED | OUTPATIENT
Start: 2019-10-02

## 2019-10-02 RX ORDER — OXYCODONE HYDROCHLORIDE 5 MG/1
10 TABLET ORAL EVERY 4 HOURS
Status: DISCONTINUED | OUTPATIENT
Start: 2019-10-02 | End: 2019-10-04 | Stop reason: HOSPADM

## 2019-10-02 RX ORDER — GABAPENTIN 300 MG/1
900 CAPSULE ORAL 3 TIMES DAILY
Qty: 270 CAPSULE | Refills: 11 | Status: SHIPPED | OUTPATIENT
Start: 2019-10-02 | End: 2019-10-04

## 2019-10-02 RX ORDER — GABAPENTIN 300 MG/1
900 CAPSULE ORAL 3 TIMES DAILY
Status: CANCELLED | OUTPATIENT
Start: 2019-10-02

## 2019-10-02 RX ORDER — POTASSIUM CHLORIDE 20 MEQ/15ML
40 SOLUTION ORAL
Status: CANCELLED | OUTPATIENT
Start: 2019-10-02

## 2019-10-02 RX ORDER — SODIUM CHLORIDE 0.9 % (FLUSH) 0.9 %
10 SYRINGE (ML) INJECTION
Status: CANCELLED | OUTPATIENT
Start: 2019-10-02

## 2019-10-02 RX ORDER — LACOSAMIDE 10 MG/ML
100 SOLUTION ORAL EVERY 12 HOURS
Qty: 600 ML | Refills: 11 | Status: SHIPPED | OUTPATIENT
Start: 2019-10-02 | End: 2019-10-04

## 2019-10-02 RX ORDER — FAMOTIDINE 20 MG/1
20 TABLET, FILM COATED ORAL NIGHTLY
Status: CANCELLED | OUTPATIENT
Start: 2019-10-02

## 2019-10-02 RX ORDER — FENTANYL CITRATE-0.9 % NACL/PF 10 MCG/ML
25 PLASTIC BAG, INJECTION (ML) INTRAVENOUS CONTINUOUS
Status: DISCONTINUED | OUTPATIENT
Start: 2019-10-02 | End: 2019-10-03

## 2019-10-02 RX ORDER — INSULIN ASPART 100 [IU]/ML
1-10 INJECTION, SOLUTION INTRAVENOUS; SUBCUTANEOUS EVERY 4 HOURS PRN
Refills: 0
Start: 2019-10-02 | End: 2019-10-04

## 2019-10-02 RX ORDER — ACETAMINOPHEN 325 MG/1
650 TABLET ORAL EVERY 6 HOURS PRN
Status: CANCELLED | OUTPATIENT
Start: 2019-10-02

## 2019-10-02 RX ORDER — LACOSAMIDE 10 MG/ML
100 SOLUTION ORAL EVERY 12 HOURS
Status: CANCELLED | OUTPATIENT
Start: 2019-10-02

## 2019-10-02 RX ORDER — ACETAMINOPHEN 325 MG/1
650 TABLET ORAL EVERY 6 HOURS PRN
Refills: 0
Start: 2019-10-02 | End: 2019-10-04

## 2019-10-02 RX ORDER — LEVOTHYROXINE SODIUM 75 UG/1
75 TABLET ORAL
Qty: 30 TABLET | Refills: 1
Start: 2019-10-03 | End: 2019-10-04

## 2019-10-02 RX ORDER — FENTANYL CITRATE-0.9 % NACL/PF 10 MCG/ML
25 PLASTIC BAG, INJECTION (ML) INTRAVENOUS CONTINUOUS
Refills: 0
Start: 2019-10-02 | End: 2019-10-04 | Stop reason: HOSPADM

## 2019-10-02 RX ORDER — FLUDROCORTISONE ACETATE 0.1 MG/1
100 TABLET ORAL 2 TIMES DAILY
Status: CANCELLED | OUTPATIENT
Start: 2019-10-02

## 2019-10-02 RX ORDER — QUETIAPINE FUMARATE 50 MG/1
50 TABLET, FILM COATED ORAL EVERY 12 HOURS
Qty: 60 TABLET | Refills: 11 | Status: SHIPPED | OUTPATIENT
Start: 2019-10-02 | End: 2019-10-04

## 2019-10-02 RX ORDER — INSULIN ASPART 100 [IU]/ML
1-10 INJECTION, SOLUTION INTRAVENOUS; SUBCUTANEOUS EVERY 4 HOURS PRN
Status: CANCELLED | OUTPATIENT
Start: 2019-10-02

## 2019-10-02 RX ADMIN — GABAPENTIN 900 MG: 300 CAPSULE ORAL at 03:10

## 2019-10-02 RX ADMIN — QUETIAPINE FUMARATE 50 MG: 25 TABLET ORAL at 03:10

## 2019-10-02 RX ADMIN — DEXAMETHASONE SODIUM PHOSPHATE 4 MG: 4 INJECTION, SOLUTION INTRAMUSCULAR; INTRAVENOUS at 01:10

## 2019-10-02 RX ADMIN — DEXAMETHASONE SODIUM PHOSPHATE 4 MG: 4 INJECTION, SOLUTION INTRAMUSCULAR; INTRAVENOUS at 09:10

## 2019-10-02 RX ADMIN — OXYCODONE HYDROCHLORIDE 10 MG: 5 TABLET ORAL at 09:10

## 2019-10-02 RX ADMIN — ENOXAPARIN SODIUM 40 MG: 100 INJECTION SUBCUTANEOUS at 05:10

## 2019-10-02 RX ADMIN — FLUDROCORTISONE ACETATE 100 MCG: 0.1 TABLET ORAL at 08:10

## 2019-10-02 RX ADMIN — OXYCODONE HYDROCHLORIDE 5 MG: 5 TABLET ORAL at 11:10

## 2019-10-02 RX ADMIN — HYDRALAZINE HYDROCHLORIDE 10 MG: 20 INJECTION INTRAMUSCULAR; INTRAVENOUS at 10:10

## 2019-10-02 RX ADMIN — POTASSIUM & SODIUM PHOSPHATES POWDER PACK 280-160-250 MG 2 PACKET: 280-160-250 PACK at 06:10

## 2019-10-02 RX ADMIN — OXYCODONE HYDROCHLORIDE 5 MG: 5 TABLET ORAL at 06:10

## 2019-10-02 RX ADMIN — Medication 225 MCG/HR: at 12:10

## 2019-10-02 RX ADMIN — QUETIAPINE FUMARATE 50 MG: 25 TABLET ORAL at 04:10

## 2019-10-02 RX ADMIN — INSULIN ASPART 2 UNITS: 100 INJECTION, SOLUTION INTRAVENOUS; SUBCUTANEOUS at 08:10

## 2019-10-02 RX ADMIN — OXYCODONE HYDROCHLORIDE 5 MG: 5 TABLET ORAL at 12:10

## 2019-10-02 RX ADMIN — Medication 225 MCG/HR: at 01:10

## 2019-10-02 RX ADMIN — DEXAMETHASONE SODIUM PHOSPHATE 4 MG: 4 INJECTION, SOLUTION INTRAMUSCULAR; INTRAVENOUS at 06:10

## 2019-10-02 RX ADMIN — INSULIN ASPART 2 UNITS: 100 INJECTION, SOLUTION INTRAVENOUS; SUBCUTANEOUS at 03:10

## 2019-10-02 RX ADMIN — Medication 100 MCG/HR: at 06:10

## 2019-10-02 RX ADMIN — MORPHINE SULFATE 2 MG: 2 INJECTION, SOLUTION INTRAMUSCULAR; INTRAVENOUS at 10:10

## 2019-10-02 RX ADMIN — INSULIN ASPART 4 UNITS: 100 INJECTION, SOLUTION INTRAVENOUS; SUBCUTANEOUS at 11:10

## 2019-10-02 RX ADMIN — MORPHINE SULFATE 2 MG: 2 INJECTION, SOLUTION INTRAMUSCULAR; INTRAVENOUS at 11:10

## 2019-10-02 RX ADMIN — POTASSIUM & SODIUM PHOSPHATES POWDER PACK 280-160-250 MG 2 PACKET: 280-160-250 PACK at 11:10

## 2019-10-02 RX ADMIN — GABAPENTIN 900 MG: 300 CAPSULE ORAL at 08:10

## 2019-10-02 RX ADMIN — LEVOTHYROXINE SODIUM 75 MCG: 75 TABLET ORAL at 06:10

## 2019-10-02 RX ADMIN — LACOSAMIDE 100 MG: 10 SOLUTION ORAL at 08:10

## 2019-10-02 RX ADMIN — MORPHINE SULFATE 2 MG: 2 INJECTION, SOLUTION INTRAMUSCULAR; INTRAVENOUS at 09:10

## 2019-10-02 RX ADMIN — MORPHINE SULFATE 2 MG: 2 INJECTION, SOLUTION INTRAMUSCULAR; INTRAVENOUS at 07:10

## 2019-10-02 RX ADMIN — FAMOTIDINE 20 MG: 20 TABLET ORAL at 08:10

## 2019-10-02 NOTE — PLAN OF CARE
10/02/19 1534   Final Note   Assessment Type Final Discharge Note   Anticipated Discharge Disposition LTAC   Hospital Follow Up  Appt(s) scheduled? No   Discharge plans and expectations educations in teach back method with documentation complete? Yes   Right Care Referral Info   Post Acute Recommendation Other   Referral Type LTAC   Facility Name Post Acute [place]       Follow-up Information     [facility]. Go today.    Specialty:  LTAC  Why:  LTAC  Contact information:  [address] [facility] [address]  [phone]                   Future Appointments   Date Time Provider Department Center   10/25/2019 10:00 AM Ny Davis MD [facility]   12/18/2019 10:40 AM MILLA Castillo MD [facility]         Keely Malone RN, CCRN-K, [facility]  Neuro-Critical Care   X [phone]

## 2019-10-02 NOTE — PLAN OF CARE
ALVARO informed by Liaison for Post Acute LTAC, Khloe, that they cannot accept Fentanyl drip, but may be able to substitute something.  ALVARO informed Khloe that NCC MD does not want drip changed.  Per Khloe, they cannot accept patient with that medication.  ALVARO phoned Nicolette at [facility] to ask if they can accept Fentanyl drip.  Per Nicolette, they can accept patient on that medication.  DEEP and [facility] NP will discuss with patient's  to see if he is agreeable to [facility].      Per DEEP,  does not want to go to [facility].   stated that he wants to go to [place].     Keely Malone RN, CCRN-K, [facility]  Neuro-Critical Care   X [phone]

## 2019-10-02 NOTE — PLAN OF CARE
[facility]    FACILITY TRANSFER ORDERS      Patient Name: Clemencia Nguyen  YOB: 1945    PCP: SHELBY Castillo MD   PCP Address: [address] / [facility] [address]  PCP Phone Number: [phone]  PCP Fax: [phone]    Encounter Date: 10/02/2019    Admit to: [facility] [place], [place]    Vital Signs:  Routine    Diagnoses:   Active Hospital Problems    Diagnosis  POA    *Brain lesion [G93.9]  Yes    Dysautonomia [G90.1]  No    Restlessness and agitation [R45.1]  No    Hypotension due to hypovolemia [I95.89, E86.1]  No    Bacteremia [R78.81]  No    Fever [R50.9]  Yes    Acute respiratory failure with hypoxia and hypercapnia [J96.01, J96.02]  Yes    Leukocytosis [D72.829]  No    On mechanically assisted ventilation [Z99.11]  Not Applicable    Alteration in skin integrity [R23.9]  Yes    New onset seizure [R56.9]  Yes    Status epilepticus [G40.901]  Unknown    Cerebral edema [G93.6]  Yes    Chronic kidney disease, stage III (moderate) [N18.3]  Yes    Essential hypertension [I10]  Yes    Gastroesophageal reflux disease [K21.9]  Yes    Hypothyroidism [E03.9]  Yes      Resolved Hospital Problems   No resolved problems to display.       Allergies:Review of patient's allergies indicates:  No Known Allergies    Diet: tube feedings: Continuous isosource 1.5 danette at 45 mL per hour for 24 hours per day    Activities: Activity as tolerated, Dangle at bedside and Weight bearing as tolerated    Nursing: Routine VS., routine neurochecks, Accuchecks with moderate sliding scale insulin q4h,  Trach care q shift. Peg care q shift.. Skin care q shift. Wound care to lower lip q shift.    Vent Mode: SIMV  Oxygen Concentration (%):  [40] 40  Resp Rate Total:  [10 br/min-33 br/min] 33 br/min  Vt Set:  [380 mL] 380 mL  PEEP/CPAP:  [5 cmH20] 5 cmH20  Pressure Support:  [12 cmH20] 12 cmH20  Mean Airway Pressure:  [6.5 cmH20-8.3 cmH20] 6.5 cmH20    Labs: CBC, CMP and INR Daily for  3 days     CONSULTS:    Physical Therapy to evaluate and treat. , Occupational Therapy to evaluate and treat., Speech Therapy to evaluate and treat for Language, Swallowing and Cognition. and  to evaluate for community resources/long-range planning.    MISCELLANEOUS CARE:  PEG Care: Clean site every 24 hours.  and Routine Skin for Bedridden Patients: Apply moisture barrier cream to all skin folds and wet areas in perineal area daily and after baths and all bowel movements.    WOUND CARE ORDERS  Yes: lower lip wound care    Medications: Review discharge medications with patient and family and provide education.      Current Discharge Medication List      START taking these medications    Details   acetaminophen (TYLENOL) 325 MG tablet 2 tablets (650 mg total) by Per G Tube route every 6 (six) hours as needed.  Refills: 0      dexamethasone (DECADRON) 4 mg/mL injection Inject 1 mL (4 mg total) into the vein every 8 (eight) hours.  Qty: 1 mL, Refills: 1      enoxaparin (LOVENOX) 40 mg/0.4 mL Syrg Inject 0.4 mLs (40 mg total) into the skin once daily.      famotidine (PEPCID) 20 MG tablet 1 tablet (20 mg total) by Per G Tube route every evening.  Qty: 30 tablet, Refills: 11      fentaNYL citrate, PF,-0.9%NaCl (SUBLIMAZE) 10 mcg/mL infusion Inject 225 mcg/hr into the vein continuous.  Refills: 0    Comments: Quantity prescribed more than 7 day supply? No      fludrocortisone (FLORINEF) 0.1 mg Tab 1 tablet (100 mcg total) by Per G Tube route 2 (two) times daily.  Qty: 60 tablet, Refills: 0      gabapentin (NEURONTIN) 300 MG capsule 3 capsules (900 mg total) by Per G Tube route 3 (three) times daily.  Qty: 270 capsule, Refills: 11      insulin aspart U-100 (NOVOLOG) 100 unit/mL (3 mL) InPn pen Inject 1-10 Units into the skin every 4 (four) hours as needed (Hyperglycemia).  Refills: 0      lacosamide (VIMPAT) 10 mg/mL Soln oral solution 10 mLs (100 mg total) by Per G Tube route every 12 (twelve) hours.  Qty:  600 mL, Refills: 11      oxyCODONE (ROXICODONE) 5 MG immediate release tablet 1 tablet (5 mg total) by Per G Tube route every 6 (six) hours.  Refills: 0    Comments: Quantity prescribed more than 7 day supply? No      QUEtiapine (SEROQUEL) 50 MG tablet 1 tablet (50 mg total) by Per G Tube route every 12 (twelve) hours.  Qty: 60 tablet, Refills: 11         CONTINUE these medications which have CHANGED    Details   levothyroxine (SYNTHROID) 75 MCG tablet 1 tablet (75 mcg total) by Per G Tube route before breakfast.  Qty: 30 tablet, Refills: 1         STOP taking these medications       amLODIPine (NORVASC) 2.5 MG tablet Comments:   Reason for Stopping:         BIOTIN ORAL Comments:   Reason for Stopping:         calcitonin, salmon, (FORTICAL) 200 unit/actuation nasal spray Comments:   Reason for Stopping:         calcitRIOL (ROCALTROL) 0.5 MCG Cap Comments:   Reason for Stopping:         ciclopirox (PENLAC) 8 % Soln Comments:   Reason for Stopping:         fluticasone (FLONASE) 50 mcg/actuation nasal spray Comments:   Reason for Stopping:         imipramine (TOFRANIL) 25 MG tablet Comments:   Reason for Stopping:         irbesartan (AVAPRO) 300 MG tablet Comments:   Reason for Stopping:         krill-om-3-dha-epa-phospho-ast (MEGARED OMEGA-3 KRILL OIL) 1,000-230-60 mg Cap Comments:   Reason for Stopping:         pantoprazole (PROTONIX) 40 MG tablet Comments:   Reason for Stopping:         psyllium (METAMUCIL) packet Comments:   Reason for Stopping:         simvastatin (ZOCOR) 10 MG tablet Comments:   Reason for Stopping:         vitamin D 1000 units Tab Comments:   Reason for Stopping:                    _________________________________  Patito Odell NP  10/02/2019

## 2019-10-02 NOTE — PROGRESS NOTES
[facility]  Neurosurgery  Progress Note    Subjective:     History of Present Illness: 73 y/o female with pmhx CKD and HTN  presented to outside hospital for seizure- like activity this AM. Per  pt was found unconscious this AM, with seizure like activity occurring on the left side. Pt was given versed x 2 via EMS. CTH performed at outside hospital was negative for bleed. Pt had additional seizure in outside hospital ED and was given IV Keppra. EEG ordered and MRI brain w/out contrast concerning for possible infiltratrive process vs post ischemic sequela. Pt transferred to [facility] and admitted to [facility]. MRI brain w/ and w/out obtained that showed large area of edema in right frontal lobe and ill defined enhancement in the frontal lobe concerning for possible cerebritis vs. Neoplasm. NSGY was consulted to evaluate. Pt not on anti-coagulation.       Post-Op Info:  Procedure(s) (LRB):  CREATION, TRACHEOSTOMY open (N/A)  EGD, WITH PEG TUBE INSERTION (N/A)   6 Days Post-Op     Interval History: NAEON. Continued consideration of biopsy. We do not feel as though it is appropriate at this time.    Medications:  Continuous Infusions:   fentanyl 225 mcg/hr (10/02/19 1400)     Scheduled Meds:   dexamethasone  4 mg Intravenous Q8H    enoxaparin  40 mg Subcutaneous Daily    famotidine  20 mg Per OG tube QHS    fludrocortisone  100 mcg Per G Tube BID    gabapentin  900 mg Per G Tube TID    lacosamide  100 mg Per G Tube Q12H    levothyroxine  75 mcg Per OG tube Before breakfast    oxyCODONE  5 mg Per G Tube Q6H    QUEtiapine  50 mg Per G Tube Q12H     PRN Meds:acetaminophen, Dextrose 10% Bolus, glucagon (human recombinant), hydrALAZINE, insulin aspart U-100, magnesium oxide, magnesium oxide, potassium chloride 10%, potassium chloride 10%, potassium, sodium phosphates, potassium, sodium phosphates, potassium, sodium phosphates, sodium chloride 0.9%     Review of Systems  Objective:     Weight: 78.6 kg  (173 lb 4.5 oz)  Body mass index is 30.7 kg/m².  Vital Signs (Most Recent):  Temp: 97.8 °F (36.6 °C) (10/02/19 1101)  Pulse: 99 (10/02/19 1401)  Resp: (!) 26 (10/02/19 1401)  BP: (!) 148/74 (10/02/19 1401)  SpO2: 100 % (10/02/19 1401) Vital Signs (24h Range):  Temp:  [97.8 °F (36.6 °C)-98.7 °F (37.1 °C)] 97.8 °F (36.6 °C)  Pulse:  [63-99] 99  Resp:  [10-36] 26  SpO2:  [98 %-100 %] 100 %  BP: (115-221)/() 148/74     Date 10/02/19 0700 - 10/03/19 0659   Shift 2687-8354 0571-8199 7194-5106 24 Hour Total   INTAKE   I.V.(mL/kg) 177.5(2.3)   177.5(2.3)   NG/   550   Shift Total(mL/kg) 727.5(9.3)   727.5(9.3)   OUTPUT   Shift Total(mL/kg)       Weight (kg) 78.6 78.6 78.6 78.6              Vent Mode: SIMV  Oxygen Concentration (%):  [40] 40  Resp Rate Total:  [10 br/min-33 br/min] 33 br/min  Vt Set:  [380 mL] 380 mL  PEEP/CPAP:  [5 cmH20] 5 cmH20  Pressure Support:  [12 cmH20] 12 cmH20  Mean Airway Pressure:  [6.5 cmH20-8.3 cmH20] 6.5 cmH20         Gastrostomy/Enterostomy 09/26/19 0745 Percutaneous endoscopic gastrostomy (PEG) midline feeding (Active)   Securement secured to abdomen 10/2/2019 11:01 AM   Interventions Prior to Feeding patency checked;residual checked 10/2/2019 11:01 AM   Suction Setting/Drainage Method other (see comments) 10/1/2019  3:00 PM   Drainage yellow 9/29/2019  3:02 AM   Feeding Type continuous 10/2/2019 11:01 AM   Clamp Status/Tolerance unclamped 10/2/2019 11:01 AM   Feeding Action feeding continued 10/2/2019 11:01 AM   Dressing no dressing 10/2/2019 11:01 AM   Insertion Site dry;no redness;no warmth;no drainage;no tenderness;no swelling 10/2/2019 11:01 AM   Site Care site cleansed w/ soap and water 10/2/2019 11:01 AM   Flush/Irrigation flushed w/;water;no resistance met 10/2/2019 11:01 AM   Current Rate (mL/hr) 45 mL/hr 10/2/2019 11:01 AM   Goal Rate (mL/hr) 45 mL/hr 10/2/2019 11:01 AM   Intake (mL) 60 mL 10/2/2019 11:00 AM   Tube Feeding Intake (mL) 45 10/2/2019  2:00 PM   Residual  Amount (ml) 110 ml 10/2/2019  7:01 AM            Rectal Tube 09/15/19 0900 rectal tube w/ balloon (indicate number of mLs) (Active)   Balloon Inflation Volume (mL) 45 10/2/2019 11:01 AM   Reposition drainage bags for BMS & Bazzi on opposite sides of bed 10/2/2019 11:01 AM   Outcome gas expelled, stool evacuated 10/2/2019 11:01 AM   Stool Color Brown 10/2/2019 11:01 AM   Insertion Site Appearance no redness, warmth, tenderness, skin breakdown, drainage 10/2/2019 11:01 AM   Flush/Irrigation flushed w/;water;no resistance met 10/2/2019 11:01 AM   Intake (mL) 30 mL 10/2/2019  7:01 AM   Rectal Tube Output 125 mL 10/1/2019  6:00 PM       Female External Urinary Catheter 09/23/19 0900 (Active)   Skin no redness;no breakdown 10/2/2019 11:01 AM   Tolerance no signs/symptoms of discomfort 10/2/2019 11:01 AM   Suction Continuous suction at 60 mmHg 10/2/2019  7:01 AM   Date of last wick change 10/02/19 10/2/2019  7:01 AM   Time of last wick change 0701 10/2/2019  7:01 AM   Output (mL) 350 mL 10/1/2019 11:05 PM       Physical Exam:    Eyes: Right eye exhibits no discharge. Left eye exhibits no discharge.     Cardiovascular: Normal rate, regular rhythm and normal pulses.     Abdominal: Soft.     Skin: Skin displays no rash on trunk and no rash on extremities.     Musculoskeletal:   Appropriate tone, no evidence of spasm      Neurological:   GCS E2VtM5  PERRL, +cough/gag/corneals  Babinsky on left  Withdraws x4       Significant Labs:  Recent Labs   Lab 10/01/19  0333 10/02/19  0257   * 184*    137   K 4.1 3.9    105   CO2 24 27   BUN 22 21   CREATININE 0.8 0.7   CALCIUM 8.3* 8.2*   MG 2.0 1.9     Recent Labs   Lab 10/01/19  0333 10/02/19  0257   WBC 4.30 3.18*   HGB 9.1* 8.6*   HCT 29.1* 27.6*   * 114*     No results for input(s): LABPT, INR, APTT in the last 48 hours.  Microbiology Results (last 7 days)     Procedure Component Value Units Date/Time    Blood culture [[id number]] Collected:  09/23/19 7119     Order Status:  Completed Specimen:  Blood from Peripheral, Forearm, Left Updated:  09/28/19 0612     Blood Culture, Routine No growth after 5 days.    Narrative:       Blood cultures x 2 different sites. 4 bottles total. Please  draw cultures before administering antibiotics.    Blood culture [[id number]] Collected:  09/23/19 0241    Order Status:  Completed Specimen:  Blood from Peripheral, Forearm, Right Updated:  09/28/19 0612     Blood Culture, Routine No growth after 5 days.    Narrative:       Blood cultures from 2 different sites. 4 bottles total.  Please draw before starting antibiotics.        Recent Lab Results       10/02/19  1149   10/02/19  0845   10/02/19  0258   10/02/19  0257   10/01/19  1645        Albumin       2.3       Alkaline Phosphatase       48       Allens Test     N/A         ALT       22       Anion Gap       5       AST       9       Baso #       0.00       Basophil%       0.0       BILIRUBIN TOTAL       0.3  Comment:  For infants and newborns, interpretation of results should be based  on gestational age, weight and in agreement with clinical  observations.  Premature Infant recommended reference ranges:  Up to 24 hours.............<8.0 mg/dL  Up to 48 hours............<12.0 mg/dL  3-5 days..................<15.0 mg/dL  6-29 days.................<15.0 mg/dL         Site     Other         BUN, Bld       21       Calcium       8.2       Chloride       105       CO2       27       Creatinine       0.7       DelSys     Adult Vent         Differential Method       Automated       eGFR if        >60.0       eGFR if non        >60.0  Comment:  Calculation used to obtain the estimated glomerular filtration  rate (eGFR) is the CKD-EPI equation.          Eos #       0.0       Eosinophil%       0.0       FiO2     40         Glucose       184       Gran # (ANC)       2.1       Gran%       66.9       Hematocrit       27.6       Hemoglobin       8.6       Immature  Grans (Abs)       0.05  Comment:  Mild elevation in immature granulocytes is non specific and   can be seen in a variety of conditions including stress response,   acute inflammation, trauma and pregnancy. Correlation with other   laboratory and clinical findings is essential.         Immature Granulocytes       1.6       Lymph #       0.5       Lymph%       14.8       Magnesium       1.9       MCH       30.6       MCHC       31.2       MCV       98       Mode     SIMV         Mono #       0.5       Mono%       16.7       MPV       8.3       nRBC       0       PEEP     5         Phosphorus       2.5       Platelets       114       POC BE     1         POC HCO3     26.9         POC PCO2     48.9         POC PH     7.348         POC PO2     35         POC SATURATED O2     64         POC TCO2     28         POCT Glucose 202 195     149     Potassium       3.9       PROTEIN TOTAL       5.1       PS     12         Rate     10         RBC       2.81       RDW       15.2       Sample     VENOUS         Sodium       137       Vt     380         WBC       3.18                            I have reviewed all pertinent imaging results/findings within the past 24 hours.    Assessment/Plan:     New onset seizure  73 y/o female with pmhx CKD and HTN presented to outside hospital for seizure- like activity occurring on the left side. Found to have area of ill-defined enhancement on MRI brain w/ and w/out.  GARCIA thus far has been unrevealing for source of encephalopathy.    - Admitted to Neuro-ICU  - q 1 hr neuro checks  - FEDERICO negative  - CSF NGTD, negative for viral panel.  - Fu paraneoplastic panel.  - Wean steroids as tolerated  - No biopsy at this time  - Further care per NCC  - Neurosurgery to sign off at this time. Please call with any questions.        Remy Kraus MD  Neurosurgery  [facility]

## 2019-10-02 NOTE — ASSESSMENT & PLAN NOTE
73 y/o female with pmhx CKD and HTN presented to outside hospital for seizure- like activity occurring on the left side. Found to have area of ill-defined enhancement on MRI brain w/ and w/out.  GARCIA thus far has been unrevealing for source of encephalopathy.    - Admitted to Neuro-ICU  - q 1 hr neuro checks  - FEDERICO negative  - CSF NGTD, negative for viral panel.  - Fu paraneoplastic panel.  - Wean steroids as tolerated  - No biopsy at this time  - Further care per NCC  - Neurosurgery to sign off at this time. Please call with any questions.

## 2019-10-02 NOTE — PROGRESS NOTES
Follow up for prevention of HAPI  Nursing reports prevention measures are in use for pressure ulcer prevention.   Tongue lesions have resolved.will call if any needs develop. Ananda score is 11.  Abelino Tejada RN CWON  [id number]

## 2019-10-02 NOTE — PLAN OF CARE
DEEP observed Pt TF are at goal. Contacted Khloe with [facility] regarding the transfer. They do have a bed today. She will come in the next hour to confirm paperwork and future plans once the Pt is able to leave the LTAC. Confirmed with RN that Pt  is at bedside and if the team clears, she will ask them for facility transfer orders.     11:00am: DEEP advised by Khloe with [facility] that she is here to see the Pt . She requested the MAR and overview be sent to the RN station at [facility] at [phone]). Sent.    Toña Henning LMSW  Neurocritical Care   [facility]  [id number]

## 2019-10-02 NOTE — PLAN OF CARE
10/02/19 1510   Post-Acute Status   Post-Acute Authorization Placement   Post-Acute Placement Status Set-up Complete  (to [facility])     DEEP met with Khloe with [facility]. She has met with the family and had them sign consents. They are good to take the Pt today. DEEP spoke with MD team who cleared the Pt and is placing orders. Discussed possible med needs for [facility] to clear up. DEEP contacted [facility] and spoke with Pt supervisor. They are okay with the med request from the MD.     DEEP advised Khloe with [facility] that the orders are in. Faxed to the RN station ([phone]). She advised they are waiting on the bed to be cleaned. The report number will be: [phone] to the charge RN. DEEP completed [facility] orders for the critical care truck with med instructions. Completed envelope for [facility]. Placed with RN at bedside and advised family at bedside of the transport request time 4pm.     Toña Henning LMSW  Neurocritical Care   [facility]  [phone]

## 2019-10-02 NOTE — ASSESSMENT & PLAN NOTE
Daily CXR, ABG    Vent Mode: SIMV  Oxygen Concentration (%):  [40] 40  Resp Rate Total:  [10 br/min-31 br/min] 11 br/min  Vt Set:  [380 mL] 380 mL  PEEP/CPAP:  [5 cmH20] 5 cmH20  Pressure Support:  [12 cmH20] 12 cmH20  Mean Airway Pressure:  [6.5 cmH20-8.3 cmH20] 6.5 cmH20   Daily CXR/ABG  Does better in terms of airway pressures and synchrony on psv setting

## 2019-10-02 NOTE — SUBJECTIVE & OBJECTIVE
Interval History: NAEON. Continued consideration of biopsy. We do not feel as though it is appropriate at this time.    Medications:  Continuous Infusions:   fentanyl 225 mcg/hr (10/02/19 1400)     Scheduled Meds:   dexamethasone  4 mg Intravenous Q8H    enoxaparin  40 mg Subcutaneous Daily    famotidine  20 mg Per OG tube QHS    fludrocortisone  100 mcg Per G Tube BID    gabapentin  900 mg Per G Tube TID    lacosamide  100 mg Per G Tube Q12H    levothyroxine  75 mcg Per OG tube Before breakfast    oxyCODONE  5 mg Per G Tube Q6H    QUEtiapine  50 mg Per G Tube Q12H     PRN Meds:acetaminophen, Dextrose 10% Bolus, glucagon (human recombinant), hydrALAZINE, insulin aspart U-100, magnesium oxide, magnesium oxide, potassium chloride 10%, potassium chloride 10%, potassium, sodium phosphates, potassium, sodium phosphates, potassium, sodium phosphates, sodium chloride 0.9%     Review of Systems  Objective:     Weight: 78.6 kg (173 lb 4.5 oz)  Body mass index is 30.7 kg/m².  Vital Signs (Most Recent):  Temp: 97.8 °F (36.6 °C) (10/02/19 1101)  Pulse: 99 (10/02/19 1401)  Resp: (!) 26 (10/02/19 1401)  BP: (!) 148/74 (10/02/19 1401)  SpO2: 100 % (10/02/19 1401) Vital Signs (24h Range):  Temp:  [97.8 °F (36.6 °C)-98.7 °F (37.1 °C)] 97.8 °F (36.6 °C)  Pulse:  [63-99] 99  Resp:  [10-36] 26  SpO2:  [98 %-100 %] 100 %  BP: (115-221)/() 148/74     Date 10/02/19 0700 - 10/03/19 0659   Shift 4459-4446 8782-8505 5148-1375 24 Hour Total   INTAKE   I.V.(mL/kg) 177.5(2.3)   177.5(2.3)   NG/   550   Shift Total(mL/kg) 727.5(9.3)   727.5(9.3)   OUTPUT   Shift Total(mL/kg)       Weight (kg) 78.6 78.6 78.6 78.6              Vent Mode: SIMV  Oxygen Concentration (%):  [40] 40  Resp Rate Total:  [10 br/min-33 br/min] 33 br/min  Vt Set:  [380 mL] 380 mL  PEEP/CPAP:  [5 cmH20] 5 cmH20  Pressure Support:  [12 cmH20] 12 cmH20  Mean Airway Pressure:  [6.5 cmH20-8.3 cmH20] 6.5 cmH20         Gastrostomy/Enterostomy 09/26/19 0710  Percutaneous endoscopic gastrostomy (PEG) midline feeding (Active)   Securement secured to abdomen 10/2/2019 11:01 AM   Interventions Prior to Feeding patency checked;residual checked 10/2/2019 11:01 AM   Suction Setting/Drainage Method other (see comments) 10/1/2019  3:00 PM   Drainage yellow 9/29/2019  3:02 AM   Feeding Type continuous 10/2/2019 11:01 AM   Clamp Status/Tolerance unclamped 10/2/2019 11:01 AM   Feeding Action feeding continued 10/2/2019 11:01 AM   Dressing no dressing 10/2/2019 11:01 AM   Insertion Site dry;no redness;no warmth;no drainage;no tenderness;no swelling 10/2/2019 11:01 AM   Site Care site cleansed w/ soap and water 10/2/2019 11:01 AM   Flush/Irrigation flushed w/;water;no resistance met 10/2/2019 11:01 AM   Current Rate (mL/hr) 45 mL/hr 10/2/2019 11:01 AM   Goal Rate (mL/hr) 45 mL/hr 10/2/2019 11:01 AM   Intake (mL) 60 mL 10/2/2019 11:00 AM   Tube Feeding Intake (mL) 45 10/2/2019  2:00 PM   Residual Amount (ml) 110 ml 10/2/2019  7:01 AM            Rectal Tube 09/15/19 0900 rectal tube w/ balloon (indicate number of mLs) (Active)   Balloon Inflation Volume (mL) 45 10/2/2019 11:01 AM   Reposition drainage bags for BMS & Bazzi on opposite sides of bed 10/2/2019 11:01 AM   Outcome gas expelled, stool evacuated 10/2/2019 11:01 AM   Stool Color Brown 10/2/2019 11:01 AM   Insertion Site Appearance no redness, warmth, tenderness, skin breakdown, drainage 10/2/2019 11:01 AM   Flush/Irrigation flushed w/;water;no resistance met 10/2/2019 11:01 AM   Intake (mL) 30 mL 10/2/2019  7:01 AM   Rectal Tube Output 125 mL 10/1/2019  6:00 PM       Female External Urinary Catheter 09/23/19 0900 (Active)   Skin no redness;no breakdown 10/2/2019 11:01 AM   Tolerance no signs/symptoms of discomfort 10/2/2019 11:01 AM   Suction Continuous suction at 60 mmHg 10/2/2019  7:01 AM   Date of last wick change 10/02/19 10/2/2019  7:01 AM   Time of last wick change 0701 10/2/2019  7:01 AM   Output (mL) 350 mL 10/1/2019  11:05 PM       Physical Exam:    Eyes: Right eye exhibits no discharge. Left eye exhibits no discharge.     Cardiovascular: Normal rate, regular rhythm and normal pulses.     Abdominal: Soft.     Skin: Skin displays no rash on trunk and no rash on extremities.     Musculoskeletal:   Appropriate tone, no evidence of spasm      Neurological:   GCS [id number]  PERRL, +cough/gag/corneals  Babinsky on left  Withdraws x4       Significant Labs:  Recent Labs   Lab 10/01/19  0333 10/02/19  0257   * 184*    137   K 4.1 3.9    105   CO2 24 27   BUN 22 21   CREATININE 0.8 0.7   CALCIUM 8.3* 8.2*   MG 2.0 1.9     Recent Labs   Lab 10/01/19  0333 10/02/19  0257   WBC 4.30 3.18*   HGB 9.1* 8.6*   HCT 29.1* 27.6*   * 114*     No results for input(s): LABPT, INR, APTT in the last 48 hours.  Microbiology Results (last 7 days)     Procedure Component Value Units Date/Time    Blood culture [[id number]] Collected:  09/23/19 0241    Order Status:  Completed Specimen:  Blood from Peripheral, Forearm, Left Updated:  09/28/19 0612     Blood Culture, Routine No growth after 5 days.    Narrative:       Blood cultures x 2 different sites. 4 bottles total. Please  draw cultures before administering antibiotics.    Blood culture [[id number]] Collected:  09/23/19 0241    Order Status:  Completed Specimen:  Blood from Peripheral, Forearm, Right Updated:  09/28/19 0612     Blood Culture, Routine No growth after 5 days.    Narrative:       Blood cultures from 2 different sites. 4 bottles total.  Please draw before starting antibiotics.        Recent Lab Results       10/02/19  1149   10/02/19  0845   10/02/19  0258   10/02/19  0257   10/01/19  1645        Albumin       2.3       Alkaline Phosphatase       48       Allens Test     N/A         ALT       22       Anion Gap       5       AST       9       Baso #       0.00       Basophil%       0.0       BILIRUBIN TOTAL       0.3  Comment:  For infants and newborns, interpretation  of results should be based  on gestational age, weight and in agreement with clinical  observations.  Premature Infant recommended reference ranges:  Up to 24 hours.............<8.0 mg/dL  Up to 48 hours............<12.0 mg/dL  3-5 days..................<15.0 mg/dL  6-29 days.................<15.0 mg/dL         Site     Other         BUN, Bld       21       Calcium       8.2       Chloride       105       CO2       27       Creatinine       0.7       DelSys     Adult Vent         Differential Method       Automated       eGFR if        >60.0       eGFR if non        >60.0  Comment:  Calculation used to obtain the estimated glomerular filtration  rate (eGFR) is the CKD-EPI equation.          Eos #       0.0       Eosinophil%       0.0       FiO2     40         Glucose       184       Gran # (ANC)       2.1       Gran%       66.9       Hematocrit       27.6       Hemoglobin       8.6       Immature Grans (Abs)       0.05  Comment:  Mild elevation in immature granulocytes is non specific and   can be seen in a variety of conditions including stress response,   acute inflammation, trauma and pregnancy. Correlation with other   laboratory and clinical findings is essential.         Immature Granulocytes       1.6       Lymph #       0.5       Lymph%       14.8       Magnesium       1.9       MCH       30.6       MCHC       31.2       MCV       98       Mode     SIMV         Mono #       0.5       Mono%       16.7       MPV       8.3       nRBC       0       PEEP     5         Phosphorus       2.5       Platelets       114       POC BE     1         POC HCO3     26.9         POC PCO2     48.9         POC PH     7.348         POC PO2     35         POC SATURATED O2     64         POC TCO2     28         POCT Glucose 202 195     149     Potassium       3.9       PROTEIN TOTAL       5.1       PS     12         Rate     10         RBC       2.81       RDW       15.2       Sample     VENOUS          Sodium       137       Vt     380         WBC       3.18                            I have reviewed all pertinent imaging results/findings within the past 24 hours.

## 2019-10-02 NOTE — PLAN OF CARE
ICU Attending    Patient on Fentanyl infusion  Will be stopped upon discharge but to be resumed at LTAC at their discretion and from their pharmacy  Patient may require fentanyl IV pushes en route for agitation   Would recommend 75-100mcg pushes if needed en route max 2 times    Jelani Allison MD MPH  NeuroCritical Care & Vascular Neurology

## 2019-10-02 NOTE — PLAN OF CARE
POC reviewed with pt and family. Unable to assess pt's understanding d/t trach. Questions and concerns addressed with pt  and son. Pt progressing towards goals. Pending LTAC placement. Fentanyl gtt continued for agitation/comfort. No acute events. Will continue to monitor.      Problem: Restraint, Nonbehavioral (Nonviolent)  Goal: Personal Dignity and Safety Maintained  Outcome: Ongoing, Progressing     Problem: Communication Impairment (Mechanical Ventilation, Invasive)  Goal: Effective Communication  Outcome: Ongoing, Progressing

## 2019-10-02 NOTE — PROGRESS NOTES
[facility]  Neurocritical Care  Progress Note    Admit Date: 9/3/2019  Service Date: 10/02/2019  Length of Stay: 29    Subjective:     Chief Complaint: Brain lesion    History of Present Illness: Pt is  73 yo female with a PMHx of CKD 3, HTN, was transferred from [facility] to [facility] for new onset seizures and concern for right front lobe mass. The pt was found in her bedroom by her spouse at approximately 9:45 pm sitting her head against the bed, unresponsive, and having seizure like activity on the left-side. EMT was called and the pt was given Versed twice while en route to the hospital. Pt had a second witnessed seizures, left facial droop, left-sided weakness, and tongue ecchymosis in the ED. Neurology was consulted and The pt was given IV Keppra and Vimpat. MRI brain without contrast was acquired. The image showed right frontal lobe vasogenic edema with mass effect concerning for underlying mass. EEG was acquired at outside hospital concerning showing an abnormal result. The pt was given decadron IV and neurosurgical consult recommended. Pt transferred to [facility] and admitted to the [facility] for higher level of care and further evaluation.     Pt history acquired per chart review and from spouse present at the bedside. The pt's spouse denies prior history of seizures CVA, cancer history and up-to-date screenings    Hospital Course: --admitted to [facility] on 9/3 following new onset seizure, arrived intubated  --MRI with large area of R cortical edema with flair and ill defined enhancement on contrast study, concern for glioma vs infectious/inflammatory process  --LP appears non infectious, cytology pending  9/5- no acute events, awaiting LP finalization from pathology report., weaning vent.   09/06/2019: Very agitated overnight, requiring increased sedation. Patient extubated this morning on rounds, and reintubated shortly after. Unable to maintain airway and secretions. CSF gram stain negative.  09/07/2019: KATT.  EKG obtained, seroquel started. Valproic acid started.  09/08/2019: NAEON. Improved agitation with seroquel.  09/09/2019: MRI Brain w/wo ordered for today. BLE US ordered. Will need new LP later on in the week.   09/10/2019 LP for infx workup, CD4 lab, EEG monitoring per Epilepsy   09/11/2019: To IR for LP. Spoke to NSGY about possible bx of lesion. Discontinue cEEG.   9/13/2019 Issues with low HR and BP overnight and this morning, Given Shoaib bump and atropine. 24 hours of IVF and bolus. WBC decreasing and afebrile. Repeat blood cultures negative. Plan for EGD and FEDERICO today.   9/14/2019 NAEO, EGD and FEDERICO moved until Monday. CSF still pending. Increasing WBC but afebrile. Start tube feeding   9/15/2019 NAEO, all CSF studies negative so far. CT negative. Begin insulin gtt. EGD and FEDERICO tomorrow    9/16 No significant events over night. EGD done this afternoon for esophageal stricture. Esophagus stretched to 15. FEDERICO ordered for tomorrow.   9/17 No significant vents over night NPO for FEDERICO today scheduled for 3pm. Off levophed.  9/18 FEDERICO negative. Tube feeds restarted. NSGY following and considering brain biopsy. Propofol stopped. Precedex started.  9/19: NSGY recommending MRI brain W/WO with stealth for possible brain biopsy as other work up has been negative. Patient continues with hypotensive episodes, on Fentanyl drip and recommend to space out medications.  9/20 no significant events over night. Hypotensive episodes after vimpat dose and seroquel dose requiring a shoaib bump, IVF bolus then  Levophed gtt started to maintain map>65. Spontaneous breathing trial this afternoon  9/21 no significant events over night . Discussions with family daily about trach and peg. Trial of spon breathing off all sedation, pt. Agitated, restless not following commands. Family decided to have trach and peg done . gen surg. Consulted for trach and peg next week. Restarted fentanyl gtt for sedation. DCd EEG, not having seizures  9/22  Hypotensive episode last night required 500cc bolus and neobump x3. Levophed gtt started. Fentanyl gtt off till SBP back up. This am remains restless on fentanyl gtt. Will try versed IVP at lower dose.  Currently on levophed. Trach and peg scheduled for Tuesday. Paraneoplastic panel sent. Febrile over night blood and sputum cultures sent. Will readdress biopsy with NSGY next week.  9/23: likely intravascularly dry, fluid bolus again administered and placed on continuous rate, use prn versed 1mg  q2 hrs prn and resume fentanyl gtt when awakens, could not tolerate 2 mg dose  9/30: wean fentanyl, increase gabapentin, KUB, family updated  10/1: advance tube feeds, LTAC ready  10/2: transfer to LTAC    Physical Exam:  GA: comfortable, no acute distress.   HEENT: No scleral icterus or JVD.   Pulmonary: Clear to auscultation A/L. No wheezing, crackles, or rhonchi. intubated  Cardiac: RRR S1 & S2 w/o rubs/murmurs/gallops.   Abdominal: Bowel sounds present x 4. No appreciable hepatosplenomegaly.  Skin: No jaundice, rashes, or visible lesions.  Pulses: 1+ Dp bilat     Neuro:  --sedation: fentanyl  --GCS: [id number]  --Mental Status: sedated, intermittently follows commands to open eyes and stick tongue out, nods and shakes head to questions  --CN II-XII grossly intact.   --Pupils 3-->2mm, PERRL.   --brainstem: inatct  --Motor: right side spontaneous, with right leg intermittent kicking improving, left side sporadic movements  --sensory: see motor  --Reflexes: not tested  --Gait: deferred    Recent Labs   Lab 10/02/19  0257   WBC 3.18*   RBC 2.81*   HGB 8.6*   HCT 27.6*   *   MCV 98   MCH 30.6   MCHC 31.2*     Recent Labs   Lab 10/02/19  0257   CALCIUM 8.2*   PROT 5.1*      K 3.9   CO2 27      BUN 21   CREATININE 0.7   ALKPHOS 48*   ALT 22   AST 9*   BILITOT 0.3     No results for input(s): PT, INR, APTT in the last 24 hours.  Vent Mode: SIMV  Oxygen Concentration (%):  [40] 40  Resp Rate Total:  [10 br/min-31  br/min] 11 br/min  Vt Set:  [380 mL] 380 mL  PEEP/CPAP:  [5 cmH20] 5 cmH20  Pressure Support:  [12 cmH20] 12 cmH20  Mean Airway Pressure:  [6.5 cmH20-8.3 cmH20] 6.5 cmH20    Temp: 97.8 °F (36.6 °C)  Pulse: 73  Rhythm: normal sinus rhythm  BP: (!) 180/80  MAP (mmHg): 115  Resp: 15  SpO2: 100 %  Oxygen Concentration (%): 40  O2 Device (Oxygen Therapy): ventilator  Vent Mode: SIMV  Set Rate: 10 bmp  Vt Set: 380 mL  Pressure Support: 12 cmH20  PEEP/CPAP: 5 cmH20  Peak Airway Pressure: 17 cmH2O  Mean Airway Pressure: 6.5 cmH20  Plateau Pressure: 6.3 cmH20    Temp  Min: 97.8 °F (36.6 °C)  Max: 98.7 °F (37.1 °C)  Pulse  Min: 63  Max: 99  BP  Min: 115/55  Max: 221/89  MAP (mmHg)  Min: 79  Max: 139  Resp  Min: 10  Max: 36  SpO2  Min: 98 %  Max: 100 %  Oxygen Concentration (%)  Min: 40  Max: 40    10/01 0701 - 10/02 0700  In: 1439.5 [I.V.:519.5]  Out: 1075 [Urine:950]   Unmeasured Output  Urine Occurrence: 1  Stool Occurrence: 1  Emesis Occurrence: 0  Pad Count: 1    Nutrition Prescription Ordered  Current Diet Order: NPO  Nutrition Order Comments: -  Current Nutrition Support Formula Ordered: Isosource 1.5  Current Nutrition Support Rate Ordered: 45 (ml)  Current Nutrition Support Frequency Ordered: mL/hr  Last Bowel Movement: 10/02/19    Body mass index is 30.7 kg/m².      I have personally reviewed all labs, imaging, and studies today    Assessment/Plan:     Neuro  * Brain lesion  74 year old admitted to unit on 9/3 following new onset seizure, with MRI showing large area of R cortical edema with flair and ill defined enhancement on contrast study, concern for glioma vs infectious/inflammatory process  - 9/22 repeat BC, pending   - CSF cx no growth,   - CSF studies negative- VZV, enterovirus, HSV, MS  - FEDERICO negative for vegetation     - continue decadron 4mg q6h  - neuro checks/ VS q1hr   -on SubQ heparin   -Repeat brain MRI W/WO with stealth9/19, showed large  R frontal lobe lesion nonspecific for infectious or neoplastic  process; small remote L basal banglia lacunar infarct. No new lesions or infarcts  NSGY, no biopsy at this time, will readdress need for biopsy with NSGY next week.  9/22 paraneoplastic panel sent and pending    Patient intermittently following simple commands        Dysautonomia  Gabapentin 900mg dose tid     Cerebral edema  --continue decadron 4mg q6h see brain lesion      New onset seizure  2/2 to brain mass   9/21 EEG dcd. No electrographic seizures for 24h  Vimpat dcd 9/20  Valproic acid d/c'd  Following simple commands, avoid AEDs            Psychiatric  Restlessness and agitation  Responded well to seroquel  Wean sedation as tolerates    Pulmonary  On mechanically assisted ventilation  Daily CXR, ABG    Vent Mode: SIMV  Oxygen Concentration (%):  [40] 40  Resp Rate Total:  [10 br/min-31 br/min] 11 br/min  Vt Set:  [380 mL] 380 mL  PEEP/CPAP:  [5 cmH20] 5 cmH20  Pressure Support:  [12 cmH20] 12 cmH20  Mean Airway Pressure:  [6.5 cmH20-8.3 cmH20] 6.5 cmH20   Daily CXR/ABG  Does better in terms of airway pressures and synchrony on psv setting    Renal/  Chronic kidney disease, stage III (moderate)  -PMHx of CKD 3   -monitor renal function, I/Os   Currently creatinine nl          The patient is being Prophylaxed for:  Venous Thromboembolism with: Chemical  Stress Ulcer with: H2B  Ventilator Pneumonia with: chlorhexidine oral care    Activity Orders          Diet NPO Except for: Medication: NPO starting at 09/24 0001        Full Code    Jelani Allison MD  Neurocritical Care  [facility]    Level III

## 2019-10-02 NOTE — PLAN OF CARE
POC reviewed with pt and pts  at 0500. Pt unable to verbalize understanding due to trach. Pts  states understanding. Questions and concerns addressed. No acute events overnight. Fentanyl @ 225 mcg/hr. TF @ goal of 45/ml/hr and tolerating well. Plans for LTAC today. Will continue to monitor. See flowsheets for full assessment and VS info

## 2019-10-03 LAB
ALBUMIN SERPL BCP-MCNC: 1.9 G/DL (ref 3.5–5.2)
ALP SERPL-CCNC: 36 U/L (ref 55–135)
ALT SERPL W/O P-5'-P-CCNC: 18 U/L (ref 10–44)
AMPHIPHYSIN AB TITR CSF: NEGATIVE TITER
ANION GAP SERPL CALC-SCNC: 4 MMOL/L (ref 8–16)
ANISOCYTOSIS BLD QL SMEAR: SLIGHT
AST SERPL-CCNC: 8 U/L (ref 10–40)
BASOPHILS # BLD AUTO: 0 K/UL (ref 0–0.2)
BASOPHILS # BLD AUTO: 0 K/UL (ref 0–0.2)
BASOPHILS NFR BLD: 0 % (ref 0–1.9)
BASOPHILS NFR BLD: 0 % (ref 0–1.9)
BILIRUB SERPL-MCNC: 0.2 MG/DL (ref 0.1–1)
BUN SERPL-MCNC: 20 MG/DL (ref 8–23)
CALCIUM SERPL-MCNC: 7.6 MG/DL (ref 8.7–10.5)
CHLORIDE SERPL-SCNC: 105 MMOL/L (ref 95–110)
CO2 SERPL-SCNC: 28 MMOL/L (ref 23–29)
CREAT SERPL-MCNC: 0.7 MG/DL (ref 0.5–1.4)
CV2 IGG TITR CSF: NEGATIVE TITER
DIFFERENTIAL METHOD: ABNORMAL
DIFFERENTIAL METHOD: ABNORMAL
EOSINOPHIL # BLD AUTO: 0 K/UL (ref 0–0.5)
EOSINOPHIL # BLD AUTO: 0 K/UL (ref 0–0.5)
EOSINOPHIL NFR BLD: 0 % (ref 0–8)
EOSINOPHIL NFR BLD: 0 % (ref 0–8)
ERYTHROCYTE [DISTWIDTH] IN BLOOD BY AUTOMATED COUNT: 15.4 % (ref 11.5–14.5)
ERYTHROCYTE [DISTWIDTH] IN BLOOD BY AUTOMATED COUNT: 15.5 % (ref 11.5–14.5)
EST. GFR  (AFRICAN AMERICAN): >60 ML/MIN/1.73 M^2
EST. GFR  (NON AFRICAN AMERICAN): >60 ML/MIN/1.73 M^2
GLIAL NUC TYPE 1 AB TITR CSF: NEGATIVE TITER
GLUCOSE SERPL-MCNC: 199 MG/DL (ref 70–110)
HCT VFR BLD AUTO: 21.8 % (ref 37–48.5)
HCT VFR BLD AUTO: 26.1 % (ref 37–48.5)
HGB BLD-MCNC: 6.7 G/DL (ref 12–16)
HGB BLD-MCNC: 8.1 G/DL (ref 12–16)
HU1 AB TITR CSF IF: NEGATIVE TITER
HU2 AB TITR CSF IF: NEGATIVE TITER
HU3 AB TITR CSF: NEGATIVE TITER
IMM GRANULOCYTES # BLD AUTO: 0.1 K/UL (ref 0–0.04)
IMM GRANULOCYTES # BLD AUTO: 0.12 K/UL (ref 0–0.04)
IMM GRANULOCYTES NFR BLD AUTO: 3.1 % (ref 0–0.5)
IMM GRANULOCYTES NFR BLD AUTO: 3.1 % (ref 0–0.5)
LYMPHOCYTES # BLD AUTO: 0.6 K/UL (ref 1–4.8)
LYMPHOCYTES # BLD AUTO: 0.9 K/UL (ref 1–4.8)
LYMPHOCYTES NFR BLD: 19.1 % (ref 18–48)
LYMPHOCYTES NFR BLD: 22.1 % (ref 18–48)
MAGNESIUM SERPL-MCNC: 1.7 MG/DL (ref 1.6–2.6)
MCH RBC QN AUTO: 29.3 PG (ref 27–31)
MCH RBC QN AUTO: 30.5 PG (ref 27–31)
MCHC RBC AUTO-ENTMCNC: 30.7 G/DL (ref 32–36)
MCHC RBC AUTO-ENTMCNC: 31 G/DL (ref 32–36)
MCV RBC AUTO: 95 FL (ref 82–98)
MCV RBC AUTO: 98 FL (ref 82–98)
MONOCYTES # BLD AUTO: 0.6 K/UL (ref 0.3–1)
MONOCYTES # BLD AUTO: 0.6 K/UL (ref 0.3–1)
MONOCYTES NFR BLD: 16.6 % (ref 4–15)
MONOCYTES NFR BLD: 19.1 % (ref 4–15)
NEUTROPHILS # BLD AUTO: 1.9 K/UL (ref 1.8–7.7)
NEUTROPHILS # BLD AUTO: 2.2 K/UL (ref 1.8–7.7)
NEUTROPHILS NFR BLD: 58.2 % (ref 38–73)
NEUTROPHILS NFR BLD: 58.7 % (ref 38–73)
NRBC BLD-RTO: 1 /100 WBC
NRBC BLD-RTO: 1 /100 WBC
PARANEOPLASTIC INTERPRETATION,CSF: NORMAL
PCA-2 AB TITR CSF: NEGATIVE TITER
PCA-TR AB TITR CSF: NEGATIVE TITER
PHOSPHATE SERPL-MCNC: 3 MG/DL (ref 2.7–4.5)
PLATELET # BLD AUTO: 104 K/UL (ref 150–350)
PLATELET # BLD AUTO: 116 K/UL (ref 150–350)
PMV BLD AUTO: 8.5 FL (ref 9.2–12.9)
PMV BLD AUTO: 8.5 FL (ref 9.2–12.9)
PNEOE REFLEX TEST ADDED: NORMAL
POCT GLUCOSE: 157 MG/DL (ref 70–110)
POCT GLUCOSE: 162 MG/DL (ref 70–110)
POCT GLUCOSE: 168 MG/DL (ref 70–110)
POCT GLUCOSE: 192 MG/DL (ref 70–110)
POCT GLUCOSE: 208 MG/DL (ref 70–110)
POLYCHROMASIA BLD QL SMEAR: ABNORMAL
POTASSIUM SERPL-SCNC: 3.7 MMOL/L (ref 3.5–5.1)
POTASSIUM SERPL-SCNC: 4.5 MMOL/L (ref 3.5–5.1)
PROT SERPL-MCNC: 4 G/DL (ref 6–8.4)
PURKINJE CELLS AB TITR CSF IF: NEGATIVE TITER
RBC # BLD AUTO: 2.29 M/UL (ref 4–5.4)
RBC # BLD AUTO: 2.66 M/UL (ref 4–5.4)
SODIUM SERPL-SCNC: 137 MMOL/L (ref 136–145)
WBC # BLD AUTO: 3.19 K/UL (ref 3.9–12.7)
WBC # BLD AUTO: 3.85 K/UL (ref 3.9–12.7)

## 2019-10-03 PROCEDURE — 63600175 PHARM REV CODE 636 W HCPCS: Performed by: STUDENT IN AN ORGANIZED HEALTH CARE EDUCATION/TRAINING PROGRAM

## 2019-10-03 PROCEDURE — 84132 ASSAY OF SERUM POTASSIUM: CPT

## 2019-10-03 PROCEDURE — 85025 COMPLETE CBC W/AUTO DIFF WBC: CPT | Mod: 91

## 2019-10-03 PROCEDURE — 25000003 PHARM REV CODE 250: Performed by: NURSE PRACTITIONER

## 2019-10-03 PROCEDURE — 25000003 PHARM REV CODE 250: Performed by: PSYCHIATRY & NEUROLOGY

## 2019-10-03 PROCEDURE — 95951 24 HR. VIDEO EEG MONITORING: CPT | Mod: 26,,, | Performed by: PSYCHIATRY & NEUROLOGY

## 2019-10-03 PROCEDURE — 95951 HC EEG MONITORING/VIDEO RECORD: CPT

## 2019-10-03 PROCEDURE — 63600175 PHARM REV CODE 636 W HCPCS: Performed by: NURSE PRACTITIONER

## 2019-10-03 PROCEDURE — 25000003 PHARM REV CODE 250: Performed by: PHYSICIAN ASSISTANT

## 2019-10-03 PROCEDURE — 84100 ASSAY OF PHOSPHORUS: CPT

## 2019-10-03 PROCEDURE — 63600175 PHARM REV CODE 636 W HCPCS: Performed by: PHYSICIAN ASSISTANT

## 2019-10-03 PROCEDURE — 27100245 HC EEG CAPS, DISPOSABLE

## 2019-10-03 PROCEDURE — 63600175 PHARM REV CODE 636 W HCPCS: Performed by: PSYCHIATRY & NEUROLOGY

## 2019-10-03 PROCEDURE — 94003 VENT MGMT INPAT SUBQ DAY: CPT

## 2019-10-03 PROCEDURE — 99900026 HC AIRWAY MAINTENANCE (STAT)

## 2019-10-03 PROCEDURE — 99291 PR CRITICAL CARE, E/M 30-74 MINUTES: ICD-10-PCS | Mod: GC,,, | Performed by: PSYCHIATRY & NEUROLOGY

## 2019-10-03 PROCEDURE — 20000000 HC ICU ROOM

## 2019-10-03 PROCEDURE — 99291 CRITICAL CARE FIRST HOUR: CPT | Mod: GC,,, | Performed by: PSYCHIATRY & NEUROLOGY

## 2019-10-03 PROCEDURE — 83735 ASSAY OF MAGNESIUM: CPT

## 2019-10-03 PROCEDURE — 94761 N-INVAS EAR/PLS OXIMETRY MLT: CPT

## 2019-10-03 PROCEDURE — 25000003 PHARM REV CODE 250: Performed by: STUDENT IN AN ORGANIZED HEALTH CARE EDUCATION/TRAINING PROGRAM

## 2019-10-03 PROCEDURE — 99900035 HC TECH TIME PER 15 MIN (STAT)

## 2019-10-03 PROCEDURE — 80053 COMPREHEN METABOLIC PANEL: CPT

## 2019-10-03 PROCEDURE — 27200966 HC CLOSED SUCTION SYSTEM

## 2019-10-03 PROCEDURE — 25000003 PHARM REV CODE 250: Performed by: INTERNAL MEDICINE

## 2019-10-03 PROCEDURE — 27000221 HC OXYGEN, UP TO 24 HOURS

## 2019-10-03 PROCEDURE — 95951 24 HR. VIDEO EEG MONITORING: ICD-10-PCS | Mod: 26,,, | Performed by: PSYCHIATRY & NEUROLOGY

## 2019-10-03 RX ORDER — FENTANYL 25 UG/1
1 PATCH TRANSDERMAL
Status: DISCONTINUED | OUTPATIENT
Start: 2019-10-03 | End: 2019-10-03

## 2019-10-03 RX ORDER — FENTANYL 75 UG/H
1 PATCH TRANSDERMAL
Status: DISCONTINUED | OUTPATIENT
Start: 2019-10-03 | End: 2019-10-03

## 2019-10-03 RX ORDER — FENTANYL CITRATE 50 UG/ML
50 INJECTION, SOLUTION INTRAMUSCULAR; INTRAVENOUS
Status: DISCONTINUED | OUTPATIENT
Start: 2019-10-03 | End: 2019-10-04 | Stop reason: HOSPADM

## 2019-10-03 RX ORDER — PROPOFOL 10 MG/ML
INJECTION, EMULSION INTRAVENOUS
Status: DISPENSED
Start: 2019-10-03 | End: 2019-10-03

## 2019-10-03 RX ORDER — FENTANYL 50 UG/1
1 PATCH TRANSDERMAL
Status: DISCONTINUED | OUTPATIENT
Start: 2019-10-03 | End: 2019-10-04 | Stop reason: HOSPADM

## 2019-10-03 RX ORDER — LORAZEPAM 2 MG/ML
2 INJECTION INTRAMUSCULAR EVERY 10 MIN PRN
Status: DISCONTINUED | OUTPATIENT
Start: 2019-10-03 | End: 2019-10-04 | Stop reason: HOSPADM

## 2019-10-03 RX ORDER — POLYETHYLENE GLYCOL 3350 17 G/17G
17 POWDER, FOR SOLUTION ORAL DAILY PRN
Status: DISCONTINUED | OUTPATIENT
Start: 2019-10-03 | End: 2019-10-04 | Stop reason: HOSPADM

## 2019-10-03 RX ORDER — SODIUM CHLORIDE 9 MG/ML
INJECTION, SOLUTION INTRAVENOUS CONTINUOUS
Status: DISCONTINUED | OUTPATIENT
Start: 2019-10-03 | End: 2019-10-04 | Stop reason: HOSPADM

## 2019-10-03 RX ORDER — SENNOSIDES 8.6 MG/1
8.6 TABLET ORAL DAILY PRN
Status: DISCONTINUED | OUTPATIENT
Start: 2019-10-03 | End: 2019-10-04 | Stop reason: HOSPADM

## 2019-10-03 RX ORDER — LEVETIRACETAM 10 MG/ML
1000 INJECTION INTRAVASCULAR EVERY 12 HOURS
Status: DISCONTINUED | OUTPATIENT
Start: 2019-10-03 | End: 2019-10-03

## 2019-10-03 RX ADMIN — SODIUM CHLORIDE 75 ML/HR: 0.9 INJECTION, SOLUTION INTRAVENOUS at 07:10

## 2019-10-03 RX ADMIN — PROPOFOL 20 MCG/KG/MIN: 10 INJECTION, EMULSION INTRAVENOUS at 02:10

## 2019-10-03 RX ADMIN — OXYCODONE HYDROCHLORIDE 10 MG: 5 TABLET ORAL at 02:10

## 2019-10-03 RX ADMIN — LACOSAMIDE 100 MG: 10 SOLUTION ORAL at 09:10

## 2019-10-03 RX ADMIN — MAGNESIUM OXIDE 400 MG (241.3 MG MAGNESIUM) TABLET 800 MG: TABLET at 09:10

## 2019-10-03 RX ADMIN — DEXAMETHASONE SODIUM PHOSPHATE 4 MG: 4 INJECTION, SOLUTION INTRAMUSCULAR; INTRAVENOUS at 02:10

## 2019-10-03 RX ADMIN — HYDRALAZINE HYDROCHLORIDE 10 MG: 20 INJECTION INTRAMUSCULAR; INTRAVENOUS at 04:10

## 2019-10-03 RX ADMIN — FLUDROCORTISONE ACETATE 100 MCG: 0.1 TABLET ORAL at 09:10

## 2019-10-03 RX ADMIN — DEXAMETHASONE SODIUM PHOSPHATE 4 MG: 4 INJECTION, SOLUTION INTRAMUSCULAR; INTRAVENOUS at 05:10

## 2019-10-03 RX ADMIN — POTASSIUM CHLORIDE 40 MEQ: 20 SOLUTION ORAL at 05:10

## 2019-10-03 RX ADMIN — DEXAMETHASONE SODIUM PHOSPHATE 4 MG: 4 INJECTION, SOLUTION INTRAMUSCULAR; INTRAVENOUS at 10:10

## 2019-10-03 RX ADMIN — MORPHINE SULFATE 2 MG: 2 INJECTION, SOLUTION INTRAMUSCULAR; INTRAVENOUS at 09:10

## 2019-10-03 RX ADMIN — INSULIN ASPART 2 UNITS: 100 INJECTION, SOLUTION INTRAVENOUS; SUBCUTANEOUS at 07:10

## 2019-10-03 RX ADMIN — QUETIAPINE FUMARATE 50 MG: 25 TABLET ORAL at 04:10

## 2019-10-03 RX ADMIN — HYDRALAZINE HYDROCHLORIDE 10 MG: 20 INJECTION INTRAMUSCULAR; INTRAVENOUS at 09:10

## 2019-10-03 RX ADMIN — FENTANYL 1 PATCH: 75 PATCH, EXTENDED RELEASE TRANSDERMAL at 05:10

## 2019-10-03 RX ADMIN — INSULIN ASPART 2 UNITS: 100 INJECTION, SOLUTION INTRAVENOUS; SUBCUTANEOUS at 09:10

## 2019-10-03 RX ADMIN — GABAPENTIN 900 MG: 300 CAPSULE ORAL at 09:10

## 2019-10-03 RX ADMIN — LEVOTHYROXINE SODIUM 75 MCG: 75 TABLET ORAL at 05:10

## 2019-10-03 RX ADMIN — FENTANYL 1 PATCH: 50 PATCH, EXTENDED RELEASE TRANSDERMAL at 07:10

## 2019-10-03 RX ADMIN — OXYCODONE HYDROCHLORIDE 10 MG: 5 TABLET ORAL at 01:10

## 2019-10-03 RX ADMIN — MORPHINE SULFATE 2 MG: 2 INJECTION, SOLUTION INTRAMUSCULAR; INTRAVENOUS at 12:10

## 2019-10-03 RX ADMIN — ENOXAPARIN SODIUM 40 MG: 100 INJECTION SUBCUTANEOUS at 04:10

## 2019-10-03 RX ADMIN — LEVETIRACETAM 1000 MG: 1000 INJECTION, SOLUTION INTRAVENOUS at 09:10

## 2019-10-03 RX ADMIN — INSULIN ASPART 2 UNITS: 100 INJECTION, SOLUTION INTRAVENOUS; SUBCUTANEOUS at 04:10

## 2019-10-03 RX ADMIN — FAMOTIDINE 20 MG: 20 TABLET ORAL at 09:10

## 2019-10-03 RX ADMIN — Medication 800 MG: at 09:10

## 2019-10-03 RX ADMIN — OXYCODONE HYDROCHLORIDE 10 MG: 5 TABLET ORAL at 05:10

## 2019-10-03 RX ADMIN — LORAZEPAM 2 MG: 2 INJECTION, SOLUTION INTRAMUSCULAR; INTRAVENOUS at 02:10

## 2019-10-03 RX ADMIN — OXYCODONE HYDROCHLORIDE 10 MG: 5 TABLET ORAL at 09:10

## 2019-10-03 RX ADMIN — Medication 800 MG: at 05:10

## 2019-10-03 RX ADMIN — FENTANYL CITRATE 50 MCG: 50 INJECTION INTRAMUSCULAR; INTRAVENOUS at 10:10

## 2019-10-03 RX ADMIN — LEVETIRACETAM 2000 MG: 100 INJECTION, SOLUTION, CONCENTRATE INTRAVENOUS at 03:10

## 2019-10-03 RX ADMIN — GABAPENTIN 900 MG: 300 CAPSULE ORAL at 04:10

## 2019-10-03 NOTE — PROCEDURES
24 hr. Video EEG Monitoring  Date/Time: 10/3/2019 2:11 PM  Performed by: Charly Ferraro MD  Authorized by: Lolita Salinas NP       EXTENDED  ELECTROENCEPHALOGRAM  REPORT    DATE OF SERVICE: 10/2/19-10/3/19  EEG NUMBER: -1  REQUESTED BY:  Keegan  LOCATION OF SERVICE:  [facility]    METHODOLOGY   Electroencephalographic (EEG) recording is with electrodes placed according to the International 10-20 placement system.  Thirty two (32) channels of digital signal (sampling rate of 512/sec) including T1 and T2 was simultaneously recorded from the scalp and may include  EKG, EMG, and/or eye monitors.  Recording band pass was 0.1 to 512 hz.  Digital video recording of the patient is simultaneously recorded with the EEG.  The patient is instructed report clinical symptoms which may occur during the recording session.  EEG and video recording is stored and archived in digital format.  Activation procedures which include photic stimulation, hyperventilation and instructing patients to perform simple task are done in selected patients.   The EEG is displayed on a monitor screen and can be reviewed using different montages.  Computer assisted analysis is employed to detect spike and electrographic seizure activity.   The entire record is submitted for computer analysis.  The entire recording is visually reviewed and the times identified by computer analysis as being spikes or seizures are reviewed again.  Compresses spectral analysis (CSA) is also performed on the activity recorded from each individual channel.  This is displayed as a power display of frequencies from 0 to 30 Hz over time.   The CSA is reviewed looking for asymmetries in power between homologous areas of the scalp and then compared with the original EEG recording.     [employer] software was also utilized in the review of this study.  This software suite analyzes the EEG recording in multiple domains.  Coherence and rhythmicity is computed to identify EEG  sections which may contain organized seizures.  Each channel undergoes analysis to detect presence of spike and sharp waves which have special and morphological characteristic of epileptic activity.  The routine EEG recording is converted from spacial into frequency domain.  This is then displayed comparing homologous areas to identify areas of significant asymmetry.  Algorithm to identify non-cortically generated artifact is used to separate eye movement, EMG and other artifact from the EEG.      RECORDING TIMES  Start on 10/3/19 at 02:31:54   Stop on 10/3/19 at 09:15:49    A total of 7 hours of EEG recording was obtained.    EEG FINDINGS:  Recording is severely limited by electrode cap artifact. Interpretation of background is limited but is notable for diffuse theta and delta slowing. It is not possible to discern posterior dominant rhythm or clear reactivity.     State changes are not discernible.    There is no push button or clinical event.    Prolonged periods of bilateral eye blink and face twitch appears to be accompanied by myogenic and eyeblink artifact. No definitive epileptiform discharges are seen.    IMPRESSION:  Markedly limited study. Periods of eyeblink and bilateral facial twitch are seen without associated definite epileptiform activity. For further characterization, recommend continuation of study with standard electrodes.     MD Charly Hall  10/3/2019

## 2019-10-03 NOTE — ASSESSMENT & PLAN NOTE
Daily CXR, ABG    Vent Mode: SIMV  Oxygen Concentration (%):  [40] 40  Resp Rate Total:  [10 br/min-44 br/min] 18 br/min  Vt Set:  [380 mL] 380 mL  PEEP/CPAP:  [5 cmH20] 5 cmH20  Pressure Support:  [12 cmH20] 12 cmH20  Mean Airway Pressure:  [6.5 cmH20-8 cmH20] 7.1 cmH20   Daily CXR/ABG  Does better in terms of airway pressures and synchrony on psv setting  Daily vent weaning as patient tolerates

## 2019-10-03 NOTE — ASSESSMENT & PLAN NOTE
Questionable seizure over night  CAP with no seizure activity  Loaded on keppra  Stop keppra  Can increase lacosamide if needed later  Obtain real cveeg  Wean propofol as tolerated

## 2019-10-03 NOTE — PLAN OF CARE
DEEP contacted Amy with [facility] ([phone]) regarding if they take the Fentanyl drip. They do not have the capability to do this.     DEEP met with Khloe with [facility] and [facility],  for the [facility] network. They advised they are on their way to meet with the Pt  and will continue to follow. When Pt is ready again, they will take her.    DEEP advised by MD team that Pt is now on the Fentayl patch and propofol. If EEG looks okay overnight, she can go tomorrow AM. Advised Khloe from [facility].     DEEP met with Pt  at bedside to discuss transfer to LTAC. Reported that if the team still feels in the AM that she is ready, this SW will be starting with the meds to the facility and then once that is cleared then we will start the transfer process. He reported being agreeable to this.    Toña Henning, [facility]  Neurocritical Care   [facility]  [id number]

## 2019-10-03 NOTE — ASSESSMENT & PLAN NOTE
74 year old admitted to unit on 9/3 following new onset seizure, with MRI showing large area of R cortical edema with flair and ill defined enhancement on contrast study, concern for glioma vs infectious/inflammatory process  - 9/22 repeat BC, pending   - CSF cx no growth,   - CSF studies negative- VZV, enterovirus, HSV, MS  - FEDERICO negative for vegetation     - continue decadron 4mg q6h  - neuro checks/ VS q1hr   -on SubQ heparin   -Repeat brain MRI W/WO with stealth9/19, showed large  R frontal lobe lesion nonspecific for infectious or neoplastic process; small remote L basal banglia lacunar infarct. No new lesions or infarcts  NSGY, no biopsy at this time, will readdress need for biopsy with NSGY next week.  9/22 paraneoplastic panel sent and pending

## 2019-10-03 NOTE — PROGRESS NOTES
POC reviewed with pt and pts spouse at 0500. Pts spouse  verbalized understanding. Questions and concerns addressed. Seizure like activity observed overnight.  EEG cap placed,  4 mg ativan given, propofol started. Fentenyl drip titrated down to 25 per verbal order to help maintain BP.   Pt progressing toward goals. Will continue to monitor. See flowsheets for full assessment and VS info

## 2019-10-03 NOTE — PROGRESS NOTES
[facility]  Neurocritical Care  Progress Note    Admit Date: 9/3/2019  Service Date: 10/03/2019  Length of Stay: 30    Subjective:     Chief Complaint: Brain lesion    History of Present Illness: Pt is  73 yo female with a PMHx of CKD 3, HTN, was transferred from [facility] to [facility] for new onset seizures and concern for right front lobe mass. The pt was found in her bedroom by her spouse at approximately 9:45 pm sitting her head against the bed, unresponsive, and having seizure like activity on the left-side. EMT was called and the pt was given Versed twice while en route to the hospital. Pt had a second witnessed seizures, left facial droop, left-sided weakness, and tongue ecchymosis in the ED. Neurology was consulted and The pt was given IV Keppra and Vimpat. MRI brain without contrast was acquired. The image showed right frontal lobe vasogenic edema with mass effect concerning for underlying mass. EEG was acquired at outside hospital concerning showing an abnormal result. The pt was given decadron IV and neurosurgical consult recommended. Pt transferred to [facility] and admitted to the [facility] for higher level of care and further evaluation.     Pt history acquired per chart review and from spouse present at the bedside. The pt's spouse denies prior history of seizures CVA, cancer history and up-to-date screenings    Hospital Course: --admitted to [facility] on 9/3 following new onset seizure, arrived intubated  --MRI with large area of R cortical edema with flair and ill defined enhancement on contrast study, concern for glioma vs infectious/inflammatory process  --LP appears non infectious, cytology pending  9/5- no acute events, awaiting LP finalization from pathology report., weaning vent.   09/06/2019: Very agitated overnight, requiring increased sedation. Patient extubated this morning on rounds, and reintubated shortly after. Unable to maintain airway and secretions. CSF gram stain negative.  09/07/2019: KATT.  EKG obtained, seroquel started. Valproic acid started.  09/08/2019: NAEON. Improved agitation with seroquel.  09/09/2019: MRI Brain w/wo ordered for today. BLE US ordered. Will need new LP later on in the week.   09/10/2019 LP for infx workup, CD4 lab, EEG monitoring per Epilepsy   09/11/2019: To IR for LP. Spoke to NSGY about possible bx of lesion. Discontinue cEEG.   9/13/2019 Issues with low HR and BP overnight and this morning, Given Shoaib bump and atropine. 24 hours of IVF and bolus. WBC decreasing and afebrile. Repeat blood cultures negative. Plan for EGD and FEDERICO today.   9/14/2019 NAEO, EGD and FEDERICO moved until Monday. CSF still pending. Increasing WBC but afebrile. Start tube feeding   9/15/2019 NAEO, all CSF studies negative so far. CT negative. Begin insulin gtt. EGD and FEDERICO tomorrow    9/16 No significant events over night. EGD done this afternoon for esophageal stricture. Esophagus stretched to 15. FEDERICO ordered for tomorrow.   9/17 No significant vents over night NPO for FEDERICO today scheduled for 3pm. Off levophed.  9/18 FEDERICO negative. Tube feeds restarted. NSGY following and considering brain biopsy. Propofol stopped. Precedex started.  9/19: NSGY recommending MRI brain W/WO with stealth for possible brain biopsy as other work up has been negative. Patient continues with hypotensive episodes, on Fentanyl drip and recommend to space out medications.  9/20 no significant events over night. Hypotensive episodes after vimpat dose and seroquel dose requiring a shoaib bump, IVF bolus then  Levophed gtt started to maintain map>65. Spontaneous breathing trial this afternoon  9/21 no significant events over night . Discussions with family daily about trach and peg. Trial of spon breathing off all sedation, pt. Agitated, restless not following commands. Family decided to have trach and peg done . gen surg. Consulted for trach and peg next week. Restarted fentanyl gtt for sedation. DCd EEG, not having seizures  9/22  Hypotensive episode last night required 500cc bolus and neobump x3. Levophed gtt started. Fentanyl gtt off till SBP back up. This am remains restless on fentanyl gtt. Will try versed IVP at lower dose.  Currently on levophed. Trach and peg scheduled for Tuesday. Paraneoplastic panel sent. Febrile over night blood and sputum cultures sent. Will readdress biopsy with NSGY next week.  9/23: likely intravascularly dry, fluid bolus again administered and placed on continuous rate, use prn versed 1mg  q2 hrs prn and resume fentanyl gtt when awakens, could not tolerate 2 mg dose  9/30: wean fentanyl, increase gabapentin, KUB, family updated  10/1: advance tube feeds, LTAC ready  10/2: transfer to LTAC  10/3: did not transfer over night due to fentanyl gtt, questionable seizure over night, stop levetiracetam, stop fentanyl gtt, start fent patch, connect real EEG, wean propofol as tolerated    Physical Exam:  GA: comfortable, no acute distress.   HEENT: No scleral icterus or JVD.   Pulmonary: Clear to auscultation A/L. No wheezing, crackles, or rhonchi. intubated  Cardiac: RRR S1 & S2 w/o rubs/murmurs/gallops.   Abdominal: Bowel sounds present x 4. No appreciable hepatosplenomegaly.  Skin: No jaundice, rashes, or visible lesions.  Pulses: 1+ Dp bilat     Neuro:  --sedation: fentanyl + propofol  --GCS: [id number]  --Mental Status: sedated, no commands today  --CN II-XII grossly intact.   --Pupils 3-->2mm, PERRL.   --brainstem: inatct  --Motor: right side spontaneous, with right leg intermittent kicking improving, left side sporadic movements  --sensory: see motor  --Reflexes: not tested  --Gait: deferred    Recent Labs   Lab 10/03/19  0524   WBC 3.85*   RBC 2.66*   HGB 8.1*   HCT 26.1*   *   MCV 98   MCH 30.5   MCHC 31.0*     Recent Labs   Lab 10/03/19  0335   CALCIUM 7.6*   PROT 4.0*      K 3.7   CO2 28      BUN 20   CREATININE 0.7   ALKPHOS 36*   ALT 18   AST 8*   BILITOT 0.2     No results for input(s): PT,  INR, APTT in the last 24 hours.  Vent Mode: SIMV  Oxygen Concentration (%):  [40] 40  Resp Rate Total:  [10 br/min-44 br/min] 18 br/min  Vt Set:  [380 mL] 380 mL  PEEP/CPAP:  [5 cmH20] 5 cmH20  Pressure Support:  [12 cmH20] 12 cmH20  Mean Airway Pressure:  [6.5 cmH20-8 cmH20] 7.1 cmH20    Temp: 97.5 °F (36.4 °C)  Pulse: 102  Rhythm: normal sinus rhythm  BP: (!) 161/68  MAP (mmHg): 98  Resp: 20  SpO2: 100 %  Oxygen Concentration (%): 40  O2 Device (Oxygen Therapy): ventilator  Vent Mode: SIMV  Set Rate: 10 bmp  Vt Set: 380 mL  Pressure Support: 12 cmH20  PEEP/CPAP: 5 cmH20  Peak Airway Pressure: 22 cmH2O  Mean Airway Pressure: 7.1 cmH20  Plateau Pressure: 6.3 cmH20    Temp  Min: 97.5 °F (36.4 °C)  Max: 99.8 °F (37.7 °C)  Pulse  Min: 61  Max: 111  BP  Min: 91/50  Max: 195/80  MAP (mmHg)  Min: 71  Max: 125  Resp  Min: 10  Max: 28  SpO2  Min: 99 %  Max: 100 %  Oxygen Concentration (%)  Min: 40  Max: 40    10/02 0701 - 10/03 0700  In: 1686.6 [I.V.:376.6]  Out: 1520 [Urine:1200]   Unmeasured Output  Urine Occurrence: 1  Stool Occurrence: 1  Emesis Occurrence: 0  Pad Count: 1    Nutrition Prescription Ordered  Current Diet Order: NPO  Nutrition Order Comments: -  Current Nutrition Support Formula Ordered: Isosource 1.5  Current Nutrition Support Rate Ordered: 45 (ml)  Current Nutrition Support Frequency Ordered: mL/hr  Last Bowel Movement: 10/03/19    Body mass index is 30.7 kg/m².      I have personally reviewed all labs, imaging, and studies today  '  Assessment/Plan:     Neuro  * Brain lesion  74 year old admitted to unit on 9/3 following new onset seizure, with MRI showing large area of R cortical edema with flair and ill defined enhancement on contrast study, concern for glioma vs infectious/inflammatory process  - 9/22 repeat BC, pending   - CSF cx no growth,   - CSF studies negative- VZV, enterovirus, HSV, MS  - FEDERICO negative for vegetation     - continue decadron 4mg q6h  - neuro checks/ VS q1hr   -on SubQ heparin    -Repeat brain MRI W/WO with stealth9/19, showed large  R frontal lobe lesion nonspecific for infectious or neoplastic process; small remote L basal banglia lacunar infarct. No new lesions or infarcts  NSGY, no biopsy at this time, will readdress need for biopsy with NSGY next week.  9/22 paraneoplastic panel sent and pending            Dysautonomia  Gabapentin 900mg dose tid     Cerebral edema  --continue decadron 4mg q6h see brain lesion      New onset seizure  Questionable seizure over night  CAP with no seizure activity  Loaded on keppra  Stop keppra  Can increase lacosamide if needed later  Obtain real cveeg  Wean propofol as tolerated              Psychiatric  Restlessness and agitation  Responded well to seroquel  Wean sedation as tolerates    Pulmonary  On mechanically assisted ventilation  Daily CXR, ABG    Vent Mode: SIMV  Oxygen Concentration (%):  [40] 40  Resp Rate Total:  [10 br/min-44 br/min] 18 br/min  Vt Set:  [380 mL] 380 mL  PEEP/CPAP:  [5 cmH20] 5 cmH20  Pressure Support:  [12 cmH20] 12 cmH20  Mean Airway Pressure:  [6.5 cmH20-8 cmH20] 7.1 cmH20   Daily CXR/ABG  Does better in terms of airway pressures and synchrony on psv setting  Daily vent weaning as patient tolerates    Renal/  Chronic kidney disease, stage III (moderate)  -PMHx of CKD 3   -monitor renal function, I/Os   Currently creatinine nl          The patient is being Prophylaxed for:  Venous Thromboembolism with: Chemical  Stress Ulcer with: H2B  Ventilator Pneumonia with: chlorhexidine oral care    Activity Orders          Diet NPO Except for: Medication: NPO starting at 09/24 0001        Full Code     Stop fent gtt  Start fent patch  Wean propofol as tolerated  Stop keppra  Place cveeg  Increase vimpat if needed   updated at bedside    Jelani Allison MD  Neurocritical Care  [facility]    Cc time 31 minutes  Critical Care was time spent personally by me on the following activities: development of  treatment plan with patient or surrogate and bedside caregivers, discussions with consultants, evaluation of patient's response to treatment, examination of patient, ordering and performing treatments and interventions, ordering and review of laboratory studies, ordering and review of radiographic studies, pulse oximetry, re-evaluation of patient's condition. This critical care time did not overlap with that of any other provider or involve time for any procedures.

## 2019-10-03 NOTE — PLAN OF CARE
Per MD, patient to be monitored overnight for seizure activity and if no seizures, may be medically stable for LTAC placement tomorrow at [facility].          10/03/19 2100   Discharge Reassessment   Assessment Type Discharge Planning Reassessment   Provided patient/caregiver education on the expected discharge date and the discharge plan Yes   Do you have any problems affording any of your prescribed medications? No   Discharge Plan A Long-term acute care facility (LTAC)   Discharge Plan B Long-term acute care facility (LTAC)   DME Needed Upon Discharge  none   Patient choice form signed by patient/caregiver N/A   Anticipated Discharge Disposition LTAC   Can the patient answer the patient profile reliably? No, cognitively impaired   How does the patient rate their overall health at the present time?   (porsha)   Describe the patient's ability to walk at the present time. Does not walk or unable to take any steps at all   How often would a person be available to care for the patient? Whenever needed   Number of comorbid conditions (as recorded on the chart) Five or more   During the past month, has the patient often been bothered by feeling down, depressed or hopeless?   (porsha)   During the past month, has the patient often been bothered by little interest or pleasure in doing things?   (porsha)   Post-Acute Status   Post-Acute Authorization Placement   Post-Acute Placement Status Awaiting Internal Medical Clearance   Discharge Delays (!) Change in Medical Condition       Keely Malone RN, CCRN-K, [facility]  Neuro-Critical Care   X [phone]

## 2019-10-03 NOTE — PROGRESS NOTES
0200 Pt observed seizure like activity in the face and left upper extremity extending into the shoulder.  Pt was also hypertensive and tachycardic.  Pt also no longer following commands on right side and was unarousable to  Verbal and painful stimuli.      0203 [facility] MD Ruby notified.  Stated he would come to bedside.    0215 [facility] MD William notified of patients tachypnia with periods of apnea.    0220 MD William at bedside.  EEG cap placed.  2 mg lorazapam given X 2 per order.    0245 Propofol ordered and started at 20.    0302 Pts hypertensive with BP 91/50.  NCC notified.  Per verbal order propofol decreased to 10, fentanyl decreased to 25,  Pt bolused 500 ml NS.      2 G Keppra given.  Pt vitals within normal perimeters.  Will continue to monitor.

## 2019-10-04 VITALS
SYSTOLIC BLOOD PRESSURE: 154 MMHG | RESPIRATION RATE: 21 BRPM | OXYGEN SATURATION: 99 % | DIASTOLIC BLOOD PRESSURE: 65 MMHG | TEMPERATURE: 99 F | BODY MASS INDEX: 30.71 KG/M2 | HEART RATE: 103 BPM | WEIGHT: 173.31 LBS | HEIGHT: 63 IN

## 2019-10-04 LAB
ALBUMIN SERPL BCP-MCNC: 2.6 G/DL (ref 3.5–5.2)
ALLENS TEST: ABNORMAL
ALP SERPL-CCNC: 55 U/L (ref 55–135)
ALT SERPL W/O P-5'-P-CCNC: 27 U/L (ref 10–44)
ANION GAP SERPL CALC-SCNC: 7 MMOL/L (ref 8–16)
ANISOCYTOSIS BLD QL SMEAR: SLIGHT
AST SERPL-CCNC: 17 U/L (ref 10–40)
BASOPHILS # BLD AUTO: 0 K/UL (ref 0–0.2)
BASOPHILS NFR BLD: 0 % (ref 0–1.9)
BILIRUB SERPL-MCNC: 0.3 MG/DL (ref 0.1–1)
BUN SERPL-MCNC: 19 MG/DL (ref 8–23)
CALCIUM SERPL-MCNC: 8.6 MG/DL (ref 8.7–10.5)
CHLORIDE SERPL-SCNC: 106 MMOL/L (ref 95–110)
CO2 SERPL-SCNC: 28 MMOL/L (ref 23–29)
CREAT SERPL-MCNC: 0.7 MG/DL (ref 0.5–1.4)
DELSYS: ABNORMAL
DIFFERENTIAL METHOD: ABNORMAL
EOSINOPHIL # BLD AUTO: 0 K/UL (ref 0–0.5)
EOSINOPHIL NFR BLD: 0 % (ref 0–8)
ERYTHROCYTE [DISTWIDTH] IN BLOOD BY AUTOMATED COUNT: 15.8 % (ref 11.5–14.5)
ERYTHROCYTE [SEDIMENTATION RATE] IN BLOOD BY WESTERGREN METHOD: 10 MM/H
EST. GFR  (AFRICAN AMERICAN): >60 ML/MIN/1.73 M^2
EST. GFR  (NON AFRICAN AMERICAN): >60 ML/MIN/1.73 M^2
FIO2: 40
GLUCOSE SERPL-MCNC: 170 MG/DL (ref 70–110)
HCO3 UR-SCNC: 28.5 MMOL/L (ref 24–28)
HCT VFR BLD AUTO: 28.5 % (ref 37–48.5)
HGB BLD-MCNC: 9 G/DL (ref 12–16)
HYPOCHROMIA BLD QL SMEAR: ABNORMAL
IMM GRANULOCYTES # BLD AUTO: 0.1 K/UL (ref 0–0.04)
IMM GRANULOCYTES NFR BLD AUTO: 2 % (ref 0–0.5)
LYMPHOCYTES # BLD AUTO: 0.6 K/UL (ref 1–4.8)
LYMPHOCYTES NFR BLD: 13 % (ref 18–48)
MAGNESIUM SERPL-MCNC: 1.9 MG/DL (ref 1.6–2.6)
MCH RBC QN AUTO: 29.9 PG (ref 27–31)
MCHC RBC AUTO-ENTMCNC: 31.6 G/DL (ref 32–36)
MCV RBC AUTO: 95 FL (ref 82–98)
MIN VOL: 10
MODE: ABNORMAL
MONOCYTES # BLD AUTO: 0.8 K/UL (ref 0.3–1)
MONOCYTES NFR BLD: 15.7 % (ref 4–15)
NEUTROPHILS # BLD AUTO: 3.4 K/UL (ref 1.8–7.7)
NEUTROPHILS NFR BLD: 69.3 % (ref 38–73)
NRBC BLD-RTO: 1 /100 WBC
OVALOCYTES BLD QL SMEAR: ABNORMAL
PCO2 BLDA: 42.5 MMHG (ref 35–45)
PEEP: 5
PH SMN: 7.43 [PH] (ref 7.35–7.45)
PHOSPHATE SERPL-MCNC: 2.5 MG/DL (ref 2.7–4.5)
PIP: 26
PLATELET # BLD AUTO: 124 K/UL (ref 150–350)
PLATELET BLD QL SMEAR: ABNORMAL
PMV BLD AUTO: 8.6 FL (ref 9.2–12.9)
PO2 BLDA: 34 MMHG (ref 40–60)
POC BE: 4 MMOL/L
POC SATURATED O2: 68 % (ref 95–100)
POC TCO2: 30 MMOL/L (ref 24–29)
POCT GLUCOSE: 137 MG/DL (ref 70–110)
POCT GLUCOSE: 149 MG/DL (ref 70–110)
POCT GLUCOSE: 178 MG/DL (ref 70–110)
POCT GLUCOSE: 184 MG/DL (ref 70–110)
POCT GLUCOSE: 187 MG/DL (ref 70–110)
POIKILOCYTOSIS BLD QL SMEAR: SLIGHT
POTASSIUM SERPL-SCNC: 4.6 MMOL/L (ref 3.5–5.1)
PROT SERPL-MCNC: 5.3 G/DL (ref 6–8.4)
RBC # BLD AUTO: 3.01 M/UL (ref 4–5.4)
SAMPLE: ABNORMAL
SITE: ABNORMAL
SODIUM SERPL-SCNC: 141 MMOL/L (ref 136–145)
SP02: 99
VT: 380
WBC # BLD AUTO: 4.92 K/UL (ref 3.9–12.7)

## 2019-10-04 PROCEDURE — 27200966 HC CLOSED SUCTION SYSTEM

## 2019-10-04 PROCEDURE — 99900035 HC TECH TIME PER 15 MIN (STAT)

## 2019-10-04 PROCEDURE — 83735 ASSAY OF MAGNESIUM: CPT

## 2019-10-04 PROCEDURE — 25000003 PHARM REV CODE 250: Performed by: INTERNAL MEDICINE

## 2019-10-04 PROCEDURE — 25000003 PHARM REV CODE 250: Performed by: PSYCHIATRY & NEUROLOGY

## 2019-10-04 PROCEDURE — 99900026 HC AIRWAY MAINTENANCE (STAT)

## 2019-10-04 PROCEDURE — 82803 BLOOD GASES ANY COMBINATION: CPT

## 2019-10-04 PROCEDURE — 84100 ASSAY OF PHOSPHORUS: CPT

## 2019-10-04 PROCEDURE — 99233 PR SUBSEQUENT HOSPITAL CARE,LEVL III: ICD-10-PCS | Mod: GC,,, | Performed by: PSYCHIATRY & NEUROLOGY

## 2019-10-04 PROCEDURE — 25000003 PHARM REV CODE 250: Performed by: STUDENT IN AN ORGANIZED HEALTH CARE EDUCATION/TRAINING PROGRAM

## 2019-10-04 PROCEDURE — 99233 SBSQ HOSP IP/OBS HIGH 50: CPT | Mod: GC,,, | Performed by: PSYCHIATRY & NEUROLOGY

## 2019-10-04 PROCEDURE — 27000221 HC OXYGEN, UP TO 24 HOURS

## 2019-10-04 PROCEDURE — 94761 N-INVAS EAR/PLS OXIMETRY MLT: CPT

## 2019-10-04 PROCEDURE — 94003 VENT MGMT INPAT SUBQ DAY: CPT

## 2019-10-04 PROCEDURE — 25000003 PHARM REV CODE 250: Performed by: PHYSICIAN ASSISTANT

## 2019-10-04 PROCEDURE — 25000003 PHARM REV CODE 250: Performed by: NURSE PRACTITIONER

## 2019-10-04 PROCEDURE — 63600175 PHARM REV CODE 636 W HCPCS: Performed by: NURSE PRACTITIONER

## 2019-10-04 PROCEDURE — 80053 COMPREHEN METABOLIC PANEL: CPT

## 2019-10-04 PROCEDURE — 85025 COMPLETE CBC W/AUTO DIFF WBC: CPT

## 2019-10-04 PROCEDURE — 63600175 PHARM REV CODE 636 W HCPCS: Performed by: PSYCHIATRY & NEUROLOGY

## 2019-10-04 RX ORDER — FAMOTIDINE 20 MG/1
20 TABLET, FILM COATED ORAL NIGHTLY
Qty: 30 TABLET | Refills: 11
Start: 2019-10-04 | End: 2020-10-03

## 2019-10-04 RX ORDER — SENNOSIDES 8.6 MG/1
1 TABLET ORAL DAILY PRN
Start: 2019-10-04

## 2019-10-04 RX ORDER — GABAPENTIN 300 MG/1
900 CAPSULE ORAL 3 TIMES DAILY
Qty: 270 CAPSULE | Refills: 11
Start: 2019-10-04 | End: 2020-10-03

## 2019-10-04 RX ORDER — LACOSAMIDE 10 MG/ML
100 SOLUTION ORAL EVERY 12 HOURS
Qty: 600 ML | Refills: 11
Start: 2019-10-04 | End: 2020-10-03

## 2019-10-04 RX ORDER — SODIUM CHLORIDE 9 MG/ML
75 INJECTION, SOLUTION INTRAVENOUS CONTINUOUS
Start: 2019-10-04

## 2019-10-04 RX ORDER — DEXAMETHASONE SODIUM PHOSPHATE 4 MG/ML
4 INJECTION, SOLUTION INTRA-ARTICULAR; INTRALESIONAL; INTRAMUSCULAR; INTRAVENOUS; SOFT TISSUE EVERY 8 HOURS
Qty: 1 ML | Refills: 1
Start: 2019-10-04

## 2019-10-04 RX ORDER — POLYETHYLENE GLYCOL 3350 17 G/17G
17 POWDER, FOR SOLUTION ORAL DAILY PRN
Refills: 0
Start: 2019-10-04

## 2019-10-04 RX ORDER — LEVOTHYROXINE SODIUM 75 UG/1
75 TABLET ORAL
Qty: 30 TABLET | Refills: 1
Start: 2019-10-04 | End: 2020-10-03

## 2019-10-04 RX ORDER — QUETIAPINE FUMARATE 50 MG/1
50 TABLET, FILM COATED ORAL EVERY 12 HOURS
Qty: 60 TABLET | Refills: 11
Start: 2019-10-04 | End: 2020-10-03

## 2019-10-04 RX ORDER — OXYCODONE HYDROCHLORIDE 10 MG/1
10 TABLET ORAL EVERY 4 HOURS
Refills: 0
Start: 2019-10-04

## 2019-10-04 RX ORDER — INSULIN ASPART 100 [IU]/ML
1-10 INJECTION, SOLUTION INTRAVENOUS; SUBCUTANEOUS EVERY 4 HOURS PRN
Refills: 0
Start: 2019-10-04 | End: 2020-10-03

## 2019-10-04 RX ORDER — ENOXAPARIN SODIUM 100 MG/ML
40 INJECTION SUBCUTANEOUS DAILY
Start: 2019-10-04

## 2019-10-04 RX ORDER — ACETAMINOPHEN 325 MG/1
650 TABLET ORAL EVERY 6 HOURS PRN
Refills: 0
Start: 2019-10-04

## 2019-10-04 RX ORDER — FLUDROCORTISONE ACETATE 0.1 MG/1
100 TABLET ORAL 2 TIMES DAILY
Qty: 60 TABLET | Refills: 0
Start: 2019-10-04 | End: 2019-11-03

## 2019-10-04 RX ORDER — FENTANYL 50 UG/1
1 PATCH TRANSDERMAL
Refills: 0
Start: 2019-10-06

## 2019-10-04 RX ADMIN — GABAPENTIN 900 MG: 300 CAPSULE ORAL at 09:10

## 2019-10-04 RX ADMIN — OXYCODONE HYDROCHLORIDE 10 MG: 5 TABLET ORAL at 01:10

## 2019-10-04 RX ADMIN — GABAPENTIN 900 MG: 300 CAPSULE ORAL at 03:10

## 2019-10-04 RX ADMIN — QUETIAPINE FUMARATE 50 MG: 25 TABLET ORAL at 03:10

## 2019-10-04 RX ADMIN — POTASSIUM & SODIUM PHOSPHATES POWDER PACK 280-160-250 MG 2 PACKET: 280-160-250 PACK at 05:10

## 2019-10-04 RX ADMIN — INSULIN ASPART 2 UNITS: 100 INJECTION, SOLUTION INTRAVENOUS; SUBCUTANEOUS at 06:10

## 2019-10-04 RX ADMIN — FLUDROCORTISONE ACETATE 100 MCG: 0.1 TABLET ORAL at 09:10

## 2019-10-04 RX ADMIN — DEXAMETHASONE SODIUM PHOSPHATE 4 MG: 4 INJECTION, SOLUTION INTRAMUSCULAR; INTRAVENOUS at 01:10

## 2019-10-04 RX ADMIN — OXYCODONE HYDROCHLORIDE 10 MG: 5 TABLET ORAL at 09:10

## 2019-10-04 RX ADMIN — OXYCODONE HYDROCHLORIDE 10 MG: 5 TABLET ORAL at 05:10

## 2019-10-04 RX ADMIN — HYDRALAZINE HYDROCHLORIDE 10 MG: 20 INJECTION INTRAMUSCULAR; INTRAVENOUS at 12:10

## 2019-10-04 RX ADMIN — FENTANYL CITRATE 50 MCG: 50 INJECTION INTRAMUSCULAR; INTRAVENOUS at 02:10

## 2019-10-04 RX ADMIN — HYDRALAZINE HYDROCHLORIDE 10 MG: 20 INJECTION INTRAMUSCULAR; INTRAVENOUS at 03:10

## 2019-10-04 RX ADMIN — DEXAMETHASONE SODIUM PHOSPHATE 4 MG: 4 INJECTION, SOLUTION INTRAMUSCULAR; INTRAVENOUS at 06:10

## 2019-10-04 RX ADMIN — POTASSIUM & SODIUM PHOSPHATES POWDER PACK 280-160-250 MG 2 PACKET: 280-160-250 PACK at 09:10

## 2019-10-04 RX ADMIN — LACOSAMIDE 100 MG: 10 SOLUTION ORAL at 09:10

## 2019-10-04 RX ADMIN — LEVOTHYROXINE SODIUM 75 MCG: 75 TABLET ORAL at 05:10

## 2019-10-04 RX ADMIN — INSULIN ASPART 1 UNITS: 100 INJECTION, SOLUTION INTRAVENOUS; SUBCUTANEOUS at 01:10

## 2019-10-04 NOTE — PLAN OF CARE
10/04/19 1521   Post-Acute Status   Post-Acute Authorization Placement   Post-Acute Placement Status Set-up Complete  (to [facility])     DEEP met with Pt  outside of bedside regarding the progress. Provided update.     DEEP advised by Khloe that she was able to see the orders and they look good. She requested they be faxed to the RN station. Sent.     DEEP advised by Khloe they are ready to take the Pt. Pt will go to room 102, report to [phone] to charge SHADE Hirsch. DEEP set up pfc transfer orders and reprinted notes for envelope already at bedside. Advised RN via phone.     DEEP met with Pt  at bedside to give update that transport was set up. DEEP provided new notes for envelope.     Toña Henning, RENEE  Neurocritical Care   [facility]  [phone]

## 2019-10-04 NOTE — PLAN OF CARE
POC reviewed with pt and pts spouse at 0500. Pt spouse verbalized understanding. Questions and concerns addressed. No acute events overnight. Pt progressing toward goals. Will continue to monitor. See flowsheets for full assessment and VS info

## 2019-10-04 NOTE — PLAN OF CARE
10/04/19 1434   Final Note   Assessment Type Final Discharge Note   Anticipated Discharge Disposition LTAC   Hospital Follow Up  Appt(s) scheduled? No   Discharge plans and expectations educations in teach back method with documentation complete? Yes   Right Care Referral Info   Post Acute Recommendation Other   Referral Type LTAC   Facility Name [place]        Future Appointments   Date Time Provider Department Center   10/25/2019 10:00 AM Ny Davis MD [facility]   12/18/2019 10:40 AM MILLA Castillo MD [facility]         Follow-up Information     POST ACUTE MEDICAL [facility]. Go today.    Specialty:  LTAC  Why:  LTAC  Contact information:  [address] [facility] [id number] [phone]             Please follow up.    Why:  LTAC                 Keely Malone RN, CCRN-K, [facility]  Neuro-Critical Care   X [phone]

## 2019-10-04 NOTE — ASSESSMENT & PLAN NOTE
Daily CXR, ABG    Vent Mode: SIMV  Oxygen Concentration (%):  [40] 40  Resp Rate Total:  [13 br/min-39 br/min] 29 br/min  Vt Set:  [380 mL] 380 mL  PEEP/CPAP:  [5 cmH20] 5 cmH20  Pressure Support:  [12 cmH20] 12 cmH20  Mean Airway Pressure:  [7.9 cmH20-9.8 cmH20] 9.1 cmH20   Daily CXR/ABG  Does better in terms of airway pressures and synchrony on psv setting  Daily vent weaning as patient tolerates

## 2019-10-04 NOTE — ASSESSMENT & PLAN NOTE
Questionable seizure over night  CAP with no seizure activity  Continue current dose lacosamide  Off propofol (avoid restarting as patient does not tolerate)  Stop cvEEG as no seizures captured only blepherospasm

## 2019-10-04 NOTE — NURSING
Pt transported to [facility] via [facility]. Pt transported with vent.  at bedside. Report given to SHADE Hirsch at [facility]. Chart send with pt.

## 2019-10-04 NOTE — PLAN OF CARE
DEEP reviewed chart for any issues that might impede dc today. Sent MAR and overview to the RN station directly at [facility] ([phone]). Spoke with Khloe with [facility] to request she contact the DON to ensure they review the med list before this DEEP starts arranging for the dc today and requesting orders. She advised she will review it with them now.     DEEP advised by Khloe she is at [facility] but spoke with their DON and they are good with the meds and to take the Pt today. Advised MD team of need for orders.     DEEP met with Khloe at [facility]. She will meet with the  and start working on the transfer once the orders are in.    Toña Henning, RENEE  Neurocritical Care   [facility]  [phone]

## 2019-10-04 NOTE — PLAN OF CARE
[facility]    FACILITY TRANSFER ORDERS      Patient Name: Clemencia Nguyen  YOB: 1945    PCP: SHELBY Castillo MD   PCP Address: [address] / [facility] [address]  PCP Phone Number: [phone]  PCP Fax: [phone]    Encounter Date: 10/04/2019    Admit to: [facility]    Vital Signs:  Routine    Diagnoses:   Active Hospital Problems    Diagnosis  POA    *Brain lesion [G93.9]  Yes    Dysautonomia [G90.1]  No    Restlessness and agitation [R45.1]  No    Fever [R50.9]  Yes    Acute respiratory failure with hypoxia and hypercapnia [J96.01, J96.02]  Yes    Leukocytosis [D72.829]  No    On mechanically assisted ventilation [Z99.11]  Not Applicable    Alteration in skin integrity [R23.9]  Yes    New onset seizure [R56.9]  Yes    Cerebral edema [G93.6]  Yes    Chronic kidney disease, stage III (moderate) [N18.3]  Yes    Essential hypertension [I10]  Yes    Gastroesophageal reflux disease [K21.9]  Yes    Hypothyroidism [E03.9]  Yes      Resolved Hospital Problems    Diagnosis Date Resolved POA    Hypotension due to hypovolemia [I95.89, E86.1] 10/02/2019 No    Bacteremia [R78.81] 10/02/2019 No    Status epilepticus [G40.901] 10/02/2019 Yes       Allergies:Review of patient's allergies indicates:  No Known Allergies    Diet: tube feedings: Continuous Isosource at 45 mL per hour for continuous hours per day    Activities: Activity as tolerated    Vent settings:  Vent Mode: SIMV  Oxygen Concentration (%):  [40] 40  Resp Rate Total:  [13 br/min-39 br/min] 29 br/min  Vt Set:  [380 mL] 380 mL  PEEP/CPAP:  [5 cmH20] 5 cmH20  Pressure Support:  [12 cmH20] 12 cmH20  Mean Airway Pressure:  [7.9 cmH20-9.8 cmH20] 9.1 cmH20    Labs: CBC and CMP Daily for 5 days     CONSULTS:    Physical Therapy to evaluate and treat. , Occupational Therapy to evaluate and treat., Speech Therapy to evaluate and treat for Cognition. and  to evaluate for  community resources/long-range planning.    MISCELLANEOUS CARE:  PEG Care: Clean site every 24 hours.  and Routine Skin for Bedridden Patients: Apply moisture barrier cream to all skin folds and wet areas in perineal area daily and after baths and all bowel movements.    WOUND CARE ORDERS  None    Medications: Review discharge medications with patient and family and provide education.      Current Discharge Medication List      START taking these medications    Details   0.9 % sodium chloride (SODIUM CHLORIDE 0.9%) solution Inject 75 mL/hr into the vein continuous.      acetaminophen (TYLENOL) 325 MG tablet 2 tablets (650 mg total) by Per G Tube route every 6 (six) hours as needed.  Refills: 0      dexamethasone (DECADRON) 4 mg/mL injection Inject 1 mL (4 mg total) into the vein every 8 (eight) hours.  Qty: 1 mL, Refills: 1      enoxaparin (LOVENOX) 40 mg/0.4 mL Syrg Inject 0.4 mLs (40 mg total) into the skin once daily.      famotidine (PEPCID) 20 MG tablet 1 tablet (20 mg total) by Per G Tube route every evening.  Qty: 30 tablet, Refills: 11      fentaNYL (DURAGESIC) 50 mcg/hr Place 1 patch onto the skin every 72 hours.  Refills: 0    Comments: Quantity prescribed more than 7 day supply? No      fludrocortisone (FLORINEF) 0.1 mg Tab 1 tablet (100 mcg total) by Per G Tube route 2 (two) times daily.  Qty: 60 tablet, Refills: 0      gabapentin (NEURONTIN) 300 MG capsule 3 capsules (900 mg total) by Per G Tube route 3 (three) times daily.  Qty: 270 capsule, Refills: 11      insulin aspart U-100 (NOVOLOG) 100 unit/mL (3 mL) InPn pen Inject 1-10 Units into the skin every 4 (four) hours as needed (Hyperglycemia).  Refills: 0      lacosamide (VIMPAT) 10 mg/mL Soln oral solution 10 mLs (100 mg total) by Per G Tube route every 12 (twelve) hours.  Qty: 600 mL, Refills: 11      oxyCODONE (ROXICODONE) 10 mg Tab immediate release tablet 1 tablet (10 mg total) by Per G Tube route every 4 (four) hours.  Refills: 0    Comments:  Quantity prescribed more than 7 day supply? No      polyethylene glycol (GLYCOLAX) 17 gram PwPk 17 g by Per G Tube route daily as needed.  Refills: 0      QUEtiapine (SEROQUEL) 50 MG tablet 1 tablet (50 mg total) by Per G Tube route every 12 (twelve) hours.  Qty: 60 tablet, Refills: 11      senna (SENOKOT) 8.6 mg tablet 1 tablet by Per G Tube route daily as needed for Constipation.         CONTINUE these medications which have CHANGED    Details   levothyroxine (SYNTHROID) 75 MCG tablet 1 tablet (75 mcg total) by Per G Tube route before breakfast.  Qty: 30 tablet, Refills: 1         STOP taking these medications       amLODIPine (NORVASC) 2.5 MG tablet Comments:   Reason for Stopping:         BIOTIN ORAL Comments:   Reason for Stopping:         calcitonin, salmon, (FORTICAL) 200 unit/actuation nasal spray Comments:   Reason for Stopping:         calcitRIOL (ROCALTROL) 0.5 MCG Cap Comments:   Reason for Stopping:         ciclopirox (PENLAC) 8 % Soln Comments:   Reason for Stopping:         fluticasone (FLONASE) 50 mcg/actuation nasal spray Comments:   Reason for Stopping:         imipramine (TOFRANIL) 25 MG tablet Comments:   Reason for Stopping:         irbesartan (AVAPRO) 300 MG tablet Comments:   Reason for Stopping:         krill-om-3-dha-epa-phospho-ast (MEGARED OMEGA-3 KRILL OIL) 1,000-230-60 mg Cap Comments:   Reason for Stopping:         pantoprazole (PROTONIX) 40 MG tablet Comments:   Reason for Stopping:         psyllium (METAMUCIL) packet Comments:   Reason for Stopping:         simvastatin (ZOCOR) 10 MG tablet Comments:   Reason for Stopping:         vitamin D 1000 units Tab Comments:   Reason for Stopping:                    _________________________________  Lolita Salinas NP  10/04/2019

## 2019-10-04 NOTE — PROCEDURES
24 hr. Video EEG Monitoring  Date/Time: 10/4/2019 9:49 AM  Performed by: Charly Ferraro MD  Authorized by: Heber Beltran MD       EXTENDED  ELECTROENCEPHALOGRAM  REPORT    DATE OF SERVICE:   EEG NUMBER: FH -2,3  REQUESTED BY:  Alejandra  [place]  [facility] [place]Y   Electroencephalographic (EEG) recording is with electrodes placed according to the International 10-20 placement system.  Thirty two (32) channels of digital signal (sampling rate of 512/sec) including T1 and T2 was simultaneously recorded from the scalp and may include  EKG, EMG, and/or eye monitors.  Recording band pass was 0.1 to 512 hz.  Digital video recording of the patient is simultaneously recorded with the EEG.  The patient is instructed report clinical symptoms which may occur during the recording session.  EEG and video recording is stored and archived in digital format.  Activation procedures which include photic stimulation, hyperventilation and instructing patients to perform simple task are done in selected patients.   The EEG is displayed on a monitor screen and can be reviewed using different montages.  Computer assisted analysis is employed to detect spike and electrographic seizure activity.   The entire record is submitted for computer analysis.  The entire recording is visually reviewed and the times identified by computer analysis as being spikes or seizures are reviewed again.  Compresses spectral analysis (CSA) is also performed on the activity recorded from each individual channel.  This is displayed as a power display of frequencies from 0 to 30 Hz over time.   The CSA is reviewed looking for asymmetries in power between homologous areas of the scalp and then compared with the original EEG recording.     Truecaller software was also utilized in the review of this study.  This software suite analyzes the EEG recording in multiple domains.  Coherence and rhythmicity is computed to identify EEG  sections which may contain organized seizures.  Each channel undergoes analysis to detect presence of spike and sharp waves which have special and morphological characteristic of epileptic activity.  The routine EEG recording is converted from spacial into frequency domain.  This is then displayed comparing homologous areas to identify areas of significant asymmetry.  Algorithm to identify non-cortically generated artifact is used to separate eye movement, EMG and other artifact from the EEG.      RECORDING TIMES  Start on 10/3/19 at 02:31:50  Stop on 10/4/19 at 10:23:58     A total of 7 hours and 52  min of EEG recording was obtained.    EEG FINDINGS  Recording was obtained at the patient's bedside in the ICU  The record shows a fair  organization at rest, consisting of a 9 Hz posterior dominant rhythm with good  reactivity. There is mild bilateral beta activity. There is continuous, diffuse theta and delta range background slowing.     Drowsiness is characterized by attenuation of the background, vertex waves, and bilateral theta slowing. Stage II sleep is characterized by slowing, vertex waves, and symmetric sleep spindles.     Provocative maneuvers including hyperventilation and photic stimulation were not performed.     EKG recording shows a sinus rhythm.    There is no push button or clinical event.    IMPRESSION:  Abnormal study due to the presence of mild to moderate diffuse background slowing consistent with diffuse cerebral dysfunction which may be on the basis of toxic, metabolic, or primary neuronal disorder. No epileptiform discharges or electrographic seizures are seen.     MD Charly Hall  10/4/2019

## 2019-10-04 NOTE — PROGRESS NOTES
[facility]  Neurocritical Care  Progress Note    Admit Date: 9/3/2019  Service Date: 10/04/2019  Length of Stay: 31    Subjective:     Chief Complaint: Brain lesion    History of Present Illness: Pt is  73 yo female with a PMHx of CKD 3, HTN, was transferred from [facility] to [facility] for new onset seizures and concern for right front lobe mass. The pt was found in her bedroom by her spouse at approximately 9:45 pm sitting her head against the bed, unresponsive, and having seizure like activity on the left-side. EMT was called and the pt was given Versed twice while en route to the hospital. Pt had a second witnessed seizures, left facial droop, left-sided weakness, and tongue ecchymosis in the ED. Neurology was consulted and The pt was given IV Keppra and Vimpat. MRI brain without contrast was acquired. The image showed right frontal lobe vasogenic edema with mass effect concerning for underlying mass. EEG was acquired at outside hospital concerning showing an abnormal result. The pt was given decadron IV and neurosurgical consult recommended. Pt transferred to [facility] and admitted to the [facility] for higher level of care and further evaluation.     Pt history acquired per chart review and from spouse present at the bedside. The pt's spouse denies prior history of seizures CVA, cancer history and up-to-date screenings    Hospital Course: --admitted to [facility] on 9/3 following new onset seizure, arrived intubated  --MRI with large area of R cortical edema with flair and ill defined enhancement on contrast study, concern for glioma vs infectious/inflammatory process  --LP appears non infectious, cytology pending  9/5- no acute events, awaiting LP finalization from pathology report., weaning vent.   09/06/2019: Very agitated overnight, requiring increased sedation. Patient extubated this morning on rounds, and reintubated shortly after. Unable to maintain airway and secretions. CSF gram stain negative.  09/07/2019: KATT.  EKG obtained, seroquel started. Valproic acid started.  09/08/2019: NAEON. Improved agitation with seroquel.  09/09/2019: MRI Brain w/wo ordered for today. BLE US ordered. Will need new LP later on in the week.   09/10/2019 LP for infx workup, CD4 lab, EEG monitoring per Epilepsy   09/11/2019: To IR for LP. Spoke to NSGY about possible bx of lesion. Discontinue cEEG.   9/13/2019 Issues with low HR and BP overnight and this morning, Given Shoaib bump and atropine. 24 hours of IVF and bolus. WBC decreasing and afebrile. Repeat blood cultures negative. Plan for EGD and FEDERICO today.   9/14/2019 NAEO, EGD and FEDERICO moved until Monday. CSF still pending. Increasing WBC but afebrile. Start tube feeding   9/15/2019 NAEO, all CSF studies negative so far. CT negative. Begin insulin gtt. EGD and FEDERICO tomorrow    9/16 No significant events over night. EGD done this afternoon for esophageal stricture. Esophagus stretched to 15. FEDERICO ordered for tomorrow.   9/17 No significant vents over night NPO for FEDERICO today scheduled for 3pm. Off levophed.  9/18 FEDERICO negative. Tube feeds restarted. NSGY following and considering brain biopsy. Propofol stopped. Precedex started.  9/19: NSGY recommending MRI brain W/WO with stealth for possible brain biopsy as other work up has been negative. Patient continues with hypotensive episodes, on Fentanyl drip and recommend to space out medications.  9/20 no significant events over night. Hypotensive episodes after vimpat dose and seroquel dose requiring a shoaib bump, IVF bolus then  Levophed gtt started to maintain map>65. Spontaneous breathing trial this afternoon  9/21 no significant events over night . Discussions with family daily about trach and peg. Trial of spon breathing off all sedation, pt. Agitated, restless not following commands. Family decided to have trach and peg done . gen surg. Consulted for trach and peg next week. Restarted fentanyl gtt for sedation. DCd EEG, not having seizures  9/22  Hypotensive episode last night required 500cc bolus and neobump x3. Levophed gtt started. Fentanyl gtt off till SBP back up. This am remains restless on fentanyl gtt. Will try versed IVP at lower dose.  Currently on levophed. Trach and peg scheduled for Tuesday. Paraneoplastic panel sent. Febrile over night blood and sputum cultures sent. Will readdress biopsy with NSGY next week.  9/23: likely intravascularly dry, fluid bolus again administered and placed on continuous rate, use prn versed 1mg  q2 hrs prn and resume fentanyl gtt when awakens, could not tolerate 2 mg dose  9/30: wean fentanyl, increase gabapentin, KUB, family updated  10/1: advance tube feeds, LTAC ready  10/2: transfer to LTAC  10/3: did not transfer over night due to fentanyl gtt, questionable seizure over night, stop levetiracetam, stop fentanyl gtt, start fent patch, connect real EEG, wean propofol as tolerated  10/4: off propofol, tolerating fent patch,  updated at bedside, no seizure on eeg, remove EEG    Review of systems:  Unable to obtain due to patient mental status    Physical Exam:  GA: comfortable, no acute distress.   HEENT: No scleral icterus or JVD.   Pulmonary: Clear to auscultation A/L. No wheezing, crackles, or rhonchi. intubated  Cardiac: RRR S1 & S2 w/o rubs/murmurs/gallops.   Abdominal: Bowel sounds present x 4. No appreciable hepatosplenomegaly.  Skin: No jaundice, rashes, or visible lesions.  Pulses: 1+ Dp bilat     Neuro:  --sedation: none  --GCS: S2U2gH0  --Mental Status: sedated, follows simple commands again today when encouraged  --CN II-XII grossly intact.   --Pupils 3-->2mm, PERRL.   --brainstem: inatct  --Motor: right side spontaneous, with right leg intermittent kicking improving, left side sporadic movements  --sensory: see motor  --Reflexes: not tested  --Gait: deferred       Recent Labs   Lab 10/04/19  0255   WBC 4.92   RBC 3.01*   HGB 9.0*   HCT 28.5*   *   MCV 95   MCH 29.9   MCHC 31.6*     Recent  Labs   Lab 10/04/19  0255   CALCIUM 8.6*   PROT 5.3*      K 4.6   CO2 28      BUN 19   CREATININE 0.7   ALKPHOS 55   ALT 27   AST 17   BILITOT 0.3     No results for input(s): PT, INR, APTT in the last 24 hours.  Vent Mode: SIMV  Oxygen Concentration (%):  [40] 40  Resp Rate Total:  [13 br/min-39 br/min] 29 br/min  Vt Set:  [380 mL] 380 mL  PEEP/CPAP:  [5 cmH20] 5 cmH20  Pressure Support:  [12 cmH20] 12 cmH20  Mean Airway Pressure:  [7.9 cmH20-9.8 cmH20] 9.1 cmH20    Temp: 98.6 °F (37 °C)  Pulse: 98  Rhythm: normal sinus rhythm  BP: (!) 143/81  MAP (mmHg): 105  Resp: 18  SpO2: 97 %  Oxygen Concentration (%): 40  O2 Device (Oxygen Therapy): ventilator  Vent Mode: SIMV  Set Rate: 10 bmp  Vt Set: 380 mL  Pressure Support: 12 cmH20  PEEP/CPAP: 5 cmH20  Peak Airway Pressure: 17 cmH2O  Mean Airway Pressure: 9.1 cmH20  Plateau Pressure: 6.3 cmH20    Temp  Min: 98.5 °F (36.9 °C)  Max: 99.1 °F (37.3 °C)  Pulse  Min: 66  Max: 112  BP  Min: 80/44  Max: 220/86  MAP (mmHg)  Min: 56  Max: 133  Resp  Min: 9  Max: 37  SpO2  Min: 97 %  Max: 100 %  Oxygen Concentration (%)  Min: 40  Max: 40    10/03 0701 - 10/04 0700  In: 2375.8 [I.V.:945.8]  Out: 2195 [Urine:1520; Drains:165]   Unmeasured Output  Urine Occurrence: 1  Stool Occurrence: 1  Emesis Occurrence: 0  Pad Count: 2    Nutrition Prescription Ordered  Current Diet Order: NPO  Nutrition Order Comments: -  Current Nutrition Support Formula Ordered: Isosource 1.5  Current Nutrition Support Rate Ordered: 45 (ml)  Current Nutrition Support Frequency Ordered: mL/hr  Last Bowel Movement: 10/04/19    Body mass index is 30.7 kg/m².      I have personally reviewed all labs, imaging, and studies today    Assessment/Plan:     Neuro  * Brain lesion  74 year old admitted to unit on 9/3 following new onset seizure, with MRI showing large area of R cortical edema with flair and ill defined enhancement on contrast study, concern for glioma vs infectious/inflammatory process  - 9/22  repeat BC, pending   - CSF cx no growth,   - CSF studies negative- VZV, enterovirus, HSV, MS  - FEDERICO negative for vegetation     - continue decadron 4mg q6h  - neuro checks/ VS q1hr   -on SubQ heparin   -Repeat brain MRI W/WO with stealth9/19, showed large  R frontal lobe lesion nonspecific for infectious or neoplastic process; small remote L basal banglia lacunar infarct. No new lesions or infarcts  NSGY, no biopsy at this time, will readdress need for biopsy with NSGY next week.  9/22 paraneoplastic panel sent and pending            Dysautonomia  Gabapentin 900mg dose tid     Cerebral edema  --continue decadron 4mg q6h see brain lesion      New onset seizure  Questionable seizure over night  CAP with no seizure activity  Continue current dose lacosamide  Off propofol (avoid restarting as patient does not tolerate)  Stop cvEEG as no seizures captured only blepherospasm              Psychiatric  Restlessness and agitation  Continue seroquel  Continue fentanyl patch and G tube oxy  Avoid OIC    Pulmonary  On mechanically assisted ventilation  Daily CXR, ABG    Vent Mode: SIMV  Oxygen Concentration (%):  [40] 40  Resp Rate Total:  [13 br/min-39 br/min] 29 br/min  Vt Set:  [380 mL] 380 mL  PEEP/CPAP:  [5 cmH20] 5 cmH20  Pressure Support:  [12 cmH20] 12 cmH20  Mean Airway Pressure:  [7.9 cmH20-9.8 cmH20] 9.1 cmH20   Daily CXR/ABG  Does better in terms of airway pressures and synchrony on psv setting  Daily vent weaning as patient tolerates    Renal/  Chronic kidney disease, stage III (moderate)  -PMHx of CKD 3   -monitor renal function, I/Os   Currently creatinine nl          The patient is being Prophylaxed for:  Venous Thromboembolism with: Chemical  Stress Ulcer with: H2B  Ventilator Pneumonia with: chlorhexidine oral care    Activity Orders          Diet NPO Except for: Medication: NPO starting at 09/24 0001        Full Code    Jelani Allison MD  Neurocritical Care  [facility]    Level  III

## 2019-10-06 PROBLEM — G93.89 BRAIN MASS: Status: ACTIVE | Noted: 2019-09-03

## 2019-10-08 NOTE — DISCHARGE SUMMARY
[facility]  Neurocritical Care  Discharge Summary    Admit Date: 9/3/2019    Service Date: 10/08/2019    Discharge Date: 10/4/2019    Length of Stay: 31    Final Active Diagnoses:    Diagnosis Date Noted POA    PRINCIPAL PROBLEM:  Brain mass [G93.89] 09/03/2019 Yes    Dysautonomia [G90.1] 09/28/2019 No    Restlessness and agitation [R45.1] 09/28/2019 No    Fever [R50.9] 09/09/2019 Yes    Acute respiratory failure with hypoxia and hypercapnia [J96.01, J96.02] 09/09/2019 Yes    Leukocytosis [D72.829] 09/08/2019 No    On mechanically assisted ventilation [Z99.11] 09/06/2019 Not Applicable    Alteration in skin integrity [R23.9] 09/04/2019 Yes    New onset seizure [R56.9] 09/03/2019 Yes    Cerebral edema [G93.6]  Yes    Chronic kidney disease, stage III (moderate) [N18.3] 03/16/2016 Yes    Essential hypertension [I10] 03/16/2016 Yes    Gastroesophageal reflux disease [K21.9] 03/16/2016 Yes    Hypothyroidism [E03.9] 12/27/2014 Yes      Problems Resolved During this Admission:    Diagnosis Date Noted Date Resolved POA    Hypotension due to hypovolemia [I95.89, E86.1] 09/13/2019 10/02/2019 No    Bacteremia [R78.81] 09/12/2019 10/02/2019 No    Status epilepticus [G40.901] 09/03/2019 10/02/2019 Yes      History of Present Illness: Pt is  73 yo female with a PMHx of CKD 3, HTN, was transferred from [facility] to [facility] for new onset seizures and concern for right front lobe mass. The pt was found in her bedroom by her spouse at approximately 9:45 pm sitting her head against the bed, unresponsive, and having seizure like activity on the left-side. EMT was called and the pt was given Versed twice while en route to the hospital. Pt had a second witnessed seizures, left facial droop, left-sided weakness, and tongue ecchymosis in the ED. Neurology was consulted and The pt was given IV Keppra and Vimpat. MRI brain without contrast was acquired. The image showed right frontal lobe vasogenic edema with mass  effect concerning for underlying mass. EEG was acquired at outside hospital concerning showing an abnormal result. The pt was given decadron IV and neurosurgical consult recommended. Pt transferred to [facility] and admitted to the [facility] for higher level of care and further evaluation.     Pt history acquired per chart review and from spouse present at the bedside. The pt's spouse denies prior history of seizures CVA, cancer history and up-to-date screenings    Hospital Course by Event: --admitted to [facility] on 9/3 following new onset seizure, arrived intubated  --MRI with large area of R cortical edema with flair and ill defined enhancement on contrast study, concern for glioma vs infectious/inflammatory process  --LP appears non infectious, cytology pending  9/5- no acute events, awaiting LP finalization from pathology report., weaning vent.   09/06/2019: Very agitated overnight, requiring increased sedation. Patient extubated this morning on rounds, and reintubated shortly after. Unable to maintain airway and secretions. CSF gram stain negative.  09/07/2019: NAEON. EKG obtained, seroquel started. Valproic acid started.  09/08/2019: NAEON. Improved agitation with seroquel.  09/09/2019: MRI Brain w/wo ordered for today. BLE US ordered. Will need new LP later on in the week.   09/10/2019 LP for infx workup, CD4 lab, EEG monitoring per Epilepsy   09/11/2019: To IR for LP. Spoke to NSGY about possible bx of lesion. Discontinue cEEG.   9/13/2019 Issues with low HR and BP overnight and this morning, Given Shoaib bump and atropine. 24 hours of IVF and bolus. WBC decreasing and afebrile. Repeat blood cultures negative. Plan for EGD and FEDERICO today.   9/14/2019 NAEO, EGD and FEDERICO moved until Monday. CSF still pending. Increasing WBC but afebrile. Start tube feeding   9/15/2019 NAEO, all CSF studies negative so far. CT negative. Begin insulin gtt. EGD and FEDERICO tomorrow    9/16 No significant events over night. EGD done this afternoon for  esophageal stricture. Esophagus stretched to 15. FEDERICO ordered for tomorrow.   9/17 No significant vents over night NPO for FEDERICO today scheduled for 3pm. Off levophed.  9/18 FEDERICO negative. Tube feeds restarted. NSGY following and considering brain biopsy. Propofol stopped. Precedex started.  9/19: NSGY recommending MRI brain W/WO with stealth for possible brain biopsy as other work up has been negative. Patient continues with hypotensive episodes, on Fentanyl drip and recommend to space out medications.  9/20 no significant events over night. Hypotensive episodes after vimpat dose and seroquel dose requiring a kayla bump, IVF bolus then  Levophed gtt started to maintain map>65. Spontaneous breathing trial this afternoon  9/21 no significant events over night . Discussions with family daily about trach and peg. Trial of spon breathing off all sedation, pt. Agitated, restless not following commands. Family decided to have trach and peg done . gen surg. Consulted for trach and peg next week. Restarted fentanyl gtt for sedation. DCd EEG, not having seizures  9/22 Hypotensive episode last night required 500cc bolus and neobump x3. Levophed gtt started. Fentanyl gtt off till SBP back up. This am remains restless on fentanyl gtt. Will try versed IVP at lower dose.  Currently on levophed. Trach and peg scheduled for Tuesday. Paraneoplastic panel sent. Febrile over night blood and sputum cultures sent. Will readdress biopsy with NSGY next week.  9/23: likely intravascularly dry, fluid bolus again administered and placed on continuous rate, use prn versed 1mg  q2 hrs prn and resume fentanyl gtt when awakens, could not tolerate 2 mg dose  9/30: wean fentanyl, increase gabapentin, KUB, family updated  10/1: advance tube feeds, LTAC ready  10/2: transfer to LTAC  10/3: did not transfer over night due to fentanyl gtt, questionable seizure over night, stop levetiracetam, stop fentanyl gtt, start fent patch, connect real EEG, wean  propofol as tolerated  10/4: off propofol, tolerating fent patch,  updated at bedside, no seizure on eeg, remove EEG    Hospital Course by Problem:   * Brain mass  74 year old admitted to unit on 9/3 following new onset seizure, with MRI showing large area of R cortical edema with flair and ill defined enhancement on contrast study, concern for glioma vs infectious/inflammatory process  - 9/22 repeat BC, pending   - CSF cx no growth,   - CSF studies negative- VZV, enterovirus, HSV, MS  - FEDERICO negative for vegetation     - continue decadron 4mg q6h  - neuro checks/ VS q1hr   -on SubQ heparin   -Repeat brain MRI W/WO with stealth9/19, showed large  R frontal lobe lesion nonspecific for infectious or neoplastic process; small remote L basal banglia lacunar infarct. No new lesions or infarcts  NSGY, no biopsy at this time, will readdress need for biopsy with NSGY next week.  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  End date 9/25    On mechanically assisted ventilation  Daily CXR, ABG    Vent Mode: SIMV  Oxygen Concentration (%):  [40] 40  Resp Rate Total:  [13 br/min-39 br/min] 29 br/min  Vt Set:  [380 mL] 380 mL  PEEP/CPAP:  [5 cmH20] 5 cmH20  Pressure Support:  [12 cmH20] 12 cmH20  Mean Airway Pressure:  [7.9 cmH20-9.8 cmH20] 9.1 cmH20   Daily CXR/ABG  Does better in terms of airway pressures and synchrony on psv setting  Daily vent weaning as patient tolerates    Cerebral edema  --continue decadron 4mg q6h see brain lesion      New onset seizure  Questionable seizure over night  CAP with no seizure activity  Continue current dose lacosamide  Off propofol (avoid  restarting as patient does not tolerate)  Stop cvEEG as no seizures captured only blepherospasm              Gastroesophageal reflux disease  pepcid 20mg daily  Hx esophageal stricture  9/16 EGD. Stretched esophagus to 15 for FEDERICO    Chronic kidney disease, stage III (moderate)  -PMHx of CKD 3   -monitor renal function, I/Os   Currently creatinine nl    Essential hypertension  SBP <180, MAP >65   Requiring levophed gtt to maintain MAPs > 65 while on fentanyl gtt  Echo: EF 65-70% concentric LV remodeling  FEDERICO (9/17) negative for vegetation, ASD, PFO   Cont to hold anti-hypertensives  Wean off levophed, hypotension is fluid responsive   Due to adrenal insuffucincy, florinef added  Wean midodrin off      Hypothyroidism  Continue levothyroxine 75mcg daily      Significant Results:  Imaging:  See PACS    Microbiology:  See [facility]    Laboratory:  Lab Results   Component Value Date    HGBA1C 5.7 (H) 09/02/2019    CHOL 162 12/05/2018    HDL 34 (L) 12/05/2018    LDLCALC 80.8 12/05/2018    TRIG 228 (H) 09/15/2019    TSH 0.471 09/22/2019       Pending Results: csf paraneoplastic, serum paraneoplastic    Consultations:  WOUND CARE CONSULT  IP CONSULT FOR STATUS EPILEPTICUS MANAGEMENT  IP CONSULT TO NEUROSURGERY  IP CONSULT TO REGISTERED DIETITIAN/NUTRITIONIST  IP CONSULT TO PICC TEAM ([facility])  PHARMACY TO DOSE VANCOMYCIN CONSULT  IP CONSULT TO MIDLINE TEAM  IP CONSULT TO INFECTIOUS DISEASES  IP CONSULT TO GI  IP CONSULT TO PICC TEAM ([facility])  IP CONSULT TO PICC TEAM ([facility])  IP CONSULT TO GENERAL SURGERY  IP CONSULT TO INTERVENTIONAL RADIOLOGY      Procedures:   Procedure(s) (LRB):  CREATION, TRACHEOSTOMY open (N/A)  EGD, WITH PEG TUBE INSERTION (N/A) by Maria Luisa Piña MD.      Medications:    Gadonneix, Mitra R   Home Medication Instructions ISABELLE:[id number]    Printed on:10/08/19 1442   Medication Information                      0.9 % sodium chloride (SODIUM CHLORIDE 0.9%) solution  Inject 75 mL/hr into the vein continuous.              acetaminophen (TYLENOL) 325 MG tablet  2 tablets (650 mg total) by Per G Tube route every 6 (six) hours as needed.             dexamethasone (DECADRON) 4 mg/mL injection  Inject 1 mL (4 mg total) into the vein every 8 (eight) hours.             enoxaparin (LOVENOX) 40 mg/0.4 mL Syrg  Inject 0.4 mLs (40 mg total) into the skin once daily.             famotidine (PEPCID) 20 MG tablet  1 tablet (20 mg total) by Per G Tube route every evening.             fentaNYL (DURAGESIC) 50 mcg/hr  Place 1 patch onto the skin every 72 hours.             fludrocortisone (FLORINEF) 0.1 mg Tab  1 tablet (100 mcg total) by Per G Tube route 2 (two) times daily.             gabapentin (NEURONTIN) 300 MG capsule  3 capsules (900 mg total) by Per G Tube route 3 (three) times daily.             insulin aspart U-100 (NOVOLOG) 100 unit/mL (3 mL) InPn pen  Inject 1-10 Units into the skin every 4 (four) hours as needed (Hyperglycemia).             lacosamide (VIMPAT) 10 mg/mL Soln oral solution  10 mLs (100 mg total) by Per G Tube route every 12 (twelve) hours.             levothyroxine (SYNTHROID) 75 MCG tablet  1 tablet (75 mcg total) by Per G Tube route before breakfast.             oxyCODONE (ROXICODONE) 10 mg Tab immediate release tablet  1 tablet (10 mg total) by Per G Tube route every 4 (four) hours.             polyethylene glycol (GLYCOLAX) 17 gram PwPk  17 g by Per G Tube route daily as needed.             QUEtiapine (SEROQUEL) 50 MG tablet  1 tablet (50 mg total) by Per G Tube route every 12 (twelve) hours.             senna (SENOKOT) 8.6 mg tablet  1 tablet by Per G Tube route daily as needed for Constipation.               Diet: tube feeds    Activity: As tolerated.     Disposition: Discharged to LTAC in stable/garded condition.    Follow Up Plan:  1) follow up PCP  2) follow UP CSF and Serum studies    This discharge took > than 30 minutes to complete.    Jelani Allison MD  Neurocritical Care  [facility]  [place]

## 2019-10-11 NOTE — PROCEDURES
DATE OF STUDY: 9/3/19     EEG NUMBER:  [id number]     REFERRING PHYSICIAN:  Dr. Bardales      This EEG was performed to assess for subclinical seizures.     ELECTROENCEPHALOGRAM REPORT   Recording times:   Start on September 3, 2019 at hr 14 min 28 sec 13  Ends of study on September 4, 2019 at hr 7 min 0 sec 7   The total time of video EEG recording for this study was 16 hr and 30 min.    METHODOLOGY:  Electroencephalographic (EEG) is recorded with electrodes placed according to the International 10-20 placement system.  Thirty two (32) channels of digital signal (sampling rate of 512/sec), including T1 and T2, were simultaneously recorded from the scalp and may include EKG, EMG, and/or eye monitors.  Recording band pass was 0.1 to 512 Hz.  Digital video recording of the patient is simultaneously recorded with the EEG.  The patient is instructed to report clinical symptoms which may occur during the recording session.  EEG   and video recording are stored and archived in digital format.  Activation procedures, which include photic stimulation, hyperventilation and instructing patients to perform simple tasks, are done in selected patients.     The EEG is displayed on a monitor screen and can be reviewed using different montages.  Computer-assisted analysis is employed to detect spike and electrographic seizure activity.  The entire record is submitted for computer analysis.  The entire recording is visually reviewed, and the times identified by computer analysis as being spikes or seizures are reviewed again.     Compressed spectral analysis (CSA) is also performed on the activity recorded from each individual channel.  This is displayed as a power display of frequencies from 0 to 30 Hz over time.  The CSA is reviewed looking for asymmetries in power between homologous areas of the scalp, then compared with the original EEG recording.     [employer] software was also utilized in the review of this study.  This  software suite analyzes the EEG recording in multiple domains.  Coherence and rhythmicity are computed to identify EEG sections which may contain organized seizures.  Each channel undergoes analysis to detect the presence of spike and sharp waves which have special and morphological characteristics of epileptic activity.  The routine EEG recording is converted from special into frequency domain.  This is then displayed comparing homologous areas to identify areas of significant   asymmetry.  Algorithm to identify non-cortically generated artifact is used to separate artifact from the EEG.      EEG FINDINGS:  The recording was obtained with a number of standard bipolar and referential montages during obtunded state.  During this state the background was diffusely disorganized and comprises of low amplitude mixed delta range activity which was synchronous and symmetric.  Periods of suppression were noted suggesting a burst suppression background. No clear state changes were appreciated.  Intermittent symmetric spindles were noted. Rare independent left and right lateralized epileptiform discharges were noted.  No clinical or electrographic seizures were recorded.       The EKG channel revealed sinus rhythm.     IMPRESSION: This is an abnormal EEG during obtunded state. Burst suppression was noted. Rare independent left and right lateralized epileptiform discharges were noted.     CLINICAL CORRELATION:  The patient is a 74-year-old female with new onset seizures who is maintained on Keppra.  The patient is also maintained on intravenous infusion of propofol.  This is an abnormal EEG during obtunded state.  Diffuse slowing of the background was noted suggestive of a moderate to severe encephalopathy likely secondary to use of intravenous infusions of propofol. Rare independent left and right lateralized epileptiform discharges were noted. No seizures were recorded during this study.

## 2019-10-18 NOTE — PROCEDURES
DATE OF SERVICE:  09/04/2019 through 09/06/2019    EEG NUMBER:  [id number], [id number] and [id number].    ICU EEG/VIDEO MONITORING REPORT    METHODOLOGY:  Electroencephalographic (EEG) is recorded with electrodes placed   according to the International 10-20 placement system.  Thirty two (32) channels   of digital signal (sampling rate of 512/sec), including T1 and T2, were   simultaneously recorded from the scalp and may include EKG, EMG, and/or eye   monitors.  Recording band pass was 0.1 to 512 Hz.  Digital video recording of   the patient is simultaneously recorded with the EEG.  The patient is instructed   to report clinical symptoms which may occur during the recording session.  EEG   and video recording are stored and archived in digital format.  Activation   procedures, which include photic stimulation, hyperventilation and instructing   patients to perform simple tasks, are done in selected patients.    The EEG is displayed on a monitor screen and can be reviewed using different   montages.  Computer-assisted analysis is employed to detect spike and   electrographic seizure activity.  The entire record is submitted for computer   analysis.  The entire recording is visually reviewed, and the times identified   by computer analysis as being spikes or seizures are reviewed again.    Compressed spectral analysis (CSA) is also performed on the activity recorded   from each individual channel.  This is displayed as a power display of   frequencies from 0 to 30 Hz over time.  The CSA is reviewed looking for   asymmetries in power between homologous areas of the scalp, then compared with   the original EEG recording.    "[employer] software was also utilized in the review of this study.  This software   suite analyzes the EEG recording in multiple domains.  Coherence and rhythmicity   are computed to identify EEG sections which may contain organized seizures.    Each channel undergoes analysis to detect the presence  of spike and sharp waves   which have special and morphological characteristics of epileptic activity.  The   routine EEG recording is converted from special into frequency domain.  This is   then displayed comparing homologous areas to identify areas of significant   asymmetry.  Algorithm to identify non-cortically generated artifact is used to   separate artifact from the EEG.    RECORDING TIMES:  Start on 09/04/2019 at 07:00:25.  Stop on 09/05/2019 at 07:00:04.  Start on 09/05/2019 at 07:00:24.  Stop on 09/06/2019 at 07:00:06.  Start on 09/06/2019 at 07:00:23.  Stop on 09/06/2019 at 10:04:51.  A total of 50 hours 21 minutes and 21 seconds of video/EEG telemetry was   recorded.    EEG FINDINGS:  The patient's background consists of diffuse theta to delta range   slowing.  At times, there are intervals of suppression, particularly earlier in   the records.  Additionally, superimposed faster frequencies in the beta range   are seen.  There is no clear focality to the slowing.  During portions involving   higher frequency content in less suppression, there are occasional poorly   formed sharps, which are seen independently on the left and on the right, more   so on the left.  They have a rather broad field, but appear most commonly in the   left anterior quadrant.  Rigoberto electrographic seizures are not seen.    Provocative maneuvers including hyperventilation and photic stimulation are not   performed.    EKG demonstrates predominantly sinus rhythm.    INTERPRETATION:  This is an abnormal long-term video EEG monitoring study due to   the presence of diffuse slowing and at times, a burst-suppression pattern as   well as interictal epileptiform transients as described above.  The diffuse   slowing and burst-suppression are indicative of a moderate-to-severe   encephalopathy.  The presence of superimposed beta range frequencies suggest   that this may at least in part be a medication effect.  Records indicate the    patient was on propofol at the time of this recording.  The presence of rare   epileptiform discharges as discussed above suggest the presence of some degree   of cortical irritability, which is more pronounced on the left than the right.    Rigoberto electrographic seizures did not occur during this portion of the study.      FRANCISCA  dd: 10/17/2019 13:42:04 (CDT)  td: 10/17/2019 21:32:38 (CDT)  Doc ID   #[id number]  Job ID #[id number]    CC:

## 2019-10-21 ENCOUNTER — PATIENT OUTREACH (OUTPATIENT)
Dept: OTHER | Facility: OTHER | Age: 74
End: 2019-10-21

## 2019-11-05 NOTE — PROGRESS NOTES
"Patient's  called and left a detailed voicemail of patient's current health status. Mrs. Nguyen had a seizure Labor Day evening and is currently "incapacitated" and unable to participate in HDMP. Mr. Nguyen stated that the patient will resume monitoring when available.     Called EC back and left a voicemail confirming that I have received his message and will place patient on hiatus until she is able to resume monitoring. Will follow up in 3-4 months.   "

## 2019-11-30 ENCOUNTER — EXTERNAL CHRONIC CARE MANAGEMENT (OUTPATIENT)
Dept: PRIMARY CARE CLINIC | Facility: CLINIC | Age: 74
End: 2019-11-30
Payer: MEDICARE

## 2019-11-30 PROCEDURE — 99490 CHRNC CARE MGMT STAFF 1ST 20: CPT | Mod: S$PBB,GW,ICN, | Performed by: FAMILY MEDICINE

## 2019-11-30 PROCEDURE — 99490 CHRNC CARE MGMT STAFF 1ST 20: CPT | Mod: PBBFAC,PO | Performed by: FAMILY MEDICINE

## 2019-11-30 PROCEDURE — 99490 PR CHRONIC CARE MGMT, 1ST 20 MIN: ICD-10-PCS | Mod: S$PBB,GW,ICN, | Performed by: FAMILY MEDICINE

## 2020-01-23 LAB
ACHR BIND AB SER-SCNC: NORMAL NMOL/L
AMPHIPHYSIN AB TITR SER: NEGATIVE TITER
CV2 IGG TITR SER: NEGATIVE TITER
GLIAL NUC TYPE 1 AB TITR SER: NEGATIVE TITER
HU1 AB TITR SER: NEGATIVE TITER
HU2 AB TITR SER IF: NEGATIVE TITER
HU3 AB TITR SER: NEGATIVE TITER
IMMUNOLOGIST REVIEW: NORMAL
NACHR AB SER-SCNC: 0 NMOL/L
PAVAL REFLEX TEST ADDED: NORMAL
PCA-1 AB TITR SER: NEGATIVE TITER
PCA-2 AB TITR SER: NEGATIVE TITER
PCA-TR AB TITR SER: NEGATIVE TITER
STRIA MUS AB TITR SER: NEGATIVE TITER
VGCC-N BIND AB SER-SCNC: 0 NMOL/L
VGCC-P/Q BIND AB SER-SCNC: 0 NMOL/L
VGKC AB SER-SCNC: 0 NMOL/L

## 2020-03-30 ENCOUNTER — PATIENT OUTREACH (OUTPATIENT)
Dept: OTHER | Facility: OTHER | Age: 75
End: 2020-03-30

## 2020-05-05 ENCOUNTER — PATIENT MESSAGE (OUTPATIENT)
Dept: ADMINISTRATIVE | Facility: HOSPITAL | Age: 75
End: 2020-05-05

## 2020-08-05 ENCOUNTER — PATIENT OUTREACH (OUTPATIENT)
Dept: ADMINISTRATIVE | Facility: HOSPITAL | Age: 75
End: 2020-08-05

## 2020-09-29 ENCOUNTER — PATIENT MESSAGE (OUTPATIENT)
Dept: OTHER | Facility: OTHER | Age: 75
End: 2020-09-29

## 2020-10-05 ENCOUNTER — PATIENT MESSAGE (OUTPATIENT)
Dept: ADMINISTRATIVE | Facility: HOSPITAL | Age: 75
End: 2020-10-05

## 2020-12-11 ENCOUNTER — PATIENT MESSAGE (OUTPATIENT)
Dept: OTHER | Facility: OTHER | Age: 75
End: 2020-12-11

## 2021-01-04 ENCOUNTER — PATIENT MESSAGE (OUTPATIENT)
Dept: ADMINISTRATIVE | Facility: HOSPITAL | Age: 76
End: 2021-01-04

## 2021-04-06 ENCOUNTER — PATIENT MESSAGE (OUTPATIENT)
Dept: ADMINISTRATIVE | Facility: HOSPITAL | Age: 76
End: 2021-04-06

## 2021-07-07 ENCOUNTER — PATIENT MESSAGE (OUTPATIENT)
Dept: ADMINISTRATIVE | Facility: HOSPITAL | Age: 76
End: 2021-07-07

## 2022-05-31 ENCOUNTER — PATIENT MESSAGE (OUTPATIENT)
Dept: ADMINISTRATIVE | Facility: HOSPITAL | Age: 77
End: 2022-05-31
Payer: MEDICARE

## 2022-10-06 NOTE — ASSESSMENT & PLAN NOTE
74 year old admitted to unit on 9/3 following new onset seizure, with MRI showing large area of R cortical edema with flair and ill defined enhancement on contrast study, concern for glioma vs infectious/inflammatory process  -- LP appears non infectious, cytology pending  -- continue decadron  -- EEG monitoring, no further seizures  -- Propofol for sedation- requiring high doses  -- continue keppra 1000 bid  -- NSGY, epilepsy following   -- Hourly neuro checks  -- Hourly vital signs   06-Oct-2022 13:43

## 2023-02-27 NOTE — PLAN OF CARE
Continue with cpap  Follow up with pcp  Follow up with pulm as outpatient   Problem: Adult Inpatient Plan of Care  Goal: Plan of Care Review  Outcome: Ongoing (interventions implemented as appropriate)  POC reviewed with pt at 0400. Pt unable to verbalize understanding. Questions and concerns addressed with spouse. New arterial line placed. See previous note. Pt agitated throughout night; kicking legs. Propofol and fentanyl gtt titrated.  Pt progressing toward goals. Will continue to monitor. See flowsheets for full assessment and VS info

## 2024-02-12 NOTE — NURSING
Pt to OR for trach/peg. Pre-op checklist completed by night shift RN. Consents in chart, chart sent with patient.  Care resumed by anesthesia staff. Pt taken to OR on portable monitor and ambu bag.    - - -

## (undated) DEVICE — SYR 30CC LUER LOCK

## (undated) DEVICE — TRAY MINOR GEN SURG

## (undated) DEVICE — SEE MEDLINE ITEM 146313

## (undated) DEVICE — ELECTRODE BLADE INSULATED 1 IN

## (undated) DEVICE — SUT ETHILON 3/0 18IN PS-1

## (undated) DEVICE — LUBRICANT SURGILUBE 2 OZ

## (undated) DEVICE — SEE MEDLINE ITEM 157116

## (undated) DEVICE — BLADE SURG CARBON STEEL SZ11

## (undated) DEVICE — ELECTRODE REM PLYHSV RETURN 9

## (undated) DEVICE — TRACH TUBE CUFF FLEX DISP 7.5

## (undated) DEVICE — SCRUB HIBICLENS 4% CHG 4OZ

## (undated) DEVICE — SEE MEDLINE ITEM 157166

## (undated) DEVICE — Device

## (undated) DEVICE — SUT PROLENE RB-1 BL MONO

## (undated) DEVICE — SUT PROLENE 0 MO6 30IN BLUE

## (undated) DEVICE — SUT 3-0 12-18IN SILK

## (undated) DEVICE — GOWN SURGICAL X-LARGE

## (undated) DEVICE — BLADE SURG #15 CARBON STEEL

## (undated) DEVICE — GLOVE BIOGEL ORTHOPEDIC 7

## (undated) DEVICE — SUT PROLENE RB-1 4-0

## (undated) DEVICE — URINARY DRAINAGE BAG

## (undated) DEVICE — SEE MEDLINE ITEM 152487

## (undated) DEVICE — KIT PEG PULL STANDARD 20F